# Patient Record
Sex: MALE | Race: WHITE | NOT HISPANIC OR LATINO | Employment: UNEMPLOYED | URBAN - METROPOLITAN AREA
[De-identification: names, ages, dates, MRNs, and addresses within clinical notes are randomized per-mention and may not be internally consistent; named-entity substitution may affect disease eponyms.]

---

## 2017-02-13 PROBLEM — R10.84 GENERALIZED ABDOMINAL PAIN: Status: ACTIVE | Noted: 2017-02-13

## 2017-02-13 PROBLEM — I16.1 HYPERTENSIVE EMERGENCY: Status: ACTIVE | Noted: 2017-02-13

## 2017-02-14 ENCOUNTER — HOSPITAL ENCOUNTER (INPATIENT)
Facility: MEDICAL CENTER | Age: 62
LOS: 3 days | DRG: 438 | End: 2017-02-17
Attending: HOSPITALIST | Admitting: HOSPITALIST
Payer: MEDICAID

## 2017-02-14 ENCOUNTER — RESOLUTE PROFESSIONAL BILLING HOSPITAL PROF FEE (OUTPATIENT)
Dept: HOSPITALIST | Facility: MEDICAL CENTER | Age: 62
End: 2017-02-14
Payer: MEDICAID

## 2017-02-14 PROCEDURE — A9270 NON-COVERED ITEM OR SERVICE: HCPCS | Performed by: HOSPITALIST

## 2017-02-14 PROCEDURE — 99223 1ST HOSP IP/OBS HIGH 75: CPT | Performed by: HOSPITALIST

## 2017-02-14 PROCEDURE — 770006 HCHG ROOM/CARE - MED/SURG/GYN SEMI*

## 2017-02-14 PROCEDURE — 700105 HCHG RX REV CODE 258: Performed by: HOSPITALIST

## 2017-02-14 PROCEDURE — 700112 HCHG RX REV CODE 229: Performed by: HOSPITALIST

## 2017-02-14 PROCEDURE — 700111 HCHG RX REV CODE 636 W/ 250 OVERRIDE (IP): Performed by: HOSPITALIST

## 2017-02-14 PROCEDURE — 700102 HCHG RX REV CODE 250 W/ 637 OVERRIDE(OP): Performed by: HOSPITALIST

## 2017-02-14 RX ORDER — BISACODYL 10 MG
10 SUPPOSITORY, RECTAL RECTAL
Status: DISCONTINUED | OUTPATIENT
Start: 2017-02-14 | End: 2017-02-17 | Stop reason: HOSPADM

## 2017-02-14 RX ORDER — AMLODIPINE BESYLATE 5 MG/1
5 TABLET ORAL
Status: DISCONTINUED | OUTPATIENT
Start: 2017-02-15 | End: 2017-02-15

## 2017-02-14 RX ORDER — HEPARIN SODIUM 5000 [USP'U]/ML
5000 INJECTION, SOLUTION INTRAVENOUS; SUBCUTANEOUS EVERY 8 HOURS
Status: DISCONTINUED | OUTPATIENT
Start: 2017-02-14 | End: 2017-02-16

## 2017-02-14 RX ORDER — ONDANSETRON 2 MG/ML
4 INJECTION INTRAMUSCULAR; INTRAVENOUS EVERY 4 HOURS PRN
Status: DISCONTINUED | OUTPATIENT
Start: 2017-02-14 | End: 2017-02-17 | Stop reason: HOSPADM

## 2017-02-14 RX ORDER — OXYCODONE HYDROCHLORIDE 10 MG/1
10 TABLET ORAL
Status: DISCONTINUED | OUTPATIENT
Start: 2017-02-14 | End: 2017-02-17 | Stop reason: HOSPADM

## 2017-02-14 RX ORDER — OXYCODONE HYDROCHLORIDE 5 MG/1
5 TABLET ORAL
Status: DISCONTINUED | OUTPATIENT
Start: 2017-02-14 | End: 2017-02-17 | Stop reason: HOSPADM

## 2017-02-14 RX ORDER — ENEMA 19; 7 G/133ML; G/133ML
1 ENEMA RECTAL
Status: DISCONTINUED | OUTPATIENT
Start: 2017-02-14 | End: 2017-02-17 | Stop reason: HOSPADM

## 2017-02-14 RX ORDER — ACETAMINOPHEN 325 MG/1
650 TABLET ORAL EVERY 6 HOURS PRN
Status: DISCONTINUED | OUTPATIENT
Start: 2017-02-14 | End: 2017-02-17 | Stop reason: HOSPADM

## 2017-02-14 RX ORDER — SODIUM CHLORIDE 9 MG/ML
INJECTION, SOLUTION INTRAVENOUS CONTINUOUS
Status: ACTIVE | OUTPATIENT
Start: 2017-02-14 | End: 2017-02-15

## 2017-02-14 RX ORDER — ONDANSETRON 4 MG/1
4 TABLET, ORALLY DISINTEGRATING ORAL EVERY 4 HOURS PRN
Status: DISCONTINUED | OUTPATIENT
Start: 2017-02-14 | End: 2017-02-17 | Stop reason: HOSPADM

## 2017-02-14 RX ORDER — PROMETHAZINE HYDROCHLORIDE 25 MG/1
12.5-25 SUPPOSITORY RECTAL EVERY 4 HOURS PRN
Status: DISCONTINUED | OUTPATIENT
Start: 2017-02-14 | End: 2017-02-17 | Stop reason: HOSPADM

## 2017-02-14 RX ORDER — MORPHINE SULFATE 4 MG/ML
4 INJECTION, SOLUTION INTRAMUSCULAR; INTRAVENOUS
Status: DISCONTINUED | OUTPATIENT
Start: 2017-02-14 | End: 2017-02-17 | Stop reason: HOSPADM

## 2017-02-14 RX ORDER — AMOXICILLIN 250 MG
1 CAPSULE ORAL
Status: DISCONTINUED | OUTPATIENT
Start: 2017-02-14 | End: 2017-02-17 | Stop reason: HOSPADM

## 2017-02-14 RX ORDER — TEMAZEPAM 15 MG/1
15 CAPSULE ORAL
Status: DISCONTINUED | OUTPATIENT
Start: 2017-02-14 | End: 2017-02-17 | Stop reason: HOSPADM

## 2017-02-14 RX ORDER — PROMETHAZINE HYDROCHLORIDE 25 MG/1
12.5-25 TABLET ORAL EVERY 4 HOURS PRN
Status: DISCONTINUED | OUTPATIENT
Start: 2017-02-14 | End: 2017-02-17 | Stop reason: HOSPADM

## 2017-02-14 RX ORDER — AMOXICILLIN 250 MG
1 CAPSULE ORAL NIGHTLY
Status: DISCONTINUED | OUTPATIENT
Start: 2017-02-14 | End: 2017-02-17 | Stop reason: HOSPADM

## 2017-02-14 RX ORDER — LACTULOSE 20 G/30ML
30 SOLUTION ORAL
Status: DISCONTINUED | OUTPATIENT
Start: 2017-02-14 | End: 2017-02-17 | Stop reason: HOSPADM

## 2017-02-14 RX ORDER — DOCUSATE SODIUM 100 MG/1
100 CAPSULE, LIQUID FILLED ORAL 2 TIMES DAILY
Status: DISCONTINUED | OUTPATIENT
Start: 2017-02-14 | End: 2017-02-17 | Stop reason: HOSPADM

## 2017-02-14 RX ADMIN — STANDARDIZED SENNA CONCENTRATE AND DOCUSATE SODIUM 1 TABLET: 8.6; 5 TABLET, FILM COATED ORAL at 22:57

## 2017-02-14 RX ADMIN — OXYCODONE HYDROCHLORIDE 10 MG: 10 TABLET ORAL at 22:57

## 2017-02-14 RX ADMIN — SODIUM CHLORIDE: 9 INJECTION, SOLUTION INTRAVENOUS at 22:58

## 2017-02-14 RX ADMIN — HEPARIN SODIUM 5000 UNITS: 5000 INJECTION, SOLUTION INTRAVENOUS; SUBCUTANEOUS at 22:57

## 2017-02-14 RX ADMIN — DOCUSATE SODIUM 100 MG: 100 CAPSULE ORAL at 22:57

## 2017-02-14 ASSESSMENT — LIFESTYLE VARIABLES
AVERAGE NUMBER OF DAYS PER WEEK YOU HAVE A DRINK CONTAINING ALCOHOL: 1
HAVE YOU EVER FELT YOU SHOULD CUT DOWN ON YOUR DRINKING: NO
CONSUMPTION TOTAL: NEGATIVE
TOTAL SCORE: 0
TOTAL SCORE: 0
HOW MANY TIMES IN THE PAST YEAR HAVE YOU HAD 5 OR MORE DRINKS IN A DAY: 0
EVER FELT BAD OR GUILTY ABOUT YOUR DRINKING: NO
TOTAL SCORE: 0
EVER_SMOKED: YES
DO YOU DRINK ALCOHOL: YES
HAVE PEOPLE ANNOYED YOU BY CRITICIZING YOUR DRINKING: NO
EVER HAD A DRINK FIRST THING IN THE MORNING TO STEADY YOUR NERVES TO GET RID OF A HANGOVER: NO
ON A TYPICAL DAY WHEN YOU DRINK ALCOHOL HOW MANY DRINKS DO YOU HAVE: 2

## 2017-02-14 ASSESSMENT — PAIN SCALES - GENERAL: PAINLEVEL_OUTOF10: 7

## 2017-02-15 ENCOUNTER — APPOINTMENT (OUTPATIENT)
Dept: RADIOLOGY | Facility: MEDICAL CENTER | Age: 62
DRG: 438 | End: 2017-02-15
Attending: HOSPITALIST
Payer: MEDICAID

## 2017-02-15 ENCOUNTER — HOSPITAL ENCOUNTER (OUTPATIENT)
Dept: RADIOLOGY | Facility: MEDICAL CENTER | Age: 62
End: 2017-02-15

## 2017-02-15 PROBLEM — K86.89 PANCREATIC MASS: Status: ACTIVE | Noted: 2017-02-15

## 2017-02-15 PROBLEM — N17.9 ACUTE KIDNEY INJURY (HCC): Status: ACTIVE | Noted: 2017-02-15

## 2017-02-15 PROBLEM — K85.90 PANCREATITIS: Status: ACTIVE | Noted: 2017-02-15

## 2017-02-15 LAB
ANION GAP SERPL CALC-SCNC: 7 MMOL/L (ref 0–11.9)
BUN SERPL-MCNC: 16 MG/DL (ref 8–22)
CALCIUM SERPL-MCNC: 8.7 MG/DL (ref 8.5–10.5)
CANCER AG19-9 SERPL-ACNC: 37.4 U/ML (ref 0–35)
CEA SERPL-MCNC: 4 NG/ML (ref 0–3)
CHLORIDE SERPL-SCNC: 103 MMOL/L (ref 96–112)
CO2 SERPL-SCNC: 26 MMOL/L (ref 20–33)
CREAT SERPL-MCNC: 1.19 MG/DL (ref 0.5–1.4)
ERYTHROCYTE [DISTWIDTH] IN BLOOD BY AUTOMATED COUNT: 41.7 FL (ref 35.9–50)
GFR SERPL CREATININE-BSD FRML MDRD: >60 ML/MIN/1.73 M 2
GLUCOSE SERPL-MCNC: 111 MG/DL (ref 65–99)
HCT VFR BLD AUTO: 41.3 % (ref 42–52)
HGB BLD-MCNC: 13.2 G/DL (ref 14–18)
LIPASE SERPL-CCNC: 237 U/L (ref 11–82)
MCH RBC QN AUTO: 28.6 PG (ref 27–33)
MCHC RBC AUTO-ENTMCNC: 32 G/DL (ref 33.7–35.3)
MCV RBC AUTO: 89.4 FL (ref 81.4–97.8)
PLATELET # BLD AUTO: 381 K/UL (ref 164–446)
PMV BLD AUTO: 11.3 FL (ref 9–12.9)
POTASSIUM SERPL-SCNC: 4.5 MMOL/L (ref 3.6–5.5)
RBC # BLD AUTO: 4.62 M/UL (ref 4.7–6.1)
SODIUM SERPL-SCNC: 136 MMOL/L (ref 135–145)
WBC # BLD AUTO: 14.8 K/UL (ref 4.8–10.8)

## 2017-02-15 PROCEDURE — A9270 NON-COVERED ITEM OR SERVICE: HCPCS | Performed by: INTERNAL MEDICINE

## 2017-02-15 PROCEDURE — 700112 HCHG RX REV CODE 229: Performed by: HOSPITALIST

## 2017-02-15 PROCEDURE — A9270 NON-COVERED ITEM OR SERVICE: HCPCS | Performed by: HOSPITALIST

## 2017-02-15 PROCEDURE — 700102 HCHG RX REV CODE 250 W/ 637 OVERRIDE(OP): Performed by: HOSPITALIST

## 2017-02-15 PROCEDURE — A9579 GAD-BASE MR CONTRAST NOS,1ML: HCPCS | Performed by: HOSPITALIST

## 2017-02-15 PROCEDURE — 36415 COLL VENOUS BLD VENIPUNCTURE: CPT

## 2017-02-15 PROCEDURE — 700117 HCHG RX CONTRAST REV CODE 255: Performed by: HOSPITALIST

## 2017-02-15 PROCEDURE — 74183 MRI ABD W/O CNTR FLWD CNTR: CPT

## 2017-02-15 PROCEDURE — 700111 HCHG RX REV CODE 636 W/ 250 OVERRIDE (IP): Performed by: HOSPITALIST

## 2017-02-15 PROCEDURE — 99233 SBSQ HOSP IP/OBS HIGH 50: CPT | Performed by: INTERNAL MEDICINE

## 2017-02-15 PROCEDURE — 86301 IMMUNOASSAY TUMOR CA 19-9: CPT

## 2017-02-15 PROCEDURE — 770006 HCHG ROOM/CARE - MED/SURG/GYN SEMI*

## 2017-02-15 PROCEDURE — 700102 HCHG RX REV CODE 250 W/ 637 OVERRIDE(OP): Performed by: INTERNAL MEDICINE

## 2017-02-15 PROCEDURE — 700111 HCHG RX REV CODE 636 W/ 250 OVERRIDE (IP): Performed by: INTERNAL MEDICINE

## 2017-02-15 PROCEDURE — 85027 COMPLETE CBC AUTOMATED: CPT

## 2017-02-15 PROCEDURE — 80048 BASIC METABOLIC PNL TOTAL CA: CPT

## 2017-02-15 PROCEDURE — 700105 HCHG RX REV CODE 258: Performed by: HOSPITALIST

## 2017-02-15 PROCEDURE — 83690 ASSAY OF LIPASE: CPT

## 2017-02-15 PROCEDURE — 82378 CARCINOEMBRYONIC ANTIGEN: CPT

## 2017-02-15 RX ORDER — LABETALOL HYDROCHLORIDE 5 MG/ML
20 INJECTION, SOLUTION INTRAVENOUS
Status: DISCONTINUED | OUTPATIENT
Start: 2017-02-15 | End: 2017-02-17 | Stop reason: HOSPADM

## 2017-02-15 RX ORDER — HYDRALAZINE HYDROCHLORIDE 20 MG/ML
20 INJECTION INTRAMUSCULAR; INTRAVENOUS
Status: DISCONTINUED | OUTPATIENT
Start: 2017-02-15 | End: 2017-02-17 | Stop reason: HOSPADM

## 2017-02-15 RX ORDER — BISACODYL 10 MG
10 SUPPOSITORY, RECTAL RECTAL
COMMUNITY
End: 2017-03-01

## 2017-02-15 RX ORDER — LISINOPRIL 20 MG/1
20 TABLET ORAL DAILY
Status: DISCONTINUED | OUTPATIENT
Start: 2017-02-15 | End: 2017-02-17 | Stop reason: HOSPADM

## 2017-02-15 RX ORDER — DOCUSATE SODIUM 100 MG/1
100 CAPSULE, LIQUID FILLED ORAL 2 TIMES DAILY PRN
Status: DISCONTINUED | OUTPATIENT
Start: 2017-02-15 | End: 2017-02-15

## 2017-02-15 RX ORDER — AMLODIPINE BESYLATE 10 MG/1
10 TABLET ORAL
Status: DISCONTINUED | OUTPATIENT
Start: 2017-02-15 | End: 2017-02-17 | Stop reason: HOSPADM

## 2017-02-15 RX ADMIN — HEPARIN SODIUM 5000 UNITS: 5000 INJECTION, SOLUTION INTRAVENOUS; SUBCUTANEOUS at 15:16

## 2017-02-15 RX ADMIN — OXYCODONE HYDROCHLORIDE 10 MG: 10 TABLET ORAL at 10:25

## 2017-02-15 RX ADMIN — STANDARDIZED SENNA CONCENTRATE AND DOCUSATE SODIUM 1 TABLET: 8.6; 5 TABLET, FILM COATED ORAL at 19:39

## 2017-02-15 RX ADMIN — HYDRALAZINE HYDROCHLORIDE 20 MG: 20 INJECTION INTRAMUSCULAR; INTRAVENOUS at 18:31

## 2017-02-15 RX ADMIN — HEPARIN SODIUM 5000 UNITS: 5000 INJECTION, SOLUTION INTRAVENOUS; SUBCUTANEOUS at 21:49

## 2017-02-15 RX ADMIN — LISINOPRIL 20 MG: 20 TABLET ORAL at 18:35

## 2017-02-15 RX ADMIN — DOCUSATE SODIUM 100 MG: 100 CAPSULE ORAL at 19:39

## 2017-02-15 RX ADMIN — HYDRALAZINE HYDROCHLORIDE 20 MG: 20 INJECTION INTRAMUSCULAR; INTRAVENOUS at 21:58

## 2017-02-15 RX ADMIN — SODIUM CHLORIDE: 9 INJECTION, SOLUTION INTRAVENOUS at 09:10

## 2017-02-15 RX ADMIN — GADODIAMIDE 20 ML: 287 INJECTION INTRAVENOUS at 14:40

## 2017-02-15 RX ADMIN — OXYCODONE HYDROCHLORIDE 10 MG: 10 TABLET ORAL at 16:08

## 2017-02-15 RX ADMIN — HEPARIN SODIUM 5000 UNITS: 5000 INJECTION, SOLUTION INTRAVENOUS; SUBCUTANEOUS at 05:20

## 2017-02-15 RX ADMIN — AMLODIPINE BESYLATE 10 MG: 10 TABLET ORAL at 09:26

## 2017-02-15 RX ADMIN — OXYCODONE HYDROCHLORIDE 5 MG: 5 TABLET ORAL at 19:39

## 2017-02-15 RX ADMIN — OXYCODONE HYDROCHLORIDE 10 MG: 10 TABLET ORAL at 05:20

## 2017-02-15 ASSESSMENT — PAIN SCALES - GENERAL
PAINLEVEL_OUTOF10: 5
PAINLEVEL_OUTOF10: 6
PAINLEVEL_OUTOF10: 8
PAINLEVEL_OUTOF10: 7
PAINLEVEL_OUTOF10: 6
PAINLEVEL_OUTOF10: 4

## 2017-02-15 NOTE — H&P
CHIEF COMPLAINT:  Abdominal pain.    HISTORY OF PRESENT ILLNESS:  This is a 61-year-old gentleman who is   transferred to our care from [facility].  He reports that he   has been having some abdominal pain for about 2 weeks.  He describes as   cramping sensation located, more so on the upper abdomen and it does radiate   to his lower back; however.  He has also noticed a decreased appetite as when   he eats, it triggers his pain to be worse.  There is nothing else that makes   it better and nothing else makes it worse that he is aware of.  He thinks he   lost about 20 pounds in the space of the last 3-4 weeks.    Patient was seen at an urgent care and thought to be perhaps suffering from   diverticulitis and was treated empirically for this.  He did not get better.    He therefore, presented for evaluation to the emergency room at [facility].  Workup there included a CT which has demonstrated a   pancreatic mass, he has been transferred to our facility for further   evaluation.    REVIEW OF SYSTEMS:  Positive as noted above, otherwise positive for   constipation.  All other systems are reviewed and negative.    PREVIOUS MEDICAL HISTORY:  The patient denies any previous medical history as   he is not seeing physicians of late.  When he saw the urgent care mid level,   he was noted to be hypertensive and started on blood pressure medication.    This is his only previous medical history that he is aware of.    MEDICATIONS:  Lisinopril 20 mg p.o. daily.    ALLERGIES:  No known drug allergies.    SOCIAL HISTORY:  Patient smokes about greater than 40 pack years now.  He   drinks alcohol 1-2 beers every few weeks.    SURGICAL HISTORY:  Denies.    FAMILY HISTORY:  Reviewed but not relevant to presentation.    PHYSICAL EXAMINATION:  VITAL SIGNS:  Temperature 36.9, heart rate 84, respiratory rate 20, /96,   sating 91% on room air.  GENERAL:  The patient is awake, alert.  He is in no  acute distress.  HEENT:  Head is normocephalic and atraumatic.  Mucous membranes are moist.    Sclerae and conjunctivae are benign.  NECK:  Trachea is in the midline.  There is no JVD.  There are no bruits.  RESPIRATORY:  Clear to auscultation bilaterally.  CARDIAC:  Regular rate and rhythm.  No murmurs, rubs or clicks.  ABDOMEN:  Soft, no guarding, rebound, hepatosplenomegaly or masses.  There is   minimal tenderness to palpation in the epigastric region.  EXTREMITIES:  No clubbing, cyanosis or edema.  Calves are nontender to   palpation.  Peripheral pulses maintained and symmetric.  SKIN:  Warm, dry, no rashes appreciated.  NEUROLOGIC:  Alert and oriented.  Speech clear, content logical.  PSYCHIATRIC:  Mood and affect are appropriate.  Hygiene neat and clean.    LABORATORY DATA:  White count 17.2, hemoglobin 14.1, platelet count 402.    Sodium 136, potassium 3.9, BUN 22, creatinine 1.5.  Calculated GFR 48.  LFTs   are unremarkable.  Albumin is low at 2.2, and lipase is 980.  UA, nitrite and   leukocyte esterase negative.    IMAGING STUDIES:  CT of the abdomen and pelvis reviewed from the transferring   facility, large multiseptated cystic mass involving the pancreatic tail and   body.    ASSESSMENT AND PLAN:  A 61-year-old gentleman with problem list as follows:  1.  Pancreatic mass:  The patient will be admitted, MRI of the abdomen will be   done and we will consult GI or surgery depending on the results.  2.  Abdominal pain:  Likely secondary to above.  3.  Acute kidney injury:  Unclear if this is acute or acute on chronic.    Patient does carry significant hypertension, which is newly diagnosed and   certainly this could explain it.  There is no evidence of obstructive etiology   on CT; however, there is some evidence of chronic kidney disease on CT.  4.  Hypertension:  Stop the patient's ACE inhibitor given his renal function   and place him on amlodipine 10 mg p.o. daily.  Monitor and titrate closely.  5.   Tobacco use:  Encourage cessation.       ____________________________________     DO PATRICK Duarte / ANTONIO    DD:  02/15/2017 02:32:53  DT:  02/15/2017 03:05:02    D#:  [id number]  Job#:  [id number]

## 2017-02-15 NOTE — PROGRESS NOTES
Assumed patient care at 0715. A&OX4. Assessment complete. PIV right ac running NS at 100mL/hr. NPO at this time- scheduled MRI. Patient resting in bed. Personal belongings and call light within reach.

## 2017-02-15 NOTE — CARE PLAN
Problem: Communication  Goal: The ability to communicate needs accurately and effectively will improve  Outcome: PROGRESSING AS EXPECTED  Pt capable of verbalizing all needs and utilizes call light system approprietly.    Problem: Safety  Goal: Will remain free from injury  Outcome: PROGRESSING AS EXPECTED  Pt fall risk, pt wearing treaded socks, bed locked and in lowest position.  Pt refusing bed alarm, educated on need but still refusing.  Pt call light and phone within reach.

## 2017-02-15 NOTE — PROGRESS NOTES
Cannot access films to assess pancreas.  Please ask to have films pushed to [facility].  Also, needs GI consult for EUS and social service to help attaining insurance coverage for possible surgery and EUS.

## 2017-02-15 NOTE — CONSULTS
DATE OF SERVICE:  02/15/2017    HISTORY OF PRESENT ILLNESS:  Patient is a 61-year-old male patient who was   transferred from an outside [facility] to [facility] after having been diagnosed with what appears to be a large   mass in the body and tail of the pancreas.  The patient is somewhat of a poor   historian, but has a history of alcoholism and alcohol abuse, back 10 years   ago, but started developing severe abdominal pain in the epigastric region   approximately two weeks ago.  He states that it is a cramping pain in the   upper abdomen.  It did not radiate anywhere.  He denied any upper or lower GI   bleed.  He denies any unusual pancreatic disease in the family, but when he   ate it appeared that it did get worse.  In any event, he also complains of   about 20-pound weight loss, but he is morbidly obese at this time.  The   patient states that he went to urgent care, thought he was suffering from   diverticulitis, which he has had in the past, but he was then sent from there   to the ER at [facility] where he got a CT scan, which   revealed a large pancreatic cystic lesion with somewhat thickened walls and   then he was transferred to [facility] for further workup.    REVIEW OF SYSTEMS:  The patient denies any upper or lower GI bleed.  He denies   ever being jaundiced.  The patient denies any vision or hearing loss.  He   denies any nausea or vomiting, but he does complain of chronic pain.  He   denies any chest pain, shortness of breath, or neurological dysfunction.    PAST MEDICAL HISTORY:  In the past again, he used to drink heavily.  He said   he stopped 10 years ago.  He also has a history of hypertension, but he is on   blood pressure medication.    MEDICATIONS:  Right now, he is on lisinopril 20 mg a day.    ALLERGIES:  He says he has no known drug allergies.    SOCIAL HISTORY:  He does smoke a pack a day, he says for the last 40 years and   then  he says he drinks 1-2 beers every couple of weeks, but states that he   does not have any more abuse of alcohol for the last 10 years.  VITAL SIGNS:  He is afebrile, vital signs are stable.  HEENT:  Extraocular movements intact.  No sign of jaundice.  NECK:  Soft and supple.  There is no cervical, supraclavicular, or occipital   lymphadenopathy.  The patient has poor dentition.  LUNGS:  Clear to auscultation.  Moderate inspiratory effort.  CARDIOVASCULAR:  Regular rhythm.  ABDOMEN:  Soft.  He has some discomfort in the upper epigastrium and left   upper quadrant consistent with the region of the cyst.  He is obese and   distended.  EXTREMITIES:  No clubbing, cyanosis, or edema.    ASSESSMENT AND PLAN:  Finally, the patient has what appears to be either a   pseudocyst or a large mucinous cystic neoplasm or intraductal papillary   mucinous neoplasm.  The patient is in need of the endoscopic ultrasound and GI   consultation for evaluation.  Also, the patient does have some challenges   with regards to social issues and the patient has no insurance and setting up   for either surgery and endoscopic ultrasound would be an extreme challenge due   to his lack of coverage.  I would appreciate if  can get   involved and help the patient to acquire coverage for his care.  In the   meantime, I would recommend having him see GI and the patient can see me as an   outpatient once his workup and biopsies have been done.       ____________________________________     MD NATALI CARABALLO / ANTONIO    DD:  02/15/2017 10:45:28  DT:  02/15/2017 15:17:55    D#:  [id number]  Job#:  [id number]

## 2017-02-15 NOTE — PROGRESS NOTES
Pt direct admit arrived by emilee.  Pt ambulated to bed with stand by assist.  Pt assessed and 2 RN skin assessment complete, skin intact.  Updated pt on unit routine including call light system, hourly rounding, white board info, need for bed alarm, and visitors policy.  Bed in lowest position, locked, pt wearing treaded socks, and pt instructed on need to call for assistance prior to getting out of bed.  Pt refusing bed alarm, pt educated on need for bed alarm but still refusing.  Call light, phone, and personal belongs within reach, white board updated.

## 2017-02-15 NOTE — DIETARY
"Nutrition Services: Poor Oral Intake and Unplanned Weight Loss  62 yo male with admit dx: pancreatitis, pancreatic mass  PMH: HTN    Per nutrition admit screen, patient with poor oral intake and unplanned weight loss. The patient reports pain associated with eating that has been ongoing for the last 2-3 weeks; in that time frame he has lost ~20 lbs (8%, SEVERE). The patient is currently on a regular diet. Briefly discussed appropriate diet modifications for pancreatitis such as reducing fat and trying to eat smaller more frequent meals. The patient was receptive to adding snacks between meals as he is only able to tolerance small amounts of food at this time.     Diet order: Regular  Pertinent Labs: Lipase 237  Pertinent Meds: amlodipine, colace, heparin, senokot.   Fluids: NS infusion 100 ml/hr  GI: last BM 2/11  WT: 99.1 kg Bed Scale weight  HT: 6'1\"  BMI: 28.83 kg/m^2  SKIN: no skin breakdown noted  PLAN - Nutrition Rep to see patient daily for meal choices.  RECOMMEND - Low fat, small frequent meals. Obtain weights to monitor fluid and nutrition status. Please document all intake as percentage of meal consumed. RD to follow clinical course.    "

## 2017-02-15 NOTE — PROGRESS NOTES
Direct admit from Dr. Huang at [facility]. Dr. Madden accepting for Pancreatitis and Pancreatic mass. ADT signed and held 02/14/2017 at 1925, will need to be released upon patient arrival to unit. Patient arriving via ground transport.

## 2017-02-16 VITALS
SYSTOLIC BLOOD PRESSURE: 165 MMHG | HEART RATE: 88 BPM | BODY MASS INDEX: 28.96 KG/M2 | OXYGEN SATURATION: 93 % | WEIGHT: 218.48 LBS | DIASTOLIC BLOOD PRESSURE: 87 MMHG | RESPIRATION RATE: 18 BRPM | TEMPERATURE: 97.9 F | HEIGHT: 73 IN

## 2017-02-16 LAB
ALBUMIN SERPL BCP-MCNC: 2.6 G/DL (ref 3.2–4.9)
ALBUMIN/GLOB SERPL: 0.7 G/DL
ALP SERPL-CCNC: 66 U/L (ref 30–99)
ALT SERPL-CCNC: 8 U/L (ref 2–50)
ANION GAP SERPL CALC-SCNC: 10 MMOL/L (ref 0–11.9)
AST SERPL-CCNC: 9 U/L (ref 12–45)
BILIRUB SERPL-MCNC: 0.2 MG/DL (ref 0.1–1.5)
BUN SERPL-MCNC: 12 MG/DL (ref 8–22)
CALCIUM SERPL-MCNC: 8.7 MG/DL (ref 8.5–10.5)
CHLORIDE SERPL-SCNC: 102 MMOL/L (ref 96–112)
CO2 SERPL-SCNC: 21 MMOL/L (ref 20–33)
CREAT SERPL-MCNC: 1.04 MG/DL (ref 0.5–1.4)
EKG IMPRESSION: NORMAL
ERYTHROCYTE [DISTWIDTH] IN BLOOD BY AUTOMATED COUNT: 41.1 FL (ref 35.9–50)
GFR SERPL CREATININE-BSD FRML MDRD: >60 ML/MIN/1.73 M 2
GLOBULIN SER CALC-MCNC: 3.8 G/DL (ref 1.9–3.5)
GLUCOSE SERPL-MCNC: 109 MG/DL (ref 65–99)
HCT VFR BLD AUTO: 39.3 % (ref 42–52)
HGB BLD-MCNC: 13 G/DL (ref 14–18)
LIPASE SERPL-CCNC: 163 U/L (ref 11–82)
MCH RBC QN AUTO: 29 PG (ref 27–33)
MCHC RBC AUTO-ENTMCNC: 33.1 G/DL (ref 33.7–35.3)
MCV RBC AUTO: 87.5 FL (ref 81.4–97.8)
PLATELET # BLD AUTO: 324 K/UL (ref 164–446)
PMV BLD AUTO: 11 FL (ref 9–12.9)
POTASSIUM SERPL-SCNC: 3.7 MMOL/L (ref 3.6–5.5)
PROT SERPL-MCNC: 6.4 G/DL (ref 6–8.2)
RBC # BLD AUTO: 4.49 M/UL (ref 4.7–6.1)
SODIUM SERPL-SCNC: 133 MMOL/L (ref 135–145)
WBC # BLD AUTO: 13.8 K/UL (ref 4.8–10.8)

## 2017-02-16 PROCEDURE — 700111 HCHG RX REV CODE 636 W/ 250 OVERRIDE (IP): Performed by: HOSPITALIST

## 2017-02-16 PROCEDURE — 99233 SBSQ HOSP IP/OBS HIGH 50: CPT | Performed by: INTERNAL MEDICINE

## 2017-02-16 PROCEDURE — 700111 HCHG RX REV CODE 636 W/ 250 OVERRIDE (IP): Performed by: INTERNAL MEDICINE

## 2017-02-16 PROCEDURE — A9270 NON-COVERED ITEM OR SERVICE: HCPCS | Performed by: HOSPITALIST

## 2017-02-16 PROCEDURE — 80053 COMPREHEN METABOLIC PANEL: CPT

## 2017-02-16 PROCEDURE — 83690 ASSAY OF LIPASE: CPT

## 2017-02-16 PROCEDURE — 85027 COMPLETE CBC AUTOMATED: CPT

## 2017-02-16 PROCEDURE — 700102 HCHG RX REV CODE 250 W/ 637 OVERRIDE(OP): Performed by: HOSPITALIST

## 2017-02-16 PROCEDURE — 93005 ELECTROCARDIOGRAM TRACING: CPT | Performed by: INTERNAL MEDICINE

## 2017-02-16 PROCEDURE — 770006 HCHG ROOM/CARE - MED/SURG/GYN SEMI*

## 2017-02-16 PROCEDURE — 93010 ELECTROCARDIOGRAM REPORT: CPT | Performed by: INTERNAL MEDICINE

## 2017-02-16 PROCEDURE — 36415 COLL VENOUS BLD VENIPUNCTURE: CPT

## 2017-02-16 PROCEDURE — A9270 NON-COVERED ITEM OR SERVICE: HCPCS | Performed by: INTERNAL MEDICINE

## 2017-02-16 PROCEDURE — 700102 HCHG RX REV CODE 250 W/ 637 OVERRIDE(OP): Performed by: INTERNAL MEDICINE

## 2017-02-16 PROCEDURE — 700112 HCHG RX REV CODE 229: Performed by: HOSPITALIST

## 2017-02-16 RX ORDER — CLONIDINE HYDROCHLORIDE 0.1 MG/1
0.1 TABLET ORAL TWICE DAILY
Status: DISCONTINUED | OUTPATIENT
Start: 2017-02-16 | End: 2017-02-17 | Stop reason: HOSPADM

## 2017-02-16 RX ORDER — CLONIDINE HYDROCHLORIDE 0.1 MG/1
0.2 TABLET ORAL EVERY 4 HOURS PRN
Status: DISCONTINUED | OUTPATIENT
Start: 2017-02-16 | End: 2017-02-17 | Stop reason: HOSPADM

## 2017-02-16 RX ADMIN — STANDARDIZED SENNA CONCENTRATE AND DOCUSATE SODIUM 1 TABLET: 8.6; 5 TABLET, FILM COATED ORAL at 20:32

## 2017-02-16 RX ADMIN — HEPARIN SODIUM 5000 UNITS: 5000 INJECTION, SOLUTION INTRAVENOUS; SUBCUTANEOUS at 05:33

## 2017-02-16 RX ADMIN — HYDRALAZINE HYDROCHLORIDE 20 MG: 20 INJECTION INTRAMUSCULAR; INTRAVENOUS at 13:44

## 2017-02-16 RX ADMIN — BISACODYL 10 MG: 10 SUPPOSITORY RECTAL at 13:54

## 2017-02-16 RX ADMIN — MORPHINE SULFATE 4 MG: 4 INJECTION INTRAVENOUS at 11:41

## 2017-02-16 RX ADMIN — OXYCODONE HYDROCHLORIDE 10 MG: 10 TABLET ORAL at 18:17

## 2017-02-16 RX ADMIN — MORPHINE SULFATE 4 MG: 4 INJECTION INTRAVENOUS at 20:54

## 2017-02-16 RX ADMIN — AMLODIPINE BESYLATE 10 MG: 10 TABLET ORAL at 09:05

## 2017-02-16 RX ADMIN — LISINOPRIL 20 MG: 20 TABLET ORAL at 09:04

## 2017-02-16 RX ADMIN — OXYCODONE HYDROCHLORIDE 10 MG: 10 TABLET ORAL at 09:04

## 2017-02-16 RX ADMIN — OXYCODONE HYDROCHLORIDE 10 MG: 10 TABLET ORAL at 05:33

## 2017-02-16 RX ADMIN — HYDRALAZINE HYDROCHLORIDE 20 MG: 20 INJECTION INTRAMUSCULAR; INTRAVENOUS at 03:22

## 2017-02-16 RX ADMIN — LACTULOSE 30 ML: 10 SOLUTION ORAL at 09:07

## 2017-02-16 RX ADMIN — DOCUSATE SODIUM 100 MG: 100 CAPSULE ORAL at 20:32

## 2017-02-16 RX ADMIN — OXYCODONE HYDROCHLORIDE 5 MG: 5 TABLET ORAL at 00:55

## 2017-02-16 RX ADMIN — MORPHINE SULFATE 4 MG: 4 INJECTION INTRAVENOUS at 15:46

## 2017-02-16 RX ADMIN — CLONIDINE HYDROCHLORIDE 0.1 MG: 0.1 TABLET ORAL at 18:17

## 2017-02-16 RX ADMIN — HYDRALAZINE HYDROCHLORIDE 20 MG: 20 INJECTION INTRAMUSCULAR; INTRAVENOUS at 17:15

## 2017-02-16 ASSESSMENT — PAIN SCALES - GENERAL
PAINLEVEL_OUTOF10: 6
PAINLEVEL_OUTOF10: 6
PAINLEVEL_OUTOF10: 5
PAINLEVEL_OUTOF10: 6
PAINLEVEL_OUTOF10: 7
PAINLEVEL_OUTOF10: 4
PAINLEVEL_OUTOF10: 7
PAINLEVEL_OUTOF10: 10
PAINLEVEL_OUTOF10: 8
PAINLEVEL_OUTOF10: 9
PAINLEVEL_OUTOF10: 10

## 2017-02-16 NOTE — PROGRESS NOTES
MD paged regarding elevated blood pressure, and family requesting MRI results. Dr. Harrison instructed to give IV labetelol and he reported results will be read in the morning. Pharmacy reporting a shortage of Labetelol. MD re-paged.

## 2017-02-16 NOTE — DISCHARGE PLANNING
Pt's transportation is set up for [facility].  Per RN Papito, DIALLO Flynn will ride with pt for procedure.      Per [facility] Rebecca, pt's [facility] ride back to main will cost $842.  THU obtained verbal consent from Supervisor Tabitha Emanuel.  THU faxed approved service form to [facility].

## 2017-02-16 NOTE — PROGRESS NOTES
Gi update.  He is scheduled for EUS eval tomorrow at 0930 at [facility]. Needs to be there by 0730. NPO, EKG and DC heparin orders placed.

## 2017-02-16 NOTE — CARE PLAN
"Problem: Knowledge Deficit  Goal: Knowledge of disease process/condition, treatment plan, diagnostic tests, and medications will improve  Pt stated, \"I have had high blood pressure for a long time. I quit taking any blood pressure medications about 6 years ago.\" Educated pt on the importance of finding a primary care doctor once discharged from hospital. Pt stated, \"we are still trying to figure out my lack of insurance.\"    Problem: Pain Management  Goal: Pain level will decrease to patient’s comfort goal  Pt c/o constant pain in abdomen. Medicated pt per MAR.         "

## 2017-02-16 NOTE — PROGRESS NOTES
GI Consults  Re: pancreatic cystic mass    62yo male initially seen 3 weeks ago [place] for abd pain and treated empirically for diverticulitis.  Returned to [place] clinic 2/13 with ongoing generalized abd pain, change in bowel habits/constipation, 20# weight loss, sweats and fatigue.  He was noted to have /116 and started on lisinopril.  Pain cont'd and presented to [facility] 2/14 with acute pancreatitis and CT showing large pancreatic cystic mass.  Transferred to [facility], had MR pancreas and was seen by Dr. Ghosh who recommended GI consult for EUS.    Past hx of alcohol abuse but occasional beer only now.  No gallbladder disease.  No meds other than lisinopril started day before presentation to ER.  No family hx of pancreatic disease.    Labs:  2/15 CEA 4, CA 19-9 37.4  2/16 lipase 163, alb 2.6, nl liver tests    Impression:  1.  13 x 17 cm mutiseptated cystic mass in body and tail of pancreas  2.  Acute pancreatitis  3.  Generalized abdominal pain: mid abd right and left + epigastric pain  4.  Change in bowel habits  5.  Weight loss  6.  Morbidly obese  7.  HTN  8.  Hx alcohol abuse and quit regular consumption 10 years ago  9.  FAtty liver  10.  Smoker  10.  Uninsured/social issue    Recs:  1.  Dr. Oconnor will be assuming GI service this am  2.  Will work on scheduling EUS with anesthesia at [facility].  Unknown if this can be done before next week

## 2017-02-16 NOTE — DISCHARGE PLANNING
Per [facility], Herminia willett is unable to take pt that early.  THU faxed approved service form to [facility] in the amount of $40.

## 2017-02-16 NOTE — PROGRESS NOTES
Assumed care at 1915.  Report received from day shift RN. Pt A&Ox4, pt ambulatory. Pt reports pain 8/10, medicated per MAR.  Pt educated on hourly rounding and phone extension numbers. Pt board has been updated. Pt denies any additional needs at this time.  Call light and phone within reach.  Pt refusing bed alarm at this time. Pt educated on safety risk and continues to refuse. Pt educated on importance of calling for assistance and verbalizes understanding.

## 2017-02-16 NOTE — CONSULTS
DATE OF SERVICE:  02/16/2017    REFERRING PHYSICIAN:  _____.    REASON FOR THE CONSULT:  Pancreatic cystic mass.    HISTORY OF PRESENT ILLNESS:  The patient is a 61-year-old  who lives in   the [facility], was initially seen 2-3 weeks ago at a clinic in [facility] for   generalized abdominal pain and constipation.  He had been trying Metamucil   and milk of magnesia, but was unable to get relief.  He was treated   empirically for diverticulitis with an unknown antibiotic.  He represented to   a clinic in [place] on February 13th with generalized abdominal pain.  He was   urged to go to the ER.  On that initial presentation, he had a blood pressure   of 210/116 and was started on lisinopril.  The following day, he presented to   [facility] with the same symptoms of generalized abdominal   pain, constipation, 20-pound weight loss, loss of appetite, drenching sweats   and fatigue.  He underwent a CT of the abdomen and pelvis with contrast, was   found to have a cystic pancreatic mass in the body and tail.  He also was   found to have acute pancreatitis.    He has a past history of alcohol abuse, but nothing on a regular basis.  He   drinks an occasional beer.  He has not been started on any new medications   other than lisinopril 1 day before his presentation.  There is no family   history of pancreatic disease or cancer.  He did not have any evidence of   gallbladder disease on his CT.    ALLERGIES:  None.    MEDICATIONS PRIOR TO ADMISSION:  Lisinopril.    SURGERIES:  None.    MEDICAL ILLNESSES:  Remote alcohol abuse, hypertension.    SOCIAL HISTORY:  He works as a part-time .  He lives with his   girlfriend.  He smokes 1 pack of cigarettes a day for the past 40 years.  He   has an occasional beer.    FAMILY HISTORY:  Mother had cardiac disease at age 80.  Father had head and   neck cancer.    REVIEW OF SYSTEMS:  GENERAL:  A 20-pound weight loss with fatigue.  He has had drenching sweats    occurring both day and night.  He has had loss of appetite.  HEENT:  No double vision.  No severe headaches.  PULMONARY:  No shortness of breath, wheezing, or cough.  CARDIOVASCULAR:  No chest pain.  GASTROINTESTINAL:  As above.  GENITOURINARY:  No hematuria.  MUSCULOSKELETAL:  No complaints of muscle pain, has had mid to low back pain   of 3 weeks' duration.  ENDOCRINE:  No diabetes or thyroid disease.  SKIN:  No rashes or pruritus.    PHYSICAL EXAMINATION:  VITAL SIGNS:  Temperature 37.4, pulse 89, respirations 17, blood pressure   156/81.  GENERAL:  This is a 61-year-old male sitting up in bed in no acute distress.  HEENT:  His sclerae are anicteric.  Pupils are round.  No oral lesions.    Mallampati II, near edentulous.  NECK:  Soft.  No adenopathy, no thyromegaly.  LUNGS:  Clear to auscultation.  CARDIOVASCULAR:  S1, S2, without murmur, gallop or rub.  ABDOMEN:  Protuberant, bowel sounds present, no distinct palpable masses.    Tender mid right abdomen, tender mid left abdomen and epigastric regions.  No   rebound or guarding.  RECTAL:  Deferred.  EXTREMITIES:  No clubbing, cyanosis or peripheral edema.  SKIN:  Smooth, moist, and warm.  NEUROLOGIC:  He is awake, alert, oriented, moving all extremities.    LABORATORY DATA:  February 14th, WBC 17.2, hemoglobin 14.1, hematocrit 43.6,   platelets 402.  Sodium 136, potassium 3.9, chloride 100, bicarbonate 29, BUN   22, creatinine 1.5, glucose 118, lipase 980.  February 15th, CA 19-9 37.4, CEA   4.0.  February 16th, WBC 13.8, hemoglobin 13, hematocrit 39.3, platelets 324,   BUN 12, creatinine 1.04, albumin 2.6, lipase 163.  February 14th, CT of the   abdomen and pelvis with contrast revealed fatty liver, negative gallbladder,   and a 13x7 cm multiseptated cystic mass in the body and the tail with the   possibility of occlusion of the splenic vein.  February 15th, MRI of the   pancreas, 13x17.5 cm mass with multiple irregular shaped cystic components,   occupying  the pancreatic body and tail.  There is enhancement of the tissue   between the cystic areas within the mass.  There is induration and stranding   areas of enhancement surrounding this portion of the pancreas extending into   the anterior pararenal space on the left.  The mass does not appear to contact   the superior mesenteric artery.  The portal confluence and superior   mesenteric vein are located immediately adjacent to the mass.  No upper   abdominal or retroperitoneal adenopathy.  Radiologically, the differential   diagnosis is pancreatic carcinoma with necrotic lesion, cystic neoplasm of the   pancreas, or multiple pseudocysts.    IMPRESSION:  1.  A 13x17.5 cm multiseptated cystic mass in the body and the tail.  2.  Acute pancreatitis.  3.  Generalized abdominal pain.  4.  A 3-week history of mid to low back pain.  5.  Change in bowel habits.  6.  Weight loss.  7.  Morbidly obese.  8.  Hypertension.  9.  History of alcohol abuse and quit regular consumption 10 years prior.  10.  Fatty liver.  11.  Smoker.  12.  Uninsured/social issues.    RECOMMENDATIONS:  1.  Dr. Oconnor will be assuming the GI service today and I will discuss the   patient with him.  2.  When my office is open, we will work on scheduling endoscopic ultrasound   with anesthesia at [facility].  It is unknown if this will be done prior to   next week.  3.  He has not had a bowel movement in 4 days and feeling uncomfortable.  He   is receiving laxatives and if he has not had a bowel movement by tomorrow, he   may need enemas and magnesium citrate.       ____________________________________     MD ASHLEIGH GEE / ANTONIO    DD:  02/16/2017 07:53:43  DT:  02/16/2017 08:41:12    D#:  [id number]  Job#:  [id number]

## 2017-02-16 NOTE — DISCHARGE PLANNING
Care Transition Team Assessment    Information Source  Orientation : Oriented x 4  Information Given By: Patient  Informant's Name: Brian Jasmine  Who is responsible for making decisions for patient? : Patient    Elopement Risk  Legal Hold: No  Ambulatory or Self Mobile in Wheelchair: Yes  Disoriented: No  Psychiatric Symptoms: None  History of Wandering: No  Elopement this Admit: No  Vocalizing Wanting to Leave: No  Displays Behaviors, Body Language Wanting to Leave: No-Not at Risk for Elopement  Elopement Risk: Not at Risk for Elopement    Interdisciplinary Discharge Planning  Does Admitting Nurse Feel This Could be a Complex Discharge?: No  Primary Care Physician: None  Lives with - Patient's Self Care Capacity: Significant Other  Patient or legal guardian wants to designate a caregiver (see row info): No  Support Systems: Spouse / Significant Other, Family Member(s), Children  Housing / Facility: [address]  Do You Take your Prescribed Medications Regularly: Yes  Able to Return to Previous ADL's: Yes  Mobility Issues: No  Prior Services: None  Patient Expects to be Discharged to:: Home  Assistance Needed: No  Durable Medical Equipment: Not Applicable    Discharge Preparedness  What are your discharge supports?: Child, Spouse, Sibling  Prior Functional Level: Ambulatory    Functional Assesment  Prior Functional Level: Ambulatory    Finances  Financial Barriers to Discharge: Yes  Source of Income: Other (comment) (Self Employed)  Prescription Coverage: No  Prescription Coverage Comments: No insurance    Vision / Hearing Impairment  Vision Impairment : No  Hearing Impairment : No    Values / Beliefs / Concerns  Values / Beliefs Concerns : No  Special Hospitalization Concerns: none    Advance Directive  Advance Directive?: None  Advance Directive offered?: AD Booklet given    Domestic Abuse  Have you ever been the victim of abuse or violence?: No  Physical Abuse or Sexual Abuse: No  Verbal Abuse or Emotional  Abuse: No  Possible Abuse Reported to:: Not Applicable    Psychological Assessment  History of Substance Abuse: None  History of Psychiatric Problems: No  Non-compliant with Treatment: No  Newly Diagnosed Illness: No    Discharge Risks or Barriers  Discharge risks or barriers?: Uninsured / underinsured    Anticipated Discharge Information  Anticipated discharge disposition: Home

## 2017-02-16 NOTE — DISCHARGE PLANNING
Transport arranged with Abhay. Patient will be leaving [facility] tomorrow 2/17 after surgery around 1200 via [facility] going back to [facility]. THU Sim notified.

## 2017-02-16 NOTE — DISCHARGE PLANNING
Transport arranged with Grab[facility]. Patient will be leaving tomorrow morning @0600 via the [facility] van going to [facility] for a procedure. THU Sim notified.

## 2017-02-16 NOTE — PROGRESS NOTES
Patient scheduled for endoscopy ultrasound on 2/17, [facility]. Social work arranging transport. Patient to be NPO starting midnight 2/16 per MD.

## 2017-02-16 NOTE — DISCHARGE PLANNING
Received approved services from THU Sim. Sent form to [facility] for $842.     Per Grabiel in transport, the [facility] cannot take the patient to  because they do not have drivers that early. [facility] received a quote from [employer] at [facility] of $40. Patient will be leaving tomorrow morning @0615 via [facility] going to [facility]. Approved services form faxed to [facility].

## 2017-02-16 NOTE — PROGRESS NOTES
HOSPITALIST PROGRESS NOTE (SOAP)      CHIEF COMPLAINT  61 y.o.yo male presented 2/14/2017 with abdominal pain    Subjective  Chart reviewed. Still having mild abdominal tenderness but otherwise he denies nausea, vomiting. He said he has eaten very little and has not moved his bowel for a few days now.    PHYSICAL EXAM  Vitals reviewed  General/Constitutional: No acute distress.   Head: Normocephalic, atraumatic  ENT: Mucous membranes are moist.  Eyes: Pink conjunctiva, no scleral icterus  Neck: Supple, no lymphadenopathy  Cardiovascular: Normal rate and regular rhythm. S1,2 noted. No murmurs, gallops or rubs.  Pulmonary: Clear to auscultation bilaterally. No wheezes, rales or rhonchi  Abdominal: Soft, mild diffuse tenderness, not distended, bowel sounds normoactive.  Musculoskeletal: No tenderness to palpation of chest wall.  Neurologic: Grossly nonfocal, moving all extremities.  Skin: No obvious rash.  Psychiatric: Pleasant, cooperative.    LABS  Lab Results   Component Value Date/Time    WBC 14.8* 02/15/2017 04:27 AM    RBC 4.62* 02/15/2017 04:27 AM    HEMOGLOBIN 13.2* 02/15/2017 04:27 AM    HEMATOCRIT 41.3* 02/15/2017 04:27 AM    MCV 89.4 02/15/2017 04:27 AM    MCH 28.6 02/15/2017 04:27 AM    MCHC 32.0* 02/15/2017 04:27 AM    MPV 11.3 02/15/2017 04:27 AM      Lab Results   Component Value Date/Time    SODIUM 136 02/15/2017 04:27 AM    POTASSIUM 4.5 02/15/2017 04:27 AM    CHLORIDE 103 02/15/2017 04:27 AM    CO2 26 02/15/2017 04:27 AM    GLUCOSE 111* 02/15/2017 04:27 AM    BUN 16 02/15/2017 04:27 AM    CREATININE 1.19 02/15/2017 04:27 AM      No results found for: PROTHROMBTM, INR   No results found for: BLOODCULTU, BLDCULT, BCHOLD    ASSESSMENT and PLAN    Generalized abdominal pain  Pancreatic mass  Pancreatitis  Consulted Pancreaticobiliary surgeon Dr. Stevenson who recommended GI consultation for endoscopic ultrasound.  Consulted Dr. Knapp 2/15  Spoke with Brian Rossd wife regarding plan, possible  surgical intervention  I ordered  and CEA  Continue clears for now as lipase improving    Acute kidney injury  Prerenal, likely from hypovolemia and poor PO intake. Resolving with IVF    Hypertension  ACEI held bec of DEXTER. Cannot restart it because of pancreatitis risk. Was started on amlodipine instead.    Tobacco dependence  Smoking cessation encouraged.    Pharmacologic DVT prophylaxis.    Did his medication reconciliation.    I spent 54 minutes, reviewing the chart, notes, vitals, labs, (imaging) evaluating Brian Jasmine consulting GI and surgery physicians) and enacting the plan above. 50% of the time was spent in counseling Brian Jasmine (and family) and answering questions. All of the above were discussed in detail.

## 2017-02-17 ENCOUNTER — HOSPITAL ENCOUNTER (OUTPATIENT)
Facility: MEDICAL CENTER | Age: 62
End: 2017-02-17
Attending: INTERNAL MEDICINE | Admitting: INTERNAL MEDICINE
Payer: MEDICAID

## 2017-02-17 ENCOUNTER — HOSPITAL ENCOUNTER (INPATIENT)
Facility: MEDICAL CENTER | Age: 62
LOS: 1 days | DRG: 438 | End: 2017-02-18
Attending: HOSPITALIST | Admitting: HOSPITALIST
Payer: MEDICAID

## 2017-02-17 VITALS
WEIGHT: 218.26 LBS | DIASTOLIC BLOOD PRESSURE: 68 MMHG | OXYGEN SATURATION: 97 % | BODY MASS INDEX: 28.93 KG/M2 | HEIGHT: 73 IN | SYSTOLIC BLOOD PRESSURE: 138 MMHG | HEART RATE: 95 BPM | RESPIRATION RATE: 16 BRPM | TEMPERATURE: 97.9 F

## 2017-02-17 LAB — PATHOLOGY CONSULT NOTE: NORMAL

## 2017-02-17 PROCEDURE — 0FBG3ZX EXCISION OF PANCREAS, PERCUTANEOUS APPROACH, DIAGNOSTIC: ICD-10-PCS | Performed by: INTERNAL MEDICINE

## 2017-02-17 PROCEDURE — 0F9G3ZX DRAINAGE OF PANCREAS, PERCUTANEOUS APPROACH, DIAGNOSTIC: ICD-10-PCS | Performed by: INTERNAL MEDICINE

## 2017-02-17 PROCEDURE — 700102 HCHG RX REV CODE 250 W/ 637 OVERRIDE(OP): Performed by: INTERNAL MEDICINE

## 2017-02-17 PROCEDURE — 160203 HCHG ENDO MINUTES - 1ST 30 MINS LEVEL 4: Performed by: INTERNAL MEDICINE

## 2017-02-17 PROCEDURE — 700112 HCHG RX REV CODE 229: Performed by: INTERNAL MEDICINE

## 2017-02-17 PROCEDURE — 700111 HCHG RX REV CODE 636 W/ 250 OVERRIDE (IP): Performed by: INTERNAL MEDICINE

## 2017-02-17 PROCEDURE — 160048 HCHG OR STATISTICAL LEVEL 1-5: Performed by: INTERNAL MEDICINE

## 2017-02-17 PROCEDURE — 88305 TISSUE EXAM BY PATHOLOGIST: CPT | Mod: 59

## 2017-02-17 PROCEDURE — 160009 HCHG ANES TIME/MIN: Performed by: INTERNAL MEDICINE

## 2017-02-17 PROCEDURE — 99233 SBSQ HOSP IP/OBS HIGH 50: CPT | Performed by: INTERNAL MEDICINE

## 2017-02-17 PROCEDURE — 770006 HCHG ROOM/CARE - MED/SURG/GYN SEMI*

## 2017-02-17 PROCEDURE — 700111 HCHG RX REV CODE 636 W/ 250 OVERRIDE (IP)

## 2017-02-17 PROCEDURE — 88307 TISSUE EXAM BY PATHOLOGIST: CPT

## 2017-02-17 PROCEDURE — 0DJ08ZZ INSPECTION OF UPPER INTESTINAL TRACT, VIA NATURAL OR ARTIFICIAL OPENING ENDOSCOPIC: ICD-10-PCS | Performed by: INTERNAL MEDICINE

## 2017-02-17 PROCEDURE — 502240 HCHG MISC OR SUPPLY RC 0272: Performed by: INTERNAL MEDICINE

## 2017-02-17 PROCEDURE — 88112 CYTOPATH CELL ENHANCE TECH: CPT | Mod: 59

## 2017-02-17 PROCEDURE — 160208 HCHG ENDO MINUTES - EA ADDL 1 MIN LEVEL 4: Performed by: INTERNAL MEDICINE

## 2017-02-17 PROCEDURE — 160002 HCHG RECOVERY MINUTES (STAT): Performed by: INTERNAL MEDICINE

## 2017-02-17 PROCEDURE — 160035 HCHG PACU - 1ST 60 MINS PHASE I: Performed by: INTERNAL MEDICINE

## 2017-02-17 PROCEDURE — 88173 CYTOPATH EVAL FNA REPORT: CPT

## 2017-02-17 PROCEDURE — 500066 HCHG BITE BLOCK, ECT: Performed by: INTERNAL MEDICINE

## 2017-02-17 PROCEDURE — 700111 HCHG RX REV CODE 636 W/ 250 OVERRIDE (IP): Performed by: HOSPITALIST

## 2017-02-17 PROCEDURE — 700101 HCHG RX REV CODE 250

## 2017-02-17 PROCEDURE — A4606 OXYGEN PROBE USED W OXIMETER: HCPCS | Performed by: INTERNAL MEDICINE

## 2017-02-17 PROCEDURE — A9270 NON-COVERED ITEM OR SERVICE: HCPCS | Performed by: INTERNAL MEDICINE

## 2017-02-17 RX ORDER — OXYCODONE HYDROCHLORIDE 10 MG/1
10 TABLET ORAL
Status: DISCONTINUED | OUTPATIENT
Start: 2017-02-17 | End: 2017-02-18 | Stop reason: HOSPADM

## 2017-02-17 RX ORDER — CIPROFLOXACIN 2 MG/ML
INJECTION, SOLUTION INTRAVENOUS
Status: COMPLETED
Start: 2017-02-17 | End: 2017-02-17

## 2017-02-17 RX ORDER — PROMETHAZINE HYDROCHLORIDE 25 MG/1
12.5-25 SUPPOSITORY RECTAL EVERY 4 HOURS PRN
Status: DISCONTINUED | OUTPATIENT
Start: 2017-02-17 | End: 2017-02-18 | Stop reason: HOSPADM

## 2017-02-17 RX ORDER — ENEMA 19; 7 G/133ML; G/133ML
1 ENEMA RECTAL
Status: DISCONTINUED | OUTPATIENT
Start: 2017-02-17 | End: 2017-02-18 | Stop reason: HOSPADM

## 2017-02-17 RX ORDER — ONDANSETRON 2 MG/ML
4 INJECTION INTRAMUSCULAR; INTRAVENOUS EVERY 4 HOURS PRN
Status: DISCONTINUED | OUTPATIENT
Start: 2017-02-17 | End: 2017-02-18 | Stop reason: HOSPADM

## 2017-02-17 RX ORDER — OXYCODONE HYDROCHLORIDE 5 MG/1
5 TABLET ORAL
Status: DISCONTINUED | OUTPATIENT
Start: 2017-02-17 | End: 2017-02-18 | Stop reason: HOSPADM

## 2017-02-17 RX ORDER — AMLODIPINE BESYLATE 10 MG/1
10 TABLET ORAL
Status: DISCONTINUED | OUTPATIENT
Start: 2017-02-17 | End: 2017-02-18 | Stop reason: HOSPADM

## 2017-02-17 RX ORDER — CIPROFLOXACIN 2 MG/ML
400 INJECTION, SOLUTION INTRAVENOUS
Status: COMPLETED | OUTPATIENT
Start: 2017-02-17 | End: 2017-02-17

## 2017-02-17 RX ORDER — PROMETHAZINE HYDROCHLORIDE 25 MG/1
12.5-25 TABLET ORAL EVERY 4 HOURS PRN
Status: DISCONTINUED | OUTPATIENT
Start: 2017-02-17 | End: 2017-02-18 | Stop reason: HOSPADM

## 2017-02-17 RX ORDER — AMOXICILLIN 250 MG
1 CAPSULE ORAL NIGHTLY
Status: DISCONTINUED | OUTPATIENT
Start: 2017-02-17 | End: 2017-02-18 | Stop reason: HOSPADM

## 2017-02-17 RX ORDER — CLONIDINE HYDROCHLORIDE 0.1 MG/1
0.2 TABLET ORAL EVERY 4 HOURS PRN
Status: DISCONTINUED | OUTPATIENT
Start: 2017-02-17 | End: 2017-02-18 | Stop reason: HOSPADM

## 2017-02-17 RX ORDER — CLONIDINE HYDROCHLORIDE 0.1 MG/1
0.1 TABLET ORAL TWICE DAILY
Status: DISCONTINUED | OUTPATIENT
Start: 2017-02-17 | End: 2017-02-18 | Stop reason: HOSPADM

## 2017-02-17 RX ORDER — DOCUSATE SODIUM 100 MG/1
100 CAPSULE, LIQUID FILLED ORAL 2 TIMES DAILY
Status: DISCONTINUED | OUTPATIENT
Start: 2017-02-17 | End: 2017-02-18 | Stop reason: HOSPADM

## 2017-02-17 RX ORDER — MORPHINE SULFATE 4 MG/ML
4 INJECTION, SOLUTION INTRAMUSCULAR; INTRAVENOUS
Status: DISCONTINUED | OUTPATIENT
Start: 2017-02-17 | End: 2017-02-18 | Stop reason: HOSPADM

## 2017-02-17 RX ORDER — AMOXICILLIN 250 MG
1 CAPSULE ORAL
Status: DISCONTINUED | OUTPATIENT
Start: 2017-02-17 | End: 2017-02-18 | Stop reason: HOSPADM

## 2017-02-17 RX ORDER — LACTULOSE 20 G/30ML
30 SOLUTION ORAL
Status: DISCONTINUED | OUTPATIENT
Start: 2017-02-17 | End: 2017-02-18 | Stop reason: HOSPADM

## 2017-02-17 RX ORDER — TEMAZEPAM 15 MG/1
15 CAPSULE ORAL
Status: DISCONTINUED | OUTPATIENT
Start: 2017-02-17 | End: 2017-02-18 | Stop reason: HOSPADM

## 2017-02-17 RX ORDER — HYDRALAZINE HYDROCHLORIDE 20 MG/ML
20 INJECTION INTRAMUSCULAR; INTRAVENOUS
Status: DISCONTINUED | OUTPATIENT
Start: 2017-02-17 | End: 2017-02-18 | Stop reason: HOSPADM

## 2017-02-17 RX ORDER — LABETALOL HYDROCHLORIDE 5 MG/ML
20 INJECTION, SOLUTION INTRAVENOUS
Status: DISCONTINUED | OUTPATIENT
Start: 2017-02-17 | End: 2017-02-18 | Stop reason: HOSPADM

## 2017-02-17 RX ORDER — LISINOPRIL 20 MG/1
20 TABLET ORAL DAILY
Status: DISCONTINUED | OUTPATIENT
Start: 2017-02-17 | End: 2017-02-18 | Stop reason: HOSPADM

## 2017-02-17 RX ORDER — ONDANSETRON 4 MG/1
4 TABLET, ORALLY DISINTEGRATING ORAL EVERY 4 HOURS PRN
Status: DISCONTINUED | OUTPATIENT
Start: 2017-02-17 | End: 2017-02-18 | Stop reason: HOSPADM

## 2017-02-17 RX ORDER — BISACODYL 10 MG
10 SUPPOSITORY, RECTAL RECTAL
Status: DISCONTINUED | OUTPATIENT
Start: 2017-02-17 | End: 2017-02-18 | Stop reason: HOSPADM

## 2017-02-17 RX ADMIN — LISINOPRIL 20 MG: 20 TABLET ORAL at 15:00

## 2017-02-17 RX ADMIN — MORPHINE SULFATE 4 MG: 4 INJECTION INTRAVENOUS at 06:13

## 2017-02-17 RX ADMIN — CLONIDINE HYDROCHLORIDE 0.1 MG: 0.1 TABLET ORAL at 15:00

## 2017-02-17 RX ADMIN — AMLODIPINE BESYLATE 10 MG: 10 TABLET ORAL at 15:00

## 2017-02-17 RX ADMIN — MORPHINE SULFATE 4 MG: 4 INJECTION INTRAVENOUS at 00:46

## 2017-02-17 RX ADMIN — STANDARDIZED SENNA CONCENTRATE AND DOCUSATE SODIUM 1 TABLET: 8.6; 5 TABLET, FILM COATED ORAL at 21:11

## 2017-02-17 RX ADMIN — OXYCODONE HYDROCHLORIDE 10 MG: 10 TABLET ORAL at 15:02

## 2017-02-17 RX ADMIN — DOCUSATE SODIUM 100 MG: 100 CAPSULE ORAL at 21:11

## 2017-02-17 RX ADMIN — OXYCODONE HYDROCHLORIDE 10 MG: 10 TABLET ORAL at 21:11

## 2017-02-17 RX ADMIN — CIPROFLOXACIN 400 MG: 2 INJECTION, SOLUTION INTRAVENOUS at 09:30

## 2017-02-17 RX ADMIN — CIPROFLOXACIN 400 MG: 2 INJECTION INTRAVENOUS at 09:30

## 2017-02-17 ASSESSMENT — ENCOUNTER SYMPTOMS
EYES NEGATIVE: 1
CARDIOVASCULAR NEGATIVE: 1
MUSCULOSKELETAL NEGATIVE: 1
ABDOMINAL PAIN: 1
NEUROLOGICAL NEGATIVE: 1
RESPIRATORY NEGATIVE: 1
CONSTITUTIONAL NEGATIVE: 1
PSYCHIATRIC NEGATIVE: 1

## 2017-02-17 ASSESSMENT — PAIN SCALES - GENERAL
PAINLEVEL_OUTOF10: 5
PAINLEVEL_OUTOF10: 0
PAINLEVEL_OUTOF10: 3
PAINLEVEL_OUTOF10: 5
PAINLEVEL_OUTOF10: 0

## 2017-02-17 NOTE — IP AVS SNAPSHOT
FTAPI Software Access Code: [email or web address]  Expires: 3/15/2017  2:33 PM    Your email address is not on file at [facility].  Email Addresses are required for you to sign up for FTAPI Software, please contact [phone] to verify your personal information and to provide your email address prior to attempting to register for FTAPI Software.    Brian Jasmine  [facility] [place] [address]    ShoutEmt  A secure, online tool to manage your health information     [facility]’s FTAPI Software® is a secure, online tool that connects you to your personalized health information from the privacy of your home -- day or night - making it very easy for you to manage your healthcare. Once the activation process is completed, you can even access your medical information using the FTAPI Software abby, which is available for free in the Apple Abby store or Google Play store.     To learn more about FTAPI Software, visit [email or web address]    There are two levels of access available (as shown below):   My Chart Features  [facility] Primary Care Doctor [facility]  Specialists [facility]  Urgent  Care Non-[facility] Primary Care Doctor   Email your healthcare team securely and privately 24/7 X X X    Manage appointments: schedule your next appointment; view details of past/upcoming appointments X      Request prescription refills. X      View recent personal medical records, including lab and immunizations X X X X   View health record, including health history, allergies, medications X X X X   Read reports about your outpatient visits, procedures, consult and ER notes X X X X   See your discharge summary, which is a recap of your hospital and/or ER visit that includes your diagnosis, lab results, and care plan X X  X     How to register for FTAPI Software:  Once your e-mail address has been verified, follow the following steps to sign up for FTAPI Software.     1. Go to  [email or web address]  2. Click on the Sign Up Now box, which takes you to the New Member Sign Up page. You will  need to provide the following information:  a. Enter your Swidjit Access Code exactly as it appears at the top of this page. (You will not need to use this code after you’ve completed the sign-up process. If you do not sign up before the expiration date, you must request a new code.)   b. Enter your date of birth.   c. Enter your home email address.   d. Click Submit, and follow the next screen’s instructions.  3. Create a [employer] ID. This will be your Swidjit login ID and cannot be changed, so think of one that is secure and easy to remember.  4. Create a Swidjit password. You can change your password at any time.  5. Enter your Password Reset Question and Answer. This can be used at a later time if you forget your password.   6. Enter your e-mail address. This allows you to receive e-mail notifications when new information is available in Swidjit.  7. Click Sign Up. You can now view your health information.    For assistance activating your Swidjit account, call [phone]

## 2017-02-17 NOTE — PROGRESS NOTES
HOSPITALIST PROGRESS NOTE (SOAP)  Date of Service  2/16  CHIEF COMPLAINT  61 y.o.yo male presented 2/14/2017 with abdominal pain    Subjective  MRI reviewed. He and his wife had wquestions on results. Currently still having mild abdominal pain but less today    PHYSICAL EXAM  Filed Vitals:    02/16/17 0800 02/16/17 1300 02/16/17 1522 02/16/17 1600   BP: 164/90 196/92 155/78 179/90   Pulse: 63   97   Temp: 36.9 °C (98.4 °F)   37.2 °C (98.9 °F)   Resp: 19 17   Height:       Weight:       SpO2: 95%   93%   Vitals reviewed  General/Constitutional: No acute distress.    Head: Normocephalic, atraumatic  ENT: Mucous membranes are moist.  Eyes: Pink conjunctiva, no scleral icterus  Neck: Supple, no lymphadenopathy  Cardiovascular: Normal rate and regular rhythm. S1,2 noted. No murmurs, gallops or rubs.  Pulmonary: Clear to auscultation bilaterally. No wheezes, rales or rhonchi  Abdominal: Soft, mild diffuse tenderness improved today, not distended, bowel sounds normoactive.  Musculoskeletal: No tenderness to palpation of chest wall.  Neurologic: Grossly nonfocal, moving all extremities.  Skin: No obvious rash.  Psychiatric: Pleasant, cooperative.  LABS  Lab Results   Component Value Date/Time    WBC 13.8* 02/16/2017 01:45 AM    RBC 4.49* 02/16/2017 01:45 AM    HEMOGLOBIN 13.0* 02/16/2017 01:45 AM    HEMATOCRIT 39.3* 02/16/2017 01:45 AM    MCV 87.5 02/16/2017 01:45 AM    MCH 29.0 02/16/2017 01:45 AM    MCHC 33.1* 02/16/2017 01:45 AM    MPV 11.0 02/16/2017 01:45 AM      Lab Results   Component Value Date/Time    SODIUM 133* 02/16/2017 01:45 AM    POTASSIUM 3.7 02/16/2017 01:45 AM    CHLORIDE 102 02/16/2017 01:45 AM    CO2 21 02/16/2017 01:45 AM    GLUCOSE 109* 02/16/2017 01:45 AM    BUN 12 02/16/2017 01:45 AM    CREATININE 1.04 02/16/2017 01:45 AM      No results found for: PROTHROMBTM, INR   No results found for: BLOODCULTU, BLDCULT, BCHOLD    ASSESSMENT and PLAN    Generalized abdominal pain  Pancreatic  mass  Pancreatitis  Consulted Pancreaticobiliary surgeon Dr. Stevenson who recommended GI consultation for endoscopic ultrasound.  Consulted Dr. Knapp 2/15  Spoke with Brian Reddy wife regarding plan, possible surgical intervention  I ordered  and CEA  Continue clears for now as lipase improving  2/16. Discussed in detail MRI results and plan to Brian Jasmine and wife. Dicussed with Dr. Knapp as well. Plan for EGD w/ bippsy to [facility].    Acute kidney injury  Prerenal, likely from hypovolemia and poor PO intake. Resolving with IVF  2/16. Reviewed BMP. Resolving.     Hypertension  ACEI held bec of DEXTER. Cannot restart it because of pancreatitis risk. Was started on amlodipine instead.  2/16. Reviewed BP. Uncontrolled. Continue amlodipine and add clonidine scheduled PRN. Can resume lisinopril for now as unlikely pancreatic mass is caused by this.    Tobacco dependence  Smoking cessation encouraged.    Pharmacologic DVT prophylaxis.    Did his medication reconciliation.    I spent 54 minutes, reviewing the chart, notes, vitals, labs, (imaging) evaluating Brian Jasmine consulting GI and surgery physicians) and enacting the plan above. 50% of the time was spent in counseling Brian Jasmine (and family) and answering questions. All of the above were discussed in detail.  Dispositon    I spent 46 minutes, reviewing the chart, notes, vitals, labs, imaging, evaluating Brian Jasmine and enacting the plan above. 50% of the time was spent in counseling Brian Jasmine and family and answering questions. See above, assessment and plan were discussed mirela with speaking with GI.

## 2017-02-17 NOTE — PROGRESS NOTES
Gastroenterology Progress Note     Author: Morales Hinton   Date & Time Created: 2/17/2017 9:28 AM    Interval History:  2/17/2017: No issues overnight. Still have abdominal pain 5/10.     Review of Systems:  Review of Systems   Constitutional: Negative.    HENT: Negative.    Eyes: Negative.    Respiratory: Negative.    Cardiovascular: Negative.    Gastrointestinal: Positive for abdominal pain.   Genitourinary: Negative.    Musculoskeletal: Negative.    Skin: Negative.    Neurological: Negative.    Endo/Heme/Allergies: Negative.    Psychiatric/Behavioral: Negative.        Physical Exam:  Physical Exam   Constitutional: He is oriented to person, place, and time. He appears well-developed and well-nourished.   HENT:   Head: Normocephalic and atraumatic.   Eyes: Conjunctivae are normal. Pupils are equal, round, and reactive to light. No scleral icterus.   Neck: Normal range of motion. Neck supple. No JVD present. No tracheal deviation present. No thyromegaly present.   Cardiovascular: Normal rate and regular rhythm.    Pulmonary/Chest: Effort normal and breath sounds normal.   Abdominal: Soft. Bowel sounds are normal. He exhibits no mass. There is tenderness. There is no rebound and no guarding.   Neurological: He is alert and oriented to person, place, and time. No cranial nerve deficit. Coordination normal.   Skin: Skin is warm and dry.       Labs:        Invalid input(s): [id number]  Recent Labs      02/14/17   1545   TROPONINI  0.07     Recent Labs      02/14/17   1545  02/15/17   0427  02/16/17   0145   SODIUM  136  136  133*   POTASSIUM  3.9  4.5  3.7   CHLORIDE  100  103  102   CO2  29  26  21   BUN  22*  16  12   CREATININE  1.5*  1.19  1.04   CALCIUM  9.0  8.7  8.7     Recent Labs      02/14/17   1545  02/15/17   0427  02/16/17   0145   ALTSGPT  22   --   8   ASTSGOT  23   --   9*   ALKPHOSPHAT  84   --   66   TBILIRUBIN  0.3   --   0.2   LIPASE  980*  237*  163*   GLUCOSE  118*  111*  109*     Recent Labs       17   1545  02/15/17   0427  17   0145   RBC  4.91  4.62*  4.49*   HEMOGLOBIN  14.1  13.2*  13.0*   HEMATOCRIT  43.6  41.3*  39.3*   PLATELETCT  402*  381  324     Recent Labs      17   1545  02/15/17   0427  17   0145   WBC  17.2*  14.8*  13.8*   ASTSGOT  23   --   9*   ALTSGPT  22   --   8   ALKPHOSPHAT  84   --   66   TBILIRUBIN  0.3   --   0.2     Hemodynamics:  Temp (24hrs), Av.6 °C (97.9 °F), Min:36.6 °C (97.9 °F), Max:36.6 °C (97.9 °F)  Temperature: 36.6 °C (97.9 °F)  Pulse  Av  Min: 95  Max: 95Heart Rate (Monitored): 84  Blood Pressure: (!) 187/97 mmHg     Respiratory:    Respiration: 19, Pulse Oximetry: 93 %           Fluids:  No intake or output data in the 24 hours ending 17  Weight: 99 kg (218 lb 4.1 oz)  GI/Nutrition:  No orders of the defined types were placed in this encounter.     Medical Decision Making, by Problem:  There are no hospital problems to display for this patient.    1.  A 13x17.5 cm multiseptated cystic mass in the body and the tail.  2.  Acute pancreatitis.  3.  Generalized abdominal pain.  4.  A 3-week history of mid to low back pain.  5.  Change in bowel habits.  6.  Weight loss.  7.  Morbidly obese.  8.  Hypertension.  9.  History of alcohol abuse and quit regular consumption 10 years prior.  10.  Fatty liver.  11.  Smoker.  12.  Uninsured/social issues.    Plan:  1. EUS with possible FNA  2. Ciprofloxacin 400mg IV x 1 dose to be given before procedure.   3. Would recommend Surgery involvement as well.    Indication:Multiloculated pancreatic cystic mass.   Risks, benefits, and alternatives were discussed with consenting person(s). Consenting person(s) were given an opportunity to ask questions and discuss other options. Risks including but not limited to failed or incomplete EUS, ineffective therapy, pancreatitis (with potential future complications), perforation, infection, bleeding, missed lesion(s), missed diagnosis, cardiac and/or  pulmonary event, aspiration, stroke, possible need for surgery, hospitalization possibly prolonged, discomfort, unsuccessful and/or incomplete procedure, possible need for repeat procedures and/or additional testings, damage to adjacent organs and/or vascular structures, medication reaction, disability, death, and other adverse events possibly life-threatening. Discussion was undertaken with Layman's terms. Consenting persons stated understanding and acceptance of these risks, and wished to proceed. Consent was given in clear state of mind.      Core Measures

## 2017-02-17 NOTE — IP AVS SNAPSHOT
" <p align=\"LEFT\"><IMG SRC=\"//[email or web address]$/[email or web address]\" alt=\"Image\" WIDTH=\"50%\" HEIGHT=\"200\" BORDER=\"\"></p>                   Name:Brian Jasmine  Medical Record Number:[id number]  CSN: [id number]    YOB: 1955   Age: 61 y.o.  Sex: male  HT:  WT: 109 kg (240 lb 4.8 oz)          Admit Date: 2/17/2017     Discharge Date:   Today's Date: 2/18/2017  Attending Doctor:  Ted Villeda*                  Allergies:  Review of patient's allergies indicates no known allergies.          Your appointments     Mar 01, 2017  1:00 PM   30 Minute with SHAWN Cisse   [facility] (--)    [address] [place] [id number] [phone]              Follow-up Information     1. Follow up with Liam Oconnor M.D.. Schedule an appointment as soon as possible for a visit in 1 week.    Specialty:  Gastroenterology    Why:  follow up on biospy results    Contact information    [address] [id number] [phone]          2. Follow up with J Carlos Ghosh M.D.. Schedule an appointment as soon as possible for a visit in 2 weeks.    Specialties:  Surgery, Radiology    Contact information    [address] [facility] [address]  [facility] [address]  [phone]           Medication List      Take these Medications        Instructions    amlodipine 10 MG Tabs   Commonly known as:  NORVASC    Take 1 Tab by mouth every day.   Dose:  10 mg       bisacodyl 10 MG Supp   Commonly known as:  DULCOLAX    Insert 10 mg in rectum 1 time daily as needed.   Dose:  10 mg       clonidine 0.1 MG Tabs   Commonly known as:  CATAPRES    Take 1 Tab by mouth 2 Times a Day.   Dose:  0.1 mg        MG Caps    Doctor's comments:  STOP if having diarrhea   Take 100 mg by mouth 2 Times a Day.   Dose:  100 mg       Hydrocodone-Acetaminophen 5-300 MG Tabs   Commonly known as:  VICODIN    Doctor's comments:  Please call Dr. Oconnor, GI clinic or Dr. Elise [phone] for refills.   Take 1 Tab by mouth every 8 hours " as needed.   Dose:  1 Tab       lisinopril 20 MG Tabs   Commonly known as:  PRINIVIL    Take 1 Tab by mouth every day. For high blood pressure   Dose:  20 mg       magnesium hydroxide 400 MG/5ML Susp   Commonly known as:  MILK OF MAGNESIA    Take 30 mL by mouth 1 time daily as needed.   Dose:  30 mL       polyethylene glycol/lytes Pack   Commonly known as:  MIRALAX    Take 1 Packet by mouth 1 time daily as needed (constipation).   Dose:  17 g       psyllium 58.12 % Pack   Commonly known as:  METAMUCIL    Take 1 Packet by mouth 1 time daily as needed.   Dose:  1 Packet       senna-docusate 8.6-50 MG Tabs   Commonly known as:  PERICOLACE or SENOKOT S    Take 1 Tab by mouth every 24 hours as needed for Constipation.   Dose:  1 Tab

## 2017-02-17 NOTE — IP AVS SNAPSHOT
Home Care Instructions                                                                                                                  Name:Brian Jasmine  Medical Record Number:[id number]  CSN: [id number]    YOB: 1955   Age: 61 y.o.  Sex: male  HT:  WT: 109 kg (240 lb 4.8 oz)          Admit Date: 2/17/2017     Discharge Date:   Today's Date: 2/18/2017  Attending Doctor:  Ted Villeda*                  Allergies:  Review of patient's allergies indicates no known allergies.            Discharge Instructions       Follow up to Dr. Arik Braxton [facility] [phone], primary care physician. Follow up on hypertension, I have added amlodipine and clonidine.   Follow up to Dr. Oconnor, Gastroenterology in 1 week post EU/S  W/ biopsy. Clinic to follow up on biopsy results.   Follow up to Dr. Ponce, Pancreaticobiliary surgeon in 2-3 weeks... Coordinate with GI clinic    Discharge Instructions    Discharged to home by car with relative. Discharged via walking, hospital escort: Refused.  Special equipment needed: Not Applicable    Be sure to schedule a follow-up appointment with your primary care doctor or any specialists as instructed.     Discharge Plan:        I understand that a diet low in cholesterol, fat, and sodium is recommended for good health. Unless I have been given specific instructions below for another diet, I accept this instruction as my diet prescription.   Other diet: none specified    Special Instructions: None    · Is patient discharged on Warfarin / Coumadin?   No     · Is patient Post Blood Transfusion?  No    Depression / Suicide Risk    As you are discharged from this [facility] facility, it is important to learn how to keep safe from harming yourself.    Recognize the warning signs:  · Abrupt changes in personality, positive or negative- including increase in energy   · Giving away possessions  · Change in eating patterns-  significant weight changes-  positive or negative  · Change in sleeping patterns- unable to sleep or sleeping all the time   · Unwillingness or inability to communicate  · Depression  · Unusual sadness, discouragement and loneliness  · Talk of wanting to die  · Neglect of personal appearance   · Rebelliousness- reckless behavior  · Withdrawal from people/activities they love  · Confusion- inability to concentrate     If you or a loved one observes any of these behaviors or has concerns about self-harm, here's what you can do:  · Talk about it- your feelings and reasons for harming yourself  · Remove any means that you might use to hurt yourself (examples: pills, rope, extension cords, firearm)  · Get professional help from the community (Mental Health, Substance Abuse, psychological counseling)  · Do not be alone:Call your Safe Contact- someone whom you trust who will be there for you.  · Call your local CRISIS HOTLINE 407-3653 or 858-779-1865  · Call your local Children's Mobile Crisis Response Team [place] (972) 968-9242 or www.DIIME  · Call the toll free National Suicide Prevention Hotlines   · National Suicide Prevention Lifeline 406-753-IIAW (5805)  · National Hope Line Network 800-SUICIDE (303-8536)        Your appointments     Mar 01, 2017  1:00 PM   30 Minute with SHAWN Cisse   [facility] (--)    [address] [place] [id number] [phone]              Follow-up Information     1. Follow up with Liam Oconnor M.D.. Schedule an appointment as soon as possible for a visit in 1 week.    Specialty:  Gastroenterology    Why:  follow up on biospy results    Contact information    [address]  [facility] [address] [phone]          2. Follow up with J Carlos Ghosh M.D.. Schedule an appointment as soon as possible for a visit in 2 weeks.    Specialties:  Surgery, Radiology    Contact information    [address]  [facility]  [address]  [phone]           Discharge Medication Instructions:    Below are the medications your physician expects you to take upon discharge:    Review all your home medications and newly ordered medications with your doctor and/or pharmacist. Follow medication instructions as directed by your doctor and/or pharmacist.    Please keep your medication list with you and share with your physician.               Medication List      START taking these medications        Instructions    amlodipine 10 MG Tabs   Last time this was given:  10 mg on 2/18/2017  9:09 AM   Commonly known as:  NORVASC    Take 1 Tab by mouth every day.   Dose:  10 mg       clonidine 0.1 MG Tabs   Last time this was given:  0.1 mg on 2/18/2017  9:09 AM   Commonly known as:  CATAPRES    Take 1 Tab by mouth 2 Times a Day.   Dose:  0.1 mg        MG Caps   Last time this was given:  100 mg on 2/18/2017  9:09 AM    Doctor's comments:  STOP if having diarrhea   Take 100 mg by mouth 2 Times a Day.   Dose:  100 mg       Hydrocodone-Acetaminophen 5-300 MG Tabs   Commonly known as:  VICODIN    Doctor's comments:  Please call Dr. Oconnor, GI clinic or Dr. Elise [phone] for refills.   Take 1 Tab by mouth every 8 hours as needed.   Dose:  1 Tab       polyethylene glycol/lytes Pack   Commonly known as:  MIRALAX    Take 1 Packet by mouth 1 time daily as needed (constipation).   Dose:  17 g       senna-docusate 8.6-50 MG Tabs   Last time this was given:  1 Tab on 2/17/2017  9:11 PM   Commonly known as:  PERICOLACE or SENOKOT S    Take 1 Tab by mouth every 24 hours as needed for Constipation.   Dose:  1 Tab         CONTINUE taking these medications        Instructions    bisacodyl 10 MG Supp   Last time this was given:  10 mg on 2/18/2017  9:09 AM   Commonly known as:  DULCOLAX    Insert 10 mg in rectum 1 time daily as needed.   Dose:  10 mg       lisinopril 20 MG Tabs   Last time this was given:  20 mg on 2/18/2017  9:09 AM   Commonly known as:   PRINIVIL    Take 1 Tab by mouth every day. For high blood pressure   Dose:  20 mg       magnesium hydroxide 400 MG/5ML Susp   Commonly known as:  MILK OF MAGNESIA    Take 30 mL by mouth 1 time daily as needed.   Dose:  30 mL       psyllium 58.12 % Pack   Commonly known as:  METAMUCIL    Take 1 Packet by mouth 1 time daily as needed.   Dose:  1 Packet               Instructions           Diet / Nutrition:    Follow any diet instructions given to you by your doctor or the dietician, including how much salt (sodium) you are allowed each day.    If you are overweight, talk to your doctor about a weight reduction plan.    Activity:    Remain physically active following your doctor's instructions about exercise and activity.    Rest often.     Any time you become even a little tired or short of breath, SIT DOWN and rest.    Worsening Symptoms:    Report any of the following signs and symptoms to the doctor's office immediately:    *Pain of jaw, arm, or neck  *Chest pain not relieved by medication                               *Dizziness or loss of consciousness  *Difficulty breathing even when at rest   *More tired than usual                                       *Bleeding drainage or swelling of surgical site  *Swelling of feet, ankles, legs or stomach                 *Fever (>100ºF)  *Pink or blood tinged sputum  *Weight gain (3lbs/day or 5lbs /week)           *Shock from internal defibrillator (if applicable)  *Palpitations or irregular heartbeats                *Cool and/or numb extremities    Stroke Awareness    Common Risk Factors for Stroke include:    Age  Atrial Fibrillation  Carotid Artery Stenosis  Diabetes Mellitus  Excessive alcohol consumption  High blood pressure  Overweight   Physical inactivity  Smoking    Warning signs and symptoms of a stroke include:    *Sudden numbness or weakness of the face, arm or leg (especially on one side of the body).  *Sudden confusion, trouble speaking or  understanding.  *Sudden trouble seeing in one or both eyes.  *Sudden trouble walking, dizziness, loss of balance or coordination.Sudden severe headache with no known cause.    It is very important to get treatment quickly when a stroke occurs. If you experience any of the above warning signs, call 911 immediately.                   Disclaimer         Quit Smoking / Tobacco Use:    I understand the use of any tobacco products increases my chance of suffering from future heart disease or stroke and could cause other illnesses which may shorten my life. Quitting the use of tobacco products is the single most important thing I can do to improve my health. For further information on smoking / tobacco cessation call a Toll Free Quit Line at 1-318.365.5238 (*National Cancer [place]) or 1-486.394.8989 (American Lung Association) or you can access the web based program at www.lungusa.org.    [place] Tobacco Users Help Line:  (226) 841-4476       Toll Free: 1-105.225.1746  Quit Tobacco Program [facility] (630)577-2010    Crisis Hotline:    [place] Crisis Hotline:  7-512-QTOBUAC or 1-406.423.2814    [place] Crisis Hotline:    1-261.681.4844 or 803-129-8594    Discharge Survey:   Thank you for choosing [facility]. We hope we did everything we could to make your hospital stay a pleasant one. You may be receiving a phone survey and we would appreciate your time and participation in answering the questions. Your input is very valuable to us in our efforts to improve our service to our patients and their families.        My signature on this form indicates that:    1. I have reviewed and understand the above information.  2. My questions regarding this information have been answered to my satisfaction.  3. I have formulated a plan with my discharge nurse to obtain my prescribed medications for home.                  Disclaimer         __________________________________                     __________        ________                       Patient Signature                                                 Date                    Time

## 2017-02-17 NOTE — IP AVS SNAPSHOT
2/18/2017          Brian Jasmine  [address] [place] [address]    Dear Brian:    [facility] wants to ensure your discharge home is safe and you or your loved ones have had all your questions answered regarding your care after you leave the hospital.    You may receive a telephone call within two days of your discharge.  This call is to make certain you understand your discharge instructions as well as ensure we provided you with the best care possible during your stay with us.     The call will only last approximately 3-5 minutes and will be done by a nurse.    Once again, we want to ensure your discharge home is safe and that you have a clear understanding of any next steps in your care.  If you have any questions or concerns, please do not hesitate to contact us, we are here for you.  Thank you for choosing [facility] for your healthcare needs.    Sincerely,    Thee Ryan    [facility] [place] [facility]

## 2017-02-17 NOTE — PROCEDURES
DATE OF SERVICE:  02/17/2017    PROCEDURE PERFORMED:  Endoscopic gastroduodenoscopy/endoscopic ultrasound with   fine needle aspiration.    PREOPERATIVE DIAGNOSIS:  Large pancreatic cystic mass.    POSTOPERATIVE DIAGNOSES:  Large pancreatic cystic mass in the body of pancreas   at least 6.7x6.67 cm with anechoic fluid components which on aspiration was   thin and clear with hyperechoic septation within the lesion suggesting a   component of lobulation.  Aspiration removed 55 mL of clear thin nonbloody   liquid.  Multiple passes of the edges of the cystic mass as well as the   hyperechoic septation x4 passes with 22 gauge [place] core needles was   Performed for biopsy.  Visualization was difficult of the portal confluence due to the   lesion.  A splenic vessel  appears to abut the lesion.    The ampulla appears normal.  EGD appears normal.  Differential diagnoses   includes mucinous cystic neoplasm versus intraductal papillary mucinous   neoplasm versus serous cystadenoma unlikely walled-off necrosis and unlikely   pseudocyst.    SEDATION:  As per general anesthesia with Catalino Rios DO.    INSTRUMENT:  Olympus forward viewing endoscope/Olympus side-viewing linear and   radial echoendoscope.    Prior to procedure, history and physical was performed.  Patient's medication   allergies were reviewed.  Patient's tolerance of previous anesthesia also   reviewed.  Risk and benefits of procedure and sedation option risks were   discussed with patient including but not limited to sedation, bleeding,   perforation, missed diagnosis, and missed lesions.  Patient cites   understanding.  Also discussion about pancreatitis was also consulted.    Informed consent was obtained.    DESCRIPTION OF PROCEDURE:  After I obtained informed consent from the patient,   the patient was placed in the left lateral position.  Appropriate time-out   protocol was followed including correct patient, correct procedure, correct   equipment and  the room were confirmed.  Throughout the procedure, patient's   blood pressure, pulse and oxygen saturation were monitored continuously.    After appropriate level of sedation, the forward viewing endoscope was   inserted through the oropharynx, esophagus and intubated and under direct   vision passed down the esophagus through the stomach into the duodenum.    Findings and interventions as documented below.  Retroflexion was performed.    The scope was then withdrawn.  In a similar fashion, the radial echoendoscope   and the linear echoendoscope was then inserted.  Fine needle aspiration was   performed with biopsy.  Findings as documented below.  Please note, prior to   procedure, 400 mg of IV ciprofloxacin was given prophylactically.    FINDINGS:  EGD:  Esophagus:  Normal GE junction at 45 cm.  Stomach normal and retroflexion was normal.  No gross erythema.  Duodenum normal.  Papilla visualized and not prominent.    ENDOSCOPIC ULTRASOUND:  Using combination of radial and linear echoendoscope   findings as below.  The major papilla was normal endoscopically and   sonographically.  The bile duct was not dilated and the head of the pancreas   measuring 0.21 cm.  The gallbladder was normal.  There is no stone or sludge.    Pancreatic parenchyma appears grossly abnormal.  The head of the pancreas   appears normal.  There were no features of chronic pancreatitis.  There was a large cystic mass lesion measuring at least 6.7x6.67   involving the body and the tail of the pancreas.  There was anechoic fluid   component and no lesion with also hyperechoic interlobular septation within   lesions suggesting of lobulation.  The pancreatic duct was difficult to trace   in the body and the tail and the head was measured at 0.25 cm.  Utilizing   Doppler ultrasound to ensure no intervening vessels, a 22-gauge FNB needle was   utilized for puncture and aspiration.  Approximately 55 mL of clear thin   liquid was aspirated from this  lesion.  This was sent for further analysis for   CEA as well as amylase.  After aspiration of majority of liquid, 4 additional   passes of the lesion wall as well as the hyperechoic septation was performed.    Core biopsies were sent for further analysis.    There were no lymph nodes seen in the upper abdomen or mediastinum.  There are   no masses visualized in the portion of the liver.  The left adrenal was not   seen.  Please note the portal confluence was difficult to visualize due to the   mass effect of the lesion.  A splenic vessel appears to abut lesion.    RECOMMENDATIONS:  1.  Await for CEA and amylase cytology.  2.  Ciprofloxacin 400 mg IV b.i.d. or oral x3 days to prevent infection of   aspirate.  3.  Continue to monitor for complications.  4.  Consideration for surgery consult based on results.  Additionally   consideration for repeat CT scan in a few days.       ____________________________________     Morales Carlos Hinton M.D.    JAMI ALVAREZ    DD:  02/17/2017 11:27:13  DT:  02/17/2017 14:02:33    D#:  [id number]  Job#:  [id number]

## 2017-02-17 NOTE — OR SURGEON
Immediate Post-Operative Note      PreOp Diagnosis: Cystic mass in pancreas    PostOp Diagnosis: Cystic mass in the body of pancreas at least 6.7cm x 6.67cm with anechoic fluid component (clear thin) and hyperechoic septation within the lesion suggesting component of lobulation. Aspiration of 55cc of thin clear fluid, multiple passes of the edge of the cystic mass and hyperechoic septation x 4 with 22G needle. EGD normal. Ampulla normal. Difficult to identify portal/splenic confluence due to the mass. Splenic artery appears to abut the lesion.  Cystic mass lesion broad differential: mucinous cystic neoplasm vs IPMN vs Serous cystadenoma (not WON, unlikely psuedocyst)    Procedure(s):  GASTROSCOPY - Wound Class: Clean Contaminated  EGD ESOPHAGUS WITH ENDOSCOPIC US UPPER WITH POSSIBLE FNA - Wound Class: Clean Contaminated    Surgeon(s):  Morales Hinton M.D.    Anesthesiologist/Type of Anesthesia:  Anesthesiologist: Catalino Rios D.O./General    Surgical Staff:  Circulator: Sarabjit Vargas R.N.  Endoscopy Technician: Alexis Lutz    Specimen:   Buccal  Interpace  Cytology  FNA  Core    Estimated Blood Loss: None    Findings:   EGD Normal    EUS:  Finding as above  PD HOP 0.25cm  CBD HOP 0.21cm  GB normal     Ciprofloxacin 400mg IV x 1 dose given.     Complications:   None    Recommendations:  1. Await CEA/Amylase/Cytology  2. Interpace result pending  3. Ciprofloxacin 400mg IV BID or oral x 3 days  4. Monitor for complications  5. Will need to consider involvement of Dr. Ghosh and repeat CT scan in a few days.    [id number]          2/17/2017 11:02 AM Morales Hinton

## 2017-02-17 NOTE — PROGRESS NOTES
[facility]. Family at bedside. No c/o pain. Tolerating liquids well.

## 2017-02-17 NOTE — PROGRESS NOTES
Pt transferred off the floor with [employer] and DIALLO Flynn. Given Morphine IV before transfer.

## 2017-02-17 NOTE — CARE PLAN
Problem: Infection  Goal: Will remain free from infection  Intervention: Implement standard precautions and perform hand washing before and after patient contact  Hand washing before and after providing patient care.       Problem: Pain Management  Goal: Pain level will decrease to patient’s comfort goal  Outcome: PROGRESSING SLOWER THAN EXPECTED  Intervention: Follow pain managment plan developed in collaboration with patient and Interdisciplinary Team  Patient educated on ordered Medications for pain control. Verbalized understanding.

## 2017-02-17 NOTE — PROGRESS NOTES
Assumed patient care at 0715. A&OX4. Assessment complete. PIV right ac 20g patent, saline locked. Patient to be  NPO starting 2/16 at midnight for ultrasound endoscopy on 2/17 at [facility] at 0930 with check in time of 0730, SW arranging transport. Pt rates 9/10 pain, medicated per MAR. Patient resting in bed. Personal belongings and call light within reach. Family at bedside.

## 2017-02-18 VITALS
RESPIRATION RATE: 18 BRPM | HEART RATE: 88 BPM | TEMPERATURE: 98.4 F | DIASTOLIC BLOOD PRESSURE: 83 MMHG | OXYGEN SATURATION: 90 % | SYSTOLIC BLOOD PRESSURE: 169 MMHG | WEIGHT: 240.3 LBS | BODY MASS INDEX: 31.68 KG/M2

## 2017-02-18 LAB
ALBUMIN SERPL BCP-MCNC: 2.5 G/DL (ref 3.2–4.9)
ALBUMIN/GLOB SERPL: 0.7 G/DL
ALP SERPL-CCNC: 73 U/L (ref 30–99)
ALT SERPL-CCNC: 11 U/L (ref 2–50)
ANION GAP SERPL CALC-SCNC: 8 MMOL/L (ref 0–11.9)
AST SERPL-CCNC: 13 U/L (ref 12–45)
BILIRUB SERPL-MCNC: 0.2 MG/DL (ref 0.1–1.5)
BUN SERPL-MCNC: 18 MG/DL (ref 8–22)
CALCIUM SERPL-MCNC: 8.6 MG/DL (ref 8.5–10.5)
CHLORIDE SERPL-SCNC: 100 MMOL/L (ref 96–112)
CO2 SERPL-SCNC: 25 MMOL/L (ref 20–33)
CREAT SERPL-MCNC: 1.39 MG/DL (ref 0.5–1.4)
ERYTHROCYTE [DISTWIDTH] IN BLOOD BY AUTOMATED COUNT: 41.8 FL (ref 35.9–50)
GFR SERPL CREATININE-BSD FRML MDRD: 52 ML/MIN/1.73 M 2
GLOBULIN SER CALC-MCNC: 3.7 G/DL (ref 1.9–3.5)
GLUCOSE SERPL-MCNC: 101 MG/DL (ref 65–99)
HCT VFR BLD AUTO: 37.4 % (ref 42–52)
HGB BLD-MCNC: 12.2 G/DL (ref 14–18)
LIPASE SERPL-CCNC: 35 U/L (ref 11–82)
MCH RBC QN AUTO: 28.8 PG (ref 27–33)
MCHC RBC AUTO-ENTMCNC: 32.6 G/DL (ref 33.7–35.3)
MCV RBC AUTO: 88.2 FL (ref 81.4–97.8)
PLATELET # BLD AUTO: 316 K/UL (ref 164–446)
PMV BLD AUTO: 10.8 FL (ref 9–12.9)
POTASSIUM SERPL-SCNC: 4 MMOL/L (ref 3.6–5.5)
PROT SERPL-MCNC: 6.2 G/DL (ref 6–8.2)
RBC # BLD AUTO: 4.24 M/UL (ref 4.7–6.1)
SODIUM SERPL-SCNC: 133 MMOL/L (ref 135–145)
WBC # BLD AUTO: 12.9 K/UL (ref 4.8–10.8)

## 2017-02-18 PROCEDURE — 700102 HCHG RX REV CODE 250 W/ 637 OVERRIDE(OP): Performed by: INTERNAL MEDICINE

## 2017-02-18 PROCEDURE — 80053 COMPREHEN METABOLIC PANEL: CPT

## 2017-02-18 PROCEDURE — 85027 COMPLETE CBC AUTOMATED: CPT

## 2017-02-18 PROCEDURE — A9270 NON-COVERED ITEM OR SERVICE: HCPCS | Performed by: INTERNAL MEDICINE

## 2017-02-18 PROCEDURE — 99239 HOSP IP/OBS DSCHRG MGMT >30: CPT | Performed by: INTERNAL MEDICINE

## 2017-02-18 PROCEDURE — 700112 HCHG RX REV CODE 229: Performed by: INTERNAL MEDICINE

## 2017-02-18 PROCEDURE — 83690 ASSAY OF LIPASE: CPT

## 2017-02-18 PROCEDURE — 36415 COLL VENOUS BLD VENIPUNCTURE: CPT

## 2017-02-18 RX ORDER — AMLODIPINE BESYLATE 10 MG/1
10 TABLET ORAL DAILY
Qty: 30 TAB | Refills: 0 | Status: SHIPPED | OUTPATIENT
Start: 2017-02-18 | End: 2017-03-01 | Stop reason: SDUPTHER

## 2017-02-18 RX ORDER — HYDROCODONE BITARTRATE AND ACETAMINOPHEN 5; 300 MG/1; MG/1
1 TABLET ORAL EVERY 8 HOURS PRN
Qty: 15 TAB | Refills: 0 | Status: SHIPPED | OUTPATIENT
Start: 2017-02-18 | End: 2017-03-01 | Stop reason: SDUPTHER

## 2017-02-18 RX ORDER — PSEUDOEPHEDRINE HCL 30 MG
100 TABLET ORAL 2 TIMES DAILY
Qty: 60 CAP | Refills: 0 | Status: SHIPPED | OUTPATIENT
Start: 2017-02-18 | End: 2017-03-01

## 2017-02-18 RX ORDER — POLYETHYLENE GLYCOL 3350 17 G/17G
17 POWDER, FOR SOLUTION ORAL
Qty: 20 EACH | Refills: 0 | Status: SHIPPED | OUTPATIENT
Start: 2017-02-18 | End: 2017-03-01

## 2017-02-18 RX ORDER — AMOXICILLIN 250 MG
1 CAPSULE ORAL
Qty: 30 TAB | Refills: 0 | Status: SHIPPED | OUTPATIENT
Start: 2017-02-18 | End: 2017-03-01

## 2017-02-18 RX ORDER — CLONIDINE HYDROCHLORIDE 0.1 MG/1
0.1 TABLET ORAL 2 TIMES DAILY
Qty: 60 TAB | Refills: 0 | Status: SHIPPED | OUTPATIENT
Start: 2017-02-18 | End: 2017-03-01 | Stop reason: SDUPTHER

## 2017-02-18 RX ADMIN — CLONIDINE HYDROCHLORIDE 0.1 MG: 0.1 TABLET ORAL at 09:09

## 2017-02-18 RX ADMIN — BISACODYL 10 MG: 10 SUPPOSITORY RECTAL at 09:09

## 2017-02-18 RX ADMIN — DOCUSATE SODIUM 100 MG: 100 CAPSULE ORAL at 09:09

## 2017-02-18 RX ADMIN — LISINOPRIL 20 MG: 20 TABLET ORAL at 09:09

## 2017-02-18 RX ADMIN — AMLODIPINE BESYLATE 10 MG: 10 TABLET ORAL at 09:09

## 2017-02-18 ASSESSMENT — ENCOUNTER SYMPTOMS
CONSTIPATION: 1
RESPIRATORY NEGATIVE: 1
CONSTITUTIONAL NEGATIVE: 1
ABDOMINAL PAIN: 1
CARDIOVASCULAR NEGATIVE: 1

## 2017-02-18 ASSESSMENT — PAIN SCALES - GENERAL: PAINLEVEL_OUTOF10: 0

## 2017-02-18 NOTE — PROGRESS NOTES
Discharge instructions and prescriptions given to pt. PIV removed, pt tolerated well. Pt off the floor for discharge home, via ambulating with family.

## 2017-02-18 NOTE — DISCHARGE SUMMARY
HOSPITAL MEDICINE DISCHARGE SUMMARY    Name: Brian Jasmine  MRN: [id number]  : 1955  Admit Date: 2017  Discharge Date: 2017  Attending Provider: Shiva Sarkar M.D.  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 Consulted Pancreaticobiliary surgeon Dr. Stevenson who recommended GI consultation for endoscopic ultrasound but did say . Consulted Dr. Knapp 2/15 and underwent endoscopic U/S, biopsy pending. I ordered  and CEA and that came back slightlly elevated at 37.4 and 4 respectively. He resumed his diet and his hyperlipasemia trended down and is close to resolution despite advancing diet. His hepatobiliary enzymes and transaminases were not elevated. He tolerated diet and had a bowel movement. He will follow up with Dr. Oconnor, GI in 1-2 weeks for post EU/S follow up with biopsy results and with Dr. Stevenson in 2-3 weeks in coordination with GI. Refills for narcotics will be deferred to his GI physician or primary physician.      Acute kidney injury  Resolved with IVF.    Hypertension  Not well controlled. Achieved better control with adding amlodipine and clonidine to his regimen of Prinvil. He will follow up with Dr. Elise, [phone] [facility] and continue to manage his blood pressure.    Tobacco dependence  Smoking cessation encouraged. I spent 8 minutes talking to him about smoking cessation and risk of cancer.      DISCHARGE MEDICATIONS:    Brian Jasmine   Home Medication Instructions TRU:[id number]    Printed on:17 1158   Medication Information                      amlodipine (NORVASC) 10 MG Tab  Take 1 Tab by mouth every day.             bisacodyl (DULCOLAX) 10 MG Suppos  Insert 10 mg in rectum 1 time daily as needed.             clonidine  (CATAPRES) 0.1 MG Tab  Take 1 Tab by mouth 2 Times a Day.             docusate sodium 100 MG Cap  Take 100 mg by mouth 2 Times a Day.             lisinopril (PRINIVIL) 20 MG Tab  Take 1 Tab by mouth every day. For high blood pressure             magnesium hydroxide (MILK OF MAGNESIA) 400 MG/5ML Suspension  Take 30 mL by mouth 1 time daily as needed.             polyethylene glycol/lytes (MIRALAX) Pack  Take 1 Packet by mouth 1 time daily as needed (constipation).             psyllium (METAMUCIL) 58.12 % Pack  Take 1 Packet by mouth 1 time daily as needed.             senna-docusate (PERICOLACE OR SENOKOT S) 8.6-50 MG Tab  Take 1 Tab by mouth every 24 hours as needed for Constipation.                 HOSPITAL COURSE  Please review Dr. Ted Villeda, D.GLORIA. notes for further details of history of present illness, past medical/social/family histories, allergies and medications. Please review Dr. Garcia, Dr. Oconnor, Dr. Ghosh consultation notes.    CT showed mucinous looking septated cystic pancreatic mass. GI and GI surgeons were consulted (see above). He underwent EU/S with biopsy. He was advanced on diet and even with this, his lipase had trended down to near resolution. He moved his bowels. He was cleared by GI for discharge.     Please see discharge diagnoses, plan and follow up above for details of presenting problem and other medical issues    Brian Jasmine improved and was deemed ready to be discharged from the hospital as there were no further inpatient needs. Brian Jasmine felt comfortable going home. The discharge plan was discussed with Brian Jasmine, and agreed to it. Brian Jasmine was subsequently discharged in improved and stable condition.    DISCHARGE VITALS, LABS and IMAGING  Filed Vitals:    02/17/17 1910 02/18/17 0010 02/18/17 0400 02/18/17 0800   BP: 120/67  159/82 169/83   Pulse: 86  81 88   Temp: 36.5 °C (97.7 °F)  36.6 °C (97.8 °F) 36.9 °C (98.4 °F)   Resp: 18 18 18    Weight:  109 kg (240 lb 4.8 oz)     SpO2: 93%  93% 90%     Filed Vitals:    02/17/17 1910 02/18/17 0010 02/18/17 0400 02/18/17 0800   BP: 120/67  159/82 169/83   Pulse: 86  81 88   Temp: 36.5 °C (97.7 °F)  36.6 °C (97.8 °F) 36.9 °C (98.4 °F)   Resp: 18 18 18   Weight:  109 kg (240 lb 4.8 oz)     SpO2: 93%  93% 90%     Lab Results   Component Value Date/Time    WBC 12.9* 02/18/2017 03:00 AM    RBC 4.24* 02/18/2017 03:00 AM    HEMOGLOBIN 12.2* 02/18/2017 03:00 AM    HEMATOCRIT 37.4* 02/18/2017 03:00 AM    MCV 88.2 02/18/2017 03:00 AM    MCH 28.8 02/18/2017 03:00 AM    MCHC 32.6* 02/18/2017 03:00 AM    MPV 10.8 02/18/2017 03:00 AM      Lab Results   Component Value Date/Time    SODIUM 133* 02/18/2017 02:59 AM    POTASSIUM 4.0 02/18/2017 02:59 AM    CHLORIDE 100 02/18/2017 02:59 AM    CO2 25 02/18/2017 02:59 AM    GLUCOSE 101* 02/18/2017 02:59 AM    BUN 18 02/18/2017 02:59 AM    CREATININE 1.39 02/18/2017 02:59 AM      No results found for: PROTHROMBTM, INR     Mr-abdomen-with & W/o    2/15/2017  2/15/2017 2:00 PM HISTORY/REASON FOR EXAM:  Large multiseptated mass in pancreatic tail on CT. TECHNIQUE/EXAM DESCRIPTION: MRI of the pancreas with dynamic IV gadolinium enhancement. MR imaging of the pancreas was performed with axial fat-suppressed FRFSE T2, coronal single-shot fast spin-echo T2, axial in-phase and out-of-phase T1 FSPGR, pre-gadolinium-enhanced fat-suppressed axial thin-section T1 FSPGR, bolus intravenous gadolinium-enhanced thin-section fat-suppressed T1 FSPGR in the arterial dominant/pancreatic phase, portal venous phase fat-suppressed T1 FSPGR sequences of the liver and pancreas, and delayed enhanced fat-suppressed coronal T1 FSPGR of the pancreas with  delayed enhanced sagittal fat-suppressed T1 FSPGR of the pancreatic head.. The study was performed on a [employer] Experiencea 1.5 Radha MRI scanner. 20 mL Omniscan contrast was administered intravenously. COMPARISON: 02/14/2017 FINDINGS: 13 x 7.5 cm mass containing  multiple irregular shaped cystic components is identified which occupies the pancreatic body and tail. This was also identified in the prior CT. Arterial phase and portal venous phase images demonstrate enhancement of the tissue between the cystic areas within this mass. There is induration and stranding areas of enhancement surrounding this portion of the pancreas and extending into the anterior pararenal space on the left side. The mass does not appear to contact the superior mesenteric artery. However, the portal confluence and superior mesenteric vein are located immediately adjacent to the mass. No upper abdominal or retroperitoneal adenopathy is identified.     2/15/2017  1.  Large multicystic appearing complex lesion occupies the pancreatic body and tail as described above. Some induration is noted around the pancreas as described above. Differential diagnosis includes pancreatic carcinoma which includes necrotic lesions. Cystic neoplasm of the pancreas is also possible. Acute pancreatitis with multiple pseudocysts is also in the differential diagnosis. 2.  Neoplastic involvement of the superior mesenteric vein at the portal confluence cannot be excluded. No abnormalities are noted surrounding the superior mesenteric artery. Splenic vein and splenic artery appear patent. 3.  No adenopathy or other solid organ abnormalities are identified.     Ct Abdomen-pelvis With Iv Contrast    2/14/2017  HISTORY/REASON FOR EXAM: . generalized pain x 3 weeks, constipation, weight loss TECHNIQUE/EXAM DESCRIPTION: CT scan of the abdomen and pelvis with IV contrast, 2/14/2017 4:29 PM. This examination was conducted with Automated Exposure Control and Automated Adjustment of Tube Current based on patient size. Following the administration of IV contrast, helical imaging was performed from the dome of the diaphragm to the pubic symphysis. Total DLP: 834 mGy*cm COMPARISON: None. FINDINGS: Lung bases:  Limited images to the lung  bases demonstrate mild bibasilar atelectasis. Liver:  The liver demonstrates evidence of fatty infiltration without evidence of focal fatty sparing within the area of the gallbladder fossa. The portal vein is patent. No hepatic lesion is appreciated. Gallbladder:  The gallbladder appears unremarkable. Spleen:  The spleen appears normal. Stomach:  The stomach and the area of the duodenum appears unremarkable. Adrenal glands:  No adrenal mass is identified. Pancreas:  There is a large multiseptated cystic mass noted involving the pancreatic tail and body. This mass measures approximately 13 cm x 7 cm in greatest dimensions. The head and the uncinate portions of the pancreas appear normal. The walls of the cysts appear relatively thick, and the individual cysts appear somewhat large, measuring up to 4 cm in diameter. No peripheral calcifications are noted. No central scar is noted. There is mild inflammation of the mesenteric fat surrounding the pancreas, and the pancreatic lesion, but I do not appreciate any enlarged lymph nodes. There are prominent collateral vessels peripheral to the stomach, and at the splenic hilum. The finding suggests stenosis/occlusion of the splenic vein. Kidneys:  The kidneys enhance promptly and symmetrically. There is no evidence of hydronephrosis or parenchymal lesions. Bowel: I do not see evidence of large or small bowel obstruction. The appendix is identified and appears normal. There is a fusiform aneurysm of the infrarenal abdominal aorta, which measures 3.7 cm in diameter. There is prominent atherosclerotic plaque involving the abdominal aorta, as well as as the common iliac arteries. No pathologically enlarged lymphnodes are identified. No destructive osseous lesion is noted. There is a 1 cm anterolisthesis of L5 upon S1. There is abnormal loss of vertebral body height at T8, T9, and T10, which appears represent a chronic, rather than acute process is no comminution of the endplates  is identified. No destructive osseous lesion is noted.     2/14/2017  There is a large multiseptated cystic mass noted involving the pancreatic tail and body which measures approximately 13 x 7.5 cm. Given the age of the patient, and the morphology of this mass, the leading consideration would be a mucinous cystic neoplasm. Surgical consultation is therefore suggested.Saurabh Bell MD 2/14/2017 5:03 PM DLP Reporting Thresholds for Incorrect/Repeated Exams - DLP in mGy*cm Head/Neck:  0-year-old 3840, 1-year-old 5880, 5-year-old 8770, 10-year-old 86391 and adult 97102 Head:  0-year-old 4540, 1-year-old 7460, 5-year-old 38108, 10-year-old 70801 and adult 60105 Neck:  0-year-old 2940, 1-year-old 4160, 5-year-old 4550, 10-year-old 6320 and adult 8470 Chest:  0-year-old 550, 1-year-old 830, 5-year-old 1200, 10-year-old 3840 and adult 3570 Abd/pelvis:  0-year-old 440, 1-year-old 720, 5-year-old 1080, 10-year-old 3330 and adult 3330 Trunk(C/A/P):  0-year-old 490, 1-year-old 770, 5-year-old 1140, 10-year-old 3570 and adult 3330       FOLLOW-UP APPOINTMENTS:   He will follow up with Dr. Oconnor, GI in 1-2 weeks for post EU/S follow up with biopsy results and with Dr. Stevenson in 2-3 weeks in coordination with GI. Refills for narcotics will be deferred to his GI physician or primary physician.    He will follow up with Dr. Elise, [phone] [facility] in 1 week and continue to manage his blood pressure.    Please CC Dr. Elise, Dr. Oconnor, Dr. Ghosh, Dr. Garcia.    For further details on discharge medications, patient education, diet, and activity, please refer to electronic copy of discharge instructions.       TIME SPENT: 46 minutes, with greater than 50% of the time spent on face-to-face encounter, addressing medical issues, coordination of care, counseling, discharge planning, medication reconciliation, and documentation.

## 2017-02-18 NOTE — PROGRESS NOTES
Gastroenterology Progress Note     Author: Liam Oconnor   Date & Time Created: 2017 11:06 AM    Interval History:  Problem: recent pancreatitis and large pancreatic cystic neoplasm.  S: Less pain. Constipation is still a problem and MOM was recommended when he is discharged. He'll need outpatient follow with Dr. Hinton and Dr. Ghosh.     Review of Systems:  Review of Systems   Constitutional: Negative.    Respiratory: Negative.    Cardiovascular: Negative.    Gastrointestinal: Positive for abdominal pain and constipation.       Physical Exam:  Physical Exam   Constitutional: He is oriented to person, place, and time. He appears well-developed and well-nourished.   Obese.    Cardiovascular: Normal rate and regular rhythm.    Pulmonary/Chest: Breath sounds normal.   Abdominal: Soft. Bowel sounds are normal. He exhibits no distension. There is no rebound and no guarding.   Musculoskeletal: Normal range of motion. He exhibits no edema or tenderness.   Neurological: He is alert and oriented to person, place, and time.   Skin: Skin is warm and dry.   Psychiatric: He has a normal mood and affect.       Labs:        Invalid input(s): [id number]      Recent Labs      17   0259   SODIUM  133*  133*   POTASSIUM  3.7  4.0   CHLORIDE  102  100   CO2  21  25   BUN  12  18   CREATININE  1.04  1.39   CALCIUM  8.7  8.6     Recent Labs      17   0259   ALTSGPT  8  11   ASTSGOT  9*  13   ALKPHOSPHAT  66  73   TBILIRUBIN  0.2  0.2   LIPASE  163*  35   GLUCOSE  109*  101*     Recent Labs      17   0300   RBC  4.49*  4.24*   HEMOGLOBIN  13.0*  12.2*   HEMATOCRIT  39.3*  37.4*   PLATELETCT  324  316     Recent Labs      17   0259  17   0300   WBC  13.8*   --   12.9*   ASTSGOT  9*  13   --    ALTSGPT  8  11   --    ALKPHOSPHAT  66  73   --    TBILIRUBIN  0.2  0.2   --      Hemodynamics:  Temp (24hrs), Av.8 °C (98.2 °F),  Min:36.5 °C (97.7 °F), Max:37.2 °C (99 °F)  Temperature: 36.9 °C (98.4 °F)  Pulse  Av.2  Min: 63  Max: 97   Blood Pressure: (!) 169/83 mmHg     Respiratory:    Respiration: 18, Pulse Oximetry: 90 %           Fluids:    Intake/Output Summary (Last 24 hours) at 17 1106  Last data filed at 17 1700   Gross per 24 hour   Intake    360 ml   Output      0 ml   Net    360 ml     Weight: 109 kg (240 lb 4.8 oz)  GI/Nutrition:  Orders Placed This Encounter   Procedures   • DIET ORDER     Standing Status: Standing      Number of Occurrences: 1      Standing Expiration Date:      Order Specific Question:  Diet:     Answer:  Regular [1]     Medical Decision Making, by Problem:  Active Hospital Problems    Diagnosis   • Pancreatic mass [K86.9]   Impression:  1. Recent pancreatitis.  2. 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 EUS done . Path and fluid analysis pending.   3. Constipation  4. Hypertension.     Plan:  1. OK for DC today.  2. I'll arrange follow up with Dr. Hinton and Dr. RINCON.     Labs reviewed, Radiology images reviewed and Medications reviewed

## 2017-02-18 NOTE — DISCHARGE INSTRUCTIONS
Follow up to Dr. Arik Braxton [facility] [phone], primary care physician. Follow up on hypertension, I have added amlodipine and clonidine.   Follow up to Dr. Oconnor, Gastroenterology in 1 week post EU/S  W/ biopsy. Clinic to follow up on biopsy results.   Follow up to Dr. Ponce, Pancreaticobiliary surgeon in 2-3 weeks... Coordinate with GI clinic    Discharge Instructions    Discharged to home by car with relative. Discharged via walking, hospital escort: Refused.  Special equipment needed: Not Applicable    Be sure to schedule a follow-up appointment with your primary care doctor or any specialists as instructed.     Discharge Plan:        I understand that a diet low in cholesterol, fat, and sodium is recommended for good health. Unless I have been given specific instructions below for another diet, I accept this instruction as my diet prescription.   Other diet: none specified    Special Instructions: None    · Is patient discharged on Warfarin / Coumadin?   No     · Is patient Post Blood Transfusion?  No    Depression / Suicide Risk    As you are discharged from this [facility] facility, it is important to learn how to keep safe from harming yourself.    Recognize the warning signs:  · Abrupt changes in personality, positive or negative- including increase in energy   · Giving away possessions  · Change in eating patterns- significant weight changes-  positive or negative  · Change in sleeping patterns- unable to sleep or sleeping all the time   · Unwillingness or inability to communicate  · Depression  · Unusual sadness, discouragement and loneliness  · Talk of wanting to die  · Neglect of personal appearance   · Rebelliousness- reckless behavior  · Withdrawal from people/activities they love  · Confusion- inability to concentrate     If you or a loved one observes any of these behaviors or has concerns about self-harm, here's what you can do:  · Talk about it-  your feelings and reasons for harming yourself  · Remove any means that you might use to hurt yourself (examples: pills, rope, extension cords, firearm)  · Get professional help from the community (Mental Health, Substance Abuse, psychological counseling)  · Do not be alone:Call your Safe Contact- someone whom you trust who will be there for you.  · Call your local CRISIS HOTLINE 248-7919 or 303-458-7814  · Call your local Children's Mobile Crisis Response Team [place] (270) 987-2161 or www.[employer]  · Call the toll free National Suicide Prevention Hotlines   · National Suicide Prevention Lifeline 629-001-UZNK (9940)  · National Hope Line Network 800-SUICIDE (567-3087)

## 2017-02-18 NOTE — PROGRESS NOTES
Returned pt to bed. Standby one person assist. Lap belt secured. Pt can release himself. Bed alarm on. Voided in toilet.  No s/s of distress. Oriented x 4 at this time.

## 2017-02-18 NOTE — PROGRESS NOTES
Pt agreeable to discharge home. Pt spouse is en route to hospital to provide transportation home and personal clothing.

## 2017-02-18 NOTE — PROGRESS NOTES
Received report from off going RN.  Assumed patient care and introduced self to patient. Patient AOx4. No complaints of pain/discomfort at this time. Bed in lowest position, bed locked, treaded socks in place, call light within reach. Will continue to monitor.

## 2017-03-01 PROBLEM — I10 ESSENTIAL HYPERTENSION: Status: ACTIVE | Noted: 2017-03-01

## 2017-03-14 PROBLEM — E66.9 OBESITY (BMI 30-39.9): Status: ACTIVE | Noted: 2017-03-14

## 2017-03-15 ENCOUNTER — HOSPITAL ENCOUNTER (EMERGENCY)
Facility: MEDICAL CENTER | Age: 62
End: 2017-03-15
Payer: MEDICAID

## 2017-03-15 VITALS
HEART RATE: 83 BPM | RESPIRATION RATE: 16 BRPM | WEIGHT: 218.92 LBS | HEIGHT: 73 IN | TEMPERATURE: 97.3 F | SYSTOLIC BLOOD PRESSURE: 197 MMHG | DIASTOLIC BLOOD PRESSURE: 95 MMHG | OXYGEN SATURATION: 99 % | BODY MASS INDEX: 29.01 KG/M2

## 2017-03-15 LAB
ALBUMIN SERPL BCP-MCNC: 3.9 G/DL (ref 3.2–4.9)
ALBUMIN/GLOB SERPL: 0.9 G/DL
ALP SERPL-CCNC: 114 U/L (ref 30–99)
ALT SERPL-CCNC: 11 U/L (ref 2–50)
ANION GAP SERPL CALC-SCNC: 10 MMOL/L (ref 0–11.9)
AST SERPL-CCNC: 11 U/L (ref 12–45)
BASOPHILS # BLD AUTO: 0.2 % (ref 0–1.8)
BASOPHILS # BLD: 0.03 K/UL (ref 0–0.12)
BILIRUB SERPL-MCNC: 0.7 MG/DL (ref 0.1–1.5)
BUN SERPL-MCNC: 14 MG/DL (ref 8–22)
CALCIUM SERPL-MCNC: 9.7 MG/DL (ref 8.5–10.5)
CHLORIDE SERPL-SCNC: 100 MMOL/L (ref 96–112)
CO2 SERPL-SCNC: 23 MMOL/L (ref 20–33)
CREAT SERPL-MCNC: 1.11 MG/DL (ref 0.5–1.4)
EOSINOPHIL # BLD AUTO: 0.35 K/UL (ref 0–0.51)
EOSINOPHIL NFR BLD: 2.4 % (ref 0–6.9)
ERYTHROCYTE [DISTWIDTH] IN BLOOD BY AUTOMATED COUNT: 41.6 FL (ref 35.9–50)
GFR SERPL CREATININE-BSD FRML MDRD: >60 ML/MIN/1.73 M 2
GLOBULIN SER CALC-MCNC: 4.2 G/DL (ref 1.9–3.5)
GLUCOSE SERPL-MCNC: 118 MG/DL (ref 65–99)
HCT VFR BLD AUTO: 43.9 % (ref 42–52)
HGB BLD-MCNC: 14.3 G/DL (ref 14–18)
IMM GRANULOCYTES # BLD AUTO: 0.05 K/UL (ref 0–0.11)
IMM GRANULOCYTES NFR BLD AUTO: 0.3 % (ref 0–0.9)
LIPASE SERPL-CCNC: 173 U/L (ref 11–82)
LYMPHOCYTES # BLD AUTO: 2.23 K/UL (ref 1–4.8)
LYMPHOCYTES NFR BLD: 15.2 % (ref 22–41)
MCH RBC QN AUTO: 28.1 PG (ref 27–33)
MCHC RBC AUTO-ENTMCNC: 32.6 G/DL (ref 33.7–35.3)
MCV RBC AUTO: 86.2 FL (ref 81.4–97.8)
MONOCYTES # BLD AUTO: 1.08 K/UL (ref 0–0.85)
MONOCYTES NFR BLD AUTO: 7.3 % (ref 0–13.4)
NEUTROPHILS # BLD AUTO: 10.96 K/UL (ref 1.82–7.42)
NEUTROPHILS NFR BLD: 74.6 % (ref 44–72)
NRBC # BLD AUTO: 0 K/UL
NRBC BLD AUTO-RTO: 0 /100 WBC
PLATELET # BLD AUTO: 246 K/UL (ref 164–446)
PMV BLD AUTO: 10.8 FL (ref 9–12.9)
POTASSIUM SERPL-SCNC: 4.4 MMOL/L (ref 3.6–5.5)
PROT SERPL-MCNC: 8.1 G/DL (ref 6–8.2)
RBC # BLD AUTO: 5.09 M/UL (ref 4.7–6.1)
SODIUM SERPL-SCNC: 133 MMOL/L (ref 135–145)
WBC # BLD AUTO: 14.7 K/UL (ref 4.8–10.8)

## 2017-03-15 PROCEDURE — 302449 STATCHG TRIAGE ONLY (STATISTIC)

## 2017-03-15 PROCEDURE — 80053 COMPREHEN METABOLIC PANEL: CPT

## 2017-03-15 PROCEDURE — 85025 COMPLETE CBC W/AUTO DIFF WBC: CPT

## 2017-03-15 PROCEDURE — 83690 ASSAY OF LIPASE: CPT

## 2017-03-15 ASSESSMENT — PAIN SCALES - GENERAL: PAINLEVEL_OUTOF10: 8

## 2017-03-15 NOTE — ED NOTES
"Chief Complaint   Patient presents with   • Abdominal Pain     pt reports that he has a cyst on his pancreas, states that it is hurting today and was told to come here by his MD.     Pt ambulatory to triage. In obvious discomfort.   Blood pressure 197/95, pulse 83, temperature 36.3 °C (97.3 °F), temperature source Temporal, resp. rate 16, height 1.854 m (6' 1\"), weight 99.3 kg (218 lb 14.7 oz), SpO2 99 %.  Pt informed of wait times. Educated on triage process.  Asked to return to triage RN for any new or worsening of symptoms. Thanked for patience.        "

## 2017-03-16 PROBLEM — K85.90 ACUTE ON CHRONIC PANCREATITIS (HCC): Status: ACTIVE | Noted: 2017-03-16

## 2017-03-16 PROBLEM — K86.1 ACUTE ON CHRONIC PANCREATITIS (HCC): Status: ACTIVE | Noted: 2017-03-16

## 2017-04-13 PROBLEM — I10 ESSENTIAL HYPERTENSION: Status: RESOLVED | Noted: 2017-03-01 | Resolved: 2017-04-13

## 2017-04-13 PROBLEM — I50.42 CHRONIC COMBINED SYSTOLIC AND DIASTOLIC CONGESTIVE HEART FAILURE (HCC): Status: ACTIVE | Noted: 2017-04-13

## 2017-04-13 PROBLEM — I15.0 RENOVASCULAR HYPERTENSION: Status: ACTIVE | Noted: 2017-02-13

## 2017-04-13 PROBLEM — R10.10 PAIN OF UPPER ABDOMEN: Status: ACTIVE | Noted: 2017-04-13

## 2017-04-13 PROBLEM — J41.0 SIMPLE CHRONIC BRONCHITIS (HCC): Status: ACTIVE | Noted: 2017-04-13

## 2017-04-28 ENCOUNTER — HOSPITAL ENCOUNTER (OUTPATIENT)
Dept: RADIOLOGY | Facility: MEDICAL CENTER | Age: 62
End: 2017-04-28
Attending: SURGERY
Payer: MEDICAID

## 2017-04-28 DIAGNOSIS — K86.2 CYST AND PSEUDOCYST OF PANCREAS: ICD-10-CM

## 2017-04-28 DIAGNOSIS — K86.3 CYST AND PSEUDOCYST OF PANCREAS: ICD-10-CM

## 2017-04-28 PROCEDURE — 74170 CT ABD WO CNTRST FLWD CNTRST: CPT

## 2017-04-28 PROCEDURE — 700117 HCHG RX CONTRAST REV CODE 255: Performed by: SURGERY

## 2017-04-28 RX ADMIN — IOHEXOL 100 ML: 350 INJECTION, SOLUTION INTRAVENOUS at 13:40

## 2017-06-09 PROBLEM — I50.42 CHRONIC COMBINED SYSTOLIC AND DIASTOLIC CONGESTIVE HEART FAILURE (HCC): Chronic | Status: ACTIVE | Noted: 2017-04-13

## 2017-06-09 PROBLEM — I15.0 RENOVASCULAR HYPERTENSION: Chronic | Status: ACTIVE | Noted: 2017-02-13

## 2017-06-09 PROBLEM — K86.3 PSEUDOCYST OF PANCREAS: Status: ACTIVE | Noted: 2017-02-15

## 2017-06-09 PROBLEM — K86.89 PANCREATIC MASS: Chronic | Status: ACTIVE | Noted: 2017-02-15

## 2017-06-09 PROBLEM — Z91.148 NON COMPLIANCE W MEDICATION REGIMEN: Chronic | Status: ACTIVE | Noted: 2017-06-09

## 2017-06-09 PROBLEM — J41.0 SIMPLE CHRONIC BRONCHITIS (HCC): Chronic | Status: ACTIVE | Noted: 2017-04-13

## 2017-06-09 PROBLEM — Z91.148 NON COMPLIANCE W MEDICATION REGIMEN: Status: ACTIVE | Noted: 2017-06-09

## 2017-06-09 PROBLEM — E66.9 OBESITY (BMI 30-39.9): Chronic | Status: ACTIVE | Noted: 2017-03-14

## 2017-06-09 PROBLEM — K86.3 PSEUDOCYST OF PANCREAS: Chronic | Status: ACTIVE | Noted: 2017-02-15

## 2017-10-10 ENCOUNTER — HOSPITAL ENCOUNTER (OUTPATIENT)
Dept: LAB | Facility: MEDICAL CENTER | Age: 62
End: 2017-10-10
Attending: SURGERY
Payer: MEDICAID

## 2017-10-10 LAB
BUN SERPL-MCNC: 20 MG/DL (ref 8–22)
CREAT SERPL-MCNC: 1.61 MG/DL (ref 0.5–1.4)
GFR SERPL CREATININE-BSD FRML MDRD: 44 ML/MIN/1.73 M 2

## 2017-10-10 PROCEDURE — 36415 COLL VENOUS BLD VENIPUNCTURE: CPT

## 2017-10-10 PROCEDURE — 82565 ASSAY OF CREATININE: CPT

## 2017-10-10 PROCEDURE — 84520 ASSAY OF UREA NITROGEN: CPT

## 2017-10-17 ENCOUNTER — HOSPITAL ENCOUNTER (OUTPATIENT)
Dept: RADIOLOGY | Facility: MEDICAL CENTER | Age: 62
End: 2017-10-17
Attending: SURGERY
Payer: MEDICAID

## 2017-10-17 DIAGNOSIS — K86.2 CYST AND PSEUDOCYST OF PANCREAS: ICD-10-CM

## 2017-10-17 DIAGNOSIS — K86.3 CYST AND PSEUDOCYST OF PANCREAS: ICD-10-CM

## 2017-10-17 PROCEDURE — 74181 MRI ABDOMEN W/O CONTRAST: CPT

## 2018-03-06 PROBLEM — E66.9 OBESITY (BMI 35.0-39.9 WITHOUT COMORBIDITY): Status: ACTIVE | Noted: 2018-03-06

## 2018-03-06 PROBLEM — Z23 PROPHYLACTIC VACCINATION AGAINST STREPTOCOCCUS PNEUMONIAE AND INFLUENZA: Status: ACTIVE | Noted: 2018-03-06

## 2018-03-06 PROBLEM — Z53.20 COLONOSCOPY REFUSED: Status: ACTIVE | Noted: 2018-03-06

## 2018-04-10 PROBLEM — I70.1 RENAL ARTERY STENOSIS (HCC): Status: ACTIVE | Noted: 2018-04-10

## 2018-05-10 PROBLEM — F10.11 HISTORY OF ALCOHOL ABUSE: Status: ACTIVE | Noted: 2017-02-28

## 2019-03-06 PROBLEM — R07.89 OTHER CHEST PAIN: Status: ACTIVE | Noted: 2019-03-06

## 2019-03-12 PROBLEM — N17.9 ACUTE KIDNEY INJURY (HCC): Status: RESOLVED | Noted: 2017-02-15 | Resolved: 2019-03-12

## 2019-03-12 PROBLEM — J41.0 SIMPLE CHRONIC BRONCHITIS (HCC): Chronic | Status: RESOLVED | Noted: 2017-04-13 | Resolved: 2019-03-12

## 2019-03-12 PROBLEM — K86.3 PSEUDOCYST OF PANCREAS: Chronic | Status: RESOLVED | Noted: 2017-02-15 | Resolved: 2019-03-12

## 2019-03-12 PROBLEM — E66.9 OBESITY (BMI 35.0-39.9 WITHOUT COMORBIDITY): Status: RESOLVED | Noted: 2018-03-06 | Resolved: 2019-03-12

## 2019-03-12 PROBLEM — K85.90 PANCREATITIS: Status: RESOLVED | Noted: 2017-02-15 | Resolved: 2019-03-12

## 2019-03-12 PROBLEM — R10.84 GENERALIZED ABDOMINAL PAIN: Status: RESOLVED | Noted: 2017-02-13 | Resolved: 2019-03-12

## 2019-03-12 PROBLEM — Z23 PROPHYLACTIC VACCINATION AGAINST STREPTOCOCCUS PNEUMONIAE AND INFLUENZA: Status: RESOLVED | Noted: 2018-03-06 | Resolved: 2019-03-12

## 2019-03-12 PROBLEM — Z91.148 NON COMPLIANCE W MEDICATION REGIMEN: Chronic | Status: RESOLVED | Noted: 2017-06-09 | Resolved: 2019-03-12

## 2019-03-12 PROBLEM — R10.10 PAIN OF UPPER ABDOMEN: Status: RESOLVED | Noted: 2017-04-13 | Resolved: 2019-03-12

## 2019-03-12 PROBLEM — Z53.20 COLONOSCOPY REFUSED: Status: RESOLVED | Noted: 2018-03-06 | Resolved: 2019-03-12

## 2019-03-12 PROBLEM — J44.9 CHRONIC OBSTRUCTIVE PULMONARY DISEASE (HCC): Status: ACTIVE | Noted: 2019-03-12

## 2019-03-12 PROBLEM — N18.30 CKD (CHRONIC KIDNEY DISEASE), STAGE III (HCC): Status: ACTIVE | Noted: 2019-03-12

## 2019-06-13 PROBLEM — N18.4 CKD (CHRONIC KIDNEY DISEASE), STAGE IV (HCC): Status: ACTIVE | Noted: 2019-03-12

## 2020-03-18 PROBLEM — N18.6 END STAGE RENAL DISEASE (HCC): Status: ACTIVE | Noted: 2019-03-12

## 2020-09-16 PROBLEM — K85.90 ACUTE ON CHRONIC PANCREATITIS (HCC): Status: RESOLVED | Noted: 2017-03-16 | Resolved: 2020-09-16

## 2020-09-16 PROBLEM — R07.89 OTHER CHEST PAIN: Status: RESOLVED | Noted: 2019-03-06 | Resolved: 2020-09-16

## 2020-09-16 PROBLEM — K86.1 ACUTE ON CHRONIC PANCREATITIS (HCC): Status: RESOLVED | Noted: 2017-03-16 | Resolved: 2020-09-16

## 2024-06-24 ENCOUNTER — APPOINTMENT (OUTPATIENT)
Dept: RADIOLOGY | Facility: MEDICAL CENTER | Age: 69
DRG: 216 | End: 2024-06-24
Attending: EMERGENCY MEDICINE
Payer: MEDICARE

## 2024-06-24 ENCOUNTER — APPOINTMENT (OUTPATIENT)
Dept: RADIOLOGY | Facility: MEDICAL CENTER | Age: 69
DRG: 216 | End: 2024-06-24
Attending: HOSPITALIST
Payer: MEDICARE

## 2024-06-24 ENCOUNTER — ANESTHESIA EVENT (OUTPATIENT)
Dept: SURGERY | Facility: MEDICAL CENTER | Age: 69
End: 2024-06-24
Payer: MEDICARE

## 2024-06-24 ENCOUNTER — HOSPITAL ENCOUNTER (INPATIENT)
Facility: MEDICAL CENTER | Age: 69
DRG: 216 | End: 2024-06-24
Attending: EMERGENCY MEDICINE | Admitting: HOSPITALIST
Payer: MEDICARE

## 2024-06-24 DIAGNOSIS — T82.49XA CLOTTED DIALYSIS ACCESS, INITIAL ENCOUNTER (HCC): ICD-10-CM

## 2024-06-24 DIAGNOSIS — K21.9 GASTROESOPHAGEAL REFLUX DISEASE WITHOUT ESOPHAGITIS: ICD-10-CM

## 2024-06-24 DIAGNOSIS — Z99.2 ESRD NEEDING DIALYSIS (HCC): ICD-10-CM

## 2024-06-24 DIAGNOSIS — Z99.2 ESRD ON DIALYSIS (HCC): ICD-10-CM

## 2024-06-24 DIAGNOSIS — I48.91 ATRIAL FIBRILLATION WITH RVR (HCC): ICD-10-CM

## 2024-06-24 DIAGNOSIS — Z95.2 S/P AVR (AORTIC VALVE REPLACEMENT): ICD-10-CM

## 2024-06-24 DIAGNOSIS — T82.7XXA INFECTED PROSTHETIC VASCULAR GRAFT, INITIAL ENCOUNTER (HCC): ICD-10-CM

## 2024-06-24 DIAGNOSIS — I25.119 CORONARY ARTERY DISEASE INVOLVING NATIVE CORONARY ARTERY OF NATIVE HEART WITH ANGINA PECTORIS (HCC): ICD-10-CM

## 2024-06-24 DIAGNOSIS — R79.89 ELEVATED TROPONIN: ICD-10-CM

## 2024-06-24 DIAGNOSIS — E56.9 VITAMIN DEFICIENCY: ICD-10-CM

## 2024-06-24 DIAGNOSIS — I48.20 CHRONIC ATRIAL FIBRILLATION (HCC): ICD-10-CM

## 2024-06-24 DIAGNOSIS — K59.03 DRUG-INDUCED CONSTIPATION: ICD-10-CM

## 2024-06-24 DIAGNOSIS — Z95.1 S/P CABG X 1: ICD-10-CM

## 2024-06-24 DIAGNOSIS — E87.70 HYPERVOLEMIA, UNSPECIFIED HYPERVOLEMIA TYPE: ICD-10-CM

## 2024-06-24 DIAGNOSIS — N18.6 END STAGE RENAL DISEASE (HCC): ICD-10-CM

## 2024-06-24 DIAGNOSIS — I73.9 PAD (PERIPHERAL ARTERY DISEASE) (HCC): ICD-10-CM

## 2024-06-24 DIAGNOSIS — N18.6 ESRD ON DIALYSIS (HCC): ICD-10-CM

## 2024-06-24 DIAGNOSIS — J44.9 CHRONIC OBSTRUCTIVE PULMONARY DISEASE, UNSPECIFIED COPD TYPE (HCC): ICD-10-CM

## 2024-06-24 DIAGNOSIS — N18.6 ESRD NEEDING DIALYSIS (HCC): ICD-10-CM

## 2024-06-24 DIAGNOSIS — R78.81 BACTEREMIA: ICD-10-CM

## 2024-06-24 PROBLEM — I25.10 CORONARY ARTERY DISEASE: Status: ACTIVE | Noted: 2024-06-24

## 2024-06-24 LAB
ALBUMIN SERPL BCP-MCNC: 2.9 G/DL (ref 3.2–4.9)
ALBUMIN/GLOB SERPL: 0.8 G/DL
ALP SERPL-CCNC: 75 U/L (ref 30–99)
ALT SERPL-CCNC: 16 U/L (ref 2–50)
ANION GAP SERPL CALC-SCNC: 18 MMOL/L (ref 7–16)
APTT PPP: 32.7 SEC (ref 24.7–36)
AST SERPL-CCNC: 23 U/L (ref 12–45)
BASOPHILS # BLD AUTO: 0.1 % (ref 0–1.8)
BASOPHILS # BLD: 0.01 K/UL (ref 0–0.12)
BILIRUB SERPL-MCNC: 0.5 MG/DL (ref 0.1–1.5)
BUN SERPL-MCNC: 67 MG/DL (ref 8–22)
CALCIUM ALBUM COR SERPL-MCNC: 8.9 MG/DL (ref 8.5–10.5)
CALCIUM SERPL-MCNC: 8 MG/DL (ref 8.5–10.5)
CHLORIDE SERPL-SCNC: 91 MMOL/L (ref 96–112)
CO2 SERPL-SCNC: 22 MMOL/L (ref 20–33)
CREAT SERPL-MCNC: 8.86 MG/DL (ref 0.5–1.4)
EKG IMPRESSION: NORMAL
EOSINOPHIL # BLD AUTO: 0.01 K/UL (ref 0–0.51)
EOSINOPHIL NFR BLD: 0.1 % (ref 0–6.9)
ERYTHROCYTE [DISTWIDTH] IN BLOOD BY AUTOMATED COUNT: 46.5 FL (ref 35.9–50)
GFR SERPLBLD CREATININE-BSD FMLA CKD-EPI: 6 ML/MIN/1.73 M 2
GLOBULIN SER CALC-MCNC: 3.8 G/DL (ref 1.9–3.5)
GLUCOSE SERPL-MCNC: 122 MG/DL (ref 65–99)
HAV IGM SERPL QL IA: NORMAL
HBV CORE IGM SER QL: NORMAL
HBV SURFACE AB SERPL IA-ACNC: ABNORMAL MIU/ML (ref 0–10)
HBV SURFACE AG SER QL: NORMAL
HCT VFR BLD AUTO: 28.6 % (ref 42–52)
HCV AB SER QL: NORMAL
HGB BLD-MCNC: 9.4 G/DL (ref 14–18)
IMM GRANULOCYTES # BLD AUTO: 0.09 K/UL (ref 0–0.11)
IMM GRANULOCYTES NFR BLD AUTO: 0.7 % (ref 0–0.9)
INR PPP: 1.46 (ref 0.87–1.13)
LACTATE SERPL-SCNC: 1.1 MMOL/L (ref 0.5–2)
LACTATE SERPL-SCNC: 1.3 MMOL/L (ref 0.5–2)
LYMPHOCYTES # BLD AUTO: 0.89 K/UL (ref 1–4.8)
LYMPHOCYTES NFR BLD: 6.4 % (ref 22–41)
MCH RBC QN AUTO: 28.7 PG (ref 27–33)
MCHC RBC AUTO-ENTMCNC: 32.9 G/DL (ref 32.3–36.5)
MCV RBC AUTO: 87.5 FL (ref 81.4–97.8)
MONOCYTES # BLD AUTO: 0.85 K/UL (ref 0–0.85)
MONOCYTES NFR BLD AUTO: 6.2 % (ref 0–13.4)
NEUTROPHILS # BLD AUTO: 11.95 K/UL (ref 1.82–7.42)
NEUTROPHILS NFR BLD: 86.5 % (ref 44–72)
NRBC # BLD AUTO: 0 K/UL
NRBC BLD-RTO: 0 /100 WBC (ref 0–0.2)
NT-PROBNP SERPL IA-MCNC: ABNORMAL PG/ML (ref 0–125)
PLATELET # BLD AUTO: 174 K/UL (ref 164–446)
PMV BLD AUTO: 11 FL (ref 9–12.9)
POTASSIUM SERPL-SCNC: 3.8 MMOL/L (ref 3.6–5.5)
PROT SERPL-MCNC: 6.7 G/DL (ref 6–8.2)
PROTHROMBIN TIME: 17.9 SEC (ref 12–14.6)
RBC # BLD AUTO: 3.27 M/UL (ref 4.7–6.1)
SODIUM SERPL-SCNC: 131 MMOL/L (ref 135–145)
TROPONIN T SERPL-MCNC: 200 NG/L (ref 6–19)
WBC # BLD AUTO: 13.8 K/UL (ref 4.8–10.8)

## 2024-06-24 PROCEDURE — 770020 HCHG ROOM/CARE - TELE (206)

## 2024-06-24 PROCEDURE — 93990 DOPPLER FLOW TESTING: CPT

## 2024-06-24 PROCEDURE — 96365 THER/PROPH/DIAG IV INF INIT: CPT

## 2024-06-24 PROCEDURE — 71045 X-RAY EXAM CHEST 1 VIEW: CPT

## 2024-06-24 PROCEDURE — 94760 N-INVAS EAR/PLS OXIMETRY 1: CPT

## 2024-06-24 PROCEDURE — 36556 INSERT NON-TUNNEL CV CATH: CPT | Performed by: HOSPITALIST

## 2024-06-24 PROCEDURE — 85610 PROTHROMBIN TIME: CPT

## 2024-06-24 PROCEDURE — 96366 THER/PROPH/DIAG IV INF ADDON: CPT

## 2024-06-24 PROCEDURE — 83880 ASSAY OF NATRIURETIC PEPTIDE: CPT

## 2024-06-24 PROCEDURE — 85730 THROMBOPLASTIN TIME PARTIAL: CPT

## 2024-06-24 PROCEDURE — 85025 COMPLETE CBC W/AUTO DIFF WBC: CPT

## 2024-06-24 PROCEDURE — 700111 HCHG RX REV CODE 636 W/ 250 OVERRIDE (IP): Mod: UD | Performed by: EMERGENCY MEDICINE

## 2024-06-24 PROCEDURE — 87040 BLOOD CULTURE FOR BACTERIA: CPT

## 2024-06-24 PROCEDURE — 87186 SC STD MICRODIL/AGAR DIL: CPT

## 2024-06-24 PROCEDURE — C1752 CATH,HEMODIALYSIS,SHORT-TERM: HCPCS

## 2024-06-24 PROCEDURE — 80074 ACUTE HEPATITIS PANEL: CPT

## 2024-06-24 PROCEDURE — 87077 CULTURE AEROBIC IDENTIFY: CPT

## 2024-06-24 PROCEDURE — 36415 COLL VENOUS BLD VENIPUNCTURE: CPT

## 2024-06-24 PROCEDURE — 99223 1ST HOSP IP/OBS HIGH 75: CPT | Mod: 25,AI | Performed by: HOSPITALIST

## 2024-06-24 PROCEDURE — 84484 ASSAY OF TROPONIN QUANT: CPT

## 2024-06-24 PROCEDURE — 80053 COMPREHEN METABOLIC PANEL: CPT

## 2024-06-24 PROCEDURE — 99285 EMERGENCY DEPT VISIT HI MDM: CPT

## 2024-06-24 PROCEDURE — 02HV33Z INSERTION OF INFUSION DEVICE INTO SUPERIOR VENA CAVA, PERCUTANEOUS APPROACH: ICD-10-PCS | Performed by: HOSPITALIST

## 2024-06-24 PROCEDURE — 96375 TX/PRO/DX INJ NEW DRUG ADDON: CPT

## 2024-06-24 PROCEDURE — 36556 INSERT NON-TUNNEL CV CATH: CPT

## 2024-06-24 PROCEDURE — 99222 1ST HOSP IP/OBS MODERATE 55: CPT | Performed by: SURGERY

## 2024-06-24 PROCEDURE — 87015 SPECIMEN INFECT AGNT CONCNTJ: CPT

## 2024-06-24 PROCEDURE — 83605 ASSAY OF LACTIC ACID: CPT | Mod: 91

## 2024-06-24 PROCEDURE — 700101 HCHG RX REV CODE 250: Mod: UD | Performed by: EMERGENCY MEDICINE

## 2024-06-24 PROCEDURE — 86706 HEP B SURFACE ANTIBODY: CPT

## 2024-06-24 PROCEDURE — 96376 TX/PRO/DX INJ SAME DRUG ADON: CPT

## 2024-06-24 PROCEDURE — 5A1D70Z PERFORMANCE OF URINARY FILTRATION, INTERMITTENT, LESS THAN 6 HOURS PER DAY: ICD-10-PCS | Performed by: INTERNAL MEDICINE

## 2024-06-24 PROCEDURE — C1751 CATH, INF, PER/CENT/MIDLINE: HCPCS

## 2024-06-24 PROCEDURE — 93005 ELECTROCARDIOGRAM TRACING: CPT | Performed by: EMERGENCY MEDICINE

## 2024-06-24 PROCEDURE — 700111 HCHG RX REV CODE 636 W/ 250 OVERRIDE (IP): Performed by: INTERNAL MEDICINE

## 2024-06-24 PROCEDURE — 90935 HEMODIALYSIS ONE EVALUATION: CPT

## 2024-06-24 PROCEDURE — 700105 HCHG RX REV CODE 258: Mod: UD | Performed by: EMERGENCY MEDICINE

## 2024-06-24 RX ORDER — EZETIMIBE 10 MG/1
10 TABLET ORAL DAILY
COMMUNITY
Start: 2024-01-03

## 2024-06-24 RX ORDER — SODIUM CHLORIDE 9 MG/ML
500 INJECTION, SOLUTION INTRAVENOUS ONCE
Status: COMPLETED | OUTPATIENT
Start: 2024-06-24 | End: 2024-06-24

## 2024-06-24 RX ORDER — METOPROLOL TARTRATE 1 MG/ML
5 INJECTION, SOLUTION INTRAVENOUS
Status: COMPLETED | OUTPATIENT
Start: 2024-06-24 | End: 2024-06-25

## 2024-06-24 RX ORDER — CARVEDILOL 25 MG/1
25 TABLET ORAL 2 TIMES DAILY WITH MEALS
Status: DISCONTINUED | OUTPATIENT
Start: 2024-06-24 | End: 2024-06-24

## 2024-06-24 RX ORDER — HEPARIN SODIUM 1000 [USP'U]/ML
500 INJECTION, SOLUTION INTRAVENOUS; SUBCUTANEOUS
Status: DISCONTINUED | OUTPATIENT
Start: 2024-06-24 | End: 2024-07-07

## 2024-06-24 RX ORDER — OXYCODONE HYDROCHLORIDE 10 MG/1
10 TABLET ORAL EVERY 6 HOURS PRN
Status: ON HOLD | COMMUNITY
Start: 2024-04-27 | End: 2024-07-17

## 2024-06-24 RX ORDER — HEPARIN SODIUM 1000 [USP'U]/ML
1200 INJECTION, SOLUTION INTRAVENOUS; SUBCUTANEOUS
Status: DISCONTINUED | OUTPATIENT
Start: 2024-06-24 | End: 2024-07-05 | Stop reason: DRUGHIGH

## 2024-06-24 RX ORDER — ROSUVASTATIN CALCIUM 10 MG/1
10 TABLET, COATED ORAL DAILY
Status: ON HOLD | COMMUNITY
Start: 2024-01-03 | End: 2024-07-17

## 2024-06-24 RX ORDER — HYDROMORPHONE HYDROCHLORIDE 1 MG/ML
0.5 INJECTION, SOLUTION INTRAMUSCULAR; INTRAVENOUS; SUBCUTANEOUS ONCE
Status: COMPLETED | OUTPATIENT
Start: 2024-06-24 | End: 2024-06-24

## 2024-06-24 RX ORDER — HEPARIN SODIUM 1000 [USP'U]/ML
1500 INJECTION, SOLUTION INTRAVENOUS; SUBCUTANEOUS
Status: DISCONTINUED | OUTPATIENT
Start: 2024-06-24 | End: 2024-07-07

## 2024-06-24 RX ORDER — ACETAMINOPHEN 325 MG/1
650 TABLET ORAL EVERY 6 HOURS PRN
Status: DISCONTINUED | OUTPATIENT
Start: 2024-06-24 | End: 2024-06-30

## 2024-06-24 RX ORDER — VALSARTAN 80 MG/1
80 TABLET ORAL
Status: ON HOLD | COMMUNITY
End: 2024-07-17

## 2024-06-24 RX ORDER — POTASSIUM CITRATE 10 MEQ/1
10 TABLET, EXTENDED RELEASE ORAL DAILY
Status: ON HOLD | COMMUNITY
End: 2024-07-17

## 2024-06-24 RX ORDER — CALCIUM ACETATE 667 MG/1
667 CAPSULE ORAL
Status: DISCONTINUED | OUTPATIENT
Start: 2024-06-24 | End: 2024-06-24

## 2024-06-24 RX ADMIN — HYDROMORPHONE HYDROCHLORIDE 0.5 MG: 1 INJECTION, SOLUTION INTRAMUSCULAR; INTRAVENOUS; SUBCUTANEOUS at 12:39

## 2024-06-24 RX ADMIN — METOPROLOL TARTRATE 5 MG: 5 INJECTION INTRAVENOUS at 16:52

## 2024-06-24 RX ADMIN — HEPARIN SODIUM 500 UNITS: 1000 INJECTION, SOLUTION INTRAVENOUS; SUBCUTANEOUS at 20:40

## 2024-06-24 RX ADMIN — METOPROLOL TARTRATE 5 MG: 5 INJECTION INTRAVENOUS at 13:37

## 2024-06-24 RX ADMIN — HEPARIN SODIUM 1500 UNITS: 1000 INJECTION, SOLUTION INTRAVENOUS; SUBCUTANEOUS at 20:40

## 2024-06-24 RX ADMIN — SODIUM CHLORIDE 500 ML: 9 INJECTION, SOLUTION INTRAVENOUS at 11:00

## 2024-06-24 RX ADMIN — HEPARIN SODIUM 1200 UNITS: 1000 INJECTION, SOLUTION INTRAVENOUS; SUBCUTANEOUS at 23:50

## 2024-06-24 RX ADMIN — VANCOMYCIN HYDROCHLORIDE 2250 MG: 5 INJECTION, POWDER, LYOPHILIZED, FOR SOLUTION INTRAVENOUS at 13:42

## 2024-06-24 ASSESSMENT — ENCOUNTER SYMPTOMS
CHILLS: 1
FEVER: 1

## 2024-06-24 NOTE — ED NOTES
Dialysis cath placed by MD Mayen; pt tolerated well. Order received for stat cxr to confirm placement.

## 2024-06-24 NOTE — ED NOTES
Lopressor administered slow IV push over 5 min, pt tolerated well. Updated report given to DIALLO Ramon at bedside. Pt a&o, resps even and unlabored, afib rate 110s with no ectopy on cardiac monitor. Nadn at time of transport.

## 2024-06-24 NOTE — ASSESSMENT & PLAN NOTE
Left arm graft is red, swollen, tender, grossly infected  Cultures have been drawn  IV vancomycin  Temporary dialysis access will be obtained  Vascular surgery has been consulted.     Patient with positive blood cultures continue IV vancomycin I have ordered echocardiogram  Will consult with infectious disease    Discussed with infectious disease Dr. Mercado, continue IV vancomycin, monitoring for side effects, monitoring vancomycin levels  Follow-up echocardiogram  Or cultures positive for Enterococcus faecalis    Repeat blood cultures in a.m.  Cultures from 6/27 positive  Follow-up repeat blood culture from 6/28

## 2024-06-24 NOTE — ASSESSMENT & PLAN NOTE
Troponin is elevated at 200  He denies chest pain  EKG reveals an intraventricular conduction delay not clearly left nor right bundle  Likely a stress response given his infection and lack of dialysis.    Follow-up echo

## 2024-06-24 NOTE — PROGRESS NOTES
Pharmacy Vancomycin Kinetics Note for 6/24/2024     68 y.o. male on Vancomycin day # 1     Vancomycin Indication (Trough based Dosing): Skin/skin structure infection (goal trough 10-15)    Provider specified end date: 06/29/24    Active Antibiotics (From admission, onward)      Ordered     Ordering Provider       Mon Jun 24, 2024  2:05 PM    06/24/24 1405  MD Alert...Vancomycin per Pharmacy  PHARMACY TO DOSE        Question:  Indication(s) for vancomycin?  Answer:  Skin and soft tissue infection    Morales Mayen M.D.       Mon Jun 24, 2024 10:50 AM    06/24/24 1050  vancomycin (Vancocin) 2,250 mg in  mL IVPB  (vancomycin (VANCOCIN) IV (LD + Maintenance))  ONCE         Fish López M.D.            Dosing Weight: 93 kg (205 lb 0.4 oz)      Admission History: Admitted on 6/24/2024 for Infected prosthetic vascular graft, initial encounter ([facility]) [T82.7XXA]  Pertinent history: Pt presenting with infected HD catheter site, multiple SIRS criteria including leukocytosis and tachycardia. Patient has ESRD and is HD dependent    Allergies:     Patient has no known allergies.     Pertinent cultures to date:     Results       Procedure Component Value Units Date/Time    MRSA By PCR (Amp) [[id number]]     Order Status: Sent Specimen: Respirate from Nares     BLOOD CULTURE [[id number]] Collected: 06/24/24 1334    Order Status: Sent Specimen: Blood from Peripheral Updated: 06/24/24 1431    BLOOD CULTURE [[id number]] Collected: 06/24/24 1231    Order Status: Sent Specimen: Blood from Peripheral Updated: 06/24/24 1333            Labs:     Estimated Creatinine Clearance: 9.3 mL/min (A) (by C-G formula based on SCr of 8.86 mg/dL (HH)).  Recent Labs     06/24/24  1038   WBC 13.8*   NEUTSPOLYS 86.50*     Recent Labs     06/24/24  1038   BUN 67*   CREATININE 8.86*   ALBUMIN 2.9*     No intake or output data in the 24 hours ending 06/24/24 1447   /60   Pulse (!) 112   Temp 37.4 °C (99.3 °F) (Temporal)   Resp 14   Ht 1.854  "stephen (6' 1\")   Wt 93 kg (205 lb)   SpO2 92%  Temp (24hrs), Av.4 °C (99.3 °F), Min:37.4 °C (99.3 °F), Max:37.4 °C (99.3 °F)      List concerns for Vancomycin clearance:     ESRD;Age;Malnutrition/Low albumin;CHF;Hypermetabolic State (SIRS)    A/P:     -  Vancomycin dose: TBD, will dose based on timed vancomycin levels    -  Next vancomycin level(s):    -  08:00 on  18 hour level    -  Comments: Vancomycin will accumulate in patients with ESRD on HD. Loading dose given in the ED. An 18 hour level will be obtained to determine interval of redosing to achieve a therapeutic trough goal. Pharmacy will monitor therapy and re-dose as appropriate.     Andrzej Marsh, PharmD    "

## 2024-06-24 NOTE — ED NOTES
This RN unable to start second PIV for blood cultures; difficult access. Lab notified. Lab at bedside, one set drawn. Second set to be drawn subsequently.

## 2024-06-24 NOTE — ED NOTES
Micro called by this RN; per micro only one set blood cultures in lab. Tech paged again to draw second set. Per lab, tech is en route. Temoo to be hung after second set blood culture. Report to break DIALLO simpson

## 2024-06-24 NOTE — ED NOTES
Med Rec complete per pharmacy  Allergies reviewed  Antibiotics in the past 30 days:yes  Anticoagulant in past 14 days:yes  Anticoagulant:Eliquis 2.5 mg Last dose:unk  Pharmacy patient utilizes:[facility] in [place]    Pt states he no longer is on Coumadin but another blood thinner which he thinks is Eliquis.   Pt unable to verify names of medications nor when he last took his medications.    Pharmacy is closed for lunch at this time    Pt goes to Dialysis in [facility]   ([phone]). Pt missed today's dialysis and last Friday's dialysis. Per staff pt receives Retacrit every dialysis days    Addendum: verified current medications with pharmacy staff. Per staff Keflex 500 mg QID x 7 days dispensed on 05/28/24. Pt states he did not complete antibiotic course because he felt like he didn't need them. (Not on med rec)

## 2024-06-24 NOTE — CONSULTS
"[facility] -  CONSULTATION NOTE               Author: Oswald Pearce M.D. Date & Time: 6/24/2024  4:23 PM       REASON FOR CONSULTATION:   - Inpatient hemodialysis management.    CHIEF COMPLAINT:   -  \"Painful arm\"    HISTORY OF PRESENT ILLNESS:    68-year-old  male with biventricular history of ESRD on hemodialysis q. Monday Wednesday Friday at [facility] through left upper arm AV fistula that was placed about 2 to 3 years ago.  He presented to the ER with left arm pain and swelling for the last 5 days.  His last dialysis was on Wednesday and had no dialysis in the last 5 days.  He felt sick on Friday and missed his dialysis.  Ultrasound of the left upper arm AV fistula showed there is no flow in the AV fistula and it has thrombosed.  A temporary right IJ dialysis catheter was placed in the ER.  Nephrology has been consulted for management of dialysis.  Patient denies any fever or chills, no nausea or vomiting, no chest pain.  He has some shortness of breath but is able to talk in full sentences and is in no acute respiratory distress at this time. No melena, hematochezia, hematemesis.  No HA, visual changes, or abdominal pain.    REVIEW OF SYSTEMS:    10 point ROS was performed and is as per HPI or otherwise negative    PAST MEDICAL HISTORY:     Past Medical History   has a past medical history of Allergy, Aortic aneurysm (HCC), CAD (coronary artery disease), CHF (congestive heart failure) (HCC), Chronic kidney disease (CKD), stage III (moderate), COPD (chronic obstructive pulmonary disease) (HCC), Hypertension, Impaired hearing, Indigestion, Renal failure, and Sleep apnea.     Surgical History   has a past surgical history that includes gastroscopy (N/A, 02/17/2017); egd esophagus with endoscopic us (N/A, 02/17/2017); and av fistula creation (Left).      Family History  family history includes Cancer in his father; Heart Disease in his brother and mother.   Family " "history reviewed with patient. There is no family history that is pertinent to the chief complaint.      Social History   reports that he quit smoking about 5 years ago. His smoking use included cigarettes. He started smoking about 47 years ago. He has a 21.1 pack-year smoking history. He has never used smokeless tobacco. He reports that he does not drink alcohol and does not use drugs.lives in [facility] [place] MEDICATIONS:   - Reviewed and documented in chart    LABORATORY STUDIES:   - Reviewed and documented in chart    ALLERGIES:  Patient has no known allergies.    VS:  BP (!) 151/67   Pulse (!) 123   Temp 37.4 °C (99.3 °F) (Temporal)   Resp 16   Ht 1.854 m (6' 1\")   Wt 93 kg (205 lb)   SpO2 97%   BMI 27.05 kg/m²   Physical Exam  Constitutional:       General: He is not in acute distress.     Appearance: Normal appearance. He is normal weight. He is not toxic-appearing.   HENT:      Head: Normocephalic and atraumatic.      Right Ear: External ear normal.      Left Ear: External ear normal.      Nose: Nose normal.      Mouth/Throat:      Mouth: Mucous membranes are dry.   Eyes:      Extraocular Movements: Extraocular movements intact.      Pupils: Pupils are equal, round, and reactive to light.   Cardiovascular:      Pulses: Normal pulses.   Pulmonary:      Effort: Pulmonary effort is normal. No respiratory distress.      Breath sounds: No wheezing.   Abdominal:      Palpations: Abdomen is soft.   Musculoskeletal:         General: No swelling.      Cervical back: Neck supple.   Skin:     General: Skin is dry.      Comments: Increased erythema and tenderness noted over left upper arm AV fistula.  No visible discharge   Neurological:      Mental Status: He is alert.            FLUID BALANCE:  No intake/output data recorded.    LABS:  Recent Labs     06/24/24  1038   SODIUM 131*   POTASSIUM 3.8   CHLORIDE 91*   CO2 22   GLUCOSE 122*   BUN 67*   CREATININE 8.86*   CALCIUM 8.0*        IMAGING:  - All imaging " reviewed from admission to present day    Ultrasound hemodialysis graft duplex 6/20/2024 FINDINGS:   Left upper extremity.    All veins demonstrate complete color filling and compressibility with    normal venous flow dynamics including spontaneous flow and respiratory    phasicity.    No superficial or deep venous thrombosis.     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  Fistula is thrombosed and    completely occluded from its proximal segment running its entire length to    its termination at the axillary vein.    Proximal segment of fistula is aneurysmal measuring 2.2 x 1.6 cm.     IMPRESSION:  # ESRD  -Q. MWF at [facility]  #Thrombosed left upper arm AV fistula   -Right IJ temporary HD catheter placed in ER 6/24/2024  # HTN  # Anemia of CKD  # CKD-MBD  #Coronary artery disease  #Chronic A-fib      PLAN:  -Hemodialysis today as has missed dialysis for the last 5 days   -Right IJ temporary HD catheter will need to be converted to a tunneled HD catheter prior to discharge , consult IR for the same   -  UF as tolerated  - No dietary protein restrictions  - Resume home meds   - Dose all meds per ESRD    Thank you for the consultation!

## 2024-06-24 NOTE — CONSULTS
VASCULAR SURGERY SERVICE  CONSULT NOTE      Date: 6/24/2024    Referring Provider: Fish López MD    Consulting Physician: Doug Tinoco MD - [facility]     -------------------------------------------------------------------------------------------------    Reason for consultation:  Thrombosed left arm arteriovenous graft.    HPI:  This is a 68 y.o. male with multiple medical problems including end-stage renal disease who has been on hemodialysis for quite some time.  Patient is on hemodialysis Monday, Wednesday, and Friday via left upper arm arteriovenous graft which was placed 3 to 4 years ago.  He was last dialyzed Wednesday.  He presented to the emergency room with left arm pain and swelling for at least 5 days.  He was found to have thrombosed left upper arm arteriovenous graft.  Vascular service is consulted.    Past Medical History:   Diagnosis Date    Allergy     Seasonal    Aortic aneurysm (HCC)     CAD (coronary artery disease)     CHF (congestive heart failure) ([facility])     Chronic kidney disease (CKD), stage III (moderate)     COPD (chronic obstructive pulmonary disease) ([facility])     Hypertension     Impaired hearing     Indigestion     Renal failure     Sleep apnea        Past Surgical History:   Procedure Laterality Date    GASTROSCOPY N/A 02/17/2017    Procedure: GASTROSCOPY;  Surgeon: Morales Hinton M.D.;  Location: SURGERY [facility];  Service:     EGD ESOPHAGUS WITH ENDOSCOPIC US N/A 02/17/2017    Procedure: EGD ESOPHAGUS WITH ENDOSCOPIC US UPPER WITH POSSIBLE FNA;  Surgeon: Morales Hinton M.D.;  Location: SURGERY [facility];  Service:     AV FISTULA CREATION Left        Current Facility-Administered Medications   Medication Dose Route Frequency Provider Last Rate Last Admin    vancomycin (Vancocin) 2,250 mg in  mL IVPB  25 mg/kg Intravenous Once Fish López M.D. 166.7 mL/hr at 06/24/24 1342 2,250 mg at 06/24/24 1342    Metoprolol Tartrate (Lopressor) injection 5 mg  5 mg  Intravenous Q5 MIN PRN Fish López M.D.   5 mg at 24 1337    acetaminophen (Tylenol) tablet 650 mg  650 mg Oral Q6HRS PRN Morales Mayen M.D.        MD Alert...Vancomycin per Pharmacy   Other PHARMACY TO DOSE Morales Mayen M.D.         Current Outpatient Medications   Medication Sig Dispense Refill    Epoetin Jhonny-epbx (RETACRIT INJ) Inject 400 Units as directed every Monday, Wednesday, and Friday.      ezetimibe (ZETIA) 10 MG Tab Take 10 mg by mouth every day.      oxyCODONE immediate release (ROXICODONE) 10 MG immediate release tablet Take 10 mg by mouth every 6 hours as needed for Moderate Pain.      rosuvastatin (CRESTOR) 10 MG Tab Take 10 mg by mouth every day.      apixaban (ELIQUIS) 2.5mg Tab Take 2.5 mg by mouth 2 times a day.      potassium citrate SR (UROCIT-K SR) 10 MEQ (1080 MG) Tab CR Take 10 mEq by mouth every day.      valsartan (DIOVAN) 80 MG Tab Take 80 mg by mouth every day.      warfarin (COUMADIN) 5 MG Tab          Social History     Socioeconomic History    Marital status: Single     Spouse name: Not on file    Number of children: Not on file    Years of education: Not on file    Highest education level: Not on file   Occupational History    Not on file   Tobacco Use    Smoking status: Former     Current packs/day: 0.00     Average packs/day: 0.5 packs/day for 42.2 years (21.1 ttl pk-yrs)     Types: Cigarettes     Start date: 1977     Quit date: 3/4/2019     Years since quittin.3    Smokeless tobacco: Never   Substance and Sexual Activity    Alcohol use: No     Comment: Hx of alcohol use    Drug use: No    Sexual activity: Not Currently     Partners: Female   Other Topics Concern    Not on file   Social History Narrative    Not on file     Social Determinants of Health     Financial Resource Strain: Not on file   Food Insecurity: Not on file   Transportation Needs: Not on file   Physical Activity: Not on file   Stress: Not on file   Social Connections: Not on file   Intimate  "Partner Violence: Low Risk  (2/15/2023)    Received [facility]    History of Abuse     History of Abuse: Denies   Housing Stability: Not on file       Family History   Problem Relation Age of Onset    Heart Disease Mother     Cancer Father     Heart Disease Brother        Allergies:  Patient has no known allergies.    Review of Systems:    Constitutional: Positive for fever, chills  HENT:    Negative for hearing loss or tinnitus    Eyes:    Negative for blurred vision, double vision, or loss of vision  Respiratory:  Negative for cough, hemoptysis, or wheezing    Cardiac:  Negative for chest pain or palpitations or orthopnea  Vascular:  Negative for claudication or rest pain   Gastrointestinal: Negative for nausea, vomiting, or abdominal pain     Negative for hematochezia or melena   Genitourinary: Negative for dysuria, frequency, or hematuria   Musculoskeletal: Positive for pain and swelling in the distal left upper arm  Skin:   Negative for itching or rash  Neurological:  Negative for dizziness, headaches, or tremors     Negative for speech disturbance     Negative for extremity weakness or paresthesias  Endo/Heme:  Negative for easy bruising or bleeding  Psychiatric:  Negative for depression, suicidal ideas, or hallucinations    Physical Exam:  /65   Pulse (!) 110   Temp 37.4 °C (99.3 °F) (Temporal)   Resp 20   Ht 1.854 m (6' 1\")   Wt 93 kg (205 lb)   SpO2 91%     Constitutional: Overweight, ill-appearing elderly male in nonapparent distress   HEENT:  Normocephalic and atraumatic, EOMI  Neck:   Supple, no JVD  Cardiovascular: Regular rate and rhythm  Pulmonary:  Good air entry bilaterally  Abdominal:  Soft, non-tender, non-distended     Aortic impulse not widened  Musculoskeletal: No edema, no tenderness  Neurological:  CN II-XII grossly intact, no focal deficits  Skin:   Skin is warm and dry. No rash noted.  Psychiatric:  Normal mood and affect.  Left upper extremity: Thrombosed " left upper arm fistula/graft.  Well-healed scar.  Distal upper arm over the fistula/graft is swollen, approximately 6 cm in size, mildly erythematous, and tender to palpation with no thinning of overlying skin.    Labs:  Recent Labs     06/24/24  1038   WBC 13.8*   RBC 3.27*   HEMOGLOBIN 9.4*   HEMATOCRIT 28.6*   MCV 87.5   MCH 28.7   MCHC 32.9   RDW 46.5   PLATELETCT 174   MPV 11.0     Recent Labs     06/24/24  1038   SODIUM 131*   POTASSIUM 3.8   CHLORIDE 91*   CO2 22   GLUCOSE 122*   BUN 67*   CREATININE 8.86*   CALCIUM 8.0*     Recent Labs     06/24/24  1038   APTT 32.7   INR 1.46*     Recent Labs     06/24/24  1038   ASTSGOT 23   ALTSGPT 16   TBILIRUBIN 0.5   ALKPHOSPHAT 75   GLOBULIN 3.8*   INR 1.46*       Radiology:  Reviewed.    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  -Thrombosed left upper arm arteriovenous fistula/graft..  -End-stage renal disease on hemodialysis.  -Coronary artery disease.  -Congestive heart failure.  -COPD.    Plan:  I had a long discussion with patient.  He appears to have an infected pseudoaneurysm of the fistula/graft in the distal upper arm.  I discussed with him the option of undergoing operative resection of the infected pseudoaneurysm and reconstruction of the artery using vein patch tomorrow since he would need to be admitted, started on dialysis since he  has not been dialyzed since this past Wednesday, and medically optimized.  Risks of procedure were discussed with patient which include, but not limited to, bleeding, recurrent infection, heart attack, and perioperative anesthetic complications.  Patient indicated understanding and would like to proceed.  All questions were answered.    NPO after midnight.  HOLD Coumadin.    Dr. Mayen already placed temporary dialysis catheter and planned to admit patient.      Doug Tinoco MD  [facility]   [place] preferred or call my office  [phone]  __________________________________________________________________  Patient:Brian Jasmine   MRN:[id number]   CSN:[id number]

## 2024-06-24 NOTE — ED PROVIDER NOTES
"  ER Provider Note    Scribed for Fish López M.D. by Nelli Bach. 6/24/2024   10:28 AM    Primary Care Provider: Kaushal Johnson P.A.-C.    CHIEF COMPLAINT  Chief Complaint   Patient presents with    Arm Pain    Rapid Heart Beat     EXTERNAL RECORDS REVIEWED  The patient has a history of hypertension, vascular disease, COPD, on dialysis ESRD. The patient has a history of CAD with a prior stent placement Semptember 2021. He saw cardiology in [place] on 6/14/2024     HPI/XIMENA  Brian Jasmine is a 68 y.o. male who presents to the ED for evaluation of left arm pain, swelling and redness at fistula site onset 8 days ago. When asked if he has had a fever he responds \"I have had everything.\" He states he missed his dialysis on Friday. Per RN, the patient is in A-fib here. The patient denies any history of A-fib. He takes his normal regimen of Eliquis.     PAST MEDICAL HISTORY  Past Medical History:   Diagnosis Date    Allergy     Seasonal    Aortic aneurysm (HCC)     CAD (coronary artery disease)     CHF (congestive heart failure) (HCC)     Chronic kidney disease (CKD), stage III (moderate)     COPD (chronic obstructive pulmonary disease) (HCC)     Hypertension     Impaired hearing     Indigestion     Renal failure     Sleep apnea        SURGICAL HISTORY  Past Surgical History:   Procedure Laterality Date    GASTROSCOPY N/A 02/17/2017    Procedure: GASTROSCOPY;  Surgeon: Morales Hinton M.D.;  Location: [facility];  Service:     EGD ESOPHAGUS WITH ENDOSCOPIC US N/A 02/17/2017    Procedure: EGD ESOPHAGUS WITH ENDOSCOPIC US UPPER WITH POSSIBLE FNA;  Surgeon: Morales Hinton M.D.;  Location: [facility];  Service:     AV FISTULA CREATION Left        FAMILY HISTORY  Family History   Problem Relation Age of Onset    Heart Disease Mother     Cancer Father     Heart Disease Brother        SOCIAL HISTORY   reports that he quit smoking about 5 years ago. His smoking use included cigarettes. He started " "smoking about 47 years ago. He has a 21.1 pack-year smoking history. He has never used smokeless tobacco. He reports that he does not drink alcohol and does not use drugs.    CURRENT MEDICATIONS  Previous Medications    ALBUTEROL 108 (90 BASE) MCG/ACT AERO SOLN INHALATION AEROSOL    Inhale 1-2 Puffs by mouth every four hours as needed for Shortness of Breath.    ANORO ELLIPTA 62.5-25 MCG/INH AEROSOL POWDER, BREATH ACTIVATED INHALER    INHALE ONE DOSE BY MOUTH DAILY    ASPIRIN (ASA) 81 MG CHEW TAB CHEWABLE TABLET    Take 1 Tab by mouth every evening.    CALCIUM ACETATE (PHOS-LO) 667 MG CAP    take 1 capsule by mouth three times a day with meals    CARVEDILOL (COREG) 25 MG TAB    Take 1 Tab by mouth 2 times a day, with meals.    NON SPECIFIED    OXYGEN AT NIGHT    WARFARIN (COUMADIN) 5 MG TAB           ALLERGIES  None noted     PHYSICAL EXAM  /62   Pulse (!) 136   Temp 37.4 °C (99.3 °F) (Temporal)   Resp 16   Ht 1.854 m (6' 1\")   Wt 93 kg (205 lb)   SpO2 93%   BMI 27.05 kg/m²    Nursing note and vitals reviewed.  Constitutional: Chronically ill appearing  HENT: Head is normocephalic and atraumatic. Oropharynx is clear and moist without exudate or erythema.   Eyes: Pupils are equal, round, and reactive to light. Conjunctiva are normal.   Cardiovascular: Tachycardic with irregularly irregular rhythm. No murmur heard. Normal radial pulses. Dialysis fistual in left upper extremity with associated erythema and swelling, no palpable thrill  Pulmonary/Chest: Breath sounds normal. No wheezes or rales.   Abdominal: Soft and non-tender. No distention    Musculoskeletal: Extremities exhibit normal range of motion without edema or tenderness.  Except for over the left upper extremity over the graft in the left arm as above.  Neurological: Awake, alert and oriented to person, place, and time. No focal deficits noted.  Skin: Skin is warm and dry. No rash.   Psychiatric: Normal mood and affect. Appropriate for clinical " situation    DIAGNOSTIC STUDIES    Labs:   Results for orders placed or performed during the hospital encounter of 06/24/24   CBC WITH DIFFERENTIAL   Result Value Ref Range    WBC 13.8 (H) 4.8 - 10.8 K/uL    RBC 3.27 (L) 4.70 - 6.10 M/uL    Hemoglobin 9.4 (L) 14.0 - 18.0 g/dL    Hematocrit 28.6 (L) 42.0 - 52.0 %    MCV 87.5 81.4 - 97.8 fL    MCH 28.7 27.0 - 33.0 pg    MCHC 32.9 32.3 - 36.5 g/dL    RDW 46.5 35.9 - 50.0 fL    Platelet Count 174 164 - 446 K/uL    MPV 11.0 9.0 - 12.9 fL    Neutrophils-Polys 86.50 (H) 44.00 - 72.00 %    Lymphocytes 6.40 (L) 22.00 - 41.00 %    Monocytes 6.20 0.00 - 13.40 %    Eosinophils 0.10 0.00 - 6.90 %    Basophils 0.10 0.00 - 1.80 %    Immature Granulocytes 0.70 0.00 - 0.90 %    Nucleated RBC 0.00 0.00 - 0.20 /100 WBC    Neutrophils (Absolute) 11.95 (H) 1.82 - 7.42 K/uL    Lymphs (Absolute) 0.89 (L) 1.00 - 4.80 K/uL    Monos (Absolute) 0.85 0.00 - 0.85 K/uL    Eos (Absolute) 0.01 0.00 - 0.51 K/uL    Baso (Absolute) 0.01 0.00 - 0.12 K/uL    Immature Granulocytes (abs) 0.09 0.00 - 0.11 K/uL    NRBC (Absolute) 0.00 K/uL   COMP METABOLIC PANEL   Result Value Ref Range    Sodium 131 (L) 135 - 145 mmol/L    Potassium 3.8 3.6 - 5.5 mmol/L    Chloride 91 (L) 96 - 112 mmol/L    Co2 22 20 - 33 mmol/L    Anion Gap 18.0 (H) 7.0 - 16.0    Glucose 122 (H) 65 - 99 mg/dL    Bun 67 (H) 8 - 22 mg/dL    Creatinine 8.86 (HH) 0.50 - 1.40 mg/dL    Calcium 8.0 (L) 8.5 - 10.5 mg/dL    Correct Calcium 8.9 8.5 - 10.5 mg/dL    AST(SGOT) 23 12 - 45 U/L    ALT(SGPT) 16 2 - 50 U/L    Alkaline Phosphatase 75 30 - 99 U/L    Total Bilirubin 0.5 0.1 - 1.5 mg/dL    Albumin 2.9 (L) 3.2 - 4.9 g/dL    Total Protein 6.7 6.0 - 8.2 g/dL    Globulin 3.8 (H) 1.9 - 3.5 g/dL    A-G Ratio 0.8 g/dL   TROPONIN   Result Value Ref Range    Troponin T 200 (H) 6 - 19 ng/L   proBrain Natriuretic Peptide, NT   Result Value Ref Range    NT-proBNP 44317 (H) 0 - 125 pg/mL   APTT   Result Value Ref Range    APTT 32.7 24.7 - 36.0 sec    PROTHROMBIN TIME (INR)   Result Value Ref Range    PT 17.9 (H) 12.0 - 14.6 sec    INR 1.46 (H) 0.87 - 1.13   ESTIMATED GFR   Result Value Ref Range    GFR (CKD-EPI) 6 (A) >60 mL/min/1.73 m 2   EKG (NOW)   Result Value Ref Range    Report       [facility] Emergency Dept.    Test Date:  2024  Pt Name:    BROOKE ROSARIO               Department: ER  MRN:        [id number]                      Room:        02  Gender:     Male                         Technician: [id number]  :        [id number]                   Requested By:MARIO CLARK  Order #:    [id number]                    Reading MD:    Measurements  Intervals                                Axis  Rate:       131                          P:          0  OR:         0                            QRS:        12  QRSD:       113                          T:          72  QT:         357  QTc:        528    Interpretive Statements  Atrial fibrillation  Incomplete left bundle branch block  Low voltage, extremity leads  Prolonged QT interval  Compared to ECG 2017 12:24:41  Left bundle-branch block now present  Low QRS voltage now present  Sinus rhythm no longer present  Left ventricular hypertrophy no longer present  Early repolarization no longer pres ent         EKG:   I have independently interpreted this EKG as detailed above.     Radiology:   This attending emergency physician has independently interpreted the diagnostic imaging associated with this visit and is awaiting the final reading from the radiologist.   Preliminary interpretation is a follows: Ultrasound shows clotted dialysis graft    Radiologist interpretation:   US-HEMODIALYSIS GRAFT DUPLEX COMP UPPER EXTREMITY   Final Result      DX-CHEST-PORTABLE (1 VIEW)   Final Result      Mild enlargement of the cardiomediastinal silhouette without acute cardiopulmonary abnormality.      US-EXTREMITY VENOUS UPPER UNILAT LEFT    (Results Pending)   IR-CONSULT AND TREAT    (Results  Pending)        INITIAL ASSESSMENT AND PLAN    10:28 AM - Patient was evaluated at bedside for arm pain. Ordered for EKG, INR, APTT, BNP, Troponin, CMP, CBC with diff, Dx-chest and US-Hemodialysis graft to evaluate. The patient will be medicated with Vancomycin 2,250 mg for his symptoms. Patient verbalizes understanding and support with my plan of care.  Differential diagnoses include but not limited to: fistula thrombosis vs infection, electrolyte abnormality, sepsis     Hydration: Based on the patient's presentation of Tachycardia the patient was given IV fluids. IV Hydration was used because oral hydration was not adequate alone. Upon recheck following hydration, the patient was improved.     COURSE AND MEDICAL DECISION MAKING    11:37 AM - The patient will be treated with Dilaudid 0.5 mg.     12:07 PM I discussed the patient's case and the above findings with Dr. Pearce (Nephrology) who recommends consult with vascular and hospitalization.    12:37 PM I discussed the patient's case and the above findings with Dr. Tinoco (Vascular) who will consult. He states that because it has been there for a long period of time it is most appropriate to place a dialysis catheter for dialysis. He can follow up as outpatient to have the graft replaced. Ordered for IR-Consult and Treat.    12:41 PM - The patient will be treated with Lopressor 5 mg.     Patient presents today with a red painful dialysis access point.  I suspect that there is a component of infection.  Is been treated with vancomycin.  He certainly also has a thrombosed graft.    Troponin is elevated.  The patient has had atrial fibrillation with rapid ventricular response.  Initiated rate control with metoprolol.    DISPOSITION AND DISCUSSIONS    I have discussed management of the patient with the following physicians and ALEAH's:  Dr. Pearce (Nephrology) & Dr. Tinoco (Vascular)     DISPOSITION:  Patient will be hospitalized by Dr. Mayen in [facility]  condition.    CRITICAL CARE  I provided critical care services, which included medication orders, frequent reevaluations of the patient's condition and response to treatment, ordering and reviewing test results, and discussing the case with various consultants.  The critical care time associated with the care of the patient was 35 minutes. Review chart for interventions. This time is exclusive of any other billable procedures.          FINAL DIAGNOSIS  1. ESRD on dialysis ([facility])    2. Clotted dialysis access, initial encounter ([facility])    3. ESRD needing dialysis ([facility])    4. Hypervolemia, unspecified hypervolemia type    5. Atrial fibrillation with RVR ([facility])    6. Elevated troponin         I, Nelli Bach (Scribe), am scribing for, and in the presence of, Fish López M.D..    Electronically signed by: Nelli Bach (Nora), 6/24/2024    IFish M.D. personally performed the services described in this documentation, as scribed by Nelli Bach in my presence, and it is both accurate and complete.      The note accurately reflects work and decisions made by me.  Fish López M.D.  6/24/2024  1:34 PM

## 2024-06-24 NOTE — ED NOTES
US at bedside; this RN unable to start second PIV for blood cultures as room is required to be darkened.

## 2024-06-24 NOTE — ASSESSMENT & PLAN NOTE
He has not had dialysis since Wednesday is has not been feeling well  Creatinine is elevated at 8.8 unfortunately potassium is 3.8  Once temporary access has been obtained and dialysis will be done by nephrology      Note from nephrology reviewed

## 2024-06-24 NOTE — ED NOTES
Report received from break DIALLO Raymundo; care resumed by this RN. Wife and daughter Mecca updated with pt status and plan of care with pt permission. MD Mayen at bedside for dialysis cath placement.

## 2024-06-24 NOTE — H&P
Hospital Medicine History & Physical Note    Date of Service  6/24/2024    Primary Care Physician  Kaushal Johnson P.A.-C.    Consultants  Vascular surgery Dr. Tinoco  Nephrology     Code Status  Full Code    Chief Complaint  Chief Complaint   Patient presents with    Arm Pain    Rapid Heart Beat       History of Presenting Illness  Brian Jasmine is a 68 y.o. male who presented 6/24/2024 with left arm infection.  Mr. Jasmine has a past medical history of peripheral arterial disease with femoral popliteal bypass, end-stage renal disease undergoing dialysis via a left arm graft, atrial fibrillation on Coumadin therapy, hypertension, coronary artery disease with stent to the circumflex that developed pain, redness, swelling in the right arm graft site about 5 days ago and did not go to dialysis and stopped taking all of his medications because he felt so poorly.  He has had fevers and chills.  He presents with the symptoms and here has a grossly infected site.  His creatinine is 8.8 with a potassium 3.8.  Vascular surgery has been consulted as has nephrology.  Attempt will be made at a temporary dialysis catheter until blood cultures are cleared for a possible tunneled catheter.  He states he has had at least 6 or 7 prior neck catheters in the past.  IV vancomycin has been initiated.    I discussed the plan of care with Dr. López.    Review of Systems  Review of Systems   Constitutional:  Positive for chills, fever and malaise/fatigue.   All other systems reviewed and are negative.      Past Medical History   has a past medical history of Allergy, Aortic aneurysm (HCC), CAD (coronary artery disease), CHF (congestive heart failure) ([facility]), Chronic kidney disease (CKD), stage III (moderate), COPD (chronic obstructive pulmonary disease) (HCC), Hypertension, Impaired hearing, Indigestion, Renal failure, and Sleep apnea.    Surgical History   has a past surgical history that includes gastroscopy (N/A, 02/17/2017); egd  esophagus with endoscopic us (N/A, 02/17/2017); and av fistula creation (Left).     Family History  family history includes Cancer in his father; Heart Disease in his brother and mother.   Family history reviewed with patient. There is no family history that is pertinent to the chief complaint.     Social History   reports that he quit smoking about 5 years ago. His smoking use included cigarettes. He started smoking about 47 years ago. He has a 21.1 pack-year smoking history. He has never used smokeless tobacco. He reports that he does not drink alcohol and does not use drugs.lives in [place]    Allergies  No Known Allergies    Medications  Prior to Admission Medications   Prescriptions Last Dose Informant Patient Reported? Taking?   ANORO ELLIPTA 62.5-25 MCG/INH AEROSOL POWDER, BREATH ACTIVATED inhaler   No No   Sig: INHALE ONE DOSE BY MOUTH DAILY   Epoetin Jhonny-epbx (RETACRIT INJ) 6/19/2024 at [facility] Facility Yes Yes   Sig: Inject 400 Units as directed every Monday, Wednesday, and Friday.   NON SPECIFIED   Yes No   Sig: OXYGEN AT NIGHT   albuterol 108 (90 Base) MCG/ACT Aero Soln inhalation aerosol   No No   Sig: Inhale 1-2 Puffs by mouth every four hours as needed for Shortness of Breath.   aspirin (ASA) 81 MG Chew Tab chewable tablet   No No   Sig: Take 1 Tab by mouth every evening.   calcium acetate (PHOS-LO) 667 MG Cap   Yes No   Sig: take 1 capsule by mouth three times a day with meals   carvedilol (COREG) 25 MG Tab   No No   Sig: Take 1 Tab by mouth 2 times a day, with meals.   warfarin (COUMADIN) 5 MG Tab COUPLE WEEKS AGO at STOPPED Patient Yes No      Facility-Administered Medications: None       Physical Exam  Temp:  [37.4 °C (99.3 °F)] 37.4 °C (99.3 °F)  Pulse:  [126-136] 132  Resp:  [12-16] 16  BP: (117-148)/(62-70) 123/66  SpO2:  [93 %-95 %] 94 %  Blood Pressure : 123/66   Temperature: 37.4 °C (99.3 °F)   Pulse: (!) 132   Respiration: 16   Pulse Oximetry: 94 %       Physical Exam  Vitals and  nursing note reviewed.   Constitutional:       Appearance: He is ill-appearing.   Cardiovascular:      Rate and Rhythm: Tachycardia present. Rhythm irregular.   Abdominal:      General: There is no distension.      Tenderness: There is no abdominal tenderness.   Musculoskeletal:      Right lower leg: No edema.      Left lower leg: No edema.      Comments: Left arm fistula is red, hot, swollen, tender   Neurological:      General: No focal deficit present.      Mental Status: He is alert and oriented to person, place, and time.   Psychiatric:         Mood and Affect: Mood normal.         Behavior: Behavior normal.         Laboratory:  Recent Labs     06/24/24  1038   WBC 13.8*   RBC 3.27*   HEMOGLOBIN 9.4*   HEMATOCRIT 28.6*   MCV 87.5   MCH 28.7   MCHC 32.9   RDW 46.5   PLATELETCT 174   MPV 11.0     Recent Labs     06/24/24  1038   SODIUM 131*   POTASSIUM 3.8   CHLORIDE 91*   CO2 22   GLUCOSE 122*   BUN 67*   CREATININE 8.86*   CALCIUM 8.0*     Recent Labs     06/24/24  1038   ALTSGPT 16   ASTSGOT 23   ALKPHOSPHAT 75   TBILIRUBIN 0.5   GLUCOSE 122*     Recent Labs     06/24/24  1038   APTT 32.7   INR 1.46*     Recent Labs     06/24/24  1038   NTPROBNP 24353*         Recent Labs     06/24/24  1038   TROPONINT 200*       Imaging:  US-HEMODIALYSIS GRAFT DUPLEX COMP UPPER EXTREMITY   Final Result      DX-CHEST-PORTABLE (1 VIEW)   Final Result      Mild enlargement of the cardiomediastinal silhouette without acute cardiopulmonary abnormality.      US-EXTREMITY VENOUS UPPER UNILAT LEFT    (Results Pending)       EKG afib, IVCD    Assessment/Plan:  Justification for Admission Status  I anticipate this patient will require at least two midnights for appropriate medical management, necessitating inpatient admission because IV antibiotics and surgery for infected dialysis graft    Patient will need a Med/Surg bed on MEDICAL service .  The need is secondary to as above.    * Infected prosthetic vascular graft, initial  encounter ([facility])- (present on admission)  Assessment & Plan  Left arm graft is red, swollen, tender, grossly infected  Cultures have been drawn  IV vancomycin  Temporary dialysis access will be obtained  Vascular surgery has been consulted.     End stage renal disease ([facility])- (present on admission)  Assessment & Plan  He has not had dialysis since Wednesday is has not been feeling well  Creatinine is elevated at 8.8 unfortunately potassium is 3.8  Once temporary access has been obtained and dialysis will be done by nephrology    Coronary artery disease- (present on admission)  Assessment & Plan  Followed by cardiology in [place] with history of stent to the circumflex    PAD (peripheral artery disease) ([facility])- (present on admission)  Assessment & Plan  Hx of previous fem-pop bypass    Troponin level elevated- (present on admission)  Assessment & Plan  Troponin is elevated at 200  He denies chest pain  EKG reveals an intraventricular conduction delay not clearly left nor right bundle  Likely a stress response given his infection and lack of dialysis.    Chronic atrial fibrillation ([facility])- (present on admission)  Assessment & Plan  He had been on coumadin but hasn't been taking it    Renovascular hypertension- (present on admission)  Assessment & Plan  Restart his home Coreg with holding parameters        VTE prophylaxis: SCDs/TEDs

## 2024-06-24 NOTE — ED TRIAGE NOTES
.  Chief Complaint   Patient presents with    Arm Pain    Rapid Heart Beat     Left fistula pain, swelling and erythema x 4 days; missed dialysis on Friday. Found to be in afib rvr; pt denies hx afib but takes blood thinner for unspecified reason.

## 2024-06-24 NOTE — PROGRESS NOTES
[facility] Services Progress Notes    Urgent hemodialysis treatment x 3 hours completed as ordered per Dr. Pearce due to missed HD sessions secondary to thrombosed LUE AVG dialysis access.  Treatment performed at bedside started at 2045 and ended at 2346.      Net UF Removed: 2000 mL    Dialysis Input: 500 mL (200 mL prime + 300 mL rinseback)  Dialysis Output: 2500 mL    Hemodialysis treatment orders and patient labs reviewed.  Procedure explained, verbalized understanding, consent for treatment obtained.  Patient and dialysis access assessed pre and post treatment.  Patient baseline A. Fib HR 100s-130s.  Patient tolerated treatment with some mild asymptomatic hypotensive episode BP 88/58 at 2230 improved with pausing/lowering UF goal.  Patient tolerated treatment well overall.  Newly placed right IJ non-tunneled triple lumen HD catheter access with good patency and flow x 2  See dialysis flow sheets under media for details.      Post tx access: Right IJ non-tunneled HD catheter flushed with saline then locked with Heparin 1000 units/ml per designated amount in each lumen (see MAR) then clamped, capped aseptically and labeled properly. CVC dressings changed aseptically per policy and labeled properly. No s/s of infection to HD catheter site. Heparin lock to be aspirated prior to next dialysis/CVC use by dialysis RN only. Please do not flush or draw from ports.    Please notify Nephrologist/Dialysis for follow-up.     Report given to primary care nurse WALT Stallworth V RN.

## 2024-06-24 NOTE — ED NOTES
Vanco order received, MD notified no blood cultures ordered. MD ordered lactic acid sepsis and blood cultures x 2 to be drawn prior to vanco start.  notified of need for draw.

## 2024-06-24 NOTE — PROCEDURES
Central Line Insertion    Date/Time: 6/24/2024 2:56 PM    Performed by: Morales Mayen M.D.  Authorized by: Morales Mayen M.D.    Consent:     Consent obtained:  Written    Consent given by:  Patient    Risks discussed:  Arterial puncture, incorrect placement, nerve damage, pneumothorax, infection and bleeding    Alternatives discussed:  No treatment  Universal protocol:     Procedure explained and questions answered to patient or proxy's satisfaction: yes      Relevant documents present and verified: yes      Test results available and properly labeled: yes      Imaging studies available: yes      Required blood products, implants, devices, and special equipment available: yes      Site/side marked: yes      Immediately prior to procedure, a time out was called: yes      Patient identity confirmed:  Hospital-assigned identification number and verbally with patient  Pre-procedure details:     Hand hygiene: Hand hygiene performed prior to insertion      Sterile barrier technique: All elements of maximal sterile technique followed      Skin preparation:  2% chlorhexidine    Skin preparation agent: Skin preparation agent completely dried prior to procedure    Sedation:     Sedation type:  None  Anesthesia:     Anesthesia method:  Local infiltration    Local anesthetic:  Lidocaine 1% w/o epi  Procedure details:     Location:  R internal jugular    Patient position:  Flat    Procedural supplies:  Triple lumen    Catheter size: 13 F trialysis catheter.    Landmarks identified: yes      Ultrasound guidance: yes      Sterile ultrasound techniques: Sterile gel and sterile probe covers were used      Number of attempts:  1    Successful placement: yes    Post-procedure details:     Post-procedure:  Dressing applied and line sutured    Guidewire: guidewire removal confirmed      Assessment:  Blood return through all ports    Patient tolerance of procedure:  Tolerated well, no immediate complications  Comments:      Chest xray  pending

## 2024-06-24 NOTE — ASSESSMENT & PLAN NOTE
He had been on coumadin but hasn't been taking it    Continue monitoring  Continue monitoring discussed with Vascular surgery okay to restart Eliquis from vascular surgery standpoint    Hold Eliquis for possible cardiac surgery

## 2024-06-25 ENCOUNTER — ANESTHESIA (OUTPATIENT)
Dept: SURGERY | Facility: MEDICAL CENTER | Age: 69
End: 2024-06-25
Payer: MEDICARE

## 2024-06-25 LAB
ANION GAP SERPL CALC-SCNC: 14 MMOL/L (ref 7–16)
BASOPHILS # BLD AUTO: 0.1 % (ref 0–1.8)
BASOPHILS # BLD: 0.01 K/UL (ref 0–0.12)
BUN SERPL-MCNC: 38 MG/DL (ref 8–22)
CALCIUM SERPL-MCNC: 8.5 MG/DL (ref 8.5–10.5)
CHLORIDE SERPL-SCNC: 97 MMOL/L (ref 96–112)
CO2 SERPL-SCNC: 23 MMOL/L (ref 20–33)
CREAT SERPL-MCNC: 5.18 MG/DL (ref 0.5–1.4)
EKG IMPRESSION: NORMAL
EOSINOPHIL # BLD AUTO: 0.07 K/UL (ref 0–0.51)
EOSINOPHIL NFR BLD: 0.5 % (ref 0–6.9)
ERYTHROCYTE [DISTWIDTH] IN BLOOD BY AUTOMATED COUNT: 47.4 FL (ref 35.9–50)
FUNGUS SPEC CULT: NORMAL
FUNGUS SPEC FUNGUS STN: NORMAL
FUNGUS SPEC FUNGUS STN: NORMAL
GFR SERPLBLD CREATININE-BSD FMLA CKD-EPI: 11 ML/MIN/1.73 M 2
GLUCOSE SERPL-MCNC: 112 MG/DL (ref 65–99)
GRAM STN SPEC: NORMAL
HCT VFR BLD AUTO: 30.9 % (ref 42–52)
HGB BLD-MCNC: 10.2 G/DL (ref 14–18)
IMM GRANULOCYTES # BLD AUTO: 0.07 K/UL (ref 0–0.11)
IMM GRANULOCYTES NFR BLD AUTO: 0.5 % (ref 0–0.9)
LYMPHOCYTES # BLD AUTO: 1.18 K/UL (ref 1–4.8)
LYMPHOCYTES NFR BLD: 9.3 % (ref 22–41)
MCH RBC QN AUTO: 29.3 PG (ref 27–33)
MCHC RBC AUTO-ENTMCNC: 33 G/DL (ref 32.3–36.5)
MCV RBC AUTO: 88.8 FL (ref 81.4–97.8)
MONOCYTES # BLD AUTO: 1.33 K/UL (ref 0–0.85)
MONOCYTES NFR BLD AUTO: 10.4 % (ref 0–13.4)
NEUTROPHILS # BLD AUTO: 10.08 K/UL (ref 1.82–7.42)
NEUTROPHILS NFR BLD: 79.2 % (ref 44–72)
NRBC # BLD AUTO: 0 K/UL
NRBC BLD-RTO: 0 /100 WBC (ref 0–0.2)
PLATELET # BLD AUTO: 171 K/UL (ref 164–446)
PMV BLD AUTO: 11.3 FL (ref 9–12.9)
POTASSIUM SERPL-SCNC: 3.9 MMOL/L (ref 3.6–5.5)
RBC # BLD AUTO: 3.48 M/UL (ref 4.7–6.1)
SIGNIFICANT IND 70042: NORMAL
SITE SITE: NORMAL
SODIUM SERPL-SCNC: 134 MMOL/L (ref 135–145)
SOURCE SOURCE: NORMAL
VANCOMYCIN SERPL-MCNC: 19.6 UG/ML
WBC # BLD AUTO: 12.7 K/UL (ref 4.8–10.8)

## 2024-06-25 PROCEDURE — 160028 HCHG SURGERY MINUTES - 1ST 30 MINS LEVEL 3: Performed by: SURGERY

## 2024-06-25 PROCEDURE — 87070 CULTURE OTHR SPECIMN AEROBIC: CPT

## 2024-06-25 PROCEDURE — 36415 COLL VENOUS BLD VENIPUNCTURE: CPT

## 2024-06-25 PROCEDURE — 700101 HCHG RX REV CODE 250: Performed by: EMERGENCY MEDICINE

## 2024-06-25 PROCEDURE — 700101 HCHG RX REV CODE 250: Performed by: STUDENT IN AN ORGANIZED HEALTH CARE EDUCATION/TRAINING PROGRAM

## 2024-06-25 PROCEDURE — 06BQ0ZZ EXCISION OF LEFT SAPHENOUS VEIN, OPEN APPROACH: ICD-10-PCS | Performed by: SURGERY

## 2024-06-25 PROCEDURE — 87186 SC STD MICRODIL/AGAR DIL: CPT

## 2024-06-25 PROCEDURE — 05CA0ZZ EXTIRPATION OF MATTER FROM LEFT BRACHIAL VEIN, OPEN APPROACH: ICD-10-PCS | Performed by: SURGERY

## 2024-06-25 PROCEDURE — 160036 HCHG PACU - EA ADDL 30 MINS PHASE I: Performed by: SURGERY

## 2024-06-25 PROCEDURE — 35236 REPAIR BLVSL VN GRF UXTR: CPT | Mod: LT | Performed by: SURGERY

## 2024-06-25 PROCEDURE — 93005 ELECTROCARDIOGRAM TRACING: CPT | Performed by: HOSPITALIST

## 2024-06-25 PROCEDURE — 700111 HCHG RX REV CODE 636 W/ 250 OVERRIDE (IP): Performed by: SURGERY

## 2024-06-25 PROCEDURE — 700102 HCHG RX REV CODE 250 W/ 637 OVERRIDE(OP): Performed by: SURGERY

## 2024-06-25 PROCEDURE — 03U807Z SUPPLEMENT LEFT BRACHIAL ARTERY WITH AUTOLOGOUS TISSUE SUBSTITUTE, OPEN APPROACH: ICD-10-PCS | Performed by: SURGERY

## 2024-06-25 PROCEDURE — 87077 CULTURE AEROBIC IDENTIFY: CPT

## 2024-06-25 PROCEDURE — A9270 NON-COVERED ITEM OR SERVICE: HCPCS | Performed by: HOSPITALIST

## 2024-06-25 PROCEDURE — 35011 REPAIR DEFECT OF ARTERY: CPT | Mod: LT | Performed by: SURGERY

## 2024-06-25 PROCEDURE — C1713 ANCHOR/SCREW BN/BN,TIS/BN: HCPCS | Performed by: SURGERY

## 2024-06-25 PROCEDURE — 35903 EXCISION GRAFT EXTREMITY: CPT | Mod: 59,LT | Performed by: SURGERY

## 2024-06-25 PROCEDURE — 700111 HCHG RX REV CODE 636 W/ 250 OVERRIDE (IP): Performed by: HOSPITALIST

## 2024-06-25 PROCEDURE — 03C80ZZ EXTIRPATION OF MATTER FROM LEFT BRACHIAL ARTERY, OPEN APPROACH: ICD-10-PCS | Performed by: SURGERY

## 2024-06-25 PROCEDURE — 99233 SBSQ HOSP IP/OBS HIGH 50: CPT | Performed by: HOSPITALIST

## 2024-06-25 PROCEDURE — 160009 HCHG ANES TIME/MIN: Performed by: SURGERY

## 2024-06-25 PROCEDURE — A9270 NON-COVERED ITEM OR SERVICE: HCPCS | Performed by: SURGERY

## 2024-06-25 PROCEDURE — 700105 HCHG RX REV CODE 258: Performed by: STUDENT IN AN ORGANIZED HEALTH CARE EDUCATION/TRAINING PROGRAM

## 2024-06-25 PROCEDURE — 87205 SMEAR GRAM STAIN: CPT

## 2024-06-25 PROCEDURE — 87075 CULTR BACTERIA EXCEPT BLOOD: CPT

## 2024-06-25 PROCEDURE — 160002 HCHG RECOVERY MINUTES (STAT): Performed by: SURGERY

## 2024-06-25 PROCEDURE — 700102 HCHG RX REV CODE 250 W/ 637 OVERRIDE(OP): Performed by: HOSPITALIST

## 2024-06-25 PROCEDURE — 700105 HCHG RX REV CODE 258: Performed by: HOSPITALIST

## 2024-06-25 PROCEDURE — 160048 HCHG OR STATISTICAL LEVEL 1-5: Performed by: SURGERY

## 2024-06-25 PROCEDURE — 87102 FUNGUS ISOLATION CULTURE: CPT

## 2024-06-25 PROCEDURE — 93010 ELECTROCARDIOGRAM REPORT: CPT | Performed by: INTERNAL MEDICINE

## 2024-06-25 PROCEDURE — 85025 COMPLETE CBC W/AUTO DIFF WBC: CPT

## 2024-06-25 PROCEDURE — 110371 HCHG SHELL REV 272: Performed by: SURGERY

## 2024-06-25 PROCEDURE — C1729 CATH, DRAINAGE: HCPCS | Performed by: SURGERY

## 2024-06-25 PROCEDURE — 770020 HCHG ROOM/CARE - TELE (206)

## 2024-06-25 PROCEDURE — 160039 HCHG SURGERY MINUTES - EA ADDL 1 MIN LEVEL 3: Performed by: SURGERY

## 2024-06-25 PROCEDURE — 80202 ASSAY OF VANCOMYCIN: CPT

## 2024-06-25 PROCEDURE — 700111 HCHG RX REV CODE 636 W/ 250 OVERRIDE (IP): Performed by: STUDENT IN AN ORGANIZED HEALTH CARE EDUCATION/TRAINING PROGRAM

## 2024-06-25 PROCEDURE — 160035 HCHG PACU - 1ST 60 MINS PHASE I: Performed by: SURGERY

## 2024-06-25 PROCEDURE — 80048 BASIC METABOLIC PNL TOTAL CA: CPT

## 2024-06-25 RX ORDER — SODIUM CHLORIDE 9 MG/ML
INJECTION, SOLUTION INTRAVENOUS
Status: DISCONTINUED | OUTPATIENT
Start: 2024-06-25 | End: 2024-06-25 | Stop reason: SURG

## 2024-06-25 RX ORDER — ONDANSETRON 2 MG/ML
4 INJECTION INTRAMUSCULAR; INTRAVENOUS
Status: DISCONTINUED | OUTPATIENT
Start: 2024-06-25 | End: 2024-06-25 | Stop reason: HOSPADM

## 2024-06-25 RX ORDER — CEFAZOLIN SODIUM 1 G/3ML
INJECTION, POWDER, FOR SOLUTION INTRAMUSCULAR; INTRAVENOUS PRN
Status: DISCONTINUED | OUTPATIENT
Start: 2024-06-25 | End: 2024-06-25 | Stop reason: SURG

## 2024-06-25 RX ORDER — ONDANSETRON 2 MG/ML
4 INJECTION INTRAMUSCULAR; INTRAVENOUS EVERY 4 HOURS PRN
Status: DISCONTINUED | OUTPATIENT
Start: 2024-06-25 | End: 2024-06-30

## 2024-06-25 RX ORDER — SODIUM CHLORIDE, SODIUM LACTATE, POTASSIUM CHLORIDE, CALCIUM CHLORIDE 600; 310; 30; 20 MG/100ML; MG/100ML; MG/100ML; MG/100ML
INJECTION, SOLUTION INTRAVENOUS
Status: DISCONTINUED | OUTPATIENT
Start: 2024-06-25 | End: 2024-06-25

## 2024-06-25 RX ORDER — ASPIRIN 81 MG/1
81 TABLET ORAL DAILY
Status: DISCONTINUED | OUTPATIENT
Start: 2024-06-25 | End: 2024-06-27

## 2024-06-25 RX ORDER — OXYCODONE HCL 5 MG/5 ML
5 SOLUTION, ORAL ORAL
Status: DISCONTINUED | OUTPATIENT
Start: 2024-06-25 | End: 2024-06-25 | Stop reason: HOSPADM

## 2024-06-25 RX ORDER — HYDROMORPHONE HYDROCHLORIDE 1 MG/ML
0.1 INJECTION, SOLUTION INTRAMUSCULAR; INTRAVENOUS; SUBCUTANEOUS
Status: DISCONTINUED | OUTPATIENT
Start: 2024-06-25 | End: 2024-06-25 | Stop reason: HOSPADM

## 2024-06-25 RX ORDER — HYDRALAZINE HYDROCHLORIDE 20 MG/ML
10 INJECTION INTRAMUSCULAR; INTRAVENOUS EVERY 4 HOURS PRN
Status: DISCONTINUED | OUTPATIENT
Start: 2024-06-25 | End: 2024-07-01

## 2024-06-25 RX ORDER — LIDOCAINE HYDROCHLORIDE 20 MG/ML
INJECTION, SOLUTION EPIDURAL; INFILTRATION; INTRACAUDAL; PERINEURAL PRN
Status: DISCONTINUED | OUTPATIENT
Start: 2024-06-25 | End: 2024-06-25 | Stop reason: SURG

## 2024-06-25 RX ORDER — HYDROMORPHONE HYDROCHLORIDE 1 MG/ML
0.2 INJECTION, SOLUTION INTRAMUSCULAR; INTRAVENOUS; SUBCUTANEOUS
Status: DISCONTINUED | OUTPATIENT
Start: 2024-06-25 | End: 2024-06-25 | Stop reason: HOSPADM

## 2024-06-25 RX ORDER — EPHEDRINE SULFATE 50 MG/ML
5 INJECTION, SOLUTION INTRAVENOUS
Status: DISCONTINUED | OUTPATIENT
Start: 2024-06-25 | End: 2024-06-25 | Stop reason: HOSPADM

## 2024-06-25 RX ORDER — LABETALOL HYDROCHLORIDE 5 MG/ML
10 INJECTION, SOLUTION INTRAVENOUS EVERY 4 HOURS PRN
Status: DISCONTINUED | OUTPATIENT
Start: 2024-06-25 | End: 2024-07-17 | Stop reason: HOSPADM

## 2024-06-25 RX ORDER — BUPIVACAINE HYDROCHLORIDE 5 MG/ML
INJECTION, SOLUTION EPIDURAL; INTRACAUDAL
Status: DISCONTINUED | OUTPATIENT
Start: 2024-06-25 | End: 2024-06-25 | Stop reason: HOSPADM

## 2024-06-25 RX ORDER — MORPHINE SULFATE 4 MG/ML
1-2 INJECTION INTRAVENOUS
Status: DISCONTINUED | OUTPATIENT
Start: 2024-06-25 | End: 2024-06-30

## 2024-06-25 RX ORDER — HYDRALAZINE HYDROCHLORIDE 20 MG/ML
5 INJECTION INTRAMUSCULAR; INTRAVENOUS
Status: DISCONTINUED | OUTPATIENT
Start: 2024-06-25 | End: 2024-06-25 | Stop reason: HOSPADM

## 2024-06-25 RX ORDER — PROTAMINE SULFATE 10 MG/ML
INJECTION, SOLUTION INTRAVENOUS PRN
Status: DISCONTINUED | OUTPATIENT
Start: 2024-06-25 | End: 2024-06-25 | Stop reason: SURG

## 2024-06-25 RX ORDER — HYDROCODONE BITARTRATE AND ACETAMINOPHEN 5; 325 MG/1; MG/1
1-2 TABLET ORAL EVERY 6 HOURS PRN
Status: DISCONTINUED | OUTPATIENT
Start: 2024-06-25 | End: 2024-06-30

## 2024-06-25 RX ORDER — HEPARIN SODIUM,PORCINE 1000/ML
VIAL (ML) INJECTION
Status: DISCONTINUED | OUTPATIENT
Start: 2024-06-25 | End: 2024-06-25 | Stop reason: HOSPADM

## 2024-06-25 RX ORDER — OXYCODONE HCL 5 MG/5 ML
10 SOLUTION, ORAL ORAL
Status: DISCONTINUED | OUTPATIENT
Start: 2024-06-25 | End: 2024-06-25 | Stop reason: HOSPADM

## 2024-06-25 RX ORDER — ESMOLOL HYDROCHLORIDE 10 MG/ML
INJECTION INTRAVENOUS PRN
Status: DISCONTINUED | OUTPATIENT
Start: 2024-06-25 | End: 2024-06-25 | Stop reason: SURG

## 2024-06-25 RX ORDER — HEPARIN SODIUM 1000 [USP'U]/ML
INJECTION, SOLUTION INTRAVENOUS; SUBCUTANEOUS PRN
Status: DISCONTINUED | OUTPATIENT
Start: 2024-06-25 | End: 2024-06-25 | Stop reason: SURG

## 2024-06-25 RX ORDER — PHENYLEPHRINE HCL IN 0.9% NACL 1 MG/10 ML
SYRINGE (ML) INTRAVENOUS PRN
Status: DISCONTINUED | OUTPATIENT
Start: 2024-06-25 | End: 2024-06-25 | Stop reason: SURG

## 2024-06-25 RX ORDER — DIPHENHYDRAMINE HYDROCHLORIDE 50 MG/ML
12.5 INJECTION INTRAMUSCULAR; INTRAVENOUS
Status: DISCONTINUED | OUTPATIENT
Start: 2024-06-25 | End: 2024-06-25 | Stop reason: HOSPADM

## 2024-06-25 RX ORDER — MIDAZOLAM HYDROCHLORIDE 1 MG/ML
INJECTION INTRAMUSCULAR; INTRAVENOUS PRN
Status: DISCONTINUED | OUTPATIENT
Start: 2024-06-25 | End: 2024-06-25 | Stop reason: SURG

## 2024-06-25 RX ORDER — HALOPERIDOL 5 MG/ML
1 INJECTION INTRAMUSCULAR
Status: DISCONTINUED | OUTPATIENT
Start: 2024-06-25 | End: 2024-06-25 | Stop reason: HOSPADM

## 2024-06-25 RX ORDER — HYDROMORPHONE HYDROCHLORIDE 1 MG/ML
0.4 INJECTION, SOLUTION INTRAMUSCULAR; INTRAVENOUS; SUBCUTANEOUS
Status: DISCONTINUED | OUTPATIENT
Start: 2024-06-25 | End: 2024-06-25 | Stop reason: HOSPADM

## 2024-06-25 RX ORDER — SODIUM CHLORIDE, SODIUM LACTATE, POTASSIUM CHLORIDE, CALCIUM CHLORIDE 600; 310; 30; 20 MG/100ML; MG/100ML; MG/100ML; MG/100ML
INJECTION, SOLUTION INTRAVENOUS CONTINUOUS
Status: DISCONTINUED | OUTPATIENT
Start: 2024-06-25 | End: 2024-06-25 | Stop reason: HOSPADM

## 2024-06-25 RX ORDER — DEXAMETHASONE SODIUM PHOSPHATE 4 MG/ML
INJECTION, SOLUTION INTRA-ARTICULAR; INTRALESIONAL; INTRAMUSCULAR; INTRAVENOUS; SOFT TISSUE PRN
Status: DISCONTINUED | OUTPATIENT
Start: 2024-06-25 | End: 2024-06-25 | Stop reason: SURG

## 2024-06-25 RX ADMIN — FENTANYL CITRATE 50 MCG: 50 INJECTION, SOLUTION INTRAMUSCULAR; INTRAVENOUS at 14:39

## 2024-06-25 RX ADMIN — CEFAZOLIN 2 G: 1 INJECTION, POWDER, FOR SOLUTION INTRAMUSCULAR; INTRAVENOUS at 13:06

## 2024-06-25 RX ADMIN — FENTANYL CITRATE 75 MCG: 50 INJECTION, SOLUTION INTRAMUSCULAR; INTRAVENOUS at 13:25

## 2024-06-25 RX ADMIN — MIDAZOLAM HYDROCHLORIDE 2 MG: 2 INJECTION, SOLUTION INTRAMUSCULAR; INTRAVENOUS at 12:56

## 2024-06-25 RX ADMIN — ASPIRIN 81 MG: 81 TABLET, COATED ORAL at 22:43

## 2024-06-25 RX ADMIN — FENTANYL CITRATE 100 MCG: 50 INJECTION, SOLUTION INTRAMUSCULAR; INTRAVENOUS at 13:54

## 2024-06-25 RX ADMIN — ESMOLOL HYDROCHLORIDE 20 MG: 100 INJECTION, SOLUTION INTRAVENOUS at 13:16

## 2024-06-25 RX ADMIN — PHENYLEPHRINE HYDROCHLORIDE 25 MCG/MIN: 10 INJECTION INTRAVENOUS at 13:35

## 2024-06-25 RX ADMIN — FENTANYL CITRATE 50 MCG: 50 INJECTION, SOLUTION INTRAMUSCULAR; INTRAVENOUS at 13:45

## 2024-06-25 RX ADMIN — FENTANYL CITRATE 50 MCG: 50 INJECTION, SOLUTION INTRAMUSCULAR; INTRAVENOUS at 14:33

## 2024-06-25 RX ADMIN — Medication 100 MCG: at 13:03

## 2024-06-25 RX ADMIN — FENTANYL CITRATE 50 MCG: 50 INJECTION, SOLUTION INTRAMUSCULAR; INTRAVENOUS at 13:42

## 2024-06-25 RX ADMIN — SODIUM CHLORIDE: 9 INJECTION, SOLUTION INTRAVENOUS at 12:58

## 2024-06-25 RX ADMIN — Medication 100 MCG: at 13:32

## 2024-06-25 RX ADMIN — PROPOFOL 100 MG: 10 INJECTION, EMULSION INTRAVENOUS at 13:03

## 2024-06-25 RX ADMIN — LIDOCAINE HYDROCHLORIDE 60 MG: 20 INJECTION, SOLUTION EPIDURAL; INFILTRATION; INTRACAUDAL at 13:03

## 2024-06-25 RX ADMIN — Medication 100 MCG: at 13:25

## 2024-06-25 RX ADMIN — HEPARIN SODIUM 4000 UNITS: 1000 INJECTION, SOLUTION INTRAVENOUS; SUBCUTANEOUS at 13:32

## 2024-06-25 RX ADMIN — PROTAMINE SULFATE 30 MG: 10 INJECTION, SOLUTION INTRAVENOUS at 14:44

## 2024-06-25 RX ADMIN — Medication 150 MCG: at 13:21

## 2024-06-25 RX ADMIN — VANCOMYCIN HYDROCHLORIDE 1000 MG: 5 INJECTION, POWDER, LYOPHILIZED, FOR SOLUTION INTRAVENOUS at 17:56

## 2024-06-25 RX ADMIN — ACETAMINOPHEN 650 MG: 325 TABLET, FILM COATED ORAL at 20:44

## 2024-06-25 RX ADMIN — Medication 100 MCG: at 13:30

## 2024-06-25 RX ADMIN — DEXAMETHASONE SODIUM PHOSPHATE 8 MG: 4 INJECTION INTRA-ARTICULAR; INTRALESIONAL; INTRAMUSCULAR; INTRAVENOUS; SOFT TISSUE at 13:06

## 2024-06-25 RX ADMIN — METOPROLOL TARTRATE 5 MG: 5 INJECTION INTRAVENOUS at 00:06

## 2024-06-25 RX ADMIN — FENTANYL CITRATE 25 MCG: 50 INJECTION, SOLUTION INTRAMUSCULAR; INTRAVENOUS at 13:32

## 2024-06-25 SDOH — ECONOMIC STABILITY: TRANSPORTATION INSECURITY
IN THE PAST 12 MONTHS, HAS THE LACK OF TRANSPORTATION KEPT YOU FROM MEDICAL APPOINTMENTS OR FROM GETTING MEDICATIONS?: NO

## 2024-06-25 SDOH — ECONOMIC STABILITY: TRANSPORTATION INSECURITY
IN THE PAST 12 MONTHS, HAS LACK OF RELIABLE TRANSPORTATION KEPT YOU FROM MEDICAL APPOINTMENTS, MEETINGS, WORK OR FROM GETTING THINGS NEEDED FOR DAILY LIVING?: NO

## 2024-06-25 ASSESSMENT — ENCOUNTER SYMPTOMS
HEADACHES: 0
PALPITATIONS: 0
DIZZINESS: 0
COUGH: 0
DEPRESSION: 0
PND: 0
NAUSEA: 0
MYALGIAS: 0
CLAUDICATION: 0
HEMOPTYSIS: 0
DOUBLE VISION: 0
VOMITING: 0
FEVER: 0
BRUISES/BLEEDS EASILY: 0
CHILLS: 0
BLURRED VISION: 0
HEARTBURN: 0
WHEEZING: 0
BACK PAIN: 0

## 2024-06-25 ASSESSMENT — LIFESTYLE VARIABLES
HOW MANY TIMES IN THE PAST YEAR HAVE YOU HAD 5 OR MORE DRINKS IN A DAY: 0
ON A TYPICAL DAY WHEN YOU DRINK ALCOHOL HOW MANY DRINKS DO YOU HAVE: 0
HAVE PEOPLE ANNOYED YOU BY CRITICIZING YOUR DRINKING: NO
EVER FELT BAD OR GUILTY ABOUT YOUR DRINKING: NO
ALCOHOL_USE: NO
EVER HAD A DRINK FIRST THING IN THE MORNING TO STEADY YOUR NERVES TO GET RID OF A HANGOVER: NO
TOTAL SCORE: 0
DOES PATIENT WANT TO STOP DRINKING: NO
AVERAGE NUMBER OF DAYS PER WEEK YOU HAVE A DRINK CONTAINING ALCOHOL: 0
TOTAL SCORE: 0
HAVE YOU EVER FELT YOU SHOULD CUT DOWN ON YOUR DRINKING: NO
TOTAL SCORE: 0
CONSUMPTION TOTAL: NEGATIVE

## 2024-06-25 ASSESSMENT — COGNITIVE AND FUNCTIONAL STATUS - GENERAL
DAILY ACTIVITIY SCORE: 24
SUGGESTED CMS G CODE MODIFIER MOBILITY: CK
WALKING IN HOSPITAL ROOM: A LITTLE
STANDING UP FROM CHAIR USING ARMS: A LITTLE
CLIMB 3 TO 5 STEPS WITH RAILING: A LOT
MOVING FROM LYING ON BACK TO SITTING ON SIDE OF FLAT BED: A LITTLE
MOVING TO AND FROM BED TO CHAIR: A LITTLE
MOBILITY SCORE: 18
SUGGESTED CMS G CODE MODIFIER DAILY ACTIVITY: CH

## 2024-06-25 ASSESSMENT — SOCIAL DETERMINANTS OF HEALTH (SDOH)
WITHIN THE LAST YEAR, HAVE YOU BEEN KICKED, HIT, SLAPPED, OR OTHERWISE PHYSICALLY HURT BY YOUR PARTNER OR EX-PARTNER?: NO
WITHIN THE PAST 12 MONTHS, YOU WORRIED THAT YOUR FOOD WOULD RUN OUT BEFORE YOU GOT THE MONEY TO BUY MORE: NEVER TRUE
WITHIN THE PAST 12 MONTHS, THE FOOD YOU BOUGHT JUST DIDN'T LAST AND YOU DIDN'T HAVE MONEY TO GET MORE: NEVER TRUE
WITHIN THE LAST YEAR, HAVE YOU BEEN HUMILIATED OR EMOTIONALLY ABUSED IN OTHER WAYS BY YOUR PARTNER OR EX-PARTNER?: NO
IN THE PAST 12 MONTHS, HAS THE ELECTRIC, GAS, OIL, OR WATER COMPANY THREATENED TO SHUT OFF SERVICE IN YOUR HOME?: NO
WITHIN THE LAST YEAR, HAVE TO BEEN RAPED OR FORCED TO HAVE ANY KIND OF SEXUAL ACTIVITY BY YOUR PARTNER OR EX-PARTNER?: NO
WITHIN THE LAST YEAR, HAVE YOU BEEN AFRAID OF YOUR PARTNER OR EX-PARTNER?: NO

## 2024-06-25 ASSESSMENT — PAIN SCALES - GENERAL: PAIN_LEVEL: 4

## 2024-06-25 ASSESSMENT — PATIENT HEALTH QUESTIONNAIRE - PHQ9
SUM OF ALL RESPONSES TO PHQ9 QUESTIONS 1 AND 2: 0
1. LITTLE INTEREST OR PLEASURE IN DOING THINGS: NOT AT ALL
2. FEELING DOWN, DEPRESSED, IRRITABLE, OR HOPELESS: NOT AT ALL

## 2024-06-25 ASSESSMENT — FIBROSIS 4 INDEX: FIB4 SCORE: 2.18

## 2024-06-25 ASSESSMENT — PAIN DESCRIPTION - PAIN TYPE: TYPE: ACUTE PAIN

## 2024-06-25 NOTE — CARE PLAN
The patient is Watcher - Medium risk of patient condition declining or worsening    Shift Goals  Clinical Goals: Dialysis  Patient Goals: Rest  Family Goals: NESTOR    Progress made toward(s) clinical / shift goals:        Problem: Pain - Standard  Goal: Alleviation of pain or a reduction in pain to the patient’s comfort goal  Outcome: Progressing     Problem: Knowledge Deficit - Standard  Goal: Patient and family/care givers will demonstrate understanding of plan of care, disease process/condition, diagnostic tests and medications  Outcome: Progressing  Note: Pt educated on POC and disease processes. Pt verbalized understanding of medication regimen and interventions.      Problem: Hemodynamics  Goal: Patient's hemodynamics, fluid balance and neurologic status will be stable or improve  Outcome: Progressing

## 2024-06-25 NOTE — CARE PLAN
Problem: Pain - Standard  Goal: Alleviation of pain or a reduction in pain to the patient’s comfort goal  Outcome: Progressing     Problem: Fall Risk  Goal: Patient will remain free from falls  Outcome: Progressing   The patient is Stable - Low risk of patient condition declining or worsening    Shift Goals  Clinical Goals: surgery  Patient Goals: rest  Family Goals: NESTOR    Progress made toward(s) clinical / shift goals:  pt updated on POC for today.  Hourly rounding for safety.    Patient is not progressing towards the following goals:

## 2024-06-25 NOTE — ANESTHESIA PREPROCEDURE EVALUATION
Case: [id number] Date/Time: 06/25/24 1300    Procedure: REVISION, AV FISTULA    Location: [facility] OR 10 / SURGERY [facility]    Surgeons: Doug Tinoco M.D.            Relevant Problems   PULMONARY   (positive) Chronic obstructive pulmonary disease (HCC)      CARDIAC   (positive) Chronic atrial fibrillation (HCC)   (positive) Chronic combined systolic and diastolic congestive heart failure (HCC)   (positive) Coronary artery disease   (positive) Infected prosthetic vascular graft, initial encounter ([facility])   (positive) PAD (peripheral artery disease) (HCC)   (positive) Renal artery stenosis (HCC)   (positive) Renovascular hypertension         (positive) End stage renal disease (HCC)       Physical Exam    Airway   Mallampati: II  TM distance: >3 FB  Neck ROM: full       Cardiovascular - normal exam  Rhythm: irregular  Rate: normal  (-) murmur     Dental - normal exam           Pulmonary - normal exam  Breath sounds clear to auscultation     Abdominal    Neurological - normal exam                   Anesthesia Plan    ASA 3   ASA physical status 3 criteria: ESRD undergoing regularly scheduled dialysis and CAD/stents (> 3 months)    Plan - general       Airway plan will be LMA          Induction: intravenous    Postoperative Plan: Postoperative administration of opioids is intended.    Pertinent diagnostic labs and testing reviewed    Informed Consent:    Anesthetic plan and risks discussed with patient.    Use of blood products discussed with: patient whom consented to blood products.

## 2024-06-25 NOTE — HOSPITAL COURSE
Brian Jasmine is a 68 y.o. male who presented 6/24/2024 with left arm infection.  Mr. Jasmine has a past medical history of peripheral arterial disease with femoral popliteal bypass, end-stage renal disease undergoing dialysis via a left arm graft, atrial fibrillation on Coumadin therapy, hypertension, coronary artery disease with stent to the circumflex that developed pain, redness, swelling in the right arm graft site about 5 days ago and did not go to dialysis and stopped taking all of his medications because he felt so poorly.  He has had fevers and chills.  He presents with the symptoms and here has a grossly infected site.  His creatinine is 8.8 with a potassium 3.8.  Vascular surgery has been consulted as has nephrology.  Attempt will be made at a temporary dialysis catheter until blood cultures are cleared for a possible tunneled catheter.  He states he has had at least 6 or 7 prior neck catheters in the past.  IV vancomycin has been initiated   amputation as he has severe tibial disease.  Supratheraeputic INR.  Vascular cleared. Nephrology likely clears  ID recommended IV daptomycin [phone] with his MWF  dialysis sessions STOP 8/11. Outpatient dialysis being arranged. PT recommended SNF. [facility] evaluating.

## 2024-06-25 NOTE — PROGRESS NOTES
Received pt from ED. Scheduled for Dialysis this evening. C/o generalized pain will medicate per MAR.   A&O x 4  Oxygen: How many liters 2L RA at baseline.  SBP Ranged: 1 teen's-150's  Patient on cardiac monitor: Yes afib low 100's  IVF/IV medications: Not Applicable   Fall Risk Score: high fall risk   Fall risk interventions in place: Place yellow fall risk ID band on patient, Provide patient/family education based on risk assessment, Educate patient/family to call staff for assistance when getting out of bed, Place fall precaution signage outside patient door, Place patient in room close to nursing station, Utilize bed/chair fall alarm, Notify charge of high risk for huddle, and Bed alarm connected correctly  Bed type: Regular (Gary Score less than 17 interventions in place)  Bedside sitter: Not Applicable   Isolation: Not applicable

## 2024-06-25 NOTE — WOUND TEAM
[facility]  Inpatient Services  Initial Wound and Skin Care Evaluation    Admission Date: 2024     Last order of IP CONSULT TO WOUND CARE was found on 2024 from Hospital Encounter on 2024     HPI, PMH, SH: Reviewed    Past Surgical History:   Procedure Laterality Date    GASTROSCOPY N/A 2017    Procedure: GASTROSCOPY;  Surgeon: Morales Hinton M.D.;  Location: SURGERY [facility];  Service:     EGD ESOPHAGUS WITH ENDOSCOPIC US N/A 2017    Procedure: EGD ESOPHAGUS WITH ENDOSCOPIC US UPPER WITH POSSIBLE FNA;  Surgeon: Morales Hinton M.D.;  Location: SURGERY [facility];  Service:     AV FISTULA CREATION Left      Social History     Tobacco Use    Smoking status: Former     Current packs/day: 0.00     Average packs/day: 0.5 packs/day for 42.2 years (21.1 ttl pk-yrs)     Types: Cigarettes     Start date: 1977     Quit date: 3/4/2019     Years since quittin.3    Smokeless tobacco: Never   Substance Use Topics    Alcohol use: No     Comment: Hx of alcohol use     Chief Complaint   Patient presents with    Arm Pain    Rapid Heart Beat     Diagnosis: Infected prosthetic vascular graft, initial encounter ([facility]) [T82.7XXA]    Unit where seen by Wound Team: T OR POOL/NONE (Seen in T824)    WOUND CONSULT RELATED TO:  right 5th toe plantar    WOUND TEAM PLAN OF CARE - Frequency of Follow-up:   Nursing to follow dressing orders written for wound care. Contact wound team if area fails to progress, deteriorates or with any questions/concerns if something comes up before next scheduled follow up (See below as to whether wound is following and frequency of wound follow up)   Not following, consult as needed  - any area    WOUND HISTORY:   68 y.o. male with multiple medical problems including end-stage renal disease who has been on hemodialysis for quite some time.  Patient is on hemodialysis Monday, Wednesday, and Friday via left upper arm arteriovenous graft which was placed 3  to 4 years ago.  He was last dialyzed Wednesday.  He presented to the emergency room with left arm pain and swelling for at least 5 days.         WOUND ASSESSMENT/LDA    Wound 06/24/24 Arterial Ulcer MTH 5 Right right 5th plantar toe ischemic (Active)   Wound Image    06/25/24 1500   Site Assessment Black;Brown    Periwound Assessment Intact    Margins Attached edges    Closure None    Drainage Amount None    Treatments Offloading    Wound Cleansing Povidone-Iodine    Dressing Status Open to Air    NEXT Weekly Photo (Inpatient Only) 07/02/24    Wound Team Following Not following    Non-staged Wound Description Full thickness        Wound 06/24/24 Pressure Injury Coccyx;Sacrum Bilateral sDTI evolving POA (Active)   Wound Image   06/25/24 1500   Site Assessment Purple;Red;Pink    Periwound Assessment Intact    Margins Attached edges    Closure None    Drainage Amount None    Treatments Site care;Offloading    Dressing Status Clean;Dry;Intact    Dressing Changed Reapplied    Dressing Cleansing/Solutions Not Applicable    Dressing Options Offloading Dressing - Sacral    Dressing Change/Treatment Frequency Every 72 hrs, and As Needed    NEXT Dressing Change/Treatment Date 06/27/24    NEXT Weekly Photo (Inpatient Only) 07/02/24    Wound Team Following Not following    WOUND NURSE ONLY - Pressure Injury Stage DTPI                                                  Vascular:    SUSI:   No results found.    Lab Values:    Lab Results   Component Value Date/Time    WBC 12.7 (H) 06/25/2024 12:53 AM    RBC 3.48 (L) 06/25/2024 12:53 AM    HEMOGLOBIN 10.2 (L) 06/25/2024 12:53 AM    HEMATOCRIT 30.9 (L) 06/25/2024 12:53 AM    HBA1C 6.3 (H) 03/05/2019 02:00 AM         Culture Results show:  No results found for this or any previous visit (from the past 720 hour(s)).    Pain Level/Medicated:  Patient denies pain       INTERVENTIONS BY WOUND TEAM:  Chart and images reviewed. Discussed with bedside RN. All areas of concern (based on  picture review, LDA review and discussion with bedside RN) have been thoroughly assessed. Documentation of areas based on significant findings. This RN in to assess patient. Performed standard wound care which includes appropriate positioning, dressing removal and non-selective debridement. Pictures and measurements obtained weekly if/when required.    Wound:  right 5th toe plantar  Open to air with betadine     Wound:  coccyx/sacrum  Sacral offloading dressing    Advanced Wound Care Discharge Planning  Number of Clinicians necessary to complete wound care: 1    Is patient requiring IV pain medications for dressing changes:  No   Length of time for dressing change 30 min. (This does not include chart review, pre-medication time, set up, clean up or time spent charting.)    Interdisciplinary consultation: Patient, Bedside RN, Lloyd AGEE (Wound RN).  Pressure injury and staging reviewed with Lloyd AGEE (Wound RN).    EVALUATION / RATIONALE FOR TREATMENT:     Date:  06/25/24  Wound Status:  Initial evaluation    Patient with eschar to right 5th plantar toe.  Likely related to poor blood flow.  Will order SUSI for baseline.          Goals: Steady decrease in wound area and depth weekly.    NURSING PLAN OF CARE ORDERS:  Skin care: See Skin Care orders  RN Prevention Protocol    NUTRITION RECOMMENDATIONS   Wound Team Recommendations:  N/A    DIET ORDERS (From admission to next 24h)       Start     Ordered    06/24/24 9206  Diet NPO Restrict to: Sips with Medications  AT MIDNIGHT      Question:  Diet NPO Restrict to:  Answer:  Sips with Medications    06/24/24 5295                    PREVENTATIVE INTERVENTIONS:    Q shift Gary - performed per nursing policy  Q shift pressure point assessments - performed per nursing policy    Surface/Positioning  Standard/trauma mattress - Currently in Place  Reposition q 2 hours - Currently in Place    Offloading/Redistribution  Sacral offloading dressing (Silicone dressing) - Currently in  Place  Heel offloading dressing (Silicone dressing) - Currently in Place  Float Heels off Bed with Pillows - Currently in Place           Respiratory  Silicone O2 tubing - Currently in Place  Gray Foam Ear protectors - Currently in Place    Containment/Moisture Prevention    Dri-demario pad - Currently in Place    Anticipated discharge plans:  TBD        Vac Discharge Needs:  Vac Discharge plan is purely a recommendation from wound team and not a requirement for discharge unless otherwise stated by physician.  Not Applicable Pt not on a wound vac

## 2024-06-25 NOTE — ANESTHESIA TIME REPORT
Anesthesia Start and Stop Event Times       Date Time Event    6/25/2024 1238 Ready for Procedure     1258 Anesthesia Start     1537 Anesthesia Stop          Responsible Staff  06/25/24      Name Role Begin End    Rubio Cates D.O. Anesth 1258 1537          Overtime Reason:  overtime    Comments: 37 min over

## 2024-06-25 NOTE — PROGRESS NOTES
4 Eyes Skin Assessment Completed by DIALLO Castaneda and DIALLO Aguilar.    Head Blanching and Redness  Ears Redness and Blanching  Nose WDL  Mouth WDL  Neck WDL  Breast/Chest WDL  Shoulder Blades Redness and Blanching  Spine WDL  (R) Arm/Elbow/Hand Redness, Blanching, Bruising, and Scar  (L) Arm/Elbow/Hand Redness, Blanching, Bruising, Swelling, and Shiny, swelling  Abdomen WDL  Groin WDL  Scrotum/Coccyx/Buttocks Redness and Blanching  (R) Leg Redness, Blanching, and Bruising  (L) Leg Redness, Blanching, and Bruising  (R) Heel/Foot/Toe Redness, Non-Blanching, Boggy, Bruising, Scar, and Scab, flaky, non blanching 1st toe, 3rd toe, 4th toe, and 5th toe. Scab and slow to elizabeth 2nd toe  (L) Heel/Foot/Toe Redness, Blanching, Boggy, and Scab, flaky          Devices In Places Tele Box, Blood Pressure Cuff, and Pulse Ox      Interventions In Place Heel Mepilex, Sacral Mepilex, Pillows, and Heels Loaded W/Pillows    Possible Skin Injury Yes    Pictures Uploaded Into Epic Yes  Wound Consult Placed Yes  RN Wound Prevention Protocol Ordered Yes

## 2024-06-25 NOTE — PROGRESS NOTES
Vascular Surgery.    For open repair of left arm pseudoaneurysm today.  All questions were answered.

## 2024-06-25 NOTE — ANESTHESIA POSTPROCEDURE EVALUATION
Patient: Brian Jasmine    Procedure Summary       Date: 06/25/24 Room / Location: [place] [address] / SURGERY [facility]    Anesthesia Start: 1258 Anesthesia Stop: 1537    Procedure: REVISION, AV FISTULA Diagnosis: (left arm pseudoaneurysm today)    Surgeons: Doug Tinoco M.D. Responsible Provider: Rubio Cates D.O.    Anesthesia Type: general ASA Status: 3            Final Anesthesia Type: general  Last vitals  BP   Blood Pressure : 128/87    Temp   36.4 °C (97.6 °F)    Pulse   (!) 116   Resp   18    SpO2   96 %      Anesthesia Post Evaluation    Patient location during evaluation: PACU  Patient participation: complete - patient participated  Level of consciousness: awake and alert  Pain score: 4    Airway patency: patent  Anesthetic complications: no  Cardiovascular status: hemodynamically stable  Respiratory status: acceptable  Hydration status: euvolemic    PONV: none          No notable events documented.     Nurse Pain Score: 0 (NPRS)

## 2024-06-25 NOTE — OR NURSING
Patient recovered well in post-op. AAOx4. VSS, on 2L via NC. Surgical sites NESTOR, surgical dressing in place CDI x2. LAMBERT drain to bulb suctions. LUE elevated w/ warm blanket. Denies pain. Patient tolerating oral fluids without nausea. Signification other and sister updated and discussed POC. No belongings in pacu. Report called to DIALLO Lange. Awaiting transport.     Patient on tele box, verified by monitor room/tech.

## 2024-06-25 NOTE — PROGRESS NOTES
"[facility] -  PROGRESS  NOTE               Author: Osawld Pearce M.D. Date & Time: 6/25/2024  11:04 AM       HISTORY OF PRESENT ILLNESS:    68-year-old  male with biventricular history of ESRD on hemodialysis q. Monday Wednesday Friday at [facility] through left upper arm AV fistula that was placed about 2 to 3 years ago.  He presented to the ER with left arm pain and swelling for the last 5 days.  His last dialysis was on Wednesday and had no dialysis in the last 5 days.  He felt sick on Friday and missed his dialysis.  Ultrasound of the left upper arm AV fistula showed there is no flow in the AV fistula and it has thrombosed.  A temporary right IJ dialysis catheter was placed in the ER.  Nephrology has been consulted for management of dialysis.  Patient denies any fever or chills, no nausea or vomiting, no chest pain.  He has some shortness of breath but is able to talk in full sentences and is in no acute respiratory distress at this time. No melena, hematochezia, hematemesis.  No HA, visual changes, or abdominal pain.    NEPHROLOGY DAILY PROGRESS:   6/24: consult note  6/25:  Afebrile, HD last night without any complications, plans noted for OR today with Dr. Tinoco for vascualr surgery       REVIEW OF SYSTEMS:    10 point ROS was performed and is as per HPI or otherwise negative    PAST MEDICAL/SURGICAL/SOCIAL/FAMILY HISTORY:   Reviewed and remains unchanged from admission   HOME MEDICATIONS:   - Reviewed and documented in chart    LABORATORY STUDIES:   - Reviewed and documented in chart    ALLERGIES:  Patient has no known allergies.    VS:  /61   Pulse (!) 126   Temp 36.9 °C (98.4 °F) (Temporal)   Resp 17   Ht 1.854 m (6' 1\")   Wt 88.5 kg (195 lb 1.7 oz)   SpO2 95%   BMI 25.74 kg/m²   Physical Exam  Constitutional:       General: He is not in acute distress.     Appearance: Normal appearance. He is normal weight. He is not toxic-appearing.   HENT:      " Head: Normocephalic and atraumatic.      Right Ear: External ear normal.      Left Ear: External ear normal.      Nose: Nose normal.      Mouth/Throat:      Mouth: Mucous membranes are dry.   Eyes:      Extraocular Movements: Extraocular movements intact.      Pupils: Pupils are equal, round, and reactive to light.   Cardiovascular:      Pulses: Normal pulses.   Pulmonary:      Effort: Pulmonary effort is normal. No respiratory distress.      Breath sounds: No wheezing.   Abdominal:      Palpations: Abdomen is soft.   Musculoskeletal:         General: No swelling.      Cervical back: Neck supple.   Skin:     General: Skin is dry.      Comments: Increased erythema and tenderness noted over left upper arm AV fistula.  No visible discharge   Neurological:      Mental Status: He is alert.            FLUID BALANCE:  In: 500 [Dialysis:500]  Out: 2500     LABS:  Recent Labs     06/24/24  1038 06/25/24  0052   SODIUM 131* 134*   POTASSIUM 3.8 3.9   CHLORIDE 91* 97   CO2 22 23   GLUCOSE 122* 112*   BUN 67* 38*   CREATININE 8.86* 5.18*   CALCIUM 8.0* 8.5        IMAGING:  - All imaging reviewed from admission to present day    Ultrasound hemodialysis graft duplex 6/20/2024 FINDINGS:   Left upper extremity.    All veins demonstrate complete color filling and compressibility with    normal venous flow dynamics including spontaneous flow and respiratory    phasicity.    No superficial or deep venous thrombosis.     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  Fistula is thrombosed and    completely occluded from its proximal segment running its entire length to    its termination at the axillary vein.    Proximal segment of fistula is aneurysmal measuring 2.2 x 1.6 cm.     IMPRESSION:  # ESRD  -Q. MWF at [facility]  # Bacteremia    - Blood Cx from 6/24 + fro gram positive cocci   #Thrombosed left upper arm AV fistula; infected pseudoaneurysm    -Right IJ temporary HD catheter placed in ER 6/24/2024  # HTN  # Anemia of  CKD  # CKD-MBD  #Coronary artery disease  #Chronic A-fib      PLAN:  -Last HD last night 6/24/24  - No need for repeat HD today, plan for HD tomorrow and cont q MWF   - Appreciate Dr. Tinoco (Vascular Surgery) - plans noted for operative resection of the infected pseudoaneurysm and reconstruction of the artery using vein patch today. Will assess tomorrow if AVF can be used for HD, otherwise will need to convert temp IJ catheter to a tunneled HD catheter before discharge.   - Continue IV Antibiotics, Follow blood C&S   - No dietary protein restrictions  - Resume home meds   - Dose all meds per ESRD    Thank you and we will follow closely!

## 2024-06-25 NOTE — PROGRESS NOTES
Monitor Summary  Rhythm: Atrial Fibrillation  Rate: 102-123  Ectopy: PVCs, Bigeminy , Couplets  .- / .12 / .36

## 2024-06-25 NOTE — OP REPORT
DATE OF SERVICE:  06/25/2024     SURGEON:  Doug Tinoco MD     ANESTHESIOLOGIST:  Rubio Cates DO     TYPE OF ANESTHESIA:  General anesthesia.     PREOPERATIVE DIAGNOSIS:  Infected left arm dialysis graft pseudoaneurysm at   arterial anastomosis.     POSTOPERATIVE DIAGNOSIS:  Blown-out, infected left arm dialysis graft pseudoaneurysm at   arterial anastomosis.     PROCEDURES:  1.  Resection of infected left upper arm dialysis graft pseudoaneurysm at the   arterial anastomosis.  2.  Left greater saphenous vein harvest.  3.  Reconstruction of left brachial artery using left greater saphenous vein   interposition graft.  4.  Partial removal of left upper arm dialysis graft.     INDICATIONS FOR PROCEDURE:  This is a 68-year-old male with multiple medical   problems, who was dialyzed via a left upper arm dialysis graft.  He developed   an infected pseudoaneurysm at the arterial anastomosis and at the same time   the graft became thrombosed.  He presented to [facility] and was admitted.  Discussion was made with the patient.  He would   like to undergo above procedures, fully understanding all risks.     DESCRIPTION OF PROCEDURE:  Informed consent was obtained.  The patient was   taken to the operating room and was placed in the supine position.  Sequential   compression devices were applied.  The patient was given Ancef intravenously.    General anesthesia was induced.     Next, patient's left arm as well as left thigh were sterilely prepped and   draped in the normal fashion.  A timeout procedure was done.  An incision was   made in the left groin, over the course of the saphenous vein.  The incision   was extended through subcutaneous tissue using the electrocautery.  The   greater saphenous vein was identified, carefully dissected free from the   surrounding tissues.  There were 2 branches, starting approximately 2 cm from   the saphenofemoral junction.  The medial branch was the larger one.     Patient was  given Heparin 4,000 units intravenously.  After the heparin was allowed to circulate systemically for 3 minutes, a sterile tourniquet was placed in the proximal left upper arm.  The arm   was elevated and the tourniquet was inflated to 200 mmHg.  An incision was made proximal to the pseudoaneurysm and extended over the pseudoaneurysm   distally.  The incision was extended through subcutaneous tissue using the   electrocautery.  The brachial artery was identified above the pseudoaneurysm   and carefully dissected free from the surrounding tissue.  The brachial artery   below the pseudoaneurysm was also identified and carefully dissected free   from the surrounding tissues.  The pseudoaneurysm area was then entered.    There was a large clot seen, which was evacuated.  The wound was also swabbed   and sent for culture and sensitivity.  The arterial anastomosis was found to   be completely disrupted.  The brachial artery at and around the arterial anastomosis was   completely destroyed by the infection with the length of approximately 5 cm.    The pseudoaneurysm and the hematoma was completely evacuated and resected.    The brachial vein was carefully dissected free from the surrounding tissues.    Vascular clamps were obtained over the brachial artery proximal and distal of   the pseudoaneurysm.  The tourniquet was then deflated.     Next, the medial segment of the greater saphenous vein was harvested with the   2 ends suture ligated with 3-0 Vicryl suture.  The greater saphenous vein   segment was brought into the operative field and prepared and was found to be   appropriate for use as interposition graft.  The greater saphenous vein was   anastomosed end to end to the proximal part of the brachial artery in appropriate flow  direction using running 6-0 Prolene suture.  After this was done, flushing was   obtained through a graft.  Excellent inflow was seen.  The graft was clamped   above the anastomosis.  Vascular  clamp on the distal brachial artery was   removed.  Good backbleeding was seen.  The greatest saphenous vein graft   was cut to appropriate length and anastomosed end-to-end to the distal   brachial artery using running 6-0 Prolene suture.  Prior to completing the   anastomosis, backbleeding and flushing were obtained.  The anastomosis was   completed.  Flow was restored distally.  A sterile Doppler probe was brought   into the operative field.  Excellent Doppler flow signals were obtained over the   brachial artery below the anastomosis.  Brisk Doppler flow signals were also   obtained over the distal radial and ulnar arteries.     Next, attention was then turned to removing the infected dialysis graft.  The   graft was found to be not incorporated in the surrounding tissues in the mid to distal upper arm but from there up it was found to be incorporated.    The unincorporated graft was carefully retracted down and resected up to the   incorporated part through 2 counter incisions.     Patient was given protamine 30 mg intravenously.  The wounds were irrigated and   were found to have good hemostasis.  The graft tunnel was also irrigated with   sterile saline solution as well.     Through a separate stab incision inferiorly, a 7 mm Roe-Polanco drain were   brought into the operative field, cut to appropriate length and secured to the   skin with 3-0 nylon suture.  The incision in the arm was closed with   interrupted 3-0 Nylon suture vertical mattresses.  The counterincisions on the graft tunnels were   closed with 3-0 Nylon suture vertical matresses as well.  A Penrose drain was placed in the graft removal tunnel through a   small incision.     The groin wound was irrigated, was found to have excellent hemostasis.  The   groin wound was closed subcutaneously with running 3-0 Vicryl suture and the skin   edges were reapproximated with a skin stapler.    The wounds were cleaned and sterile dressings were  applied.     ESTIMATED BLOOD LOSS:  200 mL     INTRAVENOUS FLUIDS:  200 mL     COUNTS:  Sponge and instrument count was correct x2.     Patient was then awakened, extubated, taken to recovery area in stable   condition with intact neurovascular examination of the left hand.        ______________________________  MD DERRICK Morse/GARRETT    DD:  06/25/2024 15:57  DT:  06/25/2024 16:23    Job#:  [id number]

## 2024-06-25 NOTE — PROGRESS NOTES
Hospital Medicine Daily Progress Note    Date of Service  6/25/2024    Chief Complaint  Brian Jasmine is a 68 y.o. male admitted 6/24/2024 with infected dialysis access    Hospital Course  Brian Jasmine is a 68 y.o. male who presented 6/24/2024 with left arm infection.  Mr. Jasmine has a past medical history of peripheral arterial disease with femoral popliteal bypass, end-stage renal disease undergoing dialysis via a left arm graft, atrial fibrillation on Coumadin therapy, hypertension, coronary artery disease with stent to the circumflex that developed pain, redness, swelling in the right arm graft site about 5 days ago and did not go to dialysis and stopped taking all of his medications because he felt so poorly.  He has had fevers and chills.  He presents with the symptoms and here has a grossly infected site.  His creatinine is 8.8 with a potassium 3.8.  Vascular surgery has been consulted as has nephrology.  Attempt will be made at a temporary dialysis catheter until blood cultures are cleared for a possible tunneled catheter.  He states he has had at least 6 or 7 prior neck catheters in the past.  IV vancomycin has been initiated    Interval Problem Update  6/25 patient is in bed, he is new to me today, he is able to give good information, patient had temporary dialysis catheter placed yesterday, nephrology notes reviewed, vascular surgery notes reviewed, patient on IV vancomycin, blood cultures positive for gram-positive cocci x 2, I have ordered echocardiogram, patient will likely require new dialysis catheter, will discuss with infectious disease, probably removed tomorrow after dialysis, discussed with bedside nurse charge nurse  pharmacist, all question have been answered.    I have discussed this patient's plan of care and discharge plan at IDT rounds today with Case Management, Nursing, Nursing leadership, and other members of the IDT team.    Consultants/Specialty  Nephrology  Vascular  surgery    Code Status  Full Code    Disposition  The patient is not medically cleared for discharge to home or a post-acute facility.      I have placed the appropriate orders for post-discharge needs.    Review of Systems  Review of Systems   Constitutional:  Negative for chills and fever.   Eyes:  Negative for blurred vision and double vision.   Respiratory:  Negative for cough, hemoptysis and wheezing.    Cardiovascular:  Negative for chest pain, palpitations, claudication, leg swelling and PND.   Gastrointestinal:  Negative for heartburn, nausea and vomiting.   Genitourinary:  Negative for hematuria and urgency.   Musculoskeletal:  Negative for back pain and myalgias.        Left arm swelling and redness at the graft area   Skin:  Negative for rash.   Neurological:  Negative for dizziness and headaches.   Endo/Heme/Allergies:  Does not bruise/bleed easily.   Psychiatric/Behavioral:  Negative for depression.         Physical Exam  Temp:  [36.1 °C (97 °F)-37.4 °C (99.3 °F)] 36.3 °C (97.3 °F)  Pulse:  [105-133] 121  Resp:  [11-19] 19  BP: ()/(50-87) 108/60  SpO2:  [94 %-98 %] 97 %    Physical Exam  Vitals and nursing note reviewed.   Constitutional:       Appearance: He is ill-appearing.   Eyes:      General: No scleral icterus.        Right eye: No discharge.         Left eye: No discharge.      Conjunctiva/sclera: Conjunctivae normal.   Cardiovascular:      Rate and Rhythm: Normal rate and regular rhythm.      Pulses: Normal pulses.      Heart sounds: Normal heart sounds.   Pulmonary:      Effort: Pulmonary effort is normal. No respiratory distress.      Breath sounds: Normal breath sounds.   Abdominal:      General: Bowel sounds are normal. There is no distension.      Tenderness: There is no abdominal tenderness.   Musculoskeletal:         General: Swelling and deformity present. Normal range of motion.      Cervical back: Normal range of motion and neck supple.   Skin:     General: Skin is warm and dry.    Neurological:      General: No focal deficit present.      Mental Status: He is alert.      Cranial Nerves: No cranial nerve deficit.   Psychiatric:         Mood and Affect: Mood normal.         Behavior: Behavior normal.         Fluids    Intake/Output Summary (Last 24 hours) at 6/25/2024 1605  Last data filed at 6/25/2024 1535  Gross per 24 hour   Intake 3900 ml   Output 2700 ml   Net 1200 ml       Laboratory  Recent Labs     06/24/24  1038 06/25/24  0053   WBC 13.8* 12.7*   RBC 3.27* 3.48*   HEMOGLOBIN 9.4* 10.2*   HEMATOCRIT 28.6* 30.9*   MCV 87.5 88.8   MCH 28.7 29.3   MCHC 32.9 33.0   RDW 46.5 47.4   PLATELETCT 174 171   MPV 11.0 11.3     Recent Labs     06/24/24  1038 06/25/24  0052   SODIUM 131* 134*   POTASSIUM 3.8 3.9   CHLORIDE 91* 97   CO2 22 23   GLUCOSE 122* 112*   BUN 67* 38*   CREATININE 8.86* 5.18*   CALCIUM 8.0* 8.5     Recent Labs     06/24/24  1038   APTT 32.7   INR 1.46*               Imaging  DX-CHEST-PORTABLE (1 VIEW)   Final Result      1.  Satisfactory appearance of the right IJ catheter. No pneumothorax visualized.      US-HEMODIALYSIS GRAFT DUPLEX COMP UPPER EXTREMITY   Final Result      US-HEMODIALYSIS GRAFT DUPLEX COMP UPPER EXTREMITY   Final Result      DX-CHEST-PORTABLE (1 VIEW)   Final Result      Mild enlargement of the cardiomediastinal silhouette without acute cardiopulmonary abnormality.      EC-ECHOCARDIOGRAM COMPLETE W/O CONT    (Results Pending)   US-EXTREMITY ARTERY LOWER BILAT W/SUSI (COMBO)    (Results Pending)        Assessment/Plan  * Infected prosthetic vascular graft, initial encounter ([facility])- (present on admission)  Assessment & Plan  Left arm graft is red, swollen, tender, grossly infected  Cultures have been drawn  IV vancomycin  Temporary dialysis access will be obtained  Vascular surgery has been consulted.     Patient with positive blood cultures continue IV vancomycin I have ordered echocardiogram  Will consult with infectious disease    Coronary artery disease-  (present on admission)  Assessment & Plan  Followed by cardiology in [place] with history of stent to the circumflex    PAD (peripheral artery disease) (HCC)- (present on admission)  Assessment & Plan  Hx of previous fem-pop bypass    Troponin level elevated- (present on admission)  Assessment & Plan  Troponin is elevated at 200  He denies chest pain  EKG reveals an intraventricular conduction delay not clearly left nor right bundle  Likely a stress response given his infection and lack of dialysis.    Chronic atrial fibrillation (HCC)- (present on admission)  Assessment & Plan  He had been on coumadin but hasn't been taking it    Continue monitoring    End stage renal disease (HCC)- (present on admission)  Assessment & Plan  He has not had dialysis since Wednesday is has not been feeling well  Creatinine is elevated at 8.8 unfortunately potassium is 3.8  Once temporary access has been obtained and dialysis will be done by nephrology    Note from nephrology reviewed    Renovascular hypertension- (present on admission)  Assessment & Plan  Restart his home Coreg with holding parameters         VTE prophylaxis: SCDs    I have performed a physical exam and reviewed and updated ROS and Plan today (6/25/2024). In review of yesterday's note (6/24/2024), there are no changes except as documented above.        I have reviewed CBC  Have reviewed BMP   I have reviewed blood cultures  I have reviewed nephrology notes  I have reviewed vascular surgery notes  Patient is on IV vancomycin monitoring for side effects, monitoring vancomycin levels.  I have ordered echocardiogram  I have ordered blood work for in a.m.    My total time spent caring for the patient on the day of the encounter was 51 minutes.   This does not include time spent on separately billable procedures/tests.

## 2024-06-25 NOTE — OR SURGEON
Immediate Post OP Note    PreOp Diagnosis: Infected left arm dialysis graft pseudoaneurysm at arterial anastomosis.      PostOp Diagnosis: Same      Procedure(s):  1) Resection of infected left arm dialysis graft pseudoaneurysm at arterial anastomosis   2) Reconstruction of the left brachial artery using left greater saphenous vein interposition graft.  3) Partial removal of left upper arm dialysis graft.    Wound Class: Dirty or Infected    Surgeon(s):  Doug Tinoco M.D.    Anesthesiologist/Type of Anesthesia:  Anesthesiologist: Rubio Cates D.O./General    Surgical Staff:  Circulator: Mckenna Johnson R.N.; Teena Gomez R.N.  Scrub Person: Kathy Alan    Specimens removed if any:  ID Type Source Tests Collected by Time Destination   1 : Left Forearm Infected Fistula Graft Tissue Arm AFB CULTURE, FUNGAL CULTURE, AEROBIC/ANAEROBIC CULTURE (SURGERY) Doug Tinoco M.D. 6/25/2024  2:00 PM        Estimated Blood Loss: 200 mL.    IV fluids: 200 mL.    Findings: Complete blowout at arterial anastomosis.  5 cm of brachial artery around and at the arterial anastomosis was destroyed by infection, reconstructed using left greater saphenous vein interposition graft.    Complications: None.    Dictated, #[id number].        6/25/2024 3:45 PM Doug Tinoco M.D.

## 2024-06-25 NOTE — PROGRESS NOTES
Pt is oriented x 4 without complaints.  NPO since midnight for AV fistula resection.  Call light within reach.

## 2024-06-25 NOTE — PROGRESS NOTES
NOC HOSPITALIST CROSS COVER    Notified by RN regarding elevated HRs into the 130s - 140s. EKG ordered and appears to show sinus tachycardia. Patient given IV fluid bolus and electrolytes optimized with IV magnesium and sodium phos. HR now WNL.      -----------------------------------------------------------------------------------------------------------    Electronically signed by:  WALT Rizo PA-C  Hospitalist Services

## 2024-06-26 ENCOUNTER — APPOINTMENT (OUTPATIENT)
Dept: RADIOLOGY | Facility: MEDICAL CENTER | Age: 69
DRG: 216 | End: 2024-06-26
Attending: HOSPITALIST
Payer: MEDICARE

## 2024-06-26 PROBLEM — R78.81 BACTEREMIA: Status: ACTIVE | Noted: 2024-06-26

## 2024-06-26 LAB
ANION GAP SERPL CALC-SCNC: 15 MMOL/L (ref 7–16)
BUN SERPL-MCNC: 56 MG/DL (ref 8–22)
CALCIUM SERPL-MCNC: 8.3 MG/DL (ref 8.5–10.5)
CHLORIDE SERPL-SCNC: 94 MMOL/L (ref 96–112)
CO2 SERPL-SCNC: 24 MMOL/L (ref 20–33)
CREAT SERPL-MCNC: 6.93 MG/DL (ref 0.5–1.4)
ERYTHROCYTE [DISTWIDTH] IN BLOOD BY AUTOMATED COUNT: 47.9 FL (ref 35.9–50)
GFR SERPLBLD CREATININE-BSD FMLA CKD-EPI: 8 ML/MIN/1.73 M 2
GLUCOSE SERPL-MCNC: 134 MG/DL (ref 65–99)
HCT VFR BLD AUTO: 29.9 % (ref 42–52)
HGB BLD-MCNC: 10 G/DL (ref 14–18)
MCH RBC QN AUTO: 29.9 PG (ref 27–33)
MCHC RBC AUTO-ENTMCNC: 33.4 G/DL (ref 32.3–36.5)
MCV RBC AUTO: 89.5 FL (ref 81.4–97.8)
PLATELET # BLD AUTO: 170 K/UL (ref 164–446)
PMV BLD AUTO: 10.9 FL (ref 9–12.9)
POTASSIUM SERPL-SCNC: 4.5 MMOL/L (ref 3.6–5.5)
RBC # BLD AUTO: 3.34 M/UL (ref 4.7–6.1)
SODIUM SERPL-SCNC: 133 MMOL/L (ref 135–145)
WBC # BLD AUTO: 14 K/UL (ref 4.8–10.8)

## 2024-06-26 PROCEDURE — 36415 COLL VENOUS BLD VENIPUNCTURE: CPT

## 2024-06-26 PROCEDURE — 99233 SBSQ HOSP IP/OBS HIGH 50: CPT | Performed by: HOSPITALIST

## 2024-06-26 PROCEDURE — A9270 NON-COVERED ITEM OR SERVICE: HCPCS | Performed by: INTERNAL MEDICINE

## 2024-06-26 PROCEDURE — 700102 HCHG RX REV CODE 250 W/ 637 OVERRIDE(OP): Performed by: INTERNAL MEDICINE

## 2024-06-26 PROCEDURE — 90935 HEMODIALYSIS ONE EVALUATION: CPT

## 2024-06-26 PROCEDURE — A9270 NON-COVERED ITEM OR SERVICE: HCPCS | Performed by: SURGERY

## 2024-06-26 PROCEDURE — 80048 BASIC METABOLIC PNL TOTAL CA: CPT

## 2024-06-26 PROCEDURE — 700102 HCHG RX REV CODE 250 W/ 637 OVERRIDE(OP): Performed by: SURGERY

## 2024-06-26 PROCEDURE — 770020 HCHG ROOM/CARE - TELE (206)

## 2024-06-26 PROCEDURE — 99223 1ST HOSP IP/OBS HIGH 75: CPT | Performed by: INTERNAL MEDICINE

## 2024-06-26 PROCEDURE — 85027 COMPLETE CBC AUTOMATED: CPT

## 2024-06-26 PROCEDURE — 700111 HCHG RX REV CODE 636 W/ 250 OVERRIDE (IP)

## 2024-06-26 RX ORDER — MIDODRINE HYDROCHLORIDE 5 MG/1
10 TABLET ORAL
Status: DISCONTINUED | OUTPATIENT
Start: 2024-06-26 | End: 2024-07-01

## 2024-06-26 RX ORDER — HEPARIN SODIUM 1000 [USP'U]/ML
INJECTION, SOLUTION INTRAVENOUS; SUBCUTANEOUS
Status: COMPLETED
Start: 2024-06-26 | End: 2024-06-26

## 2024-06-26 RX ADMIN — HYDROCODONE BITARTRATE AND ACETAMINOPHEN 1 TABLET: 5; 325 TABLET ORAL at 15:35

## 2024-06-26 RX ADMIN — HYDROCODONE BITARTRATE AND ACETAMINOPHEN 1 TABLET: 5; 325 TABLET ORAL at 09:08

## 2024-06-26 RX ADMIN — HYDROCODONE BITARTRATE AND ACETAMINOPHEN 1 TABLET: 5; 325 TABLET ORAL at 21:27

## 2024-06-26 RX ADMIN — HEPARIN SODIUM 1500 UNITS: 1000 INJECTION, SOLUTION INTRAVENOUS; SUBCUTANEOUS at 11:30

## 2024-06-26 RX ADMIN — HEPARIN SODIUM 500 UNITS: 1000 INJECTION, SOLUTION INTRAVENOUS; SUBCUTANEOUS at 11:25

## 2024-06-26 RX ADMIN — MIDODRINE HYDROCHLORIDE 10 MG: 5 TABLET ORAL at 11:45

## 2024-06-26 RX ADMIN — HEPARIN SODIUM 1200 UNITS: 1000 INJECTION, SOLUTION INTRAVENOUS; SUBCUTANEOUS at 14:35

## 2024-06-26 ASSESSMENT — ENCOUNTER SYMPTOMS
HEARTBURN: 0
FEVER: 0
HEADACHES: 0
BRUISES/BLEEDS EASILY: 0
HEMOPTYSIS: 0
VOMITING: 0
DOUBLE VISION: 0
COUGH: 0
BLURRED VISION: 0
WHEEZING: 0
DIZZINESS: 0
PND: 0
BACK PAIN: 0
PALPITATIONS: 0
DEPRESSION: 0
CHILLS: 0
CLAUDICATION: 0
MYALGIAS: 0
NAUSEA: 0

## 2024-06-26 ASSESSMENT — PAIN DESCRIPTION - PAIN TYPE
TYPE: SURGICAL PAIN
TYPE: SURGICAL PAIN
TYPE: ACUTE PAIN
TYPE: SURGICAL PAIN

## 2024-06-26 ASSESSMENT — FIBROSIS 4 INDEX: FIB4 SCORE: 2.22

## 2024-06-26 NOTE — CONSULTS
Consults  INFECTIOUS DISEASES INPATIENT CONSULT NOTE     Date of Service: 6/26/2024    Consult Requested By: Mello Yen M.D.    Reason for Consultation: Bacteremia     History of Present Illness:   Brian Jasmine is a 68 y.o.  admitted 6/24/2024. Pt has a past medical history of PAD with femoral-popliteal bypass, ESRD on dialysis via left arm graft, A-fib on Coumadin and CAD with stents, he presented complaining of fevers, pain, redness and swelling in his right arm graft site ongoing for approximately 5 days.     Hospital Course:   Patient was the OR with vascular surgery on 6/25 with resection of the infected left arm dialysis graft pseudoaneurysm,  left greater saphenous vein harvest, reconstruction of left brachial artery, partial removal of the left arm dialysis graft    Review Of Systems:  Review of Systems   Unable to perform ROS: Medical condition       PMH:   Past Medical History:   Diagnosis Date    Allergy     Seasonal    Aortic aneurysm (HCC)     CAD (coronary artery disease)     CHF (congestive heart failure) (HCC)     Chronic kidney disease (CKD), stage III (moderate)     COPD (chronic obstructive pulmonary disease) (HCC)     Hypertension     Impaired hearing     Indigestion     Renal failure     Sleep apnea        PSH:  Past Surgical History:   Procedure Laterality Date    GASTROSCOPY N/A 02/17/2017    Procedure: GASTROSCOPY;  Surgeon: Morales Hinton M.D.;  Location: SURGERY [facility];  Service:     EGD ESOPHAGUS WITH ENDOSCOPIC US N/A 02/17/2017    Procedure: EGD ESOPHAGUS WITH ENDOSCOPIC US UPPER WITH POSSIBLE FNA;  Surgeon: Morales Hinton M.D.;  Location: SURGERY [facility];  Service:     AV FISTULA CREATION Left        FAMILY HX:  Family History   Problem Relation Age of Onset    Heart Disease Mother     Cancer Father     Heart Disease Brother      Reviewed family history. No pertinent family history.     SOCIAL HX:  Social History     Socioeconomic History    Marital status:  Single     Spouse name: Not on file    Number of children: Not on file    Years of education: Not on file    Highest education level: Not on file   Occupational History    Not on file   Tobacco Use    Smoking status: Former     Current packs/day: 0.00     Average packs/day: 0.5 packs/day for 42.2 years (21.1 ttl pk-yrs)     Types: Cigarettes     Start date: 1977     Quit date: 3/4/2019     Years since quittin.3    Smokeless tobacco: Never   Vaping Use    Vaping status: Never Used   Substance and Sexual Activity    Alcohol use: No     Comment: Hx of alcohol use    Drug use: No    Sexual activity: Not Currently     Partners: Female   Other Topics Concern    Not on file   Social History Narrative    Not on file     Social Determinants of Health     Financial Resource Strain: Not on file   Food Insecurity: No Food Insecurity (2024)    Hunger Vital Sign     Worried About Running Out of Food in the Last Year: Never true     Ran Out of Food in the Last Year: Never true   Transportation Needs: No Transportation Needs (2024)    PRAPARE - Transportation     Lack of Transportation (Medical): No     Lack of Transportation (Non-Medical): No   Physical Activity: Not on file   Stress: Not on file   Social Connections: Not on file   Intimate Partner Violence: Not At Risk (2024)    Humiliation, Afraid, Rape, and Kick questionnaire     Fear of Current or Ex-Partner: No     Emotionally Abused: No     Physically Abused: No     Sexually Abused: No   Housing Stability: Low Risk  (2024)    Housing Stability Vital Sign     Unable to Pay for Housing in the Last Year: No     Number of Places Lived in the Last Year: 1     Unstable Housing in the Last Year: No     Social History     Tobacco Use   Smoking Status Former    Current packs/day: 0.00    Average packs/day: 0.5 packs/day for 42.2 years (21.1 ttl pk-yrs)    Types: Cigarettes    Start date: 1977    Quit date: 3/4/2019    Years since quittin.3    Smokeless Tobacco Never     Social History     Substance and Sexual Activity   Alcohol Use No    Comment: Hx of alcohol use       Allergies/Intolerances:  No Known Allergies    History reviewed with the patient and /or family member, chart & primary care team    Other Current Medications:    Current Facility-Administered Medications:     midodrine (Proamatine) tablet 10 mg, 10 mg, Oral, QDAY PRN, Oswald Pearce M.D., 10 mg at 06/26/24 1145    Pharmacy Consult Request ...Pain Management Review 1 Each, 1 Each, Other, PHARMACY TO DOSE, Doug Tinoco M.D.    ondansetron (Zofran) syringe/vial injection 4 mg, 4 mg, Intravenous, Q4HRS PRN, Doug Tinoco M.D.    labetalol (Normodyne/Trandate) injection 10 mg, 10 mg, Intravenous, Q4HRS PRN, Doug Tinoco M.D.    hydrALAZINE (Apresoline) injection 10 mg, 10 mg, Intravenous, Q4HRS PRN, Doug Tinoco M.D.    aspirin EC tablet 81 mg, 81 mg, Oral, DAILY, Doug Tinoco M.D., 81 mg at 06/25/24 2243    HYDROcodone-acetaminophen (Norco) 5-325 MG per tablet 1-2 Tablet, 1-2 Tablet, Oral, Q6HRS PRN, Doug Tinoco M.D., 1 Tablet at 06/26/24 0908    morphine 4 MG/ML injection 1-2 mg, 1-2 mg, Intravenous, Q2HRS PRN, Doug Tinoco M.D.    acetaminophen (Tylenol) tablet 650 mg, 650 mg, Oral, Q6HRS PRN, Morales Mayen M.D., 650 mg at 06/25/24 2044    MD Alert...Vancomycin per Pharmacy, , Other, PHARMACY TO DOSE, Morales Mayen M.D.    heparin injection 500 Units, 500 Units, Intravenous, DIALYSIS PRN, Oswald Pearce M.D., 500 Units at 06/26/24 1125    heparin injection 1,500 Units, 1,500 Units, Intravenous, DIALYSIS PRN, Oswald Pearce M.D., 1,500 Units at 06/26/24 1130    heparin intracatheter (for DIALYSIS USE ONLY) 1,200 Units, 1,200 Units, Intracatheter, DIALYSIS PRN, Oswald Pearce M.D., 1,200 Units at 06/24/24 2350    heparin intracatheter (for DIALYSIS USE ONLY) 1,200 Units, 1,200 Units, Intracatheter, DIALYSIS PRN, Oswald Pearce M.D., 1,200 Units at 06/24/24  "2350  [unfilled]    Most Recent Vital Signs:  /58   Pulse 89   Temp 36.3 °C (97.4 °F) (Temporal)   Resp 16   Ht 1.854 m (6' 1\")   Wt 88.6 kg (195 lb 5.2 oz)   SpO2 97%   BMI 25.77 kg/m²   Temp  Av.4 °C (97.6 °F)  Min: 35.9 °C (96.6 °F)  Max: 37.4 °C (99.3 °F)    Physical Exam:  Physical Exam  Constitutional:       Appearance: Normal appearance.   HENT:      Head: Normocephalic and atraumatic.      Right Ear: External ear normal.      Left Ear: External ear normal.      Nose: Nose normal.      Mouth/Throat:      Mouth: Mucous membranes are moist.      Pharynx: Oropharynx is clear.   Eyes:      Extraocular Movements: Extraocular movements intact.      Conjunctiva/sclera: Conjunctivae normal.      Pupils: Pupils are equal, round, and reactive to light.   Cardiovascular:      Rate and Rhythm: Normal rate.   Pulmonary:      Effort: Pulmonary effort is normal.   Abdominal:      General: Abdomen is flat.   Musculoskeletal:         General: Normal range of motion.      Comments: Left arm in bandaging, reviewed photos from yesterday and antecubital area with edema and erythema   Skin:     General: Skin is warm and dry.   Neurological:      Comments: Somnolent, undergoing dialysis   Psychiatric:         Mood and Affect: Mood normal.           Pertinent Lab Results:  Recent Labs     24  1038 24  0053 24  0344   WBC 13.8* 12.7* 14.0*      Recent Labs     24  1038 24  0053 24  0344   HEMOGLOBIN 9.4* 10.2* 10.0*   HEMATOCRIT 28.6* 30.9* 29.9*   MCV 87.5 88.8 89.5   MCH 28.7 29.3 29.9   PLATELETCT 174 171 170         Recent Labs     24  1038 24  0052 24  0344   SODIUM 131* 134* 133*   POTASSIUM 3.8 3.9 4.5   CHLORIDE 91* 97 94*   CO2    CREATININE 8.86* 5.18* 6.93*        Recent Labs     24  1038   ALBUMIN 2.9*        Pertinent Micro:  Results       Procedure Component Value Units Date/Time    Anaerobic Culture [[id number]] Collected: " 06/25/24 1400    Order Status: Completed Specimen: Wound Updated: 06/26/24 0857     Significant Indicator NEG     Source WND     Site L forearm fistula graft     Culture Result Culture in progress.    Narrative:      Surgery - swabs received    Fungal Culture [[id number]] Collected: 06/25/24 1400    Order Status: Completed Specimen: Wound Updated: 06/26/24 0857     Significant Indicator NEG     Source WND     Site L forearm fistula graft     Culture Result Culture in progress.     Fungal Smear Results No fungal elements seen.    Narrative:      Surgery - swabs received    CULTURE WOUND W/ GRAM STAIN [[id number]] Collected: 06/25/24 1400    Order Status: No result Specimen: Wound Updated: 06/26/24 0857     Significant Indicator NEG     Source WND     Site L forearm fistula graft     Culture Result -     Gram Stain Result Many WBCs.  No organisms seen.      Narrative:      Surgery - swabs received    GRAM STAIN [[id number]] Collected: 06/25/24 1400    Order Status: Completed Specimen: Wound Updated: 06/25/24 1934     Significant Indicator .     Source WND     Site L forearm fistula graft     Gram Stain Result Many WBCs.  No organisms seen.      Narrative:      Surgery - swabs received    Fungal Smear [[id number]] Collected: 06/25/24 1400    Order Status: Completed Specimen: Wound Updated: 06/25/24 1934     Significant Indicator NEG     Source WND     Site L forearm fistula graft     Fungal Smear Results No fungal elements seen.    Narrative:      Surgery - swabs received    BLOOD CULTURE [[id number]]  (Abnormal) Collected: 06/24/24 1231    Order Status: Completed Specimen: Blood from Peripheral Updated: 06/25/24 1735     Significant Indicator POS     Source BLD     Site PERIPHERAL     Culture Result Growth detected by Bactec instrument. 06/25/2024  00:21      Enterococcus faecalis  Identification performed by [employer]. Confirmatory testing  and susceptibilities to follow if indicated.      Narrative:      CALL  W[facility]  183  "tel. [phone],  CALLED  183 tel. [phone] 06/25/2024, 00:24, RB PERF. RESULTS CALLED  TO:[id number]  Right Hand    BLOOD CULTURE [[id number]]  (Abnormal) Collected: 06/24/24 1334    Order Status: Completed Specimen: Blood from Peripheral Updated: 06/25/24 0208     Significant Indicator POS     Source BLD     Site Peripheral     Culture Result Growth detected by Bactec instrument. 06/25/2024  02:07  Gram Stain: Gram positive cocci      Narrative:      CALL  [facility]  183 tel. [phone],  Right Hand    MRSA By PCR (Amp) [[id number]]     Order Status: Canceled Specimen: Respirate from Nares           No results found for: \"BLOODCULTU\", \"BLDCULT\", \"BCHOLD\"     Studies:  DX-CHEST-PORTABLE (1 VIEW)    Result Date: 6/24/2024 6/24/2024 2:53 PM HISTORY/REASON FOR EXAM:  Status post central line placement TECHNIQUE/EXAM DESCRIPTION AND NUMBER OF VIEWS: Single portable view of the chest. COMPARISON: 6/24/2024 FINDINGS: Right IJ catheter tip projects over the SVC in satisfactory position. The mediastinal and cardiac silhouette is unremarkable. The pulmonary vascularity is within normal limits. The lung parenchyma is clear. There is no significant pleural effusion. There is no visible pneumothorax. There are no acute bony abnormalities.     1.  Satisfactory appearance of the right IJ catheter. No pneumothorax visualized.    US-HEMODIALYSIS GRAFT DUPLEX COMP UPPER EXTREMITY    Result Date: 6/24/2024   Upper Extremity  Venous Duplex Report  Vascular Laboratory  CONCLUSIONS  1.  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  2.  No acute DVT seen elsewhere in the left upper extremity.  BROOKE ROSARIO  Exam Date:     06/24/2024 11:30  Room #:     Inpatient  Priority:     Stat  Ht (in):             Wt (lb):  Ordering Physician:        MARIO CLARK  Referring Physician:       [id number]  Sonographer:               Hipolito" Raj CARDONA,                             SANJANA  Study Type:                Complete Unilateral  Technical Quality:         Adequate  Age:    68    Gender:     M  MRN:    [id number]  :    1955      BSA:  Indications:     AV Fistula / Thrombosed  CPT Codes:       89217  ICD Codes:       996.73  History:         Left thrombosed AV Fistula. No prior studies.  Limitations:  PROCEDURES:  Left upper extremity venous duplex imaging.  The following venous structures were evaluated: internal jugular,  subclavian, axillary, brachial, cephalic, and basilic veins.  Serial compression, color, and spectral Doppler flow evaluations were  performed.  FINDINGS:  Left upper extremity.  All veins demonstrate complete color filling and compressibility with  normal venous flow dynamics including spontaneous flow and respiratory  phasicity.  No superficial or deep venous thrombosis.  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  Fistula is thrombosed and  completely occluded from its proximal segment running its entire length to  its termination at the axillary vein.  Proximal segment of fistula is aneurysmal measuring 2.2 x 1.6 cm.  Nani Morataya  (Electronically Signed)  Final Date:      2024                   12:41    US-HEMODIALYSIS GRAFT DUPLEX COMP UPPER EXTREMITY    Result Date: 2024   Upper Extremity  Venous Duplex Report  Vascular Laboratory  CONCLUSIONS  1.  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  2.  No acute DVT seen elsewhere in the left upper extremity.  BROOKE ROSARIO  Exam Date:     2024 11:30  Room #:     Inpatient  Priority:     Stat  Ht (in):             Wt (lb):  Ordering Physician:        MARIO CLARK  Referring Physician:       [id number] Hendricks  Sonographer:               Hipolito Anthony RVT,                             SANJANA  Study Type:                Complete Unilateral  Technical  Quality:         Adequate  Age:    68    Gender:     M  MRN:    [id number]  :    1955      BSA:  Indications:     AV Fistula / Thrombosed  CPT Codes:       75969  ICD Codes:       996.73  History:         Left thrombosed AV Fistula. No prior studies.  Limitations:  PROCEDURES:  Left upper extremity venous duplex imaging.  The following venous structures were evaluated: internal jugular,  subclavian, axillary, brachial, cephalic, and basilic veins.  Serial compression, color, and spectral Doppler flow evaluations were  performed.  FINDINGS:  Left upper extremity.  All veins demonstrate complete color filling and compressibility with  normal venous flow dynamics including spontaneous flow and respiratory  phasicity.  No superficial or deep venous thrombosis.  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  Fistula is thrombosed and  completely occluded from its proximal segment running its entire length to  its termination at the axillary vein.  Proximal segment of fistula is aneurysmal measuring 2.2 x 1.6 cm.  Nani Morataya  (Electronically Signed)  Final Date:      2024                   12:41    DX-CHEST-PORTABLE (1 VIEW)    Result Date: 2024 10:29 AM HISTORY/REASON FOR EXAM:  Chest Pain. TECHNIQUE/EXAM DESCRIPTION AND NUMBER OF VIEWS: Single portable view of the chest. COMPARISON: None FINDINGS: The soft tissues and bony structures are unremarkable. The heart and mediastinal structures are enlarged. Pulmonary vascularity is normal. The lung fields are clear. There is no effusion or pneumothorax.     Mild enlargement of the cardiomediastinal silhouette without acute cardiopulmonary abnormality.      ASSESSMENT/PLAN:     68 y.o.  admitted 2024. Pt has a past medical history of PAD with femoral-popliteal bypass, ESRD on dialysis via left arm graft, A-fib on Coumadin and CAD with stents, he presented complaining of fevers, pain, redness and swelling in his right arm graft site ongoing  for approximately 5 days.     Hospital Course:   Patient was the OR with vascular surgery on 6/25 with resection of the infected left arm dialysis graft pseudoaneurysm,  left greater saphenous vein harvest, reconstruction of left brachial artery, partial removal of the left arm dialysis graft    Problem List    Leukocytosis  Bacteremia  -Blood cultures on 6/24 +E faecalis   Left arm dialysis graft site infection, pseudoaneurysm at site of anastomosis  -Status post or with vascular surgery as described above, partial removal of the prior graft  ESRD on HD  PAD    Assessment:      -Notes were reviewed from primary team, radiology, ED, surgery, specialists, etc.   -Reviewed labs to date, microbiology for current admit and prior  -Imaging was independently reviewed and interpreted      Plan:    Recommendations for antibiotics:   --- Continue vancomycin for now with pharmacy to dose    Recommendations for further/ongoing work-up, monitoring of clinical status and drug toxicity:   --- Follow-up repeat blood culture, ordered for a.m.  --- Follow-up prior blood cultures for antibiotic susceptibility  --- Follow-up wound cultures  --- Monitor lab  --- Renally dose medication  --- Dialysis catheter was placed while potentially still bacteremic, will eventually need to be removed or exchanged    Recommendations for intervention:   --- Status post or procedure as described above, wound management per surgery      Dispo: Awaiting culture results and work-up as above.  Patient is at risk for infectious complications including death.     PICC: TBD      Plan of care discussed with NOMI Yen M.D.. Will continue to follow    Hiral Mercado M.D.

## 2024-06-26 NOTE — DIETARY
"Nutrition services: Day 2 of admit.  Brian Jasmine is a 68 y.o. male with admitting DX of infected prosthetic vascular graft.    Consult received for wt loss (2-13 lbs in 1 month), poor PO per admit screen. Attempted to speak with pt at bedside today, however pt was off the floor for dialysis.    Assessment:  Height: 185.4 cm (6' 1\")  Weight: 88.6 kg (195 lb 5.2 oz)  Body mass index is 25.77 kg/m²., BMI classification: overweight  Diet/Intake: Renal; no PO recorded in chart to assess    Evaluation:   Admitted with arm pain and rapid heart beat.  Hx of ESRD on dialysis.  PMH: aortic aneurysm, coranary artery disease, congestive heart failure, chronic kidney disease, COPD, hypertension, renal failure.  Wt hx per chart review: 205 lbs 6/24/24, 240 lbs 6/14/24, 240 lbs 2/27/24,  240 lbs 12/27/23, 252 lbs 9/16/20. Unsure of weight accuracy per chart review, as all pt's weights have been the same for encounters since February. Pt weighed 205 lbs two days ago.  Per chart note 6/25, UF removal of 2000 mL. Wt fluctuations may be related to fluid removal and dialysis.    Malnutrition Risk: Unable to determine at this time.    Recommendations/Plan:  Encourage intake of meals.  Document intake of all meals  as % taken in ADLs to provide interdisciplinary communication across all shifts.   Monitor weight.  Nutrition rep will continue to see patient for ongoing meal and snack preferences.     RD will follow per dept guidelines.    "

## 2024-06-26 NOTE — ASSESSMENT & PLAN NOTE
Probably related to infected graft, status post removal.  Continue IV vancomycin  Discussed with infectious disease  Follow-up repeat blood cultures in AM.  Blood cultures positive for Enterococcus  Follow-up repeat blood cultures  Continue IV vancomycin    Echo showed possible vegetation  I have discussed with cardiology for RENEE today, patient went for RENEE that confirmed vegetation  Repeat blood culture from 6/27 positive again, will repeat blood cultures in a.m.  Continue IV vancomycin as per infectious disease  Repeat blood cultures  Discussed with infectious disease transition to ampicillin ceftriaxone

## 2024-06-26 NOTE — PROGRESS NOTES
[facility] Progress Note     Hemodialysis treatment  x 3 hours as ordered per Dr. Pearce.  Treatment initiated at 1130 and ended at 1430.  Pt A/Ox4, Pt was hypotensive prior to start of tx BP 74/36, Pt asymptomatic, Dr Pearce notified, order to initiate HD with no UF noted. /105 pre-tx HD.     Labile BP throughout tx noted, BP trended down to SBP: 80s, midodrine 10mg p.o given by primary RN as ordered by Dr Pearce, with fair result noted, SBP sustained to 90s, Pt denies any discomfort during Tx.    BP improved post tx, Pt c/o LUE pain (surgical site), primary RN notified. See e-flow sheets for further details.     Net UF : 0  mL per Neph MD     Post tx CVC care: R IJ non tunneled HD ports cleansed per protocol, flushed with NS, locked with Heparin 1ml/1000 unit, clamped and capped. CVC dressing assessed, CDI. Aspirate Heparin prior to next CVC use.     Report given to primary RN.

## 2024-06-26 NOTE — PROGRESS NOTES
"[facility] -  PROGRESS  NOTE               Author: Oswald Pearce M.D. Date & Time: 6/26/2024  12:22 PM       HISTORY OF PRESENT ILLNESS:    68-year-old  male with biventricular history of ESRD on hemodialysis q. Monday Wednesday Friday at [facility] through left upper arm AV fistula that was placed about 2 to 3 years ago.  He presented to the ER with left arm pain and swelling for the last 5 days.  His last dialysis was on Wednesday and had no dialysis in the last 5 days.  He felt sick on Friday and missed his dialysis.  Ultrasound of the left upper arm AV fistula showed there is no flow in the AV fistula and it has thrombosed.  A temporary right IJ dialysis catheter was placed in the ER.  Nephrology has been consulted for management of dialysis.  Patient denies any fever or chills, no nausea or vomiting, no chest pain.  He has some shortness of breath but is able to talk in full sentences and is in no acute respiratory distress at this time. No melena, hematochezia, hematemesis.  No HA, visual changes, or abdominal pain.    NEPHROLOGY DAILY PROGRESS:   6/24: consult note  6/25:  Afebrile, HD last night without any complications, plans noted for OR today with Dr. Tinoco for vascualr surgery   6/26: Underwent AVG repair yesterday with Dr. Tinoco, has LAMBERT drain, BP noted low this AM on dialysis, says BP 's run low on dialysis - not on Midodrine, ordered Midodrine to be used on dialysis       REVIEW OF SYSTEMS:    10 point ROS was performed and is as per HPI or otherwise negative    PAST MEDICAL/SURGICAL/SOCIAL/FAMILY HISTORY:   Reviewed and remains unchanged from admission   HOME MEDICATIONS:   - Reviewed and documented in chart    LABORATORY STUDIES:   - Reviewed and documented in chart    ALLERGIES:  Patient has no known allergies.    VS:  /58   Pulse 89   Temp 36.3 °C (97.4 °F) (Temporal)   Resp 16   Ht 1.854 m (6' 1\")   Wt 88.6 kg (195 lb 5.2 oz)   SpO2 97%  "  BMI 25.77 kg/m²   Physical Exam  Constitutional:       General: He is not in acute distress.     Appearance: Normal appearance. He is normal weight. He is not toxic-appearing.   HENT:      Head: Normocephalic and atraumatic.      Right Ear: External ear normal.      Left Ear: External ear normal.      Nose: Nose normal.      Mouth/Throat:      Mouth: Mucous membranes are dry.   Eyes:      Extraocular Movements: Extraocular movements intact.      Pupils: Pupils are equal, round, and reactive to light.   Neck:      Comments: Rt IJ temp dialysis catheter  in place  Cardiovascular:      Pulses: Normal pulses.   Pulmonary:      Effort: Pulmonary effort is normal. No respiratory distress.      Breath sounds: No wheezing.   Abdominal:      Palpations: Abdomen is soft.   Musculoskeletal:         General: No swelling.      Cervical back: Neck supple.   Skin:     General: Skin is dry.      Comments: AVG site covered with surgical dressing, LAMBERT drain in place   Neurological:      Mental Status: He is alert.            FLUID BALANCE:  In: 3790 [P.O.:390; I.V.:3400]  Out: 200     LABS:  Recent Labs     06/24/24  1038 06/25/24  0052 06/26/24  0344   SODIUM 131* 134* 133*   POTASSIUM 3.8 3.9 4.5   CHLORIDE 91* 97 94*   CO2 22 23 24   GLUCOSE 122* 112* 134*   BUN 67* 38* 56*   CREATININE 8.86* 5.18* 6.93*   CALCIUM 8.0* 8.5 8.3*        IMAGING:  - All imaging reviewed from admission to present day    Ultrasound hemodialysis graft duplex 6/20/2024 FINDINGS:   Left upper extremity.    All veins demonstrate complete color filling and compressibility with    normal venous flow dynamics including spontaneous flow and respiratory    phasicity.    No superficial or deep venous thrombosis.     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  Fistula is thrombosed and    completely occluded from its proximal segment running its entire length to    its termination at the axillary vein.    Proximal segment of fistula is aneurysmal measuring 2.2  x 1.6 cm.     IMPRESSION:  # ESRD  -Q. MWF at [facility]  # Bacteremia    - Blood Cx from 6/24 + fro gram positive cocci   #Thrombosed left upper arm AVG; infected pseudoaneurysm    -Right IJ temporary HD catheter placed in ER 6/24/2024   - S/P AVG repair, resection and pseudoaneurysm resection 6/25/24 by Dr. Tinoco along with  Reconstruction of left brachial artery using left greater saphenous vein   interposition graft.  # HTN  # Anemia of CKD  # CKD-MBD  #Coronary artery disease  #Chronic A-fib      PLAN:  - HD today and cont q MWF   - Appreciate Dr. Tinoco (Vascular Surgery)   - AVG may not be available for HD use soon- will need to convert temp IJ catheter to a tunneled HD catheter before discharge.   - Continue IV Antibiotics, Follow blood C&S   - No dietary protein restrictions  - Resume home meds   - Dose all meds per ESRD    Thank you and we will follow closely!

## 2024-06-26 NOTE — PROGRESS NOTES
4 Eyes Skin Assessment Completed by DIALLO Calvillo and DIALLO Sanchez.    Head WDL  Ears WDL  Nose WDL  Mouth WDL  Neck Incision, right triple lumen dialysis cath  Breast/Chest WDL  Shoulder Blades WDL  Spine WDL  (R) Arm/Elbow/Hand WDL  (L) Arm/Elbow/Hand AV fistula covered with gauze and tape, LAMBERT in place  Abdomen WDL  Groin WDL  Scrotum/Coccyx/Buttocks Redness and Blanching  (R) Leg WDL, healed harvest site   (L) Leg WDL  (R) Heel/Foot/Toe Redness, Blanching, Discoloration, Ulcer(s), and Scab  (L) Heel/Foot/Toe Redness, Blanching, Discoloration, Ulcer(s), and Scab, black toe          Devices In Places Tele Box and Central Line      Interventions In Place Heel Mepilex, Sacral Mepilex, and Barrier Cream    Possible Skin Injury Yes    Pictures Uploaded Into Epic Yes  Wound Consult Placed Yes  RN Wound Prevention Protocol Ordered Yes  Cu

## 2024-06-26 NOTE — DISCHARGE PLANNING
Outpatient Dialysis Note     Confirmed patient is active at:     [facility]  [address], [place] [address]     Schedule: Mon, Wed, Fri   Time: 6:00 AM     Patient is followed by Dr. Frederick.     Spoke with Flor at facility who confirmed patient information.   Forwarded records for review.     Xitlaly Reyes   Dialysis Coordinator / [facility]  Ph: [phone]

## 2024-06-26 NOTE — PROGRESS NOTES
Bedside report received from off going RN/tech: Leonel, assumed care of patient.     Fall Risk Score: MODERATE RISK  Fall risk interventions in place: Place yellow fall risk ID band on patient, Provide patient/family education based on risk assessment, Educate patient/family to call staff for assistance when getting out of bed, Place fall precaution signage outside patient door, Place patient in room close to nursing station, Utilize bed/chair fall alarm, and Bed alarm connected correctly  Bed type: Regular (Gary Score less than 17 interventions in place)  Patient on cardiac monitor: Yes  IVF/IV medications: Not Applicable   Oxygen: How many liters 2.5L, Traced the line to wall oxygen, and No oxygen tank in room  Bedside sitter: Not Applicable   Isolation: Not applicable

## 2024-06-26 NOTE — PROGRESS NOTES
Pharmacy Vancomycin Kinetics Note for 6/25/2024     68 y.o. male on Vancomycin day # 2     Vancomycin Indication (Trough based Dosing): Sepsis (goal trough 15-20)    Provider specified end date: 06/29/24    Active Antibiotics (From admission, onward)      Ordered     Ordering Provider       Tue Jun 25, 2024  5:27 PM    06/25/24 1727  vancomycin (Vancocin) 1,000 mg in  mL IVPB  (vancomycin (VANCOCIN) IV (LD + Maintenance))  ONCE         Mello Yen M.D.       Mon Jun 24, 2024  2:05 PM    06/24/24 1405  MD Alert...Vancomycin per Pharmacy  PHARMACY TO DOSE        Question:  Indication(s) for vancomycin?  Answer:  Skin and soft tissue infection    Morales Mayen M.D.            Dosing Weight: 88 kg (194 lb 0.1 oz)      Admission History: Admitted on 6/24/2024 for Infected prosthetic vascular graft, initial encounter ([facility]) [T82.7XXA]  Pertinent history: Pt presenting with infected HD catheter site, multiple SIRS criteria including leukocytosis and tachycardia. Patient has ESRD and is HD dependent    Allergies:     Patient has no known allergies.     Pertinent cultures to date:     Results       Procedure Component Value Units Date/Time    CULTURE WOUND W/ GRAM STAIN [[id number]] Collected: 06/25/24 1400    Order Status: No result Specimen: Wound Updated: 06/25/24 1622     Significant Indicator NEG     Source WND     Site L forearm fistula graft     Culture Result -     Gram Stain Result -    Narrative:      Surgery - swabs received    Anaerobic Culture [[id number]] Collected: 06/25/24 1400    Order Status: No result Specimen: Wound Updated: 06/25/24 1622     Significant Indicator NEG     Source WND     Site L forearm fistula graft     Culture Result -    Narrative:      Surgery - swabs received    Fungal Culture [[id number]] Collected: 06/25/24 1400    Order Status: No result Specimen: Wound Updated: 06/25/24 1622     Significant Indicator NEG     Source WND     Site L forearm fistula graft     Culture  "Result -     Fungal Smear Results -    Narrative:      Surgery - swabs received    BLOOD CULTURE [[id number]]  (Abnormal) Collected: 24 1334    Order Status: Completed Specimen: Blood from Peripheral Updated: 24 0208     Significant Indicator POS     Source BLD     Site Peripheral     Culture Result Growth detected by Bactec instrument. 2024  02:07  Gram Stain: Gram positive cocci      Narrative:      CALL  Woo  183 tel. [phone],  Right Hand    BLOOD CULTURE [[id number]]  (Abnormal) Collected: 24 1231    Order Status: Completed Specimen: Blood from Peripheral Updated: 24 0024     Significant Indicator POS     Source BLD     Site PERIPHERAL     Culture Result Growth detected by Bactec instrument. 2024  00:21  Gram Stain: Gram positive cocci      Narrative:      CALL  Woo  183 tel. [phone],  CALLED  183 tel. [phone] 2024, 00:24, RB PERF. RESULTS CALLED  TO:[id number]  Right Hand    MRSA By PCR (Amp) [[id number]]     Order Status: Sent Specimen: Respirate from Nares             Labs:     Estimated Creatinine Clearance: 15.9 mL/min (A) (by C-G formula based on SCr of 5.18 mg/dL ()).  Recent Labs     24  1038 24  0053   WBC 13.8* 12.7*   NEUTSPOLYS 86.50* 79.20*     Recent Labs     24  1038 24  0052   BUN 67* 38*   CREATININE 8.86* 5.18*   ALBUMIN 2.9*  --        Intake/Output Summary (Last 24 hours) at 2024 1727  Last data filed at 2024 1630  Gross per 24 hour   Intake 4140 ml   Output 2700 ml   Net 1440 ml      /61   Pulse (!) 129   Temp 36.4 °C (97.5 °F) (Temporal)   Resp 16   Ht 1.854 m (6' 1\")   Wt 88.5 kg (195 lb 1.7 oz)   SpO2 99%  Temp (24hrs), Av.6 °C (97.8 °F), Min:36.1 °C (97 °F), Max:36.9 °C (98.4 °F)      List concerns for Vancomycin clearance:     ESRD;Hypermetabolic State (SIRS);CHF;Malnutrition/Low albumin    Pharmacokinetics:     Trough kinetics:   Recent Labs     24  0756   EDUIN 19.6 "       A/P:     Vancomycin loading dose (2250 mg x1) administered 6/24 @1400. Patient was also dialyzed overnight after the initial load. Vancomycin random level obtained this morning ~18 hours post dose and resulted at 19.6 (goal 15-20 given positive blood cultures).  Plan to give a 10 mg/kg (1000 mg) dose tonight. Next HD session is planned for tomorrow. Will order repeat level for 6/27 w/ AM labs after next HD session.   Comments:   Right IJ temporary HD catheter placed in the ER due to infected L arm dialysis graft pseudoaneurysm at arterial anastomosis. Plan for OR today for open repair with vascular surgery.   Blood cultures are positive for GPCs. Follow closely for culture and susceptibilities. Recommend repeating blood cultures q48 hours until they are negative & consulting ID for further recommendations.     Tara Romero, PharmD  [id number]

## 2024-06-26 NOTE — PROGRESS NOTES
"  VASCULAR SURGERY PROGRESS NOTE      Awake, no complaints.  Some numbness in left fingers \"my fingers are always numb but it is better now\".  AF, VSS.    General: Pleasant male in nonapparent distress.  Left upper extremity: Dressing intact with no bleeding.  LAMBERT with small amount of sanguinous output.  Palpable left distal radial pulses with multiphasic flow.  Biphasic flow is also obtained over the left distal ulnar artery.  Compartments are soft.  Left lower extremity: Groin incision intact without surrounding erythema or drainage.    Labs: Reviewed.    Assessment: Status post resection of left arm mycotic pseudoaneurysm and reconstruction of left brachial artery using left greater saphenous vein.    Plan:  Stable.  Keep arm straight and elevated.  Antibiotic as per ID.    Okay to resume Eliquis.    Will take down dressing tomorrow to examine wound.    Discussed with patient.  All questions were answered.    Discussed with RN.  "

## 2024-06-26 NOTE — PROGRESS NOTES
Hospital Medicine Daily Progress Note    Date of Service  6/26/2024    Chief Complaint  Brian Jasmine is a 68 y.o. male admitted 6/24/2024 with infected dialysis access    Hospital Course  Brian Jasmine is a 68 y.o. male who presented 6/24/2024 with left arm infection.  Mr. Jasmine has a past medical history of peripheral arterial disease with femoral popliteal bypass, end-stage renal disease undergoing dialysis via a left arm graft, atrial fibrillation on Coumadin therapy, hypertension, coronary artery disease with stent to the circumflex that developed pain, redness, swelling in the right arm graft site about 5 days ago and did not go to dialysis and stopped taking all of his medications because he felt so poorly.  He has had fevers and chills.  He presents with the symptoms and here has a grossly infected site.  His creatinine is 8.8 with a potassium 3.8.  Vascular surgery has been consulted as has nephrology.  Attempt will be made at a temporary dialysis catheter until blood cultures are cleared for a possible tunneled catheter.  He states he has had at least 6 or 7 prior neck catheters in the past.  IV vancomycin has been initiated    Interval Problem Update  6/25 patient is in bed, he is new to me today, he is able to give good information, patient had temporary dialysis catheter placed yesterday, nephrology notes reviewed, vascular surgery notes reviewed, patient on IV vancomycin, blood cultures positive for gram-positive cocci x 2, I have ordered echocardiogram, patient will likely require new dialysis catheter, will discuss with infectious disease, probably removed tomorrow after dialysis, discussed with bedside nurse charge nurse  pharmacist, all question have been answered.  6/26 patient is resting in bed, no nausea no vomiting, drain is in place, he is able to give good information, continue IV vancomycin, discussed with infectious disease, note from vascular surgery and nephrology  reviewed, repeat blood cultures in a.m., follow-up echocardiogram, discussed with bedside nurse charge nurse  pharmacist.    I have discussed this patient's plan of care and discharge plan at IDT rounds today with Case Management, Nursing, Nursing leadership, and other members of the IDT team.    Consultants/Specialty  Nephrology  Vascular surgery  ID    Code Status  Full Code    Disposition  The patient is not medically cleared for discharge to home or a post-acute facility.      I have placed the appropriate orders for post-discharge needs.    Review of Systems  Review of Systems   Constitutional:  Negative for chills and fever.   Eyes:  Negative for blurred vision and double vision.   Respiratory:  Negative for cough, hemoptysis and wheezing.    Cardiovascular:  Negative for chest pain, palpitations, claudication, leg swelling and PND.   Gastrointestinal:  Negative for heartburn, nausea and vomiting.   Genitourinary:  Negative for hematuria and urgency.   Musculoskeletal:  Negative for back pain and myalgias.        Left arm swelling and redness at the graft area   Skin:  Negative for rash.   Neurological:  Negative for dizziness and headaches.   Endo/Heme/Allergies:  Does not bruise/bleed easily.   Psychiatric/Behavioral:  Negative for depression.         Physical Exam  Temp:  [35.9 °C (96.6 °F)-36.7 °C (98.1 °F)] 36.3 °C (97.4 °F)  Pulse:  [] 89  Resp:  [14-20] 16  BP: ()/(49-68) 111/58  SpO2:  [96 %-99 %] 97 %    Physical Exam  Vitals and nursing note reviewed.   Constitutional:       Appearance: He is ill-appearing.   Eyes:      General: No scleral icterus.        Right eye: No discharge.         Left eye: No discharge.      Conjunctiva/sclera: Conjunctivae normal.   Cardiovascular:      Rate and Rhythm: Normal rate and regular rhythm.      Pulses: Normal pulses.      Heart sounds: Normal heart sounds.   Pulmonary:      Effort: Pulmonary effort is normal. No respiratory distress.       Breath sounds: Normal breath sounds.   Abdominal:      General: Bowel sounds are normal. There is no distension.      Tenderness: There is no abdominal tenderness.   Musculoskeletal:         General: Swelling and deformity present. Normal range of motion.      Cervical back: Normal range of motion and neck supple.   Skin:     General: Skin is warm and dry.   Neurological:      General: No focal deficit present.      Mental Status: He is alert.      Cranial Nerves: No cranial nerve deficit.   Psychiatric:         Mood and Affect: Mood normal.         Behavior: Behavior normal.         Fluids    Intake/Output Summary (Last 24 hours) at 6/26/2024 1441  Last data filed at 6/26/2024 0900  Gross per 24 hour   Intake 3790 ml   Output 240 ml   Net 3550 ml       Laboratory  Recent Labs     06/24/24  1038 06/25/24  0053 06/26/24  0344   WBC 13.8* 12.7* 14.0*   RBC 3.27* 3.48* 3.34*   HEMOGLOBIN 9.4* 10.2* 10.0*   HEMATOCRIT 28.6* 30.9* 29.9*   MCV 87.5 88.8 89.5   MCH 28.7 29.3 29.9   MCHC 32.9 33.0 33.4   RDW 46.5 47.4 47.9   PLATELETCT 174 171 170   MPV 11.0 11.3 10.9     Recent Labs     06/24/24  1038 06/25/24  0052 06/26/24  0344   SODIUM 131* 134* 133*   POTASSIUM 3.8 3.9 4.5   CHLORIDE 91* 97 94*   CO2 22 23 24   GLUCOSE 122* 112* 134*   BUN 67* 38* 56*   CREATININE 8.86* 5.18* 6.93*   CALCIUM 8.0* 8.5 8.3*     Recent Labs     06/24/24  1038   APTT 32.7   INR 1.46*               Imaging  DX-CHEST-PORTABLE (1 VIEW)   Final Result      1.  Satisfactory appearance of the right IJ catheter. No pneumothorax visualized.      US-HEMODIALYSIS GRAFT DUPLEX COMP UPPER EXTREMITY   Final Result      US-HEMODIALYSIS GRAFT DUPLEX COMP UPPER EXTREMITY   Final Result      DX-CHEST-PORTABLE (1 VIEW)   Final Result      Mild enlargement of the cardiomediastinal silhouette without acute cardiopulmonary abnormality.      EC-ECHOCARDIOGRAM COMPLETE W/O CONT    (Results Pending)   US-SUSI SINGLE LEVEL BILAT    (Results Pending)         Assessment/Plan  * Infected prosthetic vascular graft, initial encounter (HCC)- (present on admission)  Assessment & Plan  Left arm graft is red, swollen, tender, grossly infected  Cultures have been drawn  IV vancomycin  Temporary dialysis access will be obtained  Vascular surgery has been consulted.     Patient with positive blood cultures continue IV vancomycin I have ordered echocardiogram  Will consult with infectious disease    Discussed with infectious disease Dr. Mercado, continue IV vancomycin, monitoring for side effects, monitoring vancomycin levels  Follow-up echocardiogram    Bacteremia- (present on admission)  Assessment & Plan  Probably related to infected graft, status post removal.  Continue IV vancomycin  Discussed with infectious disease  Follow-up repeat blood cultures in AM.    Coronary artery disease- (present on admission)  Assessment & Plan  Followed by cardiology in [place] with history of stent to the circumflex  No chest pain    PAD (peripheral artery disease) ([facility])- (present on admission)  Assessment & Plan  Hx of previous fem-pop bypass    Troponin level elevated- (present on admission)  Assessment & Plan  Troponin is elevated at 200  He denies chest pain  EKG reveals an intraventricular conduction delay not clearly left nor right bundle  Likely a stress response given his infection and lack of dialysis.    Follow-up echo    Chronic atrial fibrillation ([facility])- (present on admission)  Assessment & Plan  He had been on coumadin but hasn't been taking it    Continue monitoring  Continue monitoring    End stage renal disease (HCC)- (present on admission)  Assessment & Plan  He has not had dialysis since Wednesday is has not been feeling well  Creatinine is elevated at 8.8 unfortunately potassium is 3.8  Once temporary access has been obtained and dialysis will be done by nephrology      Note from nephrology reviewed    Renovascular hypertension- (present on admission)  Assessment &  Plan  Restart his home Coreg with holding parameters         VTE prophylaxis: SCDs    I have performed a physical exam and reviewed and updated ROS and Plan today (6/26/2024). In review of yesterday's note (6/25/2024), there are no changes except as documented above.      I have reviewed CBC  I have reviewed BMP  I am monitoring blood cultures result  Note from nephrology reviewed  Note from vascular surgery reviewed  Discussed with infectious disease  Patient is on IV vancomycin, monitoring for side effects include but not limited to thrombophlebitis, extravasation necrosis, vasculitis, ototoxicity, neutropenia, hemorrhagic occlusive retinal vasculitis, monitoring vancomycin levels,  I have ordered blood work for in a.m.  Discussed with pharmacist

## 2024-06-27 ENCOUNTER — APPOINTMENT (OUTPATIENT)
Dept: CARDIOLOGY | Facility: MEDICAL CENTER | Age: 69
DRG: 216 | End: 2024-06-27
Attending: HOSPITALIST
Payer: MEDICARE

## 2024-06-27 ENCOUNTER — APPOINTMENT (OUTPATIENT)
Dept: RADIOLOGY | Facility: MEDICAL CENTER | Age: 69
DRG: 216 | End: 2024-06-27
Attending: HOSPITALIST
Payer: MEDICARE

## 2024-06-27 LAB
ANION GAP SERPL CALC-SCNC: 11 MMOL/L (ref 7–16)
BACTERIA BLD CULT: ABNORMAL
BUN SERPL-MCNC: 36 MG/DL (ref 8–22)
CALCIUM SERPL-MCNC: 7.6 MG/DL (ref 8.5–10.5)
CHLORIDE SERPL-SCNC: 97 MMOL/L (ref 96–112)
CO2 SERPL-SCNC: 26 MMOL/L (ref 20–33)
CREAT SERPL-MCNC: 4.85 MG/DL (ref 0.5–1.4)
ERYTHROCYTE [DISTWIDTH] IN BLOOD BY AUTOMATED COUNT: 48.8 FL (ref 35.9–50)
GFR SERPLBLD CREATININE-BSD FMLA CKD-EPI: 12 ML/MIN/1.73 M 2
GLUCOSE SERPL-MCNC: 104 MG/DL (ref 65–99)
HCT VFR BLD AUTO: 24.3 % (ref 42–52)
HCT VFR BLD AUTO: 26.8 % (ref 42–52)
HCT VFR BLD AUTO: 27.2 % (ref 42–52)
HGB BLD-MCNC: 7.9 G/DL (ref 14–18)
HGB BLD-MCNC: 8.7 G/DL (ref 14–18)
HGB BLD-MCNC: 8.8 G/DL (ref 14–18)
LV EJECT FRACT MOD 2C 99903: 57.86
LV EJECT FRACT MOD 4C 99902: 46.78
LV EJECT FRACT MOD BP 99901: 53.46
MCH RBC QN AUTO: 29.8 PG (ref 27–33)
MCHC RBC AUTO-ENTMCNC: 32.5 G/DL (ref 32.3–36.5)
MCV RBC AUTO: 91.7 FL (ref 81.4–97.8)
PLATELET # BLD AUTO: 199 K/UL (ref 164–446)
PMV BLD AUTO: 10.5 FL (ref 9–12.9)
POTASSIUM SERPL-SCNC: 3.7 MMOL/L (ref 3.6–5.5)
RBC # BLD AUTO: 2.65 M/UL (ref 4.7–6.1)
SIGNIFICANT IND 70042: ABNORMAL
SIGNIFICANT IND 70042: ABNORMAL
SITE SITE: ABNORMAL
SITE SITE: ABNORMAL
SODIUM SERPL-SCNC: 134 MMOL/L (ref 135–145)
SOURCE SOURCE: ABNORMAL
SOURCE SOURCE: ABNORMAL
VANCOMYCIN SERPL-MCNC: 15.4 UG/ML
WBC # BLD AUTO: 11.5 K/UL (ref 4.8–10.8)

## 2024-06-27 PROCEDURE — 85014 HEMATOCRIT: CPT | Mod: 91

## 2024-06-27 PROCEDURE — 700105 HCHG RX REV CODE 258: Performed by: HOSPITALIST

## 2024-06-27 PROCEDURE — 99233 SBSQ HOSP IP/OBS HIGH 50: CPT | Performed by: HOSPITALIST

## 2024-06-27 PROCEDURE — 93925 LOWER EXTREMITY STUDY: CPT

## 2024-06-27 PROCEDURE — 99233 SBSQ HOSP IP/OBS HIGH 50: CPT | Performed by: INTERNAL MEDICINE

## 2024-06-27 PROCEDURE — 700102 HCHG RX REV CODE 250 W/ 637 OVERRIDE(OP): Performed by: SURGERY

## 2024-06-27 PROCEDURE — A9270 NON-COVERED ITEM OR SERVICE: HCPCS | Performed by: SURGERY

## 2024-06-27 PROCEDURE — 700102 HCHG RX REV CODE 250 W/ 637 OVERRIDE(OP): Performed by: HOSPITALIST

## 2024-06-27 PROCEDURE — 93922 UPR/L XTREMITY ART 2 LEVELS: CPT

## 2024-06-27 PROCEDURE — 80048 BASIC METABOLIC PNL TOTAL CA: CPT

## 2024-06-27 PROCEDURE — 770020 HCHG ROOM/CARE - TELE (206)

## 2024-06-27 PROCEDURE — 93306 TTE W/DOPPLER COMPLETE: CPT | Mod: 26 | Performed by: STUDENT IN AN ORGANIZED HEALTH CARE EDUCATION/TRAINING PROGRAM

## 2024-06-27 PROCEDURE — 85018 HEMOGLOBIN: CPT

## 2024-06-27 PROCEDURE — 36415 COLL VENOUS BLD VENIPUNCTURE: CPT

## 2024-06-27 PROCEDURE — 87040 BLOOD CULTURE FOR BACTERIA: CPT

## 2024-06-27 PROCEDURE — 700111 HCHG RX REV CODE 636 W/ 250 OVERRIDE (IP): Performed by: HOSPITALIST

## 2024-06-27 PROCEDURE — 700105 HCHG RX REV CODE 258: Performed by: INTERNAL MEDICINE

## 2024-06-27 PROCEDURE — 93306 TTE W/DOPPLER COMPLETE: CPT

## 2024-06-27 PROCEDURE — 80202 ASSAY OF VANCOMYCIN: CPT

## 2024-06-27 PROCEDURE — A9270 NON-COVERED ITEM OR SERVICE: HCPCS | Performed by: HOSPITALIST

## 2024-06-27 PROCEDURE — 85027 COMPLETE CBC AUTOMATED: CPT

## 2024-06-27 PROCEDURE — 700111 HCHG RX REV CODE 636 W/ 250 OVERRIDE (IP): Mod: JZ | Performed by: INTERNAL MEDICINE

## 2024-06-27 PROCEDURE — 87077 CULTURE AEROBIC IDENTIFY: CPT

## 2024-06-27 RX ADMIN — HYDROCODONE BITARTRATE AND ACETAMINOPHEN 1 TABLET: 5; 325 TABLET ORAL at 11:35

## 2024-06-27 RX ADMIN — ASPIRIN 81 MG: 81 TABLET, COATED ORAL at 04:25

## 2024-06-27 RX ADMIN — APIXABAN 2.5 MG: 2.5 TABLET, FILM COATED ORAL at 17:12

## 2024-06-27 RX ADMIN — VANCOMYCIN HYDROCHLORIDE 1250 MG: 5 INJECTION, POWDER, LYOPHILIZED, FOR SOLUTION INTRAVENOUS at 09:28

## 2024-06-27 RX ADMIN — HYDROCODONE BITARTRATE AND ACETAMINOPHEN 1 TABLET: 5; 325 TABLET ORAL at 04:25

## 2024-06-27 RX ADMIN — HYDROCODONE BITARTRATE AND ACETAMINOPHEN 1 TABLET: 5; 325 TABLET ORAL at 17:12

## 2024-06-27 RX ADMIN — AMPICILLIN AND SULBACTAM 3 G: 1; 2 INJECTION, POWDER, FOR SOLUTION INTRAMUSCULAR; INTRAVENOUS at 17:21

## 2024-06-27 ASSESSMENT — ENCOUNTER SYMPTOMS
BACK PAIN: 0
VOMITING: 0
CLAUDICATION: 0
PALPITATIONS: 0
DEPRESSION: 0
WEAKNESS: 1
COUGH: 0
HEARTBURN: 0
CONSTIPATION: 0
NERVOUS/ANXIOUS: 0
MYALGIAS: 0
BLURRED VISION: 0
NAUSEA: 0
DIZZINESS: 0
MYALGIAS: 1
CHILLS: 0
BRUISES/BLEEDS EASILY: 0
WHEEZING: 0
DOUBLE VISION: 0
HEADACHES: 0
HEMOPTYSIS: 0
ABDOMINAL PAIN: 0
SHORTNESS OF BREATH: 0
PND: 0
DIARRHEA: 0
FEVER: 0

## 2024-06-27 ASSESSMENT — PAIN DESCRIPTION - PAIN TYPE
TYPE: ACUTE PAIN
TYPE: ACUTE PAIN;SURGICAL PAIN
TYPE: ACUTE PAIN
TYPE: ACUTE PAIN;SURGICAL PAIN

## 2024-06-27 ASSESSMENT — CHA2DS2 SCORE
AGE 75 OR GREATER: NO
SEX: MALE
HYPERTENSION: NO
AGE 65 TO 74: NO
CHA2DS2 VASC SCORE: 1
PRIOR STROKE OR TIA OR THROMBOEMBOLISM: NO
DIABETES: NO
VASCULAR DISEASE: YES
CHF OR LEFT VENTRICULAR DYSFUNCTION: NO

## 2024-06-27 ASSESSMENT — COGNITIVE AND FUNCTIONAL STATUS - GENERAL
SUGGESTED CMS G CODE MODIFIER MOBILITY: CJ
WALKING IN HOSPITAL ROOM: A LITTLE
DRESSING REGULAR LOWER BODY CLOTHING: A LITTLE
STANDING UP FROM CHAIR USING ARMS: A LITTLE
TOILETING: A LITTLE
MOBILITY SCORE: 21
CLIMB 3 TO 5 STEPS WITH RAILING: A LITTLE
DAILY ACTIVITIY SCORE: 22
SUGGESTED CMS G CODE MODIFIER DAILY ACTIVITY: CJ

## 2024-06-27 ASSESSMENT — FIBROSIS 4 INDEX: FIB4 SCORE: 2.23

## 2024-06-27 NOTE — PROGRESS NOTES
VASCULAR SURGERY PROGRESS NOTE      Awake, no complaints.    AF, VSS.     General: Pleasant male in nonapparent distress.  Left upper extremity: Incisions intact with no bleeding.  Penrose in place through a small counter incision.  LAMBERT with small amount of sanguinous output.  Palpable left distal radial pulses with multiphasic flow.  Biphasic flow is also obtained over the left distal ulnar artery.  Compartments are soft.  Left lower extremity: Groin incision intact without surrounding erythema or drainage.     Labs: Reviewed.     Assessment: Status post resection of left arm mycotic pseudoaneurysm and reconstruction of left brachial artery using left greater saphenous vein.     Plan:  Stable.  Keep arm straight and elevated.  Antibiotic as per ID.     Okay to resume Eliquis for A. Fib.    I removed LAMBERT as well as Penrose drain and changed dressings.  Start dressing changes to left upper arm with dry Kerlix and Ace wrap once a day..     Discussed with patient.  All questions were answered.     Notified Dr. Montoya about Eliquis.

## 2024-06-27 NOTE — PROGRESS NOTES
Hospital Medicine Daily Progress Note    Date of Service  6/27/2024    Chief Complaint  Brian Jasmine is a 68 y.o. male admitted 6/24/2024 with infected dialysis access    Hospital Course  Brian Jasmine is a 68 y.o. male who presented 6/24/2024 with left arm infection.  Mr. Jasmine has a past medical history of peripheral arterial disease with femoral popliteal bypass, end-stage renal disease undergoing dialysis via a left arm graft, atrial fibrillation on Coumadin therapy, hypertension, coronary artery disease with stent to the circumflex that developed pain, redness, swelling in the right arm graft site about 5 days ago and did not go to dialysis and stopped taking all of his medications because he felt so poorly.  He has had fevers and chills.  He presents with the symptoms and here has a grossly infected site.  His creatinine is 8.8 with a potassium 3.8.  Vascular surgery has been consulted as has nephrology.  Attempt will be made at a temporary dialysis catheter until blood cultures are cleared for a possible tunneled catheter.  He states he has had at least 6 or 7 prior neck catheters in the past.  IV vancomycin has been initiated    Interval Problem Update  6/25 patient is in bed, he is new to me today, he is able to give good information, patient had temporary dialysis catheter placed yesterday, nephrology notes reviewed, vascular surgery notes reviewed, patient on IV vancomycin, blood cultures positive for gram-positive cocci x 2, I have ordered echocardiogram, patient will likely require new dialysis catheter, will discuss with infectious disease, probably removed tomorrow after dialysis, discussed with bedside nurse charge nurse  pharmacist, all question have been answered.  6/26 patient is resting in bed, no nausea no vomiting, drain is in place, he is able to give good information, continue IV vancomycin, discussed with infectious disease, note from vascular surgery and nephrology  reviewed, repeat blood cultures in a.m., follow-up echocardiogram, discussed with bedside nurse charge nurse  pharmacist.  6/27 patient is resting in bed, he is alert oriented follows commands, he denies any fever chills nausea vomiting, pain is stable, discussed with bedside nurse charge nurse  pharmacist, continue IV antibiotics, follow-up echo results continue monitoring daily BMP Note from nephrology reviewed.    I have discussed this patient's plan of care and discharge plan at IDT rounds today with Case Management, Nursing, Nursing leadership, and other members of the IDT team.    Consultants/Specialty  Nephrology  Vascular surgery  ID    Code Status  Full Code    Disposition  The patient is not medically cleared for discharge to home or a post-acute facility.      I have placed the appropriate orders for post-discharge needs.    Review of Systems  Review of Systems   Constitutional:  Negative for chills and fever.   Eyes:  Negative for blurred vision and double vision.   Respiratory:  Negative for cough, hemoptysis and wheezing.    Cardiovascular:  Negative for chest pain, palpitations, claudication, leg swelling and PND.   Gastrointestinal:  Negative for heartburn, nausea and vomiting.   Genitourinary:  Negative for hematuria and urgency.   Musculoskeletal:  Negative for back pain and myalgias.        Left arm swelling and redness at the graft area   Skin:  Negative for rash.   Neurological:  Negative for dizziness and headaches.   Endo/Heme/Allergies:  Does not bruise/bleed easily.   Psychiatric/Behavioral:  Negative for depression.         Physical Exam  Temp:  [35.9 °C (96.6 °F)-36.9 °C (98.4 °F)] 36.9 °C (98.4 °F)  Pulse:  [] 88  Resp:  [16-18] 18  BP: (106-136)/(47-66) 129/66  SpO2:  [95 %-99 %] 96 %    Physical Exam  Vitals and nursing note reviewed.   Constitutional:       Appearance: He is ill-appearing.   Eyes:      General: No scleral icterus.        Right eye: No  discharge.         Left eye: No discharge.      Conjunctiva/sclera: Conjunctivae normal.   Cardiovascular:      Rate and Rhythm: Normal rate and regular rhythm.      Pulses: Normal pulses.      Heart sounds: Normal heart sounds.   Pulmonary:      Effort: Pulmonary effort is normal. No respiratory distress.      Breath sounds: Normal breath sounds.   Abdominal:      General: Bowel sounds are normal. There is no distension.      Tenderness: There is no abdominal tenderness.   Musculoskeletal:         General: Swelling and deformity present. Normal range of motion.      Cervical back: Normal range of motion and neck supple.   Skin:     General: Skin is warm and dry.   Neurological:      General: No focal deficit present.      Mental Status: He is alert.      Cranial Nerves: No cranial nerve deficit.   Psychiatric:         Mood and Affect: Mood normal.         Behavior: Behavior normal.         Fluids    Intake/Output Summary (Last 24 hours) at 6/27/2024 1410  Last data filed at 6/27/2024 1300  Gross per 24 hour   Intake 1220 ml   Output 1070 ml   Net 150 ml       Laboratory  Recent Labs     06/25/24  0053 06/26/24  0344 06/27/24  0958   WBC 12.7* 14.0* 11.5*   RBC 3.48* 3.34* 2.65*   HEMOGLOBIN 10.2* 10.0* 7.9*   HEMATOCRIT 30.9* 29.9* 24.3*   MCV 88.8 89.5 91.7   MCH 29.3 29.9 29.8   MCHC 33.0 33.4 32.5   RDW 47.4 47.9 48.8   PLATELETCT 171 170 199   MPV 11.3 10.9 10.5     Recent Labs     06/25/24  0052 06/26/24  0344 06/27/24  0320   SODIUM 134* 133* 134*   POTASSIUM 3.9 4.5 3.7   CHLORIDE 97 94* 97   CO2 23 24 26   GLUCOSE 112* 134* 104*   BUN 38* 56* 36*   CREATININE 5.18* 6.93* 4.85*   CALCIUM 8.5 8.3* 7.6*                     Imaging  US-SUSI SINGLE LEVEL BILAT   Final Result      DX-CHEST-PORTABLE (1 VIEW)   Final Result      1.  Satisfactory appearance of the right IJ catheter. No pneumothorax visualized.      US-HEMODIALYSIS GRAFT DUPLEX COMP UPPER EXTREMITY   Final Result      US-HEMODIALYSIS GRAFT DUPLEX COMP  UPPER EXTREMITY   Final Result      DX-CHEST-PORTABLE (1 VIEW)   Final Result      Mild enlargement of the cardiomediastinal silhouette without acute cardiopulmonary abnormality.      EC-ECHOCARDIOGRAM COMPLETE W/O CONT    (Results Pending)   IR-HUERTA,GROSHONG PLACEMENT >5    (Results Pending)   US-EXTREMITY ARTERY LOWER BILAT    (Results Pending)        Assessment/Plan  * Infected prosthetic vascular graft, initial encounter ([facility])- (present on admission)  Assessment & Plan  Left arm graft is red, swollen, tender, grossly infected  Cultures have been drawn  IV vancomycin  Temporary dialysis access will be obtained  Vascular surgery has been consulted.     Patient with positive blood cultures continue IV vancomycin I have ordered echocardiogram  Will consult with infectious disease    Discussed with infectious disease Dr. Mercado, continue IV vancomycin, monitoring for side effects, monitoring vancomycin levels  Follow-up echocardiogram  Or cultures positive for Enterococcus faecalis    Bacteremia- (present on admission)  Assessment & Plan  Probably related to infected graft, status post removal.  Continue IV vancomycin  Discussed with infectious disease  Follow-up repeat blood cultures in AM.  Blood cultures positive for Enterococcus  Follow-up repeat blood cultures  Continue IV vancomycin    Echo today pending reading    Coronary artery disease- (present on admission)  Assessment & Plan  Followed by cardiology in [place] with history of stent to the circumflex  No chest pain    PAD (peripheral artery disease) ([facility])- (present on admission)  Assessment & Plan  Hx of previous fem-pop bypass    Troponin level elevated- (present on admission)  Assessment & Plan  Troponin is elevated at 200  He denies chest pain  EKG reveals an intraventricular conduction delay not clearly left nor right bundle  Likely a stress response given his infection and lack of dialysis.    Follow-up echo    Chronic atrial fibrillation  (HCC)- (present on admission)  Assessment & Plan  He had been on coumadin but hasn't been taking it    Continue monitoring  Continue monitoring discussed with Vascular surgery okay to restart Eliquis from vascular surgery standpoint    End stage renal disease (HCC)- (present on admission)  Assessment & Plan  He has not had dialysis since Wednesday is has not been feeling well  Creatinine is elevated at 8.8 unfortunately potassium is 3.8  Once temporary access has been obtained and dialysis will be done by nephrology      Note from nephrology reviewed  Note from nephrology reviewed    Renovascular hypertension- (present on admission)  Assessment & Plan  Restart his home Coreg with holding parameters         VTE prophylaxis: SCDs    I have performed a physical exam and reviewed and updated ROS and Plan today (6/27/2024). In review of yesterday's note (6/26/2024), there are no changes except as documented above.      Total time of 52 minutes spent prepping to see patient (e.g. reviewing  tests/imaging results, notes from consultants, bedside nurse, night shift ) obtaining and/or reviewing separately obtained history. Performing a medically appropriate examination and evaluation.  Counseling and educating the patient.  Ordering medications, tests, or procedures.  Referring and communicating with other health care professionals.  Documenting clinical information in EPIC.  Independently interpreting results and communicating results to patient.  Care coordination.    Restart Eliquis today

## 2024-06-27 NOTE — PROGRESS NOTES
[facility] -  PROGRESS  NOTE               Author: JASKARAN Estevez Date & Time: 6/27/2024  11:33 AM       HISTORY OF PRESENT ILLNESS:    68-year-old  male with biventricular history of ESRD on hemodialysis q. Monday Wednesday Friday at [facility] through left upper arm AV fistula that was placed about 2 to 3 years ago.  He presented to the ER with left arm pain and swelling for the last 5 days.  His last dialysis was on Wednesday and had no dialysis in the last 5 days.  He felt sick on Friday and missed his dialysis.  Ultrasound of the left upper arm AV fistula showed there is no flow in the AV fistula and it has thrombosed.  A temporary right IJ dialysis catheter was placed in the ER.  Nephrology has been consulted for management of dialysis.  Patient denies any fever or chills, no nausea or vomiting, no chest pain.  He has some shortness of breath but is able to talk in full sentences and is in no acute respiratory distress at this time. No melena, hematochezia, hematemesis.  No HA, visual changes, or abdominal pain.    NEPHROLOGY DAILY PROGRESS:   6/24: consult note  6/25:  Afebrile, HD last night without any complications, plans noted for OR today with Dr. Tinoco for vascualr surgery   6/26: Underwent AVG repair yesterday with Dr. Tinoco, has LAMBERT drain, BP noted low this AM on dialysis, says BP 's run low on dialysis - not on Midodrine, ordered Midodrine to be used on dialysis   6/27: 0 Net UF with HD yesterday, limited by hypotension. Sitting up at side of bed eating breakfast. Bps improved 130s systolic.       REVIEW OF SYSTEMS:    10 point ROS was performed and is as per HPI or otherwise negative    PAST MEDICAL/SURGICAL/SOCIAL/FAMILY HISTORY:   Reviewed and remains unchanged from admission   HOME MEDICATIONS:   - Reviewed and documented in chart    LABORATORY STUDIES:   - Reviewed and documented in chart    ALLERGIES:  Patient has no known  "allergies.    VS:  /65   Pulse 75   Temp 36.9 °C (98.4 °F) (Temporal)   Resp 18   Ht 1.854 m (6' 1\")   Wt 89.8 kg (197 lb 15.6 oz)   SpO2 95%   BMI 26.12 kg/m²   Physical Exam  Constitutional:       General: He is not in acute distress.     Appearance: Normal appearance. He is normal weight. He is not toxic-appearing.   HENT:      Head: Normocephalic and atraumatic.      Right Ear: External ear normal.      Left Ear: External ear normal.      Nose: Nose normal.      Mouth/Throat:      Mouth: Mucous membranes are dry.   Eyes:      Extraocular Movements: Extraocular movements intact.      Pupils: Pupils are equal, round, and reactive to light.   Neck:      Comments: Rt IJ temp dialysis catheter  in place  Cardiovascular:      Pulses: Normal pulses.   Pulmonary:      Effort: Pulmonary effort is normal. No respiratory distress.      Breath sounds: No wheezing.   Abdominal:      Palpations: Abdomen is soft.   Musculoskeletal:         General: No swelling.      Cervical back: Neck supple.   Skin:     General: Skin is dry.      Comments: AVG site covered with surgical dressing, LAMBERT drain in place   Neurological:      Mental Status: He is alert.            FLUID BALANCE:  In: 980 [P.O.:480; Dialysis:500]  Out: 560     LABS:  Recent Labs     06/25/24  0052 06/26/24  0344 06/27/24  0320   SODIUM 134* 133* 134*   POTASSIUM 3.9 4.5 3.7   CHLORIDE 97 94* 97   CO2 23 24 26   GLUCOSE 112* 134* 104*   BUN 38* 56* 36*   CREATININE 5.18* 6.93* 4.85*   CALCIUM 8.5 8.3* 7.6*        IMAGING:  - All imaging reviewed from admission to present day    Ultrasound hemodialysis graft duplex 6/20/2024 FINDINGS:   Left upper extremity.    All veins demonstrate complete color filling and compressibility with    normal venous flow dynamics including spontaneous flow and respiratory    phasicity.    No superficial or deep venous thrombosis.     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  Fistula is thrombosed and    completely occluded " from its proximal segment running its entire length to    its termination at the axillary vein.    Proximal segment of fistula is aneurysmal measuring 2.2 x 1.6 cm.     IMPRESSION:  # ESRD  -Q. MWF at [facility]  # Bacteremia    - Blood Cx from 6/24 + E. Faecalis  #Thrombosed left upper arm AVG; infected pseudoaneurysm    -Right IJ temporary HD catheter placed in ER 6/24/2024   - S/P AVG repair, resection and pseudoaneurysm resection 6/25/24 by Dr. Tinoco along with  Reconstruction of left brachial artery using left greater saphenous vein   interposition graft.  # HTN  # Anemia of CKD  # CKD-MBD  #Coronary artery disease  #Chronic A-fib      PLAN:  - No HD today ([facility]URS) and cont q MWF   - Appreciate Dr. Tinoco (Vascular Surgery)   - AVG may not be available for HD use soon   - will need to convert temp IJ catheter to a tunneled HD catheter before discharge.   - Continue IV Antibiotics, Follow blood C&S   - No dietary protein restrictions  - Resume home meds   - Dose all meds per ESRD    Thank you!

## 2024-06-27 NOTE — PROGRESS NOTES
Bedside report received from off going RN/tech: Marie, assumed care of patient.     Fall Risk Score: HIGH RISK  Fall risk interventions in place: Place yellow fall risk ID band on patient, Provide patient/family education based on risk assessment, Educate patient/family to call staff for assistance when getting out of bed, Place fall precaution signage outside patient door, Place patient in room close to nursing station, Utilize bed/chair fall alarm, Notify charge of high risk for huddle, and Bed alarm connected correctly  Bed type: Regular (Gary Score less than 17 interventions in place)  Patient on cardiac monitor: Yes  IVF/IV medications: Not Applicable   Oxygen: How many liters 1L and Traced the line to wall oxygen  Bedside sitter: Not Applicable   Isolation: Not applicable  Pt opens eyes spontaneously and tracks RN, demonstrates oriented conversation to person, place, time, and event, follows commands. Bedside report received from day shift RN. Pt is currently A&Ox4, SR, breathing at a normal rate and rhythm, warm and dry, skin color appropriate for ethnicity, and in no visible emotional or physical distress. Bed locked in lowest position, call light is within reach, fall prevention measures in place. Pt educated to use call light before getting out of bed, pt verbalized understanding. No further needs at this time.

## 2024-06-27 NOTE — CARE PLAN
The patient is Watcher - Medium risk of patient condition declining or worsening    Shift Goals  Clinical Goals: VSS, hemodynamic stability, mobilize  Patient Goals: Rest, pain control  Family Goals: NESTOR    Progress made toward(s) clinical / shift goals:    Problem: Pain - Standard  Goal: Alleviation of pain or a reduction in pain to the patient’s comfort goal  Outcome: Progressing  Note: Assess and monitor for pain. Provide pharmacological and non-pharmacological interventions as appropriate. Re-evaluate and continue to monitor.        Problem: Hemodynamics  Goal: Patient's hemodynamics, fluid balance and neurologic status will be stable or improve  Outcome: Progressing  Note: Assess and monitor VS, monitor for S&S of hypotension.     Problem: Skin Integrity  Goal: Skin integrity is maintained or improved  Outcome: Progressing  Note: Assess skin and monitor for skin breakdown. Alleviate pressure to bony prominences and provide assistance with turning, repositioning, ROM and mobility as appropriate. Use of Sacral and heel mepilex and pillows as needed. Continue to monitor.

## 2024-06-27 NOTE — PROGRESS NOTES
Lab called with result of Hgb of 7.9 at 1023. Per lab this was a re-draw from the NOC team.   Dr. Rodriguez notified of critical lab result at 1035. Per Dr. Rodriguez will order a re-draw at noon.

## 2024-06-27 NOTE — PROGRESS NOTES
Infectious Disease Progress Note    Author: Hiral Mercado M.D. Date & Time of service: 2024  3:57 PM    Chief Complaint:  Bacteremia     Interval History:      Review of Systems:  Review of Systems   Respiratory:  Negative for cough and shortness of breath.    Gastrointestinal:  Negative for abdominal pain, constipation, diarrhea, nausea and vomiting.   Musculoskeletal:  Positive for myalgias.   Neurological:  Positive for weakness.   Psychiatric/Behavioral:  The patient is not nervous/anxious.        Hemodynamics:  Temp (24hrs), Av.7 °C (98.1 °F), Min:36.4 °C (97.5 °F), Max:36.9 °C (98.4 °F)  Temperature: 36.9 °C (98.4 °F)  Pulse  Av.8  Min: 72  Max: 136   Blood Pressure : 129/66       Physical Exam:  Physical Exam  Cardiovascular:      Rate and Rhythm: Normal rate and regular rhythm.      Heart sounds: Normal heart sounds.   Pulmonary:      Effort: Pulmonary effort is normal.      Breath sounds: Normal breath sounds.   Abdominal:      General: Abdomen is flat. Bowel sounds are normal.      Palpations: Abdomen is soft.   Musculoskeletal:      Comments: Left arm in bandaging   Skin:     General: Skin is warm and dry.   Neurological:      General: No focal deficit present.      Mental Status: He is oriented to person, place, and time.   Psychiatric:         Mood and Affect: Mood normal.         Behavior: Behavior normal.         Meds:    Current Facility-Administered Medications:     apixaban    midodrine    Pharmacy Consult Request    ondansetron    labetalol    hydrALAZINE    HYDROcodone-acetaminophen    morphine injection    acetaminophen    MD Alert...Vancomycin per Pharmacy    heparin    heparin    heparin    heparin    Labs:  Recent Labs     24  0053 24  0344 24  0958 24  1432   WBC 12.7* 14.0* 11.5*  --    RBC 3.48* 3.34* 2.65*  --    HEMOGLOBIN 10.2* 10.0* 7.9* 8.8*   HEMATOCRIT 30.9* 29.9* 24.3* 26.8*   MCV 88.8 89.5 91.7  --    MCH 29.3 29.9 29.8  --    RDW 47.4  47.9 48.8  --    PLATELETCT 171 170 199  --    MPV 11.3 10.9 10.5  --    NEUTSPOLYS 79.20*  --   --   --    LYMPHOCYTES 9.30*  --   --   --    MONOCYTES 10.40  --   --   --    EOSINOPHILS 0.50  --   --   --    BASOPHILS 0.10  --   --   --      Recent Labs     24  0052 24  0344 24  0320   SODIUM 134* 133* 134*   POTASSIUM 3.9 4.5 3.7   CHLORIDE 97 94* 97   CO2    GLUCOSE 112* 134* 104*   BUN 38* 56* 36*     Recent Labs     242 24  0344 24  0320   CREATININE 5.18* 6.93* 4.85*       Imaging:  US-EXTREMITY ARTERY LOWER BILAT    Result Date: 2024  Lower Extremity  Arterial Duplex Report  Vascular Laboratory  CONCLUSIONS  1.  Right lower extremity atherosclerosis with occlusion of the distal  fem/popliteal graft with 3 vessel runoff to the right ankle.  3.  Left lower extremity atherosclerosis with patent fem/popliteal graft  and 3 vessel runoff to the ankle.  ABRAHAMBROOKE  Exam Date:     2024 12:28  Room #:     Inpatient  Priority:     Routine  Ht (in):             Wt (lb):  Ordering Physician:        CHANTAL CRAWFORD  Referring Physician:       [id number]  Sonographer:               Hipolito Anthony RVT,                             [facility]  Study Type:                Complete Bilateral  Technical Quality:         Adequate  Age:    68    Gender:     M  MRN:    [id number]  :    1955      BSA:  Indications:     Bypass Graft  CPT Codes:       28906  ICD Codes:       414.04  History:         History of bilateral fem-pop bypass grafts. No prior studies.  Limitations:                RIGHT  Waveform        Peak Systolic Velocity (cm/s)                  Prox    Prox-Mid  Mid    Mid-Dist  Distal  Biphasic                          274                      CFA  Biphasic        198                                        PFA                                                             SFA                                     29                       POP  Monophasic      73                                 18      AT  Monophasic      40                                 31      PT  Monophasic      72                                 51      NICHOLAS                LEFT  Waveform        Peak Systolic Velocity (cm/s)                  Prox    Prox-Mid  Mid    Mid-Dist  Distal  Biphasic                          206                      CFA  Biphasic        373                                        PFA                                                             SFA  Biphasic                                           74      POP  Biphasic        132                                137     AT  Biphasic        93                                 94      PT  Biphasic        47                                 59      NICHOLAS  FINDINGS  Right.  Diffuse plaque and multiphasic flow throughout the common femoral and  popliteal arteries.  Three vessel runoff to the ankle with monophasic flow.  Fully occluded fem to pop bypass graft seen with the following velocities:  Proximal anastamosis: 32 cm/sec.  Distal to proximal anastamosis: 14 cm/sec.  Distal anastamosis: No flow detected.  Distal to anastamosis: 29 cm/sec.  Left.  Diffuse plaque and multiphasic flow throughout the common femoral and  popliteal arteries.  Three vessel runoff to the ankle with biphasic flow.  Fem to pop bypass graft seen with the following velocities:  Proximal anastamosis: 32 cm/sec.  Distal to proximal anastamosis: 21 cm/sec.  Proximal thigh: 104 cm/sec.  Mid thigh: 107 cm/sec.  Distal thigh: 95 cm/sec.  Distal anastamosis: 183 cm/sec.  Distal to anastamosis: 132 cm/sec.  Abdominal aortic aneurysm seem with a measurement of 4.0 x 4.4 cm noted as  an incidental finding.  .  Nani Morataya  (Electronically Signed)  Final Date:      27 June 2024                   15:40    US-SUSI SINGLE LEVEL BILAT    Result Date: 6/27/2024   Vascular Laboratory  Conclusions  1.  Reduced  right SUSI.  2.  Reduced left TBI.  BROOKE ROSARIO  Age:    68    Gender:     M  MRN:    [id number]  :    1955      BSA:  Exam Date:     2024 09:20  Room #:     Inpatient  Priority:     Routine  Ht (in):             Wt (lb):  Ordering Physician:        CHANTAL CRAWFORD  Referring Physician:       [id number]  Sonographer:               Hipolito Anthony RVT RDMS  Study Type:                Complete Bilateral  Technical Quality:         Adequate  Indications:     Ulcer of ankle  CPT Codes:       41081  ICD Codes:       707.13  History:         Ulceration of the bilateral ankles. No prior studies.  Limitations:                 RIGHT  Waveform            Systolic BPs (mmHg)                             117           Brachial  Triphasic                                Common Femoral  Biphasic                                 Popliteal  Bi, non-rev                83            Posterior Tibial  Bi, non-rev                72            Dorsalis Pedis                             285           Digit                             0.71          SUSI                             2.44          TBI                       LEFT  Waveform        Systolic BPs (mmHg)                                           Brachial  Triphasic                                Common Femoral  Triphasic                                Popliteal  Triphasic                  130           Posterior Tibial  Triphasic                  117           Dorsalis Pedis  Diminished                 45            Digit                             1.11          SUSI                             0.38          TBI  Findings  Right.  Doppler waveforms of the common femoral, popliteal, posterior tibial, and  dorsalis pedis arteries are of high amplitude and multiphasic.  Doppler waveforms at the ankle are brisk and biphasic.  The ankle-brachial index is dimished.  The toe-brachial index is not  accurately measured due to calcification and  noncompressibility of the tibial vessels.  Left.  Unable to obtain brachial pressure due to unremovable dressing following AV  fistula repair.   Doppler waveforms of the common femoral, popliteal, posterior tibial, and  dorsalis pedis arteries are of high amplitude and multiphasic.  Doppler waveforms at the ankle are brisk and triphasic.  The ankle-brachial index is normal.  The toe-brachial index is abnormally reduced.  An arterial duplex was performed in accordance with lower extremity  arterial evaluation protocol - see separate report.  Nani Mroataya  (Electronically Signed)  Final Date:      27 June 2024                   11:25    DX-CHEST-PORTABLE (1 VIEW)    Result Date: 6/24/2024 6/24/2024 2:53 PM HISTORY/REASON FOR EXAM:  Status post central line placement TECHNIQUE/EXAM DESCRIPTION AND NUMBER OF VIEWS: Single portable view of the chest. COMPARISON: 6/24/2024 FINDINGS: Right IJ catheter tip projects over the SVC in satisfactory position. The mediastinal and cardiac silhouette is unremarkable. The pulmonary vascularity is within normal limits. The lung parenchyma is clear. There is no significant pleural effusion. There is no visible pneumothorax. There are no acute bony abnormalities.     1.  Satisfactory appearance of the right IJ catheter. No pneumothorax visualized.    US-HEMODIALYSIS GRAFT DUPLEX COMP UPPER EXTREMITY    Result Date: 6/24/2024   Upper Extremity  Venous Duplex Report  Vascular Laboratory  CONCLUSIONS  1.  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  2.  No acute DVT seen elsewhere in the left upper extremity.  BROOKE ROSARIO  Exam Date:     06/24/2024 11:30  Room #:     Inpatient  Priority:     Stat  Ht (in):             Wt (lb):  Ordering Physician:        MARIO CLARK  Referring Physician:       [id number]  Sonographer:                Hipolito Anthony RVT, RDMS  Study Type:                Complete Unilateral  Technical Quality:         Adequate  Age:    68    Gender:     M  MRN:    [id number]  :    1955      BSA:  Indications:     AV Fistula / Thrombosed  CPT Codes:       05420  ICD Codes:       996.73  History:         Left thrombosed AV Fistula. No prior studies.  Limitations:  PROCEDURES:  Left upper extremity venous duplex imaging.  The following venous structures were evaluated: internal jugular,  subclavian, axillary, brachial, cephalic, and basilic veins.  Serial compression, color, and spectral Doppler flow evaluations were  performed.  FINDINGS:  Left upper extremity.  All veins demonstrate complete color filling and compressibility with  normal venous flow dynamics including spontaneous flow and respiratory  phasicity.  No superficial or deep venous thrombosis.  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  Fistula is thrombosed and  completely occluded from its proximal segment running its entire length to  its termination at the axillary vein.  Proximal segment of fistula is aneurysmal measuring 2.2 x 1.6 cm.  Nani Morataya  (Electronically Signed)  Final Date:      2024                   12:41    US-HEMODIALYSIS GRAFT DUPLEX COMP UPPER EXTREMITY    Result Date: 2024   Upper Extremity  Venous Duplex Report  Vascular Laboratory  CONCLUSIONS  1.  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  2.  No acute DVT seen elsewhere in the left upper extremity.  BROOKE ROSARIO  Exam Date:     2024 11:30  Room #:     Inpatient  Priority:     Stat  Ht (in):             Wt (lb):  Ordering Physician:        MARIO CLARK  Referring Physician:       EDUARDO Almanza  Sonographer:               Hipolito Anthony RVT, RDMS  Study Type:                Complete  Unilateral  Technical Quality:         Adequate  Age:    68    Gender:     M  MRN:    [id number]  :    1955      BSA:  Indications:     AV Fistula / Thrombosed  CPT Codes:       82126  ICD Codes:       996.73  History:         Left thrombosed AV Fistula. No prior studies.  Limitations:  PROCEDURES:  Left upper extremity venous duplex imaging.  The following venous structures were evaluated: internal jugular,  subclavian, axillary, brachial, cephalic, and basilic veins.  Serial compression, color, and spectral Doppler flow evaluations were  performed.  FINDINGS:  Left upper extremity.  All veins demonstrate complete color filling and compressibility with  normal venous flow dynamics including spontaneous flow and respiratory  phasicity.  No superficial or deep venous thrombosis.  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  Fistula is thrombosed and  completely occluded from its proximal segment running its entire length to  its termination at the axillary vein.  Proximal segment of fistula is aneurysmal measuring 2.2 x 1.6 cm.  Nani Morataya  (Electronically Signed)  Final Date:      2024                   12:41    DX-CHEST-PORTABLE (1 VIEW)    Result Date: 2024 10:29 AM HISTORY/REASON FOR EXAM:  Chest Pain. TECHNIQUE/EXAM DESCRIPTION AND NUMBER OF VIEWS: Single portable view of the chest. COMPARISON: None FINDINGS: The soft tissues and bony structures are unremarkable. The heart and mediastinal structures are enlarged. Pulmonary vascularity is normal. The lung fields are clear. There is no effusion or pneumothorax.     Mild enlargement of the cardiomediastinal silhouette without acute cardiopulmonary abnormality.      Micro:  Results       Procedure Component Value Units Date/Time    CULTURE WOUND W/ GRAM STAIN [[id number]]  (Abnormal)  (Susceptibility) Collected: 24 1400    Order Status: Completed Specimen: Wound Updated: 24 1123     Significant Indicator POS     Source  WND     Site L forearm fistula graft     Culture Result -     Gram Stain Result Many WBCs.  No organisms seen.       Culture Result Enterococcus faecalis  Moderate growth      Narrative:      Surgery - swabs received    Susceptibility       Enterococcus faecalis (1)       Antibiotic Interpretation Microscan   Method Status    Daptomycin Sensitive 2 mcg/mL WAQAR Preliminary    Gent Synergy Sensitive <=500 mcg/mL WAQAR Preliminary    Ampicillin Sensitive <=2 mcg/mL WAQAR Preliminary    Linezolid Sensitive 2 mcg/mL WAQAR Preliminary    Vancomycin Sensitive 1 mcg/mL WAQAR Preliminary    Penicillin Sensitive 2 mcg/mL WAQAR Preliminary    Tetracycline Sensitive <=4 mcg/mL WAQAR Preliminary                       Anaerobic Culture [[id number]] Collected: 06/25/24 1400    Order Status: Completed Specimen: Wound Updated: 06/27/24 1123     Significant Indicator NEG     Source WND     Site L forearm fistula graft     Culture Result Culture in progress.    Narrative:      Surgery - swabs received    Fungal Culture [[id number]] Collected: 06/25/24 1400    Order Status: Completed Specimen: Wound Updated: 06/27/24 1123     Significant Indicator NEG     Source WND     Site L forearm fistula graft     Culture Result Culture in progress.     Fungal Smear Results No fungal elements seen.    Narrative:      Surgery - swabs received    BLOOD CULTURE [[id number]]  (Abnormal) Collected: 06/24/24 1334    Order Status: Completed Specimen: Blood from Peripheral Updated: 06/27/24 1122     Significant Indicator POS     Source BLD     Site Peripheral     Culture Result Growth detected by Bactec instrument. 06/25/2024  02:07      Enterococcus faecalis  Please see previous culture for susceptibilities      Narrative:      CALL  [facility]  183 tel. [phone],  Right Hand    BLOOD CULTURE [[id number]]  (Abnormal)  (Susceptibility) Collected: 06/24/24 1231    Order Status: Completed Specimen: Blood from Peripheral Updated: 06/27/24 1121     Significant Indicator POS      Source BLD     Site PERIPHERAL     Culture Result Growth detected by Bactec instrument. 06/25/2024  00:21      Enterococcus faecalis    Narrative:      CALL  [facility]  183 tel. [phone],  CALLED  183 tel. [phone] 06/25/2024, 17:37, Voalted carlos alberto PARVEEN  Right Hand    Susceptibility       Enterococcus faecalis (1)       Antibiotic Interpretation Microscan   Method Status    Daptomycin Sensitive 2 mcg/mL WAQAR Final    Gent Synergy Sensitive <=500 mcg/mL WAQAR Final    Ampicillin Sensitive <=2 mcg/mL WAQAR Final    Linezolid Sensitive 2 mcg/mL WAQAR Final    Vancomycin Sensitive 1 mcg/mL WAQAR Final    Penicillin Sensitive 2 mcg/mL WAQAR Final                       BLOOD CULTURE [[id number]] Collected: 06/27/24 0958    Order Status: Sent Specimen: Blood from Peripheral Updated: 06/27/24 1010    BLOOD CULTURE [[id number]] Collected: 06/27/24 0320    Order Status: Sent Specimen: Blood from Line Updated: 06/27/24 0450    GRAM STAIN [[id number]] Collected: 06/25/24 1400    Order Status: Completed Specimen: Wound Updated: 06/25/24 1934     Significant Indicator .     Source WND     Site L forearm fistula graft     Gram Stain Result Many WBCs.  No organisms seen.      Narrative:      Surgery - swabs received    Fungal Smear [[id number]] Collected: 06/25/24 1400    Order Status: Completed Specimen: Wound Updated: 06/25/24 1934     Significant Indicator NEG     Source WND     Site L forearm fistula graft     Fungal Smear Results No fungal elements seen.    Narrative:      Surgery - swabs received    MRSA By PCR (Amp) [[id number]]     Order Status: Canceled Specimen: Respirate from Nares             Assessment:  Active Hospital Problems    Diagnosis     *Infected prosthetic vascular graft, initial encounter ([facility]) [T82.7XXA]     Bacteremia [R78.81]     Chronic atrial fibrillation ([facility]) [I48.20]     Troponin level elevated [R79.89]     PAD (peripheral artery disease) ([facility]) [I73.9]     Coronary artery disease [I25.10]     End stage renal disease  (HCC) [N18.6]     Renovascular hypertension [I15.0]      Interval 24 hours:      AF, O2 1 L NC   Labs reviewed to assess for clinical status, drug toxicity and organ function  Imaging personally reviewed both images and report.   Micro reviewed    Patient states that he pain at the left arm is improved.  Antibiotics as below.    ASSESSMENT/PLAN:      68 y.o.  admitted 6/24/2024. Pt has a past medical history of PAD with femoral-popliteal bypass, ESRD on dialysis via left arm graft, A-fib on Coumadin and CAD with stents, he presented complaining of fevers, pain, redness and swelling in his right arm graft site ongoing for approximately 5 days.      Hospital Course:   Patient was the OR with vascular surgery on 6/25 with resection of the infected left arm dialysis graft pseudoaneurysm,  left greater saphenous vein harvest, reconstruction of left brachial artery, partial removal of the left arm dialysis graft     Problem List     Leukocytosis, proving  Bacteremia, source the infected left arm dialysis site  -Blood cultures on 6/24 +E faecalis, amp sensitive   Left arm dialysis graft site infection, pseudoaneurysm at site of anastomosis  -Status post or with vascular surgery as described above, partial removal of the prior graft  -Left forearm wound culture +E faecalis, amp sensitive  ESRD on HD  PAD     Plan:     Recommendations for antibiotics:   --- Stop vancomycin will transition to renally dosed Unasyn 3 g every 24 hours     Recommendations for further/ongoing work-up, monitoring of clinical status and drug toxicity:   --- Follow-up repeat blood culture, pending   --- Monitor lab  --- Renally dose medications  --- Dialysis catheter was placed while potentially still bacteremic, will eventually need to be removed or exchanged     Recommendations for intervention:   --- Status post or procedure as described above, wound management per surgery        Dispo: Awaiting culture results and work-up as above.  Patient is at  risk for infectious complications including death.      PICC: TBD        Plan of care discussed with NOMI Yen M.D.. Will continue to follow     Hiral Mercado M.D.

## 2024-06-27 NOTE — PROGRESS NOTES
[facility] -  PROGRESS  NOTE               Author: Oswald Pearce M.D. Date & Time: 6/27/2024  5:55 AM       HISTORY OF PRESENT ILLNESS:    68-year-old  male with biventricular history of ESRD on hemodialysis q. Monday Wednesday Friday at [facility] through left upper arm AV fistula that was placed about 2 to 3 years ago.  He presented to the ER with left arm pain and swelling for the last 5 days.  His last dialysis was on Wednesday and had no dialysis in the last 5 days.  He felt sick on Friday and missed his dialysis.  Ultrasound of the left upper arm AV fistula showed there is no flow in the AV fistula and it has thrombosed.  A temporary right IJ dialysis catheter was placed in the ER.  Nephrology has been consulted for management of dialysis.  Patient denies any fever or chills, no nausea or vomiting, no chest pain.  He has some shortness of breath but is able to talk in full sentences and is in no acute respiratory distress at this time. No melena, hematochezia, hematemesis.  No HA, visual changes, or abdominal pain.    NEPHROLOGY DAILY PROGRESS:   6/24: consult note  6/25:  Afebrile, HD last night without any complications, plans noted for OR today with Dr. Tinoco for vascualr surgery   6/26: Underwent AVG repair yesterday with Dr. Tinoco, has LAMBERT drain, BP noted low this AM on dialysis, says BP 's run low on dialysis - not on Midodrine, ordered Midodrine to be used on dialysis   6/27: HD yesterday with some concerns for hypertension, able to complete treatment with midodrine, blood pressures better this a.m., denies any new complaints    REVIEW OF SYSTEMS:    10 point ROS was performed and is as per HPI or otherwise negative    PAST MEDICAL/SURGICAL/SOCIAL/FAMILY HISTORY:   Reviewed and remains unchanged from admission   HOME MEDICATIONS:   - Reviewed and documented in chart    LABORATORY STUDIES:   - Reviewed and documented in chart    ALLERGIES:  Patient has no  "known allergies.    VS:  /50   Pulse 75   Temp 36.6 °C (97.9 °F) (Temporal)   Resp 17   Ht 1.854 m (6' 1\")   Wt 89.8 kg (197 lb 15.6 oz)   SpO2 96%   BMI 26.12 kg/m²   Physical Exam  Constitutional:       General: He is not in acute distress.     Appearance: Normal appearance. He is normal weight. He is not toxic-appearing.   HENT:      Head: Normocephalic and atraumatic.      Right Ear: External ear normal.      Left Ear: External ear normal.      Nose: Nose normal.      Mouth/Throat:      Mouth: Mucous membranes are dry.   Eyes:      Extraocular Movements: Extraocular movements intact.      Pupils: Pupils are equal, round, and reactive to light.   Neck:      Comments: Rt IJ temp dialysis catheter  in place  Cardiovascular:      Pulses: Normal pulses.   Pulmonary:      Effort: Pulmonary effort is normal. No respiratory distress.      Breath sounds: No wheezing.   Abdominal:      Palpations: Abdomen is soft.   Musculoskeletal:         General: No swelling.      Cervical back: Neck supple.   Skin:     General: Skin is dry.      Comments: AVG site covered with surgical dressing, LAMBERT drain in place   Neurological:      Mental Status: He is alert.            FLUID BALANCE:  In: 650 [P.O.:150; Dialysis:500]  Out: 540     LABS:  Recent Labs     06/25/24  0052 06/26/24  0344 06/27/24  0320   SODIUM 134* 133* 134*   POTASSIUM 3.9 4.5 3.7   CHLORIDE 97 94* 97   CO2 23 24 26   GLUCOSE 112* 134* 104*   BUN 38* 56* 36*   CREATININE 5.18* 6.93* 4.85*   CALCIUM 8.5 8.3* 7.6*        IMAGING:  - All imaging reviewed from admission to present day    Ultrasound hemodialysis graft duplex 6/20/2024 FINDINGS:   Left upper extremity.    All veins demonstrate complete color filling and compressibility with    normal venous flow dynamics including spontaneous flow and respiratory    phasicity.    No superficial or deep venous thrombosis.     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  Fistula is thrombosed and    completely " occluded from its proximal segment running its entire length to    its termination at the axillary vein.    Proximal segment of fistula is aneurysmal measuring 2.2 x 1.6 cm.     IMPRESSION:  # ESRD  -Q. MWF at [facility]  # Bacteremia    - Blood Cx from 6/24 + fro gram positive cocci   #Thrombosed left upper arm AVG; infected pseudoaneurysm    -Right IJ temporary HD catheter placed in ER 6/24/2024   - S/P AVG repair, resection and mycotic pseudoaneurysm resection 6/25/24 by Dr. Tinoco along with  Reconstruction of left brachial artery using left greater saphenous vein interposition graft.  # HTN  # Anemia of CKD  # CKD-MBD  #Coronary artery disease  #Chronic A-fib      PLAN:  - HD today and cont q MWF   - Appreciate Dr. Tinoco (Vascular Surgery)   - AVG may not be available for HD use soon- will need to convert temp IJ catheter to a tunneled HD catheter before discharge   - Continue IV Antibiotics per ID, Follow blood C&S   - No dietary protein restrictions  - Resume home meds   - Dose all meds per ESRD    Thank you and we will follow closely!

## 2024-06-27 NOTE — CARE PLAN
The patient is Watcher - Medium risk of patient condition declining or worsening    Shift Goals  Clinical Goals: VSS, monitor HR, hemodynamic stability, pain control  Patient Goals: rest, comfort, pain control  Family Goals: [place]    Progress made toward(s) clinical / shift goals:    Problem: Pain - Standard  Goal: Alleviation of pain or a reduction in pain to the patient’s comfort goal  Outcome: Progressing   Pt reports pain well controlled with current therapy.    Problem: Knowledge Deficit - Standard  Goal: Patient and family/care givers will demonstrate understanding of plan of care, disease process/condition, diagnostic tests and medications  Outcome: Progressing   Plan of care discussed    Patient is not progressing towards the following goals:

## 2024-06-27 NOTE — CARE PLAN
The patient is Watcher - Medium risk of patient condition declining or worsening    Shift Goals: Pain management  Clinical Goals: Hemodynamic stability, monitor HR and rhythm, pain control  Patient Goals: Rest, comfort, pain control  Family Goals: NEST[place]    Progress made toward(s) clinical / shift goals:    Problem: Pain - Standard  Goal: Alleviation of pain or a reduction in pain to the patient’s comfort goal  Outcome: Progressing  Note: Assess and monitor for pain. Provide pharmacological and non-pharmacological interventions as appropriate. Re-evaluate and continue to monitor.       Problem: Hemodynamics  Goal: Patient's hemodynamics, fluid balance and neurologic status will be stable or improve  Outcome: Progressing  Note: Assess vital signs and neurological status. Monitor labs, V/S, mentation and for S&S of bleeding. Continue to monitor.         Patient is not progressing towards the following goals: Hypotensive during dialysis, nephrologist aware

## 2024-06-27 NOTE — PROGRESS NOTES
Pharmacy Vancomycin Kinetics Note for 6/27/2024     68 y.o. male on Vancomycin day # 4     Vancomycin Indication (Trough based Dosing): Sepsis (goal trough 15-20)    Provider specified end date: 06/29/24 (TBD per ID)    Active Antibiotics (From admission, onward)      Ordered     Ordering Provider       Thu Jun 27, 2024  7:50 AM    06/27/24 0750  vancomycin (Vancocin) 1,250 mg in  mL IVPB  (vancomycin (VANCOCIN) IV (LD + Maintenance))  ONCE         Mello Yen M.D.       Mon Jun 24, 2024  2:05 PM    06/24/24 1405  MD Alert...Vancomycin per Pharmacy  PHARMACY TO DOSE        Question:  Indication(s) for vancomycin?  Answer:  Skin and soft tissue infection    Morales Mayen M.D.            Dosing Weight: 89.8 kg (197 lb 15.6 oz)      Admission History: Admitted on 6/24/2024 for Infected prosthetic vascular graft, initial encounter ([facility]) [T82.7XXA]  Pertinent history: Pt presenting with infected HD catheter site, multiple SIRS criteria including leukocytosis and tachycardia. Patient has ESRD and is HD dependent    Allergies:     Patient has no known allergies.     Pertinent cultures to date:     Results       Procedure Component Value Units Date/Time    CULTURE WOUND W/ GRAM STAIN [[id number]]  (Abnormal)  (Susceptibility) Collected: 06/25/24 1400    Order Status: Completed Specimen: Wound Updated: 06/27/24 1123     Significant Indicator POS     Source WND     Site L forearm fistula graft     Culture Result -     Gram Stain Result Many WBCs.  No organisms seen.       Culture Result Enterococcus faecalis  Moderate growth      Narrative:      Surgery - swabs received    Anaerobic Culture [[id number]] Collected: 06/25/24 1400    Order Status: Completed Specimen: Wound Updated: 06/27/24 1123     Significant Indicator NEG     Source WND     Site L forearm fistula graft     Culture Result Culture in progress.    Narrative:      Surgery - swabs received    Fungal Culture [[id number]] Collected: 06/25/24 1400     Order Status: Completed Specimen: Wound Updated: 06/27/24 1123     Significant Indicator NEG     Source WND     Site L forearm fistula graft     Culture Result Culture in progress.     Fungal Smear Results No fungal elements seen.    Narrative:      Surgery - swabs received    BLOOD CULTURE [[id number]]  (Abnormal) Collected: 06/24/24 1334    Order Status: Completed Specimen: Blood from Peripheral Updated: 06/27/24 1122     Significant Indicator POS     Source BLD     Site Peripheral     Culture Result Growth detected by Bactec instrument. 06/25/2024  02:07      Enterococcus faecalis  Please see previous culture for susceptibilities      Narrative:      CALL  Woo  183 tel. [phone],  Right Hand    BLOOD CULTURE [[id number]]  (Abnormal)  (Susceptibility) Collected: 06/24/24 1231    Order Status: Completed Specimen: Blood from Peripheral Updated: 06/27/24 1121     Significant Indicator POS     Source BLD     Site PERIPHERAL     Culture Result Growth detected by Bactec instrument. 06/25/2024  00:21      Enterococcus faecalis    Narrative:      CALL  [facility]  183 tel. [phone],  CALLED  183 tel. [phone] 06/25/2024, 17:37, Voalted carlos alberto SAINI  Right Hand    BLOOD CULTURE [[id number]] Collected: 06/27/24 0958    Order Status: Sent Specimen: Blood from Peripheral Updated: 06/27/24 1010    BLOOD CULTURE [[id number]] Collected: 06/27/24 0320    Order Status: Sent Specimen: Blood from Line Updated: 06/27/24 0450    GRAM STAIN [[id number]] Collected: 06/25/24 1400    Order Status: Completed Specimen: Wound Updated: 06/25/24 1934     Significant Indicator .     Source WND     Site L forearm fistula graft     Gram Stain Result Many WBCs.  No organisms seen.      Narrative:      Surgery - swabs received    Fungal Smear [[id number]] Collected: 06/25/24 1400    Order Status: Completed Specimen: Wound Updated: 06/25/24 1934     Significant Indicator NEG     Source WND     Site L forearm fistula graft     Fungal Smear Results No fungal  "elements seen.    Narrative:      Surgery - swabs received    MRSA By PCR (Amp) [[id number]]     Order Status: Canceled Specimen: Respirate from Nares             Labs:     Estimated Creatinine Clearance: 16.9 mL/min (A) (by C-G formula based on SCr of 4.85 mg/dL (HH)).  Recent Labs     243 24  0344 24  0958   WBC 12.7* 14.0* 11.5*   NEUTSPOLYS 79.20*  --   --      Recent Labs     24  00524  0344 24  0320   BUN 38* 56* 36*   CREATININE 5.18* 6.93* 4.85*       Intake/Output Summary (Last 24 hours) at 2024 1350  Last data filed at 2024 1300  Gross per 24 hour   Intake 1220 ml   Output 1070 ml   Net 150 ml      /66   Pulse 88   Temp 36.9 °C (98.4 °F) (Temporal)   Resp 18   Ht 1.854 m (6' 1\")   Wt 89.8 kg (197 lb 15.6 oz)   SpO2 96%  Temp (24hrs), Av.5 °C (97.7 °F), Min:35.9 °C (96.6 °F), Max:36.9 °C (98.4 °F)      List concerns for Vancomycin clearance:     ESRD;CHF;Malnutrition/Low albumin    Pharmacokinetics:     Trough kinetics:   Recent Labs     24  0320   VANCORANDOM 15.4       A/P:     -  Vancomycin dose: Vanco 1250 mg x 1 today    -  Next vancomycin level(s): in 2-3 days    -  Comments: Vanco level this AM is 15.4. Vanco 15 mg/kg (1250 mg) x 1 dopes given. Will check a vanco level in a few days. ID is following, duration TBD.    Jonathon Rizo, PharmD    "

## 2024-06-27 NOTE — PROGRESS NOTES
Monitor Summary  Rhythm: SR-Afib  Rate:82-90  Ectopy:PVC, Bigem  -/.1/.4

## 2024-06-27 NOTE — PROGRESS NOTES
Bedside report received from off going RN/tech: Laisha, assumed care of patient.     Fall Risk Score: HIGH RISK  Fall risk interventions in place: Place yellow fall risk ID band on patient, Provide patient/family education based on risk assessment, Educate patient/family to call staff for assistance when getting out of bed, Place fall precaution signage outside patient door, Place patient in room close to nursing station, Utilize bed/chair fall alarm, Notify charge of high risk for huddle, and Bed alarm connected correctly  Bed type: Regular (Gary Score less than 17 interventions in place)  Patient on cardiac monitor: Yes  IVF/IV medications: Not Applicable   Oxygen: Room Air  Bedside sitter: Not Applicable   Isolation: Not applicable

## 2024-06-28 ENCOUNTER — ANESTHESIA EVENT (OUTPATIENT)
Dept: CARDIOLOGY | Facility: MEDICAL CENTER | Age: 69
End: 2024-06-28
Payer: MEDICARE

## 2024-06-28 ENCOUNTER — ANESTHESIA (OUTPATIENT)
Dept: CARDIOLOGY | Facility: MEDICAL CENTER | Age: 69
End: 2024-06-28
Payer: MEDICARE

## 2024-06-28 ENCOUNTER — APPOINTMENT (OUTPATIENT)
Dept: CARDIOLOGY | Facility: MEDICAL CENTER | Age: 69
DRG: 216 | End: 2024-06-28
Attending: NURSE PRACTITIONER
Payer: MEDICARE

## 2024-06-28 LAB
ANION GAP SERPL CALC-SCNC: 14 MMOL/L (ref 7–16)
BACTERIA SPEC ANAEROBE CULT: NORMAL
BACTERIA WND AEROBE CULT: ABNORMAL
BACTERIA WND AEROBE CULT: ABNORMAL
BUN SERPL-MCNC: 43 MG/DL (ref 8–22)
CALCIUM SERPL-MCNC: 7.6 MG/DL (ref 8.5–10.5)
CHLORIDE SERPL-SCNC: 97 MMOL/L (ref 96–112)
CO2 SERPL-SCNC: 24 MMOL/L (ref 20–33)
CREAT SERPL-MCNC: 5.56 MG/DL (ref 0.5–1.4)
ERYTHROCYTE [DISTWIDTH] IN BLOOD BY AUTOMATED COUNT: 49 FL (ref 35.9–50)
FERRITIN SERPL-MCNC: 2057 NG/ML (ref 22–322)
GFR SERPLBLD CREATININE-BSD FMLA CKD-EPI: 11 ML/MIN/1.73 M 2
GLUCOSE SERPL-MCNC: 102 MG/DL (ref 65–99)
GRAM STN SPEC: ABNORMAL
HCT VFR BLD AUTO: 25.3 % (ref 42–52)
HCT VFR BLD AUTO: 25.6 % (ref 42–52)
HCT VFR BLD AUTO: 26.3 % (ref 42–52)
HGB BLD-MCNC: 8 G/DL (ref 14–18)
HGB BLD-MCNC: 8.1 G/DL (ref 14–18)
HGB BLD-MCNC: 8.7 G/DL (ref 14–18)
HGB RETIC QN AUTO: 32.7 PG/CELL (ref 29–35)
IMM RETICS NFR: 8.7 % (ref 2.6–16.1)
IRON SATN MFR SERPL: 35 % (ref 15–55)
IRON SERPL-MCNC: 52 UG/DL (ref 50–180)
MCH RBC QN AUTO: 29 PG (ref 27–33)
MCHC RBC AUTO-ENTMCNC: 32 G/DL (ref 32.3–36.5)
MCV RBC AUTO: 90.7 FL (ref 81.4–97.8)
PLATELET # BLD AUTO: 213 K/UL (ref 164–446)
PMV BLD AUTO: 10.7 FL (ref 9–12.9)
POTASSIUM SERPL-SCNC: 3.7 MMOL/L (ref 3.6–5.5)
RBC # BLD AUTO: 2.79 M/UL (ref 4.7–6.1)
RETICS # AUTO: 0.07 M/UL (ref 0.04–0.12)
RETICS/RBC NFR: 2.5 % (ref 0.8–2.6)
SIGNIFICANT IND 70042: ABNORMAL
SIGNIFICANT IND 70042: NORMAL
SITE SITE: ABNORMAL
SITE SITE: NORMAL
SODIUM SERPL-SCNC: 135 MMOL/L (ref 135–145)
SOURCE SOURCE: ABNORMAL
SOURCE SOURCE: NORMAL
TIBC SERPL-MCNC: 147 UG/DL (ref 250–450)
TRANSFERRIN SERPL-MCNC: 129 MG/DL (ref 200–370)
UIBC SERPL-MCNC: 95 UG/DL (ref 110–370)
VIT B12 SERPL-MCNC: 1116 PG/ML (ref 211–911)
WBC # BLD AUTO: 10.6 K/UL (ref 4.8–10.8)

## 2024-06-28 PROCEDURE — 700111 HCHG RX REV CODE 636 W/ 250 OVERRIDE (IP): Mod: JZ

## 2024-06-28 PROCEDURE — 93325 DOPPLER ECHO COLOR FLOW MAPG: CPT

## 2024-06-28 PROCEDURE — 700102 HCHG RX REV CODE 250 W/ 637 OVERRIDE(OP): Performed by: SURGERY

## 2024-06-28 PROCEDURE — 700101 HCHG RX REV CODE 250: Performed by: ANESTHESIOLOGY

## 2024-06-28 PROCEDURE — A9270 NON-COVERED ITEM OR SERVICE: HCPCS | Performed by: SURGERY

## 2024-06-28 PROCEDURE — 770020 HCHG ROOM/CARE - TELE (206)

## 2024-06-28 PROCEDURE — A9270 NON-COVERED ITEM OR SERVICE: HCPCS | Performed by: INTERNAL MEDICINE

## 2024-06-28 PROCEDURE — 85027 COMPLETE CBC AUTOMATED: CPT

## 2024-06-28 PROCEDURE — 700102 HCHG RX REV CODE 250 W/ 637 OVERRIDE(OP): Performed by: INTERNAL MEDICINE

## 2024-06-28 PROCEDURE — 84466 ASSAY OF TRANSFERRIN: CPT

## 2024-06-28 PROCEDURE — A9270 NON-COVERED ITEM OR SERVICE: HCPCS | Performed by: HOSPITALIST

## 2024-06-28 PROCEDURE — 85046 RETICYTE/HGB CONCENTRATE: CPT

## 2024-06-28 PROCEDURE — 99233 SBSQ HOSP IP/OBS HIGH 50: CPT | Performed by: HOSPITALIST

## 2024-06-28 PROCEDURE — 82728 ASSAY OF FERRITIN: CPT

## 2024-06-28 PROCEDURE — 82607 VITAMIN B-12: CPT

## 2024-06-28 PROCEDURE — 93312 ECHO TRANSESOPHAGEAL: CPT | Mod: 26 | Performed by: INTERNAL MEDICINE

## 2024-06-28 PROCEDURE — 80048 BASIC METABOLIC PNL TOTAL CA: CPT

## 2024-06-28 PROCEDURE — 83540 ASSAY OF IRON: CPT

## 2024-06-28 PROCEDURE — 85014 HEMATOCRIT: CPT

## 2024-06-28 PROCEDURE — 36415 COLL VENOUS BLD VENIPUNCTURE: CPT

## 2024-06-28 PROCEDURE — 90935 HEMODIALYSIS ONE EVALUATION: CPT

## 2024-06-28 PROCEDURE — 83550 IRON BINDING TEST: CPT

## 2024-06-28 PROCEDURE — 700111 HCHG RX REV CODE 636 W/ 250 OVERRIDE (IP): Performed by: ANESTHESIOLOGY

## 2024-06-28 PROCEDURE — 160035 HCHG PACU - 1ST 60 MINS PHASE I

## 2024-06-28 PROCEDURE — 700102 HCHG RX REV CODE 250 W/ 637 OVERRIDE(OP): Performed by: HOSPITALIST

## 2024-06-28 PROCEDURE — 85018 HEMOGLOBIN: CPT

## 2024-06-28 PROCEDURE — B24BZZ4 ULTRASONOGRAPHY OF HEART WITH AORTA, TRANSESOPHAGEAL: ICD-10-PCS | Performed by: INTERNAL MEDICINE

## 2024-06-28 PROCEDURE — 160002 HCHG RECOVERY MINUTES (STAT)

## 2024-06-28 PROCEDURE — 700105 HCHG RX REV CODE 258: Performed by: ANESTHESIOLOGY

## 2024-06-28 PROCEDURE — 93325 DOPPLER ECHO COLOR FLOW MAPG: CPT | Mod: 26 | Performed by: INTERNAL MEDICINE

## 2024-06-28 RX ORDER — HYDROMORPHONE HYDROCHLORIDE 1 MG/ML
0.5 INJECTION, SOLUTION INTRAMUSCULAR; INTRAVENOUS; SUBCUTANEOUS
Status: DISCONTINUED | OUTPATIENT
Start: 2024-06-28 | End: 2024-06-28 | Stop reason: HOSPADM

## 2024-06-28 RX ORDER — OXYCODONE HCL 5 MG/5 ML
5 SOLUTION, ORAL ORAL
Status: DISCONTINUED | OUTPATIENT
Start: 2024-06-28 | End: 2024-06-28 | Stop reason: HOSPADM

## 2024-06-28 RX ORDER — HYDROMORPHONE HYDROCHLORIDE 1 MG/ML
0.2 INJECTION, SOLUTION INTRAMUSCULAR; INTRAVENOUS; SUBCUTANEOUS
Status: DISCONTINUED | OUTPATIENT
Start: 2024-06-28 | End: 2024-06-28 | Stop reason: HOSPADM

## 2024-06-28 RX ORDER — ONDANSETRON 2 MG/ML
4 INJECTION INTRAMUSCULAR; INTRAVENOUS
Status: DISCONTINUED | OUTPATIENT
Start: 2024-06-28 | End: 2024-06-28 | Stop reason: HOSPADM

## 2024-06-28 RX ORDER — HYDRALAZINE HYDROCHLORIDE 20 MG/ML
5 INJECTION INTRAMUSCULAR; INTRAVENOUS
Status: DISCONTINUED | OUTPATIENT
Start: 2024-06-28 | End: 2024-06-28 | Stop reason: HOSPADM

## 2024-06-28 RX ORDER — SODIUM CHLORIDE, SODIUM LACTATE, POTASSIUM CHLORIDE, CALCIUM CHLORIDE 600; 310; 30; 20 MG/100ML; MG/100ML; MG/100ML; MG/100ML
INJECTION, SOLUTION INTRAVENOUS CONTINUOUS
Status: DISCONTINUED | OUTPATIENT
Start: 2024-06-28 | End: 2024-06-28 | Stop reason: HOSPADM

## 2024-06-28 RX ORDER — SODIUM CHLORIDE, SODIUM LACTATE, POTASSIUM CHLORIDE, CALCIUM CHLORIDE 600; 310; 30; 20 MG/100ML; MG/100ML; MG/100ML; MG/100ML
INJECTION, SOLUTION INTRAVENOUS
Status: DISCONTINUED | OUTPATIENT
Start: 2024-06-28 | End: 2024-06-28 | Stop reason: SURG

## 2024-06-28 RX ORDER — EPHEDRINE SULFATE 50 MG/ML
5 INJECTION, SOLUTION INTRAVENOUS
Status: DISCONTINUED | OUTPATIENT
Start: 2024-06-28 | End: 2024-06-28 | Stop reason: HOSPADM

## 2024-06-28 RX ORDER — HALOPERIDOL 5 MG/ML
1 INJECTION INTRAMUSCULAR
Status: DISCONTINUED | OUTPATIENT
Start: 2024-06-28 | End: 2024-06-28 | Stop reason: HOSPADM

## 2024-06-28 RX ORDER — LIDOCAINE HYDROCHLORIDE 20 MG/ML
INJECTION, SOLUTION EPIDURAL; INFILTRATION; INTRACAUDAL; PERINEURAL PRN
Status: DISCONTINUED | OUTPATIENT
Start: 2024-06-28 | End: 2024-06-28 | Stop reason: SURG

## 2024-06-28 RX ORDER — HYDROMORPHONE HYDROCHLORIDE 1 MG/ML
0.4 INJECTION, SOLUTION INTRAMUSCULAR; INTRAVENOUS; SUBCUTANEOUS
Status: DISCONTINUED | OUTPATIENT
Start: 2024-06-28 | End: 2024-06-28 | Stop reason: HOSPADM

## 2024-06-28 RX ORDER — OXYCODONE HCL 5 MG/5 ML
10 SOLUTION, ORAL ORAL
Status: DISCONTINUED | OUTPATIENT
Start: 2024-06-28 | End: 2024-06-28 | Stop reason: HOSPADM

## 2024-06-28 RX ORDER — LABETALOL HYDROCHLORIDE 5 MG/ML
5 INJECTION, SOLUTION INTRAVENOUS
Status: DISCONTINUED | OUTPATIENT
Start: 2024-06-28 | End: 2024-06-28 | Stop reason: HOSPADM

## 2024-06-28 RX ORDER — HEPARIN SODIUM 1000 [USP'U]/ML
INJECTION, SOLUTION INTRAVENOUS; SUBCUTANEOUS
Status: COMPLETED
Start: 2024-06-28 | End: 2024-06-28

## 2024-06-28 RX ORDER — DIPHENHYDRAMINE HYDROCHLORIDE 50 MG/ML
12.5 INJECTION INTRAMUSCULAR; INTRAVENOUS
Status: DISCONTINUED | OUTPATIENT
Start: 2024-06-28 | End: 2024-06-28 | Stop reason: HOSPADM

## 2024-06-28 RX ADMIN — PROPOFOL 20 MG: 10 INJECTION, EMULSION INTRAVENOUS at 12:37

## 2024-06-28 RX ADMIN — APIXABAN 2.5 MG: 2.5 TABLET, FILM COATED ORAL at 17:22

## 2024-06-28 RX ADMIN — PROPOFOL 20 MG: 10 INJECTION, EMULSION INTRAVENOUS at 12:33

## 2024-06-28 RX ADMIN — HYDROCODONE BITARTRATE AND ACETAMINOPHEN 2 TABLET: 5; 325 TABLET ORAL at 00:44

## 2024-06-28 RX ADMIN — LIDOCAINE HYDROCHLORIDE 100 MG: 20 INJECTION, SOLUTION EPIDURAL; INFILTRATION; INTRACAUDAL at 12:19

## 2024-06-28 RX ADMIN — PROPOFOL 20 MG: 10 INJECTION, EMULSION INTRAVENOUS at 12:29

## 2024-06-28 RX ADMIN — HEPARIN SODIUM 1200 UNITS: 1000 INJECTION, SOLUTION INTRAVENOUS; SUBCUTANEOUS at 10:15

## 2024-06-28 RX ADMIN — PROPOFOL 50 MG: 10 INJECTION, EMULSION INTRAVENOUS at 12:19

## 2024-06-28 RX ADMIN — HEPARIN SODIUM 1500 UNITS: 1000 INJECTION, SOLUTION INTRAVENOUS; SUBCUTANEOUS at 07:11

## 2024-06-28 RX ADMIN — HYDROCODONE BITARTRATE AND ACETAMINOPHEN 2 TABLET: 5; 325 TABLET ORAL at 23:17

## 2024-06-28 RX ADMIN — PROPOFOL 20 MG: 10 INJECTION, EMULSION INTRAVENOUS at 12:27

## 2024-06-28 RX ADMIN — HEPARIN SODIUM 500 UNITS: 1000 INJECTION, SOLUTION INTRAVENOUS; SUBCUTANEOUS at 07:11

## 2024-06-28 RX ADMIN — EPOETIN ALFA 10000 UNITS: 10000 SOLUTION INTRAVENOUS; SUBCUTANEOUS at 10:20

## 2024-06-28 RX ADMIN — PROPOFOL 20 MG: 10 INJECTION, EMULSION INTRAVENOUS at 12:24

## 2024-06-28 RX ADMIN — ACETAMINOPHEN 650 MG: 325 TABLET, FILM COATED ORAL at 14:41

## 2024-06-28 RX ADMIN — HYDROCODONE BITARTRATE AND ACETAMINOPHEN 2 TABLET: 5; 325 TABLET ORAL at 17:22

## 2024-06-28 RX ADMIN — HYDROCODONE BITARTRATE AND ACETAMINOPHEN 2 TABLET: 5; 325 TABLET ORAL at 10:51

## 2024-06-28 RX ADMIN — SODIUM CHLORIDE, POTASSIUM CHLORIDE, SODIUM LACTATE AND CALCIUM CHLORIDE: 600; 310; 30; 20 INJECTION, SOLUTION INTRAVENOUS at 12:10

## 2024-06-28 RX ADMIN — APIXABAN 2.5 MG: 2.5 TABLET, FILM COATED ORAL at 04:24

## 2024-06-28 RX ADMIN — MIDODRINE HYDROCHLORIDE 10 MG: 5 TABLET ORAL at 06:50

## 2024-06-28 ASSESSMENT — ENCOUNTER SYMPTOMS
DOUBLE VISION: 0
FEVER: 0
HEMOPTYSIS: 0
BRUISES/BLEEDS EASILY: 0
PND: 0
WHEEZING: 0
BLURRED VISION: 0
DEPRESSION: 0
HEADACHES: 0
CLAUDICATION: 0
COUGH: 0
MYALGIAS: 0
CHILLS: 0
DIZZINESS: 0
VOMITING: 0
PALPITATIONS: 0
NAUSEA: 0
BACK PAIN: 0
HEARTBURN: 0

## 2024-06-28 ASSESSMENT — PAIN DESCRIPTION - PAIN TYPE
TYPE: ACUTE PAIN
TYPE: ACUTE PAIN
TYPE: ACUTE PAIN;SURGICAL PAIN
TYPE: ACUTE PAIN
TYPE: ACUTE PAIN;SURGICAL PAIN

## 2024-06-28 ASSESSMENT — FIBROSIS 4 INDEX: FIB4 SCORE: 1.78

## 2024-06-28 NOTE — PROGRESS NOTES
Bedside report received from off going RN/tech: Laisha, assumed care of patient.     Fall Risk Score: HIGH RISK  Fall risk interventions in place: Place yellow fall risk ID band on patient, Provide patient/family education based on risk assessment, Educate patient/family to call staff for assistance when getting out of bed, Place fall precaution signage outside patient door, Place patient in room close to nursing station, Utilize bed/chair fall alarm, Notify charge of high risk for huddle, and Bed alarm connected correctly  Bed type: Regular (Gary Score less than 17 interventions in place)  Patient on cardiac monitor: Yes  IVF/IV medications: Not Applicable   Oxygen: How many liters 1L, Traced the line to wall oxygen, and No oxygen tank in room  Bedside sitter: Not Applicable   Isolation: Not applicable

## 2024-06-28 NOTE — PROGRESS NOTES
4 Eyes Skin Assessment Completed by DIALLO Medina and DIALLO Gaines.    Head WDL  Ears Redness and Blanching  Nose WDL  Mouth WDL  Neck Incision Triple lumen dialysis cath  Breast/Chest WDL  Shoulder Blades WDL  Spine WDL  (R) Arm/Elbow/Hand WDL  (L) Arm/Elbow/Hand Redness, Blanching, and Swelling, surgical site covered in gauze and ace wrap  Abdomen WDL  Groin WDL  Scrotum/Coccyx/Buttocks Redness and Blanching  (R) Leg Redness, healed harvest site with staples, gauze, and tegaderm  (L) Leg WDL  (R) Heel/Foot/Toe Redness, Discoloration, Bruising, and Scab, black toe  (L) Heel/Foot/Toe Redness, Blanching, Discoloration, Ulcer(s), and Scab          Devices In Places Tele Box, Pulse Ox, and Central Line      Interventions In Place Gray Ear Foams, NC W/Ear Foams, Sacral Mepilex, Heel Float Boots, and Pillows    Possible Skin Injury Yes    Pictures Uploaded Into Epic Yes  Wound Consult Placed Yes  RN Wound Prevention Protocol Ordered Yes

## 2024-06-28 NOTE — CARE PLAN
The patient is Stable - Low risk of patient condition declining or worsening    Shift Goals  Clinical Goals: VSS, mobility, pain control  Patient Goals: rest, pain control  Family Goals: [place]    Progress made toward(s) clinical / shift goals:    Problem: Pain - Standard  Goal: Alleviation of pain or a reduction in pain to the patient’s comfort goal  Outcome: Progressing  Note: Assess and monitor for pain. Provide pharmacological and non-pharmacological interventions as appropriate. Re-evaluate and continue to monitor.       Problem: Hemodynamics  Goal: Patient's hemodynamics, fluid balance and neurologic status will be stable or improve  Outcome: Progressing  Note: Monitor V/S, monitor and assess HR and rhythm. Assess peripheral pulses     Problem: Fall Risk  Goal: Patient will remain free from falls  Outcome: Progressing  Note: Treaded socks and bed/strip alarm on, side rails up x 2. Call light within reach. Pt educated to call for assistance. Reinforce as needed. Continue to monitor.

## 2024-06-28 NOTE — ANESTHESIA POSTPROCEDURE EVALUATION
Patient: Brian Jasmine    Procedure Summary       Date: 06/28/24 Room / Location: [facility] - Echocardiology [facility]    Anesthesia Start: 1210 Anesthesia Stop: 1250    Procedure: EC-RENEE W/O CONT Diagnosis:       Infected prosthetic vascular graft, initial encounter (HCC)      Infected prosthetic vascular graft, initial encounter (HCC)    Scheduled Providers: Jeremy Dalal M.D.; Sky Melendrez M.D. Responsible Provider: Sky Melendrez M.D.    Anesthesia Type: MAC ASA Status: 3            Final Anesthesia Type: MAC  Last vitals  BP 92/55   Temp   36.6 °C (97.9 °F)    Pulse   80   Resp   15    SpO2   97 %      Anesthesia Post Evaluation    Patient location during evaluation: PACU  Patient participation: complete - patient participated  Level of consciousness: awake and alert    Airway patency: patent  Anesthetic complications: no  Cardiovascular status: hemodynamically stable  Respiratory status: acceptable  Hydration status: euvolemic    PONV: none          No notable events documented.     Nurse Pain Score: 2 (NPRS)

## 2024-06-28 NOTE — PROGRESS NOTES
Monitor Summary  Rhythm: SR  Rate: 73-87  Ectopy: frequent PVC Frequent PAC  .16 / .07 / .47

## 2024-06-28 NOTE — PROGRESS NOTES
NO HARD CHART:   Patient to procedure Room for RENEE with no hard chart, only loose papers. Per Floor RN, no hard chart available. Procedure RN completed Pre-Procedure Consent paperwork packet, including: WHO Yellow Sheet signed by RN and MD, Informed consent for RENEE procedure signed by RN, patient, and Cardiology, and Informed consent for Anesthesia signed by Anesthesia MD and patient. Procedure Packet secured to loose papers and hand delivered to receiving PACU RN who acknowledged receipt and no hard chart.       Denture X2 given to PACU RN.

## 2024-06-28 NOTE — PROGRESS NOTES
Bedside report received from off going RN/tech: Adam Calvillo, assumed care of patient.     Fall Risk Score: HIGH RISK  Fall risk interventions in place: Place yellow fall risk ID band on patient, Provide patient/family education based on risk assessment, Educate patient/family to call staff for assistance when getting out of bed, Place fall precaution signage outside patient door, Place patient in room close to nursing station, Utilize bed/chair fall alarm, Notify charge of high risk for huddle, and Bed alarm connected correctly  Bed type: Regular (Gary Score less than 17 interventions in place)  Patient on cardiac monitor: Yes  IVF/IV medications: Not Applicable   Oxygen: Room Air  Bedside sitter: Not Applicable   Isolation: Not applicable  Pt opens eyes spontaneously and tracks RN, demonstrates oriented conversation to person, place, time, and event, follows commands. Bedside report received from day shift RN. Pt is currently A&Ox4, SR, breathing at a normal rate and rhythm, warm and dry, skin color appropriate for ethnicity, and in no visible emotional or physical distress. Bed locked in lowest position, call light is within reach, fall prevention measures in place. Pt educated to use call light before getting out of bed, pt verbalized understanding. No further needs at this time.

## 2024-06-28 NOTE — PROGRESS NOTES
[facility] -  PROGRESS  NOTE               Author: Jeremy Negron M.D. Date & Time: 6/28/2024  10:40 AM       HISTORY OF PRESENT ILLNESS:    68-year-old  male with biventricular history of ESRD on hemodialysis q. Monday Wednesday Friday at [facility] through left upper arm AV fistula that was placed about 2 to 3 years ago.  He presented to the ER with left arm pain and swelling for the last 5 days.  His last dialysis was on Wednesday and had no dialysis in the last 5 days.  He felt sick on Friday and missed his dialysis.  Ultrasound of the left upper arm AV fistula showed there is no flow in the AV fistula and it has thrombosed.  A temporary right IJ dialysis catheter was placed in the ER.  Nephrology has been consulted for management of dialysis.  Patient denies any fever or chills, no nausea or vomiting, no chest pain.  He has some shortness of breath but is able to talk in full sentences and is in no acute respiratory distress at this time. No melena, hematochezia, hematemesis.  No HA, visual changes, or abdominal pain.    NEPHROLOGY DAILY PROGRESS:   6/24: consult note  6/25:  Afebrile, HD last night without any complications, plans noted for OR today with Dr. Tinoco for vascualr surgery   6/26: Underwent AVG repair yesterday with Dr. Tinoco, has LAMBERT drain, BP noted low this AM on dialysis, says BP 's run low on dialysis - not on Midodrine, ordered Midodrine to be used on dialysis   6/27: 0 Net UF with HD yesterday, limited by hypotension. Sitting up at side of bed eating breakfast. Bps improved 130s systolic.   6/28: HD today, seen      REVIEW OF SYSTEMS:    10 point ROS was performed and is as per HPI or otherwise negative    PAST MEDICAL/SURGICAL/SOCIAL/FAMILY HISTORY:   Reviewed and remains unchanged from admission   HOME MEDICATIONS:   - Reviewed and documented in chart    LABORATORY STUDIES:   - Reviewed and documented in chart    ALLERGIES:  Patient has no known  "allergies.    VS:  /55   Pulse 80   Temp 36.6 °C (97.9 °F) (Temporal)   Resp 16   Ht 1.854 m (6' 1\")   Wt 89.8 kg (197 lb 15.6 oz)   SpO2 97%   BMI 26.12 kg/m²   Physical Exam  Constitutional:       General: He is not in acute distress.     Appearance: Normal appearance. He is normal weight. He is not toxic-appearing.   HENT:      Head: Normocephalic and atraumatic.      Right Ear: External ear normal.      Left Ear: External ear normal.      Nose: Nose normal.      Mouth/Throat:      Mouth: Mucous membranes are dry.   Eyes:      Extraocular Movements: Extraocular movements intact.      Pupils: Pupils are equal, round, and reactive to light.   Neck:      Comments: Rt IJ temp dialysis catheter  in place  Cardiovascular:      Pulses: Normal pulses.   Pulmonary:      Effort: Pulmonary effort is normal. No respiratory distress.      Breath sounds: No wheezing.   Abdominal:      Palpations: Abdomen is soft.   Musculoskeletal:         General: No swelling.      Cervical back: Neck supple.   Skin:     General: Skin is dry.      Comments: AVG site covered with surgical dressing, LAMBERT drain in place   Neurological:      Mental Status: He is alert.            FLUID BALANCE:  In: 720 [P.O.:720]  Out: 750     LABS:  Recent Labs     06/26/24  0344 06/27/24  0320 06/28/24  0028   SODIUM 133* 134* 135   POTASSIUM 4.5 3.7 3.7   CHLORIDE 94* 97 97   CO2 24 26 24   GLUCOSE 134* 104* 102*   BUN 56* 36* 43*   CREATININE 6.93* 4.85* 5.56*   CALCIUM 8.3* 7.6* 7.6*        IMAGING:  - All imaging reviewed from admission to present day    Ultrasound hemodialysis graft duplex 6/20/2024 FINDINGS:   Left upper extremity.    All veins demonstrate complete color filling and compressibility with    normal venous flow dynamics including spontaneous flow and respiratory    phasicity.    No superficial or deep venous thrombosis.     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  Fistula is thrombosed and    completely occluded from its " proximal segment running its entire length to    its termination at the axillary vein.    Proximal segment of fistula is aneurysmal measuring 2.2 x 1.6 cm.     IMPRESSION:  # ESRD  -Q. MWF at [facility]  # Bacteremia    - Blood Cx from 6/24, 6/27 + E. Faecalis  #Thrombosed left upper arm AVG; infected pseudoaneurysm    -Right IJ temporary HD catheter placed in ER 6/24/2024   - S/P AVG repair, resection and pseudoaneurysm resection 6/25/24 by Dr. Tinoco along with  Reconstruction of left brachial artery using left greater saphenous vein   interposition graft.  # HTN  # Anemia of CKD  # CKD-MBD  #Coronary artery disease  #Chronic A-fib      PLAN:  - HD today Friday and cont q MWF   - EPO with HD  - Appreciate Dr. Tinoco (Vascular Surgery)   - AVG may not be available for HD use soon   - will need to convert temp IJ catheter to a tunneled HD catheter before discharge. But would wait another day or two  - Continue IV Antibiotics, Follow blood C&S 6/27 still psoitve  - No dietary protein restrictions  - Resume home meds   - Dose all meds per ESRD    Thank you!

## 2024-06-28 NOTE — PROGRESS NOTES
See prior ID progress note from 6/27 with full assessment and plan.  Patient underwent TTE yesterday and concern for endocarditis but echodensity seen on the aortic valve.  Patient undergoing RENEE now.    --- Due to concern for endocarditis I restarted vancomycin for now with pharmacy to dose.  If it is confirmed we could potentially do a dual beta-lactam therapy versus vancomycin with dialysis.    ID will follow    Hiral Mercado MD

## 2024-06-28 NOTE — CARE PLAN
The patient is Stable - Low risk of patient condition declining or worsening    Shift Goals  Clinical Goals: VSS, mobility, rest, CMS intact  Patient Goals: rest, pain control  Family Goals: [place]    Progress made toward(s) clinical / shift goals:    Problem: Pain - Standard  Goal: Alleviation of pain or a reduction in pain to the patient’s comfort goal  Outcome: Progressing   Pt reports pain well controlled with current therapy.    Problem: Knowledge Deficit - Standard  Goal: Patient and family/care givers will demonstrate understanding of plan of care, disease process/condition, diagnostic tests and medications  Outcome: Progressing   Discussed plan of care.    Patient is not progressing towards the following goals:       show

## 2024-06-28 NOTE — PROGRESS NOTES
Hospital Medicine Daily Progress Note    Date of Service  6/28/2024    Chief Complaint  Brian Jasmine is a 68 y.o. male admitted 6/24/2024 with infected dialysis access    Hospital Course  Brian Jasmine is a 68 y.o. male who presented 6/24/2024 with left arm infection.  Mr. Jasmine has a past medical history of peripheral arterial disease with femoral popliteal bypass, end-stage renal disease undergoing dialysis via a left arm graft, atrial fibrillation on Coumadin therapy, hypertension, coronary artery disease with stent to the circumflex that developed pain, redness, swelling in the right arm graft site about 5 days ago and did not go to dialysis and stopped taking all of his medications because he felt so poorly.  He has had fevers and chills.  He presents with the symptoms and here has a grossly infected site.  His creatinine is 8.8 with a potassium 3.8.  Vascular surgery has been consulted as has nephrology.  Attempt will be made at a temporary dialysis catheter until blood cultures are cleared for a possible tunneled catheter.  He states he has had at least 6 or 7 prior neck catheters in the past.  IV vancomycin has been initiated    Interval Problem Update  6/25 patient is in bed, he is new to me today, he is able to give good information, patient had temporary dialysis catheter placed yesterday, nephrology notes reviewed, vascular surgery notes reviewed, patient on IV vancomycin, blood cultures positive for gram-positive cocci x 2, I have ordered echocardiogram, patient will likely require new dialysis catheter, will discuss with infectious disease, probably removed tomorrow after dialysis, discussed with bedside nurse charge nurse  pharmacist, all question have been answered.  6/26 patient is resting in bed, no nausea no vomiting, drain is in place, he is able to give good information, continue IV vancomycin, discussed with infectious disease, note from vascular surgery and nephrology  reviewed, repeat blood cultures in a.m., follow-up echocardiogram, discussed with bedside nurse charge nurse  pharmacist.  6/27 patient is resting in bed, he is alert oriented follows commands, he denies any fever chills nausea vomiting, pain is stable, discussed with bedside nurse charge nurse  pharmacist, continue IV antibiotics, follow-up echo results continue monitoring daily BMP Note from nephrology reviewed.  6/28 patient is resting in bed, he just came back from RENEE, he is alert oriented follows commands, RENEE results reviewed, repeated blood culture from 6/27 positive, will repeat blood cultures in a.m., notes from infectious disease reviewed, note from cardiology reviewed, continue IV vancomycin, close monitoring for side effects, will need dialysis catheter removal, notes from nephrology reviewed, continue close monitoring, discussed with bedside nurse charge nurse  pharmacist.    I have discussed this patient's plan of care and discharge plan at IDT rounds today with Case Management, Nursing, Nursing leadership, and other members of the IDT team.    Consultants/Specialty  Nephrology  Vascular surgery  ID    Code Status  Full Code    Disposition  The patient is not medically cleared for discharge to home or a post-acute facility.      I have placed the appropriate orders for post-discharge needs.    Review of Systems  Review of Systems   Constitutional:  Negative for chills and fever.   Eyes:  Negative for blurred vision and double vision.   Respiratory:  Negative for cough, hemoptysis and wheezing.    Cardiovascular:  Negative for chest pain, palpitations, claudication, leg swelling and PND.   Gastrointestinal:  Negative for heartburn, nausea and vomiting.   Genitourinary:  Negative for hematuria and urgency.   Musculoskeletal:  Negative for back pain and myalgias.        Left arm swelling and redness at the graft area   Skin:  Negative for rash.   Neurological:   Negative for dizziness and headaches.   Endo/Heme/Allergies:  Does not bruise/bleed easily.   Psychiatric/Behavioral:  Negative for depression.         Physical Exam  Temp:  [36.2 °C (97.1 °F)-36.8 °C (98.2 °F)] 36.5 °C (97.7 °F)  Pulse:  [63-84] 72  Resp:  [14-18] 18  BP: ()/(40-63) 118/60  SpO2:  [92 %-100 %] 99 %    Physical Exam  Vitals and nursing note reviewed.   Constitutional:       Appearance: He is ill-appearing.   Eyes:      General: No scleral icterus.        Right eye: No discharge.         Left eye: No discharge.      Conjunctiva/sclera: Conjunctivae normal.   Cardiovascular:      Rate and Rhythm: Normal rate and regular rhythm.      Pulses: Normal pulses.      Heart sounds: Normal heart sounds.   Pulmonary:      Effort: Pulmonary effort is normal. No respiratory distress.      Breath sounds: Normal breath sounds.   Abdominal:      General: Bowel sounds are normal. There is no distension.      Tenderness: There is no abdominal tenderness.   Musculoskeletal:         General: Swelling and deformity present. Normal range of motion.      Cervical back: Normal range of motion and neck supple.   Skin:     General: Skin is warm and dry.   Neurological:      General: No focal deficit present.      Mental Status: He is alert.      Cranial Nerves: No cranial nerve deficit.   Psychiatric:         Mood and Affect: Mood normal.         Behavior: Behavior normal.         Fluids    Intake/Output Summary (Last 24 hours) at 6/28/2024 1529  Last data filed at 6/28/2024 1240  Gross per 24 hour   Intake 340 ml   Output 200 ml   Net 140 ml       Laboratory  Recent Labs     06/26/24  0344 06/27/24  0958 06/27/24  1432 06/28/24  0028 06/28/24  0650 06/28/24  1139   WBC 14.0* 11.5*  --  10.6  --   --    RBC 3.34* 2.65*  --  2.79*  --   --    HEMOGLOBIN 10.0* 7.9*   < > 8.1* 8.0* 8.7*   HEMATOCRIT 29.9* 24.3*   < > 25.3* 25.6* 26.3*   MCV 89.5 91.7  --  90.7  --   --    MCH 29.9 29.8  --  29.0  --   --    MCHC 33.4 32.5   --  32.0*  --   --    RDW 47.9 48.8  --  49.0  --   --    PLATELETCT 170 199  --  213  --   --    MPV 10.9 10.5  --  10.7  --   --     < > = values in this interval not displayed.     Recent Labs     06/26/24  0344 06/27/24  0320 06/28/24  0028   SODIUM 133* 134* 135   POTASSIUM 4.5 3.7 3.7   CHLORIDE 94* 97 97   CO2 24 26 24   GLUCOSE 134* 104* 102*   BUN 56* 36* 43*   CREATININE 6.93* 4.85* 5.56*   CALCIUM 8.3* 7.6* 7.6*                     Imaging  EC-RENEE W/O CONT         EC-ECHOCARDIOGRAM COMPLETE W/O CONT   Final Result      US-SUSI SINGLE LEVEL BILAT   Final Result      US-EXTREMITY ARTERY LOWER BILAT   Final Result      DX-CHEST-PORTABLE (1 VIEW)   Final Result      1.  Satisfactory appearance of the right IJ catheter. No pneumothorax visualized.      US-HEMODIALYSIS GRAFT DUPLEX COMP UPPER EXTREMITY   Final Result      US-HEMODIALYSIS GRAFT DUPLEX COMP UPPER EXTREMITY   Final Result      DX-CHEST-PORTABLE (1 VIEW)   Final Result      Mild enlargement of the cardiomediastinal silhouette without acute cardiopulmonary abnormality.           Assessment/Plan  * Infected prosthetic vascular graft, initial encounter ([facility])- (present on admission)  Assessment & Plan  Left arm graft is red, swollen, tender, grossly infected  Cultures have been drawn  IV vancomycin  Temporary dialysis access will be obtained  Vascular surgery has been consulted.     Patient with positive blood cultures continue IV vancomycin I have ordered echocardiogram  Will consult with infectious disease    Discussed with infectious disease Dr. Mercado, continue IV vancomycin, monitoring for side effects, monitoring vancomycin levels  Follow-up echocardiogram  Or cultures positive for Enterococcus faecalis    Repeat blood cultures in a.m.  Cultures from 6/27 positive    Bacteremia- (present on admission)  Assessment & Plan  Probably related to infected graft, status post removal.  Continue IV vancomycin  Discussed with infectious disease  Follow-up  repeat blood cultures in AM.  Blood cultures positive for Enterococcus  Follow-up repeat blood cultures  Continue IV vancomycin    Echo showed possible vegetation  I have discussed with cardiology for RENEE today, patient went for RENEE that confirmed vegetation  Repeat blood culture from 6/27 positive again, will repeat blood cultures in a.m.  Continue IV vancomycin as per infectious disease    Coronary artery disease- (present on admission)  Assessment & Plan  Followed by cardiology in [place] with history of stent to the circumflex  No chest pain    PAD (peripheral artery disease) (HCC)- (present on admission)  Assessment & Plan  Hx of previous fem-pop bypass    Troponin level elevated- (present on admission)  Assessment & Plan  Troponin is elevated at 200  He denies chest pain  EKG reveals an intraventricular conduction delay not clearly left nor right bundle  Likely a stress response given his infection and lack of dialysis.    Follow-up echo    Chronic atrial fibrillation (HCC)- (present on admission)  Assessment & Plan  He had been on coumadin but hasn't been taking it    Continue monitoring  Continue monitoring discussed with Vascular surgery okay to restart Eliquis from vascular surgery standpoint    End stage renal disease (HCC)- (present on admission)  Assessment & Plan  He has not had dialysis since Wednesday is has not been feeling well  Creatinine is elevated at 8.8 unfortunately potassium is 3.8  Once temporary access has been obtained and dialysis will be done by nephrology      Note from nephrology reviewed  Note from nephrology reviewed    Renovascular hypertension- (present on admission)  Assessment & Plan  Restart his home Coreg with holding parameters         VTE prophylaxis: SCDs    I have performed a physical exam and reviewed and updated ROS and Plan today (6/28/2024). In review of yesterday's note (6/27/2024), there are no changes except as documented above.      Review CBC  Review  BMP  Reviewed note from cardiology  Reviewed note from infectious disease  RENEE results reviewed  Patient is on IV vancomycin monitoring for side effects include but not limited to ototoxicity, thrombophlebitis, extravasation endocarditis, vasculitis, neutropenia, hemorrhagic occlusive retinal vasculitis, monitoring vancomycin levels  Discussed with pharmacist  Order repeat blood cultures  Ordered blood work for in a.m.

## 2024-06-28 NOTE — RESPIRATORY CARE
"COPD EDUCATION by COPD CLINICAL EDUCATOR  6/27/2024 at 5:27 PM by Chari Stover, RRT     Patient interviewed by COPD education team. Patient acknowledges COPD but denied any current medication or Oxygen use. He states he does not have any Oxygen at home. He took our education booklet on COPD but declined review..             COPD Assessment  COPD Clinical Specialists ONLY  COPD Education Initiated: Yes--Short Intervention (met with pt -acknowledges COPD dx denies current medications and oxygen use he does not have Oxygen at home he quit smoking took information but did not want to elaborate)  DME Company: NONE  Physician Name: Fatou in [place] pt is setting up an appointment  Referrals Initiated: Yes  Smoking Cessation: No (quit 5 yrs)  Home Health Care:  (TBD at this encounter)  Mobile Urgent Care Services: N/A (out of area)    PFT Results  (OP) Pulmonary Function Testing: Yes (PFT in 2018 but no data available)  Interdisciplinary Rounds: Attendance at Rounds (30 Min)    Meds to Beds  [facility] provides bedside medication delivery for all eligible patients at discharge.  Would you like to opt out of this program for any reason?: No - Stay Opted In     MY COPD ACTION PLAN     It is recommended that patients and physicians /healthcare providers complete this action plan together. This plan should be discussed at each physician visit and updated as needed.    The green, yellow and red zones show groups of symptoms of COPD. This list of symptoms is not comprehensive, and you may experience other symptoms. In the \"Actions\" column, your healthcare provider has recommended actions for you to take based on your symptoms.    Patient Name: Brian Jasmine   YOB: 1955   Last Updated on:     Green Zone:  I am doing well today Actions     Usual activitiy and exercise level   Take daily medications     Usual amounts of cough and phlegm/mucus   Use oxygen as prescribed     Sleep well at night   Continue " "regular exercise/diet plan     Appetite is good   At all times avoid cigarette smoke, inhaled irritants     Daily Medications (these medications are taken every day):                Yellow Zone:  I am having a bad day or a COPD flare Actions     More breathless than usual   Continue daily medications     I have less energy for my daily activities   Use quick relief inhaler as ordered     Increased or thicker phlegm/mucus   Use oxygen as prescribed     Using quick relief inhaler/nebulizer more often   Get plenty of rest     Swelling of ankles more than usual   Use pursed lip breathing     More coughing than usual   At all times avoid cigarette smoke, inhaled irritants     I feel like I have a \"chest cold\"     Poor sleep and my symptoms woke me up     My appetite is not good     My medicine is not helping      Call provider immediately if symptoms don’t improve     Continue daily medications, add rescue medications:               Medications to be used during a flare up, (as Discussed with Provider):              Red Zone:  I need urgent medical care Actions     Severe shortness of breath even at rest   Call 911 or seek medical care immediately     Not able to do any activity because of breathing      Fever or shaking chills      Feeling confused or very drowsy       Chest pains      Coughing up blood                  "

## 2024-06-28 NOTE — PROGRESS NOTES
Monitor Summary  Rhythm: Sinus rhythm, Afib with a first degree  Rate:67-88  Ectopy:PVC, Ciup, Trigem, Bigem,  .21/.09/.39

## 2024-06-28 NOTE — ANESTHESIA TIME REPORT
Anesthesia Start and Stop Event Times       Date Time Event    6/28/2024 1208 Ready for Procedure     1210 Anesthesia Start     1250 Anesthesia Stop          Responsible Staff  06/28/24      Name Role Begin End    Sky Melendrez M.D. Anesth 1210 1250          Overtime Reason:  no overtime (within assigned shift)    Comments:

## 2024-06-28 NOTE — ANESTHESIA PREPROCEDURE EVALUATION
Date/Time: 06/28/24 1100    Scheduled providers: Jeremy Dalal M.D.; Sky Melendrez M.D.    Procedure: EC-RENEE W/O CONT    Diagnosis:       Infected prosthetic vascular graft, initial encounter ([facility]) [T82.7XXA]      Infected prosthetic vascular graft, initial encounter ([facility]) [T82.7XXA]    Location: [facility] - Echocardiology [facility]            Relevant Problems   PULMONARY   (positive) Chronic obstructive pulmonary disease (HCC)      CARDIAC   (positive) Chronic atrial fibrillation (HCC)   (positive) Chronic combined systolic and diastolic congestive heart failure (HCC)   (positive) Coronary artery disease   (positive) Infected prosthetic vascular graft, initial encounter ([facility])   (positive) PAD (peripheral artery disease) (HCC)   (positive) Renal artery stenosis (HCC)   (positive) Renovascular hypertension         (positive) End stage renal disease (HCC)     TTE 6/27/24:  Normal left ventricular systolic function. The ejection fraction is   measured to be  53% by Pugh's biplane.   Normal right ventricular size and systolic function.  Moderate aortic insufficiency. Mobile echodensity seen on the aortic valve, concerning for vegetation.    Physical Exam    Airway   Mallampati: II  TM distance: >3 FB  Neck ROM: full       Cardiovascular - normal exam  Rhythm: regular  Rate: normal  (-) murmur     Dental - normal exam           Pulmonary - normal exam  Breath sounds clear to auscultation     Abdominal    Neurological - normal exam                   Anesthesia Plan    ASA 3   ASA physical status 3 criteria: CAD/stents (> 3 months) and ESRD undergoing regularly scheduled dialysis    Plan - MAC               Induction: intravenous      Pertinent diagnostic labs and testing reviewed    Informed Consent:    Anesthetic plan and risks discussed with patient.    Use of blood products discussed with: patient whom consented to blood products.

## 2024-06-28 NOTE — OR NURSING
1245.Patient arrived from RENEE procedure in stable condition. Received report from nurse and anesthesiologist. VSS.    1315 Report given to Adam LANDRUM    1324.Patient transported to floor via gurney with RN and monitor.  On 2 L O2 with tank >50% and tubing connected to patient.  RN notified that pt is on the way to the floor.

## 2024-06-28 NOTE — PROGRESS NOTES
Cardiology note  Preliminary RENEE report  Normal LV function and wall motion  Large irregular mobile aortic valve echodensity consistent with vegetation  No obvious perivalvular abscess identified  Severe aortic regurgitation  Mitral and tricuspid valves are normal.  Pulmonic valve not seen.

## 2024-06-29 ENCOUNTER — APPOINTMENT (OUTPATIENT)
Dept: CARDIOLOGY | Facility: MEDICAL CENTER | Age: 69
DRG: 216 | End: 2024-06-29
Payer: MEDICARE

## 2024-06-29 ENCOUNTER — APPOINTMENT (OUTPATIENT)
Dept: RADIOLOGY | Facility: MEDICAL CENTER | Age: 69
DRG: 216 | End: 2024-06-29
Payer: MEDICARE

## 2024-06-29 LAB
ABO + RH BLD: NORMAL
ABO GROUP BLD: NORMAL
ACT BLD: 220 SEC (ref 74–137)
ALBUMIN SERPL BCP-MCNC: 2.9 G/DL (ref 3.2–4.9)
ALBUMIN/GLOB SERPL: 0.9 G/DL
ALP SERPL-CCNC: 97 U/L (ref 30–99)
ALT SERPL-CCNC: 17 U/L (ref 2–50)
ANION GAP SERPL CALC-SCNC: 10 MMOL/L (ref 7–16)
ANION GAP SERPL CALC-SCNC: 14 MMOL/L (ref 7–16)
APTT PPP: 213.1 SEC (ref 24.7–36)
AST SERPL-CCNC: 26 U/L (ref 12–45)
BACTERIA BLD CULT: ABNORMAL
BARCODED ABORH UBTYP: 5100
BARCODED ABORH UBTYP: 9500
BARCODED PRD CODE UBPRD: NORMAL
BARCODED UNIT NUM UBUNT: NORMAL
BILIRUB SERPL-MCNC: 0.2 MG/DL (ref 0.1–1.5)
BLD GP AB SCN SERPL QL: NORMAL
BUN SERPL-MCNC: 25 MG/DL (ref 8–22)
BUN SERPL-MCNC: 27 MG/DL (ref 8–22)
CALCIUM ALBUM COR SERPL-MCNC: 9.2 MG/DL (ref 8.5–10.5)
CALCIUM SERPL-MCNC: 8.2 MG/DL (ref 8.5–10.5)
CALCIUM SERPL-MCNC: 8.3 MG/DL (ref 8.5–10.5)
CHLORIDE SERPL-SCNC: 96 MMOL/L (ref 96–112)
CHLORIDE SERPL-SCNC: 98 MMOL/L (ref 96–112)
CO2 SERPL-SCNC: 24 MMOL/L (ref 20–33)
CO2 SERPL-SCNC: 27 MMOL/L (ref 20–33)
COMPONENT R 8504R: NORMAL
CREAT SERPL-MCNC: 4.05 MG/DL (ref 0.5–1.4)
CREAT SERPL-MCNC: 4.87 MG/DL (ref 0.5–1.4)
ERYTHROCYTE [DISTWIDTH] IN BLOOD BY AUTOMATED COUNT: 48.5 FL (ref 35.9–50)
EST. AVERAGE GLUCOSE BLD GHB EST-MCNC: 111 MG/DL
GFR SERPLBLD CREATININE-BSD FMLA CKD-EPI: 12 ML/MIN/1.73 M 2
GFR SERPLBLD CREATININE-BSD FMLA CKD-EPI: 15 ML/MIN/1.73 M 2
GLOBULIN SER CALC-MCNC: 3.4 G/DL (ref 1.9–3.5)
GLUCOSE SERPL-MCNC: 95 MG/DL (ref 65–99)
GLUCOSE SERPL-MCNC: 98 MG/DL (ref 65–99)
HBA1C MFR BLD: 5.5 % (ref 4–5.6)
HCT VFR BLD AUTO: 25.9 % (ref 42–52)
HGB BLD-MCNC: 8.4 G/DL (ref 14–18)
INR PPP: 1.42 (ref 0.87–1.13)
MCH RBC QN AUTO: 29.7 PG (ref 27–33)
MCHC RBC AUTO-ENTMCNC: 32.4 G/DL (ref 32.3–36.5)
MCV RBC AUTO: 91.5 FL (ref 81.4–97.8)
PLATELET # BLD AUTO: 256 K/UL (ref 164–446)
PMV BLD AUTO: 10.1 FL (ref 9–12.9)
POTASSIUM SERPL-SCNC: 3.9 MMOL/L (ref 3.6–5.5)
POTASSIUM SERPL-SCNC: 3.9 MMOL/L (ref 3.6–5.5)
PRODUCT TYPE UPROD: NORMAL
PROT SERPL-MCNC: 6.3 G/DL (ref 6–8.2)
PROTHROMBIN TIME: 17.5 SEC (ref 12–14.6)
RBC # BLD AUTO: 2.83 M/UL (ref 4.7–6.1)
RH BLD: NORMAL
SIGNIFICANT IND 70042: ABNORMAL
SIGNIFICANT IND 70042: ABNORMAL
SITE SITE: ABNORMAL
SITE SITE: ABNORMAL
SODIUM SERPL-SCNC: 134 MMOL/L (ref 135–145)
SODIUM SERPL-SCNC: 135 MMOL/L (ref 135–145)
SOURCE SOURCE: ABNORMAL
SOURCE SOURCE: ABNORMAL
UNIT STATUS USTAT: NORMAL
VANCOMYCIN SERPL-MCNC: 19.8 UG/ML
WBC # BLD AUTO: 11 K/UL (ref 4.8–10.8)

## 2024-06-29 PROCEDURE — 87040 BLOOD CULTURE FOR BACTERIA: CPT | Mod: 91

## 2024-06-29 PROCEDURE — 700111 HCHG RX REV CODE 636 W/ 250 OVERRIDE (IP): Performed by: INTERNAL MEDICINE

## 2024-06-29 PROCEDURE — 71046 X-RAY EXAM CHEST 2 VIEWS: CPT

## 2024-06-29 PROCEDURE — 93571 IV DOP VEL&/PRESS C FLO 1ST: CPT | Mod: 26,52,LD | Performed by: INTERNAL MEDICINE

## 2024-06-29 PROCEDURE — 99233 SBSQ HOSP IP/OBS HIGH 50: CPT | Performed by: INTERNAL MEDICINE

## 2024-06-29 PROCEDURE — 700102 HCHG RX REV CODE 250 W/ 637 OVERRIDE(OP): Performed by: SURGERY

## 2024-06-29 PROCEDURE — 93005 ELECTROCARDIOGRAM TRACING: CPT

## 2024-06-29 PROCEDURE — 700101 HCHG RX REV CODE 250: Performed by: INTERNAL MEDICINE

## 2024-06-29 PROCEDURE — 85730 THROMBOPLASTIN TIME PARTIAL: CPT

## 2024-06-29 PROCEDURE — 85347 COAGULATION TIME ACTIVATED: CPT

## 2024-06-29 PROCEDURE — 93882 EXTRACRANIAL UNI/LTD STUDY: CPT | Mod: LT

## 2024-06-29 PROCEDURE — 99153 MOD SED SAME PHYS/QHP EA: CPT

## 2024-06-29 PROCEDURE — 86850 RBC ANTIBODY SCREEN: CPT

## 2024-06-29 PROCEDURE — 700105 HCHG RX REV CODE 258: Performed by: INTERNAL MEDICINE

## 2024-06-29 PROCEDURE — 85610 PROTHROMBIN TIME: CPT

## 2024-06-29 PROCEDURE — 770022 HCHG ROOM/CARE - ICU (200)

## 2024-06-29 PROCEDURE — 83036 HEMOGLOBIN GLYCOSYLATED A1C: CPT

## 2024-06-29 PROCEDURE — 4A023N7 MEASUREMENT OF CARDIAC SAMPLING AND PRESSURE, LEFT HEART, PERCUTANEOUS APPROACH: ICD-10-PCS | Performed by: INTERNAL MEDICINE

## 2024-06-29 PROCEDURE — 80202 ASSAY OF VANCOMYCIN: CPT

## 2024-06-29 PROCEDURE — 87186 SC STD MICRODIL/AGAR DIL: CPT

## 2024-06-29 PROCEDURE — 87077 CULTURE AEROBIC IDENTIFY: CPT

## 2024-06-29 PROCEDURE — 99233 SBSQ HOSP IP/OBS HIGH 50: CPT | Performed by: HOSPITALIST

## 2024-06-29 PROCEDURE — 93970 EXTREMITY STUDY: CPT

## 2024-06-29 PROCEDURE — A9270 NON-COVERED ITEM OR SERVICE: HCPCS | Performed by: SURGERY

## 2024-06-29 PROCEDURE — 700102 HCHG RX REV CODE 250 W/ 637 OVERRIDE(OP): Performed by: HOSPITALIST

## 2024-06-29 PROCEDURE — 99223 1ST HOSP IP/OBS HIGH 75: CPT | Mod: 57,FS | Performed by: THORACIC SURGERY (CARDIOTHORACIC VASCULAR SURGERY)

## 2024-06-29 PROCEDURE — 700117 HCHG RX CONTRAST REV CODE 255: Performed by: INTERNAL MEDICINE

## 2024-06-29 PROCEDURE — 80053 COMPREHEN METABOLIC PANEL: CPT

## 2024-06-29 PROCEDURE — 85027 COMPLETE CBC AUTOMATED: CPT

## 2024-06-29 PROCEDURE — 700111 HCHG RX REV CODE 636 W/ 250 OVERRIDE (IP): Mod: JG | Performed by: SURGERY

## 2024-06-29 PROCEDURE — 99152 MOD SED SAME PHYS/QHP 5/>YRS: CPT | Performed by: INTERNAL MEDICINE

## 2024-06-29 PROCEDURE — 4A033BC MEASUREMENT OF ARTERIAL PRESSURE, CORONARY, PERCUTANEOUS APPROACH: ICD-10-PCS | Performed by: INTERNAL MEDICINE

## 2024-06-29 PROCEDURE — A9270 NON-COVERED ITEM OR SERVICE: HCPCS | Performed by: HOSPITALIST

## 2024-06-29 PROCEDURE — 93458 L HRT ARTERY/VENTRICLE ANGIO: CPT | Mod: 26 | Performed by: INTERNAL MEDICINE

## 2024-06-29 PROCEDURE — 80048 BASIC METABOLIC PNL TOTAL CA: CPT

## 2024-06-29 PROCEDURE — 86900 BLOOD TYPING SEROLOGIC ABO: CPT

## 2024-06-29 PROCEDURE — 700111 HCHG RX REV CODE 636 W/ 250 OVERRIDE (IP)

## 2024-06-29 PROCEDURE — 99222 1ST HOSP IP/OBS MODERATE 55: CPT | Performed by: INTERNAL MEDICINE

## 2024-06-29 PROCEDURE — 86901 BLOOD TYPING SEROLOGIC RH(D): CPT

## 2024-06-29 PROCEDURE — 700101 HCHG RX REV CODE 250

## 2024-06-29 PROCEDURE — B2111ZZ FLUOROSCOPY OF MULTIPLE CORONARY ARTERIES USING LOW OSMOLAR CONTRAST: ICD-10-PCS | Performed by: INTERNAL MEDICINE

## 2024-06-29 PROCEDURE — 93005 ELECTROCARDIOGRAM TRACING: CPT | Performed by: INTERNAL MEDICINE

## 2024-06-29 RX ORDER — NOREPINEPHRINE BITARTRATE 0.03 MG/ML
0-1 INJECTION, SOLUTION INTRAVENOUS CONTINUOUS
Status: DISCONTINUED | OUTPATIENT
Start: 2024-06-30 | End: 2024-06-30

## 2024-06-29 RX ORDER — VERAPAMIL HYDROCHLORIDE 2.5 MG/ML
INJECTION, SOLUTION INTRAVENOUS
Status: COMPLETED
Start: 2024-06-29 | End: 2024-06-29

## 2024-06-29 RX ORDER — MIDAZOLAM HYDROCHLORIDE 1 MG/ML
INJECTION INTRAMUSCULAR; INTRAVENOUS
Status: COMPLETED
Start: 2024-06-29 | End: 2024-06-29

## 2024-06-29 RX ORDER — DEXMEDETOMIDINE HYDROCHLORIDE 4 UG/ML
0-1.5 INJECTION, SOLUTION INTRAVENOUS CONTINUOUS
Status: DISCONTINUED | OUTPATIENT
Start: 2024-06-30 | End: 2024-06-30

## 2024-06-29 RX ORDER — HEPARIN SODIUM 1000 [USP'U]/ML
INJECTION, SOLUTION INTRAVENOUS; SUBCUTANEOUS
Status: COMPLETED
Start: 2024-06-29 | End: 2024-06-29

## 2024-06-29 RX ORDER — LIDOCAINE HYDROCHLORIDE 20 MG/ML
INJECTION, SOLUTION INFILTRATION; PERINEURAL
Status: COMPLETED
Start: 2024-06-29 | End: 2024-06-29

## 2024-06-29 RX ORDER — HEPARIN SODIUM 200 [USP'U]/100ML
INJECTION, SOLUTION INTRAVENOUS
Status: COMPLETED
Start: 2024-06-29 | End: 2024-06-29

## 2024-06-29 RX ORDER — ACETAMINOPHEN 500 MG
1000 TABLET ORAL ONCE
Status: COMPLETED | OUTPATIENT
Start: 2024-06-30 | End: 2024-06-30

## 2024-06-29 RX ORDER — METHADONE HYDROCHLORIDE 10 MG/ML
20 INJECTION, SOLUTION INTRAMUSCULAR; INTRAVENOUS; SUBCUTANEOUS ONCE
Status: CANCELLED | OUTPATIENT
Start: 2024-06-29 | End: 2024-06-29

## 2024-06-29 RX ORDER — EPINEPHRINE HCL IN 0.9 % NACL 4MG/250ML
0-.5 PLASTIC BAG, INJECTION (ML) INTRAVENOUS CONTINUOUS
Status: DISCONTINUED | OUTPATIENT
Start: 2024-06-30 | End: 2024-06-30

## 2024-06-29 RX ADMIN — HYDROCODONE BITARTRATE AND ACETAMINOPHEN 2 TABLET: 5; 325 TABLET ORAL at 13:16

## 2024-06-29 RX ADMIN — APIXABAN 2.5 MG: 2.5 TABLET, FILM COATED ORAL at 04:42

## 2024-06-29 RX ADMIN — AMPICILLIN SODIUM 2000 MG: 2 INJECTION, POWDER, FOR SOLUTION INTRAVENOUS at 18:44

## 2024-06-29 RX ADMIN — IOHEXOL 80 ML: 350 INJECTION, SOLUTION INTRAVENOUS at 16:29

## 2024-06-29 RX ADMIN — LABETALOL HYDROCHLORIDE 10 MG: 5 INJECTION, SOLUTION INTRAVENOUS at 18:18

## 2024-06-29 RX ADMIN — VERAPAMIL HYDROCHLORIDE 5 MG: 2.5 INJECTION, SOLUTION INTRAVENOUS at 15:52

## 2024-06-29 RX ADMIN — FENTANYL CITRATE 100 MCG: 50 INJECTION, SOLUTION INTRAMUSCULAR; INTRAVENOUS at 16:27

## 2024-06-29 RX ADMIN — NITROGLYCERIN 10 ML: 20 INJECTION INTRAVENOUS at 15:52

## 2024-06-29 RX ADMIN — AMPICILLIN SODIUM 2000 MG: 2 INJECTION, POWDER, FOR SOLUTION INTRAVENOUS at 13:10

## 2024-06-29 RX ADMIN — HEPARIN SODIUM: 1000 INJECTION, SOLUTION INTRAVENOUS; SUBCUTANEOUS at 15:52

## 2024-06-29 RX ADMIN — HYDROCODONE BITARTRATE AND ACETAMINOPHEN 2 TABLET: 5; 325 TABLET ORAL at 19:31

## 2024-06-29 RX ADMIN — MORPHINE SULFATE 1 MG: 4 INJECTION INTRAVENOUS at 04:43

## 2024-06-29 RX ADMIN — LIDOCAINE HYDROCHLORIDE: 20 INJECTION, SOLUTION INFILTRATION; PERINEURAL at 15:52

## 2024-06-29 RX ADMIN — MIDAZOLAM HYDROCHLORIDE 2 MG: 1 INJECTION, SOLUTION INTRAMUSCULAR; INTRAVENOUS at 16:27

## 2024-06-29 RX ADMIN — HEPARIN SODIUM 2000 UNITS: 200 INJECTION, SOLUTION INTRAVENOUS at 15:52

## 2024-06-29 RX ADMIN — CEFTRIAXONE SODIUM 2000 MG: 10 INJECTION, POWDER, FOR SOLUTION INTRAVENOUS at 18:32

## 2024-06-29 RX ADMIN — CEFTRIAXONE SODIUM 2000 MG: 10 INJECTION, POWDER, FOR SOLUTION INTRAVENOUS at 13:06

## 2024-06-29 ASSESSMENT — ENCOUNTER SYMPTOMS
FOCAL WEAKNESS: 0
DOUBLE VISION: 0
VOMITING: 0
PND: 0
CONSTIPATION: 0
CHILLS: 0
BLURRED VISION: 0
NERVOUS/ANXIOUS: 0
DIZZINESS: 0
CHILLS: 1
WHEEZING: 0
MYALGIAS: 0
SEIZURES: 0
FEVER: 0
BACK PAIN: 0
NAUSEA: 0
DEPRESSION: 0
CLAUDICATION: 0
PALPITATIONS: 0
WEIGHT LOSS: 0
BRUISES/BLEEDS EASILY: 0
HEARTBURN: 0
WEAKNESS: 1
HEMOPTYSIS: 0
SPEECH CHANGE: 0
SHORTNESS OF BREATH: 0
EYE PAIN: 0
HALLUCINATIONS: 0
NECK PAIN: 0
ABDOMINAL PAIN: 0
ORTHOPNEA: 0
SPUTUM PRODUCTION: 0
FLANK PAIN: 0
CHEST TIGHTNESS: 0
HEADACHES: 0
SHORTNESS OF BREATH: 1
POLYDIPSIA: 0
MYALGIAS: 1
COUGH: 0
DIARRHEA: 0
WEAKNESS: 0
STRIDOR: 0
EYE DISCHARGE: 0
DIAPHORESIS: 0
FEVER: 1
SORE THROAT: 0

## 2024-06-29 ASSESSMENT — PAIN DESCRIPTION - PAIN TYPE
TYPE: ACUTE PAIN

## 2024-06-29 ASSESSMENT — FIBROSIS 4 INDEX
FIB4 SCORE: 1.63
FIB4 SCORE: 1.48

## 2024-06-29 ASSESSMENT — LIFESTYLE VARIABLES: SUBSTANCE_ABUSE: 0

## 2024-06-29 NOTE — PROGRESS NOTES
Hospital Medicine Daily Progress Note    Date of Service  6/29/2024    Chief Complaint  Brian Jasmine is a 68 y.o. male admitted 6/24/2024 with infected dialysis access    Hospital Course  Brian Jasmine is a 68 y.o. male who presented 6/24/2024 with left arm infection.  Mr. Jasmine has a past medical history of peripheral arterial disease with femoral popliteal bypass, end-stage renal disease undergoing dialysis via a left arm graft, atrial fibrillation on Coumadin therapy, hypertension, coronary artery disease with stent to the circumflex that developed pain, redness, swelling in the right arm graft site about 5 days ago and did not go to dialysis and stopped taking all of his medications because he felt so poorly.  He has had fevers and chills.  He presents with the symptoms and here has a grossly infected site.  His creatinine is 8.8 with a potassium 3.8.  Vascular surgery has been consulted as has nephrology.  Attempt will be made at a temporary dialysis catheter until blood cultures are cleared for a possible tunneled catheter.  He states he has had at least 6 or 7 prior neck catheters in the past.  IV vancomycin has been initiated    Interval Problem Update  6/25 patient is in bed, he is new to me today, he is able to give good information, patient had temporary dialysis catheter placed yesterday, nephrology notes reviewed, vascular surgery notes reviewed, patient on IV vancomycin, blood cultures positive for gram-positive cocci x 2, I have ordered echocardiogram, patient will likely require new dialysis catheter, will discuss with infectious disease, probably removed tomorrow after dialysis, discussed with bedside nurse charge nurse  pharmacist, all question have been answered.  6/26 patient is resting in bed, no nausea no vomiting, drain is in place, he is able to give good information, continue IV vancomycin, discussed with infectious disease, note from vascular surgery and nephrology  reviewed, repeat blood cultures in a.m., follow-up echocardiogram, discussed with bedside nurse charge nurse  pharmacist.  6/27 patient is resting in bed, he is alert oriented follows commands, he denies any fever chills nausea vomiting, pain is stable, discussed with bedside nurse charge nurse  pharmacist, continue IV antibiotics, follow-up echo results continue monitoring daily BMP Note from nephrology reviewed.  6/28 patient is resting in bed, he just came back from [facility], he is alert oriented follows commands, RENEE results reviewed, repeated blood culture from 6/27 positive, will repeat blood cultures in a.m., notes from infectious disease reviewed, note from cardiology reviewed, continue IV vancomycin, close monitoring for side effects, will need dialysis catheter removal, notes from nephrology reviewed, continue close monitoring, discussed with bedside nurse charge nurse  pharmacist.  6/29 patient is sitting in chair, he is eating, patient will go for cardiac cath and cardiac surgery for valve replacement tomorrow, discussed with cardiology, note from cardiothoracic surgery reviewed, discussed with infectious disease, note from nephrology reviewed, holding Eliquis, continue antibiotics Unasyn and ceftriaxone, discussed with bedside nurse charge nurse  pharmacist, all question have been answered.    I have discussed this patient's plan of care and discharge plan at IDT rounds today with Case Management, Nursing, Nursing leadership, and other members of the IDT team.    Consultants/Specialty  Nephrology  Vascular surgery  ID  Thoracic surgery  Cardiology    Code Status  Full Code    Disposition  The patient is not medically cleared for discharge to home or a post-acute facility.      I have placed the appropriate orders for post-discharge needs.    Review of Systems  Review of Systems   Constitutional:  Negative for chills and fever.   Eyes:  Negative for blurred  vision and double vision.   Respiratory:  Negative for cough, hemoptysis and wheezing.    Cardiovascular:  Negative for chest pain, palpitations, claudication, leg swelling and PND.   Gastrointestinal:  Negative for heartburn, nausea and vomiting.   Genitourinary:  Negative for hematuria and urgency.   Musculoskeletal:  Negative for back pain and myalgias.        Left arm swelling and redness at the graft area   Skin:  Negative for rash.   Neurological:  Negative for dizziness and headaches.   Endo/Heme/Allergies:  Does not bruise/bleed easily.   Psychiatric/Behavioral:  Negative for depression.         Physical Exam  Temp:  [36.1 °C (97 °F)-36.9 °C (98.5 °F)] 36.9 °C (98.5 °F)  Pulse:  [] 98  Resp:  [16-20] 20  BP: ()/(46-77) 133/72  SpO2:  [93 %-100 %] 100 %    Physical Exam  Vitals and nursing note reviewed.   Constitutional:       Appearance: He is ill-appearing.   Eyes:      General: No scleral icterus.        Right eye: No discharge.         Left eye: No discharge.      Conjunctiva/sclera: Conjunctivae normal.   Cardiovascular:      Rate and Rhythm: Normal rate and regular rhythm.      Pulses: Normal pulses.      Heart sounds: Normal heart sounds.   Pulmonary:      Effort: Pulmonary effort is normal. No respiratory distress.      Breath sounds: Normal breath sounds.   Abdominal:      General: Bowel sounds are normal. There is no distension.      Tenderness: There is no abdominal tenderness.   Musculoskeletal:         General: Swelling and deformity present. Normal range of motion.      Cervical back: Normal range of motion and neck supple.   Skin:     General: Skin is warm and dry.   Neurological:      General: No focal deficit present.      Mental Status: He is alert.      Cranial Nerves: No cranial nerve deficit.   Psychiatric:         Mood and Affect: Mood normal.         Behavior: Behavior normal.         Fluids    Intake/Output Summary (Last 24 hours) at 6/29/2024 3114  Last data filed at  6/29/2024 1303  Gross per 24 hour   Intake 640 ml   Output --   Net 640 ml       Laboratory  Recent Labs     06/27/24  0958 06/27/24  1432 06/28/24  0028 06/28/24  0650 06/28/24  1139 06/29/24  0125   WBC 11.5*  --  10.6  --   --  11.0*   RBC 2.65*  --  2.79*  --   --  2.83*   HEMOGLOBIN 7.9*   < > 8.1* 8.0* 8.7* 8.4*   HEMATOCRIT 24.3*   < > 25.3* 25.6* 26.3* 25.9*   MCV 91.7  --  90.7  --   --  91.5   MCH 29.8  --  29.0  --   --  29.7   MCHC 32.5  --  32.0*  --   --  32.4   RDW 48.8  --  49.0  --   --  48.5   PLATELETCT 199  --  213  --   --  256   MPV 10.5  --  10.7  --   --  10.1    < > = values in this interval not displayed.     Recent Labs     06/27/24  0320 06/28/24  0028 06/29/24  0125   SODIUM 134* 135 135   POTASSIUM 3.7 3.7 3.9   CHLORIDE 97 97 98   CO2 26 24 27   GLUCOSE 104* 102* 98   BUN 36* 43* 25*   CREATININE 4.85* 5.56* 4.05*   CALCIUM 7.6* 7.6* 8.2*                     Imaging  US-CAROTID DOPPLER UNILAT LEFT   Final Result      EC-RENEE W/O CONT   Final Result      EC-ECHOCARDIOGRAM COMPLETE W/O CONT   Final Result      US-SUSI SINGLE LEVEL BILAT   Final Result      US-EXTREMITY ARTERY LOWER BILAT   Final Result      DX-CHEST-PORTABLE (1 VIEW)   Final Result      1.  Satisfactory appearance of the right IJ catheter. No pneumothorax visualized.      US-HEMODIALYSIS GRAFT DUPLEX COMP UPPER EXTREMITY   Final Result      US-HEMODIALYSIS GRAFT DUPLEX COMP UPPER EXTREMITY   Final Result      DX-CHEST-PORTABLE (1 VIEW)   Final Result      Mild enlargement of the cardiomediastinal silhouette without acute cardiopulmonary abnormality.      CL-LEFT HEART CATHETERIZATION WITH POSSIBLE INTERVENTION    (Results Pending)        Assessment/Plan  * Infected prosthetic vascular graft, initial encounter ([facility])- (present on admission)  Assessment & Plan  Left arm graft is red, swollen, tender, grossly infected  Cultures have been drawn  IV vancomycin  Temporary dialysis access will be obtained  Vascular surgery has  been consulted.     Patient with positive blood cultures continue IV vancomycin I have ordered echocardiogram  Will consult with infectious disease    Discussed with infectious disease Dr. Mercado, continue IV vancomycin, monitoring for side effects, monitoring vancomycin levels  Follow-up echocardiogram  Or cultures positive for Enterococcus faecalis    Repeat blood cultures in a.m.  Cultures from 6/27 positive  Follow-up repeat blood culture from 6/28    Bacteremia- (present on admission)  Assessment & Plan  Probably related to infected graft, status post removal.  Continue IV vancomycin  Discussed with infectious disease  Follow-up repeat blood cultures in AM.  Blood cultures positive for Enterococcus  Follow-up repeat blood cultures  Continue IV vancomycin    Echo showed possible vegetation  I have discussed with cardiology for RENEE today, patient went for RENEE that confirmed vegetation  Repeat blood culture from 6/27 positive again, will repeat blood cultures in a.m.  Continue IV vancomycin as per infectious disease  Repeat blood cultures  Discussed with infectious disease transition to ampicillin ceftriaxone    Coronary artery disease- (present on admission)  Assessment & Plan  Followed by cardiology in [place] with history of stent to the circumflex  No chest pain    PAD (peripheral artery disease) (HCC)- (present on admission)  Assessment & Plan  Hx of previous fem-pop bypass    Troponin level elevated- (present on admission)  Assessment & Plan  Troponin is elevated at 200  He denies chest pain  EKG reveals an intraventricular conduction delay not clearly left nor right bundle  Likely a stress response given his infection and lack of dialysis.    Follow-up echo    Chronic atrial fibrillation (HCC)- (present on admission)  Assessment & Plan  He had been on coumadin but hasn't been taking it    Continue monitoring  Continue monitoring discussed with Vascular surgery okay to restart Eliquis from vascular  surgery standpoint    Hold Eliquis for possible cardiac surgery    End stage renal disease (HCC)- (present on admission)  Assessment & Plan  He has not had dialysis since Wednesday is has not been feeling well  Creatinine is elevated at 8.8 unfortunately potassium is 3.8  Once temporary access has been obtained and dialysis will be done by nephrology      Note from nephrology reviewed    Renovascular hypertension- (present on admission)  Assessment & Plan  Restart his home Coreg with holding parameters         VTE prophylaxis: SCDs    I have performed a physical exam and reviewed and updated ROS and Plan today (6/29/2024). In review of yesterday's note (6/28/2024), there are no changes except as documented above.      Total time of 52 minutes spent prepping to see patient (e.g. reviewing  tests/imaging results, notes from consultants, bedside nurse, night shift ) obtaining and/or reviewing separately obtained history. Performing a medically appropriate examination and evaluation.  Counseling and educating the patient.  Ordering medications, tests, or procedures.  Referring and communicating with other health care professionals.  Documenting clinical information in EPIC.  Independently interpreting results and communicating results to patient.  Care coordination.

## 2024-06-29 NOTE — PROGRESS NOTES
[facility]     Dialysis treatment started at 0711 and completed at 1011. Nephrologist Dr. Negron notified of treatment start.     NET UF: 2000 mL     Patient is A&Ox4, no pain and discomfort reported. VS within normal limits. Left UA AVF has Bruitt and thrill and no signs and symptoms of infection. AVF cannulated and no access issues encountered. Lines are patent w/ good blood flow. Lab results and orders reviewed prior to treatment start.     Patient completed and tolerated dialysis treatment well w/o complications. VS stayed within normal limits. Left UA AVF Deaccessed, No bleeding noted after needle removal. Manual access pressure applied x 10mins. Patient and PCN instructed to monitor Left UA AVF site for bleeding and to re-enforce pressure dressing if needed.     1030 Report given to PCN UMAIR Cage RN    See Dialysis treatment flow sheet for details.

## 2024-06-29 NOTE — CONSULTS
REFERRING PHYSICIAN: Jeremy Jacques MD.     CONSULTING PHYSICIAN: Zion Austin DO     CHIEF COMPLAINT: Infective endocarditis     HISTORY OF PRESENT ILLNESS: The patient is a 68 y.o. male with a past medical history significant for coronary artery disease (s/p PCI to OM 6/2021), peripheral vascular disease (s/p bilat femoral-popliteal bypass) ESRD on HD, chronic anticoagulation for AV fistula, HTN, HLD, hx of tobacco use who presented on 6/24/24 with fever and chills and concern for infection involving the AV fistula. Blood cultures positive for enterococcus faecalis, ampicillin sensitive. Cardiology was consulted and performed RENEE, which demonstrated a mobile aortic valve echogenic mass consistent with vegetation with severe aortic regurgitation. EKG also revealed atrial fibrillation. For this reason our service was consulted.     The patient was seen and evaluated at bedside this morning and endorses the above history.  He currently reports no new issues. He denies chest pain, shortness of breath, palpitations, orthopnea, syncope, lower extremity edema.       PAST MEDICAL HISTORY:   Active Ambulatory Problems     Diagnosis Date Noted    Renovascular hypertension 02/13/2017    Obesity (BMI 30-39.9) 03/14/2017    Chronic combined systolic and diastolic congestive heart failure (HCC) 04/13/2017    Renal artery stenosis (HCC) 04/10/2018    History of alcohol abuse 02/28/2017    End stage renal disease (HCC) 03/12/2019    Chronic obstructive pulmonary disease (HCC) 03/12/2019     Resolved Ambulatory Problems     Diagnosis Date Noted    Generalized abdominal pain 02/13/2017    Pseudocyst of pancreas 02/15/2017    Acute kidney injury (HCC) 02/15/2017    Pancreatitis 02/15/2017    Essential hypertension 03/01/2017    Acute on chronic pancreatitis (HCC) 03/16/2017    Pain of upper abdomen 04/13/2017    Simple chronic bronchitis (HCC) 04/13/2017    Non compliance w medication regimen 06/09/2017    Prophylactic  vaccination against streptococcus pneumoniae and influenza 03/06/2018    Colonoscopy refused 03/06/2018    Obesity (BMI 35.0-39.9 without comorbidity) (HCC) 03/06/2018    Other chest pain 03/06/2019     Past Medical History:   Diagnosis Date    Allergy     Aortic aneurysm (HCC)     CAD (coronary artery disease)     CHF (congestive heart failure) (HCC)     Chronic kidney disease (CKD), stage III (moderate)     COPD (chronic obstructive pulmonary disease) (HCC)     Hypertension     Impaired hearing     Indigestion     Renal failure     Sleep apnea        PAST SURGICAL HISTORY:   Past Surgical History:   Procedure Laterality Date    AV FISTULA REVISION Left 6/25/2024    Procedure: RESECTION OF INFECTED LEFT ARM ARTERIOVENOUS  GRAFT PSEUDOANEURYSM . RECONSTRUCT LEFT BRACHIAL ARTERY USING LEFT GREATER SAPHENOUS VEIN;  Surgeon: Doug Tinoco M.D.;  Location: SURGERY [facility];  Service: General    GASTROSCOPY N/A 02/17/2017    Procedure: GASTROSCOPY;  Surgeon: Morales Hinton M.D.;  Location: SURGERY [facility];  Service:     EGD ESOPHAGUS WITH ENDOSCOPIC US N/A 02/17/2017    Procedure: EGD ESOPHAGUS WITH ENDOSCOPIC US UPPER WITH POSSIBLE FNA;  Surgeon: Morales Hinton M.D.;  Location: SURGERY [facility];  Service:     AV FISTULA CREATION Left         ALLERGIES: No Known Allergies     CURRENT MEDICATIONS:   Current Facility-Administered Medications:     epoetin (Epogen,Procrit) injection 10,000 Units, 10,000 Units, Intravenous, MO, WE + FR, Jeremy Negron M.D., 10,000 Units at 06/28/24 1020    MD Alert...Vancomycin per Pharmacy, , Other, PHARMACY TO DOSE, Hiral Mercado M.D.    apixaban (Eliquis) tablet 2.5 mg, 2.5 mg, Oral, BID, Mello Yen M.D., 2.5 mg at 06/29/24 0442    midodrine (Proamatine) tablet 10 mg, 10 mg, Oral, QDAY PRN, Oswald Pearce M.D., 10 mg at 06/28/24 0650    Pharmacy Consult Request ...Pain Management Review 1 Each, 1 Each, Other, PHARMACY TO DOSE, Doug Tinoco M.D.     ondansetron (Zofran) syringe/vial injection 4 mg, 4 mg, Intravenous, Q4HRS PRN, Doug Tinoco M.D.    labetalol (Normodyne/Trandate) injection 10 mg, 10 mg, Intravenous, Q4HRS PRN, Doug Tinoco M.D.    hydrALAZINE (Apresoline) injection 10 mg, 10 mg, Intravenous, Q4HRS PRN, Doug Tinoco M.D.    HYDROcodone-acetaminophen (Norco) 5-325 MG per tablet 1-2 Tablet, 1-2 Tablet, Oral, Q6HRS PRN, Doug Tinoco M.D., 2 Tablet at 24 2317    morphine 4 MG/ML injection 1-2 mg, 1-2 mg, Intravenous, Q2HRS PRN, Doug Tinoco M.D., 1 mg at 24 0443    acetaminophen (Tylenol) tablet 650 mg, 650 mg, Oral, Q6HRS PRN, Morales Mayen M.D., 650 mg at 24 1441    heparin injection 500 Units, 500 Units, Intravenous, DIALYSIS PRN, Oswald Pearce M.D., 500 Units at 24 0711    heparin injection 1,500 Units, 1,500 Units, Intravenous, DIALYSIS PRN, Oswald Pearce M.D., 1,500 Units at 24 0711    heparin intracatheter (for DIALYSIS USE ONLY) 1,200 Units, 1,200 Units, Intracatheter, DIALYSIS PRN, Oswald Pearce M.D., 1,200 Units at 24 1015    heparin intracatheter (for DIALYSIS USE ONLY) 1,200 Units, 1,200 Units, Intracatheter, DIALYSIS PRN, Oswald Pearce M.D., 1,200 Units at 24 1015    FAMILY HISTORY:   Family History   Problem Relation Age of Onset    Heart Disease Mother     Cancer Father     Heart Disease Brother         SOCIAL HISTORY:   Social History     Socioeconomic History    Marital status: Single     Spouse name: Not on file    Number of children: Not on file    Years of education: Not on file    Highest education level: Not on file   Occupational History    Not on file   Tobacco Use    Smoking status: Former     Current packs/day: 0.00     Average packs/day: 0.5 packs/day for 42.2 years (21.1 ttl pk-yrs)     Types: Cigarettes     Start date: 1977     Quit date: 3/4/2019     Years since quittin.3    Smokeless tobacco: Never   Vaping Use    Vaping status: Never Used    Substance and Sexual Activity    Alcohol use: No     Comment: Hx of alcohol use    Drug use: No    Sexual activity: Not Currently     Partners: Female   Other Topics Concern    Not on file   Social History Narrative    Not on file     Social Determinants of Health     Financial Resource Strain: Not on file   Food Insecurity: No Food Insecurity (6/25/2024)    Hunger Vital Sign     Worried About Running Out of Food in the Last Year: Never true     Ran Out of Food in the Last Year: Never true   Transportation Needs: No Transportation Needs (6/25/2024)    PRAPARE - Transportation     Lack of Transportation (Medical): No     Lack of Transportation (Non-Medical): No   Physical Activity: Not on file   Stress: Not on file   Social Connections: Not on file   Intimate Partner Violence: Not At Risk (6/25/2024)    Humiliation, Afraid, Rape, and Kick questionnaire     Fear of Current or Ex-Partner: No     Emotionally Abused: No     Physically Abused: No     Sexually Abused: No   Housing Stability: Low Risk  (6/25/2024)    Housing Stability Vital Sign     Unable to Pay for Housing in the Last Year: No     Number of Places Lived in the Last Year: 1     Unstable Housing in the Last Year: No       REVIEW OF SYSTEMS:  Review of Systems   Constitutional:  Positive for chills, fever and malaise/fatigue. Negative for diaphoresis and weight loss.   HENT:  Negative for congestion, ear pain, hearing loss, nosebleeds, sore throat and tinnitus.    Eyes:  Negative for blurred vision, double vision, pain and discharge.   Respiratory:  Positive for shortness of breath. Negative for cough, hemoptysis, sputum production, wheezing and stridor.    Cardiovascular:  Positive for leg swelling. Negative for chest pain, palpitations and orthopnea.   Gastrointestinal:  Negative for abdominal pain, constipation, heartburn, melena, nausea and vomiting.   Genitourinary:  Negative for dysuria, flank pain and hematuria.   Musculoskeletal:  Positive for  "joint pain. Negative for myalgias and neck pain.   Skin:  Negative for itching and rash.   Neurological:  Negative for dizziness, speech change, focal weakness, seizures, weakness and headaches.   Endo/Heme/Allergies:  Negative for environmental allergies and polydipsia. Does not bruise/bleed easily.   Psychiatric/Behavioral:  Negative for depression, hallucinations, substance abuse and suicidal ideas.          PHYSICAL EXAMINATION:    BP (!) 148/58   Pulse 89   Temp 36.5 °C (97.7 °F) (Temporal)   Resp 20   Ht 1.854 m (6' 1\")   Wt 90.8 kg (200 lb 2.8 oz)   SpO2 99%   BMI 26.41 kg/m²    Physical Exam  Constitutional:       General: He is not in acute distress.  HENT:      Head: Normocephalic and atraumatic.      Nose: Nose normal.   Eyes:      Conjunctiva/sclera: Conjunctivae normal.      Pupils: Pupils are equal, round, and reactive to light.   Neck:      Vascular: No JVD.      Trachea: No tracheal deviation.   Cardiovascular:      Rate and Rhythm: Normal rate and regular rhythm.      Heart sounds: Murmur heard.   Pulmonary:      Effort: Pulmonary effort is normal. No respiratory distress.      Breath sounds: Normal breath sounds. No stridor.   Abdominal:      General: Bowel sounds are normal. There is no distension.      Palpations: Abdomen is soft.      Tenderness: There is no abdominal tenderness.   Musculoskeletal:         General: No tenderness. Normal range of motion.      Cervical back: Normal range of motion and neck supple.   Skin:     General: Skin is warm and dry.   Neurological:      Mental Status: He is alert and oriented to person, place, and time.      Coordination: Coordination normal.      Gait: Gait is intact.   Psychiatric:         Mood and Affect: Mood and affect normal.         Cognition and Memory: Memory normal.           LABS REVIEWED:  Lab Results   Component Value Date/Time    SODIUM 135 06/29/2024 01:25 AM    POTASSIUM 3.9 06/29/2024 01:25 AM    CHLORIDE 98 06/29/2024 01:25 AM    " CO2 27 06/29/2024 01:25 AM    GLUCOSE 98 06/29/2024 01:25 AM    BUN 25 (H) 06/29/2024 01:25 AM    CREATININE 4.05 (H) 06/29/2024 01:25 AM    GLOMRATE 7 (L) 02/15/2023 03:59 AM      Lab Results   Component Value Date/Time    PROTHROMBTM 17.9 (H) 06/24/2024 10:38 AM    INR 1.46 (H) 06/24/2024 10:38 AM      Lab Results   Component Value Date/Time    WBC 11.0 (H) 06/29/2024 01:25 AM    RBC 2.83 (L) 06/29/2024 01:25 AM    HEMOGLOBIN 8.4 (L) 06/29/2024 01:25 AM    HEMATOCRIT 25.9 (L) 06/29/2024 01:25 AM    MCV 91.5 06/29/2024 01:25 AM    MCH 29.7 06/29/2024 01:25 AM    MCHC 32.4 06/29/2024 01:25 AM    MPV 10.1 06/29/2024 01:25 AM    NEUTSPOLYS 79.20 (H) 06/25/2024 12:53 AM    LYMPHOCYTES 9.30 (L) 06/25/2024 12:53 AM    MONOCYTES 10.40 06/25/2024 12:53 AM    EOSINOPHILS 0.50 06/25/2024 12:53 AM    BASOPHILS 0.10 06/25/2024 12:53 AM    HYPOCHROMIA 1+ (A) 08/31/2021 08:05 AM    ANISOCYTOSIS 1+ (A) 08/31/2021 08:05 AM        IMAGING REVIEWED AND INTERPRETED:    ECHOCARDIOGRAM   RENEE 6/28/24 @ [facility]   Echocardiography Laboratory  CONCLUSIONS  Normal left ventricular systolic function.  The left ventricular ejection fraction is visually estimated to be 65-  70%.  Aortic valve with large (1.7x 1.1 cm) lobulated mobile mass consistent   with vegetation.  Severe-torrential aortic insufficiency.  Normal right ventricular systolic function    CARDIAC CATHETERIZATION   None    CT SCAN CHEST   None       IMPRESSION:  8 y.o. male with a past medical history significant for coronary artery disease (s/p PCI to OM 6/2021), peripheral vascular disease (s/p bilat femoral-popliteal bypass) ESRD on HD, chronic anticoagulation for AV fistula, HTN, HLD, hx of tobacco, now with infective endocarditis and severe aortic regurgitation.       PLAN:  I recommend aortic valve replacement, with modified maze procedure    The procedure, its risks, benefits, potential complications and alternative treatments were discussed with the patient in detail  including the risks should he decide not to undergo my recommended treatment. All of his questions were answered to his satisfaction and he is willing to proceed with the operation. The risks include death, stroke, infection: to include a rare bacterial infection related to the use of the heart/lung machine, sonia-operative myocardial infarction, dysrhythmias, diaphragmatic paralysis, chest wall paresthesia, tracheostomy, kidney or other organ failure, possible return to the operating room for bleeding, bleeding requiring transfusion with its attendant risks including AIDS or hepatitis, dehiscence of surgical incisions, respiratory complications including the need for prolonged ventilator support, Protamine or other drug reaction, peripheral neuropathy, loss of limb, and miscount of surgical items. The operative mortality risk is approximately 8%. The STS mortality risk score for AVR is 6% and the morbidity and mortality risk score for AVR is 23%. The scores were discussed with patient.     Thank you for this very challenging consultation and participation in the patient’s care.  I will keep you apprised of all future developments.      The operation is scheduled this hospitalization in the next few days.     Sincerely,     Zion Austin DO.          I,  Zion Austin DO performed a substantial portion of the service face-to-face with the same patient on the same date of service. I have reviewed and agree with the care plan and complexity of problems documented by me, Zion Austin DO and/or Onofre Sutherland PA-C. I was personally involved in the medical decision making, including the information below:  reviewing and interpreting the films, conducted elements of the history and physical exam, and provided the plan of care. All medical decision making was made by me.

## 2024-06-29 NOTE — PROGRESS NOTES
Monitor Summary  Rhythm: Sinus Rhythm  Rate:70-86  Ectopy:PVC, Trig, PAC, Coup  .14/.08/.39

## 2024-06-29 NOTE — CONSULTS
Cardiology Initial Consultation    Date of Service  6/29/2024    Referring Physician  Mello Yen M.D    Reason for Consultation  Abnormal echocardiogram    History of Presenting Illness  Brian Jasmine is a 68 y.o. male with a past medical history of CAD, PCI OM (2.25 x 18 mm 6/30/2021), peripheral vascular disease with bilateral femoral-popliteal bypass, ESRD on hemodialysis, chronic anticoagulation for AV fistula patency, hypertension, dyslipidemia, prior chronic tobacco use who presented 6/24/2024 with fevers, chills and evidence of AV fistula infection requiring resection.  Blood cultures positive for Enterococcus faecalis ampicillin sensitive.  Transthoracic echocardiogram showed mobile aortic valve echogenic mass.  Transesophageal echocardiogram showed mobile aortic valve echogenic mass consistent with vegetation with severe aortic regurgitation.  Additionally EKG on admission showed atrial fibrillation.  Cardiology consultation was requested.    The patient [facility] last seen 6/14/2024.  Currently    The patient denies any symptoms of chest pain, shortness of breath or palpitations.  He has never been diagnosed with atrial fibrillation though he states he has been told of an irregular heartbeat during dialysis sessions.  He has been on chronic anticoagulation to maintain dialysis fistula patency easily with warfarin and was transitioned to Eliquis.    He denies a history of stroke or thyroid disease.    Review of Systems  Review of Systems   Respiratory:  Negative for chest tightness and shortness of breath.    Cardiovascular:  Negative for palpitations.   Gastrointestinal:  Negative for abdominal pain and nausea.   Neurological:  Negative for dizziness and headaches.     Past Medical History   has a past medical history of Allergy, Aortic aneurysm (HCC), CAD (coronary artery disease), CHF (congestive heart failure) ([facility]), Chronic kidney disease  (CKD), stage III (moderate), COPD (chronic obstructive pulmonary disease) (HCC), Hypertension, Impaired hearing, Indigestion, Renal failure, and Sleep apnea.    Surgical History   has a past surgical history that includes gastroscopy (N/A, 02/17/2017); egd esophagus with endoscopic us (N/A, 02/17/2017); av fistula creation (Left); and av fistula revision (Left, 6/25/2024).    Family History  family history includes Cancer in his father; Heart Disease in his brother and mother.    Social History   reports that he quit smoking about 5 years ago. His smoking use included cigarettes. He started smoking about 47 years ago. He has a 21.1 pack-year smoking history. He has never used smokeless tobacco. He reports that he does not drink alcohol and does not use drugs.    Medications  Prior to Admission Medications   Prescriptions Last Dose Informant Patient Reported? Taking?   Epoetin Jhonny-epbx (RETACRIT INJ) 6/19/2024 at [facility], Patient's Home Pharmacy Yes Yes   Sig: Inject 400 Units as directed every Monday, Wednesday, and Friday.   apixaban (ELIQUIS) 2.5mg Tab UNK at [facility] Patient's Home Pharmacy Yes Yes   Sig: Take 2.5 mg by mouth 2 times a day.   ezetimibe (ZETIA) 10 MG Tab UNK at [facility] Patient's Home Pharmacy Yes Yes   Sig: Take 10 mg by mouth every day.   oxyCODONE immediate release (ROXICODONE) 10 MG immediate release tablet UNK at [facility] Patient's Home Pharmacy Yes Yes   Sig: Take 10 mg by mouth every 6 hours as needed for Moderate Pain.   potassium citrate SR (UROCIT-K SR) 10 MEQ (1080 MG) Tab CR UNK at [facility] Patient's Home Pharmacy Yes Yes   Sig: Take 10 mEq by mouth every day.   rosuvastatin (CRESTOR) 10 MG Tab UNK at [facility] Patient's Home Pharmacy Yes Yes   Sig: Take 10 mg by mouth every day.   valsartan (DIOVAN) 80 MG Tab UNK at [facility] Patient's Home Pharmacy Yes No   Sig: Take 80 mg by mouth every day.   warfarin (COUMADIN) 5 MG Tab COUPLE WEEKS AGO at [facility] Patient, Patient's Home Pharmacy Yes No       Facility-Administered Medications: None     epoetin, 10,000 Units, Intravenous, MO, WE + FR  MD Alert...Vancomycin per Pharmacy, , Other, PHARMACY TO DOSE  apixaban, 2.5 mg, Oral, BID  Pharmacy Consult Request, 1 Each, Other, PHARMACY TO DOSE        Allergies  No Known Allergies    Vital signs in last 24 hours  Temp:  [36.1 °C (97 °F)-36.5 °C (97.7 °F)] 36.5 °C (97.7 °F)  Pulse:  [] 80  Resp:  [14-18] 16  BP: ()/(46-77) 148/58  SpO2:  [92 %-100 %] 98 %    Physical Exam  Physical Exam  Constitutional:       General: He is not in acute distress.  HENT:      Head: Normocephalic.   Eyes:      General: No scleral icterus.     Conjunctiva/sclera: Conjunctivae normal.      Pupils: Pupils are equal, round, and reactive to light.   Neck:      Comments: Normal jugular venous pressure.  Cardiovascular:      Rate and Rhythm: Normal rate and regular rhythm.      Pulses:           Carotid pulses are 1+ on the right side and 1+ on the left side.       Radial pulses are 1+ on the right side and 1+ on the left side.        Dorsalis pedis pulses are 0 on the right side and 1+ on the left side.      Heart sounds: S1 normal and S2 normal. No murmur heard.     No friction rub. No gallop.   Pulmonary:      Effort: Pulmonary effort is normal.      Breath sounds: Normal breath sounds. No wheezing, rhonchi or rales.   Chest:      Comments: Permacath  Abdominal:      General: Bowel sounds are normal.      Palpations: Abdomen is soft.      Tenderness: There is no abdominal tenderness.   Musculoskeletal:      Right lower leg: No edema.      Left lower leg: No edema.      Comments: Bilateral leg scars   Skin:     General: Skin is warm and dry.      Nails: There is no clubbing.   Neurological:      Mental Status: He is alert and oriented to person, place, and time.   Psychiatric:         Behavior: Behavior normal.         Lab Review  Lab Results   Component Value Date/Time    WBC 11.0 (H) 06/29/2024 01:25 AM    RBC 2.83 (L)  "06/29/2024 01:25 AM    HEMOGLOBIN 8.4 (L) 06/29/2024 01:25 AM    HEMATOCRIT 25.9 (L) 06/29/2024 01:25 AM    MCV 91.5 06/29/2024 01:25 AM    MCH 29.7 06/29/2024 01:25 AM    MCHC 32.4 06/29/2024 01:25 AM    MPV 10.1 06/29/2024 01:25 AM      Lab Results   Component Value Date/Time    SODIUM 135 06/29/2024 01:25 AM    POTASSIUM 3.9 06/29/2024 01:25 AM    CHLORIDE 98 06/29/2024 01:25 AM    CO2 27 06/29/2024 01:25 AM    GLUCOSE 98 06/29/2024 01:25 AM    BUN 25 (H) 06/29/2024 01:25 AM    CREATININE 4.05 (H) 06/29/2024 01:25 AM    GLOMRATE 7 (L) 02/15/2023 03:59 AM      Lab Results   Component Value Date/Time    ASTSGOT 23 06/24/2024 10:38 AM    ALTSGPT 16 06/24/2024 10:38 AM     Lab Results   Component Value Date/Time    CHOLSTRLTOT 172 05/09/2022 10:44 AM    LDL 99 05/09/2022 10:44 AM    HDL 39.0 (L) 05/09/2022 10:44 AM    TRIGLYCERIDE 170 (H) 05/09/2022 10:44 AM    TROPONINT 200 (H) 06/24/2024 10:38 AM       No results for input(s): \"NTPROBNP\" in the last 72 hours.    Cardiac Imaging and Procedures Review  EKG:  My personal interpretation of the EKG dated 6/25/2024 is atrial fibrillation    Rhythm: My personal interpretation the rhythm dated 6/29/2024 is sinus rhythm, PACs    Echocardiogram:  6/27/2024  Moderately reduced left ventricular systolic function.  Left ventricular ejection fraction is visually estimated to be 35-40%.  Indeterminate diastolic function.  Normal inferior vena cava size and inspiratory collapse.  Right ventricular systolic pressure is estimated to be 38 mmHg.  Aortic sclerosis without stenosis.    Echocardiogram: 6/28/2024  Normal left ventricular systolic function. The ejection fraction is   measured to be  53% by Pugh's biplane.   Normal right ventricular size and systolic function.  Moderate aortic insufficiency. Mobile echodensity seen on the aortic   valve, concerning for vegetation.   Unable to estimate pulmonary artery pressure due to an inadequate   tricuspid regurgitant jet.  No recent " study is available for comparison.   Primary team is notified of findings.    Transesophageal echocardiogram: 6/28/2024  Normal left ventricular systolic function.  The left ventricular ejection fraction is visually estimated to be 65-  70%.  Aortic valve with large (1.7x 1.1 cm) lobulated mobile mass consistent   with vegetation.  Severe-torrential aortic insufficiency.  Normal right ventricular systolic function.    Cardiac Catheterization:  6/30/2021  PCI Cx marginal 2.25 x 18 mm AURA     Imaging  Chest X-Ray:  6/24/2024   Mild enlargement of the cardiomediastinal silhouette without acute cardiopulmonary abnormality.    Assessment  Infective endocarditis aortic valve  Severe aortic regurgitation  Atrial fibrillation, newly diagnosed, rate controlled  Bacteremia, Enterococcus faecalis  CAD, PCI Cx marginal 2.25 x 25 mm AURA 6/30/2021 [facility]  ESRD on hemodialysis  Peripheral vascular disease, bilateral femoral-popliteal bypass 2021, 2023  Hypertension  Dyslipidemia  Prior tobacco use    Recommendation Discussion  Clinically stable from a cardiac standpoint currently with no ischemic symptoms, no heart failure findings.  I reviewed with the patient the findings of the transesophageal echocardiogram.  Recommend CT surgical consultation for aortic valve vegetation and severe aortic regurgitation  Continue ASCVD prevention.  Recommend resuming rosuvastatin, ezetimibe.  Target LDL of less than 70.  Recheck lipid panel  Check TFTs  For atrial fibrillation continue Eliquis for atrial fibrillation stroke thromboembolic prophylaxis.  Rate appears for the most part to be intrinsically controlled.  Currently appears to be in sinus rhythm.  Will repeat EKG.  I had a detailed discussion with the patient informing him of his various cardiac problems in particular his new aortic valve findings.  I also spoke with Angela his significant other  No additional cardiac recommendations at this time  I personally discussed  management plan with Mello Yen M.D.     Thank you for allowing me to participate in the care of this patient.    Please contact me with any questions.    Jeremy Dalal M.D.   Cardiologist, [facility]  [phone]

## 2024-06-29 NOTE — PROCEDURES
"CARDIAC CATHETERIZATION REPORT    REFERRING: Zion Austin,     PROCEDURE PHYSICIAN: Jonathan Purvis MD, [facility], [facility]  ASSISTANT: Jc Whitfield MD    IMPRESSIONS:  1.  Obstructive two-vessel coronary artery disease involving the LAD and RCA    Recommendations:  CABG plus AVR    Pre-procedure diagnosis:  Endocarditis, surgical team recommending preoperative angiogram  Post-procedure diagnosis: Same    Procedure performed  Selective coronary angiography  Left heart catheterization  Instantaneous wave free ratio (iFR) (LAD)    Conscious sedation was supervised by myself and administered by trained personnel using fentanyl and versed between 1546 and 1629. The patient tolerated sedation without complication.     Procedure Description  1. Access: 5/6 [place] right radial artery Micropuncture technique was utilized following local anesthesia with lidocaine.  A radial cocktail containing verapamil and saline was administered in the radial artery sheath    2. Diagnostic description: The catheter was passed to the central circulation with the aide of J tipped 0.35\" wire. A 5F TIG 4.0 and 6F EBU 3.5 were used to inject the coronary circulation and enter the left ventricle during invasive hemodynamic monitoring.  The EBU 3.5 was used to perform IFR using a [employer] Omni wire system.  The IFR of the LAD was 0.75.  Left heart catheterization was performed after the EBU catheter inadvertently entered the left ventricle during attempts to engage the left main coronary artery    3. Closing: At completion of the procedure the relevant equipment was removed from the body and hemostasis achieved by Radial band    Findings   Hemodynamics:   Aorta: 123/52 mmHg  LVEDP: 30 mmHg    Coronary Anatomy   Left Main: Normal   LAD: 80% stenosis in the mid segment.  The IFR is 0.75   LCx:  Ostial 30% stenosis.  The OM1 has minimal luminal irregularities, the OM 2 has proximal 40% stenosis   RCA:  70% sequential stenosis in the midsegment of " the vessel.  The PDA has minimal luminal irregularities, the PLB is normal     Technical Factors  1. Complications: None  2. Estimated Blood Loss: <50 cc  3. Specimens: None  4. Contrast Volume: 100 ml  5. Medications: Radial cocktail (Verapamil 2.5 mg, Nitroglycerin 100 mcg) Heparin 8000 units  6. Radiation (air kerma): 347 mGy

## 2024-06-29 NOTE — PROGRESS NOTES
Received report from [facility] lab RN, pt transported on zoll to T625, pt placed on bedside monitor, 2 RN skin check completed, fall precautions in place, call light in reach. R radial site with TR band in place, HELEN with ACE bandage wrap in place. Pt Aox4, bigeminy on monitor, BP to L calf.

## 2024-06-29 NOTE — PROGRESS NOTES
[facility]     Dialysis treatment started at 0711 and completed at 1011. Nephrologist Dr. Negron notified of treatment start.     NET UF: 2000 mL     Patient is A&Ox4, no pain and discomfort reported. VS within normal limits. Right chest CVC, dressing is CDI and no signs and symptoms of infection noted. CVC Lines are patent w/ good blood flow. Lab results and orders reviewed prior to treatment start.     Patient completed and tolerated dialysis treatment well w/o complications. VS stayed within normal limits. Right chest CVC care done aseptically, lines flushed, Heparin lock in each lines, then clamped and capped. Access site labelled and dated.    1030 Report given to ALIN Cage RN     See Dialysis flow sheet for details

## 2024-06-29 NOTE — PROGRESS NOTES
Bedside report received from off going RN/tech: Madeleine, assumed care of patient.     Fall Risk Score: HIGH RISK  Fall risk interventions in place: Place yellow fall risk ID band on patient, Provide patient/family education based on risk assessment, Educate patient/family to call staff for assistance when getting out of bed, Place fall precaution signage outside patient door, Place patient in room close to nursing station, Utilize bed/chair fall alarm, Notify charge of high risk for huddle, and Bed alarm connected correctly  Bed type: Regular (Gary Score less than 17 interventions in place)  Patient on cardiac monitor: Yes  IVF/IV medications: Not Applicable   Oxygen: How many liters 1L  Bedside sitter: Not Applicable   Isolation: Not applicable  Pt opens eyes spontaneously and tracks RN, demonstrates oriented conversation to person, place, time, and event, follows commands. Bedside report received from day shift RN. Pt is currently A&Ox4, SR, breathing at a normal rate and rhythm, warm and dry, skin color appropriate for ethnicity, and in no visible emotional or physical distress. Bed locked in lowest position, call light is within reach, fall prevention measures in place. Pt educated to use call light before getting out of bed, pt verbalized understanding. No further needs at this time.

## 2024-06-29 NOTE — PROGRESS NOTES
Monitor Summary  Rhythm: SR  Rate: 68 95  Ectopy: frequent PVC, rare Bigem, rare trigem, rare coup,   .13 / .07 / .41

## 2024-06-29 NOTE — CARE PLAN
The patient is Stable - Low risk of patient condition declining or worsening    Shift Goals  Clinical Goals: VSS, mobility, pain control, abx  Patient Goals: rest, pain control  Family Goals: [place]    Progress made toward(s) clinical / shift goals:    Problem: Pain - Standard  Goal: Alleviation of pain or a reduction in pain to the patient’s comfort goal  Outcome: Progressing     Problem: Knowledge Deficit - Standard  Goal: Patient and family/care givers will demonstrate understanding of plan of care, disease process/condition, diagnostic tests and medications  Outcome: Progressing     Problem: Hemodynamics  Goal: Patient's hemodynamics, fluid balance and neurologic status will be stable or improve  Outcome: Progressing       Patient is not progressing towards the following goals:

## 2024-06-29 NOTE — PROGRESS NOTES
Infectious Disease Progress Note    Author: Sarabjit Pulliam M.D. Date & Time of service: 2024  10:14 AM    Chief Complaint:  Bacteremia, endocarditis    Interval History:   patient remains afebrile, count 11,000, cardiothoracic surgery planning aortic valve replacement, cultures as below, creatinine 4.05    Review of Systems:  Review of Systems   Respiratory:  Negative for cough and shortness of breath.    Gastrointestinal:  Negative for abdominal pain, constipation, diarrhea, nausea and vomiting.   Musculoskeletal:  Positive for myalgias.   Neurological:  Positive for weakness.   Psychiatric/Behavioral:  The patient is not nervous/anxious.        Hemodynamics:  Temp (24hrs), Av.3 °C (97.4 °F), Min:36.1 °C (97 °F), Max:36.5 °C (97.7 °F)  Temperature: 36.5 °C (97.7 °F)  Pulse  Av.5  Min: 63  Max: 136   Blood Pressure : (!) 148/58       Physical Exam:  Physical Exam  Constitutional:       General: He is not in acute distress.     Appearance: He is ill-appearing.   Cardiovascular:      Rate and Rhythm: Normal rate and regular rhythm.      Heart sounds: Normal heart sounds.   Pulmonary:      Effort: Pulmonary effort is normal.      Breath sounds: Normal breath sounds.   Abdominal:      General: Abdomen is flat. Bowel sounds are normal.      Palpations: Abdomen is soft.   Musculoskeletal:      Comments: Left arm in bandaging   Skin:     General: Skin is warm and dry.   Neurological:      General: No focal deficit present.      Mental Status: He is oriented to person, place, and time.   Psychiatric:         Mood and Affect: Mood normal.         Behavior: Behavior normal.         Meds:    Current Facility-Administered Medications:     epoetibenja GODFREY Alert...Vancomycin per Pharmacy    apixaban    midodrine    Pharmacy Consult Request    ondansetron    labetalol    hydrALAZINE    HYDROcodone-acetaminophen    morphine injection    acetaminophen    heparin    heparin    heparin    heparin    Labs:  Recent  Labs     24  0958 24  1432 24  0028 24  0650 24  1139 24  0125   WBC 11.5*  --  10.6  --   --  11.0*   RBC 2.65*  --  2.79*  --   --  2.83*   HEMOGLOBIN 7.9*   < > 8.1* 8.0* 8.7* 8.4*   HEMATOCRIT 24.3*   < > 25.3* 25.6* 26.3* 25.9*   MCV 91.7  --  90.7  --   --  91.5   MCH 29.8  --  29.0  --   --  29.7   RDW 48.8  --  49.0  --   --  48.5   PLATELETCT 199  --  213  --   --  256   MPV 10.5  --  10.7  --   --  10.1    < > = values in this interval not displayed.     Recent Labs     24  0320 24  0028 24  0125   SODIUM 134* 135 135   POTASSIUM 3.7 3.7 3.9   CHLORIDE 97 97 98   CO2    GLUCOSE 104* 102* 98   BUN 36* 43* 25*     Recent Labs     24  0320 24  0028 24  0125   CREATININE 4.85* 5.56* 4.05*       Imaging:  US-EXTREMITY ARTERY LOWER BILAT    Result Date: 2024  Lower Extremity  Arterial Duplex Report  Vascular Laboratory  CONCLUSIONS  1.  Right lower extremity atherosclerosis with occlusion of the distal  fem/popliteal graft with 3 vessel runoff to the right ankle.  3.  Left lower extremity atherosclerosis with patent fem/popliteal graft  and 3 vessel runoff to the ankle.  BROOKE ROSARIO  Exam Date:     2024 12:28  Room #:     Inpatient  Priority:     Routine  Ht (in):             Wt (lb):  Ordering Physician:        CHANTAL CRAWFORD  Referring Physician:       [id number]  Sonographer:               Hipolito Anthony RVT,                             SANJANA  Study Type:                Complete Bilateral  Technical Quality:         Adequate  Age:    68    Gender:     M  MRN:    [id number]  :    1955      BSA:  Indications:     Bypass Graft  CPT Codes:       89152  ICD Codes:       414.04  History:         History of bilateral fem-pop bypass grafts. No prior studies.  Limitations:                RIGHT  Waveform        Peak Systolic Velocity (cm/s)                   Prox    Prox-Mid  Mid    Mid-Dist  Distal  Biphasic                          274                      CFA  Biphasic        198                                        PFA                                                             SFA                                    29                       POP  Monophasic      73                                 18      AT  Monophasic      40                                 31      PT  Monophasic      72                                 51      NICHOLAS                LEFT  Waveform        Peak Systolic Velocity (cm/s)                  Prox    Prox-Mid  Mid    Mid-Dist  Distal  Biphasic                          206                      CFA  Biphasic        373                                        PFA                                                             SFA  Biphasic                                           74      POP  Biphasic        132                                137     AT  Biphasic        93                                 94      PT  Biphasic        47                                 59      NICHOLAS  FINDINGS  Right.  Diffuse plaque and multiphasic flow throughout the common femoral and  popliteal arteries.  Three vessel runoff to the ankle with monophasic flow.  Fully occluded fem to pop bypass graft seen with the following velocities:  Proximal anastamosis: 32 cm/sec.  Distal to proximal anastamosis: 14 cm/sec.  Distal anastamosis: No flow detected.  Distal to anastamosis: 29 cm/sec.  Left.  Diffuse plaque and multiphasic flow throughout the common femoral and  popliteal arteries.  Three vessel runoff to the ankle with biphasic flow.  Fem to pop bypass graft seen with the following velocities:  Proximal anastamosis: 32 cm/sec.  Distal to proximal anastamosis: 21 cm/sec.  Proximal thigh: 104 cm/sec.  Mid thigh: 107 cm/sec.  Distal thigh: 95 cm/sec.  Distal anastamosis: 183 cm/sec.  Distal to anastamosis: 132 cm/sec.  Abdominal aortic aneurysm seem with a  measurement of 4.0 x 4.4 cm noted as  an incidental finding.  .  Nani Morataya  (Electronically Signed)  Final Date:      2024                   15:40    US-SUSI SINGLE LEVEL BILAT    Result Date: 2024   Vascular Laboratory  Conclusions  1.  Reduced right SUSI.  2.  Reduced left TBI.  BROOKE ROSARIO  Age:    68    Gender:     M  MRN:    [id number]  :    1955      BSA:  Exam Date:     2024 09:20  Room #:     Inpatient  Priority:     Routine  Ht (in):             Wt (lb):  Ordering Physician:        CHANTAL CRAWFORD  Referring Physician:       [id number]  Sonographer:               Hipolito Anthony JIMENA MS  Study Type:                Complete Bilateral  Technical Quality:         Adequate  Indications:     Ulcer of ankle  CPT Codes:       27461  ICD Codes:       707.13  History:         Ulceration of the bilateral ankles. No prior studies.  Limitations:                 RIGHT  Waveform            Systolic BPs (mmHg)                             117           Brachial  Triphasic                                Common Femoral  Biphasic                                 Popliteal  Bi, non-rev                83            Posterior Tibial  Bi, non-rev                72            Dorsalis Pedis                             285           Digit                             0.71          SUSI                             2.44          TBI                       LEFT  Waveform        Systolic BPs (mmHg)                                           Brachial  Triphasic                                Common Femoral  Triphasic                                Popliteal  Triphasic                  130           Posterior Tibial  Triphasic                  117           Dorsalis Pedis  Diminished                 45            Digit                             1.11          SUSI                             0.38          TBI  Findings  Right.  Doppler  waveforms of the common femoral, popliteal, posterior tibial, and  dorsalis pedis arteries are of high amplitude and multiphasic.  Doppler waveforms at the ankle are brisk and biphasic.  The ankle-brachial index is dimished.  The toe-brachial index is not accurately measured due to calcification and  noncompressibility of the tibial vessels.  Left.  Unable to obtain brachial pressure due to unremovable dressing following AV  fistula repair.   Doppler waveforms of the common femoral, popliteal, posterior tibial, and  dorsalis pedis arteries are of high amplitude and multiphasic.  Doppler waveforms at the ankle are brisk and triphasic.  The ankle-brachial index is normal.  The toe-brachial index is abnormally reduced.  An arterial duplex was performed in accordance with lower extremity  arterial evaluation protocol - see separate report.  Nani Morataya  (Electronically Signed)  Final Date:      27 June 2024                   11:25    DX-CHEST-PORTABLE (1 VIEW)    Result Date: 6/24/2024 6/24/2024 2:53 PM HISTORY/REASON FOR EXAM:  Status post central line placement TECHNIQUE/EXAM DESCRIPTION AND NUMBER OF VIEWS: Single portable view of the chest. COMPARISON: 6/24/2024 FINDINGS: Right IJ catheter tip projects over the SVC in satisfactory position. The mediastinal and cardiac silhouette is unremarkable. The pulmonary vascularity is within normal limits. The lung parenchyma is clear. There is no significant pleural effusion. There is no visible pneumothorax. There are no acute bony abnormalities.     1.  Satisfactory appearance of the right IJ catheter. No pneumothorax visualized.    US-HEMODIALYSIS GRAFT DUPLEX COMP UPPER EXTREMITY    Result Date: 6/24/2024   Upper Extremity  Venous Duplex Report  Vascular Laboratory  CONCLUSIONS  1.  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  2.  No acute DVT seen elsewhere in  the left upper extremity.  BROOKE ROSARIO  Exam Date:     2024 11:30  Room #:     Inpatient  Priority:     Stat  Ht (in):             Wt (lb):  Ordering Physician:        MARIO CLARK  Referring Physician:       [id number] Hendricks  Sonographer:               Hipolito Anthony RVT, RDMS  Study Type:                Complete Unilateral  Technical Quality:         Adequate  Age:    68    Gender:     M  MRN:    [id number]  :    1955      BSA:  Indications:     AV Fistula / Thrombosed  CPT Codes:       00051  ICD Codes:       996.73  History:         Left thrombosed AV Fistula. No prior studies.  Limitations:  PROCEDURES:  Left upper extremity venous duplex imaging.  The following venous structures were evaluated: internal jugular,  subclavian, axillary, brachial, cephalic, and basilic veins.  Serial compression, color, and spectral Doppler flow evaluations were  performed.  FINDINGS:  Left upper extremity.  All veins demonstrate complete color filling and compressibility with  normal venous flow dynamics including spontaneous flow and respiratory  phasicity.  No superficial or deep venous thrombosis.  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  Fistula is thrombosed and  completely occluded from its proximal segment running its entire length to  its termination at the axillary vein.  Proximal segment of fistula is aneurysmal measuring 2.2 x 1.6 cm.  Nani Morataya  (Electronically Signed)  Final Date:      2024                   12:41    US-HEMODIALYSIS GRAFT DUPLEX COMP UPPER EXTREMITY    Result Date: 2024   Upper Extremity  Venous Duplex Report  Vascular Laboratory  CONCLUSIONS  1.  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  2.  No acute DVT seen elsewhere in the left upper extremity.  BROOKE ROSARIO  Exam Date:     2024 11:30  Room #:     Inpatient   Priority:     Stat  Ht (in):             Wt (lb):  Ordering Physician:        MARIO CLARK  Referring Physician:       [id number] Munson  Sonographer:               Hipolito Anthony RVT, RDMS  Study Type:                Complete Unilateral  Technical Quality:         Adequate  Age:    68    Gender:     M  MRN:    [id number]  :    1955      BSA:  Indications:     AV Fistula / Thrombosed  CPT Codes:       97898  ICD Codes:       996.73  History:         Left thrombosed AV Fistula. No prior studies.  Limitations:  PROCEDURES:  Left upper extremity venous duplex imaging.  The following venous structures were evaluated: internal jugular,  subclavian, axillary, brachial, cephalic, and basilic veins.  Serial compression, color, and spectral Doppler flow evaluations were  performed.  FINDINGS:  Left upper extremity.  All veins demonstrate complete color filling and compressibility with  normal venous flow dynamics including spontaneous flow and respiratory  phasicity.  No superficial or deep venous thrombosis.  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  Fistula is thrombosed and  completely occluded from its proximal segment running its entire length to  its termination at the axillary vein.  Proximal segment of fistula is aneurysmal measuring 2.2 x 1.6 cm.  Nani Morataya  (Electronically Signed)  Final Date:      2024                   12:41    DX-CHEST-PORTABLE (1 VIEW)    Result Date: 2024 10:29 AM HISTORY/REASON FOR EXAM:  Chest Pain. TECHNIQUE/EXAM DESCRIPTION AND NUMBER OF VIEWS: Single portable view of the chest. COMPARISON: None FINDINGS: The soft tissues and bony structures are unremarkable. The heart and mediastinal structures are enlarged. Pulmonary vascularity is normal. The lung fields are clear. There is no effusion or pneumothorax.     Mild enlargement of the cardiomediastinal silhouette without acute cardiopulmonary abnormality.      Micro:  Results        Procedure Component Value Units Date/Time    BLOOD CULTURE [[id number]] Collected: 06/29/24 0217    Order Status: Sent Specimen: Blood from Peripheral Updated: 06/29/24 0317    BLOOD CULTURE [[id number]] Collected: 06/29/24 0125    Order Status: Sent Specimen: Blood from Peripheral Updated: 06/29/24 0218    CULTURE WOUND W/ GRAM STAIN [[id number]]  (Abnormal)  (Susceptibility) Collected: 06/25/24 1400    Order Status: Completed Specimen: Wound Updated: 06/28/24 1502     Significant Indicator POS     Source WND     Site L forearm fistula graft     Culture Result -     Gram Stain Result Many WBCs.  No organisms seen.       Culture Result Enterococcus faecalis  Moderate growth      Narrative:      Surgery - swabs received    Susceptibility       Enterococcus faecalis (1)       Antibiotic Interpretation Microscan   Method Status    Daptomycin Sensitive 2 mcg/mL WAQAR Final    Gent Synergy Sensitive <=500 mcg/mL WAQAR Final    Ampicillin Sensitive <=2 mcg/mL WAQAR Final    Linezolid Sensitive 2 mcg/mL WAQAR Final    Vancomycin Sensitive 1 mcg/mL WAQAR Final    Penicillin Sensitive 2 mcg/mL WAQAR Final    Tetracycline Sensitive <=4 mcg/mL WAQAR Final                       Anaerobic Culture [[id number]] Collected: 06/25/24 1400    Order Status: Completed Specimen: Wound Updated: 06/28/24 1502     Significant Indicator NEG     Source WND     Site L forearm fistula graft     Culture Result No Anaerobes isolated.    Narrative:      Surgery - swabs received    Fungal Culture [[id number]] Collected: 06/25/24 1400    Order Status: Completed Specimen: Wound Updated: 06/28/24 1502     Significant Indicator NEG     Source WND     Site L forearm fistula graft     Culture Result Culture in progress.     Fungal Smear Results No fungal elements seen.    Narrative:      Surgery - swabs received    BLOOD CULTURE [[id number]] Collected: 06/27/24 0958    Order Status: Completed Specimen: Blood from Peripheral Updated: 06/28/24 0929     Significant  Indicator NEG     Source BLD     Site PERIPHERAL     Culture Result No Growth  Note: Blood cultures are incubated for 5 days and  are monitored continuously.Positive blood cultures  are called to the RN and reported as soon as  they are identified.      Narrative:      Right Hand    BLOOD CULTURE [[id number]]  (Abnormal) Collected: 06/27/24 0320    Order Status: Completed Specimen: Blood from Line Updated: 06/28/24 0126     Significant Indicator POS     Source BLD     Site Peripheral     Culture Result Growth detected by Bactec instrument. 06/28/2024  01:25  Gram Stain: Gram positive cocci: Possible Streptococcus sp.      Narrative:      Right Hand    BLOOD CULTURE [[id number]]  (Abnormal) Collected: 06/24/24 1334    Order Status: Completed Specimen: Blood from Peripheral Updated: 06/27/24 1122     Significant Indicator POS     Source BLD     Site Peripheral     Culture Result Growth detected by Bactec instrument. 06/25/2024  02:07      Enterococcus faecalis  Please see previous culture for susceptibilities      Narrative:      CALL  [facility]  [address] tel. [phone],  Right Hand    BLOOD CULTURE [[id number]]  (Abnormal)  (Susceptibility) Collected: 06/24/24 1231    Order Status: Completed Specimen: Blood from Peripheral Updated: 06/27/24 1121     Significant Indicator POS     Source BLD     Site PERIPHERAL     Culture Result Growth detected by Bactec instrument. 06/25/2024  00:21      Enterococcus faecalis    Narrative:      CALL  [facility]  [address] tel. [phone],  CALLED  183 tel. [phone] 06/25/2024, 17:37, Voalted carlos alberto SAINI  Right Hand    Susceptibility       Enterococcus faecalis (1)       Antibiotic Interpretation Microscan   Method Status    Daptomycin Sensitive 2 mcg/mL WAQAR Final    Gent Synergy Sensitive <=500 mcg/mL WAQAR Final    Ampicillin Sensitive <=2 mcg/mL WAQAR Final    Linezolid Sensitive 2 mcg/mL WAQAR Final    Vancomycin Sensitive 1 mcg/mL WAQAR Final    Penicillin Sensitive 2 mcg/mL WAQAR Final                       GRAM  STAIN [[id number]] Collected: 06/25/24 1400    Order Status: Completed Specimen: Wound Updated: 06/25/24 1934     Significant Indicator .     Source WND     Site L forearm fistula graft     Gram Stain Result Many WBCs.  No organisms seen.      Narrative:      Surgery - swabs received    Fungal Smear [[id number]] Collected: 06/25/24 1400    Order Status: Completed Specimen: Wound Updated: 06/25/24 1934     Significant Indicator NEG     Source WND     Site L forearm fistula graft     Fungal Smear Results No fungal elements seen.    Narrative:      Surgery - swabs received    MRSA By PCR (Amp) [[id number]]     Order Status: Canceled Specimen: Respirate from Nares           ASSESSMENT/PLAN:   68 y.o.  admitted 6/24/2024. Pt has a past medical history of PAD with femoral-popliteal bypass, ESRD on dialysis via left arm graft, A-fib on Coumadin and CAD with stents, he presented complaining of fevers, pain, redness and swelling in his right arm graft site ongoing for approximately 5 days.      Hospital Course:   Patient was the OR with vascular surgery on 6/25 with resection of the infected left arm dialysis graft pseudoaneurysm,  left greater saphenous vein harvest, reconstruction of left brachial artery, partial removal of the left arm dialysis graft.  Echocardiogram consistent with endocarditis as below.     Problem List  Aortic valve infective endocarditis, large lobulated mobile mass 1.7 x 1.1 cm with severe aortic insufficiency on RENEE  E faecalis bacteremia, infected left arm dialysis site  -Blood cultures on 6/24 +E faecalis, amp sensitive   Left arm dialysis graft site infection, pseudoaneurysm at site of anastomosis  -Status post or with vascular surgery as described above, partial removal of the prior graft  -Left forearm wound culture +E faecalis, amp sensitive  ESRD on HD  PAD     Plan:  -Will switch to dual beta-lactam therapy with renally dosed IV ampicillin and ceftriaxone  -Cardiothoracic surgery is planning  aortic valve replacement, will follow along  -Repeat blood culture from 6/27 also positive, will repeat blood cultures x 2 in a.m.  -Dialysis catheter was placed while potentially still bacteremic, may need to be removed if bacteremia fails to clear      Dispo: Awaiting culture results and work-up as above.  Patient is at risk for infectious complications including death.   PICC: TBD     Plan of care discussed with NOMI Yen M.D.. Will continue to follow.  This illness poses a threat to life

## 2024-06-29 NOTE — PROGRESS NOTES
[facility] -  PROGRESS NOTE               Author: Noemí Zuleta M.D. Date & Time: 6/29/2024  10:50 AM     HPI:  68-year-old  male with biventricular history of ESRD on hemodialysis q. Monday Wednesday Friday at [facility] through left upper arm AV fistula that was placed about 2 to 3 years ago.  He presented to the ER with left arm pain and swelling for the last 5 days.  His last dialysis was on Wednesday and had no dialysis in the last 5 days.  He felt sick on Friday and missed his dialysis.  Ultrasound of the left upper arm AV fistula showed there is no flow in the AV fistula and it has thrombosed.  A temporary right IJ dialysis catheter was placed in the ER.  Nephrology has been consulted for management of dialysis.  Patient denies any fever or chills, no nausea or vomiting, no chest pain.  He has some shortness of breath but is able to talk in full sentences and is in no acute respiratory distress at this time. No melena, hematochezia, hematemesis.  No HA, visual changes, or abdominal pain.     NEPHROLOGY DAILY PROGRESS:   6/24: consult note  6/25:  Afebrile, HD last night without any complications, plans noted for OR today with Dr. Tinoco for vascualr surgery   6/26: Underwent AVG repair yesterday with Dr. Tinoco, has LAMBERT drain, BP noted low this AM on dialysis, says BP 's run low on dialysis - not on Midodrine, ordered Midodrine to be used on dialysis   6/27: 0 Net UF with HD yesterday, limited by hypotension. Sitting up at side of bed eating breakfast. Bps improved 130s systolic.   6/28: HD today, seen  6/29: s/p HD yesterday over temp HD line. Tolerated well, no complaints     REVIEW OF SYSTEMS:    10 point ROS reviewed and is as per HPI/daily summary or otherwise negative    PMH/PSH/SH/FH:   Reviewed and unchanged since admission note    CURRENT MEDICATIONS:   Reviewed from admission to present day    VS:  BP (!) 148/58   Pulse 89   Temp 36.5 °C (97.7 °F) (Temporal)  "  Resp 20   Ht 1.854 m (6' 1\")   Wt 90.8 kg (200 lb 2.8 oz)   SpO2 99%   BMI 26.41 kg/m²     Physical Exam  Constitutional:       General: He is not in acute distress.     Appearance: Normal appearance. He is normal weight. He is not toxic-appearing.   HENT:      Head: Normocephalic and atraumatic.      Right Ear: External ear normal.      Left Ear: External ear normal.      Nose: Nose normal.      Mouth/Throat:      Mouth: Mucous membranes are dry.   Eyes:      Extraocular Movements: Extraocular movements intact.      Pupils: Pupils are equal, round, and reactive to light.   Neck:      Comments: Rt IJ temp dialysis catheter  in place  Cardiovascular:      Pulses: Normal pulses.   Pulmonary:      Effort: Pulmonary effort is normal. No respiratory distress.      Breath sounds: No wheezing.   Abdominal:      Palpations: Abdomen is soft.   Musculoskeletal:         General: No swelling.      Cervical back: Neck supple.   Skin:     General: Skin is dry.      Comments: AVG site covered with surgical dressing  Neurological:      Mental Status: He is alert.      Fluids:  In: 1000 [P.O.:400; I.V.:100; Dialysis:500]  Out: 2500     LABS:  Recent Labs     06/27/24  0320 06/28/24  0028 06/29/24  0125   SODIUM 134* 135 135   POTASSIUM 3.7 3.7 3.9   CHLORIDE 97 97 98   CO2 26 24 27   GLUCOSE 104* 102* 98   BUN 36* 43* 25*   CREATININE 4.85* 5.56* 4.05*   CALCIUM 7.6* 7.6* 8.2*       IMAGING:   All imaging reviewed from admission to present day    IMPRESSION:  # ESRD  -Q. MWF at [facility]  # Bacteremia               - Blood Cx from 6/24, 6/27 + E. Faecalis  #Thrombosed left upper arm AVG; infected pseudoaneurysm               -Right IJ temporary HD catheter placed in ER 6/24/2024              - S/P AVG repair, resection and pseudoaneurysm resection 6/25/24 by Dr. Tinoco along with  Reconstruction of left brachial artery using left greater saphenous vein   interposition graft.  # HTN  # Anemia of CKD  # " CKD-MBD  #Coronary artery disease  #Chronic A-fib        PLAN:  - cont q MWF   - EPO with HD  - Appreciate Dr. Tinoco (Vascular Surgery)   - AVG may not be available for HD use soon   - will need to convert temp IJ catheter to a tunneled HD catheter before discharge but will need negative BCx  - No dietary protein restrictions  - Resume home meds   - Dose all meds per ESRD

## 2024-06-30 ENCOUNTER — APPOINTMENT (OUTPATIENT)
Dept: RADIOLOGY | Facility: MEDICAL CENTER | Age: 69
DRG: 216 | End: 2024-06-30
Attending: THORACIC SURGERY (CARDIOTHORACIC VASCULAR SURGERY)
Payer: MEDICARE

## 2024-06-30 ENCOUNTER — ANESTHESIA (OUTPATIENT)
Dept: SURGERY | Facility: MEDICAL CENTER | Age: 69
DRG: 216 | End: 2024-06-30
Payer: MEDICARE

## 2024-06-30 ENCOUNTER — ANESTHESIA EVENT (OUTPATIENT)
Dept: SURGERY | Facility: MEDICAL CENTER | Age: 69
DRG: 216 | End: 2024-06-30
Payer: MEDICARE

## 2024-06-30 VITALS
HEART RATE: 66 BPM | HEIGHT: 73 IN | BODY MASS INDEX: 26 KG/M2 | WEIGHT: 196.21 LBS | RESPIRATION RATE: 18 BRPM | TEMPERATURE: 96.8 F | DIASTOLIC BLOOD PRESSURE: 58 MMHG | SYSTOLIC BLOOD PRESSURE: 113 MMHG | OXYGEN SATURATION: 99 %

## 2024-06-30 PROBLEM — Z95.2 S/P AVR (AORTIC VALVE REPLACEMENT): Status: ACTIVE | Noted: 2024-06-30

## 2024-06-30 LAB
ACT BLD: 140 SEC (ref 74–137)
ACT BLD: 152 SEC (ref 74–137)
ACT BLD: 476 SEC (ref 74–137)
ACT BLD: 483 SEC (ref 74–137)
ACT BLD: 489 SEC (ref 74–137)
ACT BLD: 513 SEC (ref 74–137)
ACT BLD: 519 SEC (ref 74–137)
ACT BLD: 556 SEC (ref 74–137)
ACT BLD: 672 SEC (ref 74–137)
ACT BLD: 831 SEC (ref 74–137)
AMORPH CRY #/AREA URNS HPF: PRESENT /HPF
ANION GAP SERPL CALC-SCNC: 11 MMOL/L (ref 7–16)
APPEARANCE UR: ABNORMAL
APTT PPP: 62.6 SEC (ref 24.7–36)
ARTERIAL PATENCY WRIST A: ABNORMAL
BACTERIA #/AREA URNS HPF: NEGATIVE /HPF
BACTERIA BLD CULT: ABNORMAL
BACTERIA BLD CULT: NORMAL
BACTERIA SPEC ANAEROBE CULT: NORMAL
BACTERIA TISS AEROBE CULT: NORMAL
BARCODED ABORH UBTYP: 5100
BARCODED ABORH UBTYP: 5100
BARCODED ABORH UBTYP: 6200
BARCODED ABORH UBTYP: 7300
BARCODED ABORH UBTYP: 8400
BARCODED PRD CODE UBPRD: NORMAL
BARCODED UNIT NUM UBUNT: NORMAL
BASE EXCESS BLDA CALC-SCNC: -1 MMOL/L (ref -4–3)
BASE EXCESS BLDA CALC-SCNC: -2 MMOL/L (ref -4–3)
BASE EXCESS BLDA CALC-SCNC: -2 MMOL/L (ref -4–3)
BASE EXCESS BLDA CALC-SCNC: -3 MMOL/L (ref -4–3)
BASE EXCESS BLDA CALC-SCNC: -3 MMOL/L (ref -4–3)
BASE EXCESS BLDA CALC-SCNC: -9 MMOL/L (ref -4–3)
BASE EXCESS BLDA CALC-SCNC: 0 MMOL/L (ref -4–3)
BASE EXCESS BLDA CALC-SCNC: 1 MMOL/L (ref -4–3)
BASE EXCESS BLDA CALC-SCNC: 2 MMOL/L (ref -4–3)
BASE EXCESS BLDV CALC-SCNC: -2 MMOL/L (ref -4–3)
BILIRUB UR QL STRIP.AUTO: NEGATIVE
BODY TEMPERATURE: ABNORMAL DEGREES
BUN SERPL-MCNC: 29 MG/DL (ref 8–22)
CA-I BLD ISE-SCNC: 0.83 MMOL/L (ref 1.1–1.3)
CA-I BLD ISE-SCNC: 0.9 MMOL/L (ref 1.1–1.3)
CA-I BLD ISE-SCNC: 0.91 MMOL/L (ref 1.1–1.3)
CA-I BLD ISE-SCNC: 0.93 MMOL/L (ref 1.1–1.3)
CA-I BLD ISE-SCNC: 0.94 MMOL/L (ref 1.1–1.3)
CA-I BLD ISE-SCNC: 0.97 MMOL/L (ref 1.1–1.3)
CA-I BLD ISE-SCNC: 1.05 MMOL/L (ref 1.1–1.3)
CA-I BLD ISE-SCNC: 1.06 MMOL/L (ref 1.1–1.3)
CA-I BLD ISE-SCNC: 1.22 MMOL/L (ref 1.1–1.3)
CALCIUM SERPL-MCNC: 8.3 MG/DL (ref 8.5–10.5)
CHLORIDE SERPL-SCNC: 97 MMOL/L (ref 96–112)
CHOLEST SERPL-MCNC: 128 MG/DL (ref 100–199)
CO2 BLDA-SCNC: 16 MMOL/L (ref 20–33)
CO2 BLDA-SCNC: 19 MMOL/L (ref 20–33)
CO2 BLDA-SCNC: 22 MMOL/L (ref 20–33)
CO2 BLDA-SCNC: 24 MMOL/L (ref 20–33)
CO2 BLDA-SCNC: 25 MMOL/L (ref 20–33)
CO2 BLDA-SCNC: 25 MMOL/L (ref 20–33)
CO2 BLDA-SCNC: 26 MMOL/L (ref 20–33)
CO2 BLDA-SCNC: 27 MMOL/L (ref 20–33)
CO2 BLDV-SCNC: 26 MMOL/L (ref 20–33)
CO2 SERPL-SCNC: 26 MMOL/L (ref 20–33)
COLOR UR: YELLOW
COMPONENT CT 8504CT: NORMAL
COMPONENT F 8504F: NORMAL
COMPONENT FT 8504FT: NORMAL
COMPONENT P 8504P: NORMAL
COMPONENT P 8504P: NORMAL
CREAT SERPL-MCNC: 5 MG/DL (ref 0.5–1.4)
DELSYS IDSYS: ABNORMAL
EKG IMPRESSION: NORMAL
END TIDAL CARBON DIOXIDE IECO2: 23 MMHG
END TIDAL CARBON DIOXIDE IECO2: 26 MMHG
END TIDAL CARBON DIOXIDE IECO2: 26 MMHG
END TIDAL CARBON DIOXIDE IECO2: 28 MMHG
EPI CELLS #/AREA URNS HPF: NEGATIVE /HPF
ERYTHROCYTE [DISTWIDTH] IN BLOOD BY AUTOMATED COUNT: 49.4 FL (ref 35.9–50)
ERYTHROCYTE [DISTWIDTH] IN BLOOD BY AUTOMATED COUNT: 51.5 FL (ref 35.9–50)
FUNGUS SPEC CULT: NORMAL
FUNGUS SPEC FUNGUS STN: NORMAL
FUNGUS SPEC FUNGUS STN: NORMAL
GFR SERPLBLD CREATININE-BSD FMLA CKD-EPI: 12 ML/MIN/1.73 M 2
GLUCOSE BLD STRIP.AUTO-MCNC: 100 MG/DL (ref 65–99)
GLUCOSE BLD STRIP.AUTO-MCNC: 120 MG/DL (ref 65–99)
GLUCOSE BLD STRIP.AUTO-MCNC: 126 MG/DL (ref 65–99)
GLUCOSE BLD STRIP.AUTO-MCNC: 139 MG/DL (ref 65–99)
GLUCOSE BLD STRIP.AUTO-MCNC: 148 MG/DL (ref 65–99)
GLUCOSE BLD STRIP.AUTO-MCNC: 153 MG/DL (ref 65–99)
GLUCOSE BLD STRIP.AUTO-MCNC: 159 MG/DL (ref 65–99)
GLUCOSE BLD STRIP.AUTO-MCNC: 163 MG/DL (ref 65–99)
GLUCOSE BLD STRIP.AUTO-MCNC: 182 MG/DL (ref 65–99)
GLUCOSE BLD STRIP.AUTO-MCNC: 206 MG/DL (ref 65–99)
GLUCOSE BLD STRIP.AUTO-MCNC: 94 MG/DL (ref 65–99)
GLUCOSE SERPL-MCNC: 110 MG/DL (ref 65–99)
GLUCOSE UR STRIP.AUTO-MCNC: NEGATIVE MG/DL
GRAM STN SPEC: NORMAL
GRAM STN SPEC: NORMAL
HCO3 BLDA-SCNC: 15 MMOL/L (ref 17–25)
HCO3 BLDA-SCNC: 18.6 MMOL/L (ref 17–25)
HCO3 BLDA-SCNC: 21.2 MMOL/L (ref 17–25)
HCO3 BLDA-SCNC: 23.2 MMOL/L (ref 17–25)
HCO3 BLDA-SCNC: 23.4 MMOL/L (ref 17–25)
HCO3 BLDA-SCNC: 24.1 MMOL/L (ref 17–25)
HCO3 BLDA-SCNC: 24.4 MMOL/L (ref 17–25)
HCO3 BLDA-SCNC: 25.4 MMOL/L (ref 17–25)
HCO3 BLDA-SCNC: 25.4 MMOL/L (ref 17–25)
HCO3 BLDA-SCNC: 25.6 MMOL/L (ref 17–25)
HCO3 BLDA-SCNC: 26.2 MMOL/L (ref 17–25)
HCO3 BLDV-SCNC: 24.6 MMOL/L (ref 24–28)
HCT VFR BLD AUTO: 25.2 % (ref 42–52)
HCT VFR BLD AUTO: 26.7 % (ref 42–52)
HCT VFR BLD AUTO: 26.9 % (ref 42–52)
HCT VFR BLD AUTO: 28.4 % (ref 42–52)
HCT VFR BLD AUTO: 28.9 % (ref 42–52)
HCT VFR BLD CALC: 21 % (ref 42–52)
HCT VFR BLD CALC: 22 % (ref 42–52)
HCT VFR BLD CALC: 23 % (ref 42–52)
HCT VFR BLD CALC: 24 % (ref 42–52)
HCT VFR BLD CALC: 25 % (ref 42–52)
HCT VFR BLD CALC: 26 % (ref 42–52)
HCT VFR BLD CALC: 26 % (ref 42–52)
HCT VFR BLD CALC: 27 % (ref 42–52)
HDLC SERPL-MCNC: 27 MG/DL
HGB BLD-MCNC: 7.1 G/DL (ref 14–18)
HGB BLD-MCNC: 7.5 G/DL (ref 14–18)
HGB BLD-MCNC: 7.7 G/DL (ref 14–18)
HGB BLD-MCNC: 7.8 G/DL (ref 14–18)
HGB BLD-MCNC: 8.2 G/DL (ref 14–18)
HGB BLD-MCNC: 8.5 G/DL (ref 14–18)
HGB BLD-MCNC: 8.8 G/DL (ref 14–18)
HGB BLD-MCNC: 9.2 G/DL (ref 14–18)
HGB BLD-MCNC: 9.2 G/DL (ref 14–18)
HGB BLD-MCNC: 9.5 G/DL (ref 14–18)
HGB BLD-MCNC: 9.6 G/DL (ref 14–18)
HOROWITZ INDEX BLDA+IHG-RTO: 198 MM[HG]
HOROWITZ INDEX BLDA+IHG-RTO: 235 MM[HG]
HOROWITZ INDEX BLDA+IHG-RTO: 247 MM[HG]
HOROWITZ INDEX BLDA+IHG-RTO: 257 MM[HG]
HYALINE CASTS #/AREA URNS LPF: ABNORMAL /LPF
INR PPP: 1.4 (ref 0.87–1.13)
INR PPP: 1.55 (ref 0.87–1.13)
KETONES UR STRIP.AUTO-MCNC: NEGATIVE MG/DL
LACTATE BLD-SCNC: 0.8 MMOL/L (ref 0.5–2)
LACTATE BLD-SCNC: 1.5 MMOL/L (ref 0.5–2)
LACTATE BLD-SCNC: 1.7 MMOL/L (ref 0.5–2)
LDLC SERPL CALC-MCNC: 76 MG/DL
LEUKOCYTE ESTERASE UR QL STRIP.AUTO: ABNORMAL
LV EJECT FRACT  99904: 50
MAGNESIUM SERPL-MCNC: 1.9 MG/DL (ref 1.5–2.5)
MAGNESIUM SERPL-MCNC: 3.4 MG/DL (ref 1.5–2.5)
MCH RBC QN AUTO: 28.6 PG (ref 27–33)
MCH RBC QN AUTO: 29.3 PG (ref 27–33)
MCHC RBC AUTO-ENTMCNC: 30.6 G/DL (ref 32.3–36.5)
MCHC RBC AUTO-ENTMCNC: 32.9 G/DL (ref 32.3–36.5)
MCV RBC AUTO: 89.2 FL (ref 81.4–97.8)
MCV RBC AUTO: 93.7 FL (ref 81.4–97.8)
MICRO URNS: ABNORMAL
MODE IMODE: ABNORMAL
MYCOBACTERIUM SPEC CULT: NORMAL
NITRITE UR QL STRIP.AUTO: NEGATIVE
O2/TOTAL GAS SETTING VFR VENT: 100 %
O2/TOTAL GAS SETTING VFR VENT: 30 %
O2/TOTAL GAS SETTING VFR VENT: 40 %
O2/TOTAL GAS SETTING VFR VENT: 50 %
PCO2 BLDA: 22.6 MMHG (ref 26–37)
PCO2 BLDA: 22.8 MMHG (ref 26–37)
PCO2 BLDA: 27.8 MMHG (ref 26–37)
PCO2 BLDA: 34.7 MMHG (ref 26–37)
PCO2 BLDA: 36.1 MMHG (ref 26–37)
PCO2 BLDA: 38.7 MMHG (ref 26–37)
PCO2 BLDA: 39.7 MMHG (ref 26–37)
PCO2 BLDA: 40.3 MMHG (ref 26–37)
PCO2 BLDA: 40.3 MMHG (ref 26–37)
PCO2 BLDA: 40.5 MMHG (ref 26–37)
PCO2 BLDA: 40.9 MMHG (ref 26–37)
PCO2 BLDA: 41.8 MMHG (ref 26–37)
PCO2 BLDA: 45.4 MMHG (ref 26–37)
PCO2 BLDV: 50.5 MMHG (ref 41–51)
PCO2 TEMP ADJ BLDA: 21.9 MMHG (ref 26–37)
PCO2 TEMP ADJ BLDA: 22.1 MMHG (ref 26–37)
PCO2 TEMP ADJ BLDA: 26.7 MMHG (ref 26–37)
PCO2 TEMP ADJ BLDA: 34.1 MMHG (ref 26–37)
PCO2 TEMP ADJ BLDA: 34.8 MMHG (ref 26–37)
PCO2 TEMP ADJ BLDA: 35.7 MMHG (ref 26–37)
PCO2 TEMP ADJ BLDA: 36.1 MMHG (ref 26–37)
PCO2 TEMP ADJ BLDA: 38.8 MMHG (ref 26–37)
PCO2 TEMP ADJ BLDA: 39.8 MMHG (ref 26–37)
PCO2 TEMP ADJ BLDA: 40.3 MMHG (ref 26–37)
PCO2 TEMP ADJ BLDA: 40.3 MMHG (ref 26–37)
PCO2 TEMP ADJ BLDA: 40.9 MMHG (ref 26–37)
PCO2 TEMP ADJ BLDA: 43.5 MMHG (ref 26–37)
PCO2 TEMP ADJ BLDV: 47.7 MMHG (ref 41–51)
PEEP END EXPIRATORY PRESSURE IPEEP: 5 CMH20
PEEP END EXPIRATORY PRESSURE IPEEP: 8 CMH20
PERCENT MINUTE VOLUME IPMV: 130
PERCENT MINUTE VOLUME IPMV: 160
PERCENT MINUTE VOLUME IPMV: 160
PH BLDA: 7.32 [PH] (ref 7.4–7.5)
PH BLDA: 7.35 [PH] (ref 7.4–7.5)
PH BLDA: 7.37 [PH] (ref 7.4–7.5)
PH BLDA: 7.39 [PH] (ref 7.4–7.5)
PH BLDA: 7.41 [PH] (ref 7.4–7.5)
PH BLDA: 7.42 [PH] (ref 7.4–7.5)
PH BLDA: 7.43 [PH] (ref 7.4–7.5)
PH BLDA: 7.45 [PH] (ref 7.4–7.5)
PH BLDA: 7.49 [PH] (ref 7.4–7.5)
PH BLDA: 7.53 [PH] (ref 7.4–7.5)
PH BLDV: 7.29 [PH] (ref 7.31–7.45)
PH TEMP ADJ BLDA: 7.33 [PH] (ref 7.4–7.5)
PH TEMP ADJ BLDA: 7.37 [PH] (ref 7.4–7.5)
PH TEMP ADJ BLDA: 7.38 [PH] (ref 7.4–7.5)
PH TEMP ADJ BLDA: 7.39 [PH] (ref 7.4–7.5)
PH TEMP ADJ BLDA: 7.41 [PH] (ref 7.4–7.5)
PH TEMP ADJ BLDA: 7.42 [PH] (ref 7.4–7.5)
PH TEMP ADJ BLDA: 7.42 [PH] (ref 7.4–7.5)
PH TEMP ADJ BLDA: 7.44 [PH] (ref 7.4–7.5)
PH TEMP ADJ BLDA: 7.45 [PH] (ref 7.4–7.5)
PH TEMP ADJ BLDA: 7.46 [PH] (ref 7.4–7.5)
PH TEMP ADJ BLDA: 7.46 [PH] (ref 7.4–7.5)
PH TEMP ADJ BLDA: 7.5 [PH] (ref 7.4–7.5)
PH TEMP ADJ BLDA: 7.54 [PH] (ref 7.4–7.5)
PH TEMP ADJ BLDV: 7.31 [PH] (ref 7.31–7.45)
PH UR STRIP.AUTO: 8 [PH] (ref 5–8)
PLATELET # BLD AUTO: 234 K/UL (ref 164–446)
PLATELET # BLD AUTO: 244 K/UL (ref 164–446)
PLATELET # BLD AUTO: 258 K/UL (ref 164–446)
PMV BLD AUTO: 10.2 FL (ref 9–12.9)
PMV BLD AUTO: 10.5 FL (ref 9–12.9)
PO2 BLDA: 218 MMHG (ref 64–87)
PO2 BLDA: 247 MMHG (ref 64–87)
PO2 BLDA: 252 MMHG (ref 64–87)
PO2 BLDA: 277 MMHG (ref 64–87)
PO2 BLDA: 277 MMHG (ref 64–87)
PO2 BLDA: 283 MMHG (ref 64–87)
PO2 BLDA: 305 MMHG (ref 64–87)
PO2 BLDA: 323 MMHG (ref 64–87)
PO2 BLDA: 334 MMHG (ref 64–87)
PO2 BLDA: 372 MMHG (ref 64–87)
PO2 BLDA: 77 MMHG (ref 64–87)
PO2 BLDA: 94 MMHG (ref 64–87)
PO2 BLDA: 99 MMHG (ref 64–87)
PO2 BLDV: 38 MMHG (ref 25–40)
PO2 TEMP ADJ BLDA: 218 MMHG (ref 64–87)
PO2 TEMP ADJ BLDA: 245 MMHG (ref 64–87)
PO2 TEMP ADJ BLDA: 252 MMHG (ref 64–87)
PO2 TEMP ADJ BLDA: 266 MMHG (ref 64–87)
PO2 TEMP ADJ BLDA: 275 MMHG (ref 64–87)
PO2 TEMP ADJ BLDA: 283 MMHG (ref 64–87)
PO2 TEMP ADJ BLDA: 305 MMHG (ref 64–87)
PO2 TEMP ADJ BLDA: 311 MMHG (ref 64–87)
PO2 TEMP ADJ BLDA: 326 MMHG (ref 64–87)
PO2 TEMP ADJ BLDA: 367 MMHG (ref 64–87)
PO2 TEMP ADJ BLDA: 73 MMHG (ref 64–87)
PO2 TEMP ADJ BLDA: 89 MMHG (ref 64–87)
PO2 TEMP ADJ BLDA: 95 MMHG (ref 64–87)
PO2 TEMP ADJ BLDV: 34 MMHG (ref 25–40)
POTASSIUM BLD-SCNC: 3.9 MMOL/L (ref 3.6–5.5)
POTASSIUM BLD-SCNC: 4.2 MMOL/L (ref 3.6–5.5)
POTASSIUM BLD-SCNC: 4.3 MMOL/L (ref 3.6–5.5)
POTASSIUM BLD-SCNC: 4.7 MMOL/L (ref 3.6–5.5)
POTASSIUM BLD-SCNC: 4.7 MMOL/L (ref 3.6–5.5)
POTASSIUM BLD-SCNC: 4.9 MMOL/L (ref 3.6–5.5)
POTASSIUM BLD-SCNC: 5 MMOL/L (ref 3.6–5.5)
POTASSIUM BLD-SCNC: 5.1 MMOL/L (ref 3.6–5.5)
POTASSIUM BLD-SCNC: 5.2 MMOL/L (ref 3.6–5.5)
POTASSIUM BLD-SCNC: 5.6 MMOL/L (ref 3.6–5.5)
POTASSIUM BLD-SCNC: 6 MMOL/L (ref 3.6–5.5)
POTASSIUM SERPL-SCNC: 4.2 MMOL/L (ref 3.6–5.5)
POTASSIUM SERPL-SCNC: 4.9 MMOL/L (ref 3.6–5.5)
POTASSIUM SERPL-SCNC: 5.3 MMOL/L (ref 3.6–5.5)
PRESSURE SUPPORT SETTING VENT: 5 CM[H2O]
PRODUCT TYPE UPROD: NORMAL
PROT UR QL STRIP: 100 MG/DL
PROTHROMBIN TIME: 17.3 SEC (ref 12–14.6)
PROTHROMBIN TIME: 18.8 SEC (ref 12–14.6)
RBC # BLD AUTO: 2.69 M/UL (ref 4.7–6.1)
RBC # BLD AUTO: 3.24 M/UL (ref 4.7–6.1)
RBC # URNS HPF: ABNORMAL /HPF
RBC UR QL AUTO: NEGATIVE
RHODAMINE-AURAMINE STN SPEC: NORMAL
RHODAMINE-AURAMINE STN SPEC: NORMAL
SAO2 % BLDA: 100 % (ref 93–99)
SAO2 % BLDA: 96 % (ref 93–99)
SAO2 % BLDA: 98 % (ref 93–99)
SAO2 % BLDA: 99 % (ref 93–99)
SAO2 % BLDV: 65 %
SIGNIFICANT IND 70042: ABNORMAL
SIGNIFICANT IND 70042: NORMAL
SITE SITE: ABNORMAL
SITE SITE: NORMAL
SODIUM BLD-SCNC: 134 MMOL/L (ref 135–145)
SODIUM BLD-SCNC: 134 MMOL/L (ref 135–145)
SODIUM BLD-SCNC: 136 MMOL/L (ref 135–145)
SODIUM BLD-SCNC: 138 MMOL/L (ref 135–145)
SODIUM SERPL-SCNC: 134 MMOL/L (ref 135–145)
SOURCE SOURCE: ABNORMAL
SOURCE SOURCE: NORMAL
SP GR UR STRIP.AUTO: 1.03
SPECIMEN DRAWN FROM PATIENT: ABNORMAL
T4 FREE SERPL-MCNC: 1.58 NG/DL (ref 0.93–1.7)
TRIGL SERPL-MCNC: 123 MG/DL (ref 0–149)
TSH SERPL DL<=0.005 MIU/L-ACNC: 4.36 UIU/ML (ref 0.38–5.33)
UNIT STATUS USTAT: NORMAL
UROBILINOGEN UR STRIP.AUTO-MCNC: 0.2 MG/DL
WBC # BLD AUTO: 10.9 K/UL (ref 4.8–10.8)
WBC # BLD AUTO: 27.5 K/UL (ref 4.8–10.8)
WBC #/AREA URNS HPF: ABNORMAL /HPF

## 2024-06-30 PROCEDURE — 700105 HCHG RX REV CODE 258: Performed by: THORACIC SURGERY (CARDIOTHORACIC VASCULAR SURGERY)

## 2024-06-30 PROCEDURE — C1894 INTRO/SHEATH, NON-LASER: HCPCS | Performed by: THORACIC SURGERY (CARDIOTHORACIC VASCULAR SURGERY)

## 2024-06-30 PROCEDURE — C1729 CATH, DRAINAGE: HCPCS | Performed by: THORACIC SURGERY (CARDIOTHORACIC VASCULAR SURGERY)

## 2024-06-30 PROCEDURE — 83605 ASSAY OF LACTIC ACID: CPT | Mod: 91

## 2024-06-30 PROCEDURE — 36430 TRANSFUSION BLD/BLD COMPNT: CPT

## 2024-06-30 PROCEDURE — 02RF0KZ REPLACEMENT OF AORTIC VALVE WITH NONAUTOLOGOUS TISSUE SUBSTITUTE, OPEN APPROACH: ICD-10-PCS | Performed by: THORACIC SURGERY (CARDIOTHORACIC VASCULAR SURGERY)

## 2024-06-30 PROCEDURE — 503001 HCHG PERFUSION: Performed by: THORACIC SURGERY (CARDIOTHORACIC VASCULAR SURGERY)

## 2024-06-30 PROCEDURE — B24BZZ4 ULTRASONOGRAPHY OF HEART WITH AORTA, TRANSESOPHAGEAL: ICD-10-PCS | Performed by: ANESTHESIOLOGY

## 2024-06-30 PROCEDURE — 160048 HCHG OR STATISTICAL LEVEL 1-5: Performed by: THORACIC SURGERY (CARDIOTHORACIC VASCULAR SURGERY)

## 2024-06-30 PROCEDURE — 700111 HCHG RX REV CODE 636 W/ 250 OVERRIDE (IP): Performed by: ANESTHESIOLOGY

## 2024-06-30 PROCEDURE — 93010 ELECTROCARDIOGRAM REPORT: CPT | Mod: 77,59 | Performed by: INTERNAL MEDICINE

## 2024-06-30 PROCEDURE — C1751 CATH, INF, PER/CENT/MIDLINE: HCPCS | Performed by: THORACIC SURGERY (CARDIOTHORACIC VASCULAR SURGERY)

## 2024-06-30 PROCEDURE — 160042 HCHG SURGERY MINUTES - EA ADDL 1 MIN LEVEL 5: Performed by: THORACIC SURGERY (CARDIOTHORACIC VASCULAR SURGERY)

## 2024-06-30 PROCEDURE — 33533 CABG ARTERIAL SINGLE: CPT | Performed by: THORACIC SURGERY (CARDIOTHORACIC VASCULAR SURGERY)

## 2024-06-30 PROCEDURE — 700102 HCHG RX REV CODE 250 W/ 637 OVERRIDE(OP): Performed by: SURGERY

## 2024-06-30 PROCEDURE — 33268 EXCL LAA OPN OTH PX ANY METH: CPT | Mod: AS

## 2024-06-30 PROCEDURE — 87076 CULTURE ANAEROBE IDENT EACH: CPT

## 2024-06-30 PROCEDURE — P9016 RBC LEUKOCYTES REDUCED: HCPCS

## 2024-06-30 PROCEDURE — 33268 EXCL LAA OPN OTH PX ANY METH: CPT | Performed by: THORACIC SURGERY (CARDIOTHORACIC VASCULAR SURGERY)

## 2024-06-30 PROCEDURE — 87116 MYCOBACTERIA CULTURE: CPT

## 2024-06-30 PROCEDURE — 82962 GLUCOSE BLOOD TEST: CPT | Mod: 91

## 2024-06-30 PROCEDURE — 700101 HCHG RX REV CODE 250: Performed by: ANESTHESIOLOGY

## 2024-06-30 PROCEDURE — 87206 SMEAR FLUORESCENT/ACID STAI: CPT

## 2024-06-30 PROCEDURE — 700101 HCHG RX REV CODE 250: Performed by: INTERNAL MEDICINE

## 2024-06-30 PROCEDURE — 700111 HCHG RX REV CODE 636 W/ 250 OVERRIDE (IP): Performed by: THORACIC SURGERY (CARDIOTHORACIC VASCULAR SURGERY)

## 2024-06-30 PROCEDURE — 87102 FUNGUS ISOLATION CULTURE: CPT

## 2024-06-30 PROCEDURE — 80048 BASIC METABOLIC PNL TOTAL CA: CPT

## 2024-06-30 PROCEDURE — 87040 BLOOD CULTURE FOR BACTERIA: CPT | Mod: 91

## 2024-06-30 PROCEDURE — 93010 ELECTROCARDIOGRAM REPORT: CPT | Performed by: INTERNAL MEDICINE

## 2024-06-30 PROCEDURE — 700102 HCHG RX REV CODE 250 W/ 637 OVERRIDE(OP)

## 2024-06-30 PROCEDURE — 71045 X-RAY EXAM CHEST 1 VIEW: CPT

## 2024-06-30 PROCEDURE — 700101 HCHG RX REV CODE 250

## 2024-06-30 PROCEDURE — P9047 ALBUMIN (HUMAN), 25%, 50ML: HCPCS | Mod: JG

## 2024-06-30 PROCEDURE — 93010 ELECTROCARDIOGRAM REPORT: CPT | Mod: 76 | Performed by: INTERNAL MEDICINE

## 2024-06-30 PROCEDURE — C1768 GRAFT, VASCULAR: HCPCS | Performed by: THORACIC SURGERY (CARDIOTHORACIC VASCULAR SURGERY)

## 2024-06-30 PROCEDURE — 30233R1 TRANSFUSION OF NONAUTOLOGOUS PLATELETS INTO PERIPHERAL VEIN, PERCUTANEOUS APPROACH: ICD-10-PCS | Performed by: THORACIC SURGERY (CARDIOTHORACIC VASCULAR SURGERY)

## 2024-06-30 PROCEDURE — 84132 ASSAY OF SERUM POTASSIUM: CPT | Mod: 91

## 2024-06-30 PROCEDURE — A9270 NON-COVERED ITEM OR SERVICE: HCPCS | Performed by: SURGERY

## 2024-06-30 PROCEDURE — 5A1221Z PERFORMANCE OF CARDIAC OUTPUT, CONTINUOUS: ICD-10-PCS | Performed by: THORACIC SURGERY (CARDIOTHORACIC VASCULAR SURGERY)

## 2024-06-30 PROCEDURE — 87186 SC STD MICRODIL/AGAR DIL: CPT

## 2024-06-30 PROCEDURE — C1898 LEAD, PMKR, OTHER THAN TRANS: HCPCS | Performed by: THORACIC SURGERY (CARDIOTHORACIC VASCULAR SURGERY)

## 2024-06-30 PROCEDURE — 700105 HCHG RX REV CODE 258: Performed by: INTERNAL MEDICINE

## 2024-06-30 PROCEDURE — P9017 PLASMA 1 DONOR FRZ W/IN 8 HR: HCPCS | Mod: 91

## 2024-06-30 PROCEDURE — 87075 CULTR BACTERIA EXCEPT BLOOD: CPT

## 2024-06-30 PROCEDURE — 85730 THROMBOPLASTIN TIME PARTIAL: CPT

## 2024-06-30 PROCEDURE — 80061 LIPID PANEL: CPT

## 2024-06-30 PROCEDURE — 700105 HCHG RX REV CODE 258

## 2024-06-30 PROCEDURE — 02100Z9 BYPASS CORONARY ARTERY, ONE ARTERY FROM LEFT INTERNAL MAMMARY, OPEN APPROACH: ICD-10-PCS | Performed by: THORACIC SURGERY (CARDIOTHORACIC VASCULAR SURGERY)

## 2024-06-30 PROCEDURE — 83735 ASSAY OF MAGNESIUM: CPT

## 2024-06-30 PROCEDURE — 94799 UNLISTED PULMONARY SVC/PX: CPT

## 2024-06-30 PROCEDURE — 82330 ASSAY OF CALCIUM: CPT | Mod: 91

## 2024-06-30 PROCEDURE — 87086 URINE CULTURE/COLONY COUNT: CPT

## 2024-06-30 PROCEDURE — 94002 VENT MGMT INPAT INIT DAY: CPT

## 2024-06-30 PROCEDURE — 88305 TISSUE EXAM BY PATHOLOGIST: CPT

## 2024-06-30 PROCEDURE — 33405 REPLACEMENT AORTIC VALVE OPN: CPT | Performed by: THORACIC SURGERY (CARDIOTHORACIC VASCULAR SURGERY)

## 2024-06-30 PROCEDURE — 87015 SPECIMEN INFECT AGNT CONCNTJ: CPT

## 2024-06-30 PROCEDURE — 30233N1 TRANSFUSION OF NONAUTOLOGOUS RED BLOOD CELLS INTO PERIPHERAL VEIN, PERCUTANEOUS APPROACH: ICD-10-PCS | Performed by: THORACIC SURGERY (CARDIOTHORACIC VASCULAR SURGERY)

## 2024-06-30 PROCEDURE — 110371 HCHG SHELL REV 272: Performed by: THORACIC SURGERY (CARDIOTHORACIC VASCULAR SURGERY)

## 2024-06-30 PROCEDURE — 02B70ZK EXCISION OF LEFT ATRIAL APPENDAGE, OPEN APPROACH: ICD-10-PCS | Performed by: THORACIC SURGERY (CARDIOTHORACIC VASCULAR SURGERY)

## 2024-06-30 PROCEDURE — A9270 NON-COVERED ITEM OR SERVICE: HCPCS

## 2024-06-30 PROCEDURE — 85014 HEMATOCRIT: CPT | Mod: 91

## 2024-06-30 PROCEDURE — 33533 CABG ARTERIAL SINGLE: CPT | Mod: AS

## 2024-06-30 PROCEDURE — 86923 COMPATIBILITY TEST ELECTRIC: CPT | Mod: 91

## 2024-06-30 PROCEDURE — 160009 HCHG ANES TIME/MIN: Performed by: THORACIC SURGERY (CARDIOTHORACIC VASCULAR SURGERY)

## 2024-06-30 PROCEDURE — 160031 HCHG SURGERY MINUTES - 1ST 30 MINS LEVEL 5: Performed by: THORACIC SURGERY (CARDIOTHORACIC VASCULAR SURGERY)

## 2024-06-30 PROCEDURE — 700111 HCHG RX REV CODE 636 W/ 250 OVERRIDE (IP)

## 2024-06-30 PROCEDURE — 700102 HCHG RX REV CODE 250 W/ 637 OVERRIDE(OP): Performed by: THORACIC SURGERY (CARDIOTHORACIC VASCULAR SURGERY)

## 2024-06-30 PROCEDURE — 30233V1 TRANSFUSION OF NONAUTOLOGOUS ANTIHEMOPHILIC FACTORS INTO PERIPHERAL VEIN, PERCUTANEOUS APPROACH: ICD-10-PCS | Performed by: THORACIC SURGERY (CARDIOTHORACIC VASCULAR SURGERY)

## 2024-06-30 PROCEDURE — 700101 HCHG RX REV CODE 250: Performed by: THORACIC SURGERY (CARDIOTHORACIC VASCULAR SURGERY)

## 2024-06-30 PROCEDURE — 82803 BLOOD GASES ANY COMBINATION: CPT | Mod: 91

## 2024-06-30 PROCEDURE — 770022 HCHG ROOM/CARE - ICU (200)

## 2024-06-30 PROCEDURE — 84443 ASSAY THYROID STIM HORMONE: CPT

## 2024-06-30 PROCEDURE — 84295 ASSAY OF SERUM SODIUM: CPT | Mod: 91

## 2024-06-30 PROCEDURE — 87070 CULTURE OTHR SPECIMN AEROBIC: CPT

## 2024-06-30 PROCEDURE — 94150 VITAL CAPACITY TEST: CPT

## 2024-06-30 PROCEDURE — 88304 TISSUE EXAM BY PATHOLOGIST: CPT

## 2024-06-30 PROCEDURE — 700111 HCHG RX REV CODE 636 W/ 250 OVERRIDE (IP): Performed by: INTERNAL MEDICINE

## 2024-06-30 PROCEDURE — 85018 HEMOGLOBIN: CPT | Mod: 91

## 2024-06-30 PROCEDURE — 700105 HCHG RX REV CODE 258: Performed by: ANESTHESIOLOGY

## 2024-06-30 PROCEDURE — 85027 COMPLETE CBC AUTOMATED: CPT

## 2024-06-30 PROCEDURE — 99291 CRITICAL CARE FIRST HOUR: CPT | Performed by: STUDENT IN AN ORGANIZED HEALTH CARE EDUCATION/TRAINING PROGRAM

## 2024-06-30 PROCEDURE — P9012 CRYOPRECIPITATE EACH UNIT: HCPCS | Mod: 91

## 2024-06-30 PROCEDURE — 33405 REPLACEMENT AORTIC VALVE OPN: CPT | Mod: AS

## 2024-06-30 PROCEDURE — 85049 AUTOMATED PLATELET COUNT: CPT

## 2024-06-30 PROCEDURE — 87077 CULTURE AEROBIC IDENTIFY: CPT

## 2024-06-30 PROCEDURE — 110372 HCHG SHELL REV 278: Performed by: THORACIC SURGERY (CARDIOTHORACIC VASCULAR SURGERY)

## 2024-06-30 PROCEDURE — 81001 URINALYSIS AUTO W/SCOPE: CPT

## 2024-06-30 PROCEDURE — 93005 ELECTROCARDIOGRAM TRACING: CPT

## 2024-06-30 PROCEDURE — 87205 SMEAR GRAM STAIN: CPT | Mod: 91

## 2024-06-30 PROCEDURE — P9034 PLATELETS, PHERESIS: HCPCS

## 2024-06-30 PROCEDURE — 85610 PROTHROMBIN TIME: CPT | Mod: 91

## 2024-06-30 PROCEDURE — C1713 ANCHOR/SCREW BN/BN,TIS/BN: HCPCS | Performed by: THORACIC SURGERY (CARDIOTHORACIC VASCULAR SURGERY)

## 2024-06-30 PROCEDURE — 85347 COAGULATION TIME ACTIVATED: CPT | Mod: 91

## 2024-06-30 PROCEDURE — 84439 ASSAY OF FREE THYROXINE: CPT

## 2024-06-30 DEVICE — VALVE INSPIRIS AORTIC 25MM: Type: IMPLANTABLE DEVICE | Site: HEART | Status: FUNCTIONAL

## 2024-06-30 DEVICE — DEVICE CLOSURE STERNAL ZIPFIX: Type: IMPLANTABLE DEVICE | Site: HEART | Status: FUNCTIONAL

## 2024-06-30 RX ORDER — INSULIN LISPRO 100 [IU]/ML
0-14 INJECTION, SOLUTION INTRAVENOUS; SUBCUTANEOUS
Status: DISCONTINUED | OUTPATIENT
Start: 2024-06-30 | End: 2024-07-01

## 2024-06-30 RX ORDER — ALBUMIN, HUMAN INJ 5% 5 %
25 SOLUTION INTRAVENOUS ONCE
Status: ACTIVE | OUTPATIENT
Start: 2024-06-30 | End: 2024-07-01

## 2024-06-30 RX ORDER — DIPHENHYDRAMINE HCL 25 MG
25 TABLET ORAL
Status: DISCONTINUED | OUTPATIENT
Start: 2024-06-30 | End: 2024-07-17 | Stop reason: HOSPADM

## 2024-06-30 RX ORDER — ACETAMINOPHEN 500 MG
1000 TABLET ORAL EVERY 6 HOURS
Status: DISPENSED | OUTPATIENT
Start: 2024-06-30 | End: 2024-07-10

## 2024-06-30 RX ORDER — PAPAVERINE HYDROCHLORIDE 30 MG/ML
INJECTION INTRAMUSCULAR; INTRAVENOUS
Status: DISCONTINUED | OUTPATIENT
Start: 2024-06-30 | End: 2024-06-30 | Stop reason: HOSPADM

## 2024-06-30 RX ORDER — METHADONE HYDROCHLORIDE 10 MG/ML
INJECTION, SOLUTION INTRAMUSCULAR; INTRAVENOUS; SUBCUTANEOUS PRN
Status: DISCONTINUED | OUTPATIENT
Start: 2024-06-30 | End: 2024-06-30

## 2024-06-30 RX ORDER — AMOXICILLIN 250 MG
1 CAPSULE ORAL NIGHTLY
Status: DISCONTINUED | OUTPATIENT
Start: 2024-06-30 | End: 2024-07-17 | Stop reason: HOSPADM

## 2024-06-30 RX ORDER — ASPIRIN 81 MG/1
81 TABLET ORAL DAILY
Status: DISCONTINUED | OUTPATIENT
Start: 2024-07-01 | End: 2024-07-17 | Stop reason: HOSPADM

## 2024-06-30 RX ORDER — ATORVASTATIN CALCIUM 40 MG/1
40 TABLET, FILM COATED ORAL
Status: DISCONTINUED | OUTPATIENT
Start: 2024-06-30 | End: 2024-07-17 | Stop reason: HOSPADM

## 2024-06-30 RX ORDER — MAGNESIUM SULFATE 1 G/100ML
1 INJECTION INTRAVENOUS DAILY
Status: COMPLETED | OUTPATIENT
Start: 2024-06-30 | End: 2024-07-02

## 2024-06-30 RX ORDER — ALUMINA, MAGNESIA, AND SIMETHICONE 2400; 2400; 240 MG/30ML; MG/30ML; MG/30ML
30 SUSPENSION ORAL EVERY 4 HOURS PRN
Status: DISCONTINUED | OUTPATIENT
Start: 2024-06-30 | End: 2024-07-17 | Stop reason: HOSPADM

## 2024-06-30 RX ORDER — POLYETHYLENE GLYCOL 3350 17 G/17G
1 POWDER, FOR SOLUTION ORAL 2 TIMES DAILY PRN
Status: DISCONTINUED | OUTPATIENT
Start: 2024-06-30 | End: 2024-07-17 | Stop reason: HOSPADM

## 2024-06-30 RX ORDER — PHENYLEPHRINE HYDROCHLORIDE 10 MG/ML
INJECTION, SOLUTION INTRAMUSCULAR; INTRAVENOUS; SUBCUTANEOUS PRN
Status: DISCONTINUED | OUTPATIENT
Start: 2024-06-30 | End: 2024-06-30 | Stop reason: SURG

## 2024-06-30 RX ORDER — AMIODARONE HYDROCHLORIDE 150 MG/3ML
INJECTION, SOLUTION INTRAVENOUS PRN
Status: DISCONTINUED | OUTPATIENT
Start: 2024-06-30 | End: 2024-06-30

## 2024-06-30 RX ORDER — DEXTROSE MONOHYDRATE 25 G/50ML
12.5-25 INJECTION, SOLUTION INTRAVENOUS PRN
Status: DISCONTINUED | OUTPATIENT
Start: 2024-06-30 | End: 2024-07-01

## 2024-06-30 RX ORDER — ENEMA 19; 7 G/133ML; G/133ML
1 ENEMA RECTAL
Status: DISCONTINUED | OUTPATIENT
Start: 2024-06-30 | End: 2024-07-12

## 2024-06-30 RX ORDER — PROCHLORPERAZINE EDISYLATE 5 MG/ML
10 INJECTION INTRAMUSCULAR; INTRAVENOUS EVERY 6 HOURS PRN
Status: DISCONTINUED | OUTPATIENT
Start: 2024-06-30 | End: 2024-07-17 | Stop reason: HOSPADM

## 2024-06-30 RX ORDER — OMEPRAZOLE 20 MG/1
20 CAPSULE, DELAYED RELEASE ORAL DAILY
Status: DISCONTINUED | OUTPATIENT
Start: 2024-07-01 | End: 2024-07-17 | Stop reason: HOSPADM

## 2024-06-30 RX ORDER — PROTAMINE SULFATE 10 MG/ML
INJECTION, SOLUTION INTRAVENOUS PRN
Status: DISCONTINUED | OUTPATIENT
Start: 2024-06-30 | End: 2024-06-30 | Stop reason: SURG

## 2024-06-30 RX ORDER — SODIUM CHLORIDE, SODIUM GLUCONATE, SODIUM ACETATE, POTASSIUM CHLORIDE AND MAGNESIUM CHLORIDE 526; 502; 368; 37; 30 MG/100ML; MG/100ML; MG/100ML; MG/100ML; MG/100ML
INJECTION, SOLUTION INTRAVENOUS
Status: DISCONTINUED | OUTPATIENT
Start: 2024-06-30 | End: 2024-06-30 | Stop reason: SURG

## 2024-06-30 RX ORDER — OXYCODONE HYDROCHLORIDE 5 MG/1
5 TABLET ORAL
Status: DISCONTINUED | OUTPATIENT
Start: 2024-06-30 | End: 2024-07-17 | Stop reason: HOSPADM

## 2024-06-30 RX ORDER — MIDAZOLAM HYDROCHLORIDE 1 MG/ML
2 INJECTION INTRAMUSCULAR; INTRAVENOUS
Status: DISCONTINUED | OUTPATIENT
Start: 2024-06-30 | End: 2024-07-01

## 2024-06-30 RX ORDER — NITROGLYCERIN 20 MG/100ML
0-100 INJECTION INTRAVENOUS CONTINUOUS
Status: DISCONTINUED | OUTPATIENT
Start: 2024-06-30 | End: 2024-07-02

## 2024-06-30 RX ORDER — MIDAZOLAM HYDROCHLORIDE 1 MG/ML
INJECTION INTRAMUSCULAR; INTRAVENOUS PRN
Status: DISCONTINUED | OUTPATIENT
Start: 2024-06-30 | End: 2024-06-30

## 2024-06-30 RX ORDER — SODIUM CHLORIDE, SODIUM GLUCONATE, SODIUM ACETATE, POTASSIUM CHLORIDE AND MAGNESIUM CHLORIDE 526; 502; 368; 37; 30 MG/100ML; MG/100ML; MG/100ML; MG/100ML; MG/100ML
INJECTION, SOLUTION INTRAVENOUS PRN
Status: DISCONTINUED | OUTPATIENT
Start: 2024-06-30 | End: 2024-07-07

## 2024-06-30 RX ORDER — SODIUM CHLORIDE 9 MG/ML
INJECTION, SOLUTION INTRAVENOUS CONTINUOUS
Status: DISCONTINUED | OUTPATIENT
Start: 2024-06-30 | End: 2024-07-04

## 2024-06-30 RX ORDER — TRAMADOL HYDROCHLORIDE 50 MG/1
50 TABLET ORAL EVERY 12 HOURS PRN
Status: DISCONTINUED | OUTPATIENT
Start: 2024-06-30 | End: 2024-07-17 | Stop reason: HOSPADM

## 2024-06-30 RX ORDER — DOCUSATE SODIUM 100 MG/1
100 CAPSULE, LIQUID FILLED ORAL 2 TIMES DAILY
Status: DISCONTINUED | OUTPATIENT
Start: 2024-06-30 | End: 2024-07-17 | Stop reason: HOSPADM

## 2024-06-30 RX ORDER — EPINEPHRINE HCL IN 0.9 % NACL 4MG/250ML
0-.5 PLASTIC BAG, INJECTION (ML) INTRAVENOUS CONTINUOUS
Status: DISCONTINUED | OUTPATIENT
Start: 2024-06-30 | End: 2024-07-02

## 2024-06-30 RX ORDER — ONDANSETRON 2 MG/ML
8 INJECTION INTRAMUSCULAR; INTRAVENOUS EVERY 6 HOURS PRN
Status: DISCONTINUED | OUTPATIENT
Start: 2024-06-30 | End: 2024-07-17 | Stop reason: HOSPADM

## 2024-06-30 RX ORDER — SODIUM CHLORIDE 9 MG/ML
INJECTION, SOLUTION INTRAVENOUS
Status: DISCONTINUED | OUTPATIENT
Start: 2024-06-30 | End: 2024-06-30 | Stop reason: SURG

## 2024-06-30 RX ORDER — CLOPIDOGREL BISULFATE 75 MG/1
75 TABLET ORAL DAILY
Status: DISCONTINUED | OUTPATIENT
Start: 2024-07-01 | End: 2024-07-05

## 2024-06-30 RX ORDER — BISACODYL 10 MG
10 SUPPOSITORY, RECTAL RECTAL
Status: DISCONTINUED | OUTPATIENT
Start: 2024-06-30 | End: 2024-07-17 | Stop reason: HOSPADM

## 2024-06-30 RX ORDER — ACETAMINOPHEN 500 MG
1000 TABLET ORAL EVERY 6 HOURS PRN
Status: DISCONTINUED | OUTPATIENT
Start: 2024-07-10 | End: 2024-07-17 | Stop reason: HOSPADM

## 2024-06-30 RX ORDER — MORPHINE SULFATE 4 MG/ML
4 INJECTION INTRAVENOUS
Status: DISCONTINUED | OUTPATIENT
Start: 2024-06-30 | End: 2024-07-02

## 2024-06-30 RX ORDER — NOREPINEPHRINE BITARTRATE 0.03 MG/ML
0-1 INJECTION, SOLUTION INTRAVENOUS CONTINUOUS
Status: DISCONTINUED | OUTPATIENT
Start: 2024-06-30 | End: 2024-07-02

## 2024-06-30 RX ORDER — DEXMEDETOMIDINE HYDROCHLORIDE 4 UG/ML
0-1.5 INJECTION, SOLUTION INTRAVENOUS CONTINUOUS
Status: DISCONTINUED | OUTPATIENT
Start: 2024-06-30 | End: 2024-07-02

## 2024-06-30 RX ORDER — SODIUM CHLORIDE, SODIUM LACTATE, POTASSIUM CHLORIDE, CALCIUM CHLORIDE 600; 310; 30; 20 MG/100ML; MG/100ML; MG/100ML; MG/100ML
INJECTION, SOLUTION INTRAVENOUS
Status: DISCONTINUED | OUTPATIENT
Start: 2024-06-30 | End: 2024-06-30 | Stop reason: SURG

## 2024-06-30 RX ORDER — OXYCODONE HYDROCHLORIDE 10 MG/1
10 TABLET ORAL
Status: DISCONTINUED | OUTPATIENT
Start: 2024-06-30 | End: 2024-07-17 | Stop reason: HOSPADM

## 2024-06-30 RX ORDER — ACETAMINOPHEN 325 MG/1
650 TABLET ORAL EVERY 4 HOURS PRN
Status: DISCONTINUED | OUTPATIENT
Start: 2024-06-30 | End: 2024-07-17 | Stop reason: HOSPADM

## 2024-06-30 RX ORDER — HEPARIN SODIUM,PORCINE 1000/ML
VIAL (ML) INJECTION PRN
Status: DISCONTINUED | OUTPATIENT
Start: 2024-06-30 | End: 2024-06-30 | Stop reason: SURG

## 2024-06-30 RX ORDER — ACETAMINOPHEN 650 MG/1
650 SUPPOSITORY RECTAL EVERY 4 HOURS PRN
Status: DISCONTINUED | OUTPATIENT
Start: 2024-06-30 | End: 2024-07-17 | Stop reason: HOSPADM

## 2024-06-30 RX ORDER — AMOXICILLIN 250 MG
1 CAPSULE ORAL
Status: DISCONTINUED | OUTPATIENT
Start: 2024-06-30 | End: 2024-07-17 | Stop reason: HOSPADM

## 2024-06-30 RX ADMIN — AMINOCAPROIC ACID 9080 MG: 250 INJECTION, SOLUTION INTRAVENOUS at 08:18

## 2024-06-30 RX ADMIN — PHENYLEPHRINE HYDROCHLORIDE 200 MCG: 10 INJECTION INTRAVENOUS at 12:22

## 2024-06-30 RX ADMIN — MAGNESIUM SULFATE IN DEXTROSE 1 G: 10 INJECTION, SOLUTION INTRAVENOUS at 14:28

## 2024-06-30 RX ADMIN — AMPICILLIN SODIUM 2000 MG: 2 INJECTION, POWDER, FOR SOLUTION INTRAVENOUS at 17:34

## 2024-06-30 RX ADMIN — CEFTRIAXONE SODIUM 2000 MG: 10 INJECTION, POWDER, FOR SOLUTION INTRAVENOUS at 17:14

## 2024-06-30 RX ADMIN — EPINEPHRINE 0.04 MCG/KG/MIN: 1 INJECTION INTRAMUSCULAR; INTRAVENOUS; SUBCUTANEOUS at 09:12

## 2024-06-30 RX ADMIN — AMPICILLIN SODIUM 2000 MG: 2 INJECTION, POWDER, FOR SOLUTION INTRAVENOUS at 04:25

## 2024-06-30 RX ADMIN — SODIUM CHLORIDE 10 UNITS/HR: 9 INJECTION, SOLUTION INTRAVENOUS at 10:09

## 2024-06-30 RX ADMIN — AMINOCAPROIC ACID 2 G/HR: 250 INJECTION, SOLUTION INTRAVENOUS at 09:10

## 2024-06-30 RX ADMIN — OXYCODONE HYDROCHLORIDE 5 MG: 5 TABLET ORAL at 23:37

## 2024-06-30 RX ADMIN — METOPROLOL TARTRATE 12.5 MG: 25 TABLET, FILM COATED ORAL at 06:36

## 2024-06-30 RX ADMIN — SODIUM CHLORIDE: 9 INJECTION, SOLUTION INTRAVENOUS at 14:26

## 2024-06-30 RX ADMIN — DEXMEDETOMIDINE HYDROCHLORIDE 0.4 MCG/KG/HR: 100 INJECTION, SOLUTION INTRAVENOUS at 08:18

## 2024-06-30 RX ADMIN — SODIUM CHLORIDE, SODIUM GLUCONATE, SODIUM ACETATE, POTASSIUM CHLORIDE AND MAGNESIUM CHLORIDE: 526; 502; 368; 37; 30 INJECTION, SOLUTION INTRAVENOUS at 07:30

## 2024-06-30 RX ADMIN — PHENYLEPHRINE HYDROCHLORIDE 200 MCG: 10 INJECTION INTRAVENOUS at 09:30

## 2024-06-30 RX ADMIN — ROCURONIUM BROMIDE 10 MG: 10 INJECTION, SOLUTION INTRAVENOUS at 11:25

## 2024-06-30 RX ADMIN — SODIUM CHLORIDE: 9 INJECTION, SOLUTION INTRAVENOUS at 14:25

## 2024-06-30 RX ADMIN — DEXMEDETOMIDINE HYDROCHLORIDE 0.4 MCG/KG/HR: 100 INJECTION, SOLUTION INTRAVENOUS at 14:22

## 2024-06-30 RX ADMIN — SODIUM CHLORIDE 2 UNITS/HR: 9 INJECTION, SOLUTION INTRAVENOUS at 16:21

## 2024-06-30 RX ADMIN — SENNOSIDES AND DOCUSATE SODIUM 1 TABLET: 50; 8.6 TABLET ORAL at 23:37

## 2024-06-30 RX ADMIN — PHENYLEPHRINE HYDROCHLORIDE 200 MCG: 10 INJECTION INTRAVENOUS at 12:21

## 2024-06-30 RX ADMIN — HEPARIN SODIUM 36000 UNITS: 1000 INJECTION, SOLUTION INTRAVENOUS; SUBCUTANEOUS at 09:08

## 2024-06-30 RX ADMIN — PROPOFOL 150 MG: 10 INJECTION, EMULSION INTRAVENOUS at 07:38

## 2024-06-30 RX ADMIN — PROTAMINE SULFATE 150 MG: 10 INJECTION, SOLUTION INTRAVENOUS at 12:19

## 2024-06-30 RX ADMIN — PROTAMINE SULFATE 320 MG: 10 INJECTION, SOLUTION INTRAVENOUS at 12:51

## 2024-06-30 RX ADMIN — Medication 1 APPLICATOR: at 14:27

## 2024-06-30 RX ADMIN — ROCURONIUM BROMIDE 30 MG: 10 INJECTION, SOLUTION INTRAVENOUS at 09:48

## 2024-06-30 RX ADMIN — NOREPINEPHRINE BITARTRATE 0.06 MCG/KG/MIN: 1 INJECTION INTRAVENOUS at 14:24

## 2024-06-30 RX ADMIN — ACETAMINOPHEN 1000 MG: 325 TABLET, FILM COATED ORAL at 06:32

## 2024-06-30 RX ADMIN — NOREPINEPHRINE BITARTRATE 0.04 MCG/KG/MIN: 1 INJECTION INTRAVENOUS at 09:12

## 2024-06-30 RX ADMIN — ACETAMINOPHEN 1000 MG: 500 TABLET, FILM COATED ORAL at 23:37

## 2024-06-30 RX ADMIN — METHADONE HYDROCHLORIDE 10 MG: 10 INJECTION, SOLUTION INTRAMUSCULAR; INTRAVENOUS; SUBCUTANEOUS at 07:38

## 2024-06-30 RX ADMIN — ATORVASTATIN CALCIUM 40 MG: 40 TABLET, FILM COATED ORAL at 23:30

## 2024-06-30 RX ADMIN — VASOPRESSIN 0.03 UNITS/HR: 20 INJECTION INTRAVENOUS at 12:31

## 2024-06-30 RX ADMIN — VANCOMYCIN HYDROCHLORIDE 1500 MG: 1 INJECTION, POWDER, LYOPHILIZED, FOR SOLUTION INTRAVENOUS at 08:00

## 2024-06-30 RX ADMIN — SODIUM CHLORIDE, POTASSIUM CHLORIDE, SODIUM LACTATE AND CALCIUM CHLORIDE: 600; 310; 30; 20 INJECTION, SOLUTION INTRAVENOUS at 07:30

## 2024-06-30 RX ADMIN — CEFTRIAXONE SODIUM 2000 MG: 10 INJECTION, POWDER, FOR SOLUTION INTRAVENOUS at 04:26

## 2024-06-30 RX ADMIN — ROCURONIUM BROMIDE 10 MG: 10 INJECTION, SOLUTION INTRAVENOUS at 13:06

## 2024-06-30 RX ADMIN — SODIUM CHLORIDE: 9 INJECTION, SOLUTION INTRAVENOUS at 07:30

## 2024-06-30 RX ADMIN — ROCURONIUM BROMIDE 100 MG: 10 INJECTION, SOLUTION INTRAVENOUS at 07:38

## 2024-06-30 RX ADMIN — SODIUM CHLORIDE, SODIUM GLUCONATE, SODIUM ACETATE, POTASSIUM CHLORIDE AND MAGNESIUM CHLORIDE: 526; 502; 368; 37; 30 INJECTION, SOLUTION INTRAVENOUS at 15:00

## 2024-06-30 RX ADMIN — AMIODARONE HYDROCHLORIDE 150 MG: 50 INJECTION, SOLUTION INTRAVENOUS at 11:46

## 2024-06-30 RX ADMIN — MIDAZOLAM HYDROCHLORIDE 2 MG: 2 INJECTION, SOLUTION INTRAMUSCULAR; INTRAVENOUS at 07:38

## 2024-06-30 RX ADMIN — ROCURONIUM BROMIDE 20 MG: 10 INJECTION, SOLUTION INTRAVENOUS at 09:03

## 2024-06-30 RX ADMIN — HEPARIN SODIUM 40000 UNITS: 1000 INJECTION, SOLUTION INTRAVENOUS; SUBCUTANEOUS at 12:21

## 2024-06-30 RX ADMIN — CALCIUM CHLORIDE 1000 MG: 100 INJECTION, SOLUTION INTRAVENOUS at 15:30

## 2024-06-30 RX ADMIN — Medication 1 APPLICATOR: at 23:30

## 2024-06-30 RX ADMIN — HYDROCODONE BITARTRATE AND ACETAMINOPHEN 1 TABLET: 5; 325 TABLET ORAL at 04:38

## 2024-06-30 ASSESSMENT — PAIN DESCRIPTION - PAIN TYPE
TYPE: ACUTE PAIN

## 2024-06-30 ASSESSMENT — COPD QUESTIONNAIRES
DO YOU EVER COUGH UP ANY MUCUS OR PHLEGM?: NO/ONLY WITH OCCASIONAL COLDS OR INFECTIONS
COPD SCREENING SCORE: 6
DURING THE PAST 4 WEEKS HOW MUCH DID YOU FEEL SHORT OF BREATH: SOME OF THE TIME
HAVE YOU SMOKED AT LEAST 100 CIGARETTES IN YOUR ENTIRE LIFE: YES

## 2024-06-30 ASSESSMENT — PULMONARY FUNCTION TESTS: FVC: 1.6

## 2024-06-30 NOTE — PROGRESS NOTES
4 Eyes Skin Assessment Completed by DIALLO Bowen and DIALLO Umanzor.    Head WDL  Ears Redness and Blanching  Nose WDL  Mouth WDL  Neck Incision R IJ Trialysis  Breast/Chest WDL  Shoulder Blades WDL  Spine WDL  (R) Arm/Elbow/Hand Redness and Blanching TR band R wrist  (L) Arm/Elbow/Hand Redness and Blanching ACE bandage HELEN  Abdomen Scar  Groin Incision Dressing/staples to L groin  Scrotum/Coccyx/Buttocks Redness and Blanching  (R) Leg Redness and Blanching Mottling  (L) Leg Redness and Blanching Mottling  (R) Heel/Foot/Toe Redness, Blanching, Non-Blanching, Discoloration, Boggy, Ulcer(s), and Scab  (L) Heel/Foot/Toe Redness, Blanching, and Boggy          Devices In Places ECG, Blood Pressure Cuff, Pulse Ox, and Central Line      Interventions In Place NC W/Ear Foams, Heel Mepilex, Sacral Mepilex, Pillows, Low Air Loss Mattress, Heels Loaded W/Pillows, and Pressure Redistribution Mattress    Possible Skin Injury Yes    Pictures Uploaded Into Epic Yes  Wound Consult Placed Yes  RN Wound Prevention Protocol Ordered No

## 2024-06-30 NOTE — PROGRESS NOTES
4 Eyes Skin Assessment Completed by DIALLO Matamoros and DIALLO Baugh.    Head WDL  Ears Redness and Blanching  Nose WDL  Mouth WDL  Neck trialysis incision  Breast/Chest WDL  Shoulder Blades WDL  Spine WDL  (R) Arm/Elbow/Hand Redness and Blanching TR band  (L) Arm/Elbow/Hand Redness and Blanching, stitches upper arm  Abdomen Scar  Groin incision, dressing to L groin  Scrotum/Coccyx/Buttocks Redness and Blanching  (R) Leg Redness and Blanching  (L) Leg Redness and Blanching  (R) Heel/Foot/Toe Redness, Blanching, Non-Blanching, Discoloration, Boggy, Ulcer(s), and Scab  (L) Heel/Foot/Toe Redness, Blanching, and Boggy          Devices In Places ECG, Blood Pressure Cuff, Pulse Ox, and Central Line      Interventions In Place TAP System, Pillows, Low Air Loss Mattress, and Pressure Redistribution Mattress    Possible Skin Injury Yes    Pictures Uploaded Into Epic Yes  Wound Consult Placed Yes  RN Wound Prevention Protocol Ordered No

## 2024-06-30 NOTE — PROGRESS NOTES
Patient arrived to floor from OR at 1400 with OR team and anesthesiologist. Intensivist Dr. Che at bedside for report. All gtts, lines, and devices verified. Pacer set at 70/10/2 to get capture, back up rate 40bpm. 1 unit PRBC's currently infusing, once settled pt HTN up to 130s, epi and vaso titrated off.

## 2024-06-30 NOTE — PROGRESS NOTES
R mediastinal chest tube clotted, Onofre Sutherland PA-C to bedside to suction chest tube, large clots removed.

## 2024-06-30 NOTE — CARE PLAN
The patient is Stable - Low risk of patient condition declining or worsening    Shift Goals  Clinical Goals: VSS, control pain, antibiotics  Patient Goals: rest and mobility  Family Goals: [place]    Progress made toward(s) clinical / shift goals:    Problem: Pain - Standard  Goal: Alleviation of pain or a reduction in pain to the patient’s comfort goal  Outcome: Progressing  Note: No pain with interventions     Problem: Knowledge Deficit - Standard  Goal: Patient and family/care givers will demonstrate understanding of plan of care, disease process/condition, diagnostic tests and medications  Outcome: Progressing  Note: Pt updated on POC       Patient is not progressing towards the following goals:

## 2024-06-30 NOTE — PROGRESS NOTES
Pt with 300 output from mediastinal tubes since 1400, Onofre Sutherland PA-C notified, repeat H&H stable, continue to monitor.

## 2024-06-30 NOTE — PROGRESS NOTES
Patient seen and examined this morning. Interval changes dicussed with patient as below. Preoperative lab work notable for H/h: 7.7/ 25.2. WBC: 10.9. Cr: 5.0. Blood cultures with Gram positive cocci, C&S pending.     Surgical plan updated to now include: CABG x2, EVH, AVR, Modified MAZE, RENEE.      Cardiac Catheterization   @ [facility]   IMPRESSIONS:  1.  Obstructive two-vessel coronary artery disease involving the LAD and RCA   Findings   Hemodynamics:   Aorta: 123/52 mmHg  LVEDP: 30 mmHg     Coronary Anatomy              Left Main: Normal              LAD: 80% stenosis in the mid segment.  The IFR is 0.75              LCx:  Ostial 30% stenosis.  The OM1 has minimal luminal irregularities, the OM 2 has proximal 40% stenosis              RCA:  70% sequential stenosis in the midsegment of the vessel.  The PDA has minimal luminal irregularities, the PLB is normal    US Carotids:   @ [facility]    Vascular Laboratory   CONCLUSIONS   Patent left internal carotid artery      Right internal carotid artery could not be assessed due to presence of    internal jugular catheter      US Vein Mappin/29@ [facility]   FINDINGS   Right.    The great saphenous vein is patent throughout its length with diameters    listed above.   The great saphenous vein is small in caliber below the knee.   The small saphenous vein is small in caliber throughout its length.      Left.    The great saphenous vein is not seen at the origin.   The remaining length of the great saphenous vein is small in caliber.    CXR:   @ [facility]   IMPRESSION:    1.  No focal infiltrates.  2.  Perihilar interstitial prominence and bronchial wall cuffing, appearance suggests changes of underlying bronchial inflammation, consider bronchitis.

## 2024-06-30 NOTE — PROGRESS NOTES
[facility] -  PROGRESS NOTE               Author: Noemí Zuleta M.D. Date & Time: 6/30/2024  12:25 PM     HPI:  68-year-old  male with biventricular history of ESRD on hemodialysis q. Monday Wednesday Friday at [facility] through left upper arm AV fistula that was placed about 2 to 3 years ago.  He presented to the ER with left arm pain and swelling for the last 5 days.  His last dialysis was on Wednesday and had no dialysis in the last 5 days.  He felt sick on Friday and missed his dialysis.  Ultrasound of the left upper arm AV fistula showed there is no flow in the AV fistula and it has thrombosed.  A temporary right IJ dialysis catheter was placed in the ER.  Nephrology has been consulted for management of dialysis.  Patient denies any fever or chills, no nausea or vomiting, no chest pain.  He has some shortness of breath but is able to talk in full sentences and is in no acute respiratory distress at this time. No melena, hematochezia, hematemesis.  No HA, visual changes, or abdominal pain.     NEPHROLOGY DAILY PROGRESS:   6/24: consult note  6/25:  Afebrile, HD last night without any complications, plans noted for OR today with Dr. Tinoco for vascualr surgery   6/26: Underwent AVG repair yesterday with Dr. Tinoco, has LAMBERT drain, BP noted low this AM on dialysis, says BP 's run low on dialysis - not on Midodrine, ordered Midodrine to be used on dialysis   6/27: 0 Net UF with HD yesterday, limited by hypotension. Sitting up at side of bed eating breakfast. Bps improved 130s systolic.   6/28: HD today, seen  6/29: s/p HD yesterday over temp HD line. Tolerated well, no complaints.  6/30: in OR, not available     REVIEW OF SYSTEMS:    Pt is Not available     PMH/PSH/SH/FH:   Reviewed and unchanged since admission note    CURRENT MEDICATIONS:   Reviewed from admission to present day    VS:  BP (!) 156/70   Pulse 76   Temp 36 °C (96.8 °F) (Temporal)   Resp 18   Ht 1.854 m  "(6' 1\")   Wt 89 kg (196 lb 3.4 oz)   SpO2 96%   BMI 25.89 kg/m²     Physical Exam  Pt is Not available   Fluids:  In: 240 [P.O.:240]  Out: 150     LABS:  Recent Labs     06/29/24  0125 06/29/24  1710 06/30/24  0048   SODIUM 135 134* 134*   POTASSIUM 3.9 3.9 4.2   CHLORIDE 98 96 97   CO2 27 24 26   GLUCOSE 98 95 110*   BUN 25* 27* 29*   CREATININE 4.05* 4.87* 5.00*   CALCIUM 8.2* 8.3* 8.3*       IMAGING:   All imaging reviewed from admission to present day    IMPRESSION:  # ESRD  -Q. MWF at [facility]  # Bacteremia               - Blood Cx from 6/24, 6/27 + E. Faecalis  #Thrombosed left upper arm AVG; infected pseudoaneurysm               -Right IJ temporary HD catheter placed in ER 6/24/2024              - S/P AVG repair, resection and pseudoaneurysm resection 6/25/24 by Dr. Tinoco along with  Reconstruction of left brachial artery using left greater saphenous vein   interposition graft.  # HTN  # Anemia of CKD  # CKD-MBD  #Coronary artery disease  #Chronic A-fib        PLAN:  - cont q MWF   - EPO with HD  - Appreciate Dr. Tinoco (Vascular Surgery)   - AVG may not be available for HD use soon   - will need to convert temp IJ catheter to a tunneled HD catheter before discharge but will need negative BCx  - No dietary protein restrictions  - Resume home meds   - Dose all meds per ESRD    "

## 2024-06-30 NOTE — CARE PLAN
The patient is Stable - Low risk of patient condition declining or worsening    Shift Goals  Clinical Goals: complete pre op checklist, pain control  Patient Goals: rest  Family Goals: [place]    Progress made toward(s) clinical / shift goals:    Problem: Pain - Standard  Goal: Alleviation of pain or a reduction in pain to the patient’s comfort goal  Outcome: Progressing     Problem: Fall Risk  Goal: Patient will remain free from falls  Outcome: Progressing     Problem: Skin Integrity  Goal: Skin integrity is maintained or improved  Outcome: Progressing     Problem: Pre Op  Goal: Optimal preparation for CABG/Heart Valve surgery  Outcome: Progressing  Intervention: Pre Op education to patient/significant other. Provide patient [facility] Patient Guideline for Cardiac Surgery (See Pt. Ed.)  Note: Pt openly accepted and understood education given  Intervention: Consents obtained for Surgery, Anesthesia and Transfusion/Bloodless Program Participant  Note: Printed paper  Intervention: Pre op checklist completed.  Sign and held orders released. Admit profile completed. Advanced directive verified.  Note: Completed. Full code verified  Intervention: Pre op labs per MD order: CBC, CMP, INR, PTT, COD and UA if not done in past 72 hours.  HbA1C if diabetic.  Note: completed  Intervention: Baseline assessment documented to include IS volume, weight, bilateral BP and peripheral pulses.  Note: Baseline IS 2500, UE pulses 2+ LE pulses heard with doppler, bilateral leg /51 L leg and 101/49 Right leg  Intervention: Remove dentures, valuables, jewelry, piercings, contacts, dentures and hearing aids  Note: completed  Intervention: CABG patients - Determine if patient is taking beta blockers  Note: Does not take beta blockers  Intervention: Beta blocker, gabapentin and Tylenol given at least 2 hours prior to surgery  Note: Will be administered in pre op  Intervention: If diabetic, administer insulin night before surgery  Note: Not  diabetic  Intervention: If diabetic, FSBS in am and follow sliding scale if indicated  Note: BG 92  Intervention: Chart back and consents sent to surgery with patient  Note: completed  Intervention: Transport to surgery with cardiac monitor, oxygen, and ensure hand off report is given to pre op RN  Note: To be completed       Patient is not progressing towards the following goals:

## 2024-06-30 NOTE — PROGRESS NOTES
Lab called with critical APTT 213.1 post [facility] lab, Onofre Sutherland PA-C notified.

## 2024-06-30 NOTE — OP REPORT
Operative report    PreOp Diagnosis: Bacterial endocarditis, severe aortic insufficiency, sepsis, multivessel coronary artery disease, end-stage renal disease on dialysis, chronic atrial fibrillation, peripheral arterial disease, hypertension, dyslipidemia      PostOp Diagnosis: Same as above      Procedure(s):  REPLACEMENT, AORTIC VALVE WITH A 25 MM DICKEY INSPIRIS- Wound Class: Clean with DrainCABG X1 WITH SEKELTONIZED LIMA FREE GRAFT- Wound Class: Clean with Drain  LEFT ATRIAL APPENDAGE RESECTION AND LIGATION  ECHOCARDIOGRAM, TRANSESOPHAGEAL, INTRAOPERATIVE - Wound Class: Clean with Drain      Surgeon(s):  Zion Austin D.O.    Anesthesiologist/Type of Anesthesia:  Anesthesiologist: Gumaro Arnold M.D.  Perfusionist: Doris Bhagat/General    Surgical Staff:  Assistant: Onofre Sutherland P.A.-C.  Circulator: Claudia Arcos R.N.  Relief Circulator: Anita Chau R.N.  Scrub Person: Alma Davis; Naty Duran    Specimens removed if any:  ID Type Source Tests Collected by Time Destination   1 : AORTIC VALVE Tissue Heart Valve AFB CULTURE, FUNGAL CULTURE, GRAM STAIN ONLY, AEROBIC/ANAEROBIC CULTURE (SURGERY) Zion Austin D.O. 6/30/2024 10:25 AM    A : APPENDAGE Tissue Heart PATHOLOGY SPECIMEN Zion Austin D.O. 6/30/2024 10:22 AM    B : AORTIC VALVE Tissue Heart Valve PATHOLOGY SPECIMEN Zion Austin D.O. 6/30/2024 10:27 AM        Estimated Blood Loss: Cardiopulmonary bypass    Findings: Preoperative echo showed depressed ejection fraction    Consistent with preoperative RENEE.  Postoperative echo showed improvement of the wall motion with improved ejection fraction to low normal at 45-50.    Mammary artery was 3 mm size good quality conduit.  LAD had proximal calcification and was a good target distally.  Other coronaries had calcium throughout.  Patient had no venous conduit for use for RCA bypass.  This was communicated to his cardiologist.  Flow down the LIMA free graft to the  LAD was 60 cc a minute at 150 mmHg.    Aortic valve had endocarditis of all 3 cusps with no involvement of the aortic root or annulus.  Sized for 25 mm valve.  After valve was replaced, he had good clearance of both coronary ostia with no gaps.  Postoperative echo showed a transvalvular gradient of 7 mmHg with no perivalvular leak.    Patient's anatomy was unfavorable for epicardial ablation for his chronic atrial fibrillation.  Given his sepsis, I elected to forego ablation and just ligated and excluded his appendage which was sent for specimen.    Patient weaned from bypass without issue.  He did have an episode off-pump where he injected there down his LIMA graft which required emergent reinstitution of cardiopulmonary bypass and resting for 20 minutes, he then again weaned from bypass without issue with improved ejection fraction and good hemodynamics on appropriate inotropic and vasopressor support.      Complications: None immediate postoperatively    Complications: None immediate    Patient condition: Guarded to ICU    Chest tubes: Mediastinal: 32 Norwegian x2    Pacing wires: RV x 2    Pericardium: Open    Cross-clamp time: 119 minutes    Pump time: 174 minutes    Cardioplegia: Cold blood antegrade Del Nido cardioplegia given.  Initial induction with 1250 and mL.  Redosed at 75 minutes with 300 cc of warm blood.  Patient was rearrested for repair of bleeding at the left atrial appendage stump that was done with 300 cc of antegrade Del Nido.  Hotshot was given with 300 cc of warm blood antegrade    Vent: Aortic root      Procedure:    After informed consent was obtained, the patient was brought to the operating room.  They were placed in supine position on the operating table.  General anesthesia was induced via endotracheal tube.  Lines, arterial line, Crespo were placed by the nursing and anesthesia teams.  They received pre-incision antibiotics and beta-blockade.  The patient was prepped in a sterile fashion  from their chin to their feet.  Drapes were placed in a sterile fashion.  The procedure was begun with a timeout.    I was joined throughout the case by TOO Chaudhry.  He assisted in all aspects of the procedure from beginning to end.    I began by making a median sternotomy incision with 10 blade scalpel.  I deepened my sternal incision with left cautery to the sternum.  The sternum was divided in the midline using the sternal saw.  Hemostasis of the sternal edges was obtained using bone putty and electrocautery.  The mammary retractor was brought up at that point and the left hemisternum was elevated.  Using the electrocautery with occasional clips for side branches, the mammary was dissected down from its takeoff at the subclavian down to its bifurcation into the muscular phrenic and inferior epigastric, in a skeletonized free fashion.    Once the mammary was completely dissected out, systemic heparinization was performed, using 400 units/kg of heparin, to achieve an ACT greater than 480 seconds. The mammary was ligated distally and divided.  There was excellent flow noted.  I placed a small clip at the distal end of the mammary, treated it with papaverine, and placed it back in the left chest for safe keeping.  The proximal portion was ligated with large clips and a silk tie the distal stump was secured.  The mammary retractor was removed and sternal retractor was placed in the chest open.    With the chest open, I then dissected out the innominate vein and the pericardium.  I then opened the pericardium and inverted T fashion.  I created pericardial well, suspending the pericardial edges from the skin edges using interrupted 2-0 Ethibond sutures.  Inspection of the heart and the aorta revealed no obvious pathology.  Palpation of the aorta revealed no calcifications precluding cannulation or cross-clamp.  I cannulated the aorta using a direct Seldinger's technique and RENEE guidance.    Next, I placed the  venous cannula into the IVC via the right atrium.  Again this was confirmed with RENEE.  Next, I then placed a pursestring on the mid ascending aorta, through which I placed my antegrade needle.      With my cannulas in place, I then inspected the vein.  It was of reasonable quality.  The distal end was spatulated and it was marked to help with orienting the graft.  Cardiopulmonary bypass was then initiated.    Once on full flow, I then retracted the heart to inspect my targets.  The LAD was noted to be a reasonable target distally.  These were each marked with the Dolores blade.  Finally, I dissected the pulmonary artery away from the distal portion of the aorta to facilitate cross-clamp placement and complete isolation of the heart.  I then placed a cross-clamp and the heart was arrested.  Ice slush was placed on the heart to assist with cooling. CO2 was distilled over the field throughout the case.    After I had arrest, I examined the patient's pulmonary veins.  Given his body habitus and the fact that he was fairly ill individual with sepsis and endocarditis, I felt the added time of cross-clamp and bypass would be potentially harmful.  I therefore turned my attention to the just resecting the left atrial appendage.  The heart was repositioned to expose it and a pursestring was placed at its base using a 4-0 Prolene suture with pledget reinforcement.  This was then resected and sent for specimen.  The stump was oversewn in 2 layers with the 4-0 Prolene.    The heart was repositioned again to expose the LAD.  I dissected this out with a Dolores blade and opened it with 11 blade scalpel.  I extended the arteriotomy with Cortez scissors.  I then created a keyhole defect in the pericardium through which I brought my mammary pedicle.  I then sized it to the site of the anastomosis and divided mammary pedicle, discarding the distal portion.  I then fashioned the distal end of the mammary.  An end LIMA to side LAD  anastomosis was created with a running 7-0 Prolene suture.  Upon completion, the pedicle was opened and flow was noted to run distal.  It was tested with cardioplegia instillation on the graft found to be 60 mL minute at a pressure 150 mmHg.     I then turned my attention to the aortic valve.  I created a transverse oblique aortotomy which was carried down to the midportion of the noncoronary sinus.  The clearly infected cusp were resected and sent for specimen and culture.  Sizing was consistent with a 25 mm valve.  Annular sutures were placed circumferentially in an noneverting fashion using 2 oh Ethibonds with 3 x 3 mm pledgets.  The valve was brought up and prepped on the back table.  Sutures were then passed through the sewing cuff and lowered into place.  After checking the both coronary ostia were clear of the valve and that it sat well on the annulus, the sutures were then secured with the cor knot cramping device.  Again both coronary ostia were checked and found to be completely clear of the valve with good seating of the valve.  The aortotomy was then closed with 4-0 Prolene suture with pledget reinforcement on the lateral and in 2 layers.    The frame of free graft proximal anastomosis was then created.  A 4 mm punch was used to create an aortotomy to which the free graft was sized and then anastomosed with a running 6-0 Prolene suture.  It was marked with a coronary marker.  Will  Patient was de-aired, flows dropped, and cross-clamp removed.  As the heart reperfused the placement of right ventricular pacing leads and my mediastinal chest tubes.  They were brought out through the epigastrium in the usual fashion.  Surgical sites were checked and I found had bleeding at the left atrial appendage stump.  I attempted to oversew this with pledgeted 4-0 Prolene sutures with the cross-clamp off.  Ultimately decided to reapply the cross-clamp earlier as the heart in order to get a secure closure.  This was done  with 300 cc of antegrade Del Nido.  A single pledgeted 4-0 Prolene suture was then placed with good hemostasis.  Cross-clamp was then removed after de-airing the patient.  After waiting an appropriate amount of time, the patient was then  from bypass.  Unfortunately after several minutes off bypass the patient had a sudden V-fib arrest consistent with intracoronary air.  He was defibrillated twice with no resumption of normal sinus rhythm.  He was recannulated with the venous cannula and heparin were given through the pump.  Cardiopulmonary bypass was then reinitiated for 20 minutes to clear the intra coronary air.  After that time he again  from bypass without any issue with the minimal dose of inotropic and vasopressor support for his endocarditis sepsis.     Venous cannula and aortic root vent were removed.  Protamine was administered to reverse systemic heparinization.  As the protamine was administered, the patient's blood volume in the pump was administered back to the aortic cannula.  Once that was given back, that cannula was removed.  Sites were oversewn as needed.  Hemostasis was verified to be good.  Proceeded with closure of the sternum using interrupted #5 sternal wires and the zip fix sternal band system.  The wound was then irrigated and closed in layers using absorbable sutures.  The patient tolerated the procedure well, all instrument counts were correct x2, and he was taken to the ICU in guarded condition.          6/30/2024 1:21 PM Zion Austin D.O.

## 2024-06-30 NOTE — CARE PLAN
The patient is Stable - Low risk of patient condition declining or worsening    Shift Goals  Clinical Goals: pain control, pre op checklist  Patient Goals: rest  Family Goals: [place]    Progress made toward(s) clinical / shift goals:    Problem: Pain - Standard  Goal: Alleviation of pain or a reduction in pain to the patient’s comfort goal  Outcome: Progressing     Problem: Fall Risk  Goal: Patient will remain free from falls  Outcome: Progressing     Problem: Skin Integrity  Goal: Skin integrity is maintained or improved  Outcome: Progressing     Problem: Pre Op  Goal: Optimal preparation for CABG/Heart Valve surgery  Outcome: Progressing  Intervention: Pre Op education to patient/significant other. Provide patient [facility] Patient Guideline for Cardiac Surgery (See Pt. Ed.)  Note: Pt openly accepted and understood education given  Intervention: Consents obtained for Surgery, Anesthesia and Transfusion/Bloodless Program Participant  Note: Printed paper  Intervention: Pre op checklist completed.  Sign and held orders released. Admit profile completed. Advanced directive verified.  Note: Completed. Full code verified  Intervention: Baseline assessment documented to include IS volume, weight, bilateral BP and peripheral pulses.  Note: Baseline IS 2500, UE pulses 2+ LE pulses heard with doppler, bilateral leg /51 L leg and 101/49 Right leg  Intervention: Remove dentures, valuables, jewelry, piercings, contacts, dentures and hearing aids  Note: completed  Intervention: CABG patients - Determine if patient is taking beta blockers  Note: Does not take beta blockers  Intervention: Beta blocker, gabapentin and Tylenol given at least 2 hours prior to surgery  Note: Will be administered in pre op  Intervention: If diabetic, administer insulin night before surgery  Note: Not diabetic  Intervention: If diabetic, FSBS in am and follow sliding scale if indicated  Note: BG 92  Intervention: Chart back and consents sent to surgery  with patient  Note: completed  Intervention: Transport to surgery with cardiac monitor, oxygen, and ensure hand off report is given to pre op RN  Note: To be completed       Patient is not progressing towards the following goals:

## 2024-07-01 ENCOUNTER — APPOINTMENT (OUTPATIENT)
Dept: RADIOLOGY | Facility: MEDICAL CENTER | Age: 69
DRG: 216 | End: 2024-07-01
Payer: MEDICARE

## 2024-07-01 ENCOUNTER — PATIENT OUTREACH (OUTPATIENT)
Dept: SCHEDULING | Facility: IMAGING CENTER | Age: 69
End: 2024-07-01
Payer: MEDICARE

## 2024-07-01 PROBLEM — Z99.11 ENCOUNTER FOR WEANING FROM VENTILATOR (HCC): Status: ACTIVE | Noted: 2024-07-01

## 2024-07-01 LAB
ANION GAP SERPL CALC-SCNC: 20 MMOL/L (ref 7–16)
BUN SERPL-MCNC: 31 MG/DL (ref 8–22)
CALCIUM SERPL-MCNC: 8.2 MG/DL (ref 8.5–10.5)
CFT BLD TEG: 4.4 MIN (ref 4.6–9.1)
CFT P HPASE BLD TEG: 4.2 MIN (ref 4.3–8.3)
CHLORIDE SERPL-SCNC: 98 MMOL/L (ref 96–112)
CLOT ANGLE BLD TEG: 79 DEGREES (ref 63–78)
CLOT LYSIS 30M P MA LENFR BLD TEG: 0 % (ref 0–2.6)
CO2 SERPL-SCNC: 21 MMOL/L (ref 20–33)
CREAT SERPL-MCNC: 4.95 MG/DL (ref 0.5–1.4)
CT.EXTRINSIC BLD ROTEM: 0.8 MIN (ref 0.8–2.1)
EKG IMPRESSION: NORMAL
ERYTHROCYTE [DISTWIDTH] IN BLOOD BY AUTOMATED COUNT: 52.1 FL (ref 35.9–50)
GFR SERPLBLD CREATININE-BSD FMLA CKD-EPI: 12 ML/MIN/1.73 M 2
GLUCOSE BLD STRIP.AUTO-MCNC: 126 MG/DL (ref 65–99)
GLUCOSE BLD STRIP.AUTO-MCNC: 126 MG/DL (ref 65–99)
GLUCOSE BLD STRIP.AUTO-MCNC: 128 MG/DL (ref 65–99)
GLUCOSE BLD STRIP.AUTO-MCNC: 133 MG/DL (ref 65–99)
GLUCOSE BLD STRIP.AUTO-MCNC: 136 MG/DL (ref 65–99)
GLUCOSE BLD STRIP.AUTO-MCNC: 137 MG/DL (ref 65–99)
GLUCOSE BLD STRIP.AUTO-MCNC: 138 MG/DL (ref 65–99)
GLUCOSE BLD STRIP.AUTO-MCNC: 138 MG/DL (ref 65–99)
GLUCOSE BLD STRIP.AUTO-MCNC: 140 MG/DL (ref 65–99)
GLUCOSE BLD STRIP.AUTO-MCNC: 141 MG/DL (ref 65–99)
GLUCOSE BLD STRIP.AUTO-MCNC: 143 MG/DL (ref 65–99)
GLUCOSE BLD STRIP.AUTO-MCNC: 145 MG/DL (ref 65–99)
GLUCOSE BLD STRIP.AUTO-MCNC: 152 MG/DL (ref 65–99)
GLUCOSE BLD STRIP.AUTO-MCNC: 160 MG/DL (ref 65–99)
GLUCOSE BLD STRIP.AUTO-MCNC: 168 MG/DL (ref 65–99)
GLUCOSE BLD STRIP.AUTO-MCNC: 170 MG/DL (ref 65–99)
GLUCOSE BLD STRIP.AUTO-MCNC: 182 MG/DL (ref 65–99)
GLUCOSE SERPL-MCNC: 153 MG/DL (ref 65–99)
HCT VFR BLD AUTO: 20.7 % (ref 42–52)
HCT VFR BLD AUTO: 22.9 % (ref 42–52)
HCT VFR BLD AUTO: 24.9 % (ref 42–52)
HGB BLD-MCNC: 6.8 G/DL (ref 14–18)
HGB BLD-MCNC: 7.7 G/DL (ref 14–18)
HGB BLD-MCNC: 8.4 G/DL (ref 14–18)
MCF BLD TEG: 67.4 MM (ref 52–69)
MCF.PLATELET INHIB BLD ROTEM: 33.4 MM (ref 15–32)
MCH RBC QN AUTO: 29.3 PG (ref 27–33)
MCHC RBC AUTO-ENTMCNC: 32.9 G/DL (ref 32.3–36.5)
MCV RBC AUTO: 89.2 FL (ref 81.4–97.8)
PA AA BLD-ACNC: 9.2 % (ref 0–11)
PA ADP BLD-ACNC: 19.2 % (ref 0–17)
PATHOLOGY CONSULT NOTE: NORMAL
PLATELET # BLD AUTO: 169 K/UL (ref 164–446)
PMV BLD AUTO: 10.5 FL (ref 9–12.9)
POTASSIUM SERPL-SCNC: 4.9 MMOL/L (ref 3.6–5.5)
POTASSIUM SERPL-SCNC: 5.1 MMOL/L (ref 3.6–5.5)
RBC # BLD AUTO: 2.32 M/UL (ref 4.7–6.1)
SODIUM SERPL-SCNC: 139 MMOL/L (ref 135–145)
TEG ALGORITHM TGALG: ABNORMAL
WBC # BLD AUTO: 16.7 K/UL (ref 4.8–10.8)

## 2024-07-01 PROCEDURE — 770022 HCHG ROOM/CARE - ICU (200)

## 2024-07-01 PROCEDURE — 36430 TRANSFUSION BLD/BLD COMPNT: CPT

## 2024-07-01 PROCEDURE — 84132 ASSAY OF SERUM POTASSIUM: CPT

## 2024-07-01 PROCEDURE — 700111 HCHG RX REV CODE 636 W/ 250 OVERRIDE (IP)

## 2024-07-01 PROCEDURE — A9270 NON-COVERED ITEM OR SERVICE: HCPCS

## 2024-07-01 PROCEDURE — 85027 COMPLETE CBC AUTOMATED: CPT

## 2024-07-01 PROCEDURE — 85018 HEMOGLOBIN: CPT

## 2024-07-01 PROCEDURE — 700111 HCHG RX REV CODE 636 W/ 250 OVERRIDE (IP): Mod: JZ | Performed by: NURSE PRACTITIONER

## 2024-07-01 PROCEDURE — 82962 GLUCOSE BLOOD TEST: CPT

## 2024-07-01 PROCEDURE — 99291 CRITICAL CARE FIRST HOUR: CPT | Performed by: INTERNAL MEDICINE

## 2024-07-01 PROCEDURE — 93005 ELECTROCARDIOGRAM TRACING: CPT

## 2024-07-01 PROCEDURE — 700111 HCHG RX REV CODE 636 W/ 250 OVERRIDE (IP): Performed by: INTERNAL MEDICINE

## 2024-07-01 PROCEDURE — 87040 BLOOD CULTURE FOR BACTERIA: CPT

## 2024-07-01 PROCEDURE — 85576 BLOOD PLATELET AGGREGATION: CPT | Mod: 91

## 2024-07-01 PROCEDURE — 99233 SBSQ HOSP IP/OBS HIGH 50: CPT | Performed by: INTERNAL MEDICINE

## 2024-07-01 PROCEDURE — 85347 COAGULATION TIME ACTIVATED: CPT

## 2024-07-01 PROCEDURE — 700102 HCHG RX REV CODE 250 W/ 637 OVERRIDE(OP): Performed by: INTERNAL MEDICINE

## 2024-07-01 PROCEDURE — 94669 MECHANICAL CHEST WALL OSCILL: CPT

## 2024-07-01 PROCEDURE — 85384 FIBRINOGEN ACTIVITY: CPT | Mod: 91

## 2024-07-01 PROCEDURE — 97602 WOUND(S) CARE NON-SELECTIVE: CPT

## 2024-07-01 PROCEDURE — 700101 HCHG RX REV CODE 250: Performed by: INTERNAL MEDICINE

## 2024-07-01 PROCEDURE — 86923 COMPATIBILITY TEST ELECTRIC: CPT | Mod: 91

## 2024-07-01 PROCEDURE — 71045 X-RAY EXAM CHEST 1 VIEW: CPT

## 2024-07-01 PROCEDURE — 93010 ELECTROCARDIOGRAM REPORT: CPT | Performed by: INTERNAL MEDICINE

## 2024-07-01 PROCEDURE — 700102 HCHG RX REV CODE 250 W/ 637 OVERRIDE(OP)

## 2024-07-01 PROCEDURE — 700111 HCHG RX REV CODE 636 W/ 250 OVERRIDE (IP): Mod: JZ,JG | Performed by: INTERNAL MEDICINE

## 2024-07-01 PROCEDURE — P9016 RBC LEUKOCYTES REDUCED: HCPCS | Mod: 91

## 2024-07-01 PROCEDURE — 700105 HCHG RX REV CODE 258: Performed by: INTERNAL MEDICINE

## 2024-07-01 PROCEDURE — 80048 BASIC METABOLIC PNL TOTAL CA: CPT

## 2024-07-01 PROCEDURE — 85014 HEMATOCRIT: CPT

## 2024-07-01 PROCEDURE — 90935 HEMODIALYSIS ONE EVALUATION: CPT

## 2024-07-01 RX ORDER — CALCIUM CHLORIDE 100 MG/ML
1 INJECTION INTRAVENOUS; INTRAVENTRICULAR ONCE
Status: COMPLETED | OUTPATIENT
Start: 2024-07-01 | End: 2024-07-01

## 2024-07-01 RX ORDER — HYDRALAZINE HYDROCHLORIDE 20 MG/ML
20 INJECTION INTRAMUSCULAR; INTRAVENOUS EVERY 6 HOURS PRN
Status: DISCONTINUED | OUTPATIENT
Start: 2024-07-01 | End: 2024-07-17 | Stop reason: HOSPADM

## 2024-07-01 RX ORDER — DEXTROSE MONOHYDRATE 25 G/50ML
25 INJECTION, SOLUTION INTRAVENOUS
Status: DISCONTINUED | OUTPATIENT
Start: 2024-07-01 | End: 2024-07-03

## 2024-07-01 RX ORDER — VALSARTAN 80 MG/1
40 TABLET ORAL
Status: DISCONTINUED | OUTPATIENT
Start: 2024-07-02 | End: 2024-07-05

## 2024-07-01 RX ADMIN — MAGNESIUM SULFATE IN DEXTROSE 1 G: 10 INJECTION, SOLUTION INTRAVENOUS at 06:35

## 2024-07-01 RX ADMIN — Medication 1 APPLICATOR: at 20:13

## 2024-07-01 RX ADMIN — ACETAMINOPHEN 1000 MG: 500 TABLET, FILM COATED ORAL at 06:15

## 2024-07-01 RX ADMIN — HEPARIN SODIUM 1200 UNITS: 1000 INJECTION, SOLUTION INTRAVENOUS; SUBCUTANEOUS at 15:00

## 2024-07-01 RX ADMIN — HEPARIN SODIUM 1500 UNITS: 1000 INJECTION, SOLUTION INTRAVENOUS; SUBCUTANEOUS at 12:39

## 2024-07-01 RX ADMIN — ACETAMINOPHEN 1000 MG: 500 TABLET, FILM COATED ORAL at 17:10

## 2024-07-01 RX ADMIN — INSULIN HUMAN 2 UNITS: 100 INJECTION, SOLUTION PARENTERAL at 20:38

## 2024-07-01 RX ADMIN — OXYCODONE HYDROCHLORIDE 5 MG: 5 TABLET ORAL at 19:29

## 2024-07-01 RX ADMIN — EPOETIN ALFA 10000 UNITS: 10000 SOLUTION INTRAVENOUS; SUBCUTANEOUS at 12:45

## 2024-07-01 RX ADMIN — OXYCODONE HYDROCHLORIDE 5 MG: 5 TABLET ORAL at 04:02

## 2024-07-01 RX ADMIN — OXYCODONE HYDROCHLORIDE 5 MG: 5 TABLET ORAL at 11:00

## 2024-07-01 RX ADMIN — ONDANSETRON 8 MG: 2 INJECTION INTRAMUSCULAR; INTRAVENOUS at 06:35

## 2024-07-01 RX ADMIN — COAGULATION FACTOR VIIA (RECOMBINANT) 1000 MCG: KIT at 03:17

## 2024-07-01 RX ADMIN — DOCUSATE SODIUM 100 MG: 100 CAPSULE, LIQUID FILLED ORAL at 06:14

## 2024-07-01 RX ADMIN — ATORVASTATIN CALCIUM 40 MG: 40 TABLET, FILM COATED ORAL at 20:13

## 2024-07-01 RX ADMIN — AMPICILLIN SODIUM 2000 MG: 2 INJECTION, POWDER, FOR SOLUTION INTRAVENOUS at 17:19

## 2024-07-01 RX ADMIN — AMPICILLIN SODIUM 2000 MG: 2 INJECTION, POWDER, FOR SOLUTION INTRAVENOUS at 07:00

## 2024-07-01 RX ADMIN — CALCIUM CHLORIDE 1 G: 100 INJECTION, SOLUTION INTRAVENOUS at 03:12

## 2024-07-01 RX ADMIN — HEPARIN SODIUM 500 UNITS: 1000 INJECTION, SOLUTION INTRAVENOUS; SUBCUTANEOUS at 12:10

## 2024-07-01 RX ADMIN — ACETAMINOPHEN 1000 MG: 500 TABLET, FILM COATED ORAL at 23:13

## 2024-07-01 RX ADMIN — OXYCODONE HYDROCHLORIDE 5 MG: 5 TABLET ORAL at 23:14

## 2024-07-01 RX ADMIN — CALCIUM CHLORIDE 1 G: 100 INJECTION, SOLUTION INTRAVENOUS at 06:11

## 2024-07-01 RX ADMIN — ACETAMINOPHEN 1000 MG: 500 TABLET, FILM COATED ORAL at 11:00

## 2024-07-01 RX ADMIN — CEFTRIAXONE SODIUM 2000 MG: 10 INJECTION, POWDER, FOR SOLUTION INTRAVENOUS at 17:10

## 2024-07-01 RX ADMIN — OXYCODONE HYDROCHLORIDE 5 MG: 5 TABLET ORAL at 08:00

## 2024-07-01 RX ADMIN — OXYCODONE HYDROCHLORIDE 5 MG: 5 TABLET ORAL at 14:43

## 2024-07-01 RX ADMIN — METOPROLOL TARTRATE 12.5 MG: 25 TABLET, FILM COATED ORAL at 06:15

## 2024-07-01 RX ADMIN — HYDRALAZINE HYDROCHLORIDE 20 MG: 20 INJECTION INTRAMUSCULAR; INTRAVENOUS at 23:13

## 2024-07-01 RX ADMIN — CEFTRIAXONE SODIUM 2000 MG: 10 INJECTION, POWDER, FOR SOLUTION INTRAVENOUS at 06:46

## 2024-07-01 RX ADMIN — OMEPRAZOLE 20 MG: 20 CAPSULE, DELAYED RELEASE ORAL at 06:14

## 2024-07-01 RX ADMIN — Medication 1 APPLICATOR: at 08:01

## 2024-07-01 ASSESSMENT — PAIN DESCRIPTION - PAIN TYPE
TYPE: ACUTE PAIN

## 2024-07-01 ASSESSMENT — FIBROSIS 4 INDEX: FIB4 SCORE: 2.46

## 2024-07-01 NOTE — FLOWSHEET NOTE
06/30/24 2115   Spontaneous Breathing Trial (SBT)   Length of Weaning Trial (Hours) 45 mins   Patient Extubated Yes   Weaning Parameters   RR (bpm) 12   $ FVC / Vital Capacity (liters)  1.6   NIF (cm H2O)  -27   Rapid Shallow Breathing Index (RR/VT) 13   Spontaneous VE 9.6   Spontaneous      Pt extubated to 4 L NC

## 2024-07-01 NOTE — PROGRESS NOTES
Critical Care Progress Note    Date of admission  6/24/2024    68 y.o. male with history of PAD w/fem-pop bypass, ESRD on HD, afib on warfarin, HTN, CAD s/p PCI who presented 6/24/2024 with left arm graft infection. He was noted to have E. faecalis bacteremia and aortic valve infective endocarditis (1.7x1.1cm large lobulated mobile mass) as well as severe AI. Patient was evaluated by CT surgery and on 6/30/24, he underwent aortic valve replacement (25mm Morgan Inspiris), CABG x1 w/skeletonized LIMA free graft, TAHIR resection and ligation.   Cross clamp time 119 minutes. Pump time was 174 minutes - he was weaned from bypass initially and then had an episode where air was injected down his LIMA graft and he was emergently placed back on cardiopulmonary bypass for 20 min and then weaned without problems. Postop EF showed improvement in wall motion with improved EF of 45-50%.  Patient received 4u pRBCs, 2u plasma, 1u plts.  Post-op, patient was transferred on epi, levo and vaso. 2 chest tubes in place with bloody output. Not on insulin with BG in 140s.        Hospital Course  6/30 admitted to the ICU s/p cardiac surgery  7/1 Awake and alert, bleeding improved    Interval Problem Update  Reviewed last 24 hour events:  Awake and alert  iHDS per nephro  Transfusions and VII overnight which has improved coagulopathy     Review of Systems  Review of Systems   Unable to perform ROS: Critical illness        Vital Signs for last 24 hours   Temp:  [35.7 °C (96.3 °F)-36.6 °C (97.9 °F)] 36.1 °C (97 °F)  Pulse:  [57-71] 58  Resp:  [9-20] 16  BP: ()/(52-91) 108/60  SpO2:  [93 %-100 %] 96 %    Hemodynamic parameters for last 24 hours  CVP:  [2 MM HG-19 MM HG] 7 MM HG    Respiratory Information for the last 24 hours  Vent Mode: Spont  PEEP/CPAP: 5  P Support: 5  MAP: 6  Length of Weaning Trial (Hours): 45 mins  Control VTE (exp VT): 696    Physical Exam   Physical Exam  Vitals and nursing note reviewed.   HENT:      Head:  Normocephalic and atraumatic.      Nose: Nose normal.      Mouth/Throat:      Mouth: Mucous membranes are moist.   Eyes:      Pupils: Pupils are equal, round, and reactive to light.   Cardiovascular:      Rate and Rhythm: Normal rate.      Pulses: Normal pulses.      Comments: Dressing c/d/i  Pulmonary:      Effort: Pulmonary effort is normal. No respiratory distress.   Abdominal:      General: Abdomen is flat. There is no distension.   Musculoskeletal:      Right lower leg: No edema.      Left lower leg: No edema.   Skin:     General: Skin is warm and dry.      Capillary Refill: Capillary refill takes less than 2 seconds.   Neurological:      Mental Status: He is alert and oriented to person, place, and time.      Motor: No weakness.   Psychiatric:         Mood and Affect: Mood normal.         Medications  Current Facility-Administered Medications   Medication Dose Route Frequency Provider Last Rate Last Admin    insulin regular (HumuLIN R,NovoLIN R) injection  2-9 Units Subcutaneous 4X/DAY ACHDHRUV Barton M.D.        And    dextrose 50% (D50W) injection 25 g  25 g Intravenous Q15 MIN PRN Catalino Barton M.D.        norepinephrine (Levophed) 8 mg in 250 mL NS infusion (premix)  0-1 mcg/kg/min (Ideal) Intravenous Continuous Zion Austin, D.O.   Stopped at 07/01/24 0330    EPINEPHrine (Adrenalin) infusion 4 mg/250 mL (premix)  0-0.5 mcg/kg/min (Ideal) Intravenous Continuous Zion Austin, D.O.   Held at 06/30/24 1407    Respiratory Therapy Consult   Nebulization Continuous RT Onofre Sutherland P.A.-C.        NS infusion   Intravenous Continuous Onofre Sutherland P.A.-C.   Stopped at 07/01/24 0045    NS infusion   Intravenous Continuous LILIA ChaudhryC.   Stopped at 07/01/24 0043    electrolyte-A (Plasmalyte-A) infusion   Intravenous PRN Onofre Sutherland P.A.-C.   Stopped at 06/30/24 1814    Nozin nasal  swab  1 Applicator Each Nostril BID Onofre Sutherland P.A.-C.   1 Applicator at 07/01/24  0801    calcium CHLORIDE 10 % 1,000 mg in dextrose 5% 100 mL IVPB  1,000 mg Intravenous Once PRN LILIA ChaudhryCMichelle 200 mL/hr at 06/30/24 1530 1,000 mg at 06/30/24 1530    magnesium sulfate in D5W IVPB premix 1 g  1 g Intravenous DAILY ANOOP Chaudhry.-CMichelle   Stopped at 07/01/24 0735    metoprolol tartrate (Lopressor) tablet 12.5 mg  12.5 mg Oral BID ANOOP Chaudhry.-CMichelle   12.5 mg at 07/01/24 0615    Followed by    [START ON 7/2/2024] metoprolol tartrate (Lopressor) tablet 25 mg  25 mg Oral BID ANOOP Chaudhry.-FRITZ.        aspirin EC tablet 81 mg  81 mg Oral DAILY ROBB ChaudhryA.-C.        clopidogrel (Plavix) tablet 75 mg  75 mg Oral DAILY ROBB ChaudhryA.-DELFINA        atorvastatin (Lipitor) tablet 40 mg  40 mg Oral QHS ROBB ChaudhryAMichelle-CMichelle   40 mg at 06/30/24 2330    clevidipine (Cleviprex) IV emulsion  0-21 mg/hr Intravenous Continuous ANOOP Chaudhry.-CMichelle   Held at 06/30/24 1415    nitroglycerin 50 mg in D5W 250 ml infusion  0-100 mcg/min Intravenous Continuous ROBB ChaudhryA.-C.   Held at 06/30/24 1415    Pharmacy Consult Request ...Pain Management Review 1 Each  1 Each Other PHARMACY TO DOSE Onofre Sutherland P.A.-C.        acetaminophen (Tylenol) tablet 1,000 mg  1,000 mg Oral Q6HRS ANOOP Chaudhry.-CMichelle   1,000 mg at 07/01/24 1100    Followed by    [START ON 7/10/2024] acetaminophen (Tylenol) tablet 1,000 mg  1,000 mg Oral Q6HRS PRN SHAWN Chaudhry-C.        oxyCODONE immediate-release (Roxicodone) tablet 5 mg  5 mg Oral Q3HRS PRN LILIA ChaudhryC.   5 mg at 07/01/24 1100    Or    oxyCODONE immediate release (Roxicodone) tablet 10 mg  10 mg Oral Q3HRS PRN ROBB ChaudhryAMichelle-CMichelle        Or    fentaNYL (Sublimaze) injection 50 mcg  50 mcg Intravenous Q3HRS PRN ROBB ChaudhryA.-C.        traMADol (Ultram) 50 MG tablet 50 mg  50 mg Oral Q12HRS PRN ROBB ChaudhryA.-CMichelle        dexmedetomidine (PRECEDEX) 400 mcg/100mL NS premix infusion  0-1.5 mcg/kg/hr (Ideal) Intravenous Continuous Onofre Sutherland  WANG.A.-C.   Stopped at 06/30/24 1502    sodium bicarbonate 8.4 % injection 50 mEq  50 mEq Intravenous Q HOUR PRN Onofre Sutherland P.A.-C.        morphine 4 MG/ML injection 4 mg  4 mg Intravenous Q HOUR PRN ROBB ChaudhryAMichelle-C.        ondansetron (Zofran) syringe/vial injection 8 mg  8 mg Intravenous Q6HRS PRN LILIA ChaudhryCMichelle   8 mg at 07/01/24 0635    Or    prochlorperazine (Compazine) injection 10 mg  10 mg Intravenous Q6HRS PRN ROBB ChaudhryAMichelle-C.        acetaminophen (Tylenol) tablet 650 mg  650 mg Oral Q4HRS PRN Onofre Sutherland P.A.-C.        Or    acetaminophen (Tylenol) suppository 650 mg  650 mg Rectal Q4HRS PRN ROBB ChaudhryAMichelle-C.        docusate sodium (Colace) capsule 100 mg  100 mg Oral BID Onofre Sutherland P.A.-C.   100 mg at 07/01/24 0614    senna-docusate (Pericolace Or Senokot S) 8.6-50 MG per tablet 1 Tablet  1 Tablet Oral Nightly LILIA ChaudhryC.   1 Tablet at 06/30/24 2337    senna-docusate (Pericolace Or Senokot S) 8.6-50 MG per tablet 1 Tablet  1 Tablet Oral Q24HRS PRN ROBB ChaudhryA.-C.        polyethylene glycol/lytes (Miralax) Packet 1 Packet  1 Packet Oral BID PRN Onofre Sutherland P.A.-C.        magnesium hydroxide (Milk Of Magnesia) suspension 30 mL  30 mL Oral QDAY PRN ROBB ChaudhryAArianC.        bisacodyl (Dulcolax) suppository 10 mg  10 mg Rectal Q24HRS PRN ROBB ChaudhryATRAN.        sodium phosphate (Fleet) enema 133 mL  1 Each Rectal Once PRN ROBB ChaudhryA.-CMichelle        omeprazole (PriLOSEC) capsule 20 mg  20 mg Oral DAILY Onofre Sutherland P.A.-C.   20 mg at 07/01/24 0614    mag hydrox-al hydrox-simeth (Maalox Plus Es Or Mylanta Ds) suspension 30 mL  30 mL Oral Q4HRS PRN SHAWN Chaudhry-DELFINA        diphenhydrAMINE (Benadryl) tablet/capsule 25 mg  25 mg Oral HS PRN - MR X 1 Onofre Sutherland P.A.-C.        vasopressin (Vasostrict) 20 Units in  mL Infusion  0.03 Units/min Intravenous Continuous Zion Austin D.O.   Held at 06/30/24 1431    sodium bicarbonate 8.4 %  injection 50 mEq  50 mEq Intravenous Once Onofre Sutherland P.A.-C.        albumin human 5% solution 25 g  25 g Intravenous Once Onofre Sutherland P.A.-C.   Held at 06/30/24 2200    albuterol (Proventil) 2.5mg/0.5ml nebulizer solution 2.5 mg  2.5 mg Nebulization Q2HRS PRN (RT) Molly Che M.D.        cefTRIAXone (Rocephin) syringe 2,000 mg  2,000 mg Intravenous Q12HRS Sarabjit Pulliam M.D.   2,000 mg at 07/01/24 0646    ampicillin (Omnipen) 2,000 mg in  mL IVPB  2,000 mg Intravenous Q12HRS Sarabjit Pulliam M.D.   Stopped at 07/01/24 0730    epoetin (Epogen,Procrit) injection 10,000 Units  10,000 Units Intravenous MO, WE + FR Jeremy Negron M.D.   10,000 Units at 06/28/24 1020    labetalol (Normodyne/Trandate) injection 10 mg  10 mg Intravenous Q4HRS PRN Doug Tinoco M.D.   10 mg at 06/29/24 1818    hydrALAZINE (Apresoline) injection 10 mg  10 mg Intravenous Q4HRS PRN Dogu Tinoco M.D.        heparin injection 500 Units  500 Units Intravenous DIALYSIS PRN Oswald Pearce M.D.   500 Units at 06/28/24 0711    heparin injection 1,500 Units  1,500 Units Intravenous DIALYSIS PRN Oswald Pearce M.D.   1,500 Units at 06/28/24 0711    heparin intracatheter (for DIALYSIS USE ONLY) 1,200 Units  1,200 Units Intracatheter DIALYSIS JASSIN Oswald Pearce M.D.   1,200 Units at 06/28/24 1015    heparin intracatheter (for DIALYSIS USE ONLY) 1,200 Units  1,200 Units Intracatheter DIALYSIS PRN Oswald Pearce M.D.   1,200 Units at 06/28/24 1015       Fluids    Intake/Output Summary (Last 24 hours) at 7/1/2024 1226  Last data filed at 7/1/2024 0700  Gross per 24 hour   Intake 64765.76 ml   Output 2960 ml   Net 9613.76 ml       Laboratory  Recent Labs     06/30/24  1459 06/30/24  1601 06/30/24  2105   ISTATAPH 7.525* 7.491 7.425   ISTATAPCO2 22.6* 27.8 22.8*   ISTATAPO2 99* 94* 77   ISTATATCO2 19* 22 16*   [id number] 99 98 96   ISTATARTHCO3 18.6 21.2 15.0*   ISTATARTBE -3 -1 -9*   ISTATTEMP 36.3 C 36.1 C 36.3 C    ISTATFIO2 50 40 30   ISTATSPEC Arterial Arterial Arterial   ISTATAPHTC 7.536* 7.505* 7.436   [id number] 95* 89* 73         Recent Labs     06/29/24 1710 06/30/24 0048 06/30/24  1355 06/30/24  1700 07/01/24  0130 07/01/24  0630   SODIUM 134* 134*  --   --  139  --    POTASSIUM 3.9 4.2 5.3 4.9 4.9 5.1   CHLORIDE 96 97  --   --  98  --    CO2 24 26  --   --  21  --    BUN 27* 29*  --   --  31*  --    CREATININE 4.87* 5.00*  --   --  4.95*  --    MAGNESIUM  --  1.9 3.4*  --   --   --    CALCIUM 8.3* 8.3*  --   --  8.2*  --      Recent Labs     06/29/24 1710 06/30/24 0048 07/01/24  0130   ALTSGPT 17  --   --    ASTSGOT 26  --   --    ALKPHOSPHAT 97  --   --    TBILIRUBIN 0.2  --   --    GLUCOSE 95 110* 153*     Recent Labs     06/29/24 1710 06/30/24 0048 06/30/24 2140 07/01/24  0130   WBC  --  10.9* 27.5* 16.7*   ASTSGOT 26  --   --   --    ALTSGPT 17  --   --   --    ALKPHOSPHAT 97  --   --   --    TBILIRUBIN 0.2  --   --   --      Recent Labs     06/29/24 1710 06/30/24 0048 06/30/24  1355 06/30/24  1554 06/30/24  2140 07/01/24  0130 07/01/24  0500 07/01/24  0817   RBC  --  2.69*  --   --  3.24* 2.32*  --   --    HEMOGLOBIN  --  7.7* 9.6*   < > 9.5* 6.8* 7.7* 8.4*   HEMATOCRIT  --  25.2* 28.4*   < > 28.9* 20.7* 22.9* 24.9*   PLATELETCT  --  244 234  --  258 169  --   --    PROTHROMBTM 17.5*  --  18.8*  --  17.3*  --   --   --    APTT 213.1*  --  62.6*  --   --   --   --   --    INR 1.42*  --  1.55*  --  1.40*  --   --   --     < > = values in this interval not displayed.       Imaging  X-Ray:  I have personally reviewed the images and compared with prior images.    Assessment/Plan  * Infected prosthetic vascular graft, initial encounter ([facility])- (present on admission)  Assessment & Plan  C/b pseudoaneurysm.  6/25/24 Left brachial artery interposition repair with GSV due to infected AVG arterial pseudoaneurysm, and partial AVG removal (Alejandrina)  Plan for AV graft as outpatient once medically improved and  discharged    Encounter for weaning from ventilator ([facility])  Assessment & Plan  Intubated 6/30 for surgery, extubated successfully    SpO2 > 90%  Pulmonary hygiene     S/P AVR (aortic valve replacement)  Assessment & Plan  Underwent AVR on 6/30/24 for AV endocarditis w/E.faecalis bacteremia    Monitor and optimize hemodynamics  Goals SBP< 120 mmHg, MAP> 65   Titrate vasoactive/ionotropic medications vs BP reducing medications to achieve hemodynamic goals.  Maintain intravascular euvolemia  Monitor chest tube output for postoperative hemorrhage.       Bacteremia- (present on admission)  Assessment & Plan  E. Faecalis bacteremia with AV endocarditis - s/p AVR on 6/30.  F/u blood cultures from today - will need to repeat cultures tomorrow while pending these results especially with his new AVR  Continue ampicillin and ceftriaxone  Appreciate ID recs  Will need more permanent HD access once repeat cultures neg and patient stable    Coronary artery disease- (present on admission)  Assessment & Plan  Hx of PCI to circumflex. With elevated trop but no chest pain.  Underwent CABG with LIMA to LAD  Aspirin, plavix  Statin   GDMT as tolerated  Cardiology following     PAD (peripheral artery disease) ([facility])- (present on admission)  Assessment & Plan  Hx of previous fem-pop bypass    Chronic atrial fibrillation ([facility])- (present on admission)  Assessment & Plan  S/p left atrial appendage resection and ligation  Tele monitoring     End stage renal disease ([facility])- (present on admission)  Assessment & Plan  HD per nephrology - via temporary HD cath  Monitor/replete lytes  Strict I/Os - still makes urine  Will need plans for more permanent access once stable    Renovascular hypertension- (present on admission)  Assessment & Plan  Hold antihypertensives - currently on vasopressors post AVR         VTE:  per CTS protocol  Ulcer: per CTS protocol  Lines: Central Line  Ongoing indication addressed, Arterial Line  Ongoing indication  addressed, and Crsepo Catheter  Ongoing indication addressed    I have performed a physical exam and reviewed and updated ROS and Plan today (7/1/2024). In review of yesterday's note (6/30/2024), there are no changes except as documented above.     Discussed patient condition and risk of morbidity and/or mortality with RN, RT, Pharmacy, Charge nurse / hot rounds, and Patient  The patient remains critically ill.  Critical care time = 42 minutes in directly providing and coordinating critical care and extensive data review.  No time overlap and excludes procedures.

## 2024-07-01 NOTE — PROGRESS NOTES
Shift Notifications    --------6/30--------    2130: TOO Trejo updated on pt condition.    -chest tube clotting and >150mL output in last hour. Okay to have suctioned by ICU  -pt requiring increased pressors, CVP  -last ABG, pt just extubated without complication    Orders for PT/INR, CBC without diff, 1 PLT, 2FFP, and 1 Cyro.    --------7/1---------    0020: TOO Trejo updated on pt condition.  Pt temp down to 35.7, but pt is c/o of feeling hot and is sweating. CT is still clotting up, but manageable. Okay to give factor 7 if H/H continues to trend down.     0230: TOO Sutherland notified of h/h drop from 9.5/29 to 6.8/20.7, 130mL dump after mobilizing to EOB.  Order to 1G CaCl, then factor 7, then 1unit PRBC.  Okay to recheck h/h 4 hours after first unit and then repeat 1 unit PRBC and 1G CaCl if Hgb<8.0 or hct <26.  Update if bleeding continues.     0450: TOO Sutherland notified of CT outout 190mL last hour, still clotting.  Requesting TEG.  Updated on pt condition-levo off, pt drinking a lot, still unable to keep temperature up despite nelly hugger.     0500: Nick Pan MD notified of above. TEG ordered.  H/H sent down per protocol.     0540:  Okay for 1 unit PRBC and CaCl per TOO Trejo

## 2024-07-01 NOTE — ASSESSMENT & PLAN NOTE
C/b pseudoaneurysm.  6/25/24 Left brachial artery interposition repair with GSV due to infected AVG arterial pseudoaneurysm, and partial AVG removal (Alejandrina)  Plan for AV graft as outpatient once medically improved and discharged

## 2024-07-01 NOTE — PROGRESS NOTES
EXTUBATED AT 2115 TO 5L NC  Total Intubation time: 7 hours 27min    Rapid shallow breathing index (RSBI) less than 100:  13     More than one vasoactive agent Extubation parameters:   Only levo gtt    Pinsp less than or equal to 15 cmH2O:  5    RR less than or equal to 20 breaths per minute:  12    PEEP less than 12wdK9O:  5    FiO2 less than or equal to 50%:  30%    NIF more negative than -25:   -27    VC 10-15 mL/k.5L    PACO2 less than 45:  22    pH greater than 7.3:  7.43    SpO2 greater than 92%   96

## 2024-07-01 NOTE — PROGRESS NOTES
Infectious Disease Progress Note    Author: Sarabjit Pulliam M.D. Date & Time of service: 2024  7:50 AM    Chief Complaint:  Bacteremia, endocarditis    Interval History:   patient remains afebrile, count 11,000, cardiothoracic surgery planning aortic valve replacement, cultures as below, creatinine 4.05   patient remains afebrile, underwent valve replacement surgery yesterday, white count bumped to 27.5, down to 16.7 today, cultures as below, creatinine 4.95, magnesium 3 point    Review of Systems:  Review of Systems   Unable to perform ROS: Other       Hemodynamics:  Temp (24hrs), Av.1 °C (97 °F), Min:35.7 °C (96.3 °F), Max:36.6 °C (97.9 °F)  Temperature: 35.9 °C (96.6 °F), Monitored Temp: 35.9 °C (96.6 °F)  Pulse  Av.4  Min: 57  Max: 136   Blood Pressure : 125/65, Arterial BP: 110/110  CVP (mm Hg): (!) 8 MM HG    Physical Exam:  Physical Exam  Constitutional:       General: He is not in acute distress.     Appearance: He is ill-appearing.   Cardiovascular:      Rate and Rhythm: Normal rate and regular rhythm.      Heart sounds: Normal heart sounds.   Pulmonary:      Effort: Pulmonary effort is normal.      Breath sounds: Normal breath sounds.      Comments: Midline clean and dry dressing  Abdominal:      General: Abdomen is flat. Bowel sounds are normal.      Palpations: Abdomen is soft.   Musculoskeletal:      Comments: Left arm in bandaging   Skin:     General: Skin is warm and dry.   Neurological:      General: No focal deficit present.      Mental Status: He is oriented to person, place, and time.   Psychiatric:         Mood and Affect: Mood normal.         Behavior: Behavior normal.         Meds:    Current Facility-Administered Medications:     NORepinephrine    EPINEPHrine (Adrenalin) infusion    Respiratory Therapy Consult    NS    NS    electrolyte-A    Nozin nasal  swab    calcium CHLORIDE 10 % 1,000 mg in dextrose 5% 100 mL IVPB    magnesium sulfate    Potassium Serum (K)  **AND** K+ Scale: Goal of 4.5    metoprolol tartrate **FOLLOWED BY** [START ON 7/2/2024] metoprolol tartrate    aspirin    clopidogrel    atorvastatin    clevidipine    nitroglycerin in D5W    Pharmacy Consult Request    acetaminophen **FOLLOWED BY** [START ON 7/10/2024] acetaminophen    oxyCODONE immediate-release **OR** oxyCODONE immediate-release **OR** fentaNYL    traMADol    midazolam    dexmedetomidine (Precedex) infusion    sodium bicarbonate    morphine injection    ondansetron **OR** prochlorperazine    acetaminophen **OR** acetaminophen    docusate sodium    senna-docusate    senna-docusate    polyethylene glycol/lytes    magnesium hydroxide    bisacodyl    sodium phosphate    omeprazole    mag hydrox-al hydrox-simeth    diphenhydrAMINE    vasopressin (Vasostrict) 20 Units in  mL Infusion    insulin regular    insulin lispro    insulin regular (HumuLIN R/NovoLIN R) 100 Units in  mL Infusion    dextrose bolus    MD Alert...Pharmacy to initiate transition from insulin infusion to subcutaneous insulin for cardiothoracic surgery    sodium bicarbonate    albumin human 5%    albuterol    cefTRIAXone (ROCEPHIN) IV    ampicillin    epoetin    midodrine    labetalol    hydrALAZINE    heparin    heparin    heparin    heparin    Labs:  Recent Labs     06/30/24  0048 06/30/24  1355 06/30/24  1554 06/30/24  2140 07/01/24  0130 07/01/24  0500   WBC 10.9*  --   --  27.5* 16.7*  --    RBC 2.69*  --   --  3.24* 2.32*  --    HEMOGLOBIN 7.7* 9.6*   < > 9.5* 6.8* 7.7*   HEMATOCRIT 25.2* 28.4*   < > 28.9* 20.7* 22.9*   MCV 93.7  --   --  89.2 89.2  --    MCH 28.6  --   --  29.3 29.3  --    RDW 49.4  --   --  51.5* 52.1*  --    PLATELETCT 244 234  --  258 169  --    MPV 10.5  --   --  10.2 10.5  --     < > = values in this interval not displayed.     Recent Labs     06/29/24  1710 06/30/24  0048 06/30/24  1355 06/30/24  1700 07/01/24  0130 07/01/24  0630   SODIUM 134* 134*  --   --  139  --    POTASSIUM 3.9 4.2   <  > 4.9 4.9 5.1   CHLORIDE 96 97  --   --  98  --    CO2 24 26  --   --  21  --    GLUCOSE 95 110*  --   --  153*  --    BUN 27* 29*  --   --  31*  --     < > = values in this interval not displayed.     Recent Labs     24  1710 24  0048 24  0130   ALBUMIN 2.9*  --   --    TBILIRUBIN 0.2  --   --    ALKPHOSPHAT 97  --   --    TOTPROTEIN 6.3  --   --    ALTSGPT 17  --   --    ASTSGOT 26  --   --    CREATININE 4.87* 5.00* 4.95*       Imaging:  US-EXTREMITY ARTERY LOWER BILAT    Result Date: 2024  Lower Extremity  Arterial Duplex Report  Vascular Laboratory  CONCLUSIONS  1.  Right lower extremity atherosclerosis with occlusion of the distal  fem/popliteal graft with 3 vessel runoff to the right ankle.  3.  Left lower extremity atherosclerosis with patent fem/popliteal graft  and 3 vessel runoff to the ankle.  ABRAHAMBROOKE  Exam Date:     2024 12:28  Room #:     Inpatient  Priority:     Routine  Ht (in):             Wt (lb):  Ordering Physician:        CHANTAL CRAWFORD  Referring Physician:       [id number]  Sonographer:               Hipolito Anthony RVT,                             [facility]  Study Type:                Complete Bilateral  Technical Quality:         Adequate  Age:    68    Gender:     M  MRN:    [id number]  :    1955      BSA:  Indications:     Bypass Graft  CPT Codes:       88262  ICD Codes:       414.04  History:         History of bilateral fem-pop bypass grafts. No prior studies.  Limitations:                RIGHT  Waveform        Peak Systolic Velocity (cm/s)                  Prox    Prox-Mid  Mid    Mid-Dist  Distal  Biphasic                          274                      CFA  Biphasic        198                                        PFA                                                             SFA                                    29                       POP  Monophasic      73                                  18      AT  Monophasic      40                                 31      PT  Monophasic      72                                 51      NICHOLAS                LEFT  Waveform        Peak Systolic Velocity (cm/s)                  Prox    Prox-Mid  Mid    Mid-Dist  Distal  Biphasic                          206                      CFA  Biphasic        373                                        PFA                                                             SFA  Biphasic                                           74      POP  Biphasic        132                                137     AT  Biphasic        93                                 94      PT  Biphasic        47                                 59      NICHOLAS  FINDINGS  Right.  Diffuse plaque and multiphasic flow throughout the common femoral and  popliteal arteries.  Three vessel runoff to the ankle with monophasic flow.  Fully occluded fem to pop bypass graft seen with the following velocities:  Proximal anastamosis: 32 cm/sec.  Distal to proximal anastamosis: 14 cm/sec.  Distal anastamosis: No flow detected.  Distal to anastamosis: 29 cm/sec.  Left.  Diffuse plaque and multiphasic flow throughout the common femoral and  popliteal arteries.  Three vessel runoff to the ankle with biphasic flow.  Fem to pop bypass graft seen with the following velocities:  Proximal anastamosis: 32 cm/sec.  Distal to proximal anastamosis: 21 cm/sec.  Proximal thigh: 104 cm/sec.  Mid thigh: 107 cm/sec.  Distal thigh: 95 cm/sec.  Distal anastamosis: 183 cm/sec.  Distal to anastamosis: 132 cm/sec.  Abdominal aortic aneurysm seem with a measurement of 4.0 x 4.4 cm noted as  an incidental finding.  .  Nani Morataya  (Electronically Signed)  Final Date:      27 June 2024                   15:40    US-SUSI SINGLE LEVEL BILAT    Result Date: 6/27/2024   Vascular Laboratory  Conclusions  1.  Reduced right SUSI.  2.  Reduced left TBI.  BROOKE ROSARIO  Age:    68    Gender:     M   MRN:    [id number]  :    1955      BSA:  Exam Date:     2024 09:20  Room #:     Inpatient  Priority:     Routine  Ht (in):             Wt (lb):  Ordering Physician:        CHANTAL CRAWFORD  Referring Physician:       [id number]  Sonographer:               Hipolito Anthony RVT [facility]  Study Type:                Complete Bilateral  Technical Quality:         Adequate  Indications:     Ulcer of ankle  CPT Codes:       61284  ICD Codes:       707.13  History:         Ulceration of the bilateral ankles. No prior studies.  Limitations:                 RIGHT  Waveform            Systolic BPs (mmHg)                             117           Brachial  Triphasic                                Common Femoral  Biphasic                                 Popliteal  Bi, non-rev                83            Posterior Tibial  Bi, non-rev                72            Dorsalis Pedis                             285           Digit                             0.71          SUSI                             2.44          TBI                       LEFT  Waveform        Systolic BPs (mmHg)                                           Brachial  Triphasic                                Common Femoral  Triphasic                                Popliteal  Triphasic                  130           Posterior Tibial  Triphasic                  117           Dorsalis Pedis  Diminished                 45            Digit                             1.11          SUSI                             0.38          TBI  Findings  Right.  Doppler waveforms of the common femoral, popliteal, posterior tibial, and  dorsalis pedis arteries are of high amplitude and multiphasic.  Doppler waveforms at the ankle are brisk and biphasic.  The ankle-brachial index is dimished.  The toe-brachial index is not accurately measured due to calcification and  noncompressibility of the tibial vessels.   Left.  Unable to obtain brachial pressure due to unremovable dressing following AV  fistula repair.   Doppler waveforms of the common femoral, popliteal, posterior tibial, and  dorsalis pedis arteries are of high amplitude and multiphasic.  Doppler waveforms at the ankle are brisk and triphasic.  The ankle-brachial index is normal.  The toe-brachial index is abnormally reduced.  An arterial duplex was performed in accordance with lower extremity  arterial evaluation protocol - see separate report.  Nani GUNN Krystajuliocesar  (Electronically Signed)  Final Date:      27 June 2024                   11:25    DX-CHEST-PORTABLE (1 VIEW)    Result Date: 6/24/2024 6/24/2024 2:53 PM HISTORY/REASON FOR EXAM:  Status post central line placement TECHNIQUE/EXAM DESCRIPTION AND NUMBER OF VIEWS: Single portable view of the chest. COMPARISON: 6/24/2024 FINDINGS: Right IJ catheter tip projects over the SVC in satisfactory position. The mediastinal and cardiac silhouette is unremarkable. The pulmonary vascularity is within normal limits. The lung parenchyma is clear. There is no significant pleural effusion. There is no visible pneumothorax. There are no acute bony abnormalities.     1.  Satisfactory appearance of the right IJ catheter. No pneumothorax visualized.    US-HEMODIALYSIS GRAFT DUPLEX COMP UPPER EXTREMITY    Result Date: 6/24/2024   Upper Extremity  Venous Duplex Report  Vascular Laboratory  CONCLUSIONS  1.  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  2.  No acute DVT seen elsewhere in the left upper extremity.  BROOKE ROSARIO  Exam Date:     06/24/2024 11:30  Room #:     Inpatient  Priority:     Stat  Ht (in):             Wt (lb):  Ordering Physician:        MARIO CLARK  Referring Physician:       [id number] Hendricks  Sonographer:               Hipolito Anthony RVT,                             [facility]  Study Type:                 Complete Unilateral  Technical Quality:         Adequate  Age:    68    Gender:     M  MRN:    [id number]  :    1955      BSA:  Indications:     AV Fistula / Thrombosed  CPT Codes:       35008  ICD Codes:       996.73  History:         Left thrombosed AV Fistula. No prior studies.  Limitations:  PROCEDURES:  Left upper extremity venous duplex imaging.  The following venous structures were evaluated: internal jugular,  subclavian, axillary, brachial, cephalic, and basilic veins.  Serial compression, color, and spectral Doppler flow evaluations were  performed.  FINDINGS:  Left upper extremity.  All veins demonstrate complete color filling and compressibility with  normal venous flow dynamics including spontaneous flow and respiratory  phasicity.  No superficial or deep venous thrombosis.  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  Fistula is thrombosed and  completely occluded from its proximal segment running its entire length to  its termination at the axillary vein.  Proximal segment of fistula is aneurysmal measuring 2.2 x 1.6 cm.  Nani Morataya  (Electronically Signed)  Final Date:      2024                   12:41    US-HEMODIALYSIS GRAFT DUPLEX COMP UPPER EXTREMITY    Result Date: 2024   Upper Extremity  Venous Duplex Report  Vascular Laboratory  CONCLUSIONS  1.  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  2.  No acute DVT seen elsewhere in the left upper extremity.  BROOKE ROSARIO  Exam Date:     2024 11:30  Room #:     Inpatient  Priority:     Stat  Ht (in):             Wt (lb):  Ordering Physician:        MARIO CLARK  Referring Physician:       [id number] Hendricks  Sonographer:               Hipolito Anthony RVT, RDMS  Study Type:                Complete Unilateral  Technical Quality:         Adequate  Age:    68    Gender:     M  MRN:    [id number]   :    1955      BSA:  Indications:     AV Fistula / Thrombosed  CPT Codes:       86212  ICD Codes:       996.73  History:         Left thrombosed AV Fistula. No prior studies.  Limitations:  PROCEDURES:  Left upper extremity venous duplex imaging.  The following venous structures were evaluated: internal jugular,  subclavian, axillary, brachial, cephalic, and basilic veins.  Serial compression, color, and spectral Doppler flow evaluations were  performed.  FINDINGS:  Left upper extremity.  All veins demonstrate complete color filling and compressibility with  normal venous flow dynamics including spontaneous flow and respiratory  phasicity.  No superficial or deep venous thrombosis.  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  Fistula is thrombosed and  completely occluded from its proximal segment running its entire length to  its termination at the axillary vein.  Proximal segment of fistula is aneurysmal measuring 2.2 x 1.6 cm.  Nani Morataya  (Electronically Signed)  Final Date:      2024                   12:41    DX-CHEST-PORTABLE (1 VIEW)    Result Date: 2024 10:29 AM HISTORY/REASON FOR EXAM:  Chest Pain. TECHNIQUE/EXAM DESCRIPTION AND NUMBER OF VIEWS: Single portable view of the chest. COMPARISON: None FINDINGS: The soft tissues and bony structures are unremarkable. The heart and mediastinal structures are enlarged. Pulmonary vascularity is normal. The lung fields are clear. There is no effusion or pneumothorax.     Mild enlargement of the cardiomediastinal silhouette without acute cardiopulmonary abnormality.      Micro:  Results       Procedure Component Value Units Date/Time    BLOOD CULTURE [[id number]]  (Abnormal) Collected: 24 0048    Order Status: Completed Specimen: Blood from Peripheral Updated: 24 0711     Significant Indicator POS     Source BLD     Site PERIPHERAL     Culture Result Growth detected by Bactec instrument. 2024  07:09  Gram Stain:  Gram positive cocci: Possible Streptococcus sp.      Narrative:      CALL  Woo  161 tel. [phone],  CALLED  161 tel. [phone] 07/01/2024, 07:11, RB PERF. RESULTS CALLED  TO:[id number] RN  Right Hand    Acid Fast Stain [[id number]] Collected: 06/30/24 1025    Order Status: Completed Specimen: Tissue Updated: 06/30/24 2253     Significant Indicator NEG     Source TISS     Site aortic valve     AFB Smear Results No acid fast bacilli seen.    Narrative:      Surgery Specimen    CULTURE TISSUE W/ GRM STAIN [[id number]] Collected: 06/30/24 1025    Order Status: No result Specimen: Tissue Updated: 06/30/24 2253     Significant Indicator NEG     Source TISS     Site aortic valve     Culture Result -     Gram Stain Result Rare WBCs.  No organisms seen.      Narrative:      Surgery Specimen    Anaerobic Culture [[id number]] Collected: 06/30/24 1025    Order Status: No result Specimen: Tissue Updated: 06/30/24 2253     Significant Indicator NEG     Source TISS     Site aortic valve     Culture Result -    Narrative:      Surgery Specimen    Fungal Culture [[id number]] Collected: 06/30/24 1025    Order Status: No result Specimen: Tissue Updated: 06/30/24 2253     Significant Indicator NEG     Source TISS     Site aortic valve     Culture Result -     Fungal Smear Results No fungal elements seen.    Narrative:      Surgery Specimen    AFB Culture [[id number]] Collected: 06/30/24 1025    Order Status: Completed Specimen: Tissue Updated: 06/30/24 2253     Significant Indicator NEG     Source TISS     Site aortic valve     Culture Result Culture in progress.     AFB Smear Results No acid fast bacilli seen.    Narrative:      Surgery Specimen    GRAM STAIN [[id number]] Collected: 06/30/24 1025    Order Status: Completed Specimen: Tissue Updated: 06/30/24 2253     Significant Indicator .     Source TISS     Site aortic valve     Gram Stain Result Rare WBCs.  No organisms seen.      Narrative:      Surgery Specimen    Fungal Smear  [[id number]] Collected: 06/30/24 1025    Order Status: Completed Specimen: Tissue Updated: 06/30/24 2253     Significant Indicator NEG     Source TISS     Site aortic valve     Fungal Smear Results No fungal elements seen.    Narrative:      Surgery Specimen    URINE CULTURE(NEW) [[id number]] Collected: 06/30/24 0546    Order Status: Sent Specimen: Urine, Clean Catch Updated: 06/30/24 1046    BLOOD CULTURE [[id number]] Collected: 06/29/24 0125    Order Status: Completed Specimen: Blood from Peripheral Updated: 06/30/24 0815     Significant Indicator NEG     Source BLD     Site PERIPHERAL     Culture Result No Growth  Note: Blood cultures are incubated for 5 days and  are monitored continuously.Positive blood cultures  are called to the RN and reported as soon as  they are identified.      Narrative:      Right Hand    BLOOD CULTURE [[id number]]  (Abnormal) Collected: 06/29/24 0217    Order Status: Completed Specimen: Blood from Peripheral Updated: 06/30/24 0717     Significant Indicator POS     Source BLD     Site PERIPHERAL     Culture Result Growth detected by Bactec instrument. 06/30/2024  07:13  Gram Stain: Gram positive cocci: Possible Streptococcus sp.      Narrative:      CALL  Woo  RSTA tel. [phone],  CALLED  RSTA tel. [phone] 06/30/2024, 07:16, voalted results to  Hospitalist Mello Montoya  Right Hand    URINE CULTURE(NEW) [[id number]]     Order Status: No result Specimen: Urine     Urinalysis [[id number]]  (Abnormal) Collected: 06/30/24 0546    Order Status: Completed Specimen: Urine, Clean Catch Updated: 06/30/24 0623     Color Yellow     Character Cloudy     Specific Gravity 1.029     Ph 8.0     Glucose Negative mg/dL      Ketones Negative mg/dL      Protein 100 mg/dL      Bilirubin Negative     Urobilinogen, Urine 0.2     Nitrite Negative     Leukocyte Esterase Moderate     Occult Blood Negative     Micro Urine Req Microscopic    BLOOD CULTURE [[id number]] Collected: 06/30/24 0205    Order Status:  Sent Specimen: Blood from Peripheral Updated: 06/30/24 0342    BLOOD CULTURE [[id number]]  (Abnormal) Collected: 06/27/24 0320    Order Status: Completed Specimen: Blood from Line Updated: 06/29/24 1737     Significant Indicator POS     Source BLD     Site Peripheral     Culture Result Growth detected by Bactec instrument. 06/28/2024  01:25      Enterococcus faecalis  Please see previous culture for susceptibilities      Narrative:      CALL  Woo  183 tel. [phone],  CALLED  183 tel. [phone] 06/28/2024, 01:27, RB PERF. RESULTS CALLED TO: RN  [id number]  Right Hand    BLOOD CULTURE [[id number]]  (Abnormal) Collected: 06/27/24 0958    Order Status: Completed Specimen: Blood from Peripheral Updated: 06/29/24 1737     Significant Indicator POS     Source BLD     Site PERIPHERAL     Culture Result Growth detected by Bactec instrument. 06/28/2024  17:42      Enterococcus faecalis  Please see previous culture for susceptibilities      Narrative:      CALL  Woo  183 tel. [phone],  CALLED  183 tel. [phone] 06/28/2024, 17:58, RB PERF. RESULTS CALLED  TO:[facility] [place] to Aurelio Cid Pharm D  Right Hand    CULTURE WOUND W/ GRAM STAIN [[id number]]  (Abnormal)  (Susceptibility) Collected: 06/25/24 1400    Order Status: Completed Specimen: Wound Updated: 06/28/24 1502     Significant Indicator POS     Source WND     Site L forearm fistula graft     Culture Result -     Gram Stain Result Many WBCs.  No organisms seen.       Culture Result Enterococcus faecalis  Moderate growth      Narrative:      Surgery - swabs received    Susceptibility       Enterococcus faecalis (1)       Antibiotic Interpretation Microscan   Method Status    Daptomycin Sensitive 2 mcg/mL WAQAR Final    Gent Synergy Sensitive <=500 mcg/mL WAQAR Final    Ampicillin Sensitive <=2 mcg/mL WAQAR Final    Linezolid Sensitive 2 mcg/mL WAQAR Final    Vancomycin Sensitive 1 mcg/mL WAQAR Final    Penicillin Sensitive 2 mcg/mL WAQAR Final    Tetracycline Sensitive <=4 mcg/mL WAQAR  Final                       Anaerobic Culture [[id number]] Collected: 06/25/24 1400    Order Status: Completed Specimen: Wound Updated: 06/28/24 1502     Significant Indicator NEG     Source WND     Site L forearm fistula graft     Culture Result No Anaerobes isolated.    Narrative:      Surgery - swabs received    Fungal Culture [[id number]] Collected: 06/25/24 1400    Order Status: Completed Specimen: Wound Updated: 06/28/24 1502     Significant Indicator NEG     Source WND     Site L forearm fistula graft     Culture Result Culture in progress.     Fungal Smear Results No fungal elements seen.    Narrative:      Surgery - swabs received    BLOOD CULTURE [[id number]]  (Abnormal) Collected: 06/24/24 1334    Order Status: Completed Specimen: Blood from Peripheral Updated: 06/27/24 1122     Significant Indicator POS     Source BLD     Site Peripheral     Culture Result Growth detected by Bactec instrument. 06/25/2024  02:07      Enterococcus faecalis  Please see previous culture for susceptibilities      Narrative:      CALL  Woo  183 tel. [phone],  Right Hand    BLOOD CULTURE [[id number]]  (Abnormal)  (Susceptibility) Collected: 06/24/24 1231    Order Status: Completed Specimen: Blood from Peripheral Updated: 06/27/24 1121     Significant Indicator POS     Source BLD     Site PERIPHERAL     Culture Result Growth detected by Bactec instrument. 06/25/2024  00:21      Enterococcus faecalis    Narrative:      CALL  Woo  183 tel. [phone],  CALLED  183 tel. [phone] 06/25/2024, 17:37, Voalted carlos alberto SAINI  Right Hand    Susceptibility       Enterococcus faecalis (1)       Antibiotic Interpretation Microscan   Method Status    Daptomycin Sensitive 2 mcg/mL WAQAR Final    Gent Synergy Sensitive <=500 mcg/mL WAQAR Final    Ampicillin Sensitive <=2 mcg/mL WAQAR Final    Linezolid Sensitive 2 mcg/mL WAQAR Final    Vancomycin Sensitive 1 mcg/mL WAQAR Final    Penicillin Sensitive 2 mcg/mL WAQAR Final                       GRAM STAIN  [[id number]] Collected: 06/25/24 1400    Order Status: Completed Specimen: Wound Updated: 06/25/24 1934     Significant Indicator .     Source WND     Site L forearm fistula graft     Gram Stain Result Many WBCs.  No organisms seen.      Narrative:      Surgery - swabs received    Fungal Smear [[id number]] Collected: 06/25/24 1400    Order Status: Completed Specimen: Wound Updated: 06/25/24 1934     Significant Indicator NEG     Source WND     Site L forearm fistula graft     Fungal Smear Results No fungal elements seen.    Narrative:      Surgery - swabs received    MRSA By PCR (Amp) [[id number]]     Order Status: Canceled Specimen: Respirate from Nares           ASSESSMENT/PLAN:   68 y.o.  admitted 6/24/2024. Pt has a past medical history of PAD with femoral-popliteal bypass, ESRD on dialysis via left arm graft, A-fib on Coumadin and CAD with stents, he presented complaining of fevers, pain, redness and swelling in his right arm graft site ongoing for approximately 5 days.      Hospital Course:   Patient was the OR with vascular surgery on 6/25 with resection of the infected left arm dialysis graft pseudoaneurysm,  left greater saphenous vein harvest, reconstruction of left brachial artery, partial removal of the left arm dialysis graft.  Echocardiogram consistent with endocarditis as below.     Problem List  Aortic valve infective endocarditis, large lobulated mobile mass 1.7 x 1.1 cm with severe aortic insufficiency on RENEE  E faecalis bacteremia, infected left arm dialysis site  -Blood cultures on 6/24 +E faecalis, amp sensitive   Left arm dialysis graft site infection, pseudoaneurysm at site of anastomosis  -Status post or with vascular surgery as described above, partial removal of the prior graft  -Left forearm wound culture +E faecalis, amp sensitive  ESRD on HD  PAD     Plan:  -Continue dual beta-lactam therapy with renally dosed IV ampicillin and ceftriaxone  -On 6/30, patient underwent aortic valve  replacement and left atrial appendage resection and ligation, follow cultures  -Repeat blood culture from 6/27, 6/29, 6/30 also positive, will repeat blood cultures x 2 in a.m.  -Dialysis catheter was placed while potentially still bacteremic, may need to be removed if bacteremia fails to clear despite surgery  -Monitor closely for any evidence of additional sites of seeding of infection     Dispo: Awaiting culture results and work-up as above.  Patient is at risk for infectious complications including death.   PICC: TBD     Discussed Dr. Barton.  This patient is critically ill in the ICU, spent 32 minutes in the unit reviewing the chart, documentation, labs, imaging, and reviewing potential treatment options with primary teams

## 2024-07-01 NOTE — DISCHARGE PLANNING
Discharge Appointments/outpatient referrals/ and  set up:    Cardiac surgery follow up appointment made for 4-5 weeks out    Hospital discharge team/schedulers called for Fernando Caputo cardiology f/u appointment for MD or APRN within 3-4 weeks if possible.    Aortic surveillance program/vascular medicine referral not needed, orders not placed.    Anticoagulation referral/ coumadin clinic referral not needed and orders not placed .    INR draws are not indicated for AVR CABG procedure and Plavix.    Will await PT/OT notes and medical progression to determine further discharge needs.

## 2024-07-01 NOTE — CARE PLAN
The patient is Watcher - Medium risk of patient condition declining or worsening    Shift Goals  Clinical Goals: wean vasopressors/ extubate/ mobility  Patient Goals: rest/ extubation/ pain control  Family Goals: [place]    Progress made toward(s) clinical / shift goals:    Problem: Pain - Standard  Goal: Alleviation of pain or a reduction in pain to the patient’s comfort goal  Outcome: Progressing  Note: Controlled with PRN oxy 5mg     Problem: Day of surgery post CABG/Heart valve replacement  Goal: Stabilization in immediate post op period  Outcome: Progressing  Intervention: VS q 15 min x 4 hours, then q 1 hour. Include temperature immediately upon arrival. Check CO/CI q 2-4 hours and PRN  Note: Completed, no eunice  Intervention: If radial artery used, elevate arm, no BP checks or needle sticks from affected arm, monitor ulnar pulse and capillary refill  Note: completed  Intervention: First post op hour labs and EKG per order  Note: completed  Intervention: Serum K q 6 hours x 24 hours.  ABG and CBC prn.  Note: completed  Intervention: Initiate post cardiac insulin infusion protocol orders for FSBS greater than 140 and check frequency per protocol  Note: Insulin gtt off at 0200.  Sugars maintaining between 120-140  Intervention: FSBS frequency as per Cardiac Surgery Insulin Drip Protocol  Note: Insulin gtt  Intervention: Chest tube to 20 cm suction, record CT drainage with VS, and check for air leak  Note: -20 suction, increased drainage tonight, lots of clotting in chest tubes  Intervention: For CT drainage >300 mL in first hour post op and/or 150 mL in subsequent hours: Stat platelets, PT, INR, TEG, iSTAT, and H&H per order  Note: completed  Intervention: Titrate and wean off vasoactive drips per patient's condition and per MD order while maintaining SBP  mmHg per MD order  Note: Levo off around 0200  Intervention: VAP protocol in place  Note: VAP protocol in place when intubated. Pt extubated at  2115  Intervention: Wean from Vent per protocol (see protocol), extubation goal within 6 hours post op  Note: Extubated after 7.5 hours  Intervention: IS q 1 hour while awake post extubation  Note: To start in AM  Intervention: Bedrest until extubated and groin lines out  Note: EOB after extubated  Intervention: Dangle within 4 hours post extubation  Note: completed  Intervention: Up in chair 4 hours, day of extubation  Note: Unable to complete this AM due to bleeding  Intervention: Maintain all original surgical dressings per provider orders and specifications  Note: Dsg in place  Intervention: Clear liquids post extubation, order carbohydrate free (post cardiac surgery) diet, advance as tolerated  Note: Clear liquid diet ordered  Intervention: Discontinue [place] shana and arterial line 12-18 hours post op if hemodynamically stable and off vasoactive drips  Note: N/a  Intervention: A-Fib and DVT prophylaxis per MD order or contraindications documented (refer to DVT/VTE problem on Care Plan)  Note: N/a  Intervention: Amiodarone protocol per MD order  Note: N/a

## 2024-07-01 NOTE — RESPIRATORY CARE
Extubation    Cuff leak noted YES  Stridor present NO       Patient toleration YES  RCP Complete? YES     Events/Summary/Plan: pt on spont for approx 45 mins, extubated successfully to 4 L NC. Pt is tolerating well at this time.

## 2024-07-01 NOTE — ASSESSMENT & PLAN NOTE
HD per nephrology - via temporary HD cath  Monitor/replete jean carlos  Strict I/Os - still makes urine  Will need plans for more permanent access once stable

## 2024-07-01 NOTE — PROGRESS NOTES
[facility] Nursing Notes    HD today x 3hrs per Dr LUISANA Augustin  Initiated at 1203 and ended 1503     Pre HD assessment     Received patient alert and oriented.   No Sob. No chest pain  With O2 @ 2lpm   Sao2 94%-96%  Lungs Clear.   Noted BP on the lower side  With ongoing 1 IV pressor  No complains pre HD.     Edema - edema on left hand and legs     Access: -Right IJ catheter, Non tunneled     No s/s of infection: no redness, no tenderness, no pus, no oozing of blood, warm to touch.     Intra HD    Bp still on the lower side  1 IV pressor ongoing  No complaints, pt is asymptomatic  @ 1430 - complaints of stomach upset and verbalized wants to go bathroom, cleaned up back side on chair instead.  Decreased UF target to 1.8L from 2L    Post HD    Concluded 10mins early, patient complaints of nausea,arm pain and stomach upset     UF goal  achieved: 1800mLs - 500 mLs(200mls prime + 300mls rinseback)       Target Net UF : 1300mLs    Informed Dr. MARIAN Augustin    Post vital signs stable, /58mmhg  With 1 IV pressor  Nil complaints  Dressing: dry and intact     Documented by  BLANK WEI RN    Report given to ALIN Perry RN

## 2024-07-01 NOTE — CARE PLAN
Problem: Ventilation  Goal: Ability to achieve and maintain unassisted ventilation or tolerate decreased levels of ventilator support  Description: Target End Date:  4 days     Document on Vent flowsheet    1.  Support and monitor invasive and noninvasive mechanical ventilation  2.  Monitor ventilator weaning response  3.  Perform ventilator associated pneumonia prevention interventions  4.  Manage ventilation therapy by monitoring diagnostic test results  Outcome: Met    Pt extubated to 4 L NC at approximately 2115

## 2024-07-01 NOTE — ASSESSMENT & PLAN NOTE
E. Faecalis bacteremia with AV endocarditis - s/p AVR on 6/30.  F/u blood cultures from today - will need to repeat cultures tomorrow while pending these results especially with his new AVR  Continue ampicillin and ceftriaxone  Appreciate ID recs  Will need more permanent HD access once repeat cultures neg and patient stable

## 2024-07-01 NOTE — CARE PLAN
The patient is Watcher - Medium risk of patient condition declining or worsening    Shift Goals  Clinical Goals: Maintain hemodynamic stability, extubate within 6 hours  Patient Goals: [place]  Family Goals: [place]ST[place]    Progress made toward(s) clinical / shift goals:    Problem: Day of surgery post CABG/Heart valve replacement  Goal: Stabilization in immediate post op period  Outcome: Progressing  Intervention: VS q 15 min x 4 hours, then q 1 hour. Include temperature immediately upon arrival. Check CO/CI q 2-4 hours and PRN  Note: Complete  Intervention: If radial artery used, elevate arm, no BP checks or needle sticks from affected arm, monitor ulnar pulse and capillary refill  Note: N/A  Intervention: First post op hour labs and EKG per order  Note: Complete  Intervention: Serum K q 6 hours x 24 hours.  ABG and CBC prn.  Note: Complete  Intervention: Initiate post cardiac insulin infusion protocol orders for FSBS greater than 140 and check frequency per protocol  Note: Complete  Intervention: FSBS frequency as per Cardiac Surgery Insulin Drip Protocol  Note: Complete  Intervention: For patients on Beta Blockers: verify dose given prior to surgery or within 6 hours after arrival to the unit  Note: Complete  Intervention: Chest tube to 20 cm suction, record CT drainage with VS, and check for air leak  Note: Complete  Intervention: For CT drainage >300 mL in first hour post op and/or 150 mL in subsequent hours: Stat platelets, PT, INR, TEG, iSTAT, and H&H per order  Note: N/A, H&H stable, CTS aware  Intervention: Titrate and wean off vasoactive drips per patient's condition and per MD order while maintaining SBP  mmHg per MD order  Note: Remains on levo gtt  Intervention: VAP protocol in place  Note: Complete  Intervention: Wean from Vent per protocol (see protocol), extubation goal within 6 hours post op  Note: Will attempt, goal 1948  Intervention: IS q 1 hour while awake post extubation  Note: Remains  intubated  Intervention: Bedrest until extubated and groin lines out  Note: Remains intubated  Intervention: Dangle within 4 hours post extubation  Note: Remains intubated  Intervention: Up in chair 4 hours, day of extubation  Note: Remains intubated  Intervention: Maintain all original surgical dressings per provider orders and specifications  Note: Complete  Intervention: Clear liquids post extubation, order carbohydrate free (post cardiac surgery) diet, advance as tolerated  Note: Remains intubated  Intervention: Discontinue [place] shana and arterial line 12-18 hours post op if hemodynamically stable and off vasoactive drips  Note: N/A  Intervention: A-Fib and DVT prophylaxis per MD order or contraindications documented (refer to DVT/VTE problem on Care Plan)  Note: N/A  Intervention: Amiodarone protocol per MD order  Note: N/A       Patient is not progressing towards the following goals:

## 2024-07-01 NOTE — CONSULTS
Critical Care Consultation    Date of consult: 6/30/2024    Referring Physician  No att. providers found    Reason for Consultation  S/p AVR    History of Presenting Illness  68 y.o. male with history of PAD w/fem-pop bypass, ESRD on HD, afib on warfarin, HTN, CAD s/p PCI who presented 6/24/2024 with left arm graft infection. He was noted to have E. faecalis bacteremia and aortic valve infective endocarditis (1.7x1.1cm large lobulated mobile mass) as well as severe AI. Patient was evaluated by CT surgery and on 6/30/24, he underwent aortic valve replacement (25mm Morgan Inspiris), CABG x1 w/skeletonized LIMA free graft, TAHIR resection and ligation.   Cross clamp time 119 minutes. Pump time was 174 minutes - he was weaned from bypass initially and then had an episode where air was injected down his LIMA graft and he was emergently placed back on cardiopulmonary bypass for 20 min and then weaned without problems. Postop EF showed improvement in wall motion with improved EF of 45-50%.  Patient received 4u pRBCs, 2u plasma, 1u plts.  Post-op, patient was transferred on epi, levo and vaso. 2 chest tubes in place with bloody output. Not on insulin with BG in 140s.     Code Status  Full Code    Review of Systems  Review of Systems   Unable to perform ROS: Critical illness       Past Medical History   has a past medical history of Allergy, Aortic aneurysm (HCC), CAD (coronary artery disease), CHF (congestive heart failure) ([facility]), Chronic kidney disease (CKD), stage III (moderate), COPD (chronic obstructive pulmonary disease) (HCC), Hypertension, Impaired hearing, Indigestion, Renal failure, and Sleep apnea.    Surgical History   has a past surgical history that includes gastroscopy (N/A, 02/17/2017); egd esophagus with endoscopic us (N/A, 02/17/2017); av fistula creation (Left); and av fistula revision (Left, 6/25/2024).    Family History  family history includes Cancer in his father; Heart Disease in his brother and  mother.    Social History   reports that he quit smoking about 5 years ago. His smoking use included cigarettes. He started smoking about 47 years ago. He has a 21.1 pack-year smoking history. He has never used smokeless tobacco. He reports that he does not drink alcohol and does not use drugs.    Medications  Home Medications       Reviewed by Claudia Arcos R.N. (Registered Nurse) on 06/30/24 at 0704  Med List Status: Complete     Medication Last Dose Status   acetaminophen (Tylenol) tablet 650 mg  Active   aminocaproic acid (Amicar) 5 g in  mL Infusion  Active   aminocaproic acid (Amicar) 9.08 g in  mL IV Bolus  Active   ampicillin (Omnipen) 2,000 mg in  mL IVPB  Active   apixaban (ELIQUIS) 2.5mg Tab UNK Active   cefTRIAXone (Rocephin) syringe 2,000 mg  Active   dexmedetomidine (PRECEDEX) 400 mcg/100mL NS premix infusion  Active   EPINEPHrine (Adrenalin) infusion 4 mg/250 mL (premix)  Active   epoetin (Epogen,Procrit) injection 10,000 Units  Active   Epoetin Jhonny-epbx (RETACRIT INJ) 6/19/2024 Active   ezetimibe (ZETIA) 10 MG Tab UNK Active   heparin injection 1,500 Units  Active   heparin injection 500 Units  Active   heparin intracatheter (for DIALYSIS USE ONLY) 1,200 Units  Active   heparin intracatheter (for DIALYSIS USE ONLY) 1,200 Units  Active   hydrALAZINE (Apresoline) injection 10 mg  Active   HYDROcodone-acetaminophen (Norco) 5-325 MG per tablet 1-2 Tablet  Active   insulin regular (HumuLIN R/NovoLIN R) 100 Units in  mL Infusion  Active   labetalol (Normodyne/Trandate) injection 10 mg  Active   lidocaine (Xylocaine) 1 % injection 0.5 mL  Active   midodrine (Proamatine) tablet 10 mg  Active   morphine 4 MG/ML injection 1-2 mg  Active   norepinephrine (Levophed) 8 mg in 250 mL NS infusion (premix)  Active   ondansetron (Zofran) syringe/vial injection 4 mg  Active   oxyCODONE immediate release (ROXICODONE) 10 MG immediate release tablet UNK Active   Pharmacy Consult Request ...Pain  Management Review 1 Each  Active   potassium citrate SR (UROCIT-K SR) 10 MEQ (1080 MG) Tab CR UNK Active   rosuvastatin (CRESTOR) 10 MG Tab UNK Active   valsartan (DIOVAN) 80 MG Tab UNK Active   vancomycin 1500 mg/250mL NS IVPB premix  Active   warfarin (COUMADIN) 5 MG Tab COUPLE WEEKS AGO Active                  Audit from Redirected Encounters    **Home medications have not yet been reviewed for this encounter**       Current Facility-Administered Medications   Medication Dose Route Frequency Provider Last Rate Last Admin    norepinephrine (Levophed) 8 mg in 250 mL NS infusion (premix)  0-1 mcg/kg/min (Ideal) Intravenous Continuous Zion Torret, D.O. 6 mL/hr at 06/30/24 1941 0.04 mcg/kg/min at 06/30/24 1941    EPINEPHrine (Adrenalin) infusion 4 mg/250 mL (premix)  0-0.5 mcg/kg/min (Ideal) Intravenous Continuous Zion Austin, D.O.   Held at 06/30/24 1407    Respiratory Therapy Consult   Nebulization Continuous RT WANG Chaudhry.A.-C.        NS infusion   Intravenous Continuous ANOOP Chaudhry.-C. 10 mL/hr at 06/30/24 1426 New Bag at 06/30/24 1426    NS infusion   Intravenous Continuous ROBB ChaudhryA.-C. 30 mL/hr at 06/30/24 1425 New Bag at 06/30/24 1425    electrolyte-A (Plasmalyte-A) infusion   Intravenous PRN ROBB ChaudhryA.-C.   Stopped at 06/30/24 1814    Nozin nasal  swab  1 Applicator Each Nostril BID ANOOP Chaudhry.-C.   1 Applicator at 06/30/24 1427    calcium CHLORIDE 10 % 1,000 mg in dextrose 5% 100 mL IVPB  1,000 mg Intravenous Once PRN WANG Chaudhry.A.-C. 200 mL/hr at 06/30/24 1530 1,000 mg at 06/30/24 1530    magnesium sulfate in D5W IVPB premix 1 g  1 g Intravenous DAILY ROBB ChaudhryA.-C.   Stopped at 06/30/24 1528    K+ Scale: Goal of 4.5  1 Each Intravenous Q6HRS Onofre Sutherland P.A.-C.        [START ON 7/1/2024] metoprolol tartrate (Lopressor) tablet 12.5 mg  12.5 mg Oral BID Onofre Sutherland P.A.-C.        Followed by    [START ON 7/2/2024] metoprolol  tartrate (Lopressor) tablet 25 mg  25 mg Oral BID LILIA ChaudhryCMichelle        [START ON 7/1/2024] aspirin EC tablet 81 mg  81 mg Oral DAILY Onofre Sutherland P.A.-C.        [START ON 7/1/2024] clopidogrel (Plavix) tablet 75 mg  75 mg Oral DAILY Onofre Sutherland P.A.-C.        atorvastatin (Lipitor) tablet 40 mg  40 mg Oral QHS Onofre Sutherland P.A.-C.        clevidipine (Cleviprex) IV emulsion  0-21 mg/hr Intravenous Continuous ANOOP Chaudhry.-C.   Held at 06/30/24 1415    nitroglycerin 50 mg in D5W 250 ml infusion  0-100 mcg/min Intravenous Continuous Onofre Sutherland P.A.-C.   Held at 06/30/24 1415    Pharmacy Consult Request ...Pain Management Review 1 Each  1 Each Other PHARMACY TO DOSE ANOOP Chaudhry.MADAY.        acetaminophen (Tylenol) tablet 1,000 mg  1,000 mg Oral Q6HRS Onofre Sutherland P.A.-C.        Followed by    [START ON 7/10/2024] acetaminophen (Tylenol) tablet 1,000 mg  1,000 mg Oral Q6HRS PRN SHAWN Chaudhry-C.        oxyCODONE immediate-release (Roxicodone) tablet 5 mg  5 mg Oral Q3HRS PRN ANOOP Chaudhry.-C.        Or    oxyCODONE immediate release (Roxicodone) tablet 10 mg  10 mg Oral Q3HRS PRN ROBB ChaudhryAMichelle-C.        Or    fentaNYL (Sublimaze) injection 50 mcg  50 mcg Intravenous Q3HRS PRN ROBB ChaudhryA.-C.        traMADol (Ultram) 50 MG tablet 50 mg  50 mg Oral Q12HRS PRN ROBB ChaudhryAMichelle-C.        midazolam (Versed) injection 2 mg  2 mg Intravenous Q HOUR PRN ROBB ChaudhryA.-FRITZ.        dexmedetomidine (PRECEDEX) 400 mcg/100mL NS premix infusion  0-1.5 mcg/kg/hr (Ideal) Intravenous Continuous SHAWN Chaudhry-C.   Stopped at 06/30/24 1502    sodium bicarbonate 8.4 % injection 50 mEq  50 mEq Intravenous Q HOUR PRN ROBB ChaudhryAMichelle-CMichelle        morphine 4 MG/ML injection 4 mg  4 mg Intravenous Q HOUR PRN SHAWN Chaudhry-C.        ondansetron (Zofran) syringe/vial injection 8 mg  8 mg Intravenous Q6HRS PRN LILIA ChuadhryCMichelle        Or    prochlorperazine (Compazine) injection 10 mg   10 mg Intravenous Q6HRS PRN Onofre Sutherland P.A.-C.        acetaminophen (Tylenol) tablet 650 mg  650 mg Oral Q4HRS PRN Onofre Sutherland P.A.-C.        Or    acetaminophen (Tylenol) suppository 650 mg  650 mg Rectal Q4HRS PRN Onofre Sutherland P.A.-C.        docusate sodium (Colace) capsule 100 mg  100 mg Oral BID Onofre Sutherland P.A.-C.        senna-docusate (Pericolace Or Senokot S) 8.6-50 MG per tablet 1 Tablet  1 Tablet Oral Nightly Onofre Sutherland P.A.-C.        senna-docusate (Pericolace Or Senokot S) 8.6-50 MG per tablet 1 Tablet  1 Tablet Oral Q24HRS PRN Onofre Sutherland P.A.-C.        polyethylene glycol/lytes (Miralax) Packet 1 Packet  1 Packet Oral BID PRN Onofre Sutherland P.A.-C.        magnesium hydroxide (Milk Of Magnesia) suspension 30 mL  30 mL Oral QDAY PRN Onofre Sutherland P.A.-C.        bisacodyl (Dulcolax) suppository 10 mg  10 mg Rectal Q24HRS PRN Onofre Sutherland P.A.-C.        sodium phosphate (Fleet) enema 133 mL  1 Each Rectal Once PRN Onofre Sutherland P.A.-C.        [START ON 7/1/2024] omeprazole (PriLOSEC) capsule 20 mg  20 mg Oral DAILY Onofre Sutherland P.A.-C.        mag hydrox-al hydrox-simeth (Maalox Plus Es Or Mylanta Ds) suspension 30 mL  30 mL Oral Q4HRS PRN Onofre Sutherland P.A.-C.        diphenhydrAMINE (Benadryl) tablet/capsule 25 mg  25 mg Oral HS PRN - MR X 1 Onofre Sutherland P.A.-C.        vasopressin (Vasostrict) 20 Units in  mL Infusion  0.03 Units/min Intravenous Continuous Zion Austin D.OMichelle   Held at 06/30/24 1431    insulin regular (HumuLIN R/NovoLIN R) 1 unit/mL IV syringe 0-14 Units  0-14 Units Intravenous Once Zion Austin D.O.        insulin lispro (HumaLOG,AdmeLOG) injection  0-14 Units Subcutaneous TID AC Zion Austin D.O.        insulin regular (HumuLIN R/NovoLIN R) 100 Units in  mL Infusion  0-29 Units/hr Intravenous Continuous Zion Austin D.O.   Stopped. (Insulin or Heparin) at 06/30/24 2230    dextrose 50% (D50W) injection 12.5-25 g  12.5-25 g  Intravenous PRN Zion Austin D.O.        MD Alert...Pharmacy to initiate transition from insulin infusion to subcutaneous insulin for cardiothoracic surgery 1 Each  1 Each Other Continuous Zion Austin D.O.        sodium bicarbonate 8.4 % injection 50 mEq  50 mEq Intravenous Once SHAWN Chaudhry-CMichelle        albumin human 5% solution 25 g  25 g Intravenous Once SHAWN Chaudhry-CMichelle        cefTRIAXone (Rocephin) syringe 2,000 mg  2,000 mg Intravenous Q12HRS Sarabjit Pulliam M.D.   2,000 mg at 06/30/24 1714    ampicillin (Omnipen) 2,000 mg in  mL IVPB  2,000 mg Intravenous Q12HRS Sarabjit Pulliam M.D.   Stopped at 06/30/24 1804    epoetin (Epogen,Procrit) injection 10,000 Units  10,000 Units Intravenous MO, WE + FR Jeremy Negron M.D.   10,000 Units at 06/28/24 1020    midodrine (Proamatine) tablet 10 mg  10 mg Oral QDAY PRN Oswald Pearce M.D.   10 mg at 06/28/24 0650    labetalol (Normodyne/Trandate) injection 10 mg  10 mg Intravenous Q4HRS PRN Doug Tinoco M.D.   10 mg at 06/29/24 1818    hydrALAZINE (Apresoline) injection 10 mg  10 mg Intravenous Q4HRS PRN Doug Tinoco M.D.        heparin injection 500 Units  500 Units Intravenous DIALYSIS PRN Oswald Pearce M.D.   500 Units at 06/28/24 0711    heparin injection 1,500 Units  1,500 Units Intravenous DIALYSIS PRN Oswald Pearce M.D.   1,500 Units at 06/28/24 0711    heparin intracatheter (for DIALYSIS USE ONLY) 1,200 Units  1,200 Units Intracatheter DIALYSIS PRN Oswald Pearce M.D.   1,200 Units at 06/28/24 1015    heparin intracatheter (for DIALYSIS USE ONLY) 1,200 Units  1,200 Units Intracatheter DIALYSIS PRN Oswald Pearce M.D.   1,200 Units at 06/28/24 1015       Allergies  No Known Allergies    Vital Signs last 24 hours  Temp:  [35.9 °C (96.6 °F)-36.6 °C (97.9 °F)] 36.1 °C (97 °F)  Pulse:  [57-76] 63  Resp:  [12-20] 17  BP: ()/(51-73) 114/57  SpO2:  [93 %-100 %] 99 %    Physical Exam  Physical Exam  Vitals and  nursing note reviewed.   Constitutional:       Appearance: He is ill-appearing.      Comments: sedated   HENT:      Mouth/Throat:      Mouth: Mucous membranes are dry.   Eyes:      Conjunctiva/sclera: Conjunctivae normal.   Cardiovascular:      Rate and Rhythm: Normal rate.   Pulmonary:      Effort: Pulmonary effort is normal.      Comments: Intubated.  Chest tubes in place w/bloody drainage  Abdominal:      Palpations: Abdomen is soft.   Musculoskeletal:         General: No deformity.   Skin:     General: Skin is warm and dry.   Neurological:      Comments: sedated         Fluids    Intake/Output Summary (Last 24 hours) at 6/30/2024 2313  Last data filed at 6/30/2024 2300  Gross per 24 hour   Intake 67061.27 ml   Output 1795 ml   Net 9450.27 ml       Laboratory  Recent Results (from the past 48 hour(s))   VANCOMYCIN TIMED (RANDOM) LEVEL    Collection Time: 06/29/24  1:25 AM   Result Value Ref Range    Vancomycin Unknown Level 19.8 ug/mL   BLOOD CULTURE    Collection Time: 06/29/24  1:25 AM    Specimen: Peripheral; Blood   Result Value Ref Range    Significant Indicator NEG     Source BLD     Site PERIPHERAL     Culture Result       No Growth  Note: Blood cultures are incubated for 5 days and  are monitored continuously.Positive blood cultures  are called to the RN and reported as soon as  they are identified.     CBC WITHOUT DIFFERENTIAL    Collection Time: 06/29/24  1:25 AM   Result Value Ref Range    WBC 11.0 (H) 4.8 - 10.8 K/uL    RBC 2.83 (L) 4.70 - 6.10 M/uL    Hemoglobin 8.4 (L) 14.0 - 18.0 g/dL    Hematocrit 25.9 (L) 42.0 - 52.0 %    MCV 91.5 81.4 - 97.8 fL    MCH 29.7 27.0 - 33.0 pg    MCHC 32.4 32.3 - 36.5 g/dL    RDW 48.5 35.9 - 50.0 fL    Platelet Count 256 164 - 446 K/uL    MPV 10.1 9.0 - 12.9 fL   Basic Metabolic Panel    Collection Time: 06/29/24  1:25 AM   Result Value Ref Range    Sodium 135 135 - 145 mmol/L    Potassium 3.9 3.6 - 5.5 mmol/L    Chloride 98 96 - 112 mmol/L    Co2 27 20 - 33 mmol/L     Glucose 98 65 - 99 mg/dL    Bun 25 (H) 8 - 22 mg/dL    Creatinine 4.05 (H) 0.50 - 1.40 mg/dL    Calcium 8.2 (L) 8.5 - 10.5 mg/dL    Anion Gap 10.0 7.0 - 16.0   ESTIMATED GFR    Collection Time: 24  1:25 AM   Result Value Ref Range    GFR (CKD-EPI) 15 (A) >60 mL/min/1.73 m 2   ABO Rh Confirm    Collection Time: 24  1:25 AM   Result Value Ref Range    ABO Rh Confirm O POS    BLOOD CULTURE    Collection Time: 24  2:17 AM    Specimen: Peripheral; Blood   Result Value Ref Range    Significant Indicator POS (POS)     Source BLD     Site PERIPHERAL     Culture Result (A)      Growth detected by Bactec instrument. 2024  07:13  Gram Stain: Gram positive cocci: Possible Streptococcus sp.     EKG    Collection Time: 24  9:19 AM   Result Value Ref Range    Report       [facility]    Test Date:  2024  Pt Name:    BROOKE ROSARIO               Department: Replaced by [facility]  MRN:        [id number]                      Room:       [facility]  Gender:     Male                         Technician: [facility]  :        1955                   Requested By:LM MEDRANO  Order #:    [id number]                    Reading MD: Fredi Herndon MD    Measurements  Intervals                                Axis  Rate:       91                           P:          68  NY:         153                          QRS:        14  QRSD:       115                          T:          87  QT:         406  QTc:        500    Interpretive Statements  Sinus rhythm  Multiple ventricular premature complexes  Incomplete left bundle branch block  Electronically Signed On 2024 09:04:07 PDT by Fredi Herndon MD     POCT activated clotting time device results    Collection Time: 24  4:17 PM   Result Value Ref Range    Istat Activated Clotting Time 220 (H) 74 - 137 sec   Comp Metabolic Panel (CMP)    Collection Time: 24  5:10 PM   Result Value Ref Range    Sodium 134 (L) 135 - 145 mmol/L    Potassium 3.9 3.6 - 5.5 mmol/L     Chloride 96 96 - 112 mmol/L    Co2 24 20 - 33 mmol/L    Anion Gap 14.0 7.0 - 16.0    Glucose 95 65 - 99 mg/dL    Bun 27 (H) 8 - 22 mg/dL    Creatinine 4.87 (HH) 0.50 - 1.40 mg/dL    Calcium 8.3 (L) 8.5 - 10.5 mg/dL    Correct Calcium 9.2 8.5 - 10.5 mg/dL    AST(SGOT) 26 12 - 45 U/L    ALT(SGPT) 17 2 - 50 U/L    Alkaline Phosphatase 97 30 - 99 U/L    Total Bilirubin 0.2 0.1 - 1.5 mg/dL    Albumin 2.9 (L) 3.2 - 4.9 g/dL    Total Protein 6.3 6.0 - 8.2 g/dL    Globulin 3.4 1.9 - 3.5 g/dL    A-G Ratio 0.9 g/dL   Hemoglobin  A1c    Collection Time: 06/29/24  5:10 PM   Result Value Ref Range    Glycohemoglobin 5.5 4.0 - 5.6 %    Est Avg Glucose 111 mg/dL   Prothrombin time (INR)    Collection Time: 06/29/24  5:10 PM   Result Value Ref Range    PT 17.5 (H) 12.0 - 14.6 sec    INR 1.42 (H) 0.87 - 1.13   APTT (PTT)    Collection Time: 06/29/24  5:10 PM   Result Value Ref Range    APTT 213.1 (HH) 24.7 - 36.0 sec   COD (Adult)    Collection Time: 06/29/24  5:10 PM   Result Value Ref Range    ABO Grouping Only O     Rh Grouping Only POS     Antibody Screen-Cod NEG     Component R       R99                 Red Cells, LR       [id number]   transfused   06/30/24   09:11      Product Type R99     Dispense Status transfused     Unit Number (Barcoded) [id number]     Product Code (Barcoded) Y6413H56     Blood Type (Barcoded) 5100     Component R       R99                 Red Cells, LR       [id number]   transfused   06/30/24   09:11      Product Type R99     Dispense Status transfused     Unit Number (Barcoded) [id number]     Product Code (Barcoded) U9349U51     Blood Type (Barcoded) 5100     Component R       R99                 Red Cells, LR       [id number]   transfused   06/30/24   11:05      Product Type R99     Dispense Status transfused     Unit Number (Barcoded) [id number]     Product Code (Barcoded) B6436A29     Blood Type (Barcoded) 5100     Component R       R99                 Red Cells, LR        [id number]   transfused   24   12:39      Product Type R99     Dispense Status transfused     Unit Number (Barcoded) [id number]     Product Code (Barcoded) X6518L93     Blood Type (Barcoded) 5100    ESTIMATED GFR    Collection Time: 24  5:10 PM   Result Value Ref Range    GFR (CKD-EPI) 12 (A) >60 mL/min/1.73 m 2   EKG    Collection Time: 24  5:21 PM   Result Value Ref Range    Report       [facility]    Test Date:  2024  Pt Name:    BROOKE ROSARIO               Department: 161  MRN:        [id number]                      Room:       [facility]  Gender:     Male                         Technician: [facility]  :        1955                   Requested By:DEEPAK VAZQUEZ  Order #:    [id number]                    Reading MD: Jeremy Dalal MD    Measurements  Intervals                                Axis  Rate:       80                           P:          -37  OR:         152                          QRS:        0  QRSD:       126                          T:          74  QT:         406  QTc:        469    Interpretive Statements  Sinus rhythm  Atrial premature complexes  Ventricular premature complexes  Nonspecific intraventricular conduction delay  Compared to ECG 2024 09:19:14  There has been no significant changes  Electronically Signed On 2024 21:30:22 PDT by Jeremy Dalal MD     FREE THYROXINE    Collection Time: 24 12:48 AM   Result Value Ref Range    Free T-4 1.58 0.93 - 1.70 ng/dL   TSH    Collection Time: 24 12:48 AM   Result Value Ref Range    TSH 4.360 0.380 - 5.330 uIU/mL   Lipid Profile    Collection Time: 24 12:48 AM   Result Value Ref Range    Cholesterol,Tot 128 100 - 199 mg/dL    Triglycerides 123 0 - 149 mg/dL    HDL 27 (A) >=40 mg/dL    LDL 76 <100 mg/dL   CBC WITHOUT DIFFERENTIAL    Collection Time: 24 12:48 AM   Result Value Ref Range    WBC 10.9 (H) 4.8 - 10.8 K/uL    RBC 2.69 (L) 4.70 - 6.10 M/uL    Hemoglobin 7.7 (L)  14.0 - 18.0 g/dL    Hematocrit 25.2 (L) 42.0 - 52.0 %    MCV 93.7 81.4 - 97.8 fL    MCH 28.6 27.0 - 33.0 pg    MCHC 30.6 (L) 32.3 - 36.5 g/dL    RDW 49.4 35.9 - 50.0 fL    Platelet Count 244 164 - 446 K/uL    MPV 10.5 9.0 - 12.9 fL   Basic Metabolic Panel    Collection Time: 06/30/24 12:48 AM   Result Value Ref Range    Sodium 134 (L) 135 - 145 mmol/L    Potassium 4.2 3.6 - 5.5 mmol/L    Chloride 97 96 - 112 mmol/L    Co2 26 20 - 33 mmol/L    Glucose 110 (H) 65 - 99 mg/dL    Bun 29 (H) 8 - 22 mg/dL    Creatinine 5.00 (HH) 0.50 - 1.40 mg/dL    Calcium 8.3 (L) 8.5 - 10.5 mg/dL    Anion Gap 11.0 7.0 - 16.0   MAGNESIUM    Collection Time: 06/30/24 12:48 AM   Result Value Ref Range    Magnesium 1.9 1.5 - 2.5 mg/dL   ESTIMATED GFR    Collection Time: 06/30/24 12:48 AM   Result Value Ref Range    GFR (CKD-EPI) 12 (A) >60 mL/min/1.73 m 2   POCT glucose device results    Collection Time: 06/30/24  5:28 AM   Result Value Ref Range    POC Glucose, Blood 94 65 - 99 mg/dL   Urinalysis    Collection Time: 06/30/24  5:46 AM    Specimen: Urine, Clean Catch   Result Value Ref Range    Color Yellow     Character Cloudy (A)     Specific Gravity 1.029 <1.035    Ph 8.0 5.0 - 8.0    Glucose Negative Negative mg/dL    Ketones Negative Negative mg/dL    Protein 100 (A) Negative mg/dL    Bilirubin Negative Negative    Urobilinogen, Urine 0.2 Negative    Nitrite Negative Negative    Leukocyte Esterase Moderate (A) Negative    Occult Blood Negative Negative    Micro Urine Req Microscopic    URINE MICROSCOPIC (W/UA)    Collection Time: 06/30/24  5:46 AM   Result Value Ref Range    -150 (A) /hpf    RBC 0-2 (A) /hpf    Bacteria Negative None /hpf    Epithelial Cells Negative /hpf    Amorphous Crystal Present /hpf    Hyaline Cast 0-2 /lpf   EC-RENEE W/O CONT    Collection Time: 06/30/24  7:25 AM   Result Value Ref Range    Left Ventrical Ejection Fraction 50    POCT glucose device results    Collection Time: 06/30/24  7:46 AM   Result Value  Ref Range    POC Glucose, Blood 100 (H) 65 - 99 mg/dL   POCT activated clotting time device results    Collection Time: 06/30/24  7:48 AM   Result Value Ref Range    Istat Activated Clotting Time 152 (H) 74 - 137 sec   POCT arterial blood gas device results    Collection Time: 06/30/24  7:48 AM   Result Value Ref Range    Ph 7.408 7.400 - 7.500    Pco2 40.3 (H) 26.0 - 37.0 mmHg    Po2 305 (H) 64 - 87 mmHg    Tco2 27 20 - 33 mmol/L    S02 100 (H) 93 - 99 %    Hco3 25.4 (H) 17.0 - 25.0 mmol/L    BE 1 -4 - 3 mmol/L    Body Temp 37.0 C degrees    Ph Temp Alma Delia 7.408 7.400 - 7.500    Pco2 Temp Co 40.3 (H) 26.0 - 37.0 mmHg    Po2 Temp Cor 305 (H) 64 - 87 mmHg    Specimen Arterial    POCT sodium device results    Collection Time: 06/30/24  7:48 AM   Result Value Ref Range    Istat Sodium 136 135 - 145 mmol/L   POCT potassium device results    Collection Time: 06/30/24  7:48 AM   Result Value Ref Range    Istat Potassium 3.9 3.6 - 5.5 mmol/L   POCT ionized CA device results    Collection Time: 06/30/24  7:48 AM   Result Value Ref Range    Istat Ionized Calcium 1.06 (L) 1.10 - 1.30 mmol/L   POCT hematocrit and hemoglobin device results    Collection Time: 06/30/24  7:48 AM   Result Value Ref Range    Istat Hematocrit 25 (L) 42 - 52 %    Istat Hemoglobin 8.5 (L) 14.0 - 18.0 g/dL   POCT glucose device results    Collection Time: 06/30/24  9:11 AM   Result Value Ref Range    POC Glucose, Blood 120 (H) 65 - 99 mg/dL   POCT activated clotting time device results    Collection Time: 06/30/24  9:14 AM   Result Value Ref Range    Istat Activated Clotting Time 476 (H) 74 - 137 sec   POCT arterial blood gas device results    Collection Time: 06/30/24  9:15 AM   Result Value Ref Range    Ph 7.316 (L) 7.400 - 7.500    Pco2 45.4 (H) 26.0 - 37.0 mmHg    Po2 372 (H) 64 - 87 mmHg    Tco2 25 20 - 33 mmol/L    S02 100 (H) 93 - 99 %    Hco3 23.2 17.0 - 25.0 mmol/L    BE -3 -4 - 3 mmol/L    Body Temp 36.0 C degrees    Ph Temp Alma Delia 7.330 (L)  7.400 - 7.500    Pco2 Temp Co 43.5 (H) 26.0 - 37.0 mmHg    Po2 Temp Cor 367 (H) 64 - 87 mmHg    Specimen Arterial    POCT sodium device results    Collection Time: 06/30/24  9:15 AM   Result Value Ref Range    Istat Sodium 136 135 - 145 mmol/L   POCT potassium device results    Collection Time: 06/30/24  9:15 AM   Result Value Ref Range    Istat Potassium 4.2 3.6 - 5.5 mmol/L   POCT ionized CA device results    Collection Time: 06/30/24  9:15 AM   Result Value Ref Range    Istat Ionized Calcium 1.05 (L) 1.10 - 1.30 mmol/L   POCT hematocrit and hemoglobin device results    Collection Time: 06/30/24  9:15 AM   Result Value Ref Range    Istat Hematocrit 23 (L) 42 - 52 %    Istat Hemoglobin 7.8 (L) 14.0 - 18.0 g/dL   POCT glucose device results    Collection Time: 06/30/24  9:48 AM   Result Value Ref Range    POC Glucose, Blood 206 (H) 65 - 99 mg/dL   POCT activated clotting time device results    Collection Time: 06/30/24  9:50 AM   Result Value Ref Range    Istat Activated Clotting Time 483 (H) 74 - 137 sec   POCT venous blood gas device results    Collection Time: 06/30/24  9:51 AM   Result Value Ref Range    Ph 7.295 (L) 7.310 - 7.450    Pco2 50.5 41.0 - 51.0 mmHg    Po2 38 25 - 40 mmHg    Tco2 26 20 - 33 mmol/L    SO2 65 %    Hco3 24.6 24.0 - 28.0 mmol/L    BE -2 -4 - 3 mmol/L    Body Temp 35.7 C degrees    Ph Temp Correc 7.313 7.310 - 7.450    Pco2 Temp Alma Delia 47.7 41.0 - 51.0 mmHg    Po2 Temp Corre 34 25 - 40 mmHg    Specimen Venous    POCT sodium device results    Collection Time: 06/30/24  9:51 AM   Result Value Ref Range    Istat Sodium 134 (L) 135 - 145 mmol/L   POCT potassium device results    Collection Time: 06/30/24  9:51 AM   Result Value Ref Range    Istat Potassium 5.0 3.6 - 5.5 mmol/L   POCT ionized CA device results    Collection Time: 06/30/24  9:51 AM   Result Value Ref Range    Istat Ionized Calcium 0.97 (L) 1.10 - 1.30 mmol/L   POCT hematocrit and hemoglobin device results    Collection Time:  06/30/24  9:51 AM   Result Value Ref Range    Istat Hematocrit 27 (L) 42 - 52 %    Istat Hemoglobin 9.2 (L) 14.0 - 18.0 g/dL   POCT glucose device results    Collection Time: 06/30/24 10:00 AM   Result Value Ref Range    POC Glucose, Blood 182 (H) 65 - 99 mg/dL   POCT activated clotting time device results    Collection Time: 06/30/24 10:03 AM   Result Value Ref Range    Istat Activated Clotting Time 556 (H) 74 - 137 sec   POCT arterial blood gas device results    Collection Time: 06/30/24 10:04 AM   Result Value Ref Range    Ph 7.352 (L) 7.400 - 7.500    Pco2 41.8 (H) 26.0 - 37.0 mmHg    Po2 334 (H) 64 - 87 mmHg    Tco2 24 20 - 33 mmol/L    S02 100 (H) 93 - 99 %    Hco3 23.2 17.0 - 25.0 mmol/L    BE -2 -4 - 3 mmol/L    Body Temp 35.3 C degrees    Ph Temp Alma Delia 7.376 (L) 7.400 - 7.500    Pco2 Temp Co 38.8 (H) 26.0 - 37.0 mmHg    Po2 Temp Cor 326 (H) 64 - 87 mmHg    Specimen Arterial    POCT sodium device results    Collection Time: 06/30/24 10:04 AM   Result Value Ref Range    Istat Sodium 134 (L) 135 - 145 mmol/L   POCT potassium device results    Collection Time: 06/30/24 10:04 AM   Result Value Ref Range    Istat Potassium 4.7 3.6 - 5.5 mmol/L   POCT ionized CA device results    Collection Time: 06/30/24 10:04 AM   Result Value Ref Range    Istat Ionized Calcium 0.91 (L) 1.10 - 1.30 mmol/L   POCT hematocrit and hemoglobin device results    Collection Time: 06/30/24 10:04 AM   Result Value Ref Range    Istat Hematocrit 25 (L) 42 - 52 %    Istat Hemoglobin 8.5 (L) 14.0 - 18.0 g/dL   CULTURE TISSUE W/ GRM STAIN    Collection Time: 06/30/24 10:25 AM    Specimen: Tissue   Result Value Ref Range    Significant Indicator NEG     Source TISS     Site aortic valve     Culture Result -     Gram Stain Result Rare WBCs.  No organisms seen.      Anaerobic Culture    Collection Time: 06/30/24 10:25 AM    Specimen: Tissue   Result Value Ref Range    Significant Indicator NEG     Source TISS     Site aortic valve     Culture  Result -    Fungal Culture    Collection Time: 06/30/24 10:25 AM    Specimen: Tissue   Result Value Ref Range    Significant Indicator NEG     Source TISS     Site aortic valve     Culture Result -     Fungal Smear Results No fungal elements seen.    AFB Culture    Collection Time: 06/30/24 10:25 AM    Specimen: Tissue   Result Value Ref Range    Significant Indicator NEG     Source TISS     Site aortic valve     Culture Result Culture in progress.     AFB Smear Results No acid fast bacilli seen.    GRAM STAIN    Collection Time: 06/30/24 10:25 AM    Specimen: Tissue   Result Value Ref Range    Significant Indicator .     Source TISS     Site aortic valve     Gram Stain Result Rare WBCs.  No organisms seen.      Acid Fast Stain    Collection Time: 06/30/24 10:25 AM    Specimen: Tissue   Result Value Ref Range    Significant Indicator NEG     Source TISS     Site aortic valve     AFB Smear Results No acid fast bacilli seen.    Fungal Smear    Collection Time: 06/30/24 10:25 AM    Specimen: Tissue   Result Value Ref Range    Significant Indicator NEG     Source TISS     Site aortic valve     Fungal Smear Results No fungal elements seen.    POCT glucose device results    Collection Time: 06/30/24 10:28 AM   Result Value Ref Range    POC Glucose, Blood 153 (H) 65 - 99 mg/dL   POCT activated clotting time device results    Collection Time: 06/30/24 10:30 AM   Result Value Ref Range    Istat Activated Clotting Time 672 (H) 74 - 137 sec   POCT arterial blood gas device results    Collection Time: 06/30/24 10:31 AM   Result Value Ref Range    Ph 7.415 7.400 - 7.500    Pco2 39.7 (H) 26.0 - 37.0 mmHg    Po2 323 (H) 64 - 87 mmHg    Tco2 27 20 - 33 mmol/L    S02 100 (H) 93 - 99 %    Hco3 25.4 (H) 17.0 - 25.0 mmol/L    BE 1 -4 - 3 mmol/L    Body Temp 34.6 C degrees    Ph Temp Alma Delia 7.450 7.400 - 7.500    Pco2 Temp Co 35.7 26.0 - 37.0 mmHg    Po2 Temp Cor 311 (H) 64 - 87 mmHg    Specimen Arterial    POCT sodium device results     Collection Time: 06/30/24 10:31 AM   Result Value Ref Range    Istat Sodium 136 135 - 145 mmol/L   POCT potassium device results    Collection Time: 06/30/24 10:31 AM   Result Value Ref Range    Istat Potassium 4.3 3.6 - 5.5 mmol/L   POCT ionized CA device results    Collection Time: 06/30/24 10:31 AM   Result Value Ref Range    Istat Ionized Calcium 0.93 (L) 1.10 - 1.30 mmol/L   POCT hematocrit and hemoglobin device results    Collection Time: 06/30/24 10:31 AM   Result Value Ref Range    Istat Hematocrit 24 (L) 42 - 52 %    Istat Hemoglobin 8.2 (L) 14.0 - 18.0 g/dL   PLATELETS REQUEST    Collection Time: 06/30/24 10:40 AM   Result Value Ref Range    Component P       P71                 Plts,Pheresis       [id number]   transfused   06/30/24   11:51      Product Type Platelets Pheresis LR     Dispense Status transfused     Unit Number (Barcoded) [id number]     Product Code (Barcoded) [id number]     Blood Type (Barcoded) 6200     Component P       P07                 Plts,Pheresis       [id number]   issued       06/30/24   22:14      Product Type Platelets  Pheresis LR     Dispense Status issued     Unit Number (Barcoded) [id number]     Product Code (Barcoded) [id number]     Blood Type (Barcoded) 7300    POCT glucose device results    Collection Time: 06/30/24 10:56 AM   Result Value Ref Range    POC Glucose, Blood 139 (H) 65 - 99 mg/dL   POCT activated clotting time device results    Collection Time: 06/30/24 10:59 AM   Result Value Ref Range    Istat Activated Clotting Time 513 (H) 74 - 137 sec   POCT arterial blood gas device results    Collection Time: 06/30/24 10:59 AM   Result Value Ref Range    Ph 7.429 7.400 - 7.500    Pco2 38.7 (H) 26.0 - 37.0 mmHg    Po2 277 (H) 64 - 87 mmHg    Tco2 27 20 - 33 mmol/L    S02 100 (H) 93 - 99 %    Hco3 25.6 (H) 17.0 - 25.0 mmol/L    BE 1 -4 - 3 mmol/L    Body Temp 34.6 C degrees    Ph Temp Alma Delia 7.465 7.400 - 7.500    Pco2 Temp Co 34.8 26.0 - 37.0 mmHg    Po2 Temp Cor  266 (H) 64 - 87 mmHg    Specimen Arterial    POCT sodium device results    Collection Time: 06/30/24 10:59 AM   Result Value Ref Range    Istat Sodium 136 135 - 145 mmol/L   POCT potassium device results    Collection Time: 06/30/24 10:59 AM   Result Value Ref Range    Istat Potassium 4.7 3.6 - 5.5 mmol/L   POCT ionized CA device results    Collection Time: 06/30/24 10:59 AM   Result Value Ref Range    Istat Ionized Calcium 0.93 (L) 1.10 - 1.30 mmol/L   POCT hematocrit and hemoglobin device results    Collection Time: 06/30/24 10:59 AM   Result Value Ref Range    Istat Hematocrit 22 (L) 42 - 52 %    Istat Hemoglobin 7.5 (L) 14.0 - 18.0 g/dL   POCT glucose device results    Collection Time: 06/30/24 11:27 AM   Result Value Ref Range    POC Glucose, Blood 126 (H) 65 - 99 mg/dL   POCT activated clotting time device results    Collection Time: 06/30/24 11:30 AM   Result Value Ref Range    Istat Activated Clotting Time 519 (H) 74 - 137 sec   POCT arterial blood gas device results    Collection Time: 06/30/24 11:31 AM   Result Value Ref Range    Ph 7.388 (L) 7.400 - 7.500    Pco2 40.5 (H) 26.0 - 37.0 mmHg    Po2 277 (H) 64 - 87 mmHg    Tco2 26 20 - 33 mmol/L    S02 100 (H) 93 - 99 %    Hco3 24.4 17.0 - 25.0 mmol/L    BE -1 -4 - 3 mmol/L    Body Temp 36.6 C degrees    Ph Temp Alma Delia 7.394 (L) 7.400 - 7.500    Pco2 Temp Co 39.8 (H) 26.0 - 37.0 mmHg    Po2 Temp Cor 275 (H) 64 - 87 mmHg    Specimen Arterial    POCT sodium device results    Collection Time: 06/30/24 11:31 AM   Result Value Ref Range    Istat Sodium 136 135 - 145 mmol/L   POCT potassium device results    Collection Time: 06/30/24 11:31 AM   Result Value Ref Range    Istat Potassium 6.0 (H) 3.6 - 5.5 mmol/L   POCT ionized CA device results    Collection Time: 06/30/24 11:31 AM   Result Value Ref Range    Istat Ionized Calcium 0.90 (L) 1.10 - 1.30 mmol/L   POCT hematocrit and hemoglobin device results    Collection Time: 06/30/24 11:31 AM   Result Value Ref Range     Istat Hematocrit 25 (L) 42 - 52 %    Istat Hemoglobin 8.5 (L) 14.0 - 18.0 g/dL   FRESH FROZEN PLASMA    Collection Time: 06/30/24 11:53 AM   Result Value Ref Range    Component Ft       FPT                 Plasma, Thawed      [id number]   transfused   06/30/24   11:57      Product Type Plasma  Thawed     Dispense Status transfused     Unit Number (Barcoded) [id number]     Product Code (Barcoded) [id number]     Blood Type (Barcoded) 5100     Component F       TA4                 Thawed Plasma 4     [id number]   transfused   06/30/24   11:57      Product Type Thawed Apheresis Plasma 4th Cont     Dispense Status transfused     Unit Number (Barcoded) [id number]     Product Code (Barcoded) P1760B52     Blood Type (Barcoded) 8400     Component F       TA2                 Thawed Plasma 2     [id number]   issued       06/30/24   22:17      Product Type Thawed Apheresis Plasma 2nd Cont     Dispense Status issued     Unit Number (Barcoded) [id number]     Product Code (Barcoded) Q7244K83     Blood Type (Barcoded) 7300     Component F       TA2                 Thawed Plasma 2     [id number]   issued       06/30/24   22:59      Product Type Thawed Apheresis Plasma 2nd Cont     Dispense Status issued     Unit Number (Barcoded) [id number]     Product Code (Barcoded) H4649T72     Blood Type (Barcoded) 7300    POCT glucose device results    Collection Time: 06/30/24 11:59 AM   Result Value Ref Range    POC Glucose, Blood 148 (H) 65 - 99 mg/dL   POCT activated clotting time device results    Collection Time: 06/30/24 12:02 PM   Result Value Ref Range    Istat Activated Clotting Time 489 (H) 74 - 137 sec   POCT arterial blood gas device results    Collection Time: 06/30/24 12:02 PM   Result Value Ref Range    Ph 7.415 7.400 - 7.500    Pco2 40.9 (H) 26.0 - 37.0 mmHg    Po2 252 (H) 64 - 87 mmHg    Tco2 27 20 - 33 mmol/L    S02 100 (H) 93 - 99 %    Hco3 26.2 (H) 17.0 - 25.0 mmol/L    BE 2 -4 - 3 mmol/L    Body  Temp 37.0 C degrees    Ph Temp Alma Delia 7.415 7.400 - 7.500    Pco2 Temp Co 40.9 (H) 26.0 - 37.0 mmHg    Po2 Temp Cor 252 (H) 64 - 87 mmHg    Specimen Arterial    POCT sodium device results    Collection Time: 06/30/24 12:02 PM   Result Value Ref Range    Istat Sodium 136 135 - 145 mmol/L   POCT potassium device results    Collection Time: 06/30/24 12:02 PM   Result Value Ref Range    Istat Potassium 5.6 (H) 3.6 - 5.5 mmol/L   POCT ionized CA device results    Collection Time: 06/30/24 12:02 PM   Result Value Ref Range    Istat Ionized Calcium 1.22 1.10 - 1.30 mmol/L   POCT hematocrit and hemoglobin device results    Collection Time: 06/30/24 12:02 PM   Result Value Ref Range    Istat Hematocrit 26 (L) 42 - 52 %    Istat Hemoglobin 8.8 (L) 14.0 - 18.0 g/dL   POCT glucose device results    Collection Time: 06/30/24 12:29 PM   Result Value Ref Range    POC Glucose, Blood 163 (H) 65 - 99 mg/dL   POCT activated clotting time device results    Collection Time: 06/30/24 12:31 PM   Result Value Ref Range    Istat Activated Clotting Time 831 (H) 74 - 137 sec   POCT arterial blood gas device results    Collection Time: 06/30/24 12:32 PM   Result Value Ref Range    Ph 7.368 (L) 7.400 - 7.500    Pco2 40.3 (H) 26.0 - 37.0 mmHg    Po2 218 (H) 64 - 87 mmHg    Tco2 24 20 - 33 mmol/L    S02 100 (H) 93 - 99 %    Hco3 23.2 17.0 - 25.0 mmol/L    BE -2 -4 - 3 mmol/L    Body Temp 37.0 C degrees    Ph Temp [place] Delia 7.368 (L) 7.400 - 7.500    Pco2 Temp Co 40.3 (H) 26.0 - 37.0 mmHg    Po2 Temp Cor 218 (H) 64 - 87 mmHg    Specimen Arterial    POCT sodium device results    Collection Time: 06/30/24 12:32 PM   Result Value Ref Range    Istat Sodium 138 135 - 145 mmol/L   POCT potassium device results    Collection Time: 06/30/24 12:32 PM   Result Value Ref Range    Istat Potassium 5.2 3.6 - 5.5 mmol/L   POCT ionized CA device results    Collection Time: 06/30/24 12:32 PM   Result Value Ref Range    Istat Ionized Calcium 0.94 (L) 1.10 - 1.30 mmol/L    POCT hematocrit and hemoglobin device results    Collection Time: 06/30/24 12:32 PM   Result Value Ref Range    Istat Hematocrit 21 (L) 42 - 52 %    Istat Hemoglobin 7.1 (L) 14.0 - 18.0 g/dL   POCT glucose device results    Collection Time: 06/30/24  1:10 PM   Result Value Ref Range    POC Glucose, Blood 159 (H) 65 - 99 mg/dL   POCT activated clotting time device results    Collection Time: 06/30/24  1:12 PM   Result Value Ref Range    Istat Activated Clotting Time 140 (H) 74 - 137 sec   POCT arterial blood gas device results    Collection Time: 06/30/24  1:13 PM   Result Value Ref Range    Ph 7.420 7.400 - 7.500    Pco2 36.1 26.0 - 37.0 mmHg    Po2 283 (H) 64 - 87 mmHg    Tco2 24 20 - 33 mmol/L    S02 100 (H) 93 - 99 %    Hco3 23.4 17.0 - 25.0 mmol/L    BE -1 -4 - 3 mmol/L    Body Temp 37.0 C degrees    Ph Temp [place] Delia 7.420 7.400 - 7.500    Pco2 Temp Co 36.1 26.0 - 37.0 mmHg    Po2 Temp Cor 283 (H) 64 - 87 mmHg    Specimen Arterial    POCT sodium device results    Collection Time: 06/30/24  1:13 PM   Result Value Ref Range    Istat Sodium 138 135 - 145 mmol/L   POCT potassium device results    Collection Time: 06/30/24  1:13 PM   Result Value Ref Range    Istat Potassium 4.9 3.6 - 5.5 mmol/L   POCT ionized CA device results    Collection Time: 06/30/24  1:13 PM   Result Value Ref Range    Istat Ionized Calcium 0.83 (L) 1.10 - 1.30 mmol/L   POCT hematocrit and hemoglobin device results    Collection Time: 06/30/24  1:13 PM   Result Value Ref Range    Istat Hematocrit 26 (L) 42 - 52 %    Istat Hemoglobin 8.8 (L) 14.0 - 18.0 g/dL   Platelet count    Collection Time: 06/30/24  1:55 PM   Result Value Ref Range    Platelet Count 234 164 - 446 K/uL   Magnesium    Collection Time: 06/30/24  1:55 PM   Result Value Ref Range    Magnesium 3.4 (H) 1.5 - 2.5 mg/dL   Prothrombin time (INR)    Collection Time: 06/30/24  1:55 PM   Result Value Ref Range    PT 18.8 (H) 12.0 - 14.6 sec    INR 1.55 (H) 0.87 - 1.13   APTT (PTT)     Collection Time: 24  1:55 PM   Result Value Ref Range    APTT 62.6 (H) 24.7 - 36.0 sec   POTASSIUM SERUM (K)    Collection Time: 24  1:55 PM   Result Value Ref Range    Potassium 5.3 3.6 - 5.5 mmol/L   HEMOGLOBIN AND HEMATOCRIT    Collection Time: 24  1:55 PM   Result Value Ref Range    Hemoglobin 9.6 (L) 14.0 - 18.0 g/dL    Hematocrit 28.4 (L) 42.0 - 52.0 %   EKG on arrival to CSU    Collection Time: 24  1:57 PM   Result Value Ref Range    Report       [facility]    Test Date:  2024  Pt Name:    BROOKE ROSARIO               Department: 161  MRN:        [id number]                      Room:       T625  Gender:     Male                         Technician: [facility]  :        1955                   Requested By:DEEPAK VAZQUEZ  Order #:    [id number]                    Reading MD: Fredi Herndon MD    Measurements  Intervals                                Axis  Rate:       61                           P:          0  WV:         0                            QRS:        28  QRSD:       114                          T:          65  QT:         594  QTc:        599    Interpretive Statements  Sinus rhythm  Borderline intraventricular conduction delay  Prolonged QT interval  Electronically Signed On 2024 14:03:49 PDT by Fredi Herndon MD     POCT arterial blood gas device results    Collection Time: 24  2:00 PM   Result Value Ref Range    Ph 7.449 7.400 - 7.500    Pco2 34.7 26.0 - 37.0 mmHg    Po2 247 (H) 64 - 87 mmHg    Tco2 25 20 - 33 mmol/L    S02 100 (H) 93 - 99 %    Hco3 24.1 17.0 - 25.0 mmol/L    BE 0 -4 - 3 mmol/L    Body Temp 36.6 C degrees    O2 Therapy 100 %    iPF Ratio 247     Ph Temp Alma Delia 7.455 7.400 - 7.500    Pco2 Temp Co 34.1 26.0 - 37.0 mmHg    Po2 Temp Cor 245 (H) 64 - 87 mmHg    Specimen Arterial     Rusty Test N/A     DelSys Vent     End Tidal Carbon Dioxide 26 mmhg    Peep End Expiratory Pressure 8 cmh20    Percent Minute Volume 160     Mode ASV    POCT sodium  device results    Collection Time: 06/30/24  2:00 PM   Result Value Ref Range    Istat Sodium 138 135 - 145 mmol/L   POCT potassium device results    Collection Time: 06/30/24  2:00 PM   Result Value Ref Range    Istat Potassium 5.1 3.6 - 5.5 mmol/L   POCT ionized CA device results    Collection Time: 06/30/24  2:00 PM   Result Value Ref Range    Istat Ionized Calcium 0.93 (L) 1.10 - 1.30 mmol/L   POCT arterial blood gas device results    Collection Time: 06/30/24  2:59 PM   Result Value Ref Range    Ph 7.525 (H) 7.400 - 7.500    Pco2 22.6 (L) 26.0 - 37.0 mmHg    Po2 99 (H) 64 - 87 mmHg    Tco2 19 (L) 20 - 33 mmol/L    S02 99 93 - 99 %    Hco3 18.6 17.0 - 25.0 mmol/L    BE -3 -4 - 3 mmol/L    Body Temp 36.3 C degrees    O2 Therapy 50 %    iPF Ratio 198     Ph Temp Alma Delia 7.536 (H) 7.400 - 7.500    Pco2 Temp Co 21.9 (L) 26.0 - 37.0 mmHg    Po2 Temp Cor 95 (H) 64 - 87 mmHg    Specimen Arterial     Rusty Test N/A     DelSys Vent     End Tidal Carbon Dioxide 23 mmhg    Peep End Expiratory Pressure 8 cmh20    Percent Minute Volume 160     Mode ASV    POCT lactate device results    Collection Time: 06/30/24  2:59 PM   Result Value Ref Range    iStat Lactate 1.7 0.5 - 2.0 mmol/L   HEMOGLOBIN AND HEMATOCRIT    Collection Time: 06/30/24  3:54 PM   Result Value Ref Range    Hemoglobin 9.2 (L) 14.0 - 18.0 g/dL    Hematocrit 26.9 (L) 42.0 - 52.0 %   POCT arterial blood gas device results    Collection Time: 06/30/24  4:01 PM   Result Value Ref Range    Ph 7.491 7.400 - 7.500    Pco2 27.8 26.0 - 37.0 mmHg    Po2 94 (H) 64 - 87 mmHg    Tco2 22 20 - 33 mmol/L    S02 98 93 - 99 %    Hco3 21.2 17.0 - 25.0 mmol/L    BE -1 -4 - 3 mmol/L    Body Temp 36.1 C degrees    O2 Therapy 40 %    iPF Ratio 235     Ph Temp Alma Delia 7.505 (H) 7.400 - 7.500    Pco2 Temp Co 26.7 26.0 - 37.0 mmHg    Po2 Temp Cor 89 (H) 64 - 87 mmHg    Specimen Arterial     Rusty Test N/A     DelSys Vent     End Tidal Carbon Dioxide 26 mmhg    Peep End Expiratory Pressure 8  cmh20    Percent Minute Volume 130     Mode ASV    POCT lactate device results    Collection Time: 06/30/24  4:01 PM   Result Value Ref Range    iStat Lactate 1.5 0.5 - 2.0 mmol/L   POTASSIUM SERUM (K)    Collection Time: 06/30/24  5:00 PM   Result Value Ref Range    Potassium 4.9 3.6 - 5.5 mmol/L   HEMOGLOBIN AND HEMATOCRIT    Collection Time: 06/30/24  6:37 PM   Result Value Ref Range    Hemoglobin 8.8 (L) 14.0 - 18.0 g/dL    Hematocrit 26.7 (L) 42.0 - 52.0 %   POCT arterial blood gas device results    Collection Time: 06/30/24  9:05 PM   Result Value Ref Range    Ph 7.425 7.400 - 7.500    Pco2 22.8 (L) 26.0 - 37.0 mmHg    Po2 77 64 - 87 mmHg    Tco2 16 (L) 20 - 33 mmol/L    S02 96 93 - 99 %    Hco3 15.0 (L) 17.0 - 25.0 mmol/L    BE -9 (L) -4 - 3 mmol/L    Body Temp 36.3 C degrees    O2 Therapy 30 %    iPF Ratio 257     Ph Temp Alma Delia 7.436 7.400 - 7.500    Pco2 Temp Co 22.1 (L) 26.0 - 37.0 mmHg    Po2 Temp Cor 73 64 - 87 mmHg    Specimen Arterial     Rusty Test N/A     DelSys Vent     End Tidal Carbon Dioxide 28 mmhg    Peep End Expiratory Pressure 5 cmh20    Pressure Support 5     Mode CPAP/Spont    POCT lactate device results    Collection Time: 06/30/24  9:05 PM   Result Value Ref Range    iStat Lactate 0.8 0.5 - 2.0 mmol/L   Prothrombin Time    Collection Time: 06/30/24  9:40 PM   Result Value Ref Range    PT 17.3 (H) 12.0 - 14.6 sec    INR 1.40 (H) 0.87 - 1.13   CBC WITHOUT DIFFERENTIAL    Collection Time: 06/30/24  9:40 PM   Result Value Ref Range    WBC 27.5 (H) 4.8 - 10.8 K/uL    RBC 3.24 (L) 4.70 - 6.10 M/uL    Hemoglobin 9.5 (L) 14.0 - 18.0 g/dL    Hematocrit 28.9 (L) 42.0 - 52.0 %    MCV 89.2 81.4 - 97.8 fL    MCH 29.3 27.0 - 33.0 pg    MCHC 32.9 32.3 - 36.5 g/dL    RDW 51.5 (H) 35.9 - 50.0 fL    Platelet Count 258 164 - 446 K/uL    MPV 10.2 9.0 - 12.9 fL   CRYOPRECIPITATE    Collection Time: 06/30/24 10:11 PM   Result Value Ref Range    Component Ct       CTP                 Cryoprecipitate      [id number]   selected     06/30/24   23:13      Product Type CTP     Dispense Status selected     Unit Number (Barcoded) [id number]     Product Code (Barcoded) [id number]     Blood Type (Barcoded) 5100        Imaging  Reviewed     Assessment/Plan  * Infected prosthetic vascular graft, initial encounter ([facility])- (present on admission)  Assessment & Plan  C/b pseudoaneurysm.  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 Faecalis bacteremia with AV endocarditis - s/p AVR on 6/30.  F/u blood cultures from today - will need to repeat cultures tomorrow while pending these results especially with his new AVR  Continue ampicillin and ceftriaxone  Appreciate ID recs  Will need more permanent HD access once repeat cultures neg and patient stable    Coronary artery disease- (present on admission)  Assessment & Plan  Hx of PCI to circumflex. With elevated trop but no chest pain.  Underwent CABG with LIMA to LAD  Aspirin, plavix  Statin   GDMT as tolerated  Cardiology following     PAD (peripheral artery disease) ([facility])- (present on admission)  Assessment & Plan  Hx of previous fem-pop bypass    Chronic atrial fibrillation ([facility])- (present on admission)  Assessment & Plan  S/p left atrial appendage resection and ligation  Tele monitoring     End stage renal disease ([facility])- (present on admission)  Assessment & Plan  HD per nephrology - via temporary HD  cath  Monitor/replete lytes  Strict I/Os - still makes urine  Will need plans for more permanent access once stable    Renovascular hypertension- (present on admission)  Assessment & Plan  Hold antihypertensives - currently on vasopressors post AVR        Discussed patient condition and risk of morbidity and/or mortality with RN, RT, Pharmacy, and Charge nurse / hot rounds.    This note was generated using voice recognition software which has a chance of producing errors of grammar and content.  I have made every reasonable attempt to find and correct any errors, but it should be expected that some may not be found prior to finalization of this note.    The patient remains critically ill.  Critical care time = 60 minutes in directly providing and coordinating critical care and extensive data review.  No time overlap and excludes procedures.    __________  Molly Che MD  Pulmonary and Critical Care Medicine  [facility]

## 2024-07-01 NOTE — PROGRESS NOTES
Onofre Sutherland PA-C notified for minimal urine output, ok to give rest of plasmalyte per Onofre. Onofre also informed pt remains very somnolent but has woken up and followed commands, informed of possibly not meeting 6 hour extubation goal.

## 2024-07-01 NOTE — PROGRESS NOTES
4 Eyes Skin Assessment Completed by DIALLO Gutierrez and DIALLO Craven.    Head WDL  Ears WDL  Nose WDL  Mouth WDL  Neck WDL  Breast/Chest Incision  Shoulder Blades WDL  Spine WDL  (R) Arm/Elbow/Hand Redness, Blanching, and Bruising  (L) Arm/Elbow/Hand Redness, Blanching, and Bruising, 3 incisions with sutures on L upper arm. Now covered by dsg    Abdomen WDL  Groin WDL  Scrotum/Coccyx/Buttocks Redness and Blanching, slow to elizabeth, line bruise on L hip/buttock    (R) Leg Redness and Blanching, scars on inner thigh, mottled knee  (L) Leg Redness and Blanching, scars on inner thigh, mottled knee  (R) Heel/Foot/Toe Discoloration, mottled and dusky feet, redness/blanching, necrotic on tip of pinkie toe    (L) Heel/Foot/Toe Discoloration, mottled and dusky feet, redness/ blanching          Devices In Places ECG, Tele Box, Blood Pressure Cuff, Pulse Ox, Crespo, Arterial Line, SCD's, Central Line, and Nasal Cannula      Interventions In Place Gray Ear Foams, Sacral Mepilex, Pillows, Q2 Turns, Low Air Loss Mattress, Heels Loaded W/Pillows, and Pressure Redistribution Mattress    Possible Skin Injury Yes    Pictures Uploaded Into Epic Yes  Wound Consult Placed Yes  RN Wound Prevention Protocol Ordered Yes

## 2024-07-01 NOTE — PROGRESS NOTES
Cardiovascular Surgery Progress Note    Name: Brian Jasmine  MRN: [id number]  : 1955  Admit Date: 2024 10:20 AM  1 Day Post-Op     Procedure:  Procedure(s) and Anesthesia Type:     * REPLACEMENT, AORTIC VALVE WITH A 25 MM DICKEY INSPIRIS- Wound Class: Clean with DrainCABG X1 WITH SEKELTONIZED LIMA FREE GRAFT- Wound Class: Clean with Drain  LEFT ATRIAL APPENDAGE RESECTION AND LIGATION  ECHOCARDIOGRAM, TRANSESOPHAGEAL, INTRAOPERATIVE - Wound Class: Clean with Drain    Vitals:  Vitals:    24 0639 24 0645 24 0648 24 0700   BP: 133/75 129/69  125/65   Pulse: 67 66 61 66   Resp: 19 16 16 15   Temp: 35.9 °C (96.6 °F)   35.9 °C (96.6 °F)   TempSrc: Bladder   Bladder   SpO2: 96% 97% 97% 97%   Weight:    97.6 kg (215 lb 2.7 oz)   Height:          Temp (24hrs), Av.1 °C (97 °F), Min:35.7 °C (96.3 °F), Max:36.6 °C (97.9 °F)      Respiratory:   Respiration: 15, Pulse Oximetry: 97 %       Fluids:    Intake/Output Summary (Last 24 hours) at 2024 0801  Last data filed at 2024 0700  Gross per 24 hour   Intake 98648.76 ml   Output 2960 ml   Net 81848.76 ml     Admit weight: Weight: 93 kg (205 lb)  Current weight: Weight: 97.6 kg (215 lb 2.7 oz) (24 0700)    Labs:  Recent Labs     24  0048 24  1355 24  1554 24  2140 24  0130 24  0500   WBC 10.9*  --   --  27.5* 16.7*  --    RBC 2.69*  --   --  3.24* 2.32*  --    HEMOGLOBIN 7.7* 9.6*   < > 9.5* 6.8* 7.7*   HEMATOCRIT 25.2* 28.4*   < > 28.9* 20.7* 22.9*   MCV 93.7  --   --  89.2 89.2  --    MCH 28.6  --   --  29.3 29.3  --    MCHC 30.6*  --   --  32.9 32.9  --    RDW 49.4  --   --  51.5* 52.1*  --    PLATELETCT 244 234  --  258 169  --    MPV 10.5  --   --  10.2 10.5  --     < > = values in this interval not displayed.     Recent Labs     24  1710 24  0048 24  1355 24  1700 24  0130 24  0630   SODIUM 134* 134*  --   --  139  --    POTASSIUM 3.9 4.2   < > 4.9 4.9  5.1   CHLORIDE 96 97  --   --  98  --    CO2 24 26  --   --  21  --    GLUCOSE 95 110*  --   --  153*  --    BUN 27* 29*  --   --  31*  --    CREATININE 4.87* 5.00*  --   --  4.95*  --    CALCIUM 8.3* 8.3*  --   --  8.2*  --     < > = values in this interval not displayed.     Recent Labs     06/29/24  1710 06/30/24  1355 06/30/24  2140   APTT 213.1* 62.6*  --    INR 1.42* 1.55* 1.40*       HgbA1c:  5.1    Diabetic Educator Consult:  N\A    Medications:  Scheduled Medications   Medication Dose Frequency    Nozin nasal  swab  1 Applicator BID    magnesium sulfate  1 g DAILY    K+ Scale: Goal of 4.5  1 Each Q6HRS    metoprolol tartrate  12.5 mg BID    Followed by    [START ON 7/2/2024] metoprolol tartrate  25 mg BID    aspirin  81 mg DAILY    clopidogrel  75 mg DAILY    atorvastatin  40 mg QHS    Pharmacy Consult Request  1 Each PHARMACY TO DOSE    acetaminophen  1,000 mg Q6HRS    docusate sodium  100 mg BID    senna-docusate  1 Tablet Nightly    omeprazole  20 mg DAILY    insulin regular  0-14 Units Once    insulin lispro  0-14 Units TID AC    sodium bicarbonate  50 mEq Once    albumin human 5%  25 g Once    cefTRIAXone (ROCEPHIN) IV  2,000 mg Q12HRS    ampicillin  2,000 mg Q12HRS    epoetin  10,000 Units MO, WE + FR        Exam:   Physical Exam  Vitals and nursing note reviewed.   Constitutional:       General: He is not in acute distress.     Appearance: Normal appearance.   HENT:      Head: Normocephalic.      Right Ear: External ear normal.      Left Ear: External ear normal.      Nose: Nose normal. No congestion.      Mouth/Throat:      Mouth: Mucous membranes are moist.      Pharynx: Oropharynx is clear.   Eyes:      Extraocular Movements: Extraocular movements intact.      Pupils: Pupils are equal, round, and reactive to light.   Cardiovascular:      Rate and Rhythm: Normal rate and regular rhythm.      Pulses: Normal pulses.      Heart sounds: Normal heart sounds.   Pulmonary:      Effort: Pulmonary  effort is normal.      Breath sounds: Decreased breath sounds present.   Abdominal:      General: Bowel sounds are decreased. There is no distension.      Palpations: Abdomen is soft.   Musculoskeletal:      Cervical back: Normal range of motion and neck supple. No tenderness.      Right lower leg: Edema present.      Left lower leg: Edema present.   Skin:     General: Skin is warm and dry.      Comments: Midline surgical incision   Neurological:      General: No focal deficit present.      Mental Status: He is alert and oriented to person, place, and time. Mental status is at baseline.   Psychiatric:         Mood and Affect: Mood normal.         Behavior: Behavior normal.         Thought Content: Thought content normal.         Cardiac Medications:    ASA - Yes    Plavix - Yes    Post-operative Beta Blockers - Yes    Ace/ARB- Yes    Statin - Yes    Aldactone- No; contraindicated because of Normal EF    SGLT2i-  No contraindicated because of No; contraindicated because of Normal EF    Ejection Fraction:  50%    Telemetry:   7/1 SR    Assessment/Plan:  POD 1  HDS, SR, neuro intact, sternal incision intact, abdomen soft - BM, fluid balance positive, wt up,  2 L NC. H/H 7.7/22.9- got blood this AM. Plan:  Recheck H/H- transfuse as appropriate. Keep chest tubes and pacing wires, IS/ambulate.     Disposition:  D

## 2024-07-01 NOTE — PROGRESS NOTES
[facility] -  PROGRESS NOTE               Author: Grabiel Augustin M.D. Date & Time: 7/1/2024  8:11 AM     HPI:  68-year-old  male with biventricular history of ESRD on hemodialysis q. Monday Wednesday Friday at [facility] through left upper arm AV fistula that was placed about 2 to 3 years ago.  He presented to the ER with left arm pain and swelling for the last 5 days.  His last dialysis was on Wednesday and had no dialysis in the last 5 days.  He felt sick on Friday and missed his dialysis.  Ultrasound of the left upper arm AV fistula showed there is no flow in the AV fistula and it has thrombosed.  A temporary right IJ dialysis catheter was placed in the ER.  Nephrology has been consulted for management of dialysis.  Patient denies any fever or chills, no nausea or vomiting, no chest pain.  He has some shortness of breath but is able to talk in full sentences and is in no acute respiratory distress at this time. No melena, hematochezia, hematemesis.  No HA, visual changes, or abdominal pain.     NEPHROLOGY DAILY PROGRESS:   6/24: consult note  6/25:  Afebrile, HD last night without any complications, plans noted for OR today with Dr. Tinoco for vascualr surgery   6/26: Underwent AVG repair yesterday with Dr. Tinoco, has LAMBERT drain, BP noted low this AM on dialysis, says BP 's run low on dialysis - not on Midodrine, ordered Midodrine to be used on dialysis   6/27: 0 Net UF with HD yesterday, limited by hypotension. Sitting up at side of bed eating breakfast. Bps improved 130s systolic.   6/28: HD today, seen  6/29: s/p HD yesterday over temp HD line. Tolerated well, no complaints.  6/30: in OR, not available   7/1: s/p AVR, 1V CABG and TAHIR resection/ligation, transferred to ICU, now off pressors, fatigued, post-op pain    REVIEW OF SYSTEMS:    Pt is Not available     PMH/PSH/SH/FH:   Reviewed and unchanged since admission note    CURRENT MEDICATIONS:   Reviewed from  "admission to present day    VS:  VS:  /60   Pulse (!) 58   Temp 36.1 °C (97 °F) (Bladder)   Resp 16   Ht 1.854 m (6' 0.99\")   Wt 97.6 kg (215 lb 2.7 oz)   SpO2 96%   BMI 28.39 kg/m²   GENERAL: no acute distress  CV: No edema  RESP: non-labored  GI: Soft  MSK: No joint deformities   SKIN: No concerning rashes  NEURO: AOx3  PSYCH: Cooperative    Fluids:  In: 34110.8 [P.O.:1100; I.V.:3454.6; Blood:3420; Other:2200]  Out: 2925     LABS:  Recent Labs     06/29/24  1710 06/30/24  0048 06/30/24  1355 06/30/24  1700 07/01/24  0130 07/01/24  0630   SODIUM 134* 134*  --   --  139  --    POTASSIUM 3.9 4.2   < > 4.9 4.9 5.1   CHLORIDE 96 97  --   --  98  --    CO2 24 26  --   --  21  --    GLUCOSE 95 110*  --   --  153*  --    BUN 27* 29*  --   --  31*  --    CREATININE 4.87* 5.00*  --   --  4.95*  --    CALCIUM 8.3* 8.3*  --   --  8.2*  --     < > = values in this interval not displayed.       IMAGING:   All imaging reviewed from admission to present day    IMPRESSION:  # ESRD  -Q. MWF at [facility]  # Bacteremia   - Blood Cx from 6/24, 6/27 + E. Faecalis  # Bacterial endocarditis:   -s/p aortic valve replacement 6/30  3 CAD  -s/p 1V CABG 6/30  #Thrombosed left upper arm AVG; infected pseudoaneurysm   -Right IJ temporary HD catheter placed in ER 6/24/2024  - S/P AVG repair, resection and pseudoaneurysm resection 6/25/24 by Dr. Tinoco along with  Reconstruction of left brachial artery using left greater saphenous vein   interposition graft.  # HTN  # Sepsis   # Anemia of CKD  # CKD-MBD  # Coronary artery disease  # Chronic A-fib  -s/p TAHIR resection/ligation        PLAN:  - HD today, continue q MWF schedule   - EPO with HD  - AVG may not be available for HD use soon   -will need conversion to a tunneled HD catheter before discharge  - No dietary protein restrictions  - Resume home meds   - Dose all meds per ESRD    Thank you    "

## 2024-07-01 NOTE — WOUND TEAM
[facility]  Inpatient Services  Wound and Skin Care Brief Evaluation    Admission Date: 6/24/2024     Last order of IP CONSULT TO WOUND CARE was found on 7/1/2024 from Hospital Encounter on 6/24/2024     HPI, PMH, SH: Reviewed    Chief Complaint   Patient presents with    Arm Pain    Rapid Heart Beat     Diagnosis: Infected prosthetic vascular graft, initial encounter (HCC) [T82.7XXA]    Unit where seen by Wound Team: T625/00     Wound consult placed regarding left hip, sacrum, left upper arm incision. Chart and images reviewed. This discussed with bedside RN, Key. This clinician in to assess patient. Patient pleasant and agreeable. Non-selectively debrided with Moist warm washcloth.     No pressure injuries or advanced wound care needs identified. Left upper arm incision has order from Dr. Blanchard, no orders needed for incision, any questions or concerns can be directed to vascular.  Sacrum and left hip are normal skin coloration for skin tone, intact, and blanching.  Wound consult completed. No further follow up unless indicated and consulted.          PREVENTATIVE INTERVENTIONS:    Q shift Gary - performed per nursing policy  Q shift pressure point assessments - performed per nursing policy    Surface/Positioning  ICU Low Airloss - Currently in Place  Reposition q 2 hours - Currently in Place    Offloading/Redistribution  Sacral offloading dressing (Silicone dressing) - Currently in Place  Float Heels off Bed with Pillows - Currently in Place           Respiratory  Silicone O2 tubing - Currently in Place  Gray Foam Ear protectors - Currently in Place    Containment/Moisture Prevention    Dri-demario pad - Currently in Place    Mobilization      Up to chair

## 2024-07-01 NOTE — ASSESSMENT & PLAN NOTE
Hx of PCI to circumflex. With elevated trop but no chest pain.  Underwent CABG with LIMA to LAD  Aspirin, plavix  Statin   GDMT as tolerated  Cardiology following

## 2024-07-01 NOTE — PROGRESS NOTES
"  VASCULAR SURGERY SERVICE  Progress Note  ___________________________________    6/25/24 Left brachial artery interposition repair with GSV due to infected AVG arterial pseudoaneurysm, and partial AVG removal (Tinoco)    6/30/24: underwent AVR and 1v CABG with LIMA (Knackstedt)    Today:  Arrived in ICU recently after AVR, Intubated, sedated,     Vitals  BP 96/58   Pulse 61   Temp 36.3 °C (97.3 °F) (Bladder)   Resp 18   Ht 1.854 m (6' 0.99\")   Wt 89 kg (196 lb 3.4 oz)   SpO2 99%   BMI 25.89 kg/m²     Exam  Intubated, sedated    Left arm bandage changed, incisions CDI with nylon sutures, healing well, no hematoma or drainage or evidence of infection    Left thigh incision clean dry and intact with staples, open to air, healing well, no hematoma    RIJ temp cath    Labs  WBC normal  Hgb stable around 9  Creatinine: on dialysis     A/P)  No specific concerns from vascular surgery standpoint    Okay to change the left arm bandage with dry gauze and Kerlix once a day.    Sutures from left arm incision should be removed on or around 7/9/2024    Staples from the left thigh incision should  be removed on or around 7/5/2024    Recommend IR for new tunneled catheter placement once the patient is medically cleared and blood cultures are clear    Placement of a new AV graft will need to be scheduled as an outpatient once the patient recovers from aortic valve replacement    Vascular surgery signing off but available anytime, if questions or concerns arise please notify vascular surgeon on call      Jonathan Blanchard MD  [facility]  Voalte preferred or call my office [phone]  __________________________________________________________________  Patient:Brian Jasmine   MRN:[id number]   CSN:[id number]      "

## 2024-07-01 NOTE — ASSESSMENT & PLAN NOTE
Underwent AVR on 6/30/24 for AV endocarditis w/E.faecalis bacteremia  Extubation per CTS protocol  Wean vasopressors as tolerated  Chest tube management per CTS  Wean sedation for mental status - might need to consider HD session if he's slow to wake  Monitor hgb, plts, coags  F/u repeat cultures

## 2024-07-02 LAB
ANION GAP SERPL CALC-SCNC: 15 MMOL/L (ref 7–16)
BACTERIA BLD CULT: ABNORMAL
BACTERIA UR CULT: NORMAL
BUN SERPL-MCNC: 27 MG/DL (ref 8–22)
CALCIUM SERPL-MCNC: 8.5 MG/DL (ref 8.5–10.5)
CHLORIDE SERPL-SCNC: 94 MMOL/L (ref 96–112)
CO2 SERPL-SCNC: 24 MMOL/L (ref 20–33)
CREAT SERPL-MCNC: 4.54 MG/DL (ref 0.5–1.4)
ERYTHROCYTE [DISTWIDTH] IN BLOOD BY AUTOMATED COUNT: 52.4 FL (ref 35.9–50)
GFR SERPLBLD CREATININE-BSD FMLA CKD-EPI: 13 ML/MIN/1.73 M 2
GLUCOSE BLD STRIP.AUTO-MCNC: 87 MG/DL (ref 65–99)
GLUCOSE BLD STRIP.AUTO-MCNC: 96 MG/DL (ref 65–99)
GLUCOSE BLD STRIP.AUTO-MCNC: 98 MG/DL (ref 65–99)
GLUCOSE BLD STRIP.AUTO-MCNC: 99 MG/DL (ref 65–99)
GLUCOSE SERPL-MCNC: 104 MG/DL (ref 65–99)
GRAM STN SPEC: NORMAL
HCT VFR BLD AUTO: 23.1 % (ref 42–52)
HGB BLD-MCNC: 7.6 G/DL (ref 14–18)
MCH RBC QN AUTO: 29.7 PG (ref 27–33)
MCHC RBC AUTO-ENTMCNC: 32.9 G/DL (ref 32.3–36.5)
MCV RBC AUTO: 90.2 FL (ref 81.4–97.8)
PLATELET # BLD AUTO: 160 K/UL (ref 164–446)
PMV BLD AUTO: 10.8 FL (ref 9–12.9)
POTASSIUM SERPL-SCNC: 3.9 MMOL/L (ref 3.6–5.5)
RBC # BLD AUTO: 2.56 M/UL (ref 4.7–6.1)
SIGNIFICANT IND 70042: ABNORMAL
SIGNIFICANT IND 70042: ABNORMAL
SIGNIFICANT IND 70042: NORMAL
SIGNIFICANT IND 70042: NORMAL
SITE SITE: ABNORMAL
SITE SITE: ABNORMAL
SITE SITE: NORMAL
SITE SITE: NORMAL
SODIUM SERPL-SCNC: 133 MMOL/L (ref 135–145)
SOURCE SOURCE: ABNORMAL
SOURCE SOURCE: ABNORMAL
SOURCE SOURCE: NORMAL
SOURCE SOURCE: NORMAL
WBC # BLD AUTO: 13.4 K/UL (ref 4.8–10.8)

## 2024-07-02 PROCEDURE — 700105 HCHG RX REV CODE 258: Performed by: INTERNAL MEDICINE

## 2024-07-02 PROCEDURE — A9270 NON-COVERED ITEM OR SERVICE: HCPCS

## 2024-07-02 PROCEDURE — 80048 BASIC METABOLIC PNL TOTAL CA: CPT

## 2024-07-02 PROCEDURE — 700102 HCHG RX REV CODE 250 W/ 637 OVERRIDE(OP): Performed by: NURSE PRACTITIONER

## 2024-07-02 PROCEDURE — 700102 HCHG RX REV CODE 250 W/ 637 OVERRIDE(OP)

## 2024-07-02 PROCEDURE — 97535 SELF CARE MNGMENT TRAINING: CPT

## 2024-07-02 PROCEDURE — 700101 HCHG RX REV CODE 250: Performed by: INTERNAL MEDICINE

## 2024-07-02 PROCEDURE — 700111 HCHG RX REV CODE 636 W/ 250 OVERRIDE (IP)

## 2024-07-02 PROCEDURE — A9270 NON-COVERED ITEM OR SERVICE: HCPCS | Performed by: NURSE PRACTITIONER

## 2024-07-02 PROCEDURE — 99233 SBSQ HOSP IP/OBS HIGH 50: CPT | Performed by: INTERNAL MEDICINE

## 2024-07-02 PROCEDURE — 94669 MECHANICAL CHEST WALL OSCILL: CPT

## 2024-07-02 PROCEDURE — 700111 HCHG RX REV CODE 636 W/ 250 OVERRIDE (IP): Performed by: INTERNAL MEDICINE

## 2024-07-02 PROCEDURE — 36430 TRANSFUSION BLD/BLD COMPNT: CPT

## 2024-07-02 PROCEDURE — P9016 RBC LEUKOCYTES REDUCED: HCPCS

## 2024-07-02 PROCEDURE — 97166 OT EVAL MOD COMPLEX 45 MIN: CPT

## 2024-07-02 PROCEDURE — 82962 GLUCOSE BLOOD TEST: CPT | Mod: 91

## 2024-07-02 PROCEDURE — 86923 COMPATIBILITY TEST ELECTRIC: CPT

## 2024-07-02 PROCEDURE — 85027 COMPLETE CBC AUTOMATED: CPT

## 2024-07-02 PROCEDURE — 770020 HCHG ROOM/CARE - TELE (206)

## 2024-07-02 RX ADMIN — CEFTRIAXONE SODIUM 2000 MG: 10 INJECTION, POWDER, FOR SOLUTION INTRAVENOUS at 18:01

## 2024-07-02 RX ADMIN — CEFTRIAXONE SODIUM 2000 MG: 10 INJECTION, POWDER, FOR SOLUTION INTRAVENOUS at 05:39

## 2024-07-02 RX ADMIN — Medication 1 APPLICATOR: at 08:26

## 2024-07-02 RX ADMIN — ACETAMINOPHEN 1000 MG: 500 TABLET, FILM COATED ORAL at 23:25

## 2024-07-02 RX ADMIN — MAGNESIUM SULFATE IN DEXTROSE 1 G: 10 INJECTION, SOLUTION INTRAVENOUS at 06:22

## 2024-07-02 RX ADMIN — OXYCODONE HYDROCHLORIDE 5 MG: 5 TABLET ORAL at 05:40

## 2024-07-02 RX ADMIN — TRAMADOL HYDROCHLORIDE 50 MG: 50 TABLET ORAL at 08:25

## 2024-07-02 RX ADMIN — ATORVASTATIN CALCIUM 40 MG: 40 TABLET, FILM COATED ORAL at 21:06

## 2024-07-02 RX ADMIN — VALSARTAN 40 MG: 80 TABLET ORAL at 05:40

## 2024-07-02 RX ADMIN — AMPICILLIN SODIUM 2000 MG: 2 INJECTION, POWDER, FOR SOLUTION INTRAVENOUS at 18:11

## 2024-07-02 RX ADMIN — ACETAMINOPHEN 1000 MG: 500 TABLET, FILM COATED ORAL at 18:02

## 2024-07-02 RX ADMIN — ACETAMINOPHEN 1000 MG: 500 TABLET, FILM COATED ORAL at 12:28

## 2024-07-02 RX ADMIN — OXYCODONE HYDROCHLORIDE 10 MG: 10 TABLET ORAL at 18:03

## 2024-07-02 RX ADMIN — ASPIRIN 81 MG: 81 TABLET, COATED ORAL at 05:40

## 2024-07-02 RX ADMIN — ACETAMINOPHEN 1000 MG: 500 TABLET, FILM COATED ORAL at 05:39

## 2024-07-02 RX ADMIN — OXYCODONE HYDROCHLORIDE 10 MG: 10 TABLET ORAL at 09:31

## 2024-07-02 RX ADMIN — AMPICILLIN SODIUM 2000 MG: 2 INJECTION, POWDER, FOR SOLUTION INTRAVENOUS at 06:18

## 2024-07-02 RX ADMIN — CLOPIDOGREL BISULFATE 75 MG: 75 TABLET ORAL at 05:40

## 2024-07-02 RX ADMIN — OMEPRAZOLE 20 MG: 20 CAPSULE, DELAYED RELEASE ORAL at 05:40

## 2024-07-02 RX ADMIN — Medication 1 APPLICATOR: at 21:06

## 2024-07-02 RX ADMIN — OXYCODONE HYDROCHLORIDE 10 MG: 10 TABLET ORAL at 23:25

## 2024-07-02 ASSESSMENT — ENCOUNTER SYMPTOMS
PALPITATIONS: 0
MYALGIAS: 0
DEPRESSION: 0
BLURRED VISION: 0
ABDOMINAL PAIN: 0
NECK PAIN: 0
HEARTBURN: 0
HEADACHES: 0
NAUSEA: 0
DIZZINESS: 0
SHORTNESS OF BREATH: 0
COUGH: 0
CHILLS: 0
DOUBLE VISION: 0
FEVER: 0

## 2024-07-02 ASSESSMENT — COGNITIVE AND FUNCTIONAL STATUS - GENERAL
DAILY ACTIVITIY SCORE: 19
TOILETING: A LITTLE
DRESSING REGULAR UPPER BODY CLOTHING: A LITTLE
SUGGESTED CMS G CODE MODIFIER DAILY ACTIVITY: CK
PERSONAL GROOMING: A LITTLE
HELP NEEDED FOR BATHING: A LITTLE
DRESSING REGULAR LOWER BODY CLOTHING: A LITTLE

## 2024-07-02 ASSESSMENT — FIBROSIS 4 INDEX
FIB4 SCORE: 2.6
FIB4 SCORE: 2.6

## 2024-07-02 ASSESSMENT — ACTIVITIES OF DAILY LIVING (ADL): TOILETING: INDEPENDENT

## 2024-07-02 ASSESSMENT — PAIN DESCRIPTION - PAIN TYPE
TYPE: ACUTE PAIN

## 2024-07-02 ASSESSMENT — PATIENT HEALTH QUESTIONNAIRE - PHQ9
1. LITTLE INTEREST OR PLEASURE IN DOING THINGS: NOT AT ALL
2. FEELING DOWN, DEPRESSED, IRRITABLE, OR HOPELESS: NOT AT ALL
SUM OF ALL RESPONSES TO PHQ9 QUESTIONS 1 AND 2: 0

## 2024-07-02 NOTE — PROGRESS NOTES
2 RN wound assessment was completed by wound Team and this RN.     4 Eyes Skin Assessment Completed by DIALLO Otto and Wound Team, RN.    Head WDL  Ears WDL  Nose Scab  Mouth WDL  Neck WDL  Breast/Chest Incision   Shoulder Blades WDL  Spine WDL  (R) Arm/Elbow/Hand Redness,blanching, bruising   (L) Arm/Elbow/Hand Redness, blanching, Bruising, 3 incisions w/ staples/sutures   Abdomen WDL  Groin WDL  Scrotum/Coccyx/Buttocks Redness, slow to elizabeth , L brusie on hip   (R) Leg Redness, Blanching, and Scar, mottled knees  (L) Leg Redness, Blanching, and Scar, mottled knees  (R) Heel/Foot/Toe Blanching, discoloration, necrotic pinky toe, red spot on top of foot   (L) Heel/Foot/Toe Discoloration, blanching           Devices In Places ECG, Tele Box, Blood Pressure Cuff, Pulse Ox, and Crespo      Interventions In Place Gray Ear Foams, NC W/Ear Foams, Heel Mepilex, Sacral Mepilex, Chair Waffle, TAP System, Pillows, Q2 Turns, and Low Air Loss Mattress    Possible Skin Injury none    Pictures Uploaded Into Epic Yes  Wound Consult Placed N/A  RN Wound Prevention Protocol Ordered No

## 2024-07-02 NOTE — PROGRESS NOTES
[facility] -  PROGRESS NOTE               Author: Grabiel Augustin M.D. Date & Time: 7/2/2024  7:47 AM     HPI:  68-year-old  male with biventricular history of ESRD on hemodialysis q. Monday Wednesday Friday at [facility] through left upper arm AV fistula that was placed about 2 to 3 years ago.  He presented to the ER with left arm pain and swelling for the last 5 days.  His last dialysis was on Wednesday and had no dialysis in the last 5 days.  He felt sick on Friday and missed his dialysis.  Ultrasound of the left upper arm AV fistula showed there is no flow in the AV fistula and it has thrombosed.  A temporary right IJ dialysis catheter was placed in the ER.  Nephrology has been consulted for management of dialysis.  Patient denies any fever or chills, no nausea or vomiting, no chest pain.  He has some shortness of breath but is able to talk in full sentences and is in no acute respiratory distress at this time. No melena, hematochezia, hematemesis.  No HA, visual changes, or abdominal pain.     NEPHROLOGY DAILY PROGRESS:   6/24: consult note  6/25:  Afebrile, HD last night without any complications, plans noted for OR today with Dr. Tinoco for vascualr surgery   6/26: Underwent AVG repair yesterday with Dr. Tinoco, has LAMBERT drain, BP noted low this AM on dialysis, says BP 's run low on dialysis - not on Midodrine, ordered Midodrine to be used on dialysis   6/27: 0 Net UF with HD yesterday, limited by hypotension. Sitting up at side of bed eating breakfast. Bps improved 130s systolic.   6/28: HD today, seen  6/29: s/p HD yesterday over temp HD line. Tolerated well, no complaints.  6/30: in OR, not available   7/1: s/p AVR, 1V CABG and TAHIR resection/ligation, transferred to ICU, now off pressors, fatigued, post-op pain  7/2: Stable hemodynamics, tolerated HD, transferred out of ICU, chest wall pain improved    REVIEW OF SYSTEMS:    10 Point ROS performed, negative other than  "stated above    PMH/PSH/SH/FH:   Reviewed and unchanged since admission note    CURRENT MEDICATIONS:   Reviewed from admission to present day    VS:  VS:  /63   Pulse 63   Temp 36.3 °C (97.3 °F)   Resp (!) 37   Ht 1.854 m (6' 0.99\")   Wt 99.3 kg (218 lb 14.7 oz)   SpO2 96%   BMI 28.89 kg/m²   GENERAL: no acute distress  CV: No edema  RESP: non-labored  GI: Soft  MSK: No joint deformities   SKIN: No concerning rashes  NEURO: AOx3  PSYCH: Cooperative    Fluids:  In: 800 [Blood:300; Dialysis:500]  Out: 2025     LABS:  Recent Labs     06/30/24  0048 06/30/24  1355 07/01/24  0130 07/01/24  0630 07/02/24  0400   SODIUM 134*  --  139  --  133*   POTASSIUM 4.2   < > 4.9 5.1 3.9   CHLORIDE 97  --  98  --  94*   CO2 26  --  21  --  24   GLUCOSE 110*  --  153*  --  104*   BUN 29*  --  31*  --  27*   CREATININE 5.00*  --  4.95*  --  4.54*   CALCIUM 8.3*  --  8.2*  --  8.5    < > = values in this interval not displayed.       IMAGING:   All imaging reviewed from admission to present day    IMPRESSION:  # ESRD  -Q. MWF at [facility]  # Bacteremia   - Blood Cx from 6/24, 6/27 + E. Faecalis  # Bacterial endocarditis:   -s/p aortic valve replacement 6/30  3 CAD  -s/p 1V CABG 6/30  #Thrombosed left upper arm AVG; infected pseudoaneurysm   -Right IJ temporary HD catheter placed in ER 6/24/2024  - S/P AVG repair, resection and pseudoaneurysm resection 6/25/24 by Dr. Tinoco along with  Reconstruction of left brachial artery using left greater saphenous vein   interposition graft.  # HTN  # Sepsis   # Anemia of CKD  # CKD-MBD  # Coronary artery disease  # Chronic A-fib  -s/p TAHIR resection/ligation        PLAN:  - No HD today, continue q MWF schedule   - EPO with HD   -will need conversion to a tunneled HD catheter before discharge, tentative plan for 7/3 or 7/4 if BC remain negative  - No dietary protein restrictions  - Resume home meds   - Dose all meds per ESRD    Thank you    "

## 2024-07-02 NOTE — PROGRESS NOTES
4 Eyes Skin Assessment Completed by Fannie RN and Bhupendra RN.    Head WDL  Ears Redness and Blanching  Nose Scab  Mouth WDL  Neck Incision (Hemodialysis cath and CVC)  Breast/Chest Incision (Midesternal Incision)  Shoulder Blades WDL  Spine WDL  (R) Arm/Elbow/Hand Redness, Blanching, Bruising, and Edema  (L) Arm/Elbow/Hand Redness, Blanching, Bruising, and Edema  Abdomen WDL  Groin WDL  Scrotum/Coccyx/Buttocks Redness and Blanching and Bruising     (R) Leg Redness, Scar and Mottled  (L) Leg Redness, Scar and Mottled  (R) Heel/Foot/Toe Redness and Blanching and Dusky  (L) Heel/Foot/Toe Redness and Blanching and Dusky          Devices In Places Tele Box, Blood Pressure Cuff, Pulse Ox, Crespo, SCD's, Central Line, and Nasal Cannula      Interventions In Place NC W/Ear Foams, Sacral Mepilex, Chair Waffle, Pillows, Optifoam, Q2 Turns, Low Air Loss Mattress, and Heels Loaded W/Pillows    Possible Skin Injury No    Pictures Uploaded Into Epic Yes  Wound Consult Placed N/A  RN Wound Prevention Protocol Ordered No

## 2024-07-02 NOTE — DIETARY
Nutrition Services: Cardiac Diet Education Consult   Day 8 of admit.  Brian Jasmine is a 68 y.o. male with admitting DX of Infected prosthetic vascular graft, initial encounter ([facility]) [T82.7XXA]    RD received referral for cardiac diet education. RD able to provide information via discharge instructions which includes nutrition recommendations and tips to support a healthy dietary pattern that aligns within pt's current dx. This includes outpatient resources to [facility] [place] [facility] for continued nutrition education and guidance as desired.     No other education needs identified at this time. Please consult RD otherwise for supplemental education or at the request of patient.    Please re-consult RD PRN

## 2024-07-02 NOTE — PROGRESS NOTES
Cardiovascular Surgery Progress Note    Name: Brian Jasmine  MRN: [id number]  : 1955  Admit Date: 2024 10:20 AM  2 Days Post-Op     Procedure:  Procedure(s) and Anesthesia Type:     * REPLACEMENT, AORTIC VALVE WITH A 25 MM DICKEY INSPIRIS- Wound Class: Clean with DrainCABG X1 WITH SEKELTONIZED LIMA FREE GRAFT- Wound Class: Clean with Drain  LEFT ATRIAL APPENDAGE RESECTION AND LIGATION  ECHOCARDIOGRAM, TRANSESOPHAGEAL, INTRAOPERATIVE - Wound Class: Clean with Drain    Vitals:  Vitals:    24 0300 24 0400 24 0500 24 0600   BP: 109/56 109/54 114/60 121/63   Pulse: 62 63 65 63   Resp:       Temp:  36.3 °C (97.3 °F)     TempSrc:       SpO2: 97% 94% 98% 96%   Weight:  99.3 kg (218 lb 14.7 oz)     Height:          Temp (24hrs), Av.1 °C (97 °F), Min:35.9 °C (96.6 °F), Max:36.3 °C (97.3 °F)      Respiratory:   Respiration: (!) 37, Pulse Oximetry: 96 %       Fluids:    Intake/Output Summary (Last 24 hours) at 2024 0739  Last data filed at 2024 0600  Gross per 24 hour   Intake 500 ml   Output 1990 ml   Net -1490 ml     Admit weight: Weight: 93 kg (205 lb)  Current weight: Weight: 99.3 kg (218 lb 14.7 oz) (24 0400)    Labs:  Recent Labs     24  2140 24  0130 24  0500 24  0817 24  0400   WBC 27.5* 16.7*  --   --  13.4*   RBC 3.24* 2.32*  --   --  2.56*   HEMOGLOBIN 9.5* 6.8* 7.7* 8.4* 7.6*   HEMATOCRIT 28.9* 20.7* 22.9* 24.9* 23.1*   MCV 89.2 89.2  --   --  90.2   MCH 29.3 29.3  --   --  29.7   MCHC 32.9 32.9  --   --  32.9   RDW 51.5* 52.1*  --   --  52.4*   PLATELETCT 258 169  --   --  160*   MPV 10.2 10.5  --   --  10.8     Recent Labs     24  0048 24  1355 24  0130 24  0630 24  0400   SODIUM 134*  --  139  --  133*   POTASSIUM 4.2   < > 4.9 5.1 3.9   CHLORIDE 97  --  98  --  94*   CO2 26  --  21  --  24   GLUCOSE 110*  --  153*  --  104*   BUN 29*  --  31*  --  27*   CREATININE 5.00*  --  4.95*  --  4.54*    CALCIUM 8.3*  --  8.2*  --  8.5    < > = values in this interval not displayed.     Recent Labs     06/29/24  1710 06/30/24  1355 06/30/24  2140   APTT 213.1* 62.6*  --    INR 1.42* 1.55* 1.40*       HgbA1c:  5.1    Diabetic Educator Consult:  N\A    Medications:  Scheduled Medications   Medication Dose Frequency    insulin regular  2-9 Units 4X/DAY ACHS    valsartan  40 mg Q DAY    Nozin nasal  swab  1 Applicator BID    metoprolol tartrate  25 mg BID    aspirin  81 mg DAILY    clopidogrel  75 mg DAILY    atorvastatin  40 mg QHS    Pharmacy Consult Request  1 Each PHARMACY TO DOSE    acetaminophen  1,000 mg Q6HRS    docusate sodium  100 mg BID    senna-docusate  1 Tablet Nightly    omeprazole  20 mg DAILY    cefTRIAXone (ROCEPHIN) IV  2,000 mg Q12HRS    ampicillin  2,000 mg Q12HRS    epoetin  10,000 Units MO, WE + FR        Exam:   Physical Exam  Vitals and nursing note reviewed.   Constitutional:       General: He is not in acute distress.     Appearance: Normal appearance.   HENT:      Head: Normocephalic.      Right Ear: External ear normal.      Left Ear: External ear normal.      Nose: Nose normal. No congestion.      Mouth/Throat:      Mouth: Mucous membranes are moist.      Pharynx: Oropharynx is clear.   Eyes:      Extraocular Movements: Extraocular movements intact.      Pupils: Pupils are equal, round, and reactive to light.   Cardiovascular:      Rate and Rhythm: Normal rate and regular rhythm.      Pulses: Normal pulses.      Heart sounds: Normal heart sounds.   Pulmonary:      Effort: Pulmonary effort is normal.      Breath sounds: Decreased breath sounds present.   Abdominal:      General: Bowel sounds are normal. There is no distension.      Palpations: Abdomen is soft.   Musculoskeletal:      Cervical back: Normal range of motion and neck supple. No tenderness.      Right lower leg: No edema.      Left lower leg: No edema.   Skin:     General: Skin is warm and dry.      Comments: Midline  surgical incision   Neurological:      General: No focal deficit present.      Mental Status: He is alert and oriented to person, place, and time. Mental status is at baseline.   Psychiatric:         Mood and Affect: Mood normal.         Behavior: Behavior normal.         Thought Content: Thought content normal.         Cardiac Medications:    ASA - Yes    Plavix - Yes    Post-operative Beta Blockers - Yes    Ace/ARB- Yes    Statin - Yes    Aldactone- No; contraindicated because of Normal EF    SGLT2i-  No contraindicated because of No; contraindicated because of Normal EF    Ejection Fraction:  50%    Telemetry:   7/1 SR  7/2 SB/SR    Assessment/Plan:  POD 1  HDS, SR, neuro intact, sternal incision intact, abdomen soft - BM, fluid balance positive, wt up,  2 L NC. H/H 7.7/22.9- got blood this AM. Plan:  Recheck H/H- transfuse as appropriate. Keep chest tubes and pacing wires, IS/ambulate.     POD 2  HDS, SB, neuro intact, wounds intact, abdomen +BM, fluid balance positive, t/ up,  0.5 L NC. H/H 7.6/23.1.  Plan: Transfuse 1 unit RBCs. Remove chest tubes. Dialysis per neph. IS/ambulate. Transfer to [facility].     Disposition:  TBD

## 2024-07-02 NOTE — DOCUMENTATION QUERY
[facility]                                                                       Query Response Note      PATIENT:               BROOKE ROSARIO  ACCT #:                  [id number]  MRN:                     [id number]  :                      1955  ADMIT DATE:       2024 10:20 AM  DISCH DATE:          RESPONDING  PROVIDER #:        [id number]           QUERY TEXT:    Sepsis is documented in - Nephrology progress notes and in the  operative note. Patient treated for infected dialysis graft, bacteremia, and endocarditis, with 2/4 SIRS on admission (WBC >12 and HR >90). No sepsis related end organ dysfunction noted and documentation of sepsis is not consistent throughout medical record.      Please clarify the status of sepsis by providing sepsis-related organ dysfunction.    Sepsis - real or suspected infection plus 2 or more SIRS criteria + organ dysfunction related to sepsis    Temp <96.8 or >101  HR >90  RR >20  WBC <4,000 or > 12,000 or >10% bandemia    The patient's Clinical Indicators include:  Clinical Findings:     (date of admission):  -WBC 13.8; lactic acid 1.3  -T 99.3; ; RR 16  -Blood Cx: Enterococcus faecalis  -ED note: chronically ill appearing    :   -Blood Cx: Enterococcus faecalis    :  -Blood Cx: Enterococcus faecalis      Risk Factors: Infected dialysis graft, bacteremia, endocarditis, afib RVR, ESRD  Treatments: ID consult, IV fluids, IV abx, graft removal, RENEE, valve replacement, labs, blood cultures      Contact me with any questions.    Thank you for your time and attention,  Clotilde Frederick RN, CDI  Connect via email, Voalte or messenger.    Note: If you agree with a listed diagnosis, please remember to include it in your concurrent daily documentation and onto the Discharge Summary.  Options provided:   -- Sepsis exists, (provide plus sepsis-related organ dysfunction)   -- Sepsis does  not exist   -- Other explanation, Please specify      Query created by: Clotilde Frederick on 7/2/2024 10:31 AM    RESPONSE TEXT:    Sepsis exists - The patient has a source of infection + SIRS criteria = sepsis. The patient does not have severe sepsis or septic shock, there is no organ dysfunction.          Electronically signed by:  DIANDRA MORALES MD 7/2/2024 1:22 PM

## 2024-07-02 NOTE — DISCHARGE INSTR - DIET
Heart-Healthy Nutrition Therapy    A heart-healthy diet is recommended to reduce your unhealthy blood cholesterol levels, manage high blood pressure, and lower your risk for heart disease.    To follow a heart-healthy diet,    Eat a balanced diet with whole grains, fruits and vegetables, and lean protein sources.  Achieve and maintain a healthy weight.  Choose heart-healthy unsaturated fats. Limit saturated fats, trans fats, and cholesterol intake. Eat more plant-based or vegetarian meals using beans and soy foods for protein.  Eat whole, unprocessed foods to limit the amount of sodium (salt) you eat.  Limit refined carbohydrates especially sugar, sweets and sugar-sweetened beverages.  If you drink alcohol, do so in moderation: one serving per day (women) and two servings per day (men).  One serving is equivalent to 12 ounces beer, 5 ounces wine, or 1.5 ounces distilled spirits    Tips for Choosing Heart-Healthy Fats    Choose lean protein and low-fat dairy foods to reduce saturated fat intake.    Saturated fat is usually found in animal-based protein and is associated with certain health risks. Saturated fat is the biggest contributor to raised low-density lipoprotein (LDL) cholesterol levels in the diet. Research shows that limiting saturated fat lowers unhealthy cholesterol levels. Eat no more than 5-6% of your total calories each day from saturated fat. Ask your registered dietitian nutritionist (RDN) to help you determine how much saturated fat is right for you.  There are many foods that do not contain large amounts of saturated fats. Swapping these foods to replace foods high in saturated fats will help you limit the saturated fat you eat and improve your cholesterol levels. You can also try eating more plant-based or vegetarian meals.        Avoid trans fats    Trans fats increase levels of LDL-cholesterol. Hydrogenated fat in processed foods is the main source of trans fats in foods.   Trans fats can be  found in stick margarine, shortening, processed sweets, baked goods, some fried foods, and packaged foods made with hydrogenated oils. Avoid foods with “partially hydrogenated oil” on the ingredient list such as: cookies, pastries, baked goods, biscuits, crackers, microwave popcorn, and frozen dinners.    Choose foods with heart healthy fats    Polyunsaturated and monounsaturated fat are unsaturated fats that may help lower your blood cholesterol level when used in place of saturated fat in your diet.  Ask your RDN about taking a dietary supplement with plant sterols and stanols to help lower your cholesterol level.  Research shows that substituting saturated fats with unsaturated fats is beneficial to cholesterol levels. Try these easy swaps:      Limit the amount of cholesterol you eat to less than 200 milligrams per day.    Cholesterol is a substance carried through the bloodstream via lipoproteins, which are known as “transporters” of fat. Some body functions need cholesterol to work properly, but too much cholesterol in the bloodstream can damage arteries and build up blood vessel linings (which can lead to heart attack and stroke). You should eat less than 200 milligrams cholesterol per day.  People respond differently to eating cholesterol. There is no test available right now that can figure out which people will respond more to dietary cholesterol and which will respond less. For individuals with high intake of dietary cholesterol, different types of increase (none, small, moderate, large) in LDL-cholesterol levels are all possible.    Food sources of cholesterol include egg yolks and organ meats such as liver, gizzards.  Limit egg yolks to two to four per week and avoid organ meats like liver and gizzards to control cholesterol intake.    Tips for Choosing Heart-Healthy Carbohydrates    Consume foods rich in viscous (soluble) fiber    Viscous, or soluble, fiber is found in the walls of plant cells. Viscous  fiber is found only in plant-based foods--animal-based foods like meat or dairy products do not contain fiber. In the stomach, viscous fibers absorb water and swell to form a thick, jelly-like mass. This helps to lower your unhealthy cholesterol  Rich sources of viscous fiber include asparagus, [place] sprouts, sweet potatoes, turnips, apricots, mangoes, oranges, legumes, barley, oats, and oat bran.  Eat at least 5 to 10 grams of viscous fiber each day. As you increase your fiber intake gradually, also increase the amount of water you drink. This will help prevent constipation.  If you have difficulty achieving this goal, ask your RDN about fiber laxatives. Choose fiber supplements made with viscous fibers such as psyllium seed husks or methylcellulose to help lower unhealthy cholesterol.    Limit refined carbohydrates    There are three types of carbohydrates: starches, sugar, and fiber. Some carbohydrates occur naturally in food, like the starches in rice or corn or the sugars in fruits and milk. Refined carbohydrates--foods with high amounts of simple sugars--can raise triglyceride levels. High triglyceride levels are associated with coronary heart disease.  Some examples of refined carbohydrate foods are table sugar, sweets, and beverages sweetened with added sugar.    Tips for Reducing Sodium (Salt)  Although sodium is important for your body to function, too much sodium can be harmful for people with high blood pressure.  As sodium and fluid buildup in your tissues and bloodstream, your blood pressure increases. High blood pressure may cause damage to other organs and increase your risk for a stroke.    Keep your salt intake to 2300 milligrams or less per day. Even if you take a pill for blood pressure or a water pill (diuretic) to remove fluid, it is still important to have less salt in your diet. Ask your RDN what amount of sodium is right for you.    Avoid processed foods.  Eat more fresh foods.  Fresh  "fruits and vegetables are naturally low in sodium, as well as frozen vegetables and fruits that have no added juices or sauces.  Fresh meats are lower in sodium than processed meats, such as rick, sausage, and hotdogs.  Read the nutrition label or ask your  to help you find a fresh meat that is low in sodium.  Eat less salt--at the table and when cooking.  A single teaspoon of table salt has 2,300 mg of sodium.  Leave the salt out of recipes for pasta, casseroles, and soups.  Ask your RDN how to cook your favorite recipes without sodium  Be a smart .  Look for food packages that say “salt-free” or “sodium-free.” These items contain less than 5 milligrams of sodium per serving.  “Very low-sodium” products contain less than 35 milligrams of sodium per serving.  “Low-sodium” products contain less than 140 milligrams of sodium per serving.   Beware of “reduced salt” or \"reduced sodium\" products.  These items may still be high in sodium. Check the nutrition label.   Add flavors to your food without adding sodium.  Try lemon juice, lime juice, fruit juice or vinegar.    Dry or fresh herbs add flavor. Try basil, bay leaf, dill, rosemary, parsley, ihsan, dry mustard, nutmeg, thyme, and paprika.  Pepper, red pepper flakes, and cayenne pepper can add spice to your meals without adding sodium. Hot sauce contains sodium, but if you use just a drop or two, it will not add up to much.  Buy a sodium-free seasoning blend or make your own at home.     Additional Lifestyle Tips    Achieve and maintain a healthy weight.  Talk with your RDN or your doctor about what is a healthy weight for you.  Set goals to reach and maintain that weight.   To lose weight, reduce your calorie intake along with increasing your physical activity. A weight loss of 10 to 15 pounds could reduce LDL-cholesterol by 5 milligrams per deciliter.  Participate in physical activity.    Talk with your health care team to find out what types of " physical activity are best for you. Set a plan to get about 30 minutes of exercise on most days.          Nutrition Counseling  Our expert team offers:   Medical Nutrition Therapy for Chronic Conditions   Weight Management   Diabetes Education and Management   Wellness Services   Body Composition Measurements   Gastrointestinal Health    [facility] are located at:  [address], [place] [address]  For more information and to schedule a consultation, please call [phone].  A physician referral may be required by your insurance for coverage.

## 2024-07-02 NOTE — THERAPY
Physical Therapy Contact Note    Patient Name: Brian Jasmine  Age:  68 y.o., Sex:  male  Medical Record #: [id number]  Today's Date: 7/2/2024    Discussed missed therapy with RN       07/01/24 1301   Interdisciplinary Plan of Care Collaboration   Collaboration Comments Eval attempted, pt in dialysis, will complete eval when pt is available.

## 2024-07-02 NOTE — PROGRESS NOTES
Infectious Disease Progress Note    Author: Sarabjit Pulliam M.D. Date & Time of service: 2024  8:21 AM    Chief Complaint:  Bacteremia, endocarditis    Interval History:   patient remains afebrile, count 11,000, cardiothoracic surgery planning aortic valve replacement, cultures as below, creatinine 4.05   patient remains afebrile, underwent valve replacement surgery yesterday, white count bumped to 27.5, down to 16.7 today, cultures as below, creatinine 4.95, magnesium 3 point   patient is afebrile, white count is down to 13.4, cultures as below, creatinine 4.54,    Review of Systems:  Review of Systems   Unable to perform ROS: Other     Hemodynamics:  Temp (24hrs), Av.2 °C (97.1 °F), Min:35.9 °C (96.6 °F), Max:36.3 °C (97.3 °F)  Temperature: 36.2 °C (97.2 °F), Monitored Temp: 36.2 °C (97.2 °F)  Pulse  Av.9  Min: 31  Max: 136   Blood Pressure : 96/54  CVP (mm Hg): (!) 7 MM HG    Physical Exam:  Physical Exam  Constitutional:       General: He is not in acute distress.     Appearance: He is ill-appearing.   Cardiovascular:      Rate and Rhythm: Normal rate and regular rhythm.      Heart sounds: Normal heart sounds.   Pulmonary:      Effort: Pulmonary effort is normal.      Breath sounds: Normal breath sounds.      Comments: Midline clean and dry dressing  Abdominal:      General: Abdomen is flat. Bowel sounds are normal.      Palpations: Abdomen is soft.   Musculoskeletal:      Comments: Left arm in bandaging   Skin:     General: Skin is warm and dry.   Neurological:      General: No focal deficit present.      Mental Status: He is oriented to person, place, and time.   Psychiatric:         Mood and Affect: Mood normal.         Behavior: Behavior normal.       Meds:    Current Facility-Administered Medications:     Discontinue sliding scale insulin orders and Accuchecks after post-op day #2 if 2 consecutive blood sugars are less than 150 mg/dL **AND** insulin regular **AND** POC blood glucose  manual result **AND** NOTIFY MD and PharmD **AND** Administer 20 grams of glucose (approximately 8 ounces of fruit juice) every 15 minutes PRN FSBG less than 70 mg/dL **AND** dextrose bolus    valsartan    hydrALAZINE    Respiratory Therapy Consult    NS    NS    electrolyte-A    Nozin nasal  swab    [] metoprolol tartrate **FOLLOWED BY** metoprolol tartrate    aspirin    clopidogrel    atorvastatin    Pharmacy Consult Request    acetaminophen **FOLLOWED BY** [START ON 7/10/2024] acetaminophen    oxyCODONE immediate-release **OR** oxyCODONE immediate-release **OR** fentaNYL    traMADol    sodium bicarbonate    ondansetron **OR** prochlorperazine    acetaminophen **OR** acetaminophen    docusate sodium    senna-docusate    senna-docusate    polyethylene glycol/lytes    magnesium hydroxide    bisacodyl    sodium phosphate    omeprazole    mag hydrox-al hydrox-simeth    diphenhydrAMINE    albuterol    cefTRIAXone (ROCEPHIN) IV    ampicillin    epoetin    labetalol    heparin    heparin    heparin    heparin    Labs:  Recent Labs     24  2140 24  0130 24  0500 24  0817 24  0400   WBC 27.5* 16.7*  --   --  13.4*   RBC 3.24* 2.32*  --   --  2.56*   HEMOGLOBIN 9.5* 6.8* 7.7* 8.4* 7.6*   HEMATOCRIT 28.9* 20.7* 22.9* 24.9* 23.1*   MCV 89.2 89.2  --   --  90.2   MCH 29.3 29.3  --   --  29.7   RDW 51.5* 52.1*  --   --  52.4*   PLATELETCT 258 169  --   --  160*   MPV 10.2 10.5  --   --  10.8     Recent Labs     24  0048 24  1355 24  0130 24  0630 24  0400   SODIUM 134*  --  139  --  133*   POTASSIUM 4.2   < > 4.9 5.1 3.9   CHLORIDE 97  --  98  --  94*   CO2 26  --  21  --  24   GLUCOSE 110*  --  153*  --  104*   BUN 29*  --  31*  --  27*    < > = values in this interval not displayed.     Recent Labs     24  1710 24  0048 24  0130 24  0400   ALBUMIN 2.9*  --   --   --    TBILIRUBIN 0.2  --   --   --    ALKPHOSPHAT 97  --   --    --    TOTPROTEIN 6.3  --   --   --    ALTSGPT 17  --   --   --    ASTSGOT 26  --   --   --    CREATININE 4.87* 5.00* 4.95* 4.54*       Imaging:  US-EXTREMITY ARTERY LOWER BILAT    Result Date: 2024  Lower Extremity  Arterial Duplex Report  Vascular Laboratory  CONCLUSIONS  1.  Right lower extremity atherosclerosis with occlusion of the distal  fem/popliteal graft with 3 vessel runoff to the right ankle.  3.  Left lower extremity atherosclerosis with patent fem/popliteal graft  and 3 vessel runoff to the ankle.  ABRAHAM, BROOKE  Exam Date:     2024 12:28  Room #:     Inpatient  Priority:     Routine  Ht (in):             Wt (lb):  Ordering Physician:        CHANTAL CRAWFORD  Referring Physician:       [id number]  Sonographer:               Hipolito Anthony RVT, RDMS  Study Type:                Complete Bilateral  Technical Quality:         Adequate  Age:    68    Gender:     M  MRN:    [id number]  :    1955      BSA:  Indications:     Bypass Graft  CPT Codes:       11265  ICD Codes:       414.04  History:         History of bilateral fem-pop bypass grafts. No prior studies.  Limitations:                RIGHT  Waveform        Peak Systolic Velocity (cm/s)                  Prox    Prox-Mid  Mid    Mid-Dist  Distal  Biphasic                          274                      CFA  Biphasic        198                                        PFA                                                             SFA                                    29                       POP  Monophasic      73                                 18      AT  Monophasic      40                                 31      PT  Monophasic      72                                 51      NICHOLAS                LEFT  Waveform        Peak Systolic Velocity (cm/s)                  Prox    Prox-Mid  Mid    Mid-Dist  Distal  Biphasic                           206                      CFA  Biphasic        373                                        PFA                                                             SFA  Biphasic                                           74      POP  Biphasic        132                                137     AT  Biphasic        93                                 94      PT  Biphasic        47                                 59      NICHOLAS  FINDINGS  Right.  Diffuse plaque and multiphasic flow throughout the common femoral and  popliteal arteries.  Three vessel runoff to the ankle with monophasic flow.  Fully occluded fem to pop bypass graft seen with the following velocities:  Proximal anastamosis: 32 cm/sec.  Distal to proximal anastamosis: 14 cm/sec.  Distal anastamosis: No flow detected.  Distal to anastamosis: 29 cm/sec.  Left.  Diffuse plaque and multiphasic flow throughout the common femoral and  popliteal arteries.  Three vessel runoff to the ankle with biphasic flow.  Fem to pop bypass graft seen with the following velocities:  Proximal anastamosis: 32 cm/sec.  Distal to proximal anastamosis: 21 cm/sec.  Proximal thigh: 104 cm/sec.  Mid thigh: 107 cm/sec.  Distal thigh: 95 cm/sec.  Distal anastamosis: 183 cm/sec.  Distal to anastamosis: 132 cm/sec.  Abdominal aortic aneurysm seem with a measurement of 4.0 x 4.4 cm noted as  an incidental finding.  .  Nani Morataya  (Electronically Signed)  Final Date:      2024                   15:40    US-SUSI SINGLE LEVEL BILAT    Result Date: 2024   Vascular Laboratory  Conclusions  1.  Reduced right SUSI.  2.  Reduced left TBI.  BROOKE ROSARIO  Age:    68    Gender:     M  MRN:    [id number]  :    1955      BSA:  Exam Date:     2024 09:20  Room #:     Inpatient  Priority:     Routine  Ht (in):             Wt (lb):  Ordering Physician:        CHANTAL CRAWFORD  Referring Physician:       [id number]   Sonographer:               Hipolito Anthony RVT [facility]  Study Type:                Complete Bilateral  Technical Quality:         Adequate  Indications:     Ulcer of ankle  CPT Codes:       53366  ICD Codes:       707.13  History:         Ulceration of the bilateral ankles. No prior studies.  Limitations:                 RIGHT  Waveform            Systolic BPs (mmHg)                             117           Brachial  Triphasic                                Common Femoral  Biphasic                                 Popliteal  Bi, non-rev                83            Posterior Tibial  Bi, non-rev                72            Dorsalis Pedis                             285           Digit                             0.71          SUSI                             2.44          TBI                       LEFT  Waveform        Systolic BPs (mmHg)                                           Brachial  Triphasic                                Common Femoral  Triphasic                                Popliteal  Triphasic                  130           Posterior Tibial  Triphasic                  117           Dorsalis Pedis  Diminished                 45            Digit                             1.11          SUSI                             0.38          TBI  Findings  Right.  Doppler waveforms of the common femoral, popliteal, posterior tibial, and  dorsalis pedis arteries are of high amplitude and multiphasic.  Doppler waveforms at the ankle are brisk and biphasic.  The ankle-brachial index is dimished.  The toe-brachial index is not accurately measured due to calcification and  noncompressibility of the tibial vessels.  Left.  Unable to obtain brachial pressure due to unremovable dressing following AV  fistula repair.   Doppler waveforms of the common femoral, popliteal, posterior tibial, and  dorsalis pedis arteries are of high amplitude and multiphasic.  Doppler waveforms at the ankle are brisk and triphasic.  The ankle-brachial  index is normal.  The toe-brachial index is abnormally reduced.  An arterial duplex was performed in accordance with lower extremity  arterial evaluation protocol - see separate report.  Nani Morataya  (Electronically Signed)  Final Date:      2024                   11:25    DX-CHEST-PORTABLE (1 VIEW)    Result Date: 2024 2:53 PM HISTORY/REASON FOR EXAM:  Status post central line placement TECHNIQUE/EXAM DESCRIPTION AND NUMBER OF VIEWS: Single portable view of the chest. COMPARISON: 2024 FINDINGS: Right IJ catheter tip projects over the SVC in satisfactory position. The mediastinal and cardiac silhouette is unremarkable. The pulmonary vascularity is within normal limits. The lung parenchyma is clear. There is no significant pleural effusion. There is no visible pneumothorax. There are no acute bony abnormalities.     1.  Satisfactory appearance of the right IJ catheter. No pneumothorax visualized.    US-HEMODIALYSIS GRAFT DUPLEX COMP UPPER EXTREMITY    Result Date: 2024   Upper Extremity  Venous Duplex Report  Vascular Laboratory  CONCLUSIONS  1.  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  2.  No acute DVT seen elsewhere in the left upper extremity.  BROOKE ROSARIO  Exam Date:     2024 11:30  Room #:     Inpatient  Priority:     Stat  Ht (in):             Wt (lb):  Ordering Physician:        MARIO CLARK  Referring Physician:       [id number] Hendricks  Sonographer:               Hipolito Anthony RVT, RDMS  Study Type:                Complete Unilateral  Technical Quality:         Adequate  Age:    68    Gender:     M  MRN:    [id number]  :    1955      BSA:  Indications:     AV Fistula / Thrombosed  CPT Codes:       12347  ICD Codes:       996.73  History:         Left thrombosed AV Fistula. No prior studies.  Limitations:  PROCEDURES:   Left upper extremity venous duplex imaging.  The following venous structures were evaluated: internal jugular,  subclavian, axillary, brachial, cephalic, and basilic veins.  Serial compression, color, and spectral Doppler flow evaluations were  performed.  FINDINGS:  Left upper extremity.  All veins demonstrate complete color filling and compressibility with  normal venous flow dynamics including spontaneous flow and respiratory  phasicity.  No superficial or deep venous thrombosis.  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  Fistula is thrombosed and  completely occluded from its proximal segment running its entire length to  its termination at the axillary vein.  Proximal segment of fistula is aneurysmal measuring 2.2 x 1.6 cm.  Nani Morataya  (Electronically Signed)  Final Date:      2024                   12:41    US-HEMODIALYSIS GRAFT DUPLEX COMP UPPER EXTREMITY    Result Date: 2024   Upper Extremity  Venous Duplex Report  Vascular Laboratory  CONCLUSIONS  1.  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  2.  No acute DVT seen elsewhere in the left upper extremity.  BROOKE ROSARIO  Exam Date:     2024 11:30  Room #:     Inpatient  Priority:     Stat  Ht (in):             Wt (lb):  Ordering Physician:        MARIO CLARK  Referring Physician:       [id number] Munson  Sonographer:               Hipolito Anthony RVT, RDMS  Study Type:                Complete Unilateral  Technical Quality:         Adequate  Age:    68    Gender:     M  MRN:    [id number]  :    1955      BSA:  Indications:     AV Fistula / Thrombosed  CPT Codes:       81066  ICD Codes:       996.73  History:         Left thrombosed AV Fistula. No prior studies.  Limitations:  PROCEDURES:  Left upper extremity venous duplex imaging.  The following venous structures were evaluated: internal  jugular,  subclavian, axillary, brachial, cephalic, and basilic veins.  Serial compression, color, and spectral Doppler flow evaluations were  performed.  FINDINGS:  Left upper extremity.  All veins demonstrate complete color filling and compressibility with  normal venous flow dynamics including spontaneous flow and respiratory  phasicity.  No superficial or deep venous thrombosis.  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  Fistula is thrombosed and  completely occluded from its proximal segment running its entire length to  its termination at the axillary vein.  Proximal segment of fistula is aneurysmal measuring 2.2 x 1.6 cm.  Nani Morataya  (Electronically Signed)  Final Date:      24 June 2024                   12:41    DX-CHEST-PORTABLE (1 VIEW)    Result Date: 6/24/2024 6/24/2024 10:29 AM HISTORY/REASON FOR EXAM:  Chest Pain. TECHNIQUE/EXAM DESCRIPTION AND NUMBER OF VIEWS: Single portable view of the chest. COMPARISON: None FINDINGS: The soft tissues and bony structures are unremarkable. The heart and mediastinal structures are enlarged. Pulmonary vascularity is normal. The lung fields are clear. There is no effusion or pneumothorax.     Mild enlargement of the cardiomediastinal silhouette without acute cardiopulmonary abnormality.      Micro:  Results       Procedure Component Value Units Date/Time    BLOOD CULTURE [[id number]] Collected: 07/01/24 0937    Order Status: Completed Specimen: Blood from Peripheral Updated: 07/02/24 0738     Significant Indicator NEG     Source BLD     Site PERIPHERAL     Culture Result No Growth  Note: Blood cultures are incubated for 5 days and  are monitored continuously.Positive blood cultures  are called to the RN and reported as soon as  they are identified.      Narrative:      Right Forearm/Arm    BLOOD CULTURE [[id number]] Collected: 07/01/24 1000    Order Status: Completed Specimen: Blood from Peripheral Updated: 07/02/24 0738     Significant Indicator NEG     Source  BLD     Site PERIPHERAL     Culture Result No Growth  Note: Blood cultures are incubated for 5 days and  are monitored continuously.Positive blood cultures  are called to the RN and reported as soon as  they are identified.      Narrative:      Right Forearm/Arm    URINE CULTURE(NEW) [[id number]] Collected: 06/30/24 0546    Order Status: Completed Specimen: Urine, Clean Catch Updated: 07/01/24 1529     Significant Indicator NEG     Source UR     Site URINE, CLEAN CATCH     Culture Result No growth at 24 hours.    Narrative:      Indication for culture:->Evaluation for sepsis without a    Anaerobic Culture [[id number]] Collected: 06/30/24 1025    Order Status: Completed Specimen: Tissue Updated: 07/01/24 1434     Significant Indicator NEG     Source TISS     Site aortic valve     Culture Result Culture in progress.    Narrative:      Surgery Specimen    Fungal Culture [[id number]] Collected: 06/30/24 1025    Order Status: Completed Specimen: Tissue Updated: 07/01/24 1434     Significant Indicator NEG     Source TISS     Site aortic valve     Culture Result Culture in progress.     Fungal Smear Results No fungal elements seen.    Narrative:      Surgery Specimen    AFB Culture [[id number]] Collected: 06/30/24 1025    Order Status: Completed Specimen: Tissue Updated: 07/01/24 1434     Significant Indicator NEG     Source TISS     Site aortic valve     Culture Result Culture in progress.     AFB Smear Results No acid fast bacilli seen.    Narrative:      Surgery Specimen    CULTURE TISSUE W/ GRM STAIN [[id number]]  (Abnormal) Collected: 06/30/24 1025    Order Status: Completed Specimen: Tissue Updated: 07/01/24 1434     Significant Indicator POS     Source TISS     Site aortic valve     Culture Result -     Gram Stain Result Rare WBCs.  No organisms seen.       Culture Result Enterococcus faecalis  Moderate growth      Narrative:      Surgery Specimen    BLOOD CULTURE [[id number]]  (Abnormal) Collected: 06/29/24 0217    Order  Status: Completed Specimen: Blood from Peripheral Updated: 07/01/24 1258     Significant Indicator POS     Source BLD     Site PERIPHERAL     Culture Result Growth detected by Bactec instrument. 06/30/2024  07:13      Enterococcus faecalis  Susceptibilities in progress      Narrative:      CALL  Woo  [facility] tel. [phone],  CALLED  [facility] tel. [phone] 06/30/2024, 07:16, voalted results to  Hospitalist Mello Montoya  Right Hand    BLOOD CULTURE [[id number]] Collected: 06/30/24 0205    Order Status: Completed Specimen: Blood from Peripheral Updated: 07/01/24 0800     Significant Indicator NEG     Source BLD     Site PERIPHERAL     Culture Result No Growth  Note: Blood cultures are incubated for 5 days and  are monitored continuously.Positive blood cultures  are called to the RN and reported as soon as  they are identified.      Narrative:      Right Forearm/Arm    BLOOD CULTURE [[id number]]  (Abnormal) Collected: 06/30/24 0048    Order Status: Completed Specimen: Blood from Peripheral Updated: 07/01/24 0711     Significant Indicator POS     Source BLD     Site PERIPHERAL     Culture Result Growth detected by Bactec instrument. 07/01/2024  07:09  Gram Stain: Gram positive cocci: Possible Streptococcus sp.      Narrative:      CALL  Woo  161 tel. [phone],  CALLED  161 tel. [phone] 07/01/2024, 07:11, RB PERF. RESULTS CALLED  TO:[id number] RN  Right Hand    Acid Fast Stain [[id number]] Collected: 06/30/24 1025    Order Status: Completed Specimen: Tissue Updated: 06/30/24 2253     Significant Indicator NEG     Source TISS     Site aortic valve     AFB Smear Results No acid fast bacilli seen.    Narrative:      Surgery Specimen    GRAM STAIN [[id number]] Collected: 06/30/24 1025    Order Status: Completed Specimen: Tissue Updated: 06/30/24 2253     Significant Indicator .     Source TISS     Site aortic valve     Gram Stain Result Rare WBCs.  No organisms seen.      Narrative:      Surgery Specimen    Fungal Smear  [[id number]] Collected: 06/30/24 1025    Order Status: Completed Specimen: Tissue Updated: 06/30/24 2253     Significant Indicator NEG     Source TISS     Site aortic valve     Fungal Smear Results No fungal elements seen.    Narrative:      Surgery Specimen    BLOOD CULTURE [[id number]] Collected: 06/29/24 0125    Order Status: Completed Specimen: Blood from Peripheral Updated: 06/30/24 0815     Significant Indicator NEG     Source BLD     Site PERIPHERAL     Culture Result No Growth  Note: Blood cultures are incubated for 5 days and  are monitored continuously.Positive blood cultures  are called to the RN and reported as soon as  they are identified.      Narrative:      Right Hand    URINE CULTURE(NEW) [[id number]]     Order Status: No result Specimen: Urine     Urinalysis [[id number]]  (Abnormal) Collected: 06/30/24 0546    Order Status: Completed Specimen: Urine, Clean Catch Updated: 06/30/24 0623     Color Yellow     Character Cloudy     Specific Gravity 1.029     Ph 8.0     Glucose Negative mg/dL      Ketones Negative mg/dL      Protein 100 mg/dL      Bilirubin Negative     Urobilinogen, Urine 0.2     Nitrite Negative     Leukocyte Esterase Moderate     Occult Blood Negative     Micro Urine Req Microscopic    BLOOD CULTURE [[id number]]  (Abnormal) Collected: 06/27/24 0320    Order Status: Completed Specimen: Blood from Line Updated: 06/29/24 1737     Significant Indicator POS     Source BLD     Site Peripheral     Culture Result Growth detected by Bactec instrument. 06/28/2024  01:25      Enterococcus faecalis  Please see previous culture for susceptibilities      Narrative:      CALL  Woo  183 tel. [phone],  CALLED  183 tel. [phone] 06/28/2024, 01:27, RB PERF. RESULTS CALLED TO: RN  [id number]  Right Hand    BLOOD CULTURE [[id number]]  (Abnormal) Collected: 06/27/24 0958    Order Status: Completed Specimen: Blood from Peripheral Updated: 06/29/24 1737     Significant Indicator POS     Source BLD     Site  PERIPHERAL     Culture Result Growth detected by Bactec instrument. 06/28/2024  17:42      Enterococcus faecalis  Please see previous culture for susceptibilities      Narrative:      CALL  Woo  183 tel. [phone],  CALLED  183 tel. [phone] 06/28/2024, 17:58, RB PERF. RESULTS CALLED  TO:Parris msg to Aurelio Cid Pharm D  Right Hand    CULTURE WOUND W/ GRAM STAIN [[id number]]  (Abnormal)  (Susceptibility) Collected: 06/25/24 1400    Order Status: Completed Specimen: Wound Updated: 06/28/24 1502     Significant Indicator POS     Source WND     Site L forearm fistula graft     Culture Result -     Gram Stain Result Many WBCs.  No organisms seen.       Culture Result Enterococcus faecalis  Moderate growth      Narrative:      Surgery - swabs received    Susceptibility       Enterococcus faecalis (1)       Antibiotic Interpretation Microscan   Method Status    Daptomycin Sensitive 2 mcg/mL WAQAR Final    Gent Synergy Sensitive <=500 mcg/mL WAQAR Final    Ampicillin Sensitive <=2 mcg/mL WAQAR Final    Linezolid Sensitive 2 mcg/mL WAQAR Final    Vancomycin Sensitive 1 mcg/mL WAQAR Final    Penicillin Sensitive 2 mcg/mL WAQAR Final    Tetracycline Sensitive <=4 mcg/mL WAQAR Final                       Anaerobic Culture [[id number]] Collected: 06/25/24 1400    Order Status: Completed Specimen: Wound Updated: 06/28/24 1502     Significant Indicator NEG     Source WND     Site L forearm fistula graft     Culture Result No Anaerobes isolated.    Narrative:      Surgery - swabs received    Fungal Culture [[id number]] Collected: 06/25/24 1400    Order Status: Completed Specimen: Wound Updated: 06/28/24 1502     Significant Indicator NEG     Source WND     Site L forearm fistula graft     Culture Result Culture in progress.     Fungal Smear Results No fungal elements seen.    Narrative:      Surgery - swabs received    BLOOD CULTURE [[id number]]  (Abnormal) Collected: 06/24/24 1334    Order Status: Completed Specimen: Blood from Peripheral  Updated: 06/27/24 1122     Significant Indicator POS     Source BLD     Site Peripheral     Culture Result Growth detected by Bactec instrument. 06/25/2024  02:07      Enterococcus faecalis  Please see previous culture for susceptibilities      Narrative:      CALL  Woo  183 tel. [phone],  Right Hand    BLOOD CULTURE [[id number]]  (Abnormal)  (Susceptibility) Collected: 06/24/24 1231    Order Status: Completed Specimen: Blood from Peripheral Updated: 06/27/24 1121     Significant Indicator POS     Source BLD     Site PERIPHERAL     Culture Result Growth detected by Bactec instrument. 06/25/2024  00:21      Enterococcus faecalis    Narrative:      CALL  Woo  183 tel. [phone],  CALLED  183 tel. [phone] 06/25/2024, 17:37, Voalted carlos alberto SAINI  Right Hand    Susceptibility       Enterococcus faecalis (1)       Antibiotic Interpretation Microscan   Method Status    Daptomycin Sensitive 2 mcg/mL WAQAR Final    Gent Synergy Sensitive <=500 mcg/mL WAQAR Final    Ampicillin Sensitive <=2 mcg/mL WAQAR Final    Linezolid Sensitive 2 mcg/mL WAQAR Final    Vancomycin Sensitive 1 mcg/mL WAQAR Final    Penicillin Sensitive 2 mcg/mL WAQAR Final                       GRAM STAIN [[id number]] Collected: 06/25/24 1400    Order Status: Completed Specimen: Wound Updated: 06/25/24 1934     Significant Indicator .     Source WND     Site L forearm fistula graft     Gram Stain Result Many WBCs.  No organisms seen.      Narrative:      Surgery - swabs received    Fungal Smear [[id number]] Collected: 06/25/24 1400    Order Status: Completed Specimen: Wound Updated: 06/25/24 1934     Significant Indicator NEG     Source WND     Site L forearm fistula graft     Fungal Smear Results No fungal elements seen.    Narrative:      Surgery - swabs received          ASSESSMENT/PLAN:   68 y.o.  admitted 6/24/2024. Pt has a past medical history of PAD with femoral-popliteal bypass, ESRD on dialysis via left arm graft, A-fib on Coumadin and CAD with stents, he  presented complaining of fevers, pain, redness and swelling in his right arm graft site ongoing for approximately 5 days.      Hospital Course:   Patient was the OR with vascular surgery on 6/25 with resection of the infected left arm dialysis graft pseudoaneurysm,  left greater saphenous vein harvest, reconstruction of left brachial artery, partial removal of the left arm dialysis graft.  Echocardiogram consistent with endocarditis as below.     Problem List  Aortic valve infective endocarditis, large lobulated mobile mass 1.7 x 1.1 cm with severe aortic insufficiency on RENEE  E faecalis bacteremia, infected left arm dialysis site  -Blood cultures on 6/24 +E faecalis, amp sensitive   Left arm dialysis graft site infection, pseudoaneurysm at site of anastomosis  -Status post or with vascular surgery as described above, partial removal of the prior graft  -Left forearm wound culture +E faecalis, amp sensitive  ESRD on HD  PAD     Plan:  -Continue dual beta-lactam therapy with renally dosed IV ampicillin and ceftriaxone  -On 6/30, patient underwent aortic valve replacement and left atrial appendage resection and ligation, follow cultures  -Repeat blood culture from 6/27, 6/29, 6/30 also positive, follow repeat blood cultures x 2 from 7/1, pending  -Dialysis catheter was placed while potentially still bacteremic, may need to be removed if bacteremia fails to clear despite surgery  -Monitor closely for any evidence of additional sites of seeding of infection     Dispo: Awaiting culture results and work-up as above.  Patient is at risk for infectious complications including death.   PICC: TBD     Discussed Dr. Barton.  This illness poses threat to life.  ID will follow

## 2024-07-02 NOTE — PROGRESS NOTES
Report received from Darek GOMEZ from [facility]. RN states he will bring patient after is done eating breakfast.

## 2024-07-02 NOTE — PROGRESS NOTES
Daily Progress Note:     Date of Service: 7/2/2024  Primary Team: [facility] ICU Gold Team   Attending: Catalino Barton M.D.   Senior Resident: Dr. Kiel Culp  Contact:  [phone]    Chief Complaint:  Infected vascular graft.    ID:  Brian Jasmine is a 68 y.o. male who presented 6/24/2024 with left arm infection.  Mr. Jasmine has a past medical history of peripheral arterial disease with femoral popliteal bypass, end-stage renal disease undergoing dialysis via a left arm graft, atrial fibrillation on Coumadin therapy, hypertension, coronary artery disease with stent to the circumflex that developed pain, redness, swelling in the right arm graft site about 5 days ago and did not go to dialysis and stopped taking all of his medications because he felt so poorly.  He has had fevers and chills.  He presents with the symptoms and here has a grossly infected site.  His creatinine is 8.8 with a potassium 3.8.  Vascular surgery has been consulted as has nephrology.  Attempt will be made at a temporary dialysis catheter until blood cultures are cleared for a possible tunneled catheter.  He states he has had at least 6 or 7 prior neck catheters in the past.  IV vancomycin has been initiated.    Subjective:  No acute complaints    Interval History:  I/O: 800/2025 (-1225; 24-hour). Chest tube 175 cc (24-hr).  Tele showed sinus rhythm rate note reported. 96.6 F. HR 51-65. BP 90//71. 85-96% on 0.5 NC.  WBC 13.4. Hgb 7.6. Plt 160. Na 133. K 3.9. Cl 94. CO2 24. AG 15. . Ca 8.5.      6/24/2024: Admitted to [facility]. General surgery was consulted for thrombosed AV graft of LUE suspected to be an infection pseudoaneurysm of LUE..  Nephro consulted for ESRD on HD. Received HD via temp cath.  6/25/2024: Underwent reconstruction of artery with vein patch.  6/26: ID consulted for e. Faecalis bacteremia. Received HD.  6/28: Received HD with EPO.  6/29: Cardiology consulted for atrial fibrillation and abnormal echo showing  echogenic mass with vegetation in aortic valve. Cardiac surgery consulted for aortic valve vegetation.  Interventional cardiology performed LHC which showed 80% obstruction of LAD in mid segment and 70% sequential stenosis in the mid-segment of RCA.  ID switched patient to renally-dosed dual beta-lactam.  6/30: Aortic valve replacement (25 mm Morgan inspiris), CABGx1, skeltonized LIMA free graft, TAHIR resection/ligation.  Postop LVEF 45-50%. Received 4 units pRBCS, 2 units plasma, and 1 unit platelets.  7/1: HD with EPO. Bcx x2 negative frowth to date. Pending final report.   7/2: Transferred to medical floor.      Consultants/Specialty:  Nephrology  General surgery  Cardiology  Cardiac Surgery   Infectious diseses    Review of Systems:    Review of Systems   Constitutional:  Negative for chills and fever.   HENT:  Negative for hearing loss and tinnitus.    Eyes:  Negative for blurred vision and double vision.   Respiratory:  Negative for cough and shortness of breath.    Cardiovascular:  Negative for chest pain, palpitations and leg swelling.   Gastrointestinal:  Negative for abdominal pain, heartburn and nausea.   Genitourinary:  Negative for dysuria and urgency.   Musculoskeletal:  Negative for myalgias and neck pain.   Neurological:  Negative for dizziness and headaches.   Psychiatric/Behavioral:  Negative for depression and suicidal ideas.        Objective Data:   Physical Exam:   Vitals:   Temp:  [35.9 °C (96.6 °F)-36.3 °C (97.3 °F)] 36.3 °C (97.3 °F)  Pulse:  [31-67] 63  Resp:  [7-37] 37  BP: ()/(50-91) 121/63  SpO2:  [79 %-99 %] 96 %    Physical Exam  Constitutional:       Appearance: Normal appearance.   HENT:      Head: Normocephalic and atraumatic.   Eyes:      General: No scleral icterus.     Extraocular Movements: Extraocular movements intact.      Pupils: Pupils are equal, round, and reactive to light.   Cardiovascular:      Rate and Rhythm: Normal rate and regular rhythm.      Heart sounds: No  murmur heard.     No friction rub. No gallop.   Pulmonary:      Effort: No respiratory distress.      Breath sounds: No wheezing or rhonchi.   Abdominal:      General: Abdomen is flat. There is no distension.      Palpations: Abdomen is soft.   Musculoskeletal:         General: No swelling. Normal range of motion.      Cervical back: Normal range of motion. No rigidity.   Skin:     General: Skin is warm and dry.      Capillary Refill: Capillary refill takes less than 2 seconds.      Coloration: Skin is not jaundiced.   Neurological:      Mental Status: He is alert and oriented to person, place, and time.      Cranial Nerves: No cranial nerve deficit.      Sensory: No sensory deficit.      Coordination: Coordination normal.           Labs:   Recent Results (from the past 24 hour(s))   POCT glucose device results    Collection Time: 07/01/24  7:46 AM   Result Value Ref Range    POC Glucose, Blood 145 (H) 65 - 99 mg/dL   HEMOGLOBIN AND HEMATOCRIT    Collection Time: 07/01/24  8:17 AM   Result Value Ref Range    Hemoglobin 8.4 (L) 14.0 - 18.0 g/dL    Hematocrit 24.9 (L) 42.0 - 52.0 %   POCT glucose device results    Collection Time: 07/01/24  9:46 AM   Result Value Ref Range    POC Glucose, Blood 138 (H) 65 - 99 mg/dL   POCT glucose device results    Collection Time: 07/01/24 11:03 AM   Result Value Ref Range    POC Glucose, Blood 138 (H) 65 - 99 mg/dL   POCT glucose device results    Collection Time: 07/01/24  5:14 PM   Result Value Ref Range    POC Glucose, Blood 136 (H) 65 - 99 mg/dL   POCT glucose device results    Collection Time: 07/01/24  8:16 PM   Result Value Ref Range    POC Glucose, Blood 160 (H) 65 - 99 mg/dL   CBC without Differential Critical Care 0130    Collection Time: 07/02/24  4:00 AM   Result Value Ref Range    WBC 13.4 (H) 4.8 - 10.8 K/uL    RBC 2.56 (L) 4.70 - 6.10 M/uL    Hemoglobin 7.6 (L) 14.0 - 18.0 g/dL    Hematocrit 23.1 (L) 42.0 - 52.0 %    MCV 90.2 81.4 - 97.8 fL    MCH 29.7 27.0 - 33.0 pg     MCHC 32.9 32.3 - 36.5 g/dL    RDW 52.4 (H) 35.9 - 50.0 fL    Platelet Count 160 (L) 164 - 446 K/uL    MPV 10.8 9.0 - 12.9 fL   Basic Metabolic Panel (BMP) Critical Care 0130    Collection Time: 07/02/24  4:00 AM   Result Value Ref Range    Sodium 133 (L) 135 - 145 mmol/L    Potassium 3.9 3.6 - 5.5 mmol/L    Chloride 94 (L) 96 - 112 mmol/L    Co2 24 20 - 33 mmol/L    Glucose 104 (H) 65 - 99 mg/dL    Bun 27 (H) 8 - 22 mg/dL    Creatinine 4.54 (HH) 0.50 - 1.40 mg/dL    Calcium 8.5 8.5 - 10.5 mg/dL    Anion Gap 15.0 7.0 - 16.0   ESTIMATED GFR    Collection Time: 07/02/24  4:00 AM   Result Value Ref Range    GFR (CKD-EPI) 13 (A) >60 mL/min/1.73 m 2       Imaging:   Independant Imaging Review: Completed  DX-CHEST-PORTABLE (1 VIEW)   Final Result         1.  Pulmonary edema and/or infiltrates are identified, which are stable since the prior exam.   2.  Trace left pleural effusion   3.  Cardiomegaly      DX-CHEST-LIMITED (1 VIEW)   Final Result         1.  Left IJ catheter, tip projects over the brachiocephalic vein/aorta region. This is short in position and does not extend into the SVC. Need to correlate clinically for venous blood return to exclude arterial line.   2.  Other findings are detailed above.      EC-RENEE W/O CONT   Final Result      DX-CHEST-2 VIEWS   Final Result         1.  No focal infiltrates.   2.  Perihilar interstitial prominence and bronchial wall cuffing, appearance suggests changes of underlying bronchial inflammation, consider bronchitis.      US-VEIN MAPPING LOWER EXTREMITY BILAT   Final Result      US-CAROTID DOPPLER UNILAT LEFT   Final Result      EC-RENEE W/O CONT   Final Result      EC-ECHOCARDIOGRAM COMPLETE W/O CONT   Final Result      US-SUSI SINGLE LEVEL BILAT   Final Result      US-EXTREMITY ARTERY LOWER BILAT   Final Result      DX-CHEST-PORTABLE (1 VIEW)   Final Result      1.  Satisfactory appearance of the right IJ catheter. No pneumothorax visualized.      US-HEMODIALYSIS GRAFT DUPLEX  COMP UPPER EXTREMITY   Final Result      US-HEMODIALYSIS GRAFT DUPLEX COMP UPPER EXTREMITY   Final Result      DX-CHEST-PORTABLE (1 VIEW)   Final Result      Mild enlargement of the cardiomediastinal silhouette without acute cardiopulmonary abnormality.      CL-LEFT HEART CATHETERIZATION WITH POSSIBLE INTERVENTION    (Results Pending)       Problem Representation:    Brian Jasmine is a 68 y.o. male who presented 6/24/2024 admitted for infection prostetic graft.    * Infected prosthetic vascular graft, initial encounter ([facility])- (present on admission)  Assessment & Plan  C/b pseudoaneurysm.  6/25/24 Left brachial artery interposition repair with GSV due to infected AVG arterial pseudoaneurysm, and partial AVG removal ([place])  - Plan for AV graft as outpatient once medically improved and discharged    Encounter for weaning from ventilator ([facility])  Assessment & Plan  Intubated 6/30 for surgery, extubated successfully at 2115 on 6/30.  - SpO2 > 90%  - Pulmonary hygiene     S/P AVR (aortic valve replacement)  Assessment & Plan  Underwent AVR on 6/30/24 for AV endocarditis w/E.faecalis bacteremia    - Monitor and optimize hemodynamics  - Goals SBP< 120 mmHg, MAP> 65   - Maintain intravascular euvolemia  - Monitor chest tube output for postoperative hemorrhage.       Bacteremia- (present on admission)  Assessment & Plan  E. Faecalis bacteremia with AV endocarditis - s/p AVR on 6/30.  Blood cultures from 7/1 show NGTD    especially with his new AVR  - Continue ampicillin and ceftriaxone  - Will need more permanent HD access once repeat cultures neg and patient stable    Coronary artery disease- (present on admission)  Assessment & Plan  Hx of PCI to OM1. With elevated trop but no chest pain.  Underwent CABG with LIMA to LAD  - continue DAPT   - Statin   - GDMT as tolerated  - Cardiology following     PAD (peripheral artery disease) ([facility])- (present on admission)  Assessment & Plan  Hx of previous fem-pop bypass    Chronic  atrial fibrillation (HCC)- (present on admission)  Assessment & Plan  S/p left atrial appendage resection and ligation  - Tele monitoring     End stage renal disease (HCC)- (present on admission)  Assessment & Plan  HD per nephrology - via temporary HD cath  - Monitor/replete lytes  - Strict I/Os - still makes urine  - Will need plans for more permanent access once stable    Renovascular hypertension- (present on admission)  Assessment & Plan  - continue home antihypertensives    DISPO: Admit to ICU      CODE STATUS: Full      Quality Measures:  Feeding: Cardiac diet/2g/1500 fluid restriction  Analgesia: Acetaminophen/Oxycodone/Morphine  Sedation: Precedex  Thromboprophylaxis: SCDs  Head of bed: >30 degrees  Ulcer prophylaxis: Omeprazole  Glycemic control: Sliding-scale insulin  Bowel care: bowel regimen  Indwelling lines: piv x1, ruiz, CBC ([facility]), HD triple lume ([facility]), chest tube x 2,   Deescalation of antibiotics: Ampicillin/ceftriaxone

## 2024-07-02 NOTE — CARE PLAN
Problem: Pain - Standard  Goal: Alleviation of pain or a reduction in pain to the patient’s comfort goal  Description: Target End Date:  Prior to discharge or change in level of care    Document on Vitals flowsheet    1.  Document pain using the appropriate pain scale per order or unit policy  2.  Educate and implement non-pharmacologic comfort measures (i.e. relaxation, distraction, massage, cold/heat therapy, etc.)  3.  Pain management medications as ordered  4.  Reassess pain after pain med administration per policy  5.  If opiods administered assess patient's response to pain medication is appropriate per POSS sedation scale  6.  Follow pain management plan developed in collaboration with patient and interdisciplinary team (including palliative care or pain specialists if applicable)  Outcome: Progressing     Problem: Hemodynamics  Goal: Patient's hemodynamics, fluid balance and neurologic status will be stable or improve  Description: Target End Date:  Prior to discharge or change in level of care    Document on Assessment and I/O flowsheet templates    1.  Monitor vital signs, pulse oximetry and cardiac monitor per provider order and/or policy  2.  Maintain blood pressure per provider order  3.  Hemodynamic monitoring per provider order  4.  Manage IV fluids and IV infusions  5.  Monitor intake and output  6.  Daily weights per unit policy or provider order  7.  Assess peripheral pulses and capillary refill  8.  Assess color and body temperature  9.  Position patient for maximum circulation/cardiac output  10. Monitor for signs/symptoms of excessive bleeding  11. Assess mental status, restlessness and changes in level of consciousness  12. Monitor temperature and report fever or hypothermia to provider immediately. Consideration of targeted temperature management.  Outcome: Progressing     Problem: Skin Integrity  Goal: Skin integrity is maintained or improved  Description: Target End Date:  Prior to  discharge or change in level of care    Document interventions on Skin Risk/Gary flowsheet groups and corresponding LDA    1.  Assess and monitor skin integrity, appearance and/or temperature  2.  Assess risk factors for impaired skin integrity and/or pressures ulcers  3.  Implement precautions to protect skin integrity in collaboration with interdisciplinary team  4.  Implement pressure ulcer prevention protocol if at risk for skin breakdown  5.  Confirm wound care consult if at risk for skin breakdown  6.  Ensure patient use of pressure relieving devices  (Low air loss bed, waffle overlay, heel protectors, ROHO cushion, etc)  Outcome: Progressing  Note: 2 RN skin check completed.      Problem: Post Op Day 1 CABG/Heart Valve Replacement  Goal: Optimal care of the post op CABG/heart valve replacement Post Op Day 1  Outcome: Progressing  Intervention: EKG and CXR completed  Note: EKG and CXR completed as ordered.   Intervention: All valve patients: PT/INR daily  Note: Labs completed as ordered, no valve.   Intervention: Antibiotics are discontinued within 24 hours of anesthesia end time unless indication documented for continuation beyond 24 hours  Note: Antibiotics discontinued per order.   Intervention: Daily weights in the morning  Note: Daily weight entered in EPIC.  Intervention: Up in chair for all meals  Note: Patient up to chair for dinner and breakfast this shift.   Intervention: Ambulate in am if stable. First ambulation 25 feet. Repeat x 3 as tolerated  Note: Ambulated x 2 as tolerated this shift.   Intervention: Discontinue ruiz catheter unless documented reason for continuation  Note: Ruiz remains in place per MD order.   Intervention: Assess surgical dressing and check provider orders for potential removal  Note: Mid-sternal incision dressing removed and cleansed per order.   Intervention: Ensure referal to intensive cardiac rehab is ordered, and smoking cessation education if appropriate  Note:  Referrals in process.   Intervention: [facility] trained RN to remove chest tubes if ordered by provider  Note: Chest tubes remain in place per MD order.   Intervention: IS q 1 hour while awake and record best IS volume  Note: IS encouraged, best 750 this shift.   Intervention: Knee high MIRIAM hose, on during the day, off at night  Note: SCDs placed at night and MIRIAM Hose applied for day.   Intervention: Saline lock IV  Note: IV available saline locked.   Intervention: Transfer to [facility] status, begin VS q 4 hours  Note: Patient remains ICU status at this time.   Intervention: After 24th hour post-anesthesia end time, transition patient to Cardiac Surgery SQ Insulin Protocol  Note: Patient has been transitioned to SQ insulin.   Intervention: If patient is CABG or on home beta-blocker, start/resume beta-blocker on POD 1 or POD 2 or document contraindication  Note: Beta Blocker is ordered, has been held due to hemodynamics, MD aware.   The patient is Stable - Low risk of patient condition declining or worsening    Shift Goals  Clinical Goals: Maintain hemodynamics  Patient Goals: Rest and pain control  Family Goals: NESTOR    Progress made toward(s) clinical / shift goals:  Progress toward goals as stated above.     Patient is not progressing towards the following goals:

## 2024-07-02 NOTE — PROGRESS NOTES
4 Eyes Skin Assessment Completed by DIALLO Calvillo and Herberth RN.    Head WDL  Ears Redness and Blanching  Nose WDL  Mouth WDL  Neck right CVC Triple Lumen Internal Jugular   Breast/Chest Incision Sternum   Shoulder Blades WDL  Spine WDL  Bruise on the back   (R) Arm/Elbow/Hand Redness and Blanching  (L) Arm/Elbow/Hand Redness and Blanching, Fistula Dressing   Abdomen Incision CT sites  Groin WDL  Scrotum/Coccyx/Buttocks Redness, Blanching, and Excoriation  (R) Leg Bruising  (L) Leg Incision Elkins site   (R) Heel/Foot/Toe Redness, Blanching, and Boggy, black toes, Scabs   (L) Heel/Foot/Toe Redness, Blanching, and Boggy, Scabs    Devices In Places Tele Box and Pulse Ox    Interventions In Place Pillows and Heels Loaded W/Pillows    Possible Skin Injury Yes    Pictures Uploaded Into Epic Yes  Wound Consult Placed Yes  RN Wound Prevention Protocol Ordered Yes

## 2024-07-02 NOTE — CARE PLAN
"  Problem: Pain - Standard  Goal: Alleviation of pain or a reduction in pain to the patient’s comfort goal  7/1/2024 1816 by Aleisha Hernandez [email or web address]  Outcome: Progressing     Problem: Knowledge Deficit - Standard  Goal: Patient and family/care givers will demonstrate understanding of plan of care, disease process/condition, diagnostic tests and medications  7/1/2024 1816 by Aleisha Hernandez R.N.  Outcome: Progressing     Problem: Post Op Day 1 CABG/Heart Valve Replacement  Goal: Optimal care of the post op CABG/heart valve replacement Post Op Day 1  7/1/2024 1816 by Aleisha Hernandez R.N.  Outcome: Progressing    Intervention: EKG and CXR completed  7/1/2024 1816 by Aleisha Hernandez R.N.  Note: Completed.     Intervention: All valve patients: PT/INR daily  Note: Not completed this AM.   Intervention: Antibiotics are discontinued within 24 hours of anesthesia end time unless indication documented for continuation beyond 24 hours  Note: N/A, pt with systemic infection.   Intervention: Daily weights in the morning  Note: AM Weight: 97.6 kg.   Intervention: Up in chair for all meals  Note: Completed for lunch and dinner, pt \"exhausted\" post dialysis, requesting delay walk to chair for dinner.   Intervention: Ambulate in am if stable. First ambulation 25 feet. Repeat x 3 as tolerated  Note: Ambulated 10 feet, pt max assist with FWW.   Intervention: Discontinue ruiz catheter unless documented reason for continuation  Note: N/a at this time per CTS.   Intervention: Assess surgical dressing and check provider orders for potential removal  Note: Completed, cleansed, OLEKSANDR.   Intervention: Ensure referal to intensive cardiac rehab is ordered, and smoking cessation education if appropriate  Note: Completed.   Intervention: [facility] trained RN to remove chest tubes if ordered by provider  Note: To remain in place at this time.   Intervention: IS q 1 hour while awake and record best IS volume  Note: Best IS: 750. "   Intervention: Knee high MIRIAM hose, on during the day, off at night  Note: Completed.   Intervention: Saline lock IV  Note: Completed.   Intervention: Transfer to [facility] status, begin VS q 4 hours  Note: N/A  Intervention: After 24th hour post-anesthesia end time, transition patient to Cardiac Surgery SQ Insulin Protocol  Note: Completed.   Intervention: If patient is CABG or on home beta-blocker, start/resume beta-blocker on POD 1 or POD 2 or document contraindication  Note: Completed, pt bradycardic, APRN notified, new orders to be placed.    The patient is Watcher - Medium risk of patient condition declining or worsening    Shift Goals  Clinical Goals: monitor h&h, rest, mobilize, pain management  Patient Goals: rest  Family Goals: updates on care    Progress made toward(s) clinical / shift goals:  medicated per mar, pt mobilized x1 this shift     Patient is not progressing towards the following goals:

## 2024-07-02 NOTE — THERAPY
Occupational Therapy   Initial Evaluation     Patient Name: Brian Jasmine  Age:  68 y.o., Sex:  male  Medical Record #: [id number]  Today's Date: 7/2/2024       Precautions: Fall Risk, Cardiac Precautions (See Comments), Sternal Precautions (See Comments)    Assessment    Patient is 68 y.o. male admitted for L arm pain, swelling, redness at fistula. PMH of hypertension, vascular disease, COPD, on dialysis ESRD. S/p aortic valve replacement on 6/30. Prior to admission pt reports being independent in ADLs living at home with his wife who is w/c bound. Pt completed LBD (underwear) Min A. Pt is very limited by fatigue. Attempted to do more ADLs, pt declined reporting that he did a lot today when walking in the hallways with nursing. Pt very motivated and will continue to assess other ADLs. Recommend post acute placement at this time.     Plan    Occupational Therapy Initial Treatment Plan   Treatment Interventions: Self Care / Activities of Daily Living, Therapeutic Exercises, Therapeutic Activity, Family / Caregiver Training  Treatment Frequency: 5 Times per Week  Duration: Until Therapy Goals Met    DC Equipment Recommendations: Unable to determine at this time  Discharge Recommendations: Recommend post-acute placement for additional occupational therapy services prior to discharge home        Objective       07/02/24 1532   Prior Living Situation   Prior Services None   Housing / Facility [address]   Steps Into Home 0  (pt reports he uses a ramp)   Steps In Home 0   Bathroom Set up Walk In Shower;Grab Bars;Shower Chair   Equipment Owned Tub / Shower Seat;4-Wheel Walker;Wheelchair   Lives with - Patient's Self Care Capacity Spouse   Comments pt reports that he lives w/ spouse who is w/c bound. their home is ADA accessible and he has his sister coming in town to help as needed   Prior Level of ADL Function   Self Feeding Independent   Grooming / Hygiene Independent   Bathing Independent   Dressing Independent    Toileting Independent   Prior Level of IADL Function   Medication Management Independent   Laundry Independent   Kitchen Mobility Independent   Finances Independent   Home Management Independent   Shopping Independent   Prior Level Of Mobility Independent With Device in Community   Driving / Transportation Driving Independent   Occupation (Pre-Hospital Vocational) Not Employed   Leisure Interests Family   History of Falls   History of Falls No   Precautions   Precautions Fall Risk;Cardiac Precautions (See Comments);Sternal Precautions (See Comments)   Vitals   Pulse Oximetry 94 %   O2 (LPM) 0   O2 Delivery Device None - Room Air   Pain   Intervention Repositioned;Rest   Pain 0 - 10 Group   Location Chest   Location Orientation Mid   Pain Rating Scale (NPRS) 6   Description Aching;Sore   Comfort Goal Comfort with Movement;Perform Activity   Therapist Pain Assessment Post Activity Pain Same as Prior to Activity   Non Verbal Descriptors   Non Verbal Scale  Sleeping   Cognition    Cognition / Consciousness WDL   Level of Consciousness Alert   Comments pleasant and cooperative   Passive ROM Upper Body   Passive ROM Upper Body Not Tested   Comments will continue to assess   Active ROM Upper Body   Active ROM Upper Body  Not Tested   Dominant Hand Right   Comments will continue to assess   Strength Upper Body   Upper Body Strength  Not Tested   Comments will continue to assess   Neurological Concerns   Neurological Concerns No   Coordination Upper Body   Coordination WDL   Balance Assessment   Sitting Balance (Static) Fair   Sitting Balance (Dynamic) Fair   Standing Balance (Static) Fair -   Standing Balance (Dynamic) Fair -   Weight Shift Sitting Fair   Weight Shift Standing Fair   Comments EOB and STS   Bed Mobility    Supine to Sit Minimal Assist   Sit to Supine Minimal Assist   Scooting Standby Assist   Comments supine to EOB   ADL Assessment   Grooming   (declined)   Upper Body Dressing   (decline d/t fatigue)    Lower Body Dressing Minimal Assist  (underwear)   Toileting   (declined)   Comments will continue to assess   How much help from another person does the patient currently need...   Putting on and taking off regular lower body clothing? 3   Bathing (including washing, rinsing, and drying)? 3   Toileting, which includes using a toilet, bedpan, or urinal? 3   Putting on and taking off regular upper body clothing? 3   Taking care of personal grooming such as brushing teeth? 3   Eating meals? 4   6 Clicks Daily Activity Score 19   Functional Mobility   Sit to Stand Moderate Assist   Bed, Chair, Wheelchair Transfer Moderate Assist   Transfer Method Stand Step   Mobility bed mobility, STS side steps at bedside   Comments FWW   Activity Tolerance   Sitting Edge of Bed 8 mins   Standing 2 mins   Comments limited by too   Patient / Family Goals   Patient / Family Goal #1 to get stronger   Short Term Goals   Short Term Goal # 1 complete UBD w/ sternal prxns SPV   Short Term Goal # 2 complete full g/h routine standing SPV   Short Term Goal # 3 complete toileting SPV   Short Term Goal # 4 complete ADL txf SPV   Education Group   Education Provided Sternal Precautions;Cardiac Precautions;Role of Occupational Therapist;Activities of Daily Living;Use of Call Light   Role of Occupational Therapist Patient Response Patient;Acceptance;Explanation;Verbal Demonstration   Cardiac Precautions Patient Response Patient;Acceptance;Explanation;Verbal Demonstration;Action Demonstration   Sternal Precautions Patient Response Patient;Acceptance;Explanation;Verbal Demonstration;Action Demonstration   ADL Patient Response Patient;Acceptance;Explanation   Use of Call Light Patient Response Patient;Acceptance;Explanation;Action Demonstration   Occupational Therapy Initial Treatment Plan    Treatment Interventions Self Care / Activities of Daily Living;Therapeutic Exercises;Therapeutic Activity;Family / Caregiver Training   Treatment Frequency  5 Times per Week   Duration Until Therapy Goals Met   Problem List   Problem List Decreased Active Daily Living Skills;Decreased Functional Mobility;Decreased Activity Tolerance;Impaired Posture / Trunk Alignment;Limited Knowledge of Post Op Precautions   Anticipated Discharge Equipment and Recommendations   DC Equipment Recommendations Unable to determine at this time   Discharge Recommendations Recommend post-acute placement for additional occupational therapy services prior to discharge home   Interdisciplinary Plan of Care Collaboration   IDT Collaboration with  Nursing   Patient Position at End of Therapy In Bed;Bed Alarm On;Call Light within Reach;Tray Table within Reach;Phone within Reach   Collaboration Comments RN updated

## 2024-07-03 LAB
ANION GAP SERPL CALC-SCNC: 16 MMOL/L (ref 7–16)
BACTERIA TISS AEROBE CULT: ABNORMAL
BACTERIA TISS AEROBE CULT: ABNORMAL
BUN SERPL-MCNC: 33 MG/DL (ref 8–22)
CALCIUM SERPL-MCNC: 7.9 MG/DL (ref 8.5–10.5)
CHLORIDE SERPL-SCNC: 95 MMOL/L (ref 96–112)
CO2 SERPL-SCNC: 21 MMOL/L (ref 20–33)
CREAT SERPL-MCNC: 5.41 MG/DL (ref 0.5–1.4)
EKG IMPRESSION: NORMAL
ERYTHROCYTE [DISTWIDTH] IN BLOOD BY AUTOMATED COUNT: 52 FL (ref 35.9–50)
GFR SERPLBLD CREATININE-BSD FMLA CKD-EPI: 11 ML/MIN/1.73 M 2
GLUCOSE SERPL-MCNC: 89 MG/DL (ref 65–99)
GRAM STN SPEC: ABNORMAL
HCT VFR BLD AUTO: 24.7 % (ref 42–52)
HGB BLD-MCNC: 8.5 G/DL (ref 14–18)
MAGNESIUM SERPL-MCNC: 2.9 MG/DL (ref 1.5–2.5)
MCH RBC QN AUTO: 31.3 PG (ref 27–33)
MCHC RBC AUTO-ENTMCNC: 34.4 G/DL (ref 32.3–36.5)
MCV RBC AUTO: 90.8 FL (ref 81.4–97.8)
PLATELET # BLD AUTO: 157 K/UL (ref 164–446)
PMV BLD AUTO: 10.6 FL (ref 9–12.9)
POTASSIUM SERPL-SCNC: 3.7 MMOL/L (ref 3.6–5.5)
POTASSIUM SERPL-SCNC: 3.8 MMOL/L (ref 3.6–5.5)
RBC # BLD AUTO: 2.72 M/UL (ref 4.7–6.1)
SIGNIFICANT IND 70042: ABNORMAL
SITE SITE: ABNORMAL
SODIUM SERPL-SCNC: 132 MMOL/L (ref 135–145)
SOURCE SOURCE: ABNORMAL
WBC # BLD AUTO: 14.8 K/UL (ref 4.8–10.8)

## 2024-07-03 PROCEDURE — 99233 SBSQ HOSP IP/OBS HIGH 50: CPT | Performed by: INTERNAL MEDICINE

## 2024-07-03 PROCEDURE — A9270 NON-COVERED ITEM OR SERVICE: HCPCS

## 2024-07-03 PROCEDURE — 700105 HCHG RX REV CODE 258: Performed by: INTERNAL MEDICINE

## 2024-07-03 PROCEDURE — 93010 ELECTROCARDIOGRAM REPORT: CPT | Performed by: STUDENT IN AN ORGANIZED HEALTH CARE EDUCATION/TRAINING PROGRAM

## 2024-07-03 PROCEDURE — 85027 COMPLETE CBC AUTOMATED: CPT

## 2024-07-03 PROCEDURE — 700111 HCHG RX REV CODE 636 W/ 250 OVERRIDE (IP): Mod: JZ | Performed by: NURSE PRACTITIONER

## 2024-07-03 PROCEDURE — 700111 HCHG RX REV CODE 636 W/ 250 OVERRIDE (IP): Performed by: INTERNAL MEDICINE

## 2024-07-03 PROCEDURE — 700101 HCHG RX REV CODE 250: Performed by: INTERNAL MEDICINE

## 2024-07-03 PROCEDURE — 94669 MECHANICAL CHEST WALL OSCILL: CPT

## 2024-07-03 PROCEDURE — 700102 HCHG RX REV CODE 250 W/ 637 OVERRIDE(OP): Performed by: NURSE PRACTITIONER

## 2024-07-03 PROCEDURE — 93005 ELECTROCARDIOGRAM TRACING: CPT | Performed by: NURSE PRACTITIONER

## 2024-07-03 PROCEDURE — 80048 BASIC METABOLIC PNL TOTAL CA: CPT

## 2024-07-03 PROCEDURE — 770020 HCHG ROOM/CARE - TELE (206)

## 2024-07-03 PROCEDURE — 83735 ASSAY OF MAGNESIUM: CPT

## 2024-07-03 PROCEDURE — 700111 HCHG RX REV CODE 636 W/ 250 OVERRIDE (IP): Mod: JZ,JG

## 2024-07-03 PROCEDURE — A9270 NON-COVERED ITEM OR SERVICE: HCPCS | Performed by: NURSE PRACTITIONER

## 2024-07-03 PROCEDURE — 84132 ASSAY OF SERUM POTASSIUM: CPT

## 2024-07-03 PROCEDURE — 90935 HEMODIALYSIS ONE EVALUATION: CPT

## 2024-07-03 PROCEDURE — 700102 HCHG RX REV CODE 250 W/ 637 OVERRIDE(OP)

## 2024-07-03 RX ORDER — AMIODARONE HYDROCHLORIDE 200 MG/1
400 TABLET ORAL 2 TIMES DAILY
Status: DISPENSED | OUTPATIENT
Start: 2024-07-03 | End: 2024-07-13

## 2024-07-03 RX ORDER — HEPARIN SODIUM 1000 [USP'U]/ML
INJECTION, SOLUTION INTRAVENOUS; SUBCUTANEOUS
Status: COMPLETED
Start: 2024-07-03 | End: 2024-07-03

## 2024-07-03 RX ADMIN — CEFTRIAXONE SODIUM 2000 MG: 10 INJECTION, POWDER, FOR SOLUTION INTRAVENOUS at 18:12

## 2024-07-03 RX ADMIN — ATORVASTATIN CALCIUM 40 MG: 40 TABLET, FILM COATED ORAL at 21:00

## 2024-07-03 RX ADMIN — AMIODARONE HYDROCHLORIDE 1 MG/MIN: 1.8 INJECTION, SOLUTION INTRAVENOUS at 13:19

## 2024-07-03 RX ADMIN — ACETAMINOPHEN 1000 MG: 500 TABLET, FILM COATED ORAL at 05:12

## 2024-07-03 RX ADMIN — HYDRALAZINE HYDROCHLORIDE 20 MG: 20 INJECTION INTRAMUSCULAR; INTRAVENOUS at 08:41

## 2024-07-03 RX ADMIN — OMEPRAZOLE 20 MG: 20 CAPSULE, DELAYED RELEASE ORAL at 05:11

## 2024-07-03 RX ADMIN — HEPARIN SODIUM 1200 UNITS: 1000 INJECTION, SOLUTION INTRAVENOUS; SUBCUTANEOUS at 17:22

## 2024-07-03 RX ADMIN — ACETAMINOPHEN 1000 MG: 500 TABLET, FILM COATED ORAL at 11:45

## 2024-07-03 RX ADMIN — OXYCODONE HYDROCHLORIDE 10 MG: 10 TABLET ORAL at 17:49

## 2024-07-03 RX ADMIN — AMIODARONE HYDROCHLORIDE 150 MG: 1.5 INJECTION, SOLUTION INTRAVENOUS at 13:22

## 2024-07-03 RX ADMIN — OXYCODONE HYDROCHLORIDE 10 MG: 10 TABLET ORAL at 08:31

## 2024-07-03 RX ADMIN — VALSARTAN 40 MG: 80 TABLET ORAL at 05:11

## 2024-07-03 RX ADMIN — OXYCODONE HYDROCHLORIDE 10 MG: 10 TABLET ORAL at 05:11

## 2024-07-03 RX ADMIN — ASPIRIN 81 MG: 81 TABLET, COATED ORAL at 05:11

## 2024-07-03 RX ADMIN — CEFTRIAXONE SODIUM 2000 MG: 10 INJECTION, POWDER, FOR SOLUTION INTRAVENOUS at 05:12

## 2024-07-03 RX ADMIN — ACETAMINOPHEN 1000 MG: 500 TABLET, FILM COATED ORAL at 23:54

## 2024-07-03 RX ADMIN — Medication 1 APPLICATOR: at 08:32

## 2024-07-03 RX ADMIN — EPOETIN ALFA-EPBX 10000 UNITS: 10000 INJECTION, SOLUTION INTRAVENOUS; SUBCUTANEOUS at 17:06

## 2024-07-03 RX ADMIN — HEPARIN SODIUM 1500 UNITS: 1000 INJECTION, SOLUTION INTRAVENOUS; SUBCUTANEOUS at 14:19

## 2024-07-03 RX ADMIN — OXYCODONE HYDROCHLORIDE 10 MG: 10 TABLET ORAL at 21:00

## 2024-07-03 RX ADMIN — ACETAMINOPHEN 1000 MG: 500 TABLET, FILM COATED ORAL at 17:51

## 2024-07-03 RX ADMIN — DOCUSATE SODIUM 100 MG: 100 CAPSULE, LIQUID FILLED ORAL at 17:50

## 2024-07-03 RX ADMIN — AMPICILLIN SODIUM 2000 MG: 2 INJECTION, POWDER, FOR SOLUTION INTRAVENOUS at 18:19

## 2024-07-03 RX ADMIN — METOPROLOL TARTRATE 25 MG: 25 TABLET, FILM COATED ORAL at 18:29

## 2024-07-03 RX ADMIN — OXYCODONE HYDROCHLORIDE 10 MG: 10 TABLET ORAL at 11:45

## 2024-07-03 RX ADMIN — AMIODARONE HYDROCHLORIDE 1 MG/MIN: 1.8 INJECTION, SOLUTION INTRAVENOUS at 18:28

## 2024-07-03 RX ADMIN — HEPARIN SODIUM 500 UNITS: 1000 INJECTION, SOLUTION INTRAVENOUS; SUBCUTANEOUS at 14:16

## 2024-07-03 RX ADMIN — Medication 1 APPLICATOR: at 21:00

## 2024-07-03 RX ADMIN — CLOPIDOGREL BISULFATE 75 MG: 75 TABLET ORAL at 05:11

## 2024-07-03 RX ADMIN — AMPICILLIN SODIUM 2000 MG: 2 INJECTION, POWDER, FOR SOLUTION INTRAVENOUS at 05:19

## 2024-07-03 ASSESSMENT — PATIENT HEALTH QUESTIONNAIRE - PHQ9
SUM OF ALL RESPONSES TO PHQ9 QUESTIONS 1 AND 2: 0
2. FEELING DOWN, DEPRESSED, IRRITABLE, OR HOPELESS: NOT AT ALL
1. LITTLE INTEREST OR PLEASURE IN DOING THINGS: NOT AT ALL

## 2024-07-03 ASSESSMENT — ENCOUNTER SYMPTOMS
CHILLS: 0
FEVER: 0

## 2024-07-03 ASSESSMENT — FIBROSIS 4 INDEX
FIB4 SCORE: 2.65
FIB4 SCORE: 2.65

## 2024-07-03 ASSESSMENT — PAIN DESCRIPTION - PAIN TYPE
TYPE: ACUTE PAIN

## 2024-07-03 NOTE — PROGRESS NOTES
Hospital Medicine Daily Progress Note    Date of Service  7/3/2024    Chief Complaint  Brian Jasmine is a 68 y.o. male admitted 6/24/2024 with left arm graft infection    Hospital Course  Brian Jasmine is a 68 y.o. male who presented 6/24/2024 with left arm infection.  Mr. Jasmine has a past medical history of peripheral arterial disease with femoral popliteal bypass, end-stage renal disease undergoing dialysis via a left arm graft, atrial fibrillation on Coumadin therapy, hypertension, coronary artery disease with stent to the circumflex that developed pain, redness, swelling in the left arm graft site about 5 days ago and did not go to dialysis and stopped taking all of his medications because he felt so poorly.  He has had fevers and chills.  He presents with the symptoms and here has a grossly infected site.  His creatinine is 8.8 with a potassium 3.8.  Vascular surgery has been consulted as has nephrology.  Attempt will be made at a temporary dialysis catheter until blood cultures are cleared for a possible tunneled catheter.  He states he has had at least 6 or 7 prior neck catheters in the past.  Patient was started on IV vancomycin.  Blood culture was positive for E faecalis.  Patient underwent a RENEE that revealed aortic valve infective endocarditis as well as severe aortic insufficiency.  CT surgery was consulted and patient underwent aortic valve replacement, CABG x 1 and TAHIR resection and ligation on 6/30/2024.    Interval Problem Update  Patient seen sitting in chair, breathing well on room air. He reports some chest wall pain.  His chest tubes and pacing wires have been removed.  His chest wound appears clean dry and intact.  Left arm appears slightly swollen. His blood cultures from 7/1 have been negative.  His white count is elevated at 14.8.  Continue IV ampicillin and IV ceftriaxone.  Case discussed with ID    I have discussed this patient's plan of care and discharge plan at IDT rounds today with  Case Management, Nursing, Nursing leadership, and other members of the IDT team.    Consultants/Specialty  cardiovascular surgeon, infectious disease, and vascular surgery    Code Status  Full Code    Disposition  The patient is not medically cleared for discharge to home or a post-acute facility.      I have placed the appropriate orders for post-discharge needs.    Review of Systems  Review of Systems   Constitutional:  Positive for malaise/fatigue.   Cardiovascular:  Positive for chest pain.        Physical Exam  Temp:  [36.1 °C (97 °F)-36.7 °C (98.1 °F)] 36.7 °C (98.1 °F)  Pulse:  [57-89] 71  Resp:  [16-20] 16  BP: (130-155)/(62-76) 151/73  SpO2:  [90 %-99 %] 99 %    Physical Exam  Vitals and nursing note reviewed.   Constitutional:       General: He is not in acute distress.  HENT:      Head: Normocephalic.      Mouth/Throat:      Mouth: Mucous membranes are moist.   Eyes:      Pupils: Pupils are equal, round, and reactive to light.   Cardiovascular:      Rate and Rhythm: Normal rate and regular rhythm.      Pulses: Normal pulses.      Heart sounds: Normal heart sounds.      Comments: Sternal incision clean dry and intact  Pulmonary:      Effort: Pulmonary effort is normal.      Breath sounds: Normal breath sounds.   Abdominal:      Palpations: Abdomen is soft.      Tenderness: There is no abdominal tenderness.   Musculoskeletal:         General: No swelling.      Cervical back: Neck supple.      Comments: Left arm swollen   Skin:     General: Skin is warm.      Coloration: Skin is not jaundiced.   Neurological:      General: No focal deficit present.      Mental Status: He is alert and oriented to person, place, and time.   Psychiatric:         Mood and Affect: Mood normal.         Behavior: Behavior normal.         Fluids    Intake/Output Summary (Last 24 hours) at 7/3/2024 0932  Last data filed at 7/3/2024 0500  Gross per 24 hour   Intake --   Output 0 ml   Net 0 ml       Laboratory  Recent Labs      07/01/24  0130 07/01/24  0500 07/01/24  0817 07/02/24  0400 07/03/24  0047   WBC 16.7*  --   --  13.4* 14.8*   RBC 2.32*  --   --  2.56* 2.72*   HEMOGLOBIN 6.8*   < > 8.4* 7.6* 8.5*   HEMATOCRIT 20.7*   < > 24.9* 23.1* 24.7*   MCV 89.2  --   --  90.2 90.8   MCH 29.3  --   --  29.7 31.3   MCHC 32.9  --   --  32.9 34.4   RDW 52.1*  --   --  52.4* 52.0*   PLATELETCT 169  --   --  160* 157*   MPV 10.5  --   --  10.8 10.6    < > = values in this interval not displayed.     Recent Labs     07/01/24  0130 07/01/24  0630 07/02/24  0400 07/03/24  0047   SODIUM 139  --  133* 132*   POTASSIUM 4.9 5.1 3.9 3.8   CHLORIDE 98  --  94* 95*   CO2 21  --  24 21   GLUCOSE 153*  --  104* 89   BUN 31*  --  27* 33*   CREATININE 4.95*  --  4.54* 5.41*   CALCIUM 8.2*  --  8.5 7.9*     Recent Labs     06/30/24  1355 06/30/24  2140   APTT 62.6*  --    INR 1.55* 1.40*               Imaging  DX-CHEST-PORTABLE (1 VIEW)   Final Result         1.  Pulmonary edema and/or infiltrates are identified, which are stable since the prior exam.   2.  Trace left pleural effusion   3.  Cardiomegaly      DX-CHEST-LIMITED (1 VIEW)   Final Result         1.  Left IJ catheter, tip projects over the brachiocephalic vein/aorta region. This is short in position and does not extend into the SVC. Need to correlate clinically for venous blood return to exclude arterial line.   2.  Other findings are detailed above.      EC-RENEE W/O CONT   Final Result      DX-CHEST-2 VIEWS   Final Result         1.  No focal infiltrates.   2.  Perihilar interstitial prominence and bronchial wall cuffing, appearance suggests changes of underlying bronchial inflammation, consider bronchitis.      US-VEIN MAPPING LOWER EXTREMITY BILAT   Final Result      US-CAROTID DOPPLER UNILAT LEFT   Final Result      EC-RENEE W/O CONT   Final Result      EC-ECHOCARDIOGRAM COMPLETE W/O CONT   Final Result      US-SUSI SINGLE LEVEL BILAT   Final Result      US-EXTREMITY ARTERY LOWER BILAT   Final Result       DX-CHEST-PORTABLE (1 VIEW)   Final Result      1.  Satisfactory appearance of the right IJ catheter. No pneumothorax visualized.      US-HEMODIALYSIS GRAFT DUPLEX COMP UPPER EXTREMITY   Final Result      US-HEMODIALYSIS GRAFT DUPLEX COMP UPPER EXTREMITY   Final Result      DX-CHEST-PORTABLE (1 VIEW)   Final Result      Mild enlargement of the cardiomediastinal silhouette without acute cardiopulmonary abnormality.      CL-LEFT HEART CATHETERIZATION WITH POSSIBLE INTERVENTION    (Results Pending)        Assessment/Plan  * Infected prosthetic vascular graft, initial encounter ([facility])- (present on admission)  Assessment & Plan  Vascular surgery consulted.   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 Left greater saphenous vein harvest. Reconstruction of left brachial artery using left greater saphenous vein   interposition graft. Partial removal of left upper arm dialysis graft.      S/P AVR (aortic valve replacement)  Assessment & Plan  Developed aortic valve endocarditis  Status post aortic valve replacement    Bacteremia- (present on admission)  Assessment & Plan  Probably related to infected graft, status post removal.  Blood cultures positive for Enterococcus  RENEE revealed aortic valve endocarditis  Patient is status post aortic valve replacement  Repeat blood cultures on 7/1 have been negative  Continue IV ampicillin and IV ceftriaxone      Coronary artery disease- (present on admission)  Assessment & Plan  Followed by cardiology in [place] with history of stent to the circumflex  S/p CABG X1 WITH SEKELTONIZED JIMENEZ to LAD  Continue DAPT and atorvastatin    PAD (peripheral artery disease) ([facility])- (present on admission)  Assessment & Plan  Hx of previous fem-pop bypass    Troponin level elevated  Assessment & Plan  Troponin is elevated at 200  He denies chest pain  EKG reveals an intraventricular conduction delay not clearly left nor right  bundle  Likely a stress response given his infection and lack of dialysis.    Follow-up echo    Chronic atrial fibrillation (HCC)- (present on admission)  Assessment & Plan  He had been on coumadin but hasn't been taking it  Currently on DAPT per CT surgery  Continue amiodarone and metoprolol      End stage renal disease (HCC)- (present on admission)  Assessment & Plan  He has not had dialysis since Wednesday is has not been feeling well  Creatinine is elevated at 8.8 unfortunately potassium is 3.8  Once temporary access has been obtained and dialysis will be done by nephrology      Note from nephrology reviewed    Renovascular hypertension- (present on admission)  Assessment & Plan  Restart his home Coreg with holding parameters         VTE prophylaxis: SCD    I have performed a physical exam and reviewed and updated ROS and Plan today (7/3/2024). In review of yesterday's note (7/2/2024), there are no changes except as documented above.    I spent 51 minutes, reviewing the chart, obtaining and/or reviewing separately obtained history. Performing a medically appropriate examination and evaluation.  Counseling and educating the patient. Ordering and reviewing medications, tests, or procedures.  Discussing the case with ID.  Documenting clinical information in EPIC. Independently interpreting results and communicating results to patient. Discussing future disposition of care with patient, RN and case management.

## 2024-07-03 NOTE — THERAPY
Occupational Therapy Contact Note    Patient Name: Brian Jasmine  Age:  68 y.o., Sex:  male  Medical Record #: [id number]  Today's Date: 7/3/2024    Attempted to see pt for OT session; pt in HD. Will reattempt as appropriate/able.

## 2024-07-03 NOTE — PROGRESS NOTES
Infectious Disease Progress Note    Author: Sarabjit Pulliam M.D. Date & Time of service: 7/3/2024  9:59 AM    Chief Complaint:  Bacteremia, endocarditis    Interval History:   patient remains afebrile, count 11,000, cardiothoracic surgery planning aortic valve replacement, cultures as below, creatinine 4.05   patient remains afebrile, underwent valve replacement surgery yesterday, white count bumped to 27.5, down to 16.7 today, cultures as below, creatinine 4.95, magnesium 3 point   patient is afebrile, white count is down to 13.4, cultures as below, creatinine 4.54,  7/3 patient remains afebrile, white count is 14.8, tolerating dual beta-lactam antibiotics, cultures as below, creatinine 5.41    Review of Systems:  Review of Systems   Constitutional:  Negative for chills and fever.   Skin:  Negative for itching and rash.     Hemodynamics:  Temp (24hrs), Av.4 °C (97.6 °F), Min:36.1 °C (97 °F), Max:36.7 °C (98.1 °F)  Temperature: 36.7 °C (98.1 °F)  Pulse  Av.7  Min: 31  Max: 136   Blood Pressure : (!) 151/73, NIBP: 116/72       Physical Exam:  Physical Exam  Constitutional:       General: He is not in acute distress.     Appearance: He is ill-appearing.   Cardiovascular:      Rate and Rhythm: Normal rate and regular rhythm.      Heart sounds: Normal heart sounds.   Pulmonary:      Effort: Pulmonary effort is normal.      Breath sounds: Normal breath sounds.      Comments: Well-approximated midline surgical site with no gross external evidence of uncontrolled infection  Abdominal:      General: Abdomen is flat. Bowel sounds are normal.      Palpations: Abdomen is soft.   Musculoskeletal:      Comments: Left arm in bandaging   Skin:     General: Skin is warm and dry.   Neurological:      General: No focal deficit present.      Mental Status: He is oriented to person, place, and time.   Psychiatric:         Mood and Affect: Mood normal.         Behavior: Behavior normal.       Meds:    Current  Facility-Administered Medications:     valsartan    hydrALAZINE    Respiratory Therapy Consult    NS    NS    electrolyte-A    Nozin nasal  swab    [] metoprolol tartrate **FOLLOWED BY** metoprolol tartrate    aspirin    clopidogrel    atorvastatin    Pharmacy Consult Request    acetaminophen **FOLLOWED BY** [START ON 7/10/2024] acetaminophen    oxyCODONE immediate-release **OR** oxyCODONE immediate-release **OR** fentaNYL    traMADol    sodium bicarbonate    ondansetron **OR** prochlorperazine    acetaminophen **OR** acetaminophen    docusate sodium    senna-docusate    senna-docusate    polyethylene glycol/lytes    magnesium hydroxide    bisacodyl    sodium phosphate    omeprazole    mag hydrox-al hydrox-simeth    diphenhydrAMINE    albuterol    cefTRIAXone (ROCEPHIN) IV    ampicillin    epoetin    labetalol    heparin    heparin    heparin    heparin    Labs:  Recent Labs     24  01324  0500 24  0817 24  0400 24   WBC 16.7*  --   --  13.4* 14.8*   RBC 2.32*  --   --  2.56* 2.72*   HEMOGLOBIN 6.8*   < > 8.4* 7.6* 8.5*   HEMATOCRIT 20.7*   < > 24.9* 23.1* 24.7*   MCV 89.2  --   --  90.2 90.8   MCH 29.3  --   --  29.7 31.3   RDW 52.1*  --   --  52.4* 52.0*   PLATELETCT 169  --   --  160* 157*   MPV 10.5  --   --  10.8 10.6    < > = values in this interval not displayed.     Recent Labs     24  01324  0630 24  0400 24   SODIUM 139  --  133* 132*   POTASSIUM 4.9 5.1 3.9 3.8   CHLORIDE 98  --  94* 95*   CO2 21  --  24 21   GLUCOSE 153*  --  104* 89   BUN 31*  --  27* 33*     Recent Labs     24  01324  0400 24   CREATININE 4.95* 4.54* 5.41*       Imaging:  US-EXTREMITY ARTERY LOWER BILAT    Result Date: 2024  Lower Extremity  Arterial Duplex Report  Vascular Laboratory  CONCLUSIONS  1.  Right lower extremity atherosclerosis with occlusion of the distal  fem/popliteal graft with 3 vessel runoff to the  right ankle.  3.  Left lower extremity atherosclerosis with patent fem/popliteal graft  and 3 vessel runoff to the ankle.  BROOKE ROSARIO  Exam Date:     2024 12:28  Room #:     Inpatient  Priority:     Routine  Ht (in):             Wt (lb):  Ordering Physician:        CHANTAL CRAWFORD  Referring Physician:       [id number], MUKUND MORALES  Sonographer:               Hipolito Anthony RVT, RDMS  Study Type:                Complete Bilateral  Technical Quality:         Adequate  Age:    68    Gender:     M  MRN:    [id number]  :    1955      BSA:  Indications:     Bypass Graft  CPT Codes:       24177  ICD Codes:       414.04  History:         History of bilateral fem-pop bypass grafts. No prior studies.  Limitations:                RIGHT  Waveform        Peak Systolic Velocity (cm/s)                  Prox    Prox-Mid  Mid    Mid-Dist  Distal  Biphasic                          274                      CFA  Biphasic        198                                        PFA                                                             SFA                                    29                       POP  Monophasic      73                                 18      AT  Monophasic      40                                 31      PT  Monophasic      72                                 51      NICHOLAS                LEFT  Waveform        Peak Systolic Velocity (cm/s)                  Prox    Prox-Mid  Mid    Mid-Dist  Distal  Biphasic                          206                      CFA  Biphasic        373                                        PFA                                                             SFA  Biphasic                                           74      POP  Biphasic        132                                137     AT  Biphasic        93                                 94      PT  Biphasic        47                                  59      NICHOLAS  FINDINGS  Right.  Diffuse plaque and multiphasic flow throughout the common femoral and  popliteal arteries.  Three vessel runoff to the ankle with monophasic flow.  Fully occluded fem to pop bypass graft seen with the following velocities:  Proximal anastamosis: 32 cm/sec.  Distal to proximal anastamosis: 14 cm/sec.  Distal anastamosis: No flow detected.  Distal to anastamosis: 29 cm/sec.  Left.  Diffuse plaque and multiphasic flow throughout the common femoral and  popliteal arteries.  Three vessel runoff to the ankle with biphasic flow.  Fem to pop bypass graft seen with the following velocities:  Proximal anastamosis: 32 cm/sec.  Distal to proximal anastamosis: 21 cm/sec.  Proximal thigh: 104 cm/sec.  Mid thigh: 107 cm/sec.  Distal thigh: 95 cm/sec.  Distal anastamosis: 183 cm/sec.  Distal to anastamosis: 132 cm/sec.  Abdominal aortic aneurysm seem with a measurement of 4.0 x 4.4 cm noted as  an incidental finding.  .  Nani Morataya  (Electronically Signed)  Final Date:      2024                   15:40    US-SUSI SINGLE LEVEL BILAT    Result Date: 2024   Vascular Laboratory  Conclusions  1.  Reduced right SUSI.  2.  Reduced left TBI.  BROOKE ROSARIO  Age:    68    Gender:     M  MRN:    [id number]  :    1955      BSA:  Exam Date:     2024 09:20  Room #:     Inpatient  Priority:     Routine  Ht (in):             Wt (lb):  Ordering Physician:        CHANTAL CRAWFORD  Referring Physician:       [id number] Elizondo  Sonographer:               Hipolito Anthony [facility]  Study Type:                Complete Bilateral  Technical Quality:         Adequate  Indications:     Ulcer of ankle  CPT Codes:       90202  ICD Codes:       707.13  History:         Ulceration of the bilateral ankles. No prior studies.  Limitations:                 RIGHT  Waveform            Systolic BPs (mmHg)                             117            Brachial  Triphasic                                Common Femoral  Biphasic                                 Popliteal  Bi, non-rev                83            Posterior Tibial  Bi, non-rev                72            Dorsalis Pedis                             285           Digit                             0.71          SUSI                             2.44          TBI                       LEFT  Waveform        Systolic BPs (mmHg)                                           Brachial  Triphasic                                Common Femoral  Triphasic                                Popliteal  Triphasic                  130           Posterior Tibial  Triphasic                  117           Dorsalis Pedis  Diminished                 45            Digit                             1.11          SUSI                             0.38          TBI  Findings  Right.  Doppler waveforms of the common femoral, popliteal, posterior tibial, and  dorsalis pedis arteries are of high amplitude and multiphasic.  Doppler waveforms at the ankle are brisk and biphasic.  The ankle-brachial index is dimished.  The toe-brachial index is not accurately measured due to calcification and  noncompressibility of the tibial vessels.  Left.  Unable to obtain brachial pressure due to unremovable dressing following AV  fistula repair.   Doppler waveforms of the common femoral, popliteal, posterior tibial, and  dorsalis pedis arteries are of high amplitude and multiphasic.  Doppler waveforms at the ankle are brisk and triphasic.  The ankle-brachial index is normal.  The toe-brachial index is abnormally reduced.  An arterial duplex was performed in accordance with lower extremity  arterial evaluation protocol - see separate report.  Nani Morataya  (Electronically Signed)  Final Date:      27 June 2024                   11:25    DX-CHEST-PORTABLE (1 VIEW)    Result Date: 6/24/2024 6/24/2024 2:53 PM HISTORY/REASON FOR EXAM:  Status post central  line placement TECHNIQUE/EXAM DESCRIPTION AND NUMBER OF VIEWS: Single portable view of the chest. COMPARISON: 2024 FINDINGS: Right IJ catheter tip projects over the SVC in satisfactory position. The mediastinal and cardiac silhouette is unremarkable. The pulmonary vascularity is within normal limits. The lung parenchyma is clear. There is no significant pleural effusion. There is no visible pneumothorax. There are no acute bony abnormalities.     1.  Satisfactory appearance of the right IJ catheter. No pneumothorax visualized.    US-HEMODIALYSIS GRAFT DUPLEX COMP UPPER EXTREMITY    Result Date: 2024   Upper Extremity  Venous Duplex Report  Vascular Laboratory  CONCLUSIONS  1.  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  2.  No acute DVT seen elsewhere in the left upper extremity.  ABRAHAMBROOKE  Exam Date:     2024 11:30  Room #:     Inpatient  Priority:     Stat  Ht (in):             Wt (lb):  Ordering Physician:        MARIO CLARK  Referring Physician:       [id number] Munson  Sonographer:               Hipolito Anthony RVT, RDMS  Study Type:                Complete Unilateral  Technical Quality:         Adequate  Age:    68    Gender:     M  MRN:    [id number]  :    1955      BSA:  Indications:     AV Fistula / Thrombosed  CPT Codes:       88161  ICD Codes:       996.73  History:         Left thrombosed AV Fistula. No prior studies.  Limitations:  PROCEDURES:  Left upper extremity venous duplex imaging.  The following venous structures were evaluated: internal jugular,  subclavian, axillary, brachial, cephalic, and basilic veins.  Serial compression, color, and spectral Doppler flow evaluations were  performed.  FINDINGS:  Left upper extremity.  All veins demonstrate complete color filling and compressibility with  normal venous flow dynamics including  spontaneous flow and respiratory  phasicity.  No superficial or deep venous thrombosis.  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  Fistula is thrombosed and  completely occluded from its proximal segment running its entire length to  its termination at the axillary vein.  Proximal segment of fistula is aneurysmal measuring 2.2 x 1.6 cm.  Nani LUIS A Rafia  (Electronically Signed)  Final Date:      2024                   12:41    US-HEMODIALYSIS GRAFT DUPLEX COMP UPPER EXTREMITY    Result Date: 2024   Upper Extremity  Venous Duplex Report  Vascular Laboratory  CONCLUSIONS  1.  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  2.  No acute DVT seen elsewhere in the left upper extremity.  BROOKE ROSARIO  Exam Date:     2024 11:30  Room #:     Inpatient  Priority:     Stat  Ht (in):             Wt (lb):  Ordering Physician:        MARIO CLARK  Referring Physician:       [id number] Munson  Sonographer:               Hipolito Anthony RVT, RDMS  Study Type:                Complete Unilateral  Technical Quality:         Adequate  Age:    68    Gender:     M  MRN:    [id number]  :    1955      BSA:  Indications:     AV Fistula / Thrombosed  CPT Codes:       48741  ICD Codes:       996.73  History:         Left thrombosed AV Fistula. No prior studies.  Limitations:  PROCEDURES:  Left upper extremity venous duplex imaging.  The following venous structures were evaluated: internal jugular,  subclavian, axillary, brachial, cephalic, and basilic veins.  Serial compression, color, and spectral Doppler flow evaluations were  performed.  FINDINGS:  Left upper extremity.  All veins demonstrate complete color filling and compressibility with  normal venous flow dynamics including spontaneous flow and respiratory  phasicity.  No superficial or deep venous thrombosis.   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  Fistula is thrombosed and  completely occluded from its proximal segment running its entire length to  its termination at the axillary vein.  Proximal segment of fistula is aneurysmal measuring 2.2 x 1.6 cm.  Nani Morataya  (Electronically Signed)  Final Date:      24 June 2024                   12:41    DX-CHEST-PORTABLE (1 VIEW)    Result Date: 6/24/2024 6/24/2024 10:29 AM HISTORY/REASON FOR EXAM:  Chest Pain. TECHNIQUE/EXAM DESCRIPTION AND NUMBER OF VIEWS: Single portable view of the chest. COMPARISON: None FINDINGS: The soft tissues and bony structures are unremarkable. The heart and mediastinal structures are enlarged. Pulmonary vascularity is normal. The lung fields are clear. There is no effusion or pneumothorax.     Mild enlargement of the cardiomediastinal silhouette without acute cardiopulmonary abnormality.      Micro:  As below    ASSESSMENT/PLAN:   68 y.o.  admitted 6/24/2024. Pt has a past medical history of PAD with femoral-popliteal bypass, ESRD on dialysis via left arm graft, A-fib on Coumadin and CAD with stents, he presented complaining of fevers, pain, redness and swelling in his right arm graft site ongoing for approximately 5 days.      Hospital Course:   Patient was the OR with vascular surgery on 6/25 with resection of the infected left arm dialysis graft pseudoaneurysm,  left greater saphenous vein harvest, reconstruction of left brachial artery, partial removal of the left arm dialysis graft.  Echocardiogram consistent with endocarditis as below.     Problem List  Aortic valve infective endocarditis, large lobulated mobile mass 1.7 x 1.1 cm with severe aortic insufficiency on RENEE  E faecalis bacteremia, infected left arm dialysis site  -Blood cultures on 6/24 +E faecalis, amp sensitive   Left arm dialysis graft site infection, pseudoaneurysm at site of anastomosis  -Status post or with vascular surgery as described above, partial removal of the  prior graft  -Left forearm wound culture +E faecalis, amp sensitive  ESRD on HD  PAD     Plan:  -Continue dual beta-lactam therapy with renally dosed IV ampicillin and ceftriaxone  -On 6/30, patient underwent aortic valve replacement and left atrial appendage resection and ligation, follow cultures  -Repeat blood culture from 6/27, 6/29, 6/30 also positive, follow repeat blood cultures x 2 from 7/1, pending, no growth till date  -Dialysis catheter was placed while potentially still bacteremic, may need to be removed if bacteremia fails to clear despite surgery  -Monitor closely for any evidence of additional sites of seeding of infection     Dispo: If blood cultures remain negative, anticipate completing 6 weeks of IV daptomycin with dialysis sessions  PICC: Likely no     Discussed with Dr. Little.  This illness poses threat to life.  ID will follow skin

## 2024-07-03 NOTE — ASSESSMENT & PLAN NOTE
7/16/2024  Developed aortic valve endocarditis  Status post aortic valve replacement - bioprosthetic valve    Continue coumadin with target INR 2-3  Complicated by bleeding events requiring PRBC and FFP  Hb now stable although continues to experiencing bleeding

## 2024-07-03 NOTE — PROGRESS NOTES
Cardiovascular Surgery Progress Note    Name: Brian Jasmine  MRN: [id number]  : 1955  Admit Date: 2024 10:20 AM  3 Days Post-Op     Procedure:  Procedure(s) and Anesthesia Type:     * REPLACEMENT, AORTIC VALVE WITH A 25 MM DICKEY INSPIRIS- Wound Class: Clean with DrainCABG X1 WITH SEKELTONIZED LIMA FREE GRAFT- Wound Class: Clean with Drain  LEFT ATRIAL APPENDAGE RESECTION AND LIGATION  ECHOCARDIOGRAM, TRANSESOPHAGEAL, INTRAOPERATIVE - Wound Class: Clean with Drain    Vitals:  Vitals:    24 2336 24 0409 24 0558   BP: 133/71 138/73 (!) 144/62    Pulse: 63 78 80    Resp: 18 17 18    Temp: 36.5 °C (97.7 °F) 36.6 °C (97.9 °F) 36.5 °C (97.7 °F)    TempSrc: Temporal Temporal Temporal    SpO2: 92% 90% 91%    Weight:    101 kg (222 lb 3.6 oz)   Height:          Temp (24hrs), Av.4 °C (97.5 °F), Min:36.1 °C (97 °F), Max:36.6 °C (97.9 °F)      Respiratory:   Respiration: 18, Pulse Oximetry: 91 %       Fluids:    Intake/Output Summary (Last 24 hours) at 7/3/2024 0731  Last data filed at 7/3/2024 0500  Gross per 24 hour   Intake 305 ml   Output 0 ml   Net 305 ml     Admit weight: Weight: 93 kg (205 lb)  Current weight: Weight: 101 kg (222 lb 3.6 oz) (24 0558)    Labs:  Recent Labs     24  0130 24  0500 24  0817 24  0400 24  0047   WBC 16.7*  --   --  13.4* 14.8*   RBC 2.32*  --   --  2.56* 2.72*   HEMOGLOBIN 6.8*   < > 8.4* 7.6* 8.5*   HEMATOCRIT 20.7*   < > 24.9* 23.1* 24.7*   MCV 89.2  --   --  90.2 90.8   MCH 29.3  --   --  29.7 31.3   MCHC 32.9  --   --  32.9 34.4   RDW 52.1*  --   --  52.4* 52.0*   PLATELETCT 169  --   --  160* 157*   MPV 10.5  --   --  10.8 10.6    < > = values in this interval not displayed.     Recent Labs     24  0130 24  0630 24  0400 24  0047   SODIUM 139  --  133* 132*   POTASSIUM 4.9 5.1 3.9 3.8   CHLORIDE 98  --  94* 95*   CO2   --  24 21   GLUCOSE 153*  --  104* 89   BUN 31*  --  27* 33*    CREATININE 4.95*  --  4.54* 5.41*   CALCIUM 8.2*  --  8.5 7.9*     Recent Labs     06/30/24  1355 06/30/24  2140   APTT 62.6*  --    INR 1.55* 1.40*       HgbA1c:  5.1    Diabetic Educator Consult:  N\A    Medications:  Scheduled Medications   Medication Dose Frequency    valsartan  40 mg Q DAY    Nozin nasal  swab  1 Applicator BID    metoprolol tartrate  25 mg BID    aspirin  81 mg DAILY    clopidogrel  75 mg DAILY    atorvastatin  40 mg QHS    Pharmacy Consult Request  1 Each PHARMACY TO DOSE    acetaminophen  1,000 mg Q6HRS    docusate sodium  100 mg BID    senna-docusate  1 Tablet Nightly    omeprazole  20 mg DAILY    cefTRIAXone (ROCEPHIN) IV  2,000 mg Q12HRS    ampicillin  2,000 mg Q12HRS    epoetin  10,000 Units MO, WE + FR        Exam:   Physical Exam  Vitals and nursing note reviewed.   Constitutional:       General: He is not in acute distress.     Appearance: Normal appearance.   HENT:      Head: Normocephalic.      Right Ear: External ear normal.      Left Ear: External ear normal.      Nose: Nose normal. No congestion.      Mouth/Throat:      Mouth: Mucous membranes are moist.      Pharynx: Oropharynx is clear.   Eyes:      Extraocular Movements: Extraocular movements intact.      Pupils: Pupils are equal, round, and reactive to light.   Cardiovascular:      Rate and Rhythm: Normal rate and regular rhythm.      Pulses: Normal pulses.      Heart sounds: Normal heart sounds.   Pulmonary:      Effort: Pulmonary effort is normal.      Breath sounds: Decreased breath sounds present.   Abdominal:      General: Bowel sounds are normal. There is no distension.      Palpations: Abdomen is soft.   Musculoskeletal:      Cervical back: Normal range of motion and neck supple. No tenderness.      Right lower leg: No edema.      Left lower leg: No edema.   Skin:     General: Skin is warm and dry.      Comments: Midline surgical incision   Neurological:      General: No focal deficit present.      Mental  Status: He is alert and oriented to person, place, and time. Mental status is at baseline.   Psychiatric:         Mood and Affect: Mood normal.         Behavior: Behavior normal.         Thought Content: Thought content normal.         Cardiac Medications:    ASA - Yes    Plavix - Yes    Post-operative Beta Blockers - Yes    Ace/ARB- Yes    Statin - Yes    Aldactone- No; contraindicated because of Normal EF    SGLT2i-  No contraindicated because of No; contraindicated because of Normal EF    Ejection Fraction:  50%    Telemetry:   7/1 SR  7/2 SB/SR  7/3 SR    Assessment/Plan:  POD 1  HDS, SR, neuro intact, sternal incision intact, abdomen soft - BM, fluid balance positive, wt up,  2 L NC. H/H 7.7/22.9- got blood this AM. Plan:  Recheck H/H- transfuse as appropriate. Keep chest tubes and pacing wires, IS/ambulate.     POD 2  HDS, SB, neuro intact, wounds intact, abdomen +BM, fluid balance positive, t/ up,  0.5 L NC. H/H 7.6/23.1.  Plan: Transfuse 1 unit RBCs. Remove chest tubes. Dialysis per neph. IS/ambulate. Transfer to [facility].     POD 3 HDS. SR.  Neuro intact.  Wounds CDI.  Abdomen soft nontender.  RA.  Cr 5.4 hgb 8.5.  Plan: Dialysis per neph.  Pacer wires removed.  Encourage IS/ambulation.      Disposition:  TBD

## 2024-07-03 NOTE — PROGRESS NOTES
Bedside report received from off going RN/tech: Margarita assumed care of patient.     Fall Risk Score: MODERATE RISK  Fall risk interventions in place: Place yellow fall risk ID band on patient, Provide patient/family education based on risk assessment, Educate patient/family to call staff for assistance when getting out of bed, Place fall precaution signage outside patient door, Place patient in room close to nursing station, Utilize bed/chair fall alarm, and Bed alarm connected correctly  Bed type: Regular (Gary Score less than 17 interventions in place)  Patient on cardiac monitor: Yes  IVF/IV medications: Infusion per MAR (List Med(s)) NS  Oxygen: Room Air  Bedside sitter: Not Applicable   Isolation: Not applicable

## 2024-07-03 NOTE — CARE PLAN
The patient is Stable - Low risk of patient condition declining or worsening    Shift Goals  Clinical Goals: increase ambulation, use IS, pain control  Patient Goals: rest, sleep  Family Goals: [place]    Progress made toward(s) clinical / shift goals:    Problem: Pain - Standard  Goal: Alleviation of pain or a reduction in pain to the patient’s comfort goal  Outcome: Progressing  Note: Patient rates pain 9/10. Patient medicated per MAR. Patient reports improvement and able to sleep after being medicated.      Problem: Post op day 2 CABG/Heart Valve Replacement  Goal: Optimal care of the post op CABG/heart valve replacement post op day 2  Outcome: Progressing  Intervention: Daily weights in the morning  Note: Daily weight complete by NOC CCT  Intervention: Up in chair for all meals  Note: Complete during day shift.   Intervention: Ambulate 4 times daily, increasing the distance each time  Note: Patient ambulated x3. Patient increasing distance each time. Patient motivated to increase distance on last walk with [facility] nurse in the morning.   Intervention: Stand at sink and wash up with assistance.  Clean incisions twice daily with soap and water.  Note: Patient states he is too tired to stand at sink and will try in the AM. Incision care given by [facility] RN.   Intervention: IS q 1 hour while awake and record best IS volume  Note: Best IS 1000ml during shift. Patient educated on use and importance of IS.   Intervention: Consider pacer wire removal by MD  Note: Pacer wires still in place and covered.   Intervention: Consider removal of ruzi and chest tube if not already done  Note: Ruiz and chest tubes removed in ICU before coming to tele.      Problem: Post Op Day 3 CABG/Heart Valve replacement  Goal: Optimal care of the post op CABG/Heart Valve replacement post op day 3  Outcome: Progressing  Intervention: Daily weights in the morning  Note: Daily weight completed by CNA   Intervention: Shower daily and clean incisions twice  daily with soap and water  Note: Incision care complete with NOC RN. Given with foam soap and washcloth.   Intervention: Up in chair for all meals  Note: Completed during day shift.   Intervention: Ambulate 4 times daily, increasing the distance each time  Note: Ambulated X 3. Increasing distance each time. Patient motivated to increase distance for last walk with NOC during morning med pass.   Intervention: IS q 1 hour while awake and record best IS volume  Note: Best IS 1000ml during shift. Patient educated on importance of IS.   Intervention: Consider removal of ruiz, chest tube and pacer wires if not already done  Note: Ruiz and chest tube removed prior to coming to tele. Pacer wires still in place and covered appropriately.        Patient is not progressing towards the following goals:

## 2024-07-03 NOTE — PROGRESS NOTES
Bedside report received from off going RN/tech: Livia, assumed care of patient.     Fall Risk Score: MODERATE RISK  Fall risk interventions in place: Place yellow fall risk ID band on patient, Provide patient/family education based on risk assessment, Educate patient/family to call staff for assistance when getting out of bed, Place fall precaution signage outside patient door, Place patient in room close to nursing station, and Utilize bed/chair fall alarm  Bed type: Regular (Gary Score less than 17 interventions in place)  Patient on cardiac monitor: Yes  IVF/IV medications: Not Applicable   Oxygen: Room Air  Bedside sitter: Not Applicable   Isolation: Not applicable

## 2024-07-03 NOTE — THERAPY
Physical Therapy Contact Note    Patient Name: Brian Jasmine  Age:  68 y.o., Sex:  male  Medical Record #: [id number]  Today's Date: 7/3/2024    PT consult received, pt in dialysis at time of attempt per RN. Will reattempt as able/appropriate.

## 2024-07-03 NOTE — PROGRESS NOTES
Pt transported via bed to dialysis. Pt had cell phone, cell phone  and glasses on her person on transfer. Pt in communication with his family about going to dialysis.

## 2024-07-03 NOTE — PROGRESS NOTES
Monitor Summary    SR 60s    Occasional PVC's and bigeminy    .19/.11/.50    New patient at 1005

## 2024-07-03 NOTE — PROGRESS NOTES
Markell LARA contacted to inform that pt went into Afib. APRN ordered to initiate the amiodarone protocol for cardiothoracic surgery with amiodarone 150 mg IV once now bolus.  No further orders at this time.

## 2024-07-03 NOTE — PROGRESS NOTES
[facility] -  PROGRESS NOTE               Author: Grabiel Augustin M.D. Date & Time: 7/3/2024  7:28 AM     HPI:  68-year-old  male with biventricular history of ESRD on hemodialysis q. Monday Wednesday Friday at [facility] through left upper arm AV fistula that was placed about 2 to 3 years ago.  He presented to the ER with left arm pain and swelling for the last 5 days.  His last dialysis was on Wednesday and had no dialysis in the last 5 days.  He felt sick on Friday and missed his dialysis.  Ultrasound of the left upper arm AV fistula showed there is no flow in the AV fistula and it has thrombosed.  A temporary right IJ dialysis catheter was placed in the ER.  Nephrology has been consulted for management of dialysis.  Patient denies any fever or chills, no nausea or vomiting, no chest pain.  He has some shortness of breath but is able to talk in full sentences and is in no acute respiratory distress at this time. No melena, hematochezia, hematemesis.  No HA, visual changes, or abdominal pain.     NEPHROLOGY DAILY PROGRESS:   6/24: consult note  6/25:  Afebrile, HD last night without any complications, plans noted for OR today with Dr. Tinoco for vascualr surgery   6/26: Underwent AVG repair yesterday with Dr. Tinoco, has LAMBERT drain, BP noted low this AM on dialysis, says BP 's run low on dialysis - not on Midodrine, ordered Midodrine to be used on dialysis   6/27: 0 Net UF with HD yesterday, limited by hypotension. Sitting up at side of bed eating breakfast. Bps improved 130s systolic.   6/28: HD today, seen  6/29: s/p HD yesterday over temp HD line. Tolerated well, no complaints.  6/30: in OR, not available   7/1: s/p AVR, 1V CABG and TAHIR resection/ligation, transferred to ICU, now off pressors, fatigued, post-op pain  7/2: Stable hemodynamics, tolerated HD, transferred out of ICU, chest wall pain improved  7/3: Stable hemodynamics, 7/1 BC remain negative, denies complaints  "    REVIEW OF SYSTEMS:    10 Point ROS performed, negative other than stated above    PMH/PSH/SH/FH:   Reviewed and unchanged since admission note    CURRENT MEDICATIONS:   Reviewed from admission to present day    VS:  VS:  BP (!) 144/62   Pulse 80   Temp 36.5 °C (97.7 °F) (Temporal)   Resp 18   Ht 1.854 m (6' 0.99\")   Wt 101 kg (222 lb 3.6 oz)   SpO2 91%   BMI 29.33 kg/m²   GENERAL: no acute distress  CV: No edema  RESP: non-labored  GI: Soft  MSK: No joint deformities   SKIN: No concerning rashes  NEURO: AOx3  PSYCH: Cooperative    Fluids:  In: 305 [Blood:305]  Out: 0     LABS:  Recent Labs     07/01/24  0130 07/01/24  0630 07/02/24  0400 07/03/24  0047   SODIUM 139  --  133* 132*   POTASSIUM 4.9 5.1 3.9 3.8   CHLORIDE 98  --  94* 95*   CO2 21  --  24 21   GLUCOSE 153*  --  104* 89   BUN 31*  --  27* 33*   CREATININE 4.95*  --  4.54* 5.41*   CALCIUM 8.2*  --  8.5 7.9*       IMAGING:   All imaging reviewed from admission to present day    IMPRESSION:  # ESRD  -Q. MWF at [facility]  # Bacteremia   - Blood Cx from 6/24, 6/27 + E. Faecalis  # Bacterial endocarditis:   -s/p aortic valve replacement 6/30  3 CAD  -s/p 1V CABG 6/30  #Thrombosed left upper arm AVG; infected pseudoaneurysm   -Right IJ temporary HD catheter placed in ER 6/24/2024  - S/P AVG repair, resection and pseudoaneurysm resection 6/25/24 by Dr. Tinoco along with  Reconstruction of left brachial artery using left greater saphenous vein   interposition graft.  # HTN  # Sepsis   # Anemia of CKD  # CKD-MBD  # Coronary artery disease  # Chronic A-fib  -s/p TAHIR resection/ligation        PLAN:  - HD today, continue q MWF schedule    -Given neg BC today placing order for permacath tomorrow as long as remain neg at 72 hr per ID  - EPO with HD  - No dietary protein restrictions  - Resume home meds   - Dose all meds per ESRD    Thank you    "

## 2024-07-04 LAB
ANION GAP SERPL CALC-SCNC: 15 MMOL/L (ref 7–16)
BACTERIA BLD CULT: NORMAL
BACTERIA SPEC ANAEROBE CULT: NORMAL
BUN SERPL-MCNC: 24 MG/DL (ref 8–22)
CALCIUM SERPL-MCNC: 8.1 MG/DL (ref 8.5–10.5)
CHLORIDE SERPL-SCNC: 95 MMOL/L (ref 96–112)
CO2 SERPL-SCNC: 24 MMOL/L (ref 20–33)
CREAT SERPL-MCNC: 4 MG/DL (ref 0.5–1.4)
ERYTHROCYTE [DISTWIDTH] IN BLOOD BY AUTOMATED COUNT: 53.5 FL (ref 35.9–50)
GFR SERPLBLD CREATININE-BSD FMLA CKD-EPI: 16 ML/MIN/1.73 M 2
GLUCOSE SERPL-MCNC: 103 MG/DL (ref 65–99)
HCT VFR BLD AUTO: 29.1 % (ref 42–52)
HGB BLD-MCNC: 9.7 G/DL (ref 14–18)
MCH RBC QN AUTO: 30.4 PG (ref 27–33)
MCHC RBC AUTO-ENTMCNC: 33.3 G/DL (ref 32.3–36.5)
MCV RBC AUTO: 91.2 FL (ref 81.4–97.8)
PLATELET # BLD AUTO: 181 K/UL (ref 164–446)
PMV BLD AUTO: 10.5 FL (ref 9–12.9)
POTASSIUM SERPL-SCNC: 3.8 MMOL/L (ref 3.6–5.5)
RBC # BLD AUTO: 3.19 M/UL (ref 4.7–6.1)
SIGNIFICANT IND 70042: NORMAL
SIGNIFICANT IND 70042: NORMAL
SITE SITE: NORMAL
SITE SITE: NORMAL
SODIUM SERPL-SCNC: 134 MMOL/L (ref 135–145)
SOURCE SOURCE: NORMAL
SOURCE SOURCE: NORMAL
WBC # BLD AUTO: 13.7 K/UL (ref 4.8–10.8)

## 2024-07-04 PROCEDURE — 85027 COMPLETE CBC AUTOMATED: CPT

## 2024-07-04 PROCEDURE — 700105 HCHG RX REV CODE 258: Performed by: INTERNAL MEDICINE

## 2024-07-04 PROCEDURE — 700111 HCHG RX REV CODE 636 W/ 250 OVERRIDE (IP): Mod: JZ | Performed by: NURSE PRACTITIONER

## 2024-07-04 PROCEDURE — 770020 HCHG ROOM/CARE - TELE (206)

## 2024-07-04 PROCEDURE — 99233 SBSQ HOSP IP/OBS HIGH 50: CPT | Performed by: INTERNAL MEDICINE

## 2024-07-04 PROCEDURE — A9270 NON-COVERED ITEM OR SERVICE: HCPCS | Performed by: NURSE PRACTITIONER

## 2024-07-04 PROCEDURE — 700102 HCHG RX REV CODE 250 W/ 637 OVERRIDE(OP): Performed by: NURSE PRACTITIONER

## 2024-07-04 PROCEDURE — 700101 HCHG RX REV CODE 250: Performed by: INTERNAL MEDICINE

## 2024-07-04 PROCEDURE — 700102 HCHG RX REV CODE 250 W/ 637 OVERRIDE(OP)

## 2024-07-04 PROCEDURE — 80048 BASIC METABOLIC PNL TOTAL CA: CPT

## 2024-07-04 PROCEDURE — A9270 NON-COVERED ITEM OR SERVICE: HCPCS

## 2024-07-04 PROCEDURE — 97162 PT EVAL MOD COMPLEX 30 MIN: CPT

## 2024-07-04 PROCEDURE — 700111 HCHG RX REV CODE 636 W/ 250 OVERRIDE (IP): Performed by: INTERNAL MEDICINE

## 2024-07-04 PROCEDURE — 97535 SELF CARE MNGMENT TRAINING: CPT

## 2024-07-04 PROCEDURE — 94669 MECHANICAL CHEST WALL OSCILL: CPT

## 2024-07-04 PROCEDURE — 99024 POSTOP FOLLOW-UP VISIT: CPT | Performed by: NURSE PRACTITIONER

## 2024-07-04 RX ADMIN — OMEPRAZOLE 20 MG: 20 CAPSULE, DELAYED RELEASE ORAL at 04:19

## 2024-07-04 RX ADMIN — ATORVASTATIN CALCIUM 40 MG: 40 TABLET, FILM COATED ORAL at 20:36

## 2024-07-04 RX ADMIN — OXYCODONE HYDROCHLORIDE 10 MG: 10 TABLET ORAL at 04:20

## 2024-07-04 RX ADMIN — OXYCODONE HYDROCHLORIDE 10 MG: 10 TABLET ORAL at 20:36

## 2024-07-04 RX ADMIN — AMIODARONE HYDROCHLORIDE 400 MG: 200 TABLET ORAL at 17:35

## 2024-07-04 RX ADMIN — CEFTRIAXONE SODIUM 2000 MG: 10 INJECTION, POWDER, FOR SOLUTION INTRAVENOUS at 17:36

## 2024-07-04 RX ADMIN — ACETAMINOPHEN 1000 MG: 500 TABLET, FILM COATED ORAL at 04:20

## 2024-07-04 RX ADMIN — CEFTRIAXONE SODIUM 2000 MG: 10 INJECTION, POWDER, FOR SOLUTION INTRAVENOUS at 04:20

## 2024-07-04 RX ADMIN — ACETAMINOPHEN 1000 MG: 500 TABLET, FILM COATED ORAL at 23:30

## 2024-07-04 RX ADMIN — OXYCODONE HYDROCHLORIDE 10 MG: 10 TABLET ORAL at 15:36

## 2024-07-04 RX ADMIN — Medication 1 APPLICATOR: at 20:36

## 2024-07-04 RX ADMIN — AMIODARONE HYDROCHLORIDE 0.5 MG/MIN: 1.8 INJECTION, SOLUTION INTRAVENOUS at 04:00

## 2024-07-04 RX ADMIN — ACETAMINOPHEN 1000 MG: 500 TABLET, FILM COATED ORAL at 12:03

## 2024-07-04 RX ADMIN — AMPICILLIN SODIUM 2000 MG: 2 INJECTION, POWDER, FOR SOLUTION INTRAVENOUS at 04:32

## 2024-07-04 RX ADMIN — OXYCODONE HYDROCHLORIDE 10 MG: 10 TABLET ORAL at 09:39

## 2024-07-04 RX ADMIN — AMPICILLIN SODIUM 2000 MG: 2 INJECTION, POWDER, FOR SOLUTION INTRAVENOUS at 17:56

## 2024-07-04 RX ADMIN — ASPIRIN 81 MG: 81 TABLET, COATED ORAL at 04:19

## 2024-07-04 RX ADMIN — ACETAMINOPHEN 1000 MG: 500 TABLET, FILM COATED ORAL at 17:35

## 2024-07-04 RX ADMIN — CLOPIDOGREL BISULFATE 75 MG: 75 TABLET ORAL at 04:27

## 2024-07-04 RX ADMIN — AMIODARONE HYDROCHLORIDE 400 MG: 200 TABLET ORAL at 03:02

## 2024-07-04 RX ADMIN — Medication 1 APPLICATOR: at 08:16

## 2024-07-04 RX ADMIN — VALSARTAN 40 MG: 80 TABLET ORAL at 04:19

## 2024-07-04 RX ADMIN — OXYCODONE HYDROCHLORIDE 10 MG: 10 TABLET ORAL at 01:03

## 2024-07-04 ASSESSMENT — ENCOUNTER SYMPTOMS
FEVER: 0
CHILLS: 0

## 2024-07-04 ASSESSMENT — COGNITIVE AND FUNCTIONAL STATUS - GENERAL
TOILETING: A LOT
STANDING UP FROM CHAIR USING ARMS: A LOT
MOBILITY SCORE: 16
SUGGESTED CMS G CODE MODIFIER MOBILITY: CK
WALKING IN HOSPITAL ROOM: A LITTLE
HELP NEEDED FOR BATHING: A LOT
PERSONAL GROOMING: A LITTLE
MOVING TO AND FROM BED TO CHAIR: A LITTLE
SUGGESTED CMS G CODE MODIFIER DAILY ACTIVITY: CK
DRESSING REGULAR LOWER BODY CLOTHING: A LOT
CLIMB 3 TO 5 STEPS WITH RAILING: A LOT
TURNING FROM BACK TO SIDE WHILE IN FLAT BAD: A LITTLE
MOVING FROM LYING ON BACK TO SITTING ON SIDE OF FLAT BED: A LITTLE
DRESSING REGULAR UPPER BODY CLOTHING: A LITTLE
DAILY ACTIVITIY SCORE: 16

## 2024-07-04 ASSESSMENT — GAIT ASSESSMENTS
GAIT LEVEL OF ASSIST: CONTACT GUARD ASSIST
DISTANCE (FEET): 100
ASSISTIVE DEVICE: FRONT WHEEL WALKER
DEVIATION: SHUFFLED GAIT;BRADYKINETIC

## 2024-07-04 ASSESSMENT — PAIN DESCRIPTION - PAIN TYPE
TYPE: ACUTE PAIN
TYPE: ACUTE PAIN
TYPE: ACUTE PAIN;CHRONIC PAIN
TYPE: ACUTE PAIN

## 2024-07-04 ASSESSMENT — FIBROSIS 4 INDEX: FIB4 SCORE: 2.3

## 2024-07-04 NOTE — THERAPY
"Occupational Therapy  Daily Treatment     Patient Name: Brian Jasmine  Age:  68 y.o., Sex:  male  Medical Record #: [id number]  Today's Date: 7/4/2024     Precautions  Precautions: Fall Risk, Cardiac Precautions (See Comments), Sternal Precautions (See Comments)    Assessment    Pt seen for OT session. Progressing with functional mobility, but req max A for STS/txfs. Pt reports he has a caregiver come in to assist his wife when he is at dialysis 3x/wk; assists with bathing and IADLs PRN. Reports also will have a caregiver who will be \"living in\" and available to assist PRN 24/7; will be driving and completing IADLs. Reports wife/sister will be puchasing pt an elevating recliner so able to get out of chair; reports has a BSC he can use as a RTS. Educated on edema mgmt, adaptive techniques for ADLs/txfs, sternal precautions, home safety/DME, adaptive clothing, energy conservation techniques w/home modifications, controlling descent during sitting, and importance of continued OOB activity. Recommend reinforcement; questionable retention. Continues to be limited by decreased functional mobility, activity tolerance, cognition, strength, balance, adherence to precautions, WOB w/activity, and pain which are currently affecting pt's ability to complete ADLs/IADLs at baseline. Will continue to follow.     Plan    Treatment Plan Status: Continue Current Treatment Plan  Type of Treatment: Self Care / Activities of Daily Living, Therapeutic Exercises, Therapeutic Activity, Family / Caregiver Training  Treatment Frequency: 5 Times per Week  Treatment Duration: Until Therapy Goals Met    DC Equipment Recommendations: Raised Toilet Seat without Arms, Sock Aide, Reacher  Discharge Recommendations: Recommend post-acute placement for additional occupational therapy services prior to discharge home (if refuses, recommend 24/7 heavy physical assistance and OTHH)     Objective     07/04/24 1454   Precautions   Precautions Fall " "Risk;Cardiac Precautions (See Comments);Sternal Precautions (See Comments)   Vitals   O2 Delivery Device None - Room Air   Vitals Comments increased WOB w/short functional ambulation to toilet   Pain 0 - 10 Group   Location Chest;Arm   Location Orientation Mid;Left   Therapist Pain Assessment Post Activity;During Activity;Nurse Notified  (not quantified)   Cognition    Cognition / Consciousness X   Level of Consciousness Alert   Safety Awareness Impaired   New Learning Impaired   Comments cooperative, flat affect. Poor insight into deficits and decreased foresight into difficulties he will have at home at [facility]. Req v/cs to maintain precautions when sitting up from reclined position in chair and scooting forward. Recommend reinforcement of all education.   Passive ROM Upper Body   Passive ROM Upper Body X   Comments LUE limited by edema   Active ROM Upper Body   Active ROM Upper Body  X   Dominant Hand Right   Comments LUE limited by edema/pain   Strength Upper Body   Upper Body Strength  X   Comments LUE limited by edema/pain and precautions.   Other Treatments   Other Treatments Provided Pt reports he will have a caregiver come in to assist his wife when he is at dialysis 3x/wk; assist with bathing and IADLs PRN. Reports also will have a caregiver who will be \"living in\" and available to assist PRN 24/7; will be driving and completing IADLs. Reports wife/sister will be puchasing pt an elevating recliner so able to get out of chair. Educated on edema mgmt, adaptive techniques for ADLs/txfs, sternal precautions (questionable retention), home safety/DME, adaptive clothing, energy conservation techniques w/home modifications, controlling descent during sitting, and importance of continued OOB activity. Recommend reinforcement; questionable retention.   Balance   Sitting Balance (Static) Fair +   Sitting Balance (Dynamic) Fair   Standing Balance (Static) Fair -   Standing Balance (Dynamic) Fair -   Weight Shift Sitting " Fair   Weight Shift Standing Fair   Skilled Intervention Verbal Cuing;Tactile Cuing;Compensatory Strategies   Comments w/FWW   Bed Mobility    Scooting Standby Assist   Skilled Intervention Verbal Cuing;Compensatory Strategies   Comments v/cs to not use BUEs for sitting up from reclined position or scooting self forward   Activities of Daily Living   Eating Supervision   Grooming   (declined need)   Bathing   (recommended RN use BSC as shower seat as pt req higher seat than shower bench)   Lower Body Dressing Moderate Assist  (socks and would req more assist for pulling up pants)   Toileting Moderate Assist  (on BSC as RTS)   Skilled Intervention Verbal Cuing;Tactile Cuing;Facilitation;Compensatory Strategies   Functional Mobility   Sit to Stand Maximal Assist  (from recliner; padded chair to be taller for staff)   Bed, Chair, Wheelchair Transfer Moderate Assist   Toilet Transfers Moderate Assist  (w/BSC as RTS (very high))   Transfer Method Stand Step   Mobility w/FWW recliner>toilet. requested to be left alone for BM; RN aware and ok'd   Skilled Intervention Verbal Cuing;Tactile Cuing;Sequencing;Facilitation;Compensatory Strategies;Postural Facilitation   Activity Tolerance   Comments limited by fatigue, increased SOB/WOB, and cognition   Patient / Family Goals   Patient / Family Goal #1 to get stronger and go home   Short Term Goals   Short Term Goal # 1 complete UBD w/ sternal prxns SPV   Goal Outcome # 1 Progressing as expected   Short Term Goal # 2 complete full g/h routine standing SPV   Goal Outcome # 2 Progressing as expected   Short Term Goal # 3 complete toileting SPV   Goal Outcome # 3 Progressing as expected   Short Term Goal # 4 complete ADL txf SPV   Goal Outcome # 4 Progressing slower than expected   Education Group   Education Provided Sternal Precautions;Pathology of bedrest   Sternal Precautions Patient Response Patient;Acceptance;Explanation;Demonstration;Verbal Demonstration;Reinforcement Needed    Pathology of Bedrest Patient Response Patient;Acceptance;Explanation;Verbal Demonstration;Reinforcement Needed

## 2024-07-04 NOTE — WOUND TEAM
[facility]  Inpatient Services  Wound and Skin Care Brief Evaluation    Admission Date: 6/24/2024     Last order of IP CONSULT TO WOUND CARE was found on 7/2/2024 from Hospital Encounter on 6/24/2024     HPI, PMH, SH: Reviewed    Chief Complaint   Patient presents with    Arm Pain    Rapid Heart Beat     Diagnosis: Infected prosthetic vascular graft, initial encounter ([facility]) [T82.7XXA]    Unit where seen by Wound Team: T821/00     Wound consult placed regarding back, R 5th toe, L hip, sacrum. Chart and images reviewed. This clinician in to assess patient. Patient pleasant and agreeable. Patient right 5th toe unchanged from previous assessment. Continue with betadine to keep clean and dry. Patient left hip, back, and sacrum [ink and intact .     R 5th toe      Left Hip      Back      No pressure injuries or advanced wound care needs identified. Wound consult completed. No further follow up unless indicated and consulted.          PREVENTATIVE INTERVENTIONS:    Q shift Gary - performed per nursing policy  Q shift pressure point assessments - performed per nursing policy    Surface/Positioning  Standard/trauma mattress - Currently in Place  Reposition q 2 hours - Currently in Place

## 2024-07-04 NOTE — CARE PLAN
The patient is Stable - Low risk of patient condition declining or worsening    Shift Goals  Clinical Goals: increase ambulation, pain control  Patient Goals: get sleep  Family Goals: na    Progress made toward(s) clinical / shift goals:    Problem: Post Op Day 3 CABG/Heart Valve replacement  Goal: Optimal care of the post op CABG/Heart Valve replacement post op day 3  Outcome: Progressing  Intervention: Daily weights in the morning  Note: Completed by NOC RN.   Intervention: Shower daily and clean incisions twice daily with soap and water  Note: Shower complete on day shift. Incision care given with soap and water with NOC RN  Intervention: Up in chair for all meals  Note: Patient refused eating in chair for dinner. Educated patient and patient states he will get up for breakfast.   Intervention: Ambulate 4 times daily, increasing the distance each time  Note: Patient ambulated x3. Patient will complete last walk with NOC RN in the morning.   Intervention: IS q 1 hour while awake and record best IS volume  Note: Best IS was 1250 during shift.   Intervention: Consider removal of ruiz, chest tube and pacer wires if not already done  Note: Ruiz and chest tube previously removed.  Pacer wires removed during dayshift.   Intervention: Assess need for case management and  for discharge planning  Note: Discussed with patient and family.        Patient is not progressing towards the following goals:

## 2024-07-04 NOTE — PROGRESS NOTES
Bedside report received from off going RN/tech: Livia, assumed care of patient.     Fall Risk Score: MODERATE RISK  Fall risk interventions in place: Place yellow fall risk ID band on patient, Provide patient/family education based on risk assessment, Educate patient/family to call staff for assistance when getting out of bed, Place fall precaution signage outside patient door, Place patient in room close to nursing station, and Utilize bed/chair fall alarm  Bed type: Regular (Gary Score less than 17 interventions in place)  Patient on cardiac monitor: Yes  IVF/IV medications: Infusion per MAR (List Med(s)) Amio running at 0.5  traced line to pump   Oxygen: Room Air  Bedside sitter: Not Applicable   Isolation: Not applicable

## 2024-07-04 NOTE — CARE PLAN
The patient is Stable - Low risk of patient condition declining or worsening    Shift Goals  Clinical Goals: Shower, Mobility, pain control  Patient Goals: Rest  Family Goals: NA    Progress made toward(s) clinical / shift goals:      Problem: Pain - Standard  Goal: Alleviation of pain or a reduction in pain to the patient’s comfort goal  Description: Target End Date:  Prior to discharge or change in level of care    Document on Vitals flowsheet    1.  Document pain using the appropriate pain scale per order or unit policy  2.  Educate and implement non-pharmacologic comfort measures (i.e. relaxation, distraction, massage, cold/heat therapy, etc.)  3.  Pain management medications as ordered  4.  Reassess pain after pain med administration per policy  5.  If opiods administered assess patient's response to pain medication is appropriate per POSS sedation scale  6.  Follow pain management plan developed in collaboration with patient and interdisciplinary team (including palliative care or pain specialists if applicable)  Outcome: Progressing     Problem: Knowledge Deficit - Standard  Goal: Patient and family/care givers will demonstrate understanding of plan of care, disease process/condition, diagnostic tests and medications  Description: Target End Date:  1-3 days or as soon as patient condition allows    Document in Patient Education    1.  Patient and family/caregiver oriented to unit, equipment, visitation policy and means for communicating concern  2.  Complete/review Learning Assessment  3.  Assess knowledge level of disease process/condition, treatment plan, diagnostic tests and medications  4.  Explain disease process/condition, treatment plan, diagnostic tests and medications  Outcome: Progressing     Problem: Hemodynamics  Goal: Patient's hemodynamics, fluid balance and neurologic status will be stable or improve  Description: Target End Date:  Prior to discharge or change in level of care    Document on  Assessment and I/O flowsheet templates    1.  Monitor vital signs, pulse oximetry and cardiac monitor per provider order and/or policy  2.  Maintain blood pressure per provider order  3.  Hemodynamic monitoring per provider order  4.  Manage IV fluids and IV infusions  5.  Monitor intake and output  6.  Daily weights per unit policy or provider order  7.  Assess peripheral pulses and capillary refill  8.  Assess color and body temperature  9.  Position patient for maximum circulation/cardiac output  10. Monitor for signs/symptoms of excessive bleeding  11. Assess mental status, restlessness and changes in level of consciousness  12. Monitor temperature and report fever or hypothermia to provider immediately. Consideration of targeted temperature management.  Outcome: Progressing     Problem: Fluid Volume  Goal: Fluid volume balance will be maintained  Description: Target End Date:  Prior to discharge or change in level of care    Document on I/O flowsheet    1.  Monitor intake and output as ordered  2.  Promote oral intake as appropriate  3.  Report inadequate intake or output to physician  4.  Administer IV therapy as ordered  5.  Weights per provider order  6.  Assess for signs and symptoms of bleeding  7.  Monitor for signs of fluid overload (respiratory changes, edema, weight gain, increased abdominal girth)  8.  Monitor of signs for inadequate fluid volume (poor skin turgor, dry mucous membranes)  9.  Instruct patient on adherence to fluid restrictions  Outcome: Progressing     Problem: Urinary - Renal Perfusion  Goal: Ability to achieve and maintain adequate renal perfusion and functioning will improve  Description: Target End Date:  Prior to discharge or change in level of care    Document on I/O and Assessment flowsheet    1.  Urine output will remain greater than 0.5ml/Kg/HR  2.  Monitor amount and/or characteristics of urine per order/policy. Specific gravity per order/policy  3.  Assess signs and symptoms  of renal dysfunction  Outcome: Progressing     Problem: Respiratory  Goal: Patient will achieve/maintain optimum respiratory ventilation and gas exchange  Description: Target End Date:  Prior to discharge or change in level of care    Document on Assessment flowsheet    1.  Assess and monitor rate, rhythm, depth and effort of respiration  2.  Breath sounds assessed qshift and/or as needed  3.  Assess O2 saturation, administer/titrate oxygen as ordered  4.  Position patient for maximum ventilatory efficiency  5.  Turn, cough, and deep breath with splinting to improve effectiveness  6.  Collaborate with RT to administer medication/treatments per order  7.  Encourage use of incentive spirometer and encourage patient to cough after use and utilize splinting techniques if applicable  8.  Airway suctioning  9.  Monitor sputum production for changes in color, consistency and frequency  10. Perform frequent oral hygiene  11. Alternate physical activity with rest periods  Outcome: Progressing     Problem: Fall Risk  Goal: Patient will remain free from falls  Description: Target End Date:  Prior to discharge or change in level of care    Document interventions on the Sandy Ravinder Fall Risk Assessment    1.  Assess for fall risk factors  2.  Implement fall precautions  Outcome: Progressing     Problem: Skin Integrity  Goal: Skin integrity is maintained or improved  Description: Target End Date:  Prior to discharge or change in level of care    Document interventions on Skin Risk/Gary flowsheet groups and corresponding LDA    1.  Assess and monitor skin integrity, appearance and/or temperature  2.  Assess risk factors for impaired skin integrity and/or pressures ulcers  3.  Implement precautions to protect skin integrity in collaboration with interdisciplinary team  4.  Implement pressure ulcer prevention protocol if at risk for skin breakdown  5.  Confirm wound care consult if at risk for skin breakdown  6.  Ensure patient  use of pressure relieving devices  (Low air loss bed, waffle overlay, heel protectors, ROHO cushion, etc)  Outcome: Progressing     Problem: Post Op Day 1 CABG/Heart Valve Replacement  Goal: Optimal care of the post op CABG/heart valve replacement Post Op Day 1  Outcome: Progressing     Problem: Post op day 2 CABG/Heart Valve Replacement  Goal: Optimal care of the post op CABG/heart valve replacement post op day 2  Outcome: Progressing     Problem: Post Op Day 5 CABG/Heart Valve Replacement  Goal: Optimal care of the Post Op CABG/Heart Valve replacement Post Op Day 5  Description: Target End Date:  end of post op day 5  Outcome: Progressing     Problem: Risk for Aspiration  Goal: Patient's risk for aspiration will be absent or decrease  Description: Target End Date:  Prior to discharge or change in level of care    1.   Complete dysphagia screening on admission  2.   NPO until dysphagia screening complete or medically cleared  3.   Collaborate with Speech Therapy, Clinical Dietitian and interdisciplinary team  4.   Implement aspiration precautions  5.   Assist patient up to chair for meals  6.   Elevate head of bed 90 degrees if patient is unable to get out of bed  7.   Encourage small bites  8.   Ensure foods/liquids are of appropriate consistency  9.   Assess for any signs/symptoms of aspiration  10. Assess breath sounds and vital signs after oral intake  Outcome: Progressing

## 2024-07-04 NOTE — PROGRESS NOTES
[facility] -  PROGRESS NOTE               Author: Grabiel Augustin M.D. Date & Time: 7/4/2024  7:40 AM     HPI:  68-year-old  male with biventricular history of ESRD on hemodialysis q. Monday Wednesday Friday at [facility] through left upper arm AV fistula that was placed about 2 to 3 years ago.  He presented to the ER with left arm pain and swelling for the last 5 days.  His last dialysis was on Wednesday and had no dialysis in the last 5 days.  He felt sick on Friday and missed his dialysis.  Ultrasound of the left upper arm AV fistula showed there is no flow in the AV fistula and it has thrombosed.  A temporary right IJ dialysis catheter was placed in the ER.  Nephrology has been consulted for management of dialysis.  Patient denies any fever or chills, no nausea or vomiting, no chest pain.  He has some shortness of breath but is able to talk in full sentences and is in no acute respiratory distress at this time. No melena, hematochezia, hematemesis.  No HA, visual changes, or abdominal pain.     NEPHROLOGY DAILY PROGRESS:   6/24: consult note  6/25:  Afebrile, HD last night without any complications, plans noted for OR today with Dr. Tinoco for vascualr surgery   6/26: Underwent AVG repair yesterday with Dr. Tinoco, has LAMBERT drain, BP noted low this AM on dialysis, says BP 's run low on dialysis - not on Midodrine, ordered Midodrine to be used on dialysis   6/27: 0 Net UF with HD yesterday, limited by hypotension. Sitting up at side of bed eating breakfast. Bps improved 130s systolic.   6/28: HD today, seen  6/29: s/p HD yesterday over temp HD line. Tolerated well, no complaints.  6/30: in OR, not available   7/1: s/p AVR, 1V CABG and TAHIR resection/ligation, transferred to ICU, now off pressors, fatigued, post-op pain  7/2: Stable hemodynamics, tolerated HD, transferred out of ICU, chest wall pain improved  7/3: Stable hemodynamics, 7/1 BC remain negative, denies complaints  "  7/4: No acute events, tolerated HD, chest wall pain improved, feeling better overall    REVIEW OF SYSTEMS:    10 Point ROS performed, negative other than stated above    PMH/PSH/SH/FH:   Reviewed and unchanged since admission note    CURRENT MEDICATIONS:   Reviewed from admission to present day    VS:  VS:  /68   Pulse 62   Temp 36.4 °C (97.5 °F) (Temporal)   Resp 16   Ht 1.854 m (6' 0.99\")   Wt 101 kg (223 lb 12.3 oz)   SpO2 92%   BMI 29.53 kg/m²   GENERAL: no acute distress  CV: No edema  RESP: non-labored  GI: Soft  MSK: No joint deformities   SKIN: No concerning rashes  NEURO: AOx3  PSYCH: Cooperative    Fluids:  In: 500 [Dialysis:500]  Out: 1000     LABS:  Recent Labs     07/02/24  0400 07/03/24  0047 07/03/24  1309 07/04/24  0110   SODIUM 133* 132*  --  134*   POTASSIUM 3.9 3.8 3.7 3.8   CHLORIDE 94* 95*  --  95*   CO2 24 21  --  24   GLUCOSE 104* 89  --  103*   BUN 27* 33*  --  24*   CREATININE 4.54* 5.41*  --  4.00*   CALCIUM 8.5 7.9*  --  8.1*       IMAGING:   All imaging reviewed from admission to present day    IMPRESSION:  # ESRD  -Q. MWF at [facility]  # Bacteremia   - Blood Cx from 6/24, 6/27 + E. Faecalis  # Bacterial endocarditis:   -s/p aortic valve replacement 6/30  3 CAD  -s/p 1V CABG 6/30  #Thrombosed left upper arm AVG; infected pseudoaneurysm   -Right IJ temporary HD catheter placed in ER 6/24/2024  - S/P AVG repair, resection and pseudoaneurysm resection 6/25/24 by Dr. Tinoco along with  Reconstruction of left brachial artery using left greater saphenous vein   interposition graft.  # HTN  # Sepsis   # Anemia of CKD  # CKD-MBD  # Coronary artery disease  # Chronic A-fib  -s/p TAHIR resection/ligation        PLAN:  - No HD today, continue q MWF schedule    -7/1 BC remain negative, placing order for permacath placement  - EPO with HD  - No dietary protein restrictions  - abx per primary  - Dose all meds per ESRD    Thank you    "

## 2024-07-04 NOTE — PROGRESS NOTES
Bedside report received from off going RN/tech: Margarita Rn, assumed care of patient.     Fall Risk Score: MODERATE RISK  Fall risk interventions in place: Place yellow fall risk ID band on patient, Provide patient/family education based on risk assessment, Educate patient/family to call staff for assistance when getting out of bed, Place fall precaution signage outside patient door, Place patient in room close to nursing station, and Utilize bed/chair fall alarm  Bed type: Regular (Gary Score less than 17 interventions in place)  Patient on cardiac monitor: Yes  IVF/IV medications: Infusion per MAR (List Med(s)) Amiodarone 0.5  Oxygen: Room Air  Bedside sitter: Not Applicable   Isolation: Not applicable    Pt is sleeping. Bed in low and locked position, call light within reach, will continue to  monitor.

## 2024-07-04 NOTE — PROGRESS NOTES
Hospital Medicine Daily Progress Note    Date of Service  7/4/2024    Chief Complaint  Brian Jasmine is a 68 y.o. male admitted 6/24/2024 with left arm graft infection    Hospital Course  Brian Jasmine is a 68 y.o. male who presented 6/24/2024 with left arm infection.  Mr. Jasmine has a past medical history of peripheral arterial disease with femoral popliteal bypass, end-stage renal disease undergoing dialysis via a left arm graft, atrial fibrillation on Coumadin therapy, hypertension, coronary artery disease with stent to the circumflex that developed pain, redness, swelling in the left arm graft site about 5 days ago and did not go to dialysis and stopped taking all of his medications because he felt so poorly.  He has had fevers and chills.  He presents with the symptoms and here has a grossly infected site.  His creatinine is 8.8 with a potassium 3.8.  Vascular surgery has been consulted as has nephrology.  Attempt will be made at a temporary dialysis catheter until blood cultures are cleared for a possible tunneled catheter.  He states he has had at least 6 or 7 prior neck catheters in the past.  Patient was started on IV vancomycin.  Blood culture was positive for E faecalis.  Patient underwent a RENEE that revealed aortic valve infective endocarditis as well as severe aortic insufficiency.  CT surgery was consulted and patient underwent aortic valve replacement, CABG x 1 and TAHIR resection and ligation on 6/30/2024.    Interval Problem Update  7/3 Patient seen sitting in chair, breathing well on room air. He reports some chest wall pain.  His chest tubes and pacing wires have been removed.  His chest wound appears clean dry and intact.  Left arm appears slightly swollen. His blood cultures from 7/1 have been negative.  His white count is elevated at 14.8.  Continue IV ampicillin and IV ceftriaxone.  Case discussed with ID    7/4 patient seen and examined at bedside.  He continues to report chest wall pain.   He attempted to work with physical therapy and reported some shortness of breath after walking 50 feet.  Blood cultures from 7/1 remain negative.  Plan is for permacath placement.  Patient will need IV daptomycin on discharge with stop date of 8/11/2024.  I discussed the plan of care with patient and infectious disease    I have discussed this patient's plan of care and discharge plan at IDT rounds today with Case Management, Nursing, Nursing leadership, and other members of the IDT team.    Consultants/Specialty  cardiovascular surgeon, infectious disease, and vascular surgery    Code Status  Full Code    Disposition  The patient is not medically cleared for discharge to home or a post-acute facility.      I have placed the appropriate orders for post-discharge needs.    Review of Systems  Review of Systems   Constitutional:  Positive for malaise/fatigue.   Cardiovascular:  Positive for chest pain.        Physical Exam  Temp:  [36.3 °C (97.3 °F)-36.9 °C (98.4 °F)] 36.3 °C (97.3 °F)  Pulse:  [53-88] 53  Resp:  [16-17] 17  BP: ()/(55-74) 146/71  SpO2:  [92 %-98 %] 95 %    Physical Exam  Vitals and nursing note reviewed.   Constitutional:       General: He is not in acute distress.  HENT:      Head: Normocephalic.      Mouth/Throat:      Mouth: Mucous membranes are moist.   Eyes:      Pupils: Pupils are equal, round, and reactive to light.   Cardiovascular:      Rate and Rhythm: Normal rate and regular rhythm.      Pulses: Normal pulses.      Heart sounds: Normal heart sounds.      Comments: Sternal incision clean dry and intact  Pulmonary:      Effort: Pulmonary effort is normal.      Breath sounds: Normal breath sounds.   Abdominal:      Palpations: Abdomen is soft.      Tenderness: There is no abdominal tenderness.   Musculoskeletal:         General: No swelling.      Cervical back: Neck supple.      Comments: Left arm swollen   Skin:     General: Skin is warm.      Coloration: Skin is not jaundiced.    Neurological:      General: No focal deficit present.      Mental Status: He is alert and oriented to person, place, and time.   Psychiatric:         Mood and Affect: Mood normal.         Behavior: Behavior normal.         Fluids    Intake/Output Summary (Last 24 hours) at 7/4/2024 1447  Last data filed at 7/4/2024 1127  Gross per 24 hour   Intake 700 ml   Output 1000 ml   Net -300 ml       Laboratory  Recent Labs     07/02/24  0400 07/03/24  0047 07/04/24  0110   WBC 13.4* 14.8* 13.7*   RBC 2.56* 2.72* 3.19*   HEMOGLOBIN 7.6* 8.5* 9.7*   HEMATOCRIT 23.1* 24.7* 29.1*   MCV 90.2 90.8 91.2   MCH 29.7 31.3 30.4   MCHC 32.9 34.4 33.3   RDW 52.4* 52.0* 53.5*   PLATELETCT 160* 157* 181   MPV 10.8 10.6 10.5     Recent Labs     07/02/24  0400 07/03/24  0047 07/03/24  1309 07/04/24  0110   SODIUM 133* 132*  --  134*   POTASSIUM 3.9 3.8 3.7 3.8   CHLORIDE 94* 95*  --  95*   CO2 24 21  --  24   GLUCOSE 104* 89  --  103*   BUN 27* 33*  --  24*   CREATININE 4.54* 5.41*  --  4.00*   CALCIUM 8.5 7.9*  --  8.1*                     Imaging  DX-CHEST-PORTABLE (1 VIEW)   Final Result         1.  Pulmonary edema and/or infiltrates are identified, which are stable since the prior exam.   2.  Trace left pleural effusion   3.  Cardiomegaly      DX-CHEST-LIMITED (1 VIEW)   Final Result         1.  Left IJ catheter, tip projects over the brachiocephalic vein/aorta region. This is short in position and does not extend into the SVC. Need to correlate clinically for venous blood return to exclude arterial line.   2.  Other findings are detailed above.      EC-RENEE W/O CONT   Final Result      DX-CHEST-2 VIEWS   Final Result         1.  No focal infiltrates.   2.  Perihilar interstitial prominence and bronchial wall cuffing, appearance suggests changes of underlying bronchial inflammation, consider bronchitis.      US-VEIN MAPPING LOWER EXTREMITY BILAT   Final Result      US-CAROTID DOPPLER UNILAT LEFT   Final Result      EC-RENEE W/O CONT    Final Result      EC-ECHOCARDIOGRAM COMPLETE W/O CONT   Final Result      US-SUSI SINGLE LEVEL BILAT   Final Result      US-EXTREMITY ARTERY LOWER BILAT   Final Result      DX-CHEST-PORTABLE (1 VIEW)   Final Result      1.  Satisfactory appearance of the right IJ catheter. No pneumothorax visualized.      US-HEMODIALYSIS GRAFT DUPLEX COMP UPPER EXTREMITY   Final Result      US-HEMODIALYSIS GRAFT DUPLEX COMP UPPER EXTREMITY   Final Result      DX-CHEST-PORTABLE (1 VIEW)   Final Result      Mild enlargement of the cardiomediastinal silhouette without acute cardiopulmonary abnormality.      CL-LEFT HEART CATHETERIZATION WITH POSSIBLE INTERVENTION    (Results Pending)   IR-HUERTA,GROSHONG PLACEMENT >5    (Results Pending)        Assessment/Plan  * Infected prosthetic vascular graft, initial encounter ([facility])- (present on admission)  Assessment & Plan  Vascular surgery consulted.   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 Left greater saphenous vein harvest. Reconstruction of left brachial artery using left greater saphenous vein   interposition graft. Partial removal of left upper arm dialysis graft.      S/P AVR (aortic valve replacement)  Assessment & Plan  Developed aortic valve endocarditis  Status post aortic valve replacement    Bacteremia- (present on admission)  Assessment & Plan  Probably related to infected graft, status post removal.  Blood cultures positive for Enterococcus  RENEE revealed aortic valve endocarditis  Patient is status post aortic valve replacement  Repeat blood cultures on 7/1 have been negative  Continue IV ampicillin and IV ceftriaxone while inpatient with plans to switch to IV dapto on discharge with stop date of 8/11/24      Coronary artery disease- (present on admission)  Assessment & Plan  Followed by cardiology in [place] with history of stent to the circumflex  S/p CABG X1 WITH SEKELTONIZED JIMEENZ to LAD  Continue  DAPT and atorvastatin    PAD (peripheral artery disease) (HCC)- (present on admission)  Assessment & Plan  Hx of previous fem-pop bypass    Troponin level elevated  Assessment & Plan  Troponin is elevated at 200  He denies chest pain  EKG reveals an intraventricular conduction delay not clearly left nor right bundle  Likely a stress response given his infection and lack of dialysis.    Follow-up echo    Chronic atrial fibrillation (HCC)- (present on admission)  Assessment & Plan  He had been on coumadin but hasn't been taking it  Currently on DAPT per CT surgery  Continue amiodarone and metoprolol      End stage renal disease (HCC)- (present on admission)  Assessment & Plan  Nephrology consulted  Continue HD   Plan for perma cath placement     Renovascular hypertension- (present on admission)  Assessment & Plan  Restart his home Coreg with holding parameters         VTE prophylaxis: SCD    I have performed a physical exam and reviewed and updated ROS and Plan today (7/4/2024). In review of yesterday's note (7/3/2024), there are no changes except as documented above.    I spent 52 minutes, reviewing the chart, obtaining and/or reviewing separately obtained history. Performing a medically appropriate examination and evaluation.  Counseling and educating the patient. Ordering and reviewing medications, tests, or procedures.  Discussing the case with ID.  Documenting clinical information in EPIC. Independently interpreting results and communicating results to patient. Discussing future disposition of care with patient, RN and case management.

## 2024-07-04 NOTE — PROGRESS NOTES
Infectious Disease Progress Note    Author: Sarabjit Pulliam M.D. Date & Time of service: 2024  11:44 AM    Chief Complaint:  Bacteremia, endocarditis    Interval History:   patient remains afebrile, count 11,000, cardiothoracic surgery planning aortic valve replacement, cultures as below, creatinine 4.05   patient remains afebrile, underwent valve replacement surgery yesterday, white count bumped to 27.5, down to 16.7 today, cultures as below, creatinine 4.95, magnesium 3 point   patient is afebrile, white count is down to 13.4, cultures as below, creatinine 4.54,  7/3 patient remains afebrile, white count is 14.8, tolerating dual beta-lactam antibiotics, cultures as below, creatinine 5.41   patient remains afebrile, count 13.7, cultures as below, doing well postop, creatinine 4, magnesium 2.9    Review of Systems:  Review of Systems   Constitutional:  Negative for chills and fever.   Skin:  Negative for itching and rash.     Hemodynamics:  Temp (24hrs), Av.5 °C (97.7 °F), Min:36.3 °C (97.3 °F), Max:36.9 °C (98.4 °F)  Temperature: 36.3 °C (97.3 °F), Monitored Temp: 36.4 °C (97.5 °F)  Pulse  Av.9  Min: 31  Max: 136   Blood Pressure : (!) 146/71       Physical Exam:  Physical Exam  Constitutional:       General: He is not in acute distress.     Appearance: He is ill-appearing.   Cardiovascular:      Rate and Rhythm: Normal rate and regular rhythm.      Heart sounds: Normal heart sounds.   Pulmonary:      Effort: Pulmonary effort is normal.      Breath sounds: Normal breath sounds.      Comments: Well-approximated midline surgical site with no gross external evidence of uncontrolled infection  Abdominal:      General: Abdomen is flat. Bowel sounds are normal.      Palpations: Abdomen is soft.   Musculoskeletal:      Comments: Left arm in bandaging   Skin:     General: Skin is warm and dry.   Neurological:      General: No focal deficit present.      Mental Status: He is oriented to person,  place, and time.   Psychiatric:         Mood and Affect: Mood normal.         Behavior: Behavior normal.       Meds:    Current Facility-Administered Medications:     amiodarone    epoetin    metoprolol tartrate    valsartan    hydrALAZINE    Respiratory Therapy Consult    electrolyte-A    Nozin nasal  swab    aspirin    clopidogrel    atorvastatin    Pharmacy Consult Request    acetaminophen **FOLLOWED BY** [START ON 7/10/2024] acetaminophen    oxyCODONE immediate-release **OR** oxyCODONE immediate-release **OR** fentaNYL    traMADol    sodium bicarbonate    ondansetron **OR** prochlorperazine    acetaminophen **OR** acetaminophen    docusate sodium    senna-docusate    senna-docusate    polyethylene glycol/lytes    magnesium hydroxide    bisacodyl    sodium phosphate    omeprazole    mag hydrox-al hydrox-simeth    diphenhydrAMINE    albuterol    cefTRIAXone (ROCEPHIN) IV    ampicillin    labetalol    heparin    heparin    heparin    heparin    Labs:  Recent Labs     07/02/24  0400 07/03/24  0047 07/04/24  0110   WBC 13.4* 14.8* 13.7*   RBC 2.56* 2.72* 3.19*   HEMOGLOBIN 7.6* 8.5* 9.7*   HEMATOCRIT 23.1* 24.7* 29.1*   MCV 90.2 90.8 91.2   MCH 29.7 31.3 30.4   RDW 52.4* 52.0* 53.5*   PLATELETCT 160* 157* 181   MPV 10.8 10.6 10.5     Recent Labs     07/02/24  0400 07/03/24  0047 07/03/24  1309 07/04/24  0110   SODIUM 133* 132*  --  134*   POTASSIUM 3.9 3.8 3.7 3.8   CHLORIDE 94* 95*  --  95*   CO2 24 21  --  24   GLUCOSE 104* 89  --  103*   BUN 27* 33*  --  24*     Recent Labs     07/02/24  0400 07/03/24  0047 07/04/24  0110   CREATININE 4.54* 5.41* 4.00*       Imaging:  US-EXTREMITY ARTERY LOWER BILAT    Result Date: 6/27/2024  Lower Extremity  Arterial Duplex Report  Vascular Laboratory  CONCLUSIONS  1.  Right lower extremity atherosclerosis with occlusion of the distal  fem/popliteal graft with 3 vessel runoff to the right ankle.  3.  Left lower extremity atherosclerosis with patent fem/popliteal graft   and 3 vessel runoff to the ankle.  BROOKE ROSARIO  Exam Date:     2024 12:28  Room #:     Inpatient  Priority:     Routine  Ht (in):             Wt (lb):  Ordering Physician:        CHANTAL CRAWFORD  Referring Physician:       [id number]  Sonographer:               Hipolito Anthony RVT, RDMS  Study Type:                Complete Bilateral  Technical Quality:         Adequate  Age:    68    Gender:     M  MRN:    [id number]  :    1955      BSA:  Indications:     Bypass Graft  CPT Codes:       90705  ICD Codes:       414.04  History:         History of bilateral fem-pop bypass grafts. No prior studies.  Limitations:                RIGHT  Waveform        Peak Systolic Velocity (cm/s)                  Prox    Prox-Mid  Mid    Mid-Dist  Distal  Biphasic                          274                      CFA  Biphasic        198                                        PFA                                                             SFA                                    29                       POP  Monophasic      73                                 18      AT  Monophasic      40                                 31      PT  Monophasic      72                                 51      NICHOLAS                LEFT  Waveform        Peak Systolic Velocity (cm/s)                  Prox    Prox-Mid  Mid    Mid-Dist  Distal  Biphasic                          206                      CFA  Biphasic        373                                        PFA                                                             SFA  Biphasic                                           74      POP  Biphasic        132                                137     AT  Biphasic        93                                 94      PT  Biphasic        47                                 59      NICHOLAS  FINDINGS  Right.  Diffuse plaque and multiphasic flow throughout the  common femoral and  popliteal arteries.  Three vessel runoff to the ankle with monophasic flow.  Fully occluded fem to pop bypass graft seen with the following velocities:  Proximal anastamosis: 32 cm/sec.  Distal to proximal anastamosis: 14 cm/sec.  Distal anastamosis: No flow detected.  Distal to anastamosis: 29 cm/sec.  Left.  Diffuse plaque and multiphasic flow throughout the common femoral and  popliteal arteries.  Three vessel runoff to the ankle with biphasic flow.  Fem to pop bypass graft seen with the following velocities:  Proximal anastamosis: 32 cm/sec.  Distal to proximal anastamosis: 21 cm/sec.  Proximal thigh: 104 cm/sec.  Mid thigh: 107 cm/sec.  Distal thigh: 95 cm/sec.  Distal anastamosis: 183 cm/sec.  Distal to anastamosis: 132 cm/sec.  Abdominal aortic aneurysm seem with a measurement of 4.0 x 4.4 cm noted as  an incidental finding.  .  Nani Morataya  (Electronically Signed)  Final Date:      2024                   15:40    US-SUSI SINGLE LEVEL BILAT    Result Date: 2024   Vascular Laboratory  Conclusions  1.  Reduced right SUSI.  2.  Reduced left TBI.  BROOKE ROSARIO  Age:    68    Gender:     M  MRN:    [id number]  :    1955      BSA:  Exam Date:     2024 09:20  Room #:     Inpatient  Priority:     Routine  Ht (in):             Wt (lb):  Ordering Physician:        CHANTAL CRAWFORD  Referring Physician:       [id number]  Sonographer:               Hipolito Anthony JIMENA [facility]  Study Type:                Complete Bilateral  Technical Quality:         Adequate  Indications:     Ulcer of ankle  CPT Codes:       72896  ICD Codes:       707.13  History:         Ulceration of the bilateral ankles. No prior studies.  Limitations:                 RIGHT  Waveform            Systolic BPs (mmHg)                             117           Brachial  Triphasic                                Common Femoral  Biphasic                                  Popliteal  Bi, non-rev                83            Posterior Tibial  Bi, non-rev                72            Dorsalis Pedis                             285           Digit                             0.71          SUIS                             2.44          TBI                       LEFT  Waveform        Systolic BPs (mmHg)                                           Brachial  Triphasic                                Common Femoral  Triphasic                                Popliteal  Triphasic                  130           Posterior Tibial  Triphasic                  117           Dorsalis Pedis  Diminished                 45            Digit                             1.11          SUSI                             0.38          TBI  Findings  Right.  Doppler waveforms of the common femoral, popliteal, posterior tibial, and  dorsalis pedis arteries are of high amplitude and multiphasic.  Doppler waveforms at the ankle are brisk and biphasic.  The ankle-brachial index is dimished.  The toe-brachial index is not accurately measured due to calcification and  noncompressibility of the tibial vessels.  Left.  Unable to obtain brachial pressure due to unremovable dressing following AV  fistula repair.   Doppler waveforms of the common femoral, popliteal, posterior tibial, and  dorsalis pedis arteries are of high amplitude and multiphasic.  Doppler waveforms at the ankle are brisk and triphasic.  The ankle-brachial index is normal.  The toe-brachial index is abnormally reduced.  An arterial duplex was performed in accordance with lower extremity  arterial evaluation protocol - see separate report.  Nani Morataya  (Electronically Signed)  Final Date:      27 June 2024                   11:25    DX-CHEST-PORTABLE (1 VIEW)    Result Date: 6/24/2024 6/24/2024 2:53 PM HISTORY/REASON FOR EXAM:  Status post central line placement TECHNIQUE/EXAM DESCRIPTION AND NUMBER OF VIEWS: Single portable view  of the chest. COMPARISON: 2024 FINDINGS: Right IJ catheter tip projects over the SVC in satisfactory position. The mediastinal and cardiac silhouette is unremarkable. The pulmonary vascularity is within normal limits. The lung parenchyma is clear. There is no significant pleural effusion. There is no visible pneumothorax. There are no acute bony abnormalities.     1.  Satisfactory appearance of the right IJ catheter. No pneumothorax visualized.    US-HEMODIALYSIS GRAFT DUPLEX COMP UPPER EXTREMITY    Result Date: 2024   Upper Extremity  Venous Duplex Report  Vascular Laboratory  CONCLUSIONS  1.  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  2.  No acute DVT seen elsewhere in the left upper extremity.  BROOKE ROSARIO  Exam Date:     2024 11:30  Room #:     Inpatient  Priority:     Stat  Ht (in):             Wt (lb):  Ordering Physician:        MARIO CLARK  Referring Physician:       [id number] Munson  Sonographer:               Hipolito Anthony RVT, RDMS  Study Type:                Complete Unilateral  Technical Quality:         Adequate  Age:    68    Gender:     M  MRN:    [id number]  :    1955      BSA:  Indications:     AV Fistula / Thrombosed  CPT Codes:       93846  ICD Codes:       996.73  History:         Left thrombosed AV Fistula. No prior studies.  Limitations:  PROCEDURES:  Left upper extremity venous duplex imaging.  The following venous structures were evaluated: internal jugular,  subclavian, axillary, brachial, cephalic, and basilic veins.  Serial compression, color, and spectral Doppler flow evaluations were  performed.  FINDINGS:  Left upper extremity.  All veins demonstrate complete color filling and compressibility with  normal venous flow dynamics including spontaneous flow and respiratory  phasicity.  No superficial or deep venous thrombosis.   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  Fistula is thrombosed and  completely occluded from its proximal segment running its entire length to  its termination at the axillary vein.  Proximal segment of fistula is aneurysmal measuring 2.2 x 1.6 cm.  Nani Morataya  (Electronically Signed)  Final Date:      2024                   12:41    US-HEMODIALYSIS GRAFT DUPLEX COMP UPPER EXTREMITY    Result Date: 2024   Upper Extremity  Venous Duplex Report  Vascular Laboratory  CONCLUSIONS  1.  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  2.  No acute DVT seen elsewhere in the left upper extremity.  ABRAHAMBROOKE  Exam Date:     2024 11:30  Room #:     Inpatient  Priority:     Stat  Ht (in):             Wt (lb):  Ordering Physician:        MARIO CLARK  Referring Physician:       [id number] Munson  Sonographer:               Hipolito Anthony RVT, RDMS  Study Type:                Complete Unilateral  Technical Quality:         Adequate  Age:    68    Gender:     M  MRN:    [id number]  :    1955      BSA:  Indications:     AV Fistula / Thrombosed  CPT Codes:       07370  ICD Codes:       996.73  History:         Left thrombosed AV Fistula. No prior studies.  Limitations:  PROCEDURES:  Left upper extremity venous duplex imaging.  The following venous structures were evaluated: internal jugular,  subclavian, axillary, brachial, cephalic, and basilic veins.  Serial compression, color, and spectral Doppler flow evaluations were  performed.  FINDINGS:  Left upper extremity.  All veins demonstrate complete color filling and compressibility with  normal venous flow dynamics including spontaneous flow and respiratory  phasicity.  No superficial or deep venous thrombosis.  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  Fistula is thrombosed and  completely occluded from its  proximal segment running its entire length to  its termination at the axillary vein.  Proximal segment of fistula is aneurysmal measuring 2.2 x 1.6 cm.  Nani GUNN Krystajuliocesar  (Electronically Signed)  Final Date:      24 June 2024                   12:41    DX-CHEST-PORTABLE (1 VIEW)    Result Date: 6/24/2024 6/24/2024 10:29 AM HISTORY/REASON FOR EXAM:  Chest Pain. TECHNIQUE/EXAM DESCRIPTION AND NUMBER OF VIEWS: Single portable view of the chest. COMPARISON: None FINDINGS: The soft tissues and bony structures are unremarkable. The heart and mediastinal structures are enlarged. Pulmonary vascularity is normal. The lung fields are clear. There is no effusion or pneumothorax.     Mild enlargement of the cardiomediastinal silhouette without acute cardiopulmonary abnormality.      Micro:  As below    ASSESSMENT/PLAN:   68 y.o.  admitted 6/24/2024. Pt has a past medical history of PAD with femoral-popliteal bypass, ESRD on dialysis via left arm graft, A-fib on Coumadin and CAD with stents, he presented complaining of fevers, pain, redness and swelling in his right arm graft site ongoing for approximately 5 days.      Hospital Course:   Patient was the OR with vascular surgery on 6/25 with resection of the infected left arm dialysis graft pseudoaneurysm,  left greater saphenous vein harvest, reconstruction of left brachial artery, partial removal of the left arm dialysis graft.  Echocardiogram consistent with endocarditis as below.     Problem List  Aortic valve infective endocarditis, large lobulated mobile mass 1.7 x 1.1 cm with severe aortic insufficiency on RENEE  E faecalis bacteremia, infected left arm dialysis site  -Blood cultures on 6/24 +E faecalis, amp sensitive   Left arm dialysis graft site infection, pseudoaneurysm at site of anastomosis  -Status post or with vascular surgery as described above, partial removal of the prior graft  -Left forearm wound culture +E faecalis, amp sensitive  ESRD on HD  PAD      Plan:  -Continue dual beta-lactam therapy with renally dosed IV ampicillin and ceftriaxone while inpatient  -On 6/30, patient underwent aortic valve replacement and left atrial appendage resection and ligation, follow cultures  -Repeat blood culture from 6/27, 6/29, 6/30 also positive, follow repeat blood cultures x 2 from 7/1, pending, no growth till date  -Dialysis catheter was placed while potentially still bacteremic, may need to be removed if bacteremia fails to clear despite surgery.  Nephrology is planning permacath placement, agree since repeat blood cultures have been negative now for 72 hours  -Monitor closely for any evidence of additional sites of seeding of infection     Dispo: On discharge, recommend IV daptomycin 800 mg - 800 mg - 1200 mg with his dialysis sessions on M - W - F  Stop date: 8/11/2024  At least weekly CBC with differential, CMP, CPK while on IV antibiotics  These orders can be placed by nephrology to be obtained at dialysis center  PICC: No     Discussed with Dr. Little.  This illness poses threat to life.  ID will sign off.  Please call with questions.  ID clinic follow-up in 2 to 3 weeks.  Okay to schedule with NP Shell

## 2024-07-04 NOTE — PROGRESS NOTES
Cardiovascular Surgery Progress Note    Name: Brian Jasmine  MRN: [id number]  : 1955  Admit Date: 2024 10:20 AM  4 Days Post-Op     Procedure:  Procedure(s) and Anesthesia Type:     * REPLACEMENT, AORTIC VALVE WITH A 25 MM DICKEY INSPIRIS- Wound Class: Clean with DrainCABG X1 WITH SEKELTONIZED LIMA FREE GRAFT- Wound Class: Clean with Drain  LEFT ATRIAL APPENDAGE RESECTION AND LIGATION  ECHOCARDIOGRAM, TRANSESOPHAGEAL, INTRAOPERATIVE - Wound Class: Clean with Drain    Vitals:  Vitals:    24 2200 24 2345 24 0400 24 0800   BP:  107/59 133/68 139/74   Pulse:  83 62 79   Resp:  16 16 17   Temp:    36.9 °C (98.4 °F)   TempSrc:  Temporal Temporal Temporal   SpO2:  93% 92% 95%   Weight: 101 kg (223 lb 12.3 oz)  101 kg (223 lb 12.3 oz)    Height:          Temp (24hrs), Av.7 °C (98 °F), Min:36.4 °C (97.5 °F), Max:36.9 °C (98.4 °F)      Respiratory:   Respiration: 17, Pulse Oximetry: 95 %       Fluids:    Intake/Output Summary (Last 24 hours) at 2024 0915  Last data filed at 7/3/2024 1719  Gross per 24 hour   Intake 500 ml   Output 1000 ml   Net -500 ml     Admit weight: Weight: 93 kg (205 lb)  Current weight: Weight: 101 kg (223 lb 12.3 oz) (24 0400)    Labs:  Recent Labs     24  0400 24  0047 24  0110   WBC 13.4* 14.8* 13.7*   RBC 2.56* 2.72* 3.19*   HEMOGLOBIN 7.6* 8.5* 9.7*   HEMATOCRIT 23.1* 24.7* 29.1*   MCV 90.2 90.8 91.2   MCH 29.7 31.3 30.4   MCHC 32.9 34.4 33.3   RDW 52.4* 52.0* 53.5*   PLATELETCT 160* 157* 181   MPV 10.8 10.6 10.5     Recent Labs     24  0400 24  0047 24  1309 24  0110   SODIUM 133* 132*  --  134*   POTASSIUM 3.9 3.8 3.7 3.8   CHLORIDE 94* 95*  --  95*   CO2 24 21  --  24   GLUCOSE 104* 89  --  103*   BUN 27* 33*  --  24*   CREATININE 4.54* 5.41*  --  4.00*   CALCIUM 8.5 7.9*  --  8.1*             HgbA1c:  5.1    Diabetic Educator Consult:  N\A    Medications:  Scheduled Medications   Medication Dose  Frequency    amiodarone  400 mg BID    epoetin  10,000 Units MO, WE + FR    metoprolol tartrate  25 mg BID    valsartan  40 mg Q DAY    Nozin nasal  swab  1 Applicator BID    aspirin  81 mg DAILY    clopidogrel  75 mg DAILY    atorvastatin  40 mg QHS    Pharmacy Consult Request  1 Each PHARMACY TO DOSE    acetaminophen  1,000 mg Q6HRS    docusate sodium  100 mg BID    senna-docusate  1 Tablet Nightly    omeprazole  20 mg DAILY    cefTRIAXone (ROCEPHIN) IV  2,000 mg Q12HRS    ampicillin  2,000 mg Q12HRS        Exam:   Physical Exam  Vitals and nursing note reviewed.   Constitutional:       General: He is not in acute distress.     Appearance: Normal appearance.   HENT:      Head: Normocephalic.      Right Ear: External ear normal.      Left Ear: External ear normal.      Nose: Nose normal. No congestion.      Mouth/Throat:      Mouth: Mucous membranes are moist.      Pharynx: Oropharynx is clear.   Eyes:      Extraocular Movements: Extraocular movements intact.      Pupils: Pupils are equal, round, and reactive to light.   Cardiovascular:      Rate and Rhythm: Normal rate and regular rhythm.      Pulses: Normal pulses.      Heart sounds: Normal heart sounds.   Pulmonary:      Effort: Pulmonary effort is normal.      Breath sounds: Decreased breath sounds present.   Abdominal:      General: Bowel sounds are normal. There is no distension.      Palpations: Abdomen is soft.   Musculoskeletal:      Cervical back: Normal range of motion and neck supple. No tenderness.      Right lower leg: No edema.      Left lower leg: No edema.   Skin:     General: Skin is warm and dry.      Comments: Midline surgical incision   Neurological:      General: No focal deficit present.      Mental Status: He is alert and oriented to person, place, and time. Mental status is at baseline.   Psychiatric:         Mood and Affect: Mood normal.         Behavior: Behavior normal.         Thought Content: Thought content normal.          Cardiac Medications:    ASA - Yes    Plavix - Yes    Post-operative Beta Blockers - Yes    Ace/ARB- Yes    Statin - Yes    Aldactone- No; contraindicated because of Normal EF    SGLT2i-  No contraindicated because of No; contraindicated because of Normal EF    Ejection Fraction:  50%    Telemetry:   7/1 SR  7/2 SB/SR  7/3 SR  7/4 SB Afib yesterday    Assessment/Plan:  POD 1  HDS, SR, neuro intact, sternal incision intact, abdomen soft - BM, fluid balance positive, wt up,  2 L NC. H/H 7.7/22.9- got blood this AM. Plan:  Recheck H/H- transfuse as appropriate. Keep chest tubes and pacing wires, IS/ambulate.     POD 2  HDS, SB, neuro intact, wounds intact, abdomen +BM, fluid balance positive, t/ up,  0.5 L NC. H/H 7.6/23.1.  Plan: Transfuse 1 unit RBCs. Remove chest tubes. Dialysis per neph. IS/ambulate. Transfer to [facility].     POD 3 HDS. SR.  Neuro intact.  Wounds CDI.  Abdomen soft nontender.  RA.  Cr 5.4 hgb 8.5.  Plan: Dialysis per neph.  Pacer wires removed.  Encourage IS/ambulation.      Pod 4 HDS. SB.  Neuro intact.  Wounds CDI.  Abdomen soft nontender +BM.  RA.  Cr 4 Hgb 9.7.  Plan: DC amio gtt continue amio oral.  Encourage IS/ambulation.    Disposition:  D

## 2024-07-04 NOTE — PROGRESS NOTES
3hr HD started @ 1419 and ended @ 1719,net UF = 500ml,low bp prior to start @ 86/50,got better @ the end. RIJ non TDC dressing CDI.Report given to Livia Solomon RN.

## 2024-07-04 NOTE — PROGRESS NOTES
Monitor Summary  Rhythm: A fib/ SB   Patient converted at 2238  Rate: 51-89  Ectopy: OPVC  - / .11 / -

## 2024-07-04 NOTE — CARE PLAN
The patient is Stable - Low risk of patient condition declining or worsening    Shift Goals  Clinical Goals: Shower, Mobility, pain control  Patient Goals: Rest  Family Goals: NA    Progress made toward(s) clinical / shift goals:      Problem: Post Op Day 4 CABG/Heart Valve Replacement  Goal: Optimal care of the Post Op CABG/Heart Valve replacement Post Op Day 4  7/4/2024 1553 by Perla Ramsay RMichelleN.  Outcome: Progressing  7/4/2024 1553 by Perla Ramsay RMichelleN.  Outcome: Progressing  Intervention: Shower daily and clean incisions twice daily with soap and water  Note: Pt tolerating daily shower. Able to do about 60% of his own cleaning. Able to do his own incision care with supervision  Intervention: Up in chair for all meals  Note: Pt up to chair for all meals of the day. Tolerating sitting in the chair for at least an hour.   Intervention: Ambulate 4 times daily, increasing the distance each time  Note: Pt ambulating with staff. Able to go a little further each time today.   Intervention: IS q 1 hour while awake and record best IS volume  Note: Best effort on IS is 1250

## 2024-07-04 NOTE — PROGRESS NOTES
2238 Patient converted back into SR.     0325 Patient's HR was sustaining 51. Patient on amio gtt. Updated CIC rapid about patients HR with amio gtt running.     0338 Reached out to [facility] [place]. Updated on patient being in SR and a HR of 51. Per PA okay to continue amio gtt and give PO dose.

## 2024-07-04 NOTE — PROGRESS NOTES
Monitor Summary     SB/SR/Afib 50-80's    (F) PVC, PAC  (R) trigeminy, bigeminy    0.20 / 0.08 / 0.48

## 2024-07-04 NOTE — THERAPY
"Physical Therapy   Initial Evaluation     Patient Name: Brian Jasmine  Age:  68 y.o., Sex:  male  Medical Record #: [id number]  Today's Date: 7/4/2024     Precautions  Precautions: Fall Risk;Cardiac Precautions (See Comments);Sternal Precautions (See Comments)    Assessment  Patient is 68 y.o. male admitted with infected prosthetic vascular graft in L arm now s/p resection & reconstruction on 6/25.  Pt found to have to mobile aortic valve echogenic mass consistent with vegetation with severe aortic regurgitation demonstrated on RENEE as well as A fi, now POD #4 aortic valve replacement, CABG x 1.  PMH includes CAD, PVD s/p bilateral femoral-popliteal bypass; ESRD on HD, HTN, HLD, and hx of tobacco use.      Pt presented for PT evaluation with decreased strength & activity tolerance limiting safety & functional mobility.  Notable edema in LUE, educated on gentle ROM for edema management.  Pt required mod A x 2 people to stand from recliner however once standing mobilized with CGA-min A using FWW.  Pt demo'd increased SOB & WOB after ~50 ft x2 of ambulation.  Pt educated on the following information from the \"Recovering from Heart Surgery\" handout: Hospital Exercise Program, Home Exercise Program, Normal Expectations after Open Heart Surgery, Sternal Precautions, Heart rate checking, RPE, Talk Test, Cardiac Surgery Discharge Concerns Decision Tree, Heart Healthy Program Cardiac Rehab Phase II, S/S of heart failure, role of acute vs post acute vs home health PT.  Will continue to follow for skilled therapy.     /60 sitting, 119/60 standing; HR 70s throughout session.      Plan    Physical Therapy Initial Treatment Plan   Treatment Plan : Bed Mobility, Equipment, Gait Training, Neuro Re-Education / Balance, Self Care / Home Evaluation, Therapeutic Activities, Therapeutic Exercise, Family / Caregiver Training  Treatment Frequency: 5 Times per Week  Duration: Until Therapy Goals Met    DC Equipment Recommendations: " None  Discharge Recommendations: Recommend post-acute placement for continued physical therapy services prior to discharge home.   (At this time pt would benefit from post acute however pending progress with STS and activity tolerance he may be able to DC home. Pt prefers to DC home due to wife's caregiving needs.  If pt to DC home would benefit from home health if available.)     Objective     07/04/24 0929   Precautions   Precautions Fall Risk;Cardiac Precautions (See Comments);Sternal Precautions (See Comments)   Pain 0 - 10 Group   Therapist Pain Assessment Post Activity Pain Same as Prior to Activity;Nurse Notified;0   Prior Living Situation   Prior Services None   Housing / Facility 1 Story House   Steps Into Home (ramp)   Equipment Owned Front-Wheel Walker;4-Wheel Walker;Ramp;Adjustable Bed Without Rails;Grab Bar(s) By Toilet;Grab Bar(s) In Tub / Shower;Single Point Cane   Lives with - Patient's Self Care Capacity Spouse   Comments Pt reported his wife primarily uses a WC so home is ADA accessible.  Pt reported having caregivers for his wife while he is at dialysis, his sister is coming to stay with them, and he has another caregiver coming next week.   Prior Level of Functional Mobility   Bed Mobility Independent   Transfer Status Independent   Ambulation Independent   Ambulation Distance household distances   Assistive Devices Used None   Cognition    Cognition / Consciousness WDL   Level of Consciousness Alert   Comments Pleasant & receptive to education.   Active ROM Lower Body    Active ROM Lower Body  WDL   Strength Lower Body   Lower Body Strength  WDL   Sensation Lower Body   Lower Extremity Sensation   WDL   Other Treatments   Other Treatments Provided Extensive education provided regarding [facility] handout including: sternal precautions & activity restrictions; HR monitoring; RPE scale; talk test; hospital & home walking programs; role of acute vs post acute vs home health PT.   Balance Assessment    Sitting Balance (Static) Fair   Sitting Balance (Dynamic) Fair   Standing Balance (Static) Fair -   Standing Balance (Dynamic) Fair -   Weight Shift Sitting Fair   Weight Shift Standing Fair   Bed Mobility    Supine to Sit (NT, up in chair pre session)   Sit to Supine Standby Assist   Comments HOB flat; has an adjustable bed at home with tension grab bars available   Gait Analysis   Gait Level Of Assist Contact Guard Assist   Assistive Device Front Wheel Walker   Distance (Feet) 100 (50 ft into hallway & back to room)   Deviation Shuffled Gait;Bradykinetic   Comments limited by increased WOB   Functional Mobility   Sit to Stand Minimal Assist (x 2 people)   Transfer Method Stand Step   Mobility bed mobility, ambulation, sitting in chair   Activity Tolerance   Comments limited by fatigue, increased SOB & WOB   Edema / Skin Assessment   Comments LUE edemetous with bandaged sutures; sternal incision   Short Term Goals    Short Term Goal # 1 Pt will perform supine <> sit without bed features with SPV in 6 visits to progress bed mobility   Short Term Goal # 2 Pt will perform STS with FWW and SPV in 6 visits to progress OOB mobility   Short Term Goal # 3 Pt will perform stand pivot transfers with FWW and SPV in 6 visits to progress functional OOB mobility   Short Term Goal # 4 Pt will ambulate 150 ft with FWW and SPV in 6 visits to progress functional gait   Education Group   Education Provided Role of Physical Therapist;Cardiac Precautions;Sternal Precautions;Use of Assistive Device   Cardiac Precautions Patient Response Patient;Acceptance;Explanation;Verbal Demonstration;Handout   Sternal Precautions Patient Response Patient;Acceptance;Explanation;Verbal Demonstration;Handout   Role of Physical Therapist Patient Response Patient;Acceptance;Explanation;Verbal Demonstration;Handout   Use of Assistive Device Patient Response Patient;Acceptance;Explanation;Verbal Demonstration

## 2024-07-05 ENCOUNTER — APPOINTMENT (OUTPATIENT)
Dept: RADIOLOGY | Facility: MEDICAL CENTER | Age: 69
DRG: 216 | End: 2024-07-05
Attending: INTERNAL MEDICINE
Payer: MEDICARE

## 2024-07-05 LAB
ANION GAP SERPL CALC-SCNC: 15 MMOL/L (ref 7–16)
BACTERIA BLD CULT: NORMAL
BUN SERPL-MCNC: 30 MG/DL (ref 8–22)
CALCIUM SERPL-MCNC: 8.2 MG/DL (ref 8.5–10.5)
CHLORIDE SERPL-SCNC: 96 MMOL/L (ref 96–112)
CO2 SERPL-SCNC: 23 MMOL/L (ref 20–33)
CREAT SERPL-MCNC: 4.91 MG/DL (ref 0.5–1.4)
ERYTHROCYTE [DISTWIDTH] IN BLOOD BY AUTOMATED COUNT: 55.4 FL (ref 35.9–50)
GFR SERPLBLD CREATININE-BSD FMLA CKD-EPI: 12 ML/MIN/1.73 M 2
GLUCOSE SERPL-MCNC: 95 MG/DL (ref 65–99)
HCT VFR BLD AUTO: 29.3 % (ref 42–52)
HGB BLD-MCNC: 9.7 G/DL (ref 14–18)
INR PPP: 1.03 (ref 0.87–1.13)
MCH RBC QN AUTO: 31.1 PG (ref 27–33)
MCHC RBC AUTO-ENTMCNC: 33.1 G/DL (ref 32.3–36.5)
MCV RBC AUTO: 93.9 FL (ref 81.4–97.8)
PHOSPHATE SERPL-MCNC: 5.2 MG/DL (ref 2.5–4.5)
PLATELET # BLD AUTO: 201 K/UL (ref 164–446)
PMV BLD AUTO: 10.6 FL (ref 9–12.9)
POTASSIUM SERPL-SCNC: 3.5 MMOL/L (ref 3.6–5.5)
PROTHROMBIN TIME: 13.6 SEC (ref 12–14.6)
RBC # BLD AUTO: 3.12 M/UL (ref 4.7–6.1)
SIGNIFICANT IND 70042: NORMAL
SITE SITE: NORMAL
SODIUM SERPL-SCNC: 134 MMOL/L (ref 135–145)
SOURCE SOURCE: NORMAL
WBC # BLD AUTO: 14.2 K/UL (ref 4.8–10.8)

## 2024-07-05 PROCEDURE — A9270 NON-COVERED ITEM OR SERVICE: HCPCS | Performed by: INTERNAL MEDICINE

## 2024-07-05 PROCEDURE — 0JH63XZ INSERTION OF TUNNELED VASCULAR ACCESS DEVICE INTO CHEST SUBCUTANEOUS TISSUE AND FASCIA, PERCUTANEOUS APPROACH: ICD-10-PCS | Performed by: RADIOLOGY

## 2024-07-05 PROCEDURE — 700102 HCHG RX REV CODE 250 W/ 637 OVERRIDE(OP)

## 2024-07-05 PROCEDURE — 85610 PROTHROMBIN TIME: CPT

## 2024-07-05 PROCEDURE — A9270 NON-COVERED ITEM OR SERVICE: HCPCS

## 2024-07-05 PROCEDURE — 77001 FLUOROGUIDE FOR VEIN DEVICE: CPT

## 2024-07-05 PROCEDURE — 770020 HCHG ROOM/CARE - TELE (206)

## 2024-07-05 PROCEDURE — 700102 HCHG RX REV CODE 250 W/ 637 OVERRIDE(OP): Performed by: INTERNAL MEDICINE

## 2024-07-05 PROCEDURE — 700102 HCHG RX REV CODE 250 W/ 637 OVERRIDE(OP): Performed by: NURSE PRACTITIONER

## 2024-07-05 PROCEDURE — 700111 HCHG RX REV CODE 636 W/ 250 OVERRIDE (IP): Performed by: INTERNAL MEDICINE

## 2024-07-05 PROCEDURE — 99233 SBSQ HOSP IP/OBS HIGH 50: CPT | Performed by: INTERNAL MEDICINE

## 2024-07-05 PROCEDURE — 700111 HCHG RX REV CODE 636 W/ 250 OVERRIDE (IP): Performed by: RADIOLOGY

## 2024-07-05 PROCEDURE — 700105 HCHG RX REV CODE 258: Performed by: INTERNAL MEDICINE

## 2024-07-05 PROCEDURE — 700101 HCHG RX REV CODE 250

## 2024-07-05 PROCEDURE — 700105 HCHG RX REV CODE 258: Performed by: RADIOLOGY

## 2024-07-05 PROCEDURE — 99223 1ST HOSP IP/OBS HIGH 75: CPT | Performed by: PHYSICAL MEDICINE & REHABILITATION

## 2024-07-05 PROCEDURE — A9270 NON-COVERED ITEM OR SERVICE: HCPCS | Performed by: NURSE PRACTITIONER

## 2024-07-05 PROCEDURE — C1750 CATH, HEMODIALYSIS,LONG-TERM: HCPCS

## 2024-07-05 PROCEDURE — 700111 HCHG RX REV CODE 636 W/ 250 OVERRIDE (IP)

## 2024-07-05 PROCEDURE — 80048 BASIC METABOLIC PNL TOTAL CA: CPT

## 2024-07-05 PROCEDURE — 02H633Z INSERTION OF INFUSION DEVICE INTO RIGHT ATRIUM, PERCUTANEOUS APPROACH: ICD-10-PCS | Performed by: RADIOLOGY

## 2024-07-05 PROCEDURE — 71045 X-RAY EXAM CHEST 1 VIEW: CPT

## 2024-07-05 PROCEDURE — 84100 ASSAY OF PHOSPHORUS: CPT

## 2024-07-05 PROCEDURE — 90935 HEMODIALYSIS ONE EVALUATION: CPT

## 2024-07-05 PROCEDURE — 700101 HCHG RX REV CODE 250: Performed by: INTERNAL MEDICINE

## 2024-07-05 PROCEDURE — 85027 COMPLETE CBC AUTOMATED: CPT

## 2024-07-05 RX ORDER — HEPARIN SODIUM 1000 [USP'U]/ML
1800 INJECTION, SOLUTION INTRAVENOUS; SUBCUTANEOUS
Status: DISCONTINUED | OUTPATIENT
Start: 2024-07-05 | End: 2024-07-17 | Stop reason: HOSPADM

## 2024-07-05 RX ORDER — ONDANSETRON 2 MG/ML
4 INJECTION INTRAMUSCULAR; INTRAVENOUS PRN
Status: ACTIVE | OUTPATIENT
Start: 2024-07-05 | End: 2024-07-05

## 2024-07-05 RX ORDER — MIDAZOLAM HYDROCHLORIDE 1 MG/ML
.5-2 INJECTION INTRAMUSCULAR; INTRAVENOUS PRN
Status: ACTIVE | OUTPATIENT
Start: 2024-07-05 | End: 2024-07-05

## 2024-07-05 RX ORDER — HEPARIN SODIUM 1000 [USP'U]/ML
INJECTION, SOLUTION INTRAVENOUS; SUBCUTANEOUS
Status: COMPLETED
Start: 2024-07-05 | End: 2024-07-05

## 2024-07-05 RX ORDER — SODIUM CHLORIDE 9 MG/ML
500 INJECTION, SOLUTION INTRAVENOUS
Status: ACTIVE | OUTPATIENT
Start: 2024-07-05 | End: 2024-07-05

## 2024-07-05 RX ORDER — LIDOCAINE HYDROCHLORIDE AND EPINEPHRINE 10; 10 MG/ML; UG/ML
INJECTION, SOLUTION INFILTRATION; PERINEURAL
Status: COMPLETED
Start: 2024-07-05 | End: 2024-07-05

## 2024-07-05 RX ORDER — VALSARTAN 80 MG/1
40 TABLET ORAL ONCE
Status: COMPLETED | OUTPATIENT
Start: 2024-07-05 | End: 2024-07-05

## 2024-07-05 RX ORDER — MIDAZOLAM HYDROCHLORIDE 1 MG/ML
INJECTION INTRAMUSCULAR; INTRAVENOUS
Status: COMPLETED
Start: 2024-07-05 | End: 2024-07-05

## 2024-07-05 RX ORDER — CEFAZOLIN SODIUM 1 G/3ML
INJECTION, POWDER, FOR SOLUTION INTRAMUSCULAR; INTRAVENOUS
Status: COMPLETED
Start: 2024-07-05 | End: 2024-07-05

## 2024-07-05 RX ORDER — WARFARIN SODIUM 5 MG/1
5 TABLET ORAL
Status: COMPLETED | OUTPATIENT
Start: 2024-07-05 | End: 2024-07-05

## 2024-07-05 RX ORDER — VALSARTAN 80 MG/1
80 TABLET ORAL
Status: DISCONTINUED | OUTPATIENT
Start: 2024-07-06 | End: 2024-07-12

## 2024-07-05 RX ADMIN — MIDAZOLAM HYDROCHLORIDE 0.5 MG: 1 INJECTION, SOLUTION INTRAMUSCULAR; INTRAVENOUS at 09:35

## 2024-07-05 RX ADMIN — CEFAZOLIN 2 G: 2 INJECTION, POWDER, FOR SOLUTION INTRAMUSCULAR; INTRAVENOUS at 09:14

## 2024-07-05 RX ADMIN — AMPICILLIN SODIUM 2000 MG: 2 INJECTION, POWDER, FOR SOLUTION INTRAVENOUS at 17:55

## 2024-07-05 RX ADMIN — EPOETIN ALFA-EPBX 10000 UNITS: 10000 INJECTION, SOLUTION INTRAVENOUS; SUBCUTANEOUS at 14:29

## 2024-07-05 RX ADMIN — Medication 1 APPLICATOR: at 07:55

## 2024-07-05 RX ADMIN — HEPARIN SODIUM 1500 UNITS: 1000 INJECTION, SOLUTION INTRAVENOUS; SUBCUTANEOUS at 13:30

## 2024-07-05 RX ADMIN — MIDAZOLAM HYDROCHLORIDE 0.5 MG: 1 INJECTION INTRAMUSCULAR; INTRAVENOUS at 09:35

## 2024-07-05 RX ADMIN — FENTANYL CITRATE 25 MCG: 50 INJECTION, SOLUTION INTRAMUSCULAR; INTRAVENOUS at 09:35

## 2024-07-05 RX ADMIN — HEPARIN SODIUM 500 UNITS: 1000 INJECTION, SOLUTION INTRAVENOUS; SUBCUTANEOUS at 13:25

## 2024-07-05 RX ADMIN — HEPARIN SODIUM: 1000 INJECTION, SOLUTION INTRAVENOUS; SUBCUTANEOUS at 09:48

## 2024-07-05 RX ADMIN — CEFTRIAXONE SODIUM 2000 MG: 10 INJECTION, POWDER, FOR SOLUTION INTRAVENOUS at 04:33

## 2024-07-05 RX ADMIN — CEFTRIAXONE SODIUM 2000 MG: 10 INJECTION, POWDER, FOR SOLUTION INTRAVENOUS at 17:48

## 2024-07-05 RX ADMIN — ACETAMINOPHEN 1000 MG: 500 TABLET, FILM COATED ORAL at 12:29

## 2024-07-05 RX ADMIN — OMEPRAZOLE 20 MG: 20 CAPSULE, DELAYED RELEASE ORAL at 04:32

## 2024-07-05 RX ADMIN — LIDOCAINE HYDROCHLORIDE AND EPINEPHRINE 10 ML: 10; 10 INJECTION, SOLUTION INFILTRATION; PERINEURAL at 09:37

## 2024-07-05 RX ADMIN — ASPIRIN 81 MG: 81 TABLET, COATED ORAL at 04:32

## 2024-07-05 RX ADMIN — ACETAMINOPHEN 1000 MG: 500 TABLET, FILM COATED ORAL at 17:48

## 2024-07-05 RX ADMIN — WARFARIN SODIUM 5 MG: 5 TABLET ORAL at 17:48

## 2024-07-05 RX ADMIN — AMIODARONE HYDROCHLORIDE 400 MG: 200 TABLET ORAL at 17:48

## 2024-07-05 RX ADMIN — AMPICILLIN SODIUM 2000 MG: 2 INJECTION, POWDER, FOR SOLUTION INTRAVENOUS at 04:46

## 2024-07-05 RX ADMIN — OXYCODONE HYDROCHLORIDE 10 MG: 10 TABLET ORAL at 20:05

## 2024-07-05 RX ADMIN — AMIODARONE HYDROCHLORIDE 400 MG: 200 TABLET ORAL at 04:32

## 2024-07-05 RX ADMIN — ACETAMINOPHEN 1000 MG: 500 TABLET, FILM COATED ORAL at 04:31

## 2024-07-05 RX ADMIN — Medication 1 APPLICATOR: at 19:34

## 2024-07-05 RX ADMIN — VALSARTAN 40 MG: 80 TABLET ORAL at 04:31

## 2024-07-05 RX ADMIN — OXYCODONE HYDROCHLORIDE 10 MG: 10 TABLET ORAL at 04:32

## 2024-07-05 RX ADMIN — CLOPIDOGREL BISULFATE 75 MG: 75 TABLET ORAL at 04:32

## 2024-07-05 RX ADMIN — ATORVASTATIN CALCIUM 40 MG: 40 TABLET, FILM COATED ORAL at 19:34

## 2024-07-05 RX ADMIN — CEFAZOLIN: 1 INJECTION, POWDER, FOR SOLUTION INTRAMUSCULAR; INTRAVENOUS at 09:15

## 2024-07-05 RX ADMIN — METOPROLOL TARTRATE 25 MG: 25 TABLET, FILM COATED ORAL at 17:48

## 2024-07-05 RX ADMIN — HEPARIN SODIUM 1800 UNITS: 1000 INJECTION, SOLUTION INTRAVENOUS; SUBCUTANEOUS at 16:35

## 2024-07-05 RX ADMIN — VALSARTAN 40 MG: 80 TABLET ORAL at 07:54

## 2024-07-05 RX ADMIN — OXYCODONE HYDROCHLORIDE 10 MG: 10 TABLET ORAL at 00:28

## 2024-07-05 ASSESSMENT — PAIN DESCRIPTION - PAIN TYPE
TYPE: ACUTE PAIN
TYPE: CHRONIC PAIN;ACUTE PAIN
TYPE: ACUTE PAIN

## 2024-07-05 ASSESSMENT — FIBROSIS 4 INDEX: FIB4 SCORE: 2.07

## 2024-07-05 NOTE — PROGRESS NOTES
Cardiovascular Surgery Progress Note    Name: Brian Jasmine  MRN: [id number]  : 1955  Admit Date: 2024 10:20 AM  5 Days Post-Op     Procedure:  Procedure(s) and Anesthesia Type:     * REPLACEMENT, AORTIC VALVE WITH A 25 MM DICKEY INSPIRIS- Wound Class: Clean with DrainCABG X1 WITH SEKELTONIZED LIMA FREE GRAFT- Wound Class: Clean with Drain  LEFT ATRIAL APPENDAGE RESECTION AND LIGATION  ECHOCARDIOGRAM, TRANSESOPHAGEAL, INTRAOPERATIVE - Wound Class: Clean with Drain    Vitals:  Vitals:    24 2343 240 24   BP: 132/62 (!) 149/70 (!) 153/73    Pulse: 63 60 61    Resp: 18 18 18    Temp:   36.5 °C (97.7 °F)    TempSrc: Temporal Temporal Temporal    SpO2: 95% 93% 94%    Weight:    102 kg (224 lb 13.9 oz)   Height:          Temp (24hrs), Av.6 °C (97.8 °F), Min:36.3 °C (97.3 °F), Max:36.9 °C (98.4 °F)      Respiratory:   Respiration: 18, Pulse Oximetry: 94 %       Fluids:    Intake/Output Summary (Last 24 hours) at 2024 0705  Last data filed at 2024 1127  Gross per 24 hour   Intake 200 ml   Output --   Net 200 ml     Admit weight: Weight: 93 kg (205 lb)  Current weight: Weight: 102 kg (224 lb 13.9 oz) (24)    Labs:  Recent Labs     24  0047 24  0110 24  0036   WBC 14.8* 13.7* 14.2*   RBC 2.72* 3.19* 3.12*   HEMOGLOBIN 8.5* 9.7* 9.7*   HEMATOCRIT 24.7* 29.1* 29.3*   MCV 90.8 91.2 93.9   MCH 31.3 30.4 31.1   MCHC 34.4 33.3 33.1   RDW 52.0* 53.5* 55.4*   PLATELETCT 157* 181 201   MPV 10.6 10.5 10.6     Recent Labs     24  0047 24  1309 24  0110 24  0036   SODIUM 132*  --  134* 134*   POTASSIUM 3.8 3.7 3.8 3.5*   CHLORIDE 95*  --  95* 96   CO2 21  --  24 23   GLUCOSE 89  --  103* 95   BUN 33*  --  24* 30*   CREATININE 5.41*  --  4.00* 4.91*   CALCIUM 7.9*  --  8.1* 8.2*             HgbA1c:  5.1    Diabetic Educator Consult:  N\A    Medications:  Scheduled Medications   Medication Dose Frequency    amiodarone  400  mg BID    epoetin  10,000 Units MO, WE + FR    metoprolol tartrate  25 mg BID    valsartan  40 mg Q DAY    Nozin nasal  swab  1 Applicator BID    aspirin  81 mg DAILY    clopidogrel  75 mg DAILY    atorvastatin  40 mg QHS    Pharmacy Consult Request  1 Each PHARMACY TO DOSE    acetaminophen  1,000 mg Q6HRS    docusate sodium  100 mg BID    senna-docusate  1 Tablet Nightly    omeprazole  20 mg DAILY    cefTRIAXone (ROCEPHIN) IV  2,000 mg Q12HRS    ampicillin  2,000 mg Q12HRS        Exam:   Physical Exam  Vitals and nursing note reviewed.   Constitutional:       General: He is not in acute distress.     Appearance: Normal appearance.   HENT:      Head: Normocephalic.      Right Ear: External ear normal.      Left Ear: External ear normal.      Nose: Nose normal. No congestion.      Mouth/Throat:      Mouth: Mucous membranes are moist.      Pharynx: Oropharynx is clear.   Eyes:      Extraocular Movements: Extraocular movements intact.      Pupils: Pupils are equal, round, and reactive to light.   Cardiovascular:      Rate and Rhythm: Normal rate and regular rhythm.      Pulses: Normal pulses.      Heart sounds: Normal heart sounds.   Pulmonary:      Effort: Pulmonary effort is normal.      Breath sounds: Decreased breath sounds present.   Abdominal:      General: Bowel sounds are normal. There is no distension.      Palpations: Abdomen is soft.   Musculoskeletal:      Cervical back: Normal range of motion and neck supple. No tenderness.      Right lower leg: No edema.      Left lower leg: No edema.   Skin:     General: Skin is warm and dry.      Comments: Midline surgical incision   Neurological:      General: No focal deficit present.      Mental Status: He is alert and oriented to person, place, and time. Mental status is at baseline.   Psychiatric:         Mood and Affect: Mood normal.         Behavior: Behavior normal.         Thought Content: Thought content normal.         Cardiac Medications:    ASA -  Yes    Plavix - No; contraindicated because of On Coumadin / NOAC    Post-operative Beta Blockers - Yes    Ace/ARB- Yes    Statin - Yes    Aldactone- No; contraindicated because of Normal EF    SGLT2i-  No contraindicated because of No; contraindicated because of Normal EF    Ejection Fraction:  50%    Telemetry:   7/1 SR  7/2 SB/SR  7/3 SR  7/4 SB Afib yesterday  7/5 SB    Assessment/Plan:  POD 1  HDS, SR, neuro intact, sternal incision intact, abdomen soft - BM, fluid balance positive, wt up,  2 L NC. H/H 7.7/22.9- got blood this AM. Plan:  Recheck H/H- transfuse as appropriate. Keep chest tubes and pacing wires, IS/ambulate.     POD 2  HDS, SB, neuro intact, wounds intact, abdomen +BM, fluid balance positive, t/ up,  0.5 L NC. H/H 7.6/23.1.  Plan: Transfuse 1 unit RBCs. Remove chest tubes. Dialysis per neph. IS/ambulate. Transfer to [facility].     POD 3 HDS. SR.  Neuro intact.  Wounds CDI.  Abdomen soft nontender.  RA.  Cr 5.4 hgb 8.5.  Plan: Dialysis per neph.  Pacer wires removed.  Encourage IS/ambulation.      Pod 4 HDS. SB.  Neuro intact.  Wounds CDI.  Abdomen soft nontender +BM.  RA.  Cr 4 Hgb 9.7.  Plan: DC amio gtt continue amio oral.  Encourage IS/ambulation.    POD 5  HTN, SB, neuro intact, wounds intact, abdomen soft +BM, fluid balance positive, wt up,  room air.  Plan:  Increase valsartan, IS/ambulate. Rehab eval pending.     Disposition:  PT/OT rec post acute- referral placed 7/5

## 2024-07-05 NOTE — PROGRESS NOTES
Pt presents to IR. Pt was consented by MD at bedside, confirmed by this RN and consent at bedside. Pt transferred to IR table in supine position. Patient underwent a tunneled hemodialysis catheter placement by Dr. Goldstein. Procedure site was marked by MD and verified using imaging guidance. Pt placed on monitor, prepped and draped in a sterile fashion. Vitals were taken every 5 minutes and remained stable during procedure (see doc flow sheet for results). CO2 waveform capnography was monitored and remained WNL throughout procedure. Report called to Dulce LANDRUM. Pt transported by stretcher with RN to T821.       [facility] Gl[facility] Long-Term Dialysis Catheter 14.6Mw11lb  REF: [id number]  LOT: [id number]  EXP: 01/31/2026

## 2024-07-05 NOTE — PROGRESS NOTES
[facility] Progress Note     Hemodialysis treatment ordered today per Dr. Grabiel Augustin x 3 hours.   Treatment initiated at 1330 completed at 1630.      Patient tolerated treatment; see electronic flow sheet for details.      Net UF 1500 mL.      Post tx, CVC flushed with saline then locked with heparin 1000 units/mL per designated amount in each wing then clamped and capped. Aspirate heparin prior to next CVC use.     Report given to Primary RN Perla Ramsay .

## 2024-07-05 NOTE — DISCHARGE PLANNING
[facility] Transitional Care Coordination    Referral from: MARCO Sutherland    Insurance Provider on Facesheet: [employer] MCR/PARKER FFS    Potential Rehab Diagnosis: Debility    Chart review indicates patient may have on going medical management and may have therapy needs to possibly meet inpatient rehab facility criteria with the goal of returning to community.    D/C support will need to be verified: S.O.    Physiatry consultation forwarded per protocol. Plan is for permacath placement.      Thank you for the referral.     1323-Spoke with Josselyn SNELY for 36 years regarding [facility] & D/C resources/support.  She is agreeable with an admission.  They live in a 2LV home that is ramped.  Brian has an electric w/c if nneds to utilize one. Josselyn utilizes a w/c and has a caregiver by the name of Mecca Moreland that assists her while Brian is @ Dialysis.  Josselyn is states that she is doing well enough to be home alone.  Spoke with Mecca, Caregiver.  She confirmed that she and another co-worker will provide 24/7 as well as transport to dialysis.  Mecca will attend family training. Submitted to insurance.     1416-Per Rehab PAR, no benefits with [facility].  TCC will no longer follow.  Please reach out to myself with any questions.

## 2024-07-05 NOTE — PROGRESS NOTES
Inpatient Anticoagulation Service Note for 7/5/2024      Reason for Anticoagulation: Aortic Mechanical Valve Replacement     [id number] VASc Score: 1  HAS-BLED Score: 3    Hemoglobin Value: (!) 9.7  Hematocrit Value: (!) 29.3  Lab Platelet Value: 201  Target INR: 2.0 to 3.0    INR from last 7 days       Date/Time INR Value    07/05/24 0833 1.03    06/30/24 2140 1.4    06/30/24 1355 1.55    06/29/24 1710 1.42          Dose from last 7 days       Date/Time Dose (mg)    07/05/24 1501 5          Significant Interactions: Amiodarone, Antibiotics, Antiplatelet Medications  Bridge Therapy: No   Reversal Agent Administered: Factor VII    Comments: New start Warfarin for hx of AV replacement 2' infective endocarditis. Patient has multiple DDI with Warfarin including amiodarone for hx of afib and current antibiotic tx for Enterococcus bacteremia and endocarditis. Noted DDI can increase serum concentration of Warfarin. Additionally, patient's admission complicated by post-op bleeding needing multiple blood products and factor VII. Confirmed with cardiac surgery team - no bridge for this patient. Will start dosing Warfarin at 5 mg x1 tonight and re-evaluate with repeat INR tomorrow AM. H/H currently stable.    Plan:  Warfarin 5 mg x1 dose tonight.  Education Material Provided?: No    Pharmacist suggested discharge dosing: TBD pending patient's INR as this is new start Warfarin.     Joselyn Dolan, PharmD

## 2024-07-05 NOTE — PROGRESS NOTES
[facility] -  PROGRESS NOTE               Author: Grabiel Augustin M.D. Date & Time: 7/5/2024  8:14 AM     HPI:  68-year-old  male with biventricular history of ESRD on hemodialysis q. Monday Wednesday Friday at [facility] through left upper arm AV fistula that was placed about 2 to 3 years ago.  He presented to the ER with left arm pain and swelling for the last 5 days.  His last dialysis was on Wednesday and had no dialysis in the last 5 days.  He felt sick on Friday and missed his dialysis.  Ultrasound of the left upper arm AV fistula showed there is no flow in the AV fistula and it has thrombosed.  A temporary right IJ dialysis catheter was placed in the ER.  Nephrology has been consulted for management of dialysis.  Patient denies any fever or chills, no nausea or vomiting, no chest pain.  He has some shortness of breath but is able to talk in full sentences and is in no acute respiratory distress at this time. No melena, hematochezia, hematemesis.  No HA, visual changes, or abdominal pain.     NEPHROLOGY DAILY PROGRESS:   6/24: consult note  6/25:  Afebrile, HD last night without any complications, plans noted for OR today with Dr. Tinoco for vascualr surgery   6/26: Underwent AVG repair yesterday with Dr. Tinoco, has LAMBERT drain, BP noted low this AM on dialysis, says BP 's run low on dialysis - not on Midodrine, ordered Midodrine to be used on dialysis   6/27: 0 Net UF with HD yesterday, limited by hypotension. Sitting up at side of bed eating breakfast. Bps improved 130s systolic.   6/28: HD today, seen  6/29: s/p HD yesterday over temp HD line. Tolerated well, no complaints.  6/30: in OR, not available   7/1: s/p AVR, 1V CABG and TAHIR resection/ligation, transferred to ICU, now off pressors, fatigued, post-op pain  7/2: Stable hemodynamics, tolerated HD, transferred out of ICU, chest wall pain improved  7/3: Stable hemodynamics, 7/1 BC remain negative, denies complaints  "  7/4: No acute events, tolerated HD, chest wall pain improved, feeling better overall  7/5: No acute events, no chest pain or SOB. S/p permacath placement this AM    REVIEW OF SYSTEMS:    10 Point ROS performed, negative other than stated above    PMH/PSH/SH/FH:   Reviewed and unchanged since admission note    CURRENT MEDICATIONS:   Reviewed from admission to present day    VS:  VS:  BP (!) 154/80   Pulse (!) 54   Temp 36.4 °C (97.5 °F) (Temporal)   Resp 18   Ht 1.854 m (6' 0.99\")   Wt 102 kg (224 lb 13.9 oz)   SpO2 94%   BMI 29.67 kg/m²   GENERAL: no acute distress  CV: No edema  RESP: non-labored  GI: Soft  MSK: No joint deformities   SKIN: No concerning rashes  NEURO: AOx3  PSYCH: Cooperative    Fluids:  In: 200 [P.O.:200]  Out: -     LABS:  Recent Labs     07/03/24  0047 07/03/24  1309 07/04/24  0110 07/05/24  0036   SODIUM 132*  --  134* 134*   POTASSIUM 3.8 3.7 3.8 3.5*   CHLORIDE 95*  --  95* 96   CO2 21  --  24 23   GLUCOSE 89  --  103* 95   BUN 33*  --  24* 30*   CREATININE 5.41*  --  4.00* 4.91*   CALCIUM 7.9*  --  8.1* 8.2*       IMAGING:   All imaging reviewed from admission to present day    IMPRESSION:  # ESRD  -Q. MWF at [facility]  # Bacteremia   - Blood Cx from 6/24, 6/27 + E. Faecalis  # Bacterial endocarditis:   -s/p aortic valve replacement 6/30  3 CAD  -s/p 1V CABG 6/30  #Thrombosed left upper arm AVG; infected pseudoaneurysm   -Right IJ temporary HD catheter placed in ER 6/24/2024  - S/P AVG repair, resection and pseudoaneurysm resection 6/25/24 by Dr. Tinoco along with  Reconstruction of left brachial artery using left greater saphenous vein interposition graft.  -permacath placed 7/5  # HTN  # Sepsis   # Anemia of CKD  # CKD-MBD  -phos at goal  # Coronary artery disease  # Chronic A-fib  -s/p TAHIR resection/ligation        PLAN:  - HD today, continue q MWF schedule   - Using permacath until graft cleared for use by vascular  - EPO with HD  - No dietary protein restrictions  - abx " per primary  - Dose all meds per ESRD    Thank you

## 2024-07-05 NOTE — OR SURGEON
Immediate Post- Operative Note    PostOp Diagnosis: RENAL FAILURE      Procedure(s): RIGHT INTERNAL JUGULAR 14.5 [place] citizen GLIDEPATH TUNNELED HEMODIALYSIS CATHETER PLACEMENT WITH U/S AND FLUOROSCOPIC GUIDANCE    REMOVAL OF TEMP CATH      Estimated Blood Loss: <20CC (FLUSHES)      FINDINGS: CATH TIP IN RA        Complications: NONE          7/5/2024     9:49 AM     Kaushal Goldstein M.D.

## 2024-07-05 NOTE — CONSULTS
Physical Medicine and Rehabilitation Consultation          Date of initial consultation: 7/5/2024  Consulting provider: JASKARAN Ca   Reason for consultation: assess for acute inpatient rehab appropriateness  LOS: 11 Day(s)    Chief complaint: Left arm graft infection    HPI: The patient is a 68 y.o. right hand dominant male with a past medical history of peripheral arterial disease with femoral-popliteal bypass, end-stage renal disease undergoing dialysis via left arm graft, atrial fibrillation on Coumadin, hypertension, CAD status post stent to circumflex;  who presented on 6/24/2024 10:20 AM with pain, erythema, swelling in the left arm around his graft site.  Patient missed 1 session of dialysis, developed fevers and chills, presented to the hospital with a grossly infected site.  Creatinine 8.8, potassium 3.8.  Patient was seen by vascular surgery and nephrology, started on IV vancomycin for blood cultures positive for E facialis.  Patient was seen Dr. Morales Tinoco MD for resection of infected left arm dialysis graft on 6/25.  Found infective endocarditis at aortic valve as well as severe aortic insufficiency.  Underwent left heart cath which found two-vessel CAD involving the LAD and RCA.  Patient underwent aortic valve replacement, CABG x 1, TAHIR resection and ligation on 6/30 by Dr. Zion Austin DO.    Current labs reflect mild leukocytosis with WBC 14, moderate blood loss anemia with hemoglobin 9.7, mild hypokalemia with potassium 3.5, ESRD with creatinine 4.91 and GFR 12, calcium 8.2, Phos 5.2, mag 2.9.  He is on ampicillin 2 g twice daily IV and ceftriaxone 2 g twice daily IV, both currently ordered through 8/10/2024    The patient currently reports overall doing well.  Patient feels he is progressing functionally.  He has some chest discomfort from his recent open heart surgery.  He endorses dyspnea on exertion, but he is stable on room air at rest.    ROS  Pertinent positives are  mentioned in the HPI, all others reviewed and are negative.    Social Hx:  1 SH  Ramped entry  With: Spouse.  Patient reports he has a large house, 2 intermittent caregivers, and hiring a third live-in caregiver    THERAPY:  Restrictions: Fall risk, cardiac precautions, sternal precautions  PT: Functional mobility   7/4: Walking 100 feet with front wheel walker contact-guard assist    OT: ADLs  7/4: Mod assist toileting and lower body dressing    SLP:   None    IMAGING:  Intraoperative RENEE 6/30/2024  EF 50%  Intraoperative RENEE during CABG, AVR, TAHIR ligation.  Pre-CPB images show low-normal LV function.  Large vegetation seen on the aortic valve with associated severe aortic insufficiency. Post CPB images show preserved   function. A new 25 mm Morgan Inspiris stented bioprosthetic valve is seen in the aortic position with normal leaflet motion, no paravalvular leak, and mean gradient of 7 mm Hg. Findings communicated at the time   of exam.     PROCEDURES:  Morales Tinoco MD 6/25/2024  1) Resection of infected left arm dialysis graft pseudoaneurysm at arterial anastomosis   2) Reconstruction of the left brachial artery using left greater saphenous vein interposition graft.  3) Partial removal of left upper arm dialysis graft.    Jonathan Purvis MD 6/29/2024  Cardiac catheterization  1.  Obstructive two-vessel coronary artery disease involving the LAD and RCA     Zion Austin DO 6/30/2024  REPLACEMENT, AORTIC VALVE WITH A 25 MM MORGAN INSPIRIS- Wound Class: Clean with DrainCABG X1 WITH SEKELTONIZED LIMA FREE GRAFT- Wound Class: Clean with Drain  LEFT ATRIAL APPENDAGE RESECTION AND LIGATION    Kaushal Goldstein MD 7/5/2024  RIGHT INTERNAL JUGULAR 14.5 Hebrew GLIDEPATH TUNNELED HEMODIALYSIS CATHETER PLACEMENT WITH U/S AND FLUOROSCOPIC GUIDANCE     PMH:  Past Medical History:   Diagnosis Date    Allergy     Seasonal    Aortic aneurysm (HCC)     CAD (coronary artery disease)     CHF (congestive heart failure) (HCC)      Chronic kidney disease (CKD), stage III (moderate)     COPD (chronic obstructive pulmonary disease) (HCC)     Hypertension     Impaired hearing     Indigestion     Renal failure     Sleep apnea        PSH:  Past Surgical History:   Procedure Laterality Date    AORTIC VALVE REPLACEMENT  6/30/2024    Procedure: REPLACEMENT, AORTIC VALVE;  Surgeon: Zion Austin D.O.;  Location: SURGERY [facility];  Service: Cardiothoracic    ECHOCARDIOGRAM, TRANSESOPHAGEAL, INTRAOPERATIVE  6/30/2024    Procedure: ECHOCARDIOGRAM, TRANSESOPHAGEAL, INTRAOPERATIVE;  Surgeon: Zion Austin D.O.;  Location: SURGERY [facility];  Service: Cardiothoracic    MULTIPLE CORONARY ARTERY BYPASS ENDO VEIN HARVEST  6/30/2024    Procedure: CABG X1;  Surgeon: Zion Austin D.O.;  Location: SURGERY [facility];  Service: Cardiothoracic    AV FISTULA REVISION Left 6/25/2024    Procedure: RESECTION OF INFECTED LEFT ARM ARTERIOVENOUS  GRAFT PSEUDOANEURYSM . RECONSTRUCT LEFT BRACHIAL ARTERY USING LEFT GREATER SAPHENOUS VEIN;  Surgeon: Doug Tinoco M.D.;  Location: SURGERY [facility];  Service: General    GASTROSCOPY N/A 02/17/2017    Procedure: GASTROSCOPY;  Surgeon: Morales Hinton M.D.;  Location: SURGERY [facility];  Service:     EGD ESOPHAGUS WITH ENDOSCOPIC US N/A 02/17/2017    Procedure: EGD ESOPHAGUS WITH ENDOSCOPIC US UPPER WITH POSSIBLE FNA;  Surgeon: Morales Hinton M.D.;  Location: [facility];  Service:     AV FISTULA CREATION Left        FHX:  Family History   Problem Relation Age of Onset    Heart Disease Mother     Cancer Father     Heart Disease Brother        Medications:  Current Facility-Administered Medications   Medication Dose    [START ON 7/6/2024] valsartan (Diovan) tablet 80 mg  80 mg    MD Alert...Warfarin per Pharmacy      NS (Bolus) 0.9 % infusion 500 mL  500 mL    ceFAZolin (Ancef) 2 g in  mL IVPB  2 g    fentaNYL (Sublimaze) injection 12.5-50 mcg  12.5-50 mcg    midazolam (Versed)  injection 0.5-2 mg  0.5-2 mg    ondansetron (Zofran) syringe/vial injection 4 mg  4 mg    LIDOCAINE-EPINEPHRINE 1 %-1:[id number] INJ SOLN      HEPARIN SODIUM (PORCINE) 1000 UNIT/ML INJ SOLN      amiodarone (Cordarone) tablet 400 mg  400 mg    epoetin (Retacrit) injection (Dialysis use only) 10,000 Units  10,000 Units    metoprolol tartrate (Lopressor) tablet 25 mg  25 mg    hydrALAZINE (Apresoline) injection 20 mg  20 mg    Respiratory Therapy Consult      electrolyte-A (Plasmalyte-A) infusion      Nozin nasal  swab  1 Applicator    aspirin EC tablet 81 mg  81 mg    atorvastatin (Lipitor) tablet 40 mg  40 mg    Pharmacy Consult Request ...Pain Management Review 1 Each  1 Each    acetaminophen (Tylenol) tablet 1,000 mg  1,000 mg    Followed by    [START ON 7/10/2024] acetaminophen (Tylenol) tablet 1,000 mg  1,000 mg    oxyCODONE immediate-release (Roxicodone) tablet 5 mg  5 mg    Or    oxyCODONE immediate release (Roxicodone) tablet 10 mg  10 mg    Or    fentaNYL (Sublimaze) injection 50 mcg  50 mcg    traMADol (Ultram) 50 MG tablet 50 mg  50 mg    sodium bicarbonate 8.4 % injection 50 mEq  50 mEq    ondansetron (Zofran) syringe/vial injection 8 mg  8 mg    Or    prochlorperazine (Compazine) injection 10 mg  10 mg    acetaminophen (Tylenol) tablet 650 mg  650 mg    Or    acetaminophen (Tylenol) suppository 650 mg  650 mg    docusate sodium (Colace) capsule 100 mg  100 mg    senna-docusate (Pericolace Or Senokot S) 8.6-50 MG per tablet 1 Tablet  1 Tablet    senna-docusate (Pericolace Or Senokot S) 8.6-50 MG per tablet 1 Tablet  1 Tablet    polyethylene glycol/lytes (Miralax) Packet 1 Packet  1 Packet    magnesium hydroxide (Milk Of Magnesia) suspension 30 mL  30 mL    bisacodyl (Dulcolax) suppository 10 mg  10 mg    sodium phosphate (Fleet) enema 133 mL  1 Each    omeprazole (PriLOSEC) capsule 20 mg  20 mg    mag hydrox-al hydrox-simeth (Maalox Plus Es Or Mylanta Ds) suspension 30 mL  30 mL    diphenhydrAMINE  "(Benadryl) tablet/capsule 25 mg  25 mg    albuterol (Proventil) 2.5mg/0.5ml nebulizer solution 2.5 mg  2.5 mg    cefTRIAXone (Rocephin) syringe 2,000 mg  2,000 mg    ampicillin (Omnipen) 2,000 mg in  mL IVPB  2,000 mg    labetalol (Normodyne/Trandate) injection 10 mg  10 mg    heparin injection 500 Units  500 Units    heparin injection 1,500 Units  1,500 Units    heparin intracatheter (for DIALYSIS USE ONLY) 1,200 Units  1,200 Units    heparin intracatheter (for DIALYSIS USE ONLY) 1,200 Units  1,200 Units       Allergies:  No Known Allergies      Physical Exam:  Vitals: BP (!) 160/81   Pulse 66   Temp 36.4 °C (97.5 °F) (Temporal)   Resp 14   Ht 1.854 m (6' 0.99\")   Wt 102 kg (224 lb 13.9 oz)   SpO2 96%   Gen: NAD  Head: NC/AT  Eyes/ Nose/ Mouth: PERRLA, moist mucous membranes  Cardio: RRR, good distal perfusion, warm extremities  Pulm: normal respiratory effort, no cyanosis   Abd: Soft NTND, negative borborygmi   Ext: No peripheral edema. No calf tenderness. No clubbing.    SKIN:  Midline sternal incision, open to air, nondraining    Mental status:  A&Ox4 (person, place, date, situation) answers questions appropriately follows commands  Speech: fluent, no aphasia or dysarthria    Motor:      Upper Extremity  Myotome R L   Shoulder flexion C5 5 5   Elbow flexion C5 5 5   Wrist extension C6 5 5   Elbow extension C7 5 5   Finger flexion C8 5 5   Finger abduction T1 5 5     Lower Extremity Myotome R L   Hip flexion L2 4/5 4/5   Knee extension L3 5 5   Ankle dorsiflexion L4 5 5   Toe extension L5 5 5   Ankle plantarflexion S1 5 5     Sensory:   intact to light touch through out    Labs: Reviewed and significant for   Recent Labs     07/03/24  0047 07/04/24  0110 07/05/24  0036 07/05/24  0833   RBC 2.72* 3.19* 3.12*  --    HEMOGLOBIN 8.5* 9.7* 9.7*  --    HEMATOCRIT 24.7* 29.1* 29.3*  --    PLATELETCT 157* 181 201  --    PROTHROMBTM  --   --   --  13.6   INR  --   --   --  1.03     Recent Labs     " 07/03/24  0047 07/03/24  1309 07/04/24  0110 07/05/24  0036   SODIUM 132*  --  134* 134*   POTASSIUM 3.8 3.7 3.8 3.5*   CHLORIDE 95*  --  95* 96   CO2 21  --  24 23   GLUCOSE 89  --  103* 95   BUN 33*  --  24* 30*   CREATININE 5.41*  --  4.00* 4.91*   CALCIUM 7.9*  --  8.1* 8.2*     Recent Results (from the past 24 hour(s))   CBC without Differential Critical Care 0130    Collection Time: 07/05/24 12:36 AM   Result Value Ref Range    WBC 14.2 (H) 4.8 - 10.8 K/uL    RBC 3.12 (L) 4.70 - 6.10 M/uL    Hemoglobin 9.7 (L) 14.0 - 18.0 g/dL    Hematocrit 29.3 (L) 42.0 - 52.0 %    MCV 93.9 81.4 - 97.8 fL    MCH 31.1 27.0 - 33.0 pg    MCHC 33.1 32.3 - 36.5 g/dL    RDW 55.4 (H) 35.9 - 50.0 fL    Platelet Count 201 164 - 446 K/uL    MPV 10.6 9.0 - 12.9 fL   Basic Metabolic Panel (BMP) Critical Care 0130    Collection Time: 07/05/24 12:36 AM   Result Value Ref Range    Sodium 134 (L) 135 - 145 mmol/L    Potassium 3.5 (L) 3.6 - 5.5 mmol/L    Chloride 96 96 - 112 mmol/L    Co2 23 20 - 33 mmol/L    Glucose 95 65 - 99 mg/dL    Bun 30 (H) 8 - 22 mg/dL    Creatinine 4.91 (HH) 0.50 - 1.40 mg/dL    Calcium 8.2 (L) 8.5 - 10.5 mg/dL    Anion Gap 15.0 7.0 - 16.0   PHOSPHORUS    Collection Time: 07/05/24 12:36 AM   Result Value Ref Range    Phosphorus 5.2 (H) 2.5 - 4.5 mg/dL   ESTIMATED GFR    Collection Time: 07/05/24 12:36 AM   Result Value Ref Range    GFR (CKD-EPI) 12 (A) >60 mL/min/1.73 m 2   PROTHROMBIN TIME    Collection Time: 07/05/24  8:33 AM   Result Value Ref Range    PT 13.6 12.0 - 14.6 sec    INR 1.03 0.87 - 1.13         ASSESSMENT:  Patient is a 68 y.o. male admitted with infected fistula graft, endocarditis, CAD now s/p resection of infected left arm dialysis graft on 6/25, aortic valve replacement and CABG x 2    Rehabilitation: Impaired ADLs and mobility  Barriers to transfer include: Insurance authorization, TCCs to verify disposition, medical clearance and bed availability. All cases are subject to administrative review.      [facility] Code / Diagnosis to Support: 0009 - Cardiac and 0017.4 - Medically Complex: Circulatory Disorders    Disposition recommendations:  -Good candidate for IPR.  Patient has deficits with mobility and ADLs secondary to his recent cardiac procedure, and resection of his left fistula.  Patient has great support from his spouse, and hired caregivers.  He has a stable discharge location which is his single-story home with a ramp to enter.  -TCC to verify discharge support.  Call spouse on her cell phone, [phone].  -PMR to follow in the periphery for rehab appropriateness, please reach out with questions or request for medical management    Medical Complexity:    Aortic valve endocarditis  -Found on TTE, confirmed on RENEE  -Blood cultures returned E facialis  -Status post aortic valve replacement by Dr. Zion Austin DO on 6/30  -Twice daily ampicillin and ceftriaxone IV through 8/10/2024  -ID following  -On discharge, ID recommending daptomycin 800 mg/800 mg / 1200 mg with dialysis MWF to stop date 8/11/24  -Continue PT OT while in house  -Good candidate for IPR    Two-vessel CAD  -Seen on cardiac catheterization by Dr. Purvis on 6/29  -Status post two-vessel CABG mitral Zion Austin DO on 6/30  -Aspirin 81 mg daily  -Atorvastatin 40 mg nightly    Infected left AV fistula  -Status post resection of infected left arm dialysis graft pseudoaneurysm at arterial anastomosis Dr. Morales Tinoco MD on 6/25/2024  -On antibiotics through 8/10    ESRD on HD  -Tunneled HD catheter placed in right IJ 7/5  -Nephrology following  -Typically gets HD MWF at [facility] continue MWF schedule  -EPO with HD  -Renally dose all medications  -No dietary protein restrictions    Atrial fibrillation  -Amiodarone 400 mg twice daily  -On warfarin as outpatient    DVT PPX: Heparin      Thank you for allowing us to participate in the care of this patient.     Patient was seen for >80 minutes on unit/floor of which > 50% of time  was spent on counseling and coordination of care regarding the above, including prognosis, risk reduction, benefits of treatment, and options for next stage of care.    Troy Padron, DO   Physical Medicine and Rehabilitation     Please note that this dictation was created using voice recognition software. I have made every reasonable attempt to correct obvious errors, but there may be errors of grammar and possibly content that I did not discover before finalizing the note.

## 2024-07-05 NOTE — PROGRESS NOTES
Hospital Medicine Daily Progress Note    Date of Service  7/5/2024    Chief Complaint  Brian Jasmine is a 68 y.o. male admitted 6/24/2024 with left arm graft infection    Hospital Course  Brian Jasmine is a 68 y.o. male who presented 6/24/2024 with left arm infection.  Mr. Jasmine has a past medical history of peripheral arterial disease with femoral popliteal bypass, end-stage renal disease undergoing dialysis via a left arm graft, atrial fibrillation on Coumadin therapy, hypertension, coronary artery disease with stent to the circumflex that developed pain, redness, swelling in the left arm graft site about 5 days ago and did not go to dialysis and stopped taking all of his medications because he felt so poorly.  He has had fevers and chills.  He presents with the symptoms and here has a grossly infected site.  His creatinine is 8.8 with a potassium 3.8.  Vascular surgery has been consulted as has nephrology.  Attempt will be made at a temporary dialysis catheter until blood cultures are cleared for a possible tunneled catheter.  He states he has had at least 6 or 7 prior neck catheters in the past.  Patient was started on IV vancomycin.  Blood culture was positive for E faecalis.  Patient underwent a RENEE that revealed aortic valve infective endocarditis as well as severe aortic insufficiency.  CT surgery was consulted and patient underwent aortic valve replacement, CABG x 1 and TAHIR resection and ligation on 6/30/2024.    Interval Problem Update  7/3 Patient seen sitting in chair, breathing well on room air. He reports some chest wall pain.  His chest tubes and pacing wires have been removed.  His chest wound appears clean dry and intact.  Left arm appears slightly swollen. His blood cultures from 7/1 have been negative.  His white count is elevated at 14.8.  Continue IV ampicillin and IV ceftriaxone.  Case discussed with ID    7/4 patient seen and examined at bedside.  He continues to report chest wall pain.   He attempted to work with physical therapy and reported some shortness of breath after walking 50 feet.  Blood cultures from 7/1 remain negative.  Plan is for permacath placement.  Patient will need IV daptomycin on discharge with stop date of 8/11/2024.  I discussed the plan of care with patient and infectious disease    7/5 patient underwent permacath placement.  Continue HD. PMR consulted for IPR evaluation. Case discussed with CT surgery, okay to DC Plavix and restarted on warfarin for history of A-fib.    I have discussed this patient's plan of care and discharge plan at IDT rounds today with Case Management, Nursing, Nursing leadership, and other members of the IDT team.    Consultants/Specialty  cardiovascular surgeon, infectious disease, and vascular surgery    Code Status  Full Code    Disposition  The patient is not medically cleared for discharge to home or a post-acute facility.      I have placed the appropriate orders for post-discharge needs.    Review of Systems  Review of Systems   Constitutional:  Positive for malaise/fatigue.   Cardiovascular:  Positive for chest pain.        Physical Exam  Temp:  [35.6 °C (96 °F)-36.5 °C (97.7 °F)] 36.4 °C (97.6 °F)  Pulse:  [54-76] 71  Resp:  [12-18] 16  BP: (132-176)/(62-93) 149/76  SpO2:  [92 %-99 %] 99 %    Physical Exam  Vitals and nursing note reviewed.   Constitutional:       General: He is not in acute distress.  HENT:      Head: Normocephalic.      Mouth/Throat:      Mouth: Mucous membranes are moist.   Eyes:      Pupils: Pupils are equal, round, and reactive to light.   Cardiovascular:      Rate and Rhythm: Normal rate and regular rhythm.      Pulses: Normal pulses.      Heart sounds: Normal heart sounds.      Comments: Sternal incision clean dry and intact  Pulmonary:      Effort: Pulmonary effort is normal.      Breath sounds: Normal breath sounds.   Abdominal:      Palpations: Abdomen is soft.      Tenderness: There is no abdominal tenderness.    Musculoskeletal:         General: No swelling.      Cervical back: Neck supple.      Comments: Left arm swollen   Skin:     General: Skin is warm.      Coloration: Skin is not jaundiced.   Neurological:      General: No focal deficit present.      Mental Status: He is alert and oriented to person, place, and time.   Psychiatric:         Mood and Affect: Mood normal.         Behavior: Behavior normal.         Fluids    Intake/Output Summary (Last 24 hours) at 7/5/2024 1651  Last data filed at 7/5/2024 1600  Gross per 24 hour   Intake 500 ml   Output 2000 ml   Net -1500 ml       Laboratory  Recent Labs     07/03/24  0047 07/04/24  0110 07/05/24  0036   WBC 14.8* 13.7* 14.2*   RBC 2.72* 3.19* 3.12*   HEMOGLOBIN 8.5* 9.7* 9.7*   HEMATOCRIT 24.7* 29.1* 29.3*   MCV 90.8 91.2 93.9   MCH 31.3 30.4 31.1   MCHC 34.4 33.3 33.1   RDW 52.0* 53.5* 55.4*   PLATELETCT 157* 181 201   MPV 10.6 10.5 10.6     Recent Labs     07/03/24  0047 07/03/24  1309 07/04/24  0110 07/05/24  0036   SODIUM 132*  --  134* 134*   POTASSIUM 3.8 3.7 3.8 3.5*   CHLORIDE 95*  --  95* 96   CO2 21  --  24 23   GLUCOSE 89  --  103* 95   BUN 33*  --  24* 30*   CREATININE 5.41*  --  4.00* 4.91*   CALCIUM 7.9*  --  8.1* 8.2*     Recent Labs     07/05/24  0833   INR 1.03                 Imaging  IR-MUKUND HUERTA PLACEMENT >5   Final Result      1. ULTRASOUND AND FLUOROSCOPIC GUIDED PLACEMENT OF A Right INTERNAL JUGULAR 14.5 Romansh 23 cm length [employer] TUNNELED DIALYSIS CATHETER.      2. THE HEMODIALYSIS CATHETER MAY BE USED IMMEDIATELY AS CLINICALLY INDICATED. FLUSHES PER PROTOCOL.      DX-CHEST-PORTABLE (1 VIEW)   Final Result      Likely developing mild central vascular congestion/edema. Small left and trace right pleural effusions are now seen      DX-CHEST-PORTABLE (1 VIEW)   Final Result         1.  Pulmonary edema and/or infiltrates are identified, which are stable since the prior exam.   2.  Trace left pleural effusion   3.  Cardiomegaly       DX-CHEST-LIMITED (1 VIEW)   Final Result         1.  Left IJ catheter, tip projects over the brachiocephalic vein/aorta region. This is short in position and does not extend into the SVC. Need to correlate clinically for venous blood return to exclude arterial line.   2.  Other findings are detailed above.      EC-RENEE W/O CONT   Final Result      DX-CHEST-2 VIEWS   Final Result         1.  No focal infiltrates.   2.  Perihilar interstitial prominence and bronchial wall cuffing, appearance suggests changes of underlying bronchial inflammation, consider bronchitis.      US-VEIN MAPPING LOWER EXTREMITY BILAT   Final Result      US-CAROTID DOPPLER UNILAT LEFT   Final Result      EC-RENEE W/O CONT   Final Result      EC-ECHOCARDIOGRAM COMPLETE W/O CONT   Final Result      US-SUSI SINGLE LEVEL BILAT   Final Result      US-EXTREMITY ARTERY LOWER BILAT   Final Result      DX-CHEST-PORTABLE (1 VIEW)   Final Result      1.  Satisfactory appearance of the right IJ catheter. No pneumothorax visualized.      US-HEMODIALYSIS GRAFT DUPLEX COMP UPPER EXTREMITY   Final Result      US-HEMODIALYSIS GRAFT DUPLEX COMP UPPER EXTREMITY   Final Result      DX-CHEST-PORTABLE (1 VIEW)   Final Result      Mild enlargement of the cardiomediastinal silhouette without acute cardiopulmonary abnormality.      CL-LEFT HEART CATHETERIZATION WITH POSSIBLE INTERVENTION    (Results Pending)        Assessment/Plan  * Infected prosthetic vascular graft, initial encounter (HCC)- (present on admission)  Assessment & Plan  Vascular surgery consulted.   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 Left greater saphenous vein harvest. Reconstruction of left brachial artery using left greater saphenous vein   interposition graft. Partial removal of left upper arm dialysis graft.      S/P AVR (aortic valve replacement)  Assessment & Plan  Developed aortic valve endocarditis  Status post aortic  valve replacement    Bacteremia- (present on admission)  Assessment & Plan  Probably related to infected graft, status post removal.  Blood cultures positive for Enterococcus  RENEE revealed aortic valve endocarditis  Patient is status post aortic valve replacement  Repeat blood cultures on 7/1 have been negative  Continue IV ampicillin and IV ceftriaxone while inpatient with plans to switch to IV dapto on discharge with stop date of 8/11/24      Coronary artery disease- (present on admission)  Assessment & Plan  Followed by cardiology in [place] with history of stent to the circumflex  S/p CABG X1 WITH SEKELTONIZED LIMA to LAD  Continue aspirin and atorvastatin    PAD (peripheral artery disease) (HCC)- (present on admission)  Assessment & Plan  Hx of previous fem-pop bypass    Troponin level elevated  Assessment & Plan  Troponin is elevated at 200  He denies chest pain  EKG reveals an intraventricular conduction delay not clearly left nor right bundle  Likely a stress response given his infection and lack of dialysis.    Follow-up echo    Chronic atrial fibrillation (HCC)- (present on admission)  Assessment & Plan  He had been on coumadin but hasn't been taking it  Continue amiodarone and metoprolol  Restart warfarin, monitor INR with goal 2-3      End stage renal disease (HCC)- (present on admission)  Assessment & Plan  Nephrology consulted  Continue HD   S/p perma cath placement     Renovascular hypertension- (present on admission)  Assessment & Plan  Restart his home Coreg with holding parameters         VTE prophylaxis: warfarin    I have performed a physical exam and reviewed and updated ROS and Plan today (7/5/2024). In review of yesterday's note (7/4/2024), there are no changes except as documented above.    I spent 51 minutes, reviewing the chart, obtaining and/or reviewing separately obtained history. Performing a medically appropriate examination and evaluation.  Counseling and educating the patient.  Ordering and reviewing medications, tests, or procedures.  Discussing the case with CT surgery.  Documenting clinical information in EPIC. Independently interpreting results and communicating results to patient. Discussing future disposition of care with patient, RN and case management.

## 2024-07-05 NOTE — THERAPY
Physical Therapy Contact Note    Patient Name: Brian Jasmine  Age:  68 y.o., Sex:  male  Medical Record #: [id number]  Today's Date: 7/5/2024 07/05/24 1540   Interdisciplinary Plan of Care Collaboration   Collaboration Comments PT tx attempted, pt currently off unit in dialysis.  Will round back as able.

## 2024-07-05 NOTE — PREADMISSION SCREENING NOTE
Pre-Admission Screening Form    Patient Information:   Name: Brian Jasmine     MRN: [id number]       : 1955      Age: 68 y.o.   Gender: male      Race: White [7]       Marital Status: Single [1]  Family Contact: Josselyn Gray Karen        Relationship: Significant other [13]  Sister [14]  Home Phone: [phone]             Cell Phone:     Advanced Directives:  Yes  Code Status:  FULL  Current Attending Provider: Oswaldo Little M.D.  Referring Physician: MARCO Sutherland  Physiatrist Consult: Dr. Padron   Referral Date: 24  Primary Payor Source:  [facility] MEDICARE  Secondary Payor Source:  MEDICAID S    Medical Information:   Date of Admission to Acute Care [place]2024  Room Number: T821/00  Rehabilitation Diagnosis: 0009 - Cardiac and 0017.4 - Medically Complex: Circulatory Disorders  Immunization History   Administered Date(s) Administered    Pneumococcal Conjugate Vaccine (Prevnar/PCV-13) 2019    Pneumococcal polysaccharide vaccine (PPSV-23) 2020    Tdap Vaccine 2019    Tuberculin Skin Test 2020     No Known Allergies  Past Medical History:   Diagnosis Date    Allergy     Seasonal    Aortic aneurysm (HCC)     CAD (coronary artery disease)     CHF (congestive heart failure) (HCC)     Chronic kidney disease (CKD), stage III (moderate)     COPD (chronic obstructive pulmonary disease) (HCC)     Hypertension     Impaired hearing     Indigestion     Renal failure     Sleep apnea      Past Surgical History:   Procedure Laterality Date    AORTIC VALVE REPLACEMENT  2024    Procedure: REPLACEMENT, AORTIC VALVE;  Surgeon: Zion Austin D.O.;  Location: SURGERY [facility];  Service: Cardiothoracic    ECHOCARDIOGRAM, TRANSESOPHAGEAL, INTRAOPERATIVE  2024    Procedure: ECHOCARDIOGRAM, TRANSESOPHAGEAL, INTRAOPERATIVE;  Surgeon: Zion Austin D.O.;  Location: SURGERY [facility];  Service: Cardiothoracic    MULTIPLE CORONARY ARTERY BYPASS ENDO VEIN  HARVEST  6/30/2024    Procedure: CABG X1;  Surgeon: Zion Austin D.O.;  Location: SURGERY [facility];  Service: Cardiothoracic    AV FISTULA REVISION Left 6/25/2024    Procedure: RESECTION OF INFECTED LEFT ARM ARTERIOVENOUS  GRAFT PSEUDOANEURYSM . RECONSTRUCT LEFT BRACHIAL ARTERY USING LEFT GREATER SAPHENOUS VEIN;  Surgeon: Doug Tinoco M.D.;  Location: SURGERY [facility];  Service: General    GASTROSCOPY N/A 02/17/2017    Procedure: GASTROSCOPY;  Surgeon: Morales Hinton M.D.;  Location: SURGERY [facility];  Service:     EGD ESOPHAGUS WITH ENDOSCOPIC US N/A 02/17/2017    Procedure: EGD ESOPHAGUS WITH ENDOSCOPIC US UPPER WITH POSSIBLE FNA;  Surgeon: Morales Hinton M.D.;  Location: SURGERY [facility];  Service:     AV FISTULA CREATION Left        History Leading to Admission, Conditions that Caused the Need for Rehab (CMS):     Dr. Mayen H&P:  Chief Complaint  Patient presents with   Arm Pain   Rapid Heart Beat        History of Presenting Illness  Brian Jasmine is a 68 y.o. male who presented 6/24/2024 with left arm infection.  Mr. Jasmine has a past medical history of peripheral arterial disease with femoral popliteal bypass, end-stage renal disease undergoing dialysis via a left arm graft, atrial fibrillation on Coumadin therapy, hypertension, coronary artery disease with stent to the circumflex that developed pain, redness, swelling in the right arm graft site about 5 days ago and did not go to dialysis and stopped taking all of his medications because he felt so poorly.  He has had fevers and chills.  He presents with the symptoms and here has a grossly infected site.  His creatinine is 8.8 with a potassium 3.8.  Vascular surgery has been consulted as has nephrology.  Attempt will be made at a temporary dialysis catheter until blood cultures are cleared for a possible tunneled catheter.  He states he has had at least 6 or 7 prior neck catheters in the past.  IV vancomycin has been  initiated.     Assessment/Plan:  Justification for Admission Status  I anticipate this patient will require at least two midnights for appropriate medical management, necessitating inpatient admission because IV antibiotics and surgery for infected dialysis graft     Patient will need a Med/Surg bed on MEDICAL service .  The need is secondary to as above.     * Infected prosthetic vascular graft, initial encounter (HCC)- (present on admission)  Assessment & Plan  Left arm graft is red, swollen, tender, grossly infected  Cultures have been drawn  IV vancomycin  Temporary dialysis access will be obtained  Vascular surgery has been consulted.      End stage renal disease ([facility])- (present on admission)  Assessment & Plan  He has not had dialysis since Wednesday is has not been feeling well  Creatinine is elevated at 8.8 unfortunately potassium is 3.8  Once temporary access has been obtained and dialysis will be done by nephrology     Coronary artery disease- (present on admission)  Assessment & Plan  Followed by cardiology in [place] with history of stent to the circumflex     PAD (peripheral artery disease) ([facility])- (present on admission)  Assessment & Plan  Hx of previous fem-pop bypass     Troponin level elevated- (present on admission)  Assessment & Plan  Troponin is elevated at 200  He denies chest pain  EKG reveals an intraventricular conduction delay not clearly left nor right bundle  Likely a stress response given his infection and lack of dialysis.     Chronic atrial fibrillation ([facility])- (present on admission)  Assessment & Plan  He had been on coumadin but hasn't been taking it     Renovascular hypertension- (present on admission)  Assessment & Plan  Restart his home Coreg with holding parameters           VTE prophylaxis: SCDs/TEDs    Dr. Tinoco (Surgery General) recommendations:  Assessment:  - Infected pseudoaneurysm of left distal upper arm arteriovenous fistula/graft.  -Thrombosed left upper arm  arteriovenous fistula/graft..  -End-stage renal disease on hemodialysis.  -Coronary artery disease.  -Congestive heart failure.  -COPD.     Plan:  I had a long discussion with patient.  He appears to have an infected pseudoaneurysm of the fistula/graft in the distal upper arm.  I discussed with him the option of undergoing operative resection of the infected pseudoaneurysm and reconstruction of the artery using vein patch tomorrow since he would need to be admitted, started on dialysis since he  has not been dialyzed since this past Wednesday, and medically optimized.  Risks of procedure were discussed with patient which include, but not limited to, bleeding, recurrent infection, heart attack, and perioperative anesthetic complications.  Patient indicated understanding and would like to proceed.  All questions were answered.     NPO after midnight.  HOLD Coumadin.     Dr. Mayen already placed temporary dialysis catheter and planned to admit patient.     Dr. Pearce (Nephrology) recommendations:  IMPRESSION:  # ESRD  -Q. MWF at [facility]  #Thrombosed left upper arm AV fistula              -Right IJ temporary HD catheter placed in ER 6/24/2024  # HTN  # Anemia of CKD  # CKD-MBD  #Coronary artery disease  #Chronic A-fib        PLAN:  -Hemodialysis today as has missed dialysis for the last 5 days   -Right IJ temporary HD catheter will need to be converted to a tunneled HD catheter prior to discharge , consult IR for the same   -  UF as tolerated  - No dietary protein restrictions  - Resume home meds   - Dose all meds per ESRD    Dr. Mercado (Infectious Disease) recommendations:  Assessment:       -Notes were reviewed from primary team, radiology, ED, surgery, specialists, etc.   -Reviewed labs to date, microbiology for current admit and prior  -Imaging was independently reviewed and interpreted        Plan:     Recommendations for antibiotics:   --- Continue vancomycin for now with pharmacy to dose      Recommendations for further/ongoing work-up, monitoring of clinical status and drug toxicity:   --- Follow-up repeat blood culture, ordered for a.m.  --- Follow-up prior blood cultures for antibiotic susceptibility  --- Follow-up wound cultures  --- Monitor lab  --- Renally dose medication  --- Dialysis catheter was placed while potentially still bacteremic, will eventually need to be removed or exchanged     Recommendations for intervention:   --- Status post or procedure as described above, wound management per surgery        Dispo: Awaiting culture results and work-up as above.  Patient is at risk for infectious complications including death.      PICC: TBD    Dr. Dalal (Cardiology) recommendations:  Assessment  1. Infective endocarditis aortic valve  2. Severe aortic regurgitation  3. Atrial fibrillation, newly diagnosed, rate controlled  4. Bacteremia, Enterococcus faecalis  5. CAD, PCI Cx marginal 2.25 x 25 mm AURA 6/30/2021 [facility]  6. ESRD on hemodialysis  7. Peripheral vascular disease, bilateral femoral-popliteal bypass 2021, 2023  8. Hypertension  9. Dyslipidemia  10. Prior tobacco use     Recommendation Discussion  1. Clinically stable from a cardiac standpoint currently with no ischemic symptoms, no heart failure findings.  2. I reviewed with the patient the findings of the transesophageal echocardiogram.  3. Recommend CT surgical consultation for aortic valve vegetation and severe aortic regurgitation  4. Continue ASCVD prevention.  Recommend resuming rosuvastatin, ezetimibe.  Target LDL of less than 70.  Recheck lipid panel  5. Check TFTs  6. For atrial fibrillation continue Eliquis for atrial fibrillation stroke thromboembolic prophylaxis.  Rate appears for the most part to be intrinsically controlled.  Currently appears to be in sinus rhythm.  Will repeat EKG.  7. I had a detailed discussion with the patient informing him of his various cardiac problems in particular his new  aortic valve findings.  I also spoke with Angela his significant other  8. No additional cardiac recommendations at this time  9. I personally discussed management plan with Mello Yen M.D.      Dr. Austin (Surgery Cardiac) recommendations:  IMPRESSION:  8 y.o. male with a past medical history significant for coronary artery disease (s/p PCI to OM 6/2021), peripheral vascular disease (s/p bilat femoral-popliteal bypass) ESRD on HD, chronic anticoagulation for AV fistula, HTN, HLD, hx of tobacco, now with infective endocarditis and severe aortic regurgitation.         PLAN:  I recommend aortic valve replacement, with modified maze procedure     The procedure, its risks, benefits, potential complications and alternative treatments were discussed with the patient in detail including the risks should he decide not to undergo my recommended treatment. All of his questions were answered to his satisfaction and he is willing to proceed with the operation. The risks include death, stroke, infection: to include a rare bacterial infection related to the use of the heart/lung machine, sonia-operative myocardial infarction, dysrhythmias, diaphragmatic paralysis, chest wall paresthesia, tracheostomy, kidney or other organ failure, possible return to the operating room for bleeding, bleeding requiring transfusion with its attendant risks including AIDS or hepatitis, dehiscence of surgical incisions, respiratory complications including the need for prolonged ventilator support, Protamine or other drug reaction, peripheral neuropathy, loss of limb, and miscount of surgical items. The operative mortality risk is approximately 8%. The STS mortality risk score for AVR is 6% and the morbidity and mortality risk score for AVR is 23%. The scores were discussed with patient.      Thank you for this very challenging consultation and participation in the patient’s care.  I will keep you apprised of all future developments.        The operation is scheduled this hospitalization in the next few days.     Dr. Padron (Physiatry) recommendations:  ASSESSMENT:  Patient is a 68 y.o. male admitted with infected fistula graft, endocarditis, CAD now s/p resection of infected left arm dialysis graft on 6/25, aortic valve replacement and CABG x 2     Rehabilitation: Impaired ADLs and mobility  Barriers to transfer include: Insurance authorization, TCCs to verify disposition, medical clearance and bed availability. All cases are subject to administrative review.      [facility] Code / Diagnosis to Support: 0009 - Cardiac and 0017.4 - Medically Complex: Circulatory Disorders     Disposition recommendations:  -Good candidate for IPR.  Patient has deficits with mobility and ADLs secondary to his recent cardiac procedure, and resection of his left fistula.  Patient has great support from his spouse, and hired caregivers.  He has a stable discharge location which is his single-story home with a ramp to enter.  -TCC to verify discharge support.  Call spouse on her cell phone, [phone].  -PMR to follow in the periphery for rehab appropriateness, please reach out with questions or request for medical management     Medical Complexity:     Aortic valve endocarditis  -Found on TTE, confirmed on RENEE  -Blood cultures returned E facialis  -Status post aortic valve replacement by Dr. Zion Austin DO on 6/30  -Twice daily ampicillin and ceftriaxone IV through 8/10/2024  -ID following  -On discharge, ID recommending daptomycin 800 mg/800 mg / 1200 mg with dialysis MWF to stop date 8/11/24  -Continue PT OT while in house  -Good candidate for IPR     Two-vessel CAD  -Seen on cardiac catheterization by Dr. Purvis on 6/29  -Status post two-vessel CABG mitral Zion Austin DO on 6/30  -Aspirin 81 mg daily  -Atorvastatin 40 mg nightly     Infected left AV fistula  -Status post resection of infected left arm dialysis graft pseudoaneurysm at arterial anastomosis   Morales Tinoco MD on 6/25/2024  -On antibiotics through 8/10     ESRD on HD  -Tunneled HD catheter placed in right IJ 7/5  -Nephrology following  -Typically gets HD MWF at [facility] continue MWF schedule  -EPO with HD  -Renally dose all medications  -No dietary protein restrictions     Atrial fibrillation  -Amiodarone 400 mg twice daily  -On warfarin as outpatient     Immediate Post OP Note     PreOp Diagnosis: Infected left arm dialysis graft pseudoaneurysm at arterial anastomosis.        PostOp Diagnosis: Same        Procedure(s):  1) Resection of infected left arm dialysis graft pseudoaneurysm at arterial anastomosis   2) Reconstruction of the left brachial artery using left greater saphenous vein interposition graft.  3) Partial removal of left upper arm dialysis graft.     Wound Class: Dirty or Infected     Surgeon(s):  Doug Tinoco M.D.     Anesthesiologist/Type of Anesthesia:  Anesthesiologist: Rubio Cates D.O./General     Surgical Staff:  Circulator: Mckenna Johnson R.N.; Teena Gomez R.N.  Scrub Person: Kathy Alan     Specimens removed if any:  ID Type Source Tests Collected by Time Destination  1 : Left Forearm Infected Fistula Graft Tissue Arm AFB CULTURE, FUNGAL CULTURE, AEROBIC/ANAEROBIC CULTURE (SURGERY) Doug Tinoco M.D. 6/25/2024  2:00 PM          Estimated Blood Loss: 200 mL.     IV fluids: 200 mL.     Findings: Complete blowout at arterial anastomosis.  5 cm of brachial artery around and at the arterial anastomosis was destroyed by infection, reconstructed using left greater saphenous vein interposition graft.     Complications: None.     Dictated, #[id number].           6/25/2024 3:45 PM Doug Tinoco M.D.    DATE OF SERVICE:  06/25/2024      SURGEON:  Doug Tinoco MD     ANESTHESIOLOGIST:  Rubio Cates DO     TYPE OF ANESTHESIA:  General anesthesia.     PREOPERATIVE DIAGNOSIS:  Infected left arm dialysis graft pseudoaneurysm at   arterial anastomosis.      POSTOPERATIVE DIAGNOSIS:  Blown-out, infected left arm dialysis graft pseudoaneurysm at   arterial anastomosis.     PROCEDURES:  1.  Resection of infected left upper arm dialysis graft pseudoaneurysm at the   arterial anastomosis.  2.  Left greater saphenous vein harvest.  3.  Reconstruction of left brachial artery using left greater saphenous vein   interposition graft.  4.  Partial removal of left upper arm dialysis graft.     INDICATIONS FOR PROCEDURE:  This is a 68-year-old male with multiple medical   problems, who was dialyzed via a left upper arm dialysis graft.  He developed   an infected pseudoaneurysm at the arterial anastomosis and at the same time   the graft became thrombosed.  He presented to [facility] and was admitted.  Discussion was made with the patient.  He would   like to undergo above procedures, fully understanding all risks.    CARDIAC CATHETERIZATION REPORT     REFERRING: Zion Austin DO     PROCEDURE PHYSICIAN: Jonathan Purvis MD, [facility], [facility]  ASSISTANT: Jc Whitfield MD     IMPRESSIONS:  1.  Obstructive two-vessel coronary artery disease involving the LAD and RCA     Recommendations:  CABG plus AVR     Pre-procedure diagnosis:  Endocarditis, surgical team recommending preoperative angiogram  Post-procedure diagnosis: Same     Procedure performed  Selective coronary angiography  Left heart catheterization  Instantaneous wave free ratio (iFR) (LAD)     Conscious sedation was supervised by myself and administered by trained personnel using fentanyl and versed between 1546 and 1629. The patient tolerated sedation without complication.      Procedure Description  1. Access: 5/6 Kazakh right radial artery Micropuncture technique was utilized following local anesthesia with lidocaine.  A radial cocktail containing verapamil and saline was administered in the radial artery sheath     2. Diagnostic description: The catheter was passed to the central circulation with the aide of J tipped  "0.35\" wire. A 5F TIG 4.0 and 6F EBU 3.5 were used to inject the coronary circulation and enter the left ventricle during invasive hemodynamic monitoring.  The EBU 3.5 was used to perform IFR using a Yoomba Omni wire system.  The IFR of the LAD was 0.75.  Left heart catheterization was performed after the EBU catheter inadvertently entered the left ventricle during attempts to engage the left main coronary artery     3. Closing: At completion of the procedure the relevant equipment was removed from the body and hemostasis achieved by Radial band     Findings   Hemodynamics:   Aorta: 123/52 mmHg  LVEDP: 30 mmHg     Coronary Anatomy              Left Main: Normal              LAD: 80% stenosis in the mid segment.  The IFR is 0.75              LCx:  Ostial 30% stenosis.  The OM1 has minimal luminal irregularities, the OM 2 has proximal 40% stenosis              RCA:  70% sequential stenosis in the midsegment of the vessel.  The PDA has minimal luminal irregularities, the PLB is normal                Technical Factors  1. Complications: None  2. Estimated Blood Loss: <50 cc  3. Specimens: None  4. Contrast Volume: 100 ml  5. Medications: Radial cocktail (Verapamil 2.5 mg, Nitroglycerin 100 mcg) Heparin 8000 units  6. Radiation (air kerma): 347 mGy    Operative report     PreOp Diagnosis: Bacterial endocarditis, severe aortic insufficiency, sepsis, multivessel coronary artery disease, end-stage renal disease on dialysis, chronic atrial fibrillation, peripheral arterial disease, hypertension, dyslipidemia        PostOp Diagnosis: Same as above        Procedure(s):  REPLACEMENT, AORTIC VALVE WITH A 25 MM DICKEY INSPIRIS- Wound Class: Clean with DrainCABG X1 WITH SEKELTONIZED LIMA FREE GRAFT- Wound Class: Clean with Drain  LEFT ATRIAL APPENDAGE RESECTION AND LIGATION  ECHOCARDIOGRAM, TRANSESOPHAGEAL, INTRAOPERATIVE - Wound Class: Clean with Drain        Surgeon(s):  Zion Austin D.O.     Anesthesiologist/Type of " Anesthesia:  Anesthesiologist: Gumaro Arnold M.D.  Perfusionist: Doris Bhagat/General     Surgical Staff:  Assistant: Onofre Sutherland P.A.-C.  Circulator: Claudia Arcos R.N.  Relief Circulator: Anita Chau R.N.  Scrub Person: Alma Davis; Naty Duran     Specimens removed if any:  ID Type Source Tests Collected by Time Destination  1 : AORTIC VALVE Tissue Heart Valve AFB CULTURE, FUNGAL CULTURE, GRAM STAIN ONLY, AEROBIC/ANAEROBIC CULTURE (SURGERY) Zion Austin D.O. 6/30/2024 10:25 AM    A : APPENDAGE Tissue Heart PATHOLOGY SPECIMEN Zion Austin D.O. 6/30/2024 10:22 AM    B : AORTIC VALVE Tissue Heart Valve PATHOLOGY SPECIMEN Zion Austin D.O. 6/30/2024 10:27 AM          Estimated Blood Loss: Cardiopulmonary bypass     Findings: Preoperative echo showed depressed ejection fraction     Consistent with preoperative RENEE.  Postoperative echo showed improvement of the wall motion with improved ejection fraction to low normal at 45-50.     Mammary artery was 3 mm size good quality conduit.  LAD had proximal calcification and was a good target distally.  Other coronaries had calcium throughout.  Patient had no venous conduit for use for RCA bypass.  This was communicated to his cardiologist.  Flow down the LIMA free graft to the LAD was 60 cc a minute at 150 mmHg.     Aortic valve had endocarditis of all 3 cusps with no involvement of the aortic root or annulus.  Sized for 25 mm valve.  After valve was replaced, he had good clearance of both coronary ostia with no gaps.  Postoperative echo showed a transvalvular gradient of 7 mmHg with no perivalvular leak.     Patient's anatomy was unfavorable for epicardial ablation for his chronic atrial fibrillation.  Given his sepsis, I elected to forego ablation and just ligated and excluded his appendage which was sent for specimen.     Patient weaned from bypass without issue.  He did have an episode off-pump where he injected there down his  LIMA graft which required emergent reinstitution of cardiopulmonary bypass and resting for 20 minutes, he then again weaned from bypass without issue with improved ejection fraction and good hemodynamics on appropriate inotropic and vasopressor support.        Complications: None immediate postoperatively     Complications: None immediate     Patient condition: Guarded to ICU     Chest tubes: Mediastinal: 32 [place]uran x2     Pacing wires: RV x 2     Pericardium: Open     Cross-clamp time: 119 minutes     Pump time: 174 minutes     Cardioplegia: Cold blood antegrade [place] cardioplegia given.  Initial induction with 1250 and mL.  Redosed at 75 minutes with 300 cc of warm blood.  Patient was rearrested for repair of bleeding at the left atrial appendage stump that was done with 300 cc of antegrade [place].  Hotshot was given with 300 cc of warm blood antegrade     Vent: Aortic root        Procedure:     After informed consent was obtained, the patient was brought to the operating room.  They were placed in supine position on the operating table.  General anesthesia was induced via endotracheal tube.  Lines, arterial line, C[place] were placed by the nursing and anesthesia teams.  They received pre-incision antibiotics and beta-blockade.  The patient was prepped in a sterile fashion from their chin to their feet.  Drapes were placed in a sterile fashion.  The procedure was begun with a timeout.     I was joined throughout the case by TOO Chaudhry.  He assisted in all aspects of the procedure from beginning to end.     I began by making a median sternotomy incision with 10 blade scalpel.  I deepened my sternal incision with left cautery to the sternum.  The sternum was divided in the midline using the sternal saw.  Hemostasis of the sternal edges was obtained using bone putty and electrocautery.  The mammary retractor was brought up at that point and the left hemisternum was elevated.  Using the electrocautery with  occasional clips for side branches, the mammary was dissected down from its takeoff at the subclavian down to its bifurcation into the muscular phrenic and inferior epigastric, in a skeletonized free fashion.    Once the mammary was completely dissected out, systemic heparinization was performed, using 400 units/kg of heparin, to achieve an ACT greater than 480 seconds. The mammary was ligated distally and divided.  There was excellent flow noted.  I placed a small clip at the distal end of the mammary, treated it with papaverine, and placed it back in the left chest for safe keeping.  The proximal portion was ligated with large clips and a silk tie the distal stump was secured.  The mammary retractor was removed and sternal retractor was placed in the chest open.     With the chest open, I then dissected out the innominate vein and the pericardium.  I then opened the pericardium and inverted T fashion.  I created pericardial well, suspending the pericardial edges from the skin edges using interrupted 2-0 Ethibond sutures.  Inspection of the heart and the aorta revealed no obvious pathology.  Palpation of the aorta revealed no calcifications precluding cannulation or cross-clamp.  I cannulated the aorta using a direct Seldinger's technique and RENEE guidance.    Next, I placed the venous cannula into the IVC via the right atrium.  Again this was confirmed with RENEE.  Next, I then placed a pursestring on the mid ascending aorta, through which I placed my antegrade needle.       With my cannulas in place, I then inspected the vein.  It was of reasonable quality.  The distal end was spatulated and it was marked to help with orienting the graft.  Cardiopulmonary bypass was then initiated.     Once on full flow, I then retracted the heart to inspect my targets.  The LAD was noted to be a reasonable target distally.  These were each marked with the [place] blade.  Finally, I dissected the pulmonary artery away from the distal  portion of the aorta to facilitate cross-clamp placement and complete isolation of the heart.  I then placed a cross-clamp and the heart was arrested.  Ice slush was placed on the heart to assist with cooling. CO2 was distilled over the field throughout the case.     After I had arrest, I examined the patient's pulmonary veins.  Given his body habitus and the fact that he was fairly ill individual with sepsis and endocarditis, I felt the added time of cross-clamp and bypass would be potentially harmful.  I therefore turned my attention to the just resecting the left atrial appendage.  The heart was repositioned to expose it and a pursestring was placed at its base using a 4-0 Prolene suture with pledget reinforcement.  This was then resected and sent for specimen.  The stump was oversewn in 2 layers with the 4-0 Prolene.     The heart was repositioned again to expose the LAD.  I dissected this out with a [place] blade and opened it with 11 blade scalpel.  I extended the arteriotomy with Cortez scissors.  I then created a keyhole defect in the pericardium through which I brought my mammary pedicle.  I then sized it to the site of the anastomosis and divided mammary pedicle, discarding the distal portion.  I then fashioned the distal end of the mammary.  An end LIMA to side LAD anastomosis was created with a running 7-0 Prolene suture.  Upon completion, the pedicle was opened and flow was noted to run distal.  It was tested with cardioplegia instillation on the graft found to be 60 mL minute at a pressure 150 mmHg.      I then turned my attention to the aortic valve.  I created a transverse oblique aortotomy which was carried down to the midportion of the noncoronary sinus.  The clearly infected cusp were resected and sent for specimen and culture.  Sizing was consistent with a 25 mm valve.  Annular sutures were placed circumferentially in an noneverting fashion using 2 oh Ethibonds with 3 x 3 mm pledgets.  The valve  was brought up and prepped on the back table.  Sutures were then passed through the sewing cuff and lowered into place.  After checking the both coronary ostia were clear of the valve and that it sat well on the annulus, the sutures were then secured with the cor knot cramping device.  Again both coronary ostia were checked and found to be completely clear of the valve with good seating of the valve.  The aortotomy was then closed with 4-0 Prolene suture with pledget reinforcement on the lateral and in 2 layers.     The frame of free graft proximal anastomosis was then created.  A 4 mm punch was used to create an aortotomy to which the free graft was sized and then anastomosed with a running 6-0 Prolene suture.  It was marked with a coronary marker.  Will  Patient was de-aired, flows dropped, and cross-clamp removed.  As the heart reperfused the placement of right ventricular pacing leads and my mediastinal chest tubes.  They were brought out through the epigastrium in the usual fashion.  Surgical sites were checked and I found had bleeding at the left atrial appendage stump.  I attempted to oversew this with pledgeted 4-0 Prolene sutures with the cross-clamp off.  Ultimately decided to reapply the cross-clamp earlier as the heart in order to get a secure closure.  This was done with 300 cc of antegrade [place].  A single pledgeted 4-0 Prolene suture was then placed with good hemostasis.  Cross-clamp was then removed after de-airing the patient.  After waiting an appropriate amount of time, the patient was then  from bypass.  Unfortunately after several minutes off bypass the patient had a sudden V-fib arrest consistent with intracoronary air.  He was defibrillated twice with no resumption of normal sinus rhythm.  He was recannulated with the venous cannula and heparin were given through the pump.  Cardiopulmonary bypass was then reinitiated for 20 minutes to clear the intra coronary air.  After that time  he again  from bypass without any issue with the minimal dose of inotropic and vasopressor support for his endocarditis sepsis.      Venous cannula and aortic root vent were removed.  Protamine was administered to reverse systemic heparinization.  As the protamine was administered, the patient's blood volume in the pump was administered back to the aortic cannula.  Once that was given back, that cannula was removed.  Sites were oversewn as needed.  Hemostasis was verified to be good.  Proceeded with closure of the sternum using interrupted #5 sternal wires and the zip fix sternal band system.  The wound was then irrigated and closed in layers using absorbable sutures.  The patient tolerated the procedure well, all instrument counts were correct x2, and he was taken to the ICU in guarded condition.     6/30/2024 1:21 PM LOREE Paz M.D.  Physician  Radiology     OR Surgeon     Signed     Date of Service: 7/5/2024  9:49 A    Immediate Post- Operative Note     PostOp Diagnosis: RENAL FAILURE        Procedure(s): RIGHT INTERNAL JUGULAR 14.5 FRENCH GLIDEPATH TUNNELED HEMODIALYSIS CATHETER PLACEMENT WITH U/S AND FLUOROSCOPIC GUIDANCE     REMOVAL OF TEMP CATH     Estimated Blood Loss: <20CC (FLUSHES)     FINDINGS: CATH TIP IN RA     Complications: NONE     7/5/2024     9:49 AM     Kaushal Goldstein M.D.    Co-morbidities:  See PMH  Potential Risk - Complications: Contractures, Deep Vein Thrombosis, Incontinence, Malnutrition, Pain, Pneumonia, Pressure Ulcer, and Urinary Tract Infection  Level of Risk: High    Ongoing Medical Management Needed (Medical/Nursing Needs):   Patient Active Problem List    Diagnosis Date Noted    Encounter for weaning from ventilator ([facility]) 07/01/2024    S/P AVR (aortic valve replacement) 06/30/2024    Bacteremia 06/26/2024    Infected prosthetic vascular graft, initial encounter ([facility]) 06/24/2024    Chronic atrial fibrillation ([facility]) 06/24/2024    Troponin  "level elevated 06/24/2024    PAD (peripheral artery disease) ([facility]) 06/24/2024    Coronary artery disease 06/24/2024    End stage renal disease ([facility]) 03/12/2019    Chronic obstructive pulmonary disease ([facility]) 03/12/2019    Renal artery stenosis ([facility]) 04/10/2018    Chronic combined systolic and diastolic congestive heart failure ([facility]) 04/13/2017    Obesity (BMI 30-39.9) 03/14/2017    History of alcohol abuse 02/28/2017    Renovascular hypertension 02/13/2017     A & O    Current Vital Signs:   Temperature: 36 °C (96.8 °F) Pulse: 64 Respiration: 17 Blood Pressure : 136/73  Weight: 102 kg (224 lb 13.9 oz) Height: 185.4 cm (6' 0.99\")  Pulse Oximetry: 95 % O2 (LPM): 0      Completed Laboratory Reports:  Recent Labs     07/03/24  0047 07/03/24  1309 07/04/24  0110 07/05/24  0036 07/05/24  0833   WBC 14.8*  --  13.7* 14.2*  --    HEMOGLOBIN 8.5*  --  9.7* 9.7*  --    HEMATOCRIT 24.7*  --  29.1* 29.3*  --    PLATELETCT 157*  --  181 201  --    SODIUM 132*  --  134* 134*  --    POTASSIUM 3.8 3.7 3.8 3.5*  --    BUN 33*  --  24* 30*  --    CREATININE 5.41*  --  4.00* 4.91*  --    GLUCOSE 89  --  103* 95  --    INR  --   --   --   --  1.03     Additional Labs: Not Applicable    Prior Living Situation:   Housing / Facility: [address]  Steps Into Home:  (ramp)  Steps In Home: 0  Lives with - Patient's Self Care Capacity: Spouse  Equipment Owned: Front-Wheel Walker, 4-Wheel Walker, Ramp, Adjustable Bed Without Rails, Grab Bar(s) By Toilet, Grab Bar(s) In Tub / Shower, Single Point Cane    Prior Level of Function / Living Situation:   Physical Therapy: Prior Services: None  Housing / Facility: 1 [address] House  Steps Into Home:  (ramp)  Steps In Home: 0  Bathroom Set up: Walk In Shower, Grab Bars, Shower Chair  Equipment Owned: Front-Wheel Walker, 4-Wheel Walker, Ramp, Adjustable Bed Without Rails, Grab Bar(s) By Toilet, Grab Bar(s) In Tub / Shower, Single Point Cane  Lives with - Patient's Self Care Capacity: Spouse  Bed Mobility: " Independent  Transfer Status: Independent  Ambulation: Independent  Assistive Devices Used: None  Current Level of Function:   Gait Level Of Assist: Contact Guard Assist  Assistive Device: Front Wheel Walker  Distance (Feet): 100 (50 ft into hallway & back to room)  Deviation: Shuffled Gait, Bradykinetic  Supine to Sit:  (NT, up in chair pre session)  Sit to Supine: Standby Assist  Scooting: Standby Assist  Skilled Intervention: Verbal Cuing, Compensatory Strategies  Comments: v/cs to not use BUEs for sitting up from reclined position or scooting self forward  Sit to Stand: Maximal Assist (from recliner; padded chair to be taller for staff)  Bed, Chair, Wheelchair Transfer: Moderate Assist  Toilet Transfers: Moderate Assist (w/BSC as RTS (very high))  Transfer Method: Stand Step  Skilled Intervention: Verbal Cuing, Tactile Cuing, Sequencing, Facilitation, Compensatory Strategies, Postural Facilitation  Sitting Edge of Bed: 8 mins  Standin mins  Occupational Therapy:   Self Feeding: Independent  Grooming / Hygiene: Independent  Bathing: Independent  Dressing: Independent  Toileting: Independent  Medication Management: Independent  Laundry: Independent  Kitchen Mobility: Independent  Finances: Independent  Home Management: Independent  Shopping: Independent  Prior Level Of Mobility: Independent With Device in Community  Driving / Transportation: Driving Independent  Prior Services: None  Housing / Facility: [address]  Occupation (Pre-Hospital Vocational): Not Employed  Leisure Interests: Family  Current Level of Function:   Eating: Supervision  Bathing:  (recommended RN use BSC as shower seat as pt req higher seat than shower bench)  Upper Body Dressing:  (decline d/t fatigue)  Lower Body Dressing: Moderate Assist (socks and would req more assist for pulling up pants)  Toileting: Moderate Assist (on BSC as RTS)  Skilled Intervention: Verbal Cuing, Tactile Cuing, Facilitation, Compensatory Strategies  Speech  Language Pathology:      Rehabilitation Prognosis/Potential: Good  Estimated Length of Stay: 7-10 days    Nursing:       MWF    Scope/Intensity of Services Recommended:  Physical Therapy: 1.5 hr / day  5 days / week. Therapeutic Interventions Required: Maximize Endurance, Mobility, Strength, and Safety  Occupational Therapy: 1.5 hr / day 5 days / week. Therapeutic Interventions Required: Maximize Self Care, ADLs, IADLs, and Energy Conservation  Rehabilitation Nursin/. Therapeutic Interventions Required: Monitor Pain, Skin, Wound(s), Vital Signs, Intake and Output, Labs, Safety, and Family Training  Rehabilitation Physician: 3 - 5 days / week. Therapeutic Interventions Required: Medical Management  Dietician: Nutritional Assessment/Education. Therapeutic Interventions Required: .    He requires 24-hour rehabilitation nursing to manage bowel and bladder function, skin care, surgical incision, nutrition and fluid intake, pain control, safety, medication management, and patient/family goals. In addition, rehabilitation nursing will reiterate and reinforce therapy skills and equipment use, including ADLs, as well as provide education to the patient and family. Brian Jasmine is willing to participate in and is able to tolerate the proposed plan of care.    Rehabilitation Goals and Plan (Expected frequency & duration of treatment in the IRF):   Return to the Community, Modified Independent Level of Care, and Family Able to Provide  Assistance  Anticipated Date of Rehabilitation Admission: 24  Patient/Family oriented IRF level of care/facility/plan: Yes  Patient/Family willing to participate in IRF care/facility/plan: Yes  Patient able to tolerate IRF level of care proposed: Yes  Patient has potential to benefit IRF level of care proposed: Yes  Comments: Not Applicable    Special Needs or Precautions - Medical Necessity:  Safety Concerns/Precautions:  Fall Risk / High Risk for Falls, Balance, and Bed /  Chair Alarm  Complex Wound Care: Surgical  Pain Management  Current Medications:    Current Facility-Administered Medications Ordered in Epic   Medication Dose Route Frequency Provider Last Rate Last Admin    [START ON 7/6/2024] valsartan (Diovan) tablet 80 mg  80 mg Oral Q DAY JASKARAN Ca MD Alert...Warfarin per Pharmacy   Other PRN MICHAEL CaRMichelleNMichelle        HEPARIN SODIUM (PORCINE) 1000 UNIT/ML INJ SOLN             amiodarone (Cordarone) tablet 400 mg  400 mg Oral BID KAYE CanalesPMichelleRMichelleN.   400 mg at 07/05/24 0432    epoetin (Retacrit) injection (Dialysis use only) 10,000 Units  10,000 Units Intravenous HELEN STEINER + FR Felipe Kaplan M.D.   10,000 Units at 07/03/24 1706    metoprolol tartrate (Lopressor) tablet 25 mg  25 mg Oral BID KAYE CanalesPMichelleRMichelleN.   25 mg at 07/03/24 1829    hydrALAZINE (Apresoline) injection 20 mg  20 mg Intravenous Q6HRS PRN MICHAEL CaRMichelleNMichelle   20 mg at 07/03/24 0841    Respiratory Therapy Consult   Nebulization Continuous RT Onofre Sutherland P.A.-C.        electrolyte-A (Plasmalyte-A) infusion   Intravenous PRN LILIA ChaudhryCMichelle   Stopped at 06/30/24 1814    Nozin nasal  swab  1 Applicator Each Nostril BID Onofre Sutherland P.A.-C.   1 Applicator at 07/05/24 0755    aspirin EC tablet 81 mg  81 mg Oral DAILY LILIA ChaudhryCMichelle   81 mg at 07/05/24 0432    atorvastatin (Lipitor) tablet 40 mg  40 mg Oral QHS Onofre Sutherland P.A.-C.   40 mg at 07/04/24 2036    Pharmacy Consult Request ...Pain Management Review 1 Each  1 Each Other PHARMACY TO DOSE Onofre Sutherland P.A.-C.        acetaminophen (Tylenol) tablet 1,000 mg  1,000 mg Oral Q6HRS Onofre Sutherland P.A.-C.   1,000 mg at 07/05/24 1229    Followed by    [START ON 7/10/2024] acetaminophen (Tylenol) tablet 1,000 mg  1,000 mg Oral Q6HRS PRN Onofre Sutherland P.A.-C.        oxyCODONE immediate-release (Roxicodone) tablet 5 mg  5 mg Oral Q3HRS PRN SHAWN Chaudhry-C.   5 mg at 07/02/24 0540    Or     oxyCODONE immediate release (Roxicodone) tablet 10 mg  10 mg Oral Q3HRS PRN SHAWN Chaudhry-CMichelle   10 mg at 07/05/24 0432    Or    fentaNYL (Sublimaze) injection 50 mcg  50 mcg Intravenous Q3HRS PRN ROBB ChaudhryA.-C.        traMADol (Ultram) 50 MG tablet 50 mg  50 mg Oral Q12HRS PRN ROBB ChaudhryAMichelle-C.   50 mg at 07/02/24 0825    sodium bicarbonate 8.4 % injection 50 mEq  50 mEq Intravenous Q HOUR PRN ROBB ChaudhryA.-C.        ondansetron (Zofran) syringe/vial injection 8 mg  8 mg Intravenous Q6HRS PRN SHAWN Chaudhry-CMichelle   8 mg at 07/01/24 0635    Or    prochlorperazine (Compazine) injection 10 mg  10 mg Intravenous Q6HRS PRN ROBB ChaudhryA.-C.        acetaminophen (Tylenol) tablet 650 mg  650 mg Oral Q4HRS PRN Onofre Sutherland P.A.-C.        Or    acetaminophen (Tylenol) suppository 650 mg  650 mg Rectal Q4HRS PRN ROBB ChaudhryAMichelle-C.        docusate sodium (Colace) capsule 100 mg  100 mg Oral BID LILIA ChaudhryCMichelle   100 mg at 07/03/24 1750    senna-docusate (Pericolace Or Senokot S) 8.6-50 MG per tablet 1 Tablet  1 Tablet Oral Nightly SHAWN Chaudhry-CMichelle   1 Tablet at 06/30/24 2337    senna-docusate (Pericolace Or Senokot S) 8.6-50 MG per tablet 1 Tablet  1 Tablet Oral Q24HRS PRN SHAWN Chaudhry-CMichelle        polyethylene glycol/lytes (Miralax) Packet 1 Packet  1 Packet Oral BID PRN Onofre Sutherland P.A.-C.        magnesium hydroxide (Milk Of Magnesia) suspension 30 mL  30 mL Oral QDAY PRN LILIA ChaudhryCMichelle        bisacodyl (Dulcolax) suppository 10 mg  10 mg Rectal Q24HRS PRN ROBB ChaudhryA.-C.        sodium phosphate (Fleet) enema 133 mL  1 Each Rectal Once PRN Onofre Sutherland P.A.-C.        omeprazole (PriLOSEC) capsule 20 mg  20 mg Oral DAILY Onofre Sutherland P.A.-C.   20 mg at 07/05/24 0432    mag hydrox-al hydrox-simeth (Maalox Plus Es Or Mylanta Ds) suspension 30 mL  30 mL Oral Q4HRS PRN Onofre Sutherland P.A.-C.        diphenhydrAMINE (Benadryl) tablet/capsule 25 mg  25 mg Oral HS PRN - MR X  1 Onofre Sutherland P.A.-C.        albuterol (Proventil) 2.5mg/0.5ml nebulizer solution 2.5 mg  2.5 mg Nebulization Q2HRS PRN (RT) Molly Che M.D.        cefTRIAXone (Rocephin) syringe 2,000 mg  2,000 mg Intravenous Q12HRS Sarabjit Pulliam M.D.   2,000 mg at 07/05/24 0433    ampicillin (Omnipen) 2,000 mg in  mL IVPB  2,000 mg Intravenous Q12HRS Sarabjit Pulliam M.D.   Stopped at 07/05/24 0516    labetalol (Normodyne/Trandate) injection 10 mg  10 mg Intravenous Q4HRS PRN Doug Tinoco M.D.   10 mg at 06/29/24 1818    heparin injection 500 Units  500 Units Intravenous DIALYSIS PRN Oswald Pearce M.D.   500 Units at 07/03/24 1416    heparin injection 1,500 Units  1,500 Units Intravenous DIALYSIS PRN Oswald Pearce M.D.   1,500 Units at 07/03/24 1419    heparin intracatheter (for DIALYSIS USE ONLY) 1,200 Units  1,200 Units Intracatheter DIALYSIS SHIMON Pearce M.D.   1,200 Units at 07/03/24 1722    heparin intracatheter (for DIALYSIS USE ONLY) 1,200 Units  1,200 Units Intracatheter DIALYSIS PRN Oswald Pearce M.D.   1,200 Units at 07/03/24 1722     No current [facility]-ordered outpatient medications on file.     Diet:   DIET ORDERS (From admission to next 24h)       Start     Ordered    07/03/24 1332  Diet Order Diet: Cardiac  ALL MEALS        Question:  Diet:  Answer:  Cardiac    07/03/24 1331                    Anticipated Discharge Destination / Patient/Family Goal:  Destination: Home with Assistance Support System:  24/7 Caregivers  Anticipated home health services: OT and PT  Previously used  service/ provider: Not Applicable  Anticipated DME Needs: [employer]  Outpatient Services: Outpatient Dialysis Note     Confirmed patient is active at:     [facility] [address], [place] [address]     Schedule: Mon, Wed, Fri   Time: 6:00 AM     Patient is followed by Dr. Frederick.     Spoke with Flor at facility who confirmed patient information.   Forwarded records for  review.     Xitlaly Reyes   Dialysis Coordinator / [facility]  Ph: [phone]  Alternative resources to address additional identified needs:   Future Appointments   Date Time Provider Department Center   7/29/2024  1:30 PM CT RESOURCE PROVIDER [facility] None      Pre-Screen Completed: 7/5/2024 1:36 PM Balta Nicholson L.P.N.

## 2024-07-05 NOTE — CARE PLAN
The patient is Stable - Low risk of patient condition declining or worsening    Shift Goals  Clinical Goals: increase ambulation, pain control, IS  Patient Goals: get better  Family Goals: na    Progress made toward(s) clinical / shift goals:    Problem: Pain - Standard  Goal: Alleviation of pain or a reduction in pain to the patient’s comfort goal  Outcome: Progressing  Note: Patient having 8/10 pain. Patient medicated per MAR. Comfort measures in place. Extra blanket given.      Problem: Fall Risk  Goal: Patient will remain free from falls  Outcome: Progressing  Note: Fall precautions in place. Bed alarm on.      Problem: Post Op Day 4 CABG/Heart Valve Replacement  Goal: Optimal care of the Post Op CABG/Heart Valve replacement Post Op Day 4  Outcome: Progressing  Intervention: Daily weights in the morning  Note: Completed with NOC RN.   Intervention: Shower daily and clean incisions twice daily with soap and water  Note: Incision care given with soap and water. Educated patient on incision care.   Intervention: Up in chair for all meals  Note: Completed on day shift.   Intervention: Ambulate 4 times daily, increasing the distance each time  Note: Ambulated 3 times today. Last walk with be complete with NOC RN in the morning.  Intervention: IS q 1 hour while awake and record best IS volume  Note: Best IS 1250   Intervention: Consider removal of ruiz, chest tube and pacer wires if not already done  Note: All removed.   Intervention: Discharge Education  Note: Educated patient on site care and mobilization at home.        Patient is not progressing towards the following goals:

## 2024-07-05 NOTE — PROGRESS NOTES
Bedside report received from off going RN/tech: Dulce, assumed care of patient.     Fall Risk Score: MODERATE RISK  Fall risk interventions in place: Place yellow fall risk ID band on patient, Provide patient/family education based on risk assessment, Educate patient/family to call staff for assistance when getting out of bed, Place fall precaution signage outside patient door, Place patient in room close to nursing station, and Utilize bed/chair fall alarm  Bed type: Regular (Gary Score less than 17 interventions in place)  Patient on cardiac monitor: Yes  IVF/IV medications: Not Applicable   Oxygen: Room Air  Bedside sitter: Not Applicable   Isolation: Not applicable

## 2024-07-05 NOTE — PROGRESS NOTES
Bedside report received from off going RN/tech: Margarita RN, assumed care of patient.     Fall Risk Score: MODERATE RISK  Fall risk interventions in place: Place yellow fall risk ID band on patient, Provide patient/family education based on risk assessment, Educate patient/family to call staff for assistance when getting out of bed, Place fall precaution signage outside patient door, Place patient in room close to nursing station, Utilize bed/chair fall alarm, Notify charge of high risk for huddle, and Bed alarm connected correctly  Bed type: Regular (Gary Score less than 17 interventions in place)  Patient on cardiac monitor: Yes  IVF/IV medications: Not Applicable   Oxygen: Room Air  Bedside sitter: Not Applicable   Isolation: Not applicable    Pt resting comfortably in bed. No needs expressed at this time. Bed in low and locked position, call light within reach, will continue to monitor.

## 2024-07-06 LAB
ANION GAP SERPL CALC-SCNC: 14 MMOL/L (ref 7–16)
BACTERIA BLD CULT: NORMAL
BACTERIA BLD CULT: NORMAL
BUN SERPL-MCNC: 23 MG/DL (ref 8–22)
CALCIUM SERPL-MCNC: 8.4 MG/DL (ref 8.5–10.5)
CHLORIDE SERPL-SCNC: 98 MMOL/L (ref 96–112)
CO2 SERPL-SCNC: 24 MMOL/L (ref 20–33)
CREAT SERPL-MCNC: 3.76 MG/DL (ref 0.5–1.4)
ERYTHROCYTE [DISTWIDTH] IN BLOOD BY AUTOMATED COUNT: 56.8 FL (ref 35.9–50)
GFR SERPLBLD CREATININE-BSD FMLA CKD-EPI: 17 ML/MIN/1.73 M 2
GLUCOSE SERPL-MCNC: 101 MG/DL (ref 65–99)
HCT VFR BLD AUTO: 28.8 % (ref 42–52)
HGB BLD-MCNC: 9.5 G/DL (ref 14–18)
INR PPP: 1.13 (ref 0.87–1.13)
MCH RBC QN AUTO: 31 PG (ref 27–33)
MCHC RBC AUTO-ENTMCNC: 33 G/DL (ref 32.3–36.5)
MCV RBC AUTO: 94.1 FL (ref 81.4–97.8)
PLATELET # BLD AUTO: 223 K/UL (ref 164–446)
PMV BLD AUTO: 10.6 FL (ref 9–12.9)
POTASSIUM SERPL-SCNC: 3.5 MMOL/L (ref 3.6–5.5)
PROTHROMBIN TIME: 14.6 SEC (ref 12–14.6)
RBC # BLD AUTO: 3.06 M/UL (ref 4.7–6.1)
SIGNIFICANT IND 70042: NORMAL
SIGNIFICANT IND 70042: NORMAL
SITE SITE: NORMAL
SITE SITE: NORMAL
SODIUM SERPL-SCNC: 136 MMOL/L (ref 135–145)
SOURCE SOURCE: NORMAL
SOURCE SOURCE: NORMAL
WBC # BLD AUTO: 13.7 K/UL (ref 4.8–10.8)

## 2024-07-06 PROCEDURE — 700111 HCHG RX REV CODE 636 W/ 250 OVERRIDE (IP): Performed by: INTERNAL MEDICINE

## 2024-07-06 PROCEDURE — 700102 HCHG RX REV CODE 250 W/ 637 OVERRIDE(OP): Mod: JZ | Performed by: INTERNAL MEDICINE

## 2024-07-06 PROCEDURE — 700102 HCHG RX REV CODE 250 W/ 637 OVERRIDE(OP)

## 2024-07-06 PROCEDURE — 85610 PROTHROMBIN TIME: CPT

## 2024-07-06 PROCEDURE — 700102 HCHG RX REV CODE 250 W/ 637 OVERRIDE(OP): Performed by: INTERNAL MEDICINE

## 2024-07-06 PROCEDURE — A9270 NON-COVERED ITEM OR SERVICE: HCPCS

## 2024-07-06 PROCEDURE — 99232 SBSQ HOSP IP/OBS MODERATE 35: CPT | Performed by: INTERNAL MEDICINE

## 2024-07-06 PROCEDURE — 80048 BASIC METABOLIC PNL TOTAL CA: CPT

## 2024-07-06 PROCEDURE — A9270 NON-COVERED ITEM OR SERVICE: HCPCS | Performed by: NURSE PRACTITIONER

## 2024-07-06 PROCEDURE — A9270 NON-COVERED ITEM OR SERVICE: HCPCS | Performed by: INTERNAL MEDICINE

## 2024-07-06 PROCEDURE — 770020 HCHG ROOM/CARE - TELE (206)

## 2024-07-06 PROCEDURE — 700102 HCHG RX REV CODE 250 W/ 637 OVERRIDE(OP): Performed by: NURSE PRACTITIONER

## 2024-07-06 PROCEDURE — A9270 NON-COVERED ITEM OR SERVICE: HCPCS | Mod: JZ | Performed by: INTERNAL MEDICINE

## 2024-07-06 PROCEDURE — 700101 HCHG RX REV CODE 250: Performed by: INTERNAL MEDICINE

## 2024-07-06 PROCEDURE — 700105 HCHG RX REV CODE 258: Performed by: INTERNAL MEDICINE

## 2024-07-06 PROCEDURE — 85027 COMPLETE CBC AUTOMATED: CPT

## 2024-07-06 RX ORDER — AMIODARONE HYDROCHLORIDE 200 MG/1
200 TABLET ORAL
Status: DISCONTINUED | OUTPATIENT
Start: 2024-07-13 | End: 2024-07-17 | Stop reason: HOSPADM

## 2024-07-06 RX ORDER — AMLODIPINE BESYLATE 10 MG/1
10 TABLET ORAL
Status: DISCONTINUED | OUTPATIENT
Start: 2024-07-06 | End: 2024-07-11

## 2024-07-06 RX ORDER — HYDRALAZINE HYDROCHLORIDE 25 MG/1
25 TABLET, FILM COATED ORAL EVERY 8 HOURS
Status: DISCONTINUED | OUTPATIENT
Start: 2024-07-06 | End: 2024-07-07

## 2024-07-06 RX ORDER — POTASSIUM CHLORIDE 20 MEQ/1
20 TABLET, EXTENDED RELEASE ORAL ONCE
Status: COMPLETED | OUTPATIENT
Start: 2024-07-06 | End: 2024-07-06

## 2024-07-06 RX ORDER — WARFARIN SODIUM 5 MG/1
5 TABLET ORAL
Status: COMPLETED | OUTPATIENT
Start: 2024-07-06 | End: 2024-07-06

## 2024-07-06 RX ADMIN — ASPIRIN 81 MG: 81 TABLET, COATED ORAL at 04:31

## 2024-07-06 RX ADMIN — ACETAMINOPHEN 1000 MG: 500 TABLET, FILM COATED ORAL at 12:53

## 2024-07-06 RX ADMIN — ACETAMINOPHEN 1000 MG: 500 TABLET, FILM COATED ORAL at 00:20

## 2024-07-06 RX ADMIN — VALSARTAN 80 MG: 80 TABLET ORAL at 05:07

## 2024-07-06 RX ADMIN — AMPICILLIN SODIUM 2000 MG: 2 INJECTION, POWDER, FOR SOLUTION INTRAVENOUS at 04:35

## 2024-07-06 RX ADMIN — METOPROLOL TARTRATE 25 MG: 25 TABLET, FILM COATED ORAL at 04:31

## 2024-07-06 RX ADMIN — OXYCODONE HYDROCHLORIDE 10 MG: 10 TABLET ORAL at 20:24

## 2024-07-06 RX ADMIN — HYDRALAZINE HYDROCHLORIDE 25 MG: 25 TABLET ORAL at 13:05

## 2024-07-06 RX ADMIN — CEFTRIAXONE SODIUM 2000 MG: 10 INJECTION, POWDER, FOR SOLUTION INTRAVENOUS at 04:34

## 2024-07-06 RX ADMIN — AMIODARONE HYDROCHLORIDE 400 MG: 200 TABLET ORAL at 04:30

## 2024-07-06 RX ADMIN — WARFARIN SODIUM 5 MG: 5 TABLET ORAL at 17:46

## 2024-07-06 RX ADMIN — OXYCODONE HYDROCHLORIDE 10 MG: 10 TABLET ORAL at 23:41

## 2024-07-06 RX ADMIN — ACETAMINOPHEN 1000 MG: 500 TABLET, FILM COATED ORAL at 23:40

## 2024-07-06 RX ADMIN — AMPICILLIN SODIUM 2000 MG: 2 INJECTION, POWDER, FOR SOLUTION INTRAVENOUS at 17:58

## 2024-07-06 RX ADMIN — AMLODIPINE BESYLATE 10 MG: 10 TABLET ORAL at 05:46

## 2024-07-06 RX ADMIN — OXYCODONE HYDROCHLORIDE 10 MG: 10 TABLET ORAL at 17:48

## 2024-07-06 RX ADMIN — OXYCODONE HYDROCHLORIDE 10 MG: 10 TABLET ORAL at 12:52

## 2024-07-06 RX ADMIN — Medication 1 APPLICATOR: at 20:24

## 2024-07-06 RX ADMIN — CEFTRIAXONE SODIUM 2000 MG: 10 INJECTION, POWDER, FOR SOLUTION INTRAVENOUS at 17:46

## 2024-07-06 RX ADMIN — POTASSIUM CHLORIDE 20 MEQ: 1500 TABLET, EXTENDED RELEASE ORAL at 09:04

## 2024-07-06 RX ADMIN — HYDRALAZINE HYDROCHLORIDE 25 MG: 25 TABLET ORAL at 21:56

## 2024-07-06 RX ADMIN — ACETAMINOPHEN 1000 MG: 500 TABLET, FILM COATED ORAL at 17:45

## 2024-07-06 RX ADMIN — OMEPRAZOLE 20 MG: 20 CAPSULE, DELAYED RELEASE ORAL at 04:31

## 2024-07-06 RX ADMIN — Medication 1 APPLICATOR: at 09:04

## 2024-07-06 RX ADMIN — ATORVASTATIN CALCIUM 40 MG: 40 TABLET, FILM COATED ORAL at 20:24

## 2024-07-06 RX ADMIN — OXYCODONE HYDROCHLORIDE 10 MG: 10 TABLET ORAL at 09:07

## 2024-07-06 RX ADMIN — AMIODARONE HYDROCHLORIDE 400 MG: 200 TABLET ORAL at 17:45

## 2024-07-06 RX ADMIN — OXYCODONE HYDROCHLORIDE 10 MG: 10 TABLET ORAL at 05:08

## 2024-07-06 RX ADMIN — ACETAMINOPHEN 1000 MG: 500 TABLET, FILM COATED ORAL at 04:31

## 2024-07-06 ASSESSMENT — FIBROSIS 4 INDEX: FIB4 SCORE: 1.87

## 2024-07-06 ASSESSMENT — PAIN DESCRIPTION - PAIN TYPE
TYPE: ACUTE PAIN

## 2024-07-06 NOTE — PROGRESS NOTES
Cardiovascular Surgery Progress Note    Name: Brian Jasmine  MRN: [id number]  : 1955  Admit Date: 2024 10:20 AM  6 Days Post-Op     Procedure:  Procedure(s) and Anesthesia Type:     * REPLACEMENT, AORTIC VALVE WITH A 25 MM DICKEY INSPIRIS- Wound Class: Clean with DrainCABG X1 WITH SEKELTONIZED LIMA FREE GRAFT- Wound Class: Clean with Drain  LEFT ATRIAL APPENDAGE RESECTION AND LIGATION  ECHOCARDIOGRAM, TRANSESOPHAGEAL, INTRAOPERATIVE - Wound Class: Clean with Drain    Vitals:  Vitals:    24 0022 24 0327 24 0330 24 0400   BP: (!) 153/78 (!) 172/94 (!) 172/94    Pulse: (!) 58 (!) 59 63    Resp: 18 18     Temp:  36.6 °C (97.9 °F)     TempSrc: Temporal Temporal     SpO2: 93% 92%     Weight:    95.4 kg (210 lb 5.1 oz)   Height:          Temp (24hrs), Av.3 °C (97.3 °F), Min:35.6 °C (96 °F), Max:36.6 °C (97.9 °F)      Respiratory:   Respiration: 18, Pulse Oximetry: 92 %       Fluids:    Intake/Output Summary (Last 24 hours) at 2024 0532  Last data filed at 2024 2100  Gross per 24 hour   Intake 1140 ml   Output 2000 ml   Net -860 ml     Admit weight: Weight: 93 kg (205 lb)  Current weight: Weight: 95.4 kg (210 lb 5.1 oz) (24 0400)    Labs:  Recent Labs     24  0110 24  0036 24  0019   WBC 13.7* 14.2* 13.7*   RBC 3.19* 3.12* 3.06*   HEMOGLOBIN 9.7* 9.7* 9.5*   HEMATOCRIT 29.1* 29.3* 28.8*   MCV 91.2 93.9 94.1   MCH 30.4 31.1 31.0   MCHC 33.3 33.1 33.0   RDW 53.5* 55.4* 56.8*   PLATELETCT 181 201 223   MPV 10.5 10.6 10.6     Recent Labs     24  0110 24  0036 24  0019   SODIUM 134* 134* 136   POTASSIUM 3.8 3.5* 3.5*   CHLORIDE 95* 96 98   CO2 24 23 24   GLUCOSE 103* 95 101*   BUN 24* 30* 23*   CREATININE 4.00* 4.91* 3.76*   CALCIUM 8.1* 8.2* 8.4*     Recent Labs     24  0833 24  0019   INR 1.03 1.13         HgbA1c:  5.1    Diabetic Educator Consult:  N\A    Medications:  Scheduled Medications   Medication Dose Frequency     valsartan  80 mg Q DAY    amiodarone  400 mg BID    epoetin  10,000 Units MO, WE + FR    metoprolol tartrate  25 mg BID    Nozin nasal  swab  1 Applicator BID    aspirin  81 mg DAILY    atorvastatin  40 mg QHS    Pharmacy Consult Request  1 Each PHARMACY TO DOSE    acetaminophen  1,000 mg Q6HRS    docusate sodium  100 mg BID    senna-docusate  1 Tablet Nightly    omeprazole  20 mg DAILY    cefTRIAXone (ROCEPHIN) IV  2,000 mg Q12HRS    ampicillin  2,000 mg Q12HRS        Exam:   Physical Exam  Vitals and nursing note reviewed.   Constitutional:       General: He is not in acute distress.     Appearance: Normal appearance.   HENT:      Head: Normocephalic.      Right Ear: External ear normal.      Left Ear: External ear normal.      Nose: Nose normal. No congestion.      Mouth/Throat:      Mouth: Mucous membranes are moist.      Pharynx: Oropharynx is clear.   Eyes:      Extraocular Movements: Extraocular movements intact.      Pupils: Pupils are equal, round, and reactive to light.   Cardiovascular:      Rate and Rhythm: Normal rate and regular rhythm.      Pulses: Normal pulses.      Heart sounds: Normal heart sounds.   Pulmonary:      Effort: Pulmonary effort is normal.      Breath sounds: Normal breath sounds.   Abdominal:      General: Bowel sounds are normal. There is no distension.      Palpations: Abdomen is soft.   Musculoskeletal:      Cervical back: Normal range of motion and neck supple. No tenderness.      Right lower leg: No edema.      Left lower leg: No edema.   Skin:     General: Skin is warm and dry.      Comments: Midline surgical incision   Neurological:      General: No focal deficit present.      Mental Status: He is alert and oriented to person, place, and time. Mental status is at baseline.   Psychiatric:         Mood and Affect: Mood normal.         Behavior: Behavior normal.         Thought Content: Thought content normal.         Cardiac Medications:    ASA - Yes    Plavix - No;  contraindicated because of On Coumadin / NOAC    Post-operative Beta Blockers - Yes    Ace/ARB- Yes    Statin - Yes    Aldactone- No; contraindicated because of Normal EF    SGLT2i-  No contraindicated because of No; contraindicated because of Normal EF    Ejection Fraction:  50%    Telemetry:   7/1 SR  7/2 SB/SR  7/3 SR  7/4 SB Afib yesterday  7/5 SB    Assessment/Plan:  POD 1  HDS, SR, neuro intact, sternal incision intact, abdomen soft - BM, fluid balance positive, wt up,  2 L NC. H/H 7.7/22.9- got blood this AM. Plan:  Recheck H/H- transfuse as appropriate. Keep chest tubes and pacing wires, IS/ambulate.     POD 2  HDS, SB, neuro intact, wounds intact, abdomen +BM, fluid balance positive, t/ up,  0.5 L NC. H/H 7.6/23.1.  Plan: Transfuse 1 unit RBCs. Remove chest tubes. Dialysis per neph. IS/ambulate. Transfer to [facility].     POD 3 HDS. SR.  Neuro intact.  Wounds CDI.  Abdomen soft nontender.  RA.  Cr 5.4 hgb 8.5.  Plan: Dialysis per neph.  Pacer wires removed.  Encourage IS/ambulation.      Pod 4 HDS. SB.  Neuro intact.  Wounds CDI.  Abdomen soft nontender +BM.  RA.  Cr 4 Hgb 9.7.  Plan: DC amio gtt continue amio oral.  Encourage IS/ambulation.    POD 5  HTN, SB, neuro intact, wounds intact, abdomen soft +BM, fluid balance positive, wt up,  room air.  Plan:  Increase valsartan, IS/ambulate. Rehab eval pending.     POD 6  HTN, SR, neuro intact, wounds intact, abdomen soft +BM, fluid balance positive, wt down,  room air.  Plan:  Add amlodipine- medically clear for IPR when BP improved. Will need IV abx on DC. IS/ambulate. HD per nephrology.     Disposition:  PT/OT rec post acute- referral placed 7/5

## 2024-07-06 NOTE — CARE PLAN
"The patient is Stable - Low risk of patient condition declining or worsening    Shift Goals  Clinical Goals: increase ambulation, pain control, IS use, incision care  Patient Goals: rest  Family Goals: [place]    Progress made toward(s) clinical / shift goals:    Problem: Pain - Standard  Goal: Alleviation of pain or a reduction in pain to the patient’s comfort goal  Outcome: Progressing  Note: Pt educated to pain scale and pain assessed. Pt medicated per MAR with PRN medication. Non-pharmacological pain management techniques encouraged.      Problem: Post Op Day 5 CABG/Heart Valve Replacement  Goal: Optimal care of the Post Op CABG/Heart Valve replacement Post Op Day 5  Outcome: Progressing  Intervention: Daily weights in the morning  Note: Daily weight to be obtained in the am.   Intervention: Shower daily and clean incisions twice daily with soap and water  Note: Daily shower and incision care complete.   Intervention: Up in chair for all meals  Note: Pt was up in chair for all meals today.   Intervention: Ambulate 4 times daily, increasing the distance each time  Note: 4x ambulation complete today.   Intervention: IS q 1 hour while awake and record best IS volume  Note: IS complete, best volume documented.   Intervention: Complete \"Home O2 Assessment' in vitals flowsheets if unable to wean off O2  Note: Pt is on room air.   Intervention: Consider removal of ruiz, chest tube and pacer wires if not already done  Note: Ruiz, CT, and pacer wires removed.   Intervention: Discharge Education  Note: DC education provided.        Patient is not progressing towards the following goals:      "

## 2024-07-06 NOTE — PROGRESS NOTES
[facility] -  PROGRESS NOTE               Author: Grabiel Augustin M.D. Date & Time: 7/6/2024  11:30 AM     HPI:  68-year-old  male with biventricular history of ESRD on hemodialysis q. Monday Wednesday Friday at [facility] through left upper arm AV fistula that was placed about 2 to 3 years ago.  He presented to the ER with left arm pain and swelling for the last 5 days.  His last dialysis was on Wednesday and had no dialysis in the last 5 days.  He felt sick on Friday and missed his dialysis.  Ultrasound of the left upper arm AV fistula showed there is no flow in the AV fistula and it has thrombosed.  A temporary right IJ dialysis catheter was placed in the ER.  Nephrology has been consulted for management of dialysis.  Patient denies any fever or chills, no nausea or vomiting, no chest pain.  He has some shortness of breath but is able to talk in full sentences and is in no acute respiratory distress at this time. No melena, hematochezia, hematemesis.  No HA, visual changes, or abdominal pain.     NEPHROLOGY DAILY PROGRESS:   6/24: consult note  6/25:  Afebrile, HD last night without any complications, plans noted for OR today with Dr. Tinoco for vascualr surgery   6/26: Underwent AVG repair yesterday with Dr. Tinoco, has LAMBERT drain, BP noted low this AM on dialysis, says BP 's run low on dialysis - not on Midodrine, ordered Midodrine to be used on dialysis   6/27: 0 Net UF with HD yesterday, limited by hypotension. Sitting up at side of bed eating breakfast. Bps improved 130s systolic.   6/28: HD today, seen  6/29: s/p HD yesterday over temp HD line. Tolerated well, no complaints.  6/30: in OR, not available   7/1: s/p AVR, 1V CABG and TAHIR resection/ligation, transferred to ICU, now off pressors, fatigued, post-op pain  7/2: Stable hemodynamics, tolerated HD, transferred out of ICU, chest wall pain improved  7/3: Stable hemodynamics, 7/1 BC remain negative, denies  "complaints   7/4: No acute events, tolerated HD, chest wall pain improved, feeling better overall  7/5: No acute events, no chest pain or SOB. S/p permacath placement this AM  7/6: No acute events, hypertensive, denies complaints    REVIEW OF SYSTEMS:    10 Point ROS performed, negative other than stated above    PMH/PSH/SH/FH:   Reviewed and unchanged since admission note    CURRENT MEDICATIONS:   Reviewed from admission to present day    VS:  VS:  BP (!) 171/80   Pulse (!) 59   Temp 36.3 °C (97.3 °F) (Temporal)   Resp 18   Ht 1.854 m (6' 0.99\")   Wt 95.4 kg (210 lb 5.1 oz)   SpO2 93%   BMI 27.75 kg/m²   GENERAL: no acute distress  CV: No edema  RESP: non-labored  GI: Soft  MSK: No joint deformities   SKIN: No concerning rashes  NEURO: AOx3  PSYCH: Cooperative    Fluids:  In: 1140 [P.O.:640; Dialysis:500]  Out: 2000     LABS:  Recent Labs     07/04/24  0110 07/05/24  0036 07/06/24  0019   SODIUM 134* 134* 136   POTASSIUM 3.8 3.5* 3.5*   CHLORIDE 95* 96 98   CO2 24 23 24   GLUCOSE 103* 95 101*   BUN 24* 30* 23*   CREATININE 4.00* 4.91* 3.76*   CALCIUM 8.1* 8.2* 8.4*       IMAGING:   All imaging reviewed from admission to present day    IMPRESSION:  # ESRD  -Q. MWF at [facility]  # Bacteremia   - Blood Cx from 6/24, 6/27 + E. Faecalis  # Bacterial endocarditis:   -s/p aortic valve replacement 6/30  3 CAD  -s/p 1V CABG 6/30  #Thrombosed left upper arm AVG; infected pseudoaneurysm   -Right IJ temporary HD catheter placed in ER 6/24/2024  - S/P AVG repair, resection and pseudoaneurysm resection 6/25/24 by Dr. Tinoco along with  Reconstruction of left brachial artery using left greater saphenous vein interposition graft.  -permacath placed 7/5  # HTN  # Sepsis   # Anemia of CKD  # CKD-MBD  -phos at goal  # Coronary artery disease  # Chronic A-fib  -s/p TAHIR resection/ligation        PLAN:  - No HD today, continue q MWF schedule   - Challenge UF  - Using permacath until graft cleared for use by vascular  - EPO " with HD  - Start hydral TID  - No dietary protein restrictions  - abx per primary  - Dose all meds per ESRD    Thank you

## 2024-07-06 NOTE — CARE PLAN
The patient is Stable - Low risk of patient condition declining or worsening    Shift Goals  Clinical Goals: Increase ambulation, pain control, IS use  Patient Goals: Go home  Family Goals: NA    Progress made toward(s) clinical / shift goals:      Problem: Post Op Day 5 CABG/Heart Valve Replacement  Goal: Optimal care of the Post Op CABG/Heart Valve replacement Post Op Day 5  Description: Target End Date:  end of post op day 5  Intervention: Shower daily and clean incisions twice daily with soap and water  Note: Pt tolerating showering. Able to do own incision care with minimal guidance. Pt does require education with qdaily showers and why they are needed.  Intervention: Up in chair for all meals  Note: Pt up to chair for 2/3 meals today. For lunch pt was in dialysis and unable to get to the chair. Pt needing extra education for the importance of why he needs to be up and out of bed at least three times a day for meals.  Intervention: Ambulate 4 times daily, increasing the distance each time  Note: Pt ambulated twice today for day shift. Extra education needed for why ambulation is important. Pt not increasing his distance each time. More education needed.   Intervention: IS q 1 hour while awake and record best IS volume  Note: Pt blew 1000 on the IS  Intervention: Discharge Education  Note: Pt recommended to rehab. Pt agreeable to rehab.        Problem: Knowledge Deficit - Standard  Goal: Patient and family/care givers will demonstrate understanding of plan of care, disease process/condition, diagnostic tests and medications  Description: Target End Date:  1-3 days or as soon as patient condition allows    Document in Patient Education    1.  Patient and family/caregiver oriented to unit, equipment, visitation policy and means for communicating concern  2.  Complete/review Learning Assessment  3.  Assess knowledge level of disease process/condition, treatment plan, diagnostic tests and medications  4.  Explain  disease process/condition, treatment plan, diagnostic tests and medications  Outcome: Not Progressing

## 2024-07-06 NOTE — PROGRESS NOTES
Inpatient Anticoagulation Service Note for 7/6/2024      Reason for Anticoagulation: Aortic Mechanical Valve Replacement     [id number] VASc Score: 1  HAS-BLED Score: 3    Hemoglobin Value: (!) 9.5  Hematocrit Value: (!) 28.8  Lab Platelet Value: 223  Target INR: 2.0 to 3.0    INR from last 7 days       Date/Time INR Value    07/06/24 0019 1.13    07/05/24 0833 1.03    06/30/24 2140 1.4    06/30/24 1355 1.55    06/29/24 1710 1.42          Dose from last 7 days       Date/Time Dose (mg)    07/06/24 1312 5    07/05/24 1501 5          Significant Interactions: Amiodarone, Antibiotics, Antiplatelet Medications  Bridge Therapy: No     Reversal Agent Administered: Factor VII  Comments: INR continues to be subtherapeutic, but trending upwards as expected with new start Warfarin. Will continue to dose 5 mg x1 dose tonight.    Plan:  Warfarin 5 mg x1 tonight.  Education Material Provided?: No    Pharmacist suggested discharge dosing:  TBD pending patient's INR as this is new start Warfarin.      Joselyn Dolan, PharmD

## 2024-07-06 NOTE — PROGRESS NOTES
Hospital Medicine Daily Progress Note    Date of Service  7/6/2024    Chief Complaint  Brian Jasmine is a 68 y.o. male admitted 6/24/2024 with left arm graft infection    Hospital Course  Brian Jasmine is a 68 y.o. male who presented 6/24/2024 with left arm infection.  Mr. Jasmine has a past medical history of peripheral arterial disease with femoral popliteal bypass, end-stage renal disease undergoing dialysis via a left arm graft, atrial fibrillation on Coumadin therapy, hypertension, coronary artery disease with stent to the circumflex that developed pain, redness, swelling in the left arm graft site about 5 days ago and did not go to dialysis and stopped taking all of his medications because he felt so poorly.  He has had fevers and chills.  He presents with the symptoms and here has a grossly infected site.  His creatinine is 8.8 with a potassium 3.8.  Vascular surgery has been consulted as has nephrology.  Attempt will be made at a temporary dialysis catheter until blood cultures are cleared for a possible tunneled catheter.  He states he has had at least 6 or 7 prior neck catheters in the past.  Patient was started on IV vancomycin.  Blood culture was positive for E faecalis.  Patient underwent a RENEE that revealed aortic valve infective endocarditis as well as severe aortic insufficiency.  CT surgery was consulted and patient underwent aortic valve replacement, CABG x 1 and TAHIR resection and ligation on 6/30/2024.    Interval Problem Update  7/3 Patient seen sitting in chair, breathing well on room air. He reports some chest wall pain.  His chest tubes and pacing wires have been removed.  His chest wound appears clean dry and intact.  Left arm appears slightly swollen. His blood cultures from 7/1 have been negative.  His white count is elevated at 14.8.  Continue IV ampicillin and IV ceftriaxone.  Case discussed with ID    7/4 patient seen and examined at bedside.  He continues to report chest wall pain.   He attempted to work with physical therapy and reported some shortness of breath after walking 50 feet.  Blood cultures from 7/1 remain negative.  Plan is for permacath placement.  Patient will need IV daptomycin on discharge with stop date of 8/11/2024.  I discussed the plan of care with patient and infectious disease    7/5 patient underwent permacath placement.  Continue HD. PMR consulted for IPR evaluation. Case discussed with CT surgery, okay to DC Plavix and restarted on warfarin for history of A-fib.    7/6 No acute events. Patient sitting in chair, does not appear to be in any distress. He has been hypertensive, started on hydralazine 25 mg TID. Plan for DC to SNF. Awaiting placement     I have discussed this patient's plan of care and discharge plan at IDT rounds today with Case Management, Nursing, Nursing leadership, and other members of the IDT team.    Consultants/Specialty  cardiovascular surgeon, infectious disease, and vascular surgery    Code Status  Full Code    Disposition  Medically Cleared  I have placed the appropriate orders for post-discharge needs.    Review of Systems  Review of Systems   Constitutional:  Positive for malaise/fatigue.   Cardiovascular:  Positive for chest pain.        Physical Exam  Temp:  [36.3 °C (97.3 °F)-36.6 °C (97.9 °F)] 36.3 °C (97.3 °F)  Pulse:  [58-73] 59  Resp:  [16-18] 18  BP: (131-172)/(70-94) 166/82  SpO2:  [92 %-99 %] 93 %    Physical Exam  Vitals and nursing note reviewed.   Constitutional:       General: He is not in acute distress.  HENT:      Head: Normocephalic.      Mouth/Throat:      Mouth: Mucous membranes are moist.   Eyes:      Pupils: Pupils are equal, round, and reactive to light.   Cardiovascular:      Rate and Rhythm: Normal rate and regular rhythm.      Pulses: Normal pulses.      Heart sounds: Normal heart sounds.      Comments: Sternal incision clean dry and intact  Pulmonary:      Effort: Pulmonary effort is normal.      Breath sounds: Normal  breath sounds.   Abdominal:      Palpations: Abdomen is soft.      Tenderness: There is no abdominal tenderness.   Musculoskeletal:         General: No swelling.      Cervical back: Neck supple.      Comments: Left arm swollen   Skin:     General: Skin is warm.      Coloration: Skin is not jaundiced.   Neurological:      General: No focal deficit present.      Mental Status: He is alert and oriented to person, place, and time.   Psychiatric:         Mood and Affect: Mood normal.         Behavior: Behavior normal.         Fluids    Intake/Output Summary (Last 24 hours) at 7/6/2024 1334  Last data filed at 7/6/2024 0859  Gross per 24 hour   Intake 1500 ml   Output 2000 ml   Net -500 ml       Laboratory  Recent Labs     07/04/24  0110 07/05/24  0036 07/06/24  0019   WBC 13.7* 14.2* 13.7*   RBC 3.19* 3.12* 3.06*   HEMOGLOBIN 9.7* 9.7* 9.5*   HEMATOCRIT 29.1* 29.3* 28.8*   MCV 91.2 93.9 94.1   MCH 30.4 31.1 31.0   MCHC 33.3 33.1 33.0   RDW 53.5* 55.4* 56.8*   PLATELETCT 181 201 223   MPV 10.5 10.6 10.6     Recent Labs     07/04/24  0110 07/05/24  0036 07/06/24  0019   SODIUM 134* 134* 136   POTASSIUM 3.8 3.5* 3.5*   CHLORIDE 95* 96 98   CO2 24 23 24   GLUCOSE 103* 95 101*   BUN 24* 30* 23*   CREATININE 4.00* 4.91* 3.76*   CALCIUM 8.1* 8.2* 8.4*     Recent Labs     07/05/24  0833 07/06/24  0019   INR 1.03 1.13                 Imaging  IR-HUERTAGOLD DELEONONG PLACEMENT >5   Final Result      1. ULTRASOUND AND FLUOROSCOPIC GUIDED PLACEMENT OF A Right INTERNAL JUGULAR 14.5 [place] 23 cm length [employer] TUNNELED DIALYSIS CATHETER.      2. THE HEMODIALYSIS CATHETER MAY BE USED IMMEDIATELY AS CLINICALLY INDICATED. FLUSHES PER PROTOCOL.      DX-CHEST-PORTABLE (1 VIEW)   Final Result      Likely developing mild central vascular congestion/edema. Small left and trace right pleural effusions are now seen      DX-CHEST-PORTABLE (1 VIEW)   Final Result         1.  Pulmonary edema and/or infiltrates are identified, which are stable since  the prior exam.   2.  Trace left pleural effusion   3.  Cardiomegaly      DX-CHEST-LIMITED (1 VIEW)   Final Result         1.  Left IJ catheter, tip projects over the brachiocephalic vein/aorta region. This is short in position and does not extend into the SVC. Need to correlate clinically for venous blood return to exclude arterial line.   2.  Other findings are detailed above.      EC-RENEE W/O CONT   Final Result      DX-CHEST-2 VIEWS   Final Result         1.  No focal infiltrates.   2.  Perihilar interstitial prominence and bronchial wall cuffing, appearance suggests changes of underlying bronchial inflammation, consider bronchitis.      US-VEIN MAPPING LOWER EXTREMITY BILAT   Final Result      US-CAROTID DOPPLER UNILAT LEFT   Final Result      EC-RENEE W/O CONT   Final Result      EC-ECHOCARDIOGRAM COMPLETE W/O CONT   Final Result      US-SUSI SINGLE LEVEL BILAT   Final Result      US-EXTREMITY ARTERY LOWER BILAT   Final Result      DX-CHEST-PORTABLE (1 VIEW)   Final Result      1.  Satisfactory appearance of the right IJ catheter. No pneumothorax visualized.      US-HEMODIALYSIS GRAFT DUPLEX COMP UPPER EXTREMITY   Final Result      US-HEMODIALYSIS GRAFT DUPLEX COMP UPPER EXTREMITY   Final Result      DX-CHEST-PORTABLE (1 VIEW)   Final Result      Mild enlargement of the cardiomediastinal silhouette without acute cardiopulmonary abnormality.      CL-LEFT HEART CATHETERIZATION WITH POSSIBLE INTERVENTION    (Results Pending)        Assessment/Plan  * Infected prosthetic vascular graft, initial encounter ([facility])- (present on admission)  Assessment & Plan  Vascular surgery consulted.   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 Left greater saphenous vein harvest. Reconstruction of left brachial artery using left greater saphenous vein   interposition graft. Partial removal of left upper arm dialysis graft.      S/P AVR (aortic valve  replacement)  Assessment & Plan  Developed aortic valve endocarditis  Status post aortic valve replacement    Bacteremia- (present on admission)  Assessment & Plan  Probably related to infected graft, status post removal.  Blood cultures positive for Enterococcus  RENEE revealed aortic valve endocarditis  Patient is status post aortic valve replacement  Repeat blood cultures on 7/1 have been negative  Continue IV ampicillin and IV ceftriaxone while inpatient with plans to switch to IV dapto on discharge with stop date of 8/11/24      Coronary artery disease- (present on admission)  Assessment & Plan  Followed by cardiology in [place] with history of stent to the circumflex  S/p CABG X1 WITH SEKELTONIZED LIMA to LAD  Continue aspirin and atorvastatin    PAD (peripheral artery disease) (HCC)- (present on admission)  Assessment & Plan  Hx of previous fem-pop bypass    Troponin level elevated  Assessment & Plan  Troponin is elevated at 200  He denies chest pain  EKG reveals an intraventricular conduction delay not clearly left nor right bundle  Likely a stress response given his infection and lack of dialysis.    Follow-up echo    Chronic atrial fibrillation (HCC)- (present on admission)  Assessment & Plan  He had been on coumadin but hasn't been taking it  Continue amiodarone and metoprolol  Restart warfarin, monitor INR with goal 2-3      End stage renal disease (HCC)- (present on admission)  Assessment & Plan  Nephrology consulted  Continue HD   S/p perma cath placement     Renovascular hypertension- (present on admission)  Assessment & Plan  Uncontrolled   Continue coreg, valsartan and amlodipine  Started on hydralazine          VTE prophylaxis: warfarin    I have performed a physical exam and reviewed and updated ROS and Plan today (7/6/2024). In review of yesterday's note (7/5/2024), there are no changes except as documented above.

## 2024-07-06 NOTE — PROGRESS NOTES
Bedside report received from off going RN/tech: Yeny LANDRUM, assumed care of patient.     Fall Risk Score: MODERATE RISK  Fall risk interventions in place: Place yellow fall risk ID band on patient, Provide patient/family education based on risk assessment, Educate patient/family to call staff for assistance when getting out of bed, Place fall precaution signage outside patient door, Place patient in room close to nursing station, Utilize bed/chair fall alarm, Notify charge of high risk for huddle, and Bed alarm connected correctly  Bed type: Regular (Gary Score less than 17 interventions in place)  Patient on cardiac monitor: Yes  IVF/IV medications: Not Applicable   Oxygen: Room Air  Bedside sitter: Not Applicable   Isolation: Not applicable    Pt up to the chair this morning. Chair alarm on, call light within reach, no other needs at this time. Will continue to monitor

## 2024-07-06 NOTE — DISCHARGE PLANNING
Case Management Discharge Planning    Admission Date: 6/24/2024  GMLOS: 11.2  ALOS: 12    6-Clicks ADL Score: 16  6-Clicks Mobility Score: 16  PT and/or OT Eval ordered: Yes  Post-acute Referrals Ordered: Yes  Post-acute Choice Obtained: Yes  Has referral(s) been sent to post-acute provider:  Yes      Anticipated Discharge Dispo: Discharge Disposition: Discharged to home/self care (01)    DME Needed: No    Action(s) Taken: Patient discussed in rounds, per CT surg may be medically cleared for rehab tomorrow. RN CM completed chart review; Per Rehab PAR, no benefits with [facility] . TCC no longer following.     RN CM met with patient and S/O Josselyn at bedside to discuss; explained that rehab has stopped following due to pt's insurance. Pt's S/O Josselyn stated she spoke with Balta with [facility] and told him she is willing to pay for patient's stay ar Rehab.     DIALLO WIGGINS contacted Balta via voalte who stated he did not recall having that conversation but if that is the case; Administration would need to review.     Other D/C plan is SNF; RN CM contacted Cristel with [facility] who is considering pt but needs his dialysis schedule. Per chart review dialysis schedule is MWF at 6AM in [facility].     Cristel stated [facility] can accommodate MWF at 11AM in [place]. Pt will need dialysis chair in [place] if [facility] will accept.     CM will contact Acute Rehab and request Administration review for S/O request.     Escalations Completed: None    Medically Clear: No    Next Steps: CM will follow and assist with DCP.    Barriers to Discharge: Medical clearance and Pending Placement    Is the patient up for discharge tomorrow: No

## 2024-07-06 NOTE — PROGRESS NOTES
Bedside report received from off going RN/tech: Perla, assumed care of patient.     Fall Risk Score: MODERATE RISK  Fall risk interventions in place: Place yellow fall risk ID band on patient, Provide patient/family education based on risk assessment, Educate patient/family to call staff for assistance when getting out of bed, Place fall precaution signage outside patient door, Place patient in room close to nursing station, Utilize bed/chair fall alarm, Notify charge of high risk for huddle, and Bed alarm connected correctly  Bed type: Regular (Gary Score less than 17 interventions in place)  Patient on cardiac monitor: Yes  IVF/IV medications: Not Applicable   Oxygen: Room Air  Bedside sitter: Not Applicable   Isolation: Not applicable

## 2024-07-07 ENCOUNTER — APPOINTMENT (OUTPATIENT)
Dept: RADIOLOGY | Facility: MEDICAL CENTER | Age: 69
DRG: 216 | End: 2024-07-07
Attending: INTERNAL MEDICINE
Payer: MEDICARE

## 2024-07-07 LAB
ANION GAP SERPL CALC-SCNC: 15 MMOL/L (ref 7–16)
APTT PPP: 40.2 SEC (ref 24.7–36)
BUN SERPL-MCNC: 32 MG/DL (ref 8–22)
CALCIUM SERPL-MCNC: 8.2 MG/DL (ref 8.5–10.5)
CHLORIDE SERPL-SCNC: 96 MMOL/L (ref 96–112)
CO2 SERPL-SCNC: 24 MMOL/L (ref 20–33)
CREAT SERPL-MCNC: 5.21 MG/DL (ref 0.5–1.4)
ERYTHROCYTE [DISTWIDTH] IN BLOOD BY AUTOMATED COUNT: 63.8 FL (ref 35.9–50)
GFR SERPLBLD CREATININE-BSD FMLA CKD-EPI: 11 ML/MIN/1.73 M 2
GLUCOSE SERPL-MCNC: 98 MG/DL (ref 65–99)
HCT VFR BLD AUTO: 29.8 % (ref 42–52)
HGB BLD-MCNC: 9.5 G/DL (ref 14–18)
INR PPP: 1.36 (ref 0.87–1.13)
INR PPP: 1.5 (ref 0.87–1.13)
MCH RBC QN AUTO: 30.3 PG (ref 27–33)
MCHC RBC AUTO-ENTMCNC: 31.9 G/DL (ref 32.3–36.5)
MCV RBC AUTO: 94.9 FL (ref 81.4–97.8)
PLATELET # BLD AUTO: 230 K/UL (ref 164–446)
PMV BLD AUTO: 9.9 FL (ref 9–12.9)
POTASSIUM SERPL-SCNC: 3.5 MMOL/L (ref 3.6–5.5)
PROTHROMBIN TIME: 17 SEC (ref 12–14.6)
PROTHROMBIN TIME: 18.3 SEC (ref 12–14.6)
RBC # BLD AUTO: 3.14 M/UL (ref 4.7–6.1)
SODIUM SERPL-SCNC: 135 MMOL/L (ref 135–145)
UFH PPP CHRO-ACNC: 0.12 IU/ML
UFH PPP CHRO-ACNC: >1.1 IU/ML
WBC # BLD AUTO: 13.2 K/UL (ref 4.8–10.8)

## 2024-07-07 PROCEDURE — A9270 NON-COVERED ITEM OR SERVICE: HCPCS | Performed by: INTERNAL MEDICINE

## 2024-07-07 PROCEDURE — 700101 HCHG RX REV CODE 250: Performed by: INTERNAL MEDICINE

## 2024-07-07 PROCEDURE — 99233 SBSQ HOSP IP/OBS HIGH 50: CPT | Performed by: INTERNAL MEDICINE

## 2024-07-07 PROCEDURE — 700111 HCHG RX REV CODE 636 W/ 250 OVERRIDE (IP): Performed by: INTERNAL MEDICINE

## 2024-07-07 PROCEDURE — 85520 HEPARIN ASSAY: CPT

## 2024-07-07 PROCEDURE — A9270 NON-COVERED ITEM OR SERVICE: HCPCS | Performed by: NURSE PRACTITIONER

## 2024-07-07 PROCEDURE — 73706 CT ANGIO LWR EXTR W/O&W/DYE: CPT | Mod: RT

## 2024-07-07 PROCEDURE — 700102 HCHG RX REV CODE 250 W/ 637 OVERRIDE(OP): Performed by: INTERNAL MEDICINE

## 2024-07-07 PROCEDURE — 700102 HCHG RX REV CODE 250 W/ 637 OVERRIDE(OP)

## 2024-07-07 PROCEDURE — 36415 COLL VENOUS BLD VENIPUNCTURE: CPT

## 2024-07-07 PROCEDURE — 770020 HCHG ROOM/CARE - TELE (206)

## 2024-07-07 PROCEDURE — 85610 PROTHROMBIN TIME: CPT

## 2024-07-07 PROCEDURE — 700102 HCHG RX REV CODE 250 W/ 637 OVERRIDE(OP): Performed by: NURSE PRACTITIONER

## 2024-07-07 PROCEDURE — 700117 HCHG RX CONTRAST REV CODE 255: Performed by: INTERNAL MEDICINE

## 2024-07-07 PROCEDURE — 80048 BASIC METABOLIC PNL TOTAL CA: CPT

## 2024-07-07 PROCEDURE — 700105 HCHG RX REV CODE 258: Performed by: INTERNAL MEDICINE

## 2024-07-07 PROCEDURE — 85730 THROMBOPLASTIN TIME PARTIAL: CPT

## 2024-07-07 PROCEDURE — A9270 NON-COVERED ITEM OR SERVICE: HCPCS

## 2024-07-07 PROCEDURE — 85027 COMPLETE CBC AUTOMATED: CPT

## 2024-07-07 RX ORDER — HEPARIN SODIUM 1000 [USP'U]/ML
80 INJECTION, SOLUTION INTRAVENOUS; SUBCUTANEOUS ONCE
Status: COMPLETED | OUTPATIENT
Start: 2024-07-07 | End: 2024-07-07

## 2024-07-07 RX ORDER — HYDRALAZINE HYDROCHLORIDE 50 MG/1
50 TABLET, FILM COATED ORAL EVERY 8 HOURS
Status: DISCONTINUED | OUTPATIENT
Start: 2024-07-07 | End: 2024-07-11

## 2024-07-07 RX ORDER — HEPARIN SODIUM 1000 [USP'U]/ML
40 INJECTION, SOLUTION INTRAVENOUS; SUBCUTANEOUS PRN
Status: DISCONTINUED | OUTPATIENT
Start: 2024-07-07 | End: 2024-07-10

## 2024-07-07 RX ORDER — HEPARIN SODIUM 5000 [USP'U]/100ML
0-30 INJECTION, SOLUTION INTRAVENOUS CONTINUOUS
Status: DISCONTINUED | OUTPATIENT
Start: 2024-07-07 | End: 2024-07-10

## 2024-07-07 RX ORDER — WARFARIN SODIUM 5 MG/1
5 TABLET ORAL DAILY
Status: DISCONTINUED | OUTPATIENT
Start: 2024-07-07 | End: 2024-07-10

## 2024-07-07 RX ADMIN — WARFARIN SODIUM 5 MG: 5 TABLET ORAL at 18:04

## 2024-07-07 RX ADMIN — ASPIRIN 81 MG: 81 TABLET, COATED ORAL at 05:05

## 2024-07-07 RX ADMIN — AMPICILLIN SODIUM 2000 MG: 2 INJECTION, POWDER, FOR SOLUTION INTRAVENOUS at 05:14

## 2024-07-07 RX ADMIN — IOHEXOL 90 ML: 350 INJECTION, SOLUTION INTRAVENOUS at 11:45

## 2024-07-07 RX ADMIN — CEFTRIAXONE SODIUM 2000 MG: 10 INJECTION, POWDER, FOR SOLUTION INTRAVENOUS at 05:05

## 2024-07-07 RX ADMIN — OXYCODONE HYDROCHLORIDE 10 MG: 10 TABLET ORAL at 17:59

## 2024-07-07 RX ADMIN — AMLODIPINE BESYLATE 10 MG: 10 TABLET ORAL at 05:04

## 2024-07-07 RX ADMIN — HYDRALAZINE HYDROCHLORIDE 50 MG: 50 TABLET ORAL at 13:11

## 2024-07-07 RX ADMIN — CEFTRIAXONE SODIUM 2000 MG: 10 INJECTION, POWDER, FOR SOLUTION INTRAVENOUS at 18:18

## 2024-07-07 RX ADMIN — Medication 1 APPLICATOR: at 08:27

## 2024-07-07 RX ADMIN — METOPROLOL TARTRATE 25 MG: 25 TABLET, FILM COATED ORAL at 18:04

## 2024-07-07 RX ADMIN — ACETAMINOPHEN 1000 MG: 500 TABLET, FILM COATED ORAL at 18:02

## 2024-07-07 RX ADMIN — ATORVASTATIN CALCIUM 40 MG: 40 TABLET, FILM COATED ORAL at 21:08

## 2024-07-07 RX ADMIN — ACETAMINOPHEN 1000 MG: 500 TABLET, FILM COATED ORAL at 13:08

## 2024-07-07 RX ADMIN — OXYCODONE HYDROCHLORIDE 10 MG: 10 TABLET ORAL at 21:10

## 2024-07-07 RX ADMIN — VALSARTAN 80 MG: 80 TABLET ORAL at 05:04

## 2024-07-07 RX ADMIN — AMIODARONE HYDROCHLORIDE 400 MG: 200 TABLET ORAL at 18:02

## 2024-07-07 RX ADMIN — HEPARIN SODIUM 15 UNITS/KG/HR: 1000 INJECTION, SOLUTION INTRAVENOUS; SUBCUTANEOUS at 22:34

## 2024-07-07 RX ADMIN — HEPARIN SODIUM 8200 UNITS: 1000 INJECTION, SOLUTION INTRAVENOUS; SUBCUTANEOUS at 15:30

## 2024-07-07 RX ADMIN — Medication 1 APPLICATOR: at 21:00

## 2024-07-07 RX ADMIN — OMEPRAZOLE 20 MG: 20 CAPSULE, DELAYED RELEASE ORAL at 05:04

## 2024-07-07 RX ADMIN — HEPARIN SODIUM 18 UNITS/KG/HR: 1000 INJECTION, SOLUTION INTRAVENOUS; SUBCUTANEOUS at 15:29

## 2024-07-07 RX ADMIN — AMPICILLIN SODIUM 2000 MG: 2 INJECTION, POWDER, FOR SOLUTION INTRAVENOUS at 18:17

## 2024-07-07 RX ADMIN — HYDRALAZINE HYDROCHLORIDE 25 MG: 25 TABLET ORAL at 05:04

## 2024-07-07 RX ADMIN — ACETAMINOPHEN 1000 MG: 500 TABLET, FILM COATED ORAL at 05:05

## 2024-07-07 RX ADMIN — AMIODARONE HYDROCHLORIDE 400 MG: 200 TABLET ORAL at 05:04

## 2024-07-07 RX ADMIN — OXYCODONE HYDROCHLORIDE 10 MG: 10 TABLET ORAL at 13:11

## 2024-07-07 RX ADMIN — HYDRALAZINE HYDROCHLORIDE 50 MG: 50 TABLET ORAL at 21:07

## 2024-07-07 ASSESSMENT — FIBROSIS 4 INDEX: FIB4 SCORE: 1.81

## 2024-07-07 ASSESSMENT — PAIN DESCRIPTION - PAIN TYPE: TYPE: ACUTE PAIN

## 2024-07-07 NOTE — THERAPY
Physical Therapy Contact Note    Patient Name: Brian Jasmine  Age:  68 y.o., Sex:  male  Medical Record #: [id number]  Today's Date: 7/7/2024    Attempted PT tx session. Per RN, pt with diminished right pedal pulse pending CT. Will hold for now for CT and vascular consult. Plan to f/u as able/appropriate.

## 2024-07-07 NOTE — PROGRESS NOTES
[facility] -  PROGRESS NOTE               Author: Grabiel Augustin M.D. Date & Time: 7/7/2024  10:20 AM     HPI:  68-year-old  male with biventricular history of ESRD on hemodialysis q. Monday Wednesday Friday at [facility] through left upper arm AV fistula that was placed about 2 to 3 years ago.  He presented to the ER with left arm pain and swelling for the last 5 days.  His last dialysis was on Wednesday and had no dialysis in the last 5 days.  He felt sick on Friday and missed his dialysis.  Ultrasound of the left upper arm AV fistula showed there is no flow in the AV fistula and it has thrombosed.  A temporary right IJ dialysis catheter was placed in the ER.  Nephrology has been consulted for management of dialysis.  Patient denies any fever or chills, no nausea or vomiting, no chest pain.  He has some shortness of breath but is able to talk in full sentences and is in no acute respiratory distress at this time. No melena, hematochezia, hematemesis.  No HA, visual changes, or abdominal pain.     NEPHROLOGY DAILY PROGRESS:   6/24: consult note  6/25:  Afebrile, HD last night without any complications, plans noted for OR today with Dr. Tinoco for vascualr surgery   6/26: Underwent AVG repair yesterday with Dr. Tinoco, has LAMBERT drain, BP noted low this AM on dialysis, says BP 's run low on dialysis - not on Midodrine, ordered Midodrine to be used on dialysis   6/27: 0 Net UF with HD yesterday, limited by hypotension. Sitting up at side of bed eating breakfast. Bps improved 130s systolic.   6/28: HD today, seen  6/29: s/p HD yesterday over temp HD line. Tolerated well, no complaints.  6/30: in OR, not available   7/1: s/p AVR, 1V CABG and TAHIR resection/ligation, transferred to ICU, now off pressors, fatigued, post-op pain  7/2: Stable hemodynamics, tolerated HD, transferred out of ICU, chest wall pain improved  7/3: Stable hemodynamics, 7/1 BC remain negative, denies  "complaints   7/4: No acute events, tolerated HD, chest wall pain improved, feeling better overall  7/5: No acute events, no chest pain or SOB. S/p permacath placement this AM  7/6: No acute events, hypertensive, denies complaints  7/7: No acute events, feels at baseline, no chest pain or SOB, Diminsed pulses RLE with discoloration-pending CTA    REVIEW OF SYSTEMS:    10 Point ROS performed, negative other than stated above    PMH/PSH/SH/FH:   Reviewed and unchanged since admission note    CURRENT MEDICATIONS:   Reviewed from admission to present day    VS:  VS:  BP (!) 162/79   Pulse 65   Temp 36.6 °C (97.9 °F) (Temporal)   Resp 18   Ht 1.854 m (6' 0.99\")   Wt 102 kg (223 lb 15.8 oz)   SpO2 96%   BMI 29.56 kg/m²   GENERAL: no acute distress  CV: +edema  RESP: non-labored  GI: Soft  MSK: No joint deformities   SKIN: No concerning rashes  NEURO: AOx3  PSYCH: Cooperative    Fluids:  In: 600 [P.O.:600]  Out: -     LABS:  Recent Labs     07/05/24  0036 07/06/24  0019 07/07/24  0450   SODIUM 134* 136 135   POTASSIUM 3.5* 3.5* 3.5*   CHLORIDE 96 98 96   CO2 23 24 24   GLUCOSE 95 101* 98   BUN 30* 23* 32*   CREATININE 4.91* 3.76* 5.21*   CALCIUM 8.2* 8.4* 8.2*       IMAGING:   All imaging reviewed from admission to present day    IMPRESSION:  # ESRD  -Q. MWF at [facility]  # Bacteremia   - Blood Cx from 6/24, 6/27 + E. Faecalis  # Bacterial endocarditis:   -s/p aortic valve replacement 6/30  3 CAD  -s/p 1V CABG 6/30  #Thrombosed left upper arm AVG; infected pseudoaneurysm   -Right IJ temporary HD catheter placed in ER 6/24/2024  - S/P AVG repair, resection and pseudoaneurysm resection 6/25/24 by Dr. Tinoco along with  Reconstruction of left brachial artery using left greater saphenous vein interposition graft.  -permacath placed 7/5  # HTN  # Sepsis   # Anemia of CKD  # CKD-MBD  -phos at goal  # Coronary artery disease  # Chronic A-fib  -s/p TAHIR resection/ligation        PLAN:  - No HD today, continue q MWF " schedule   - [place] UF  - Using permacath until graft cleared for use by vascular  - EPO with HD  - Hydral Increased  - No dietary protein restrictions  - abx per primary  - Dose all meds per ESRD    Thank you

## 2024-07-07 NOTE — PROGRESS NOTES
Monitor Summary  Rhythm: SB-SR, 1st degree  Rate: 53-60  Ectopy: (F) PVC  .22 / .12 / .61

## 2024-07-07 NOTE — CARE PLAN
The patient is Watcher - Medium risk of patient condition declining or worsening    Shift Goals  Clinical Goals: Hemodynamics, Mobility  Patient Goals: Sleep  Family Goals: NESTOR    Progress made toward(s) clinical / shift goals:      Problem: Post Op Day 5 CABG/Heart Valve Replacement  Goal: Optimal care of the Post Op CABG/Heart Valve replacement Post Op Day 5  Description: Target End Date:  end of post op day 5  Outcome: Progressing  Intervention: Daily weights in the morning  Note: Completed  Intervention: Up in chair for all meals  Note: Patient up in chair as tolerated,   Intervention: Ambulate 4 times daily, increasing the distance each time  Note: Patient encouraged to increase distance of walk however becomes short of breath. Completed 4 walks  Intervention: IS q 1 hour while awake and record best IS volume  Note: Patient encouraged to use IS while awak hourly. Best IS 1000  Intervention: Shower daily and clean incisions twice daily with soap and water  Note: Shower completed by day team. Incision cleaned by NOC  Intervention: Discharge Education  Note: To be completed at [facility]  Intervention: Add special instructions for cardiac surgery discharge instructions to after visit summary and review with patient  Note: To be completed at discharge  Intervention: Add special instructions for cardiac surgery discharge instructions to after visit summary and review with patient  Note: To be completed at discharge     Problem: Hemodynamics  Goal: Patient's hemodynamics, fluid balance and neurologic status will be stable or improve  Description: Target End Date:  Prior to discharge or change in level of care    Document on Assessment and I/O flowsheet templates    1.  Monitor vital signs, pulse oximetry and cardiac monitor per provider order and/or policy  2.  Maintain blood pressure per provider order  3.  Hemodynamic monitoring per provider order  4.  Manage IV fluids and IV infusions  5.  Monitor intake and  output  6.  Daily weights per unit policy or provider order  7.  Assess peripheral pulses and capillary refill  8.  Assess color and body temperature  9.  Position patient for maximum circulation/cardiac output  10. Monitor for signs/symptoms of excessive bleeding  11. Assess mental status, restlessness and changes in level of consciousness  12. Monitor temperature and report fever or hypothermia to provider immediately. Consideration of targeted temperature management.  Outcome: Progressing  Note: SB/SR Map >65, anuric     Problem: Pain - Standard  Goal: Alleviation of pain or a reduction in pain to the patient’s comfort goal  Description: Target End Date:  Prior to discharge or change in level of care    Document on Vitals flowsheet    1.  Document pain using the appropriate pain scale per order or unit policy  2.  Educate and implement non-pharmacologic comfort measures (i.e. relaxation, distraction, massage, cold/heat therapy, etc.)  3.  Pain management medications as ordered  4.  Reassess pain after pain med administration per policy  5.  If opiods administered assess patient's response to pain medication is appropriate per POSS sedation scale  6.  Follow pain management plan developed in collaboration with patient and interdisciplinary team (including palliative care or pain specialists if applicable)  Outcome: Progressing  Note: Patient assessed for pain. Patient reports moderate to severe generalized pain. PRN and scheduled meds per MAR     Problem: Hemodynamics  Goal: Patient's hemodynamics, fluid balance and neurologic status will be stable or improve  Description: Target End Date:  Prior to discharge or change in level of care    Document on Assessment and I/O flowsheet templates    1.  Monitor vital signs, pulse oximetry and cardiac monitor per provider order and/or policy  2.  Maintain blood pressure per provider order  3.  Hemodynamic monitoring per provider order  4.  Manage IV fluids and IV  infusions  5.  Monitor intake and output  6.  Daily weights per unit policy or provider order  7.  Assess peripheral pulses and capillary refill  8.  Assess color and body temperature  9.  Position patient for maximum circulation/cardiac output  10. Monitor for signs/symptoms of excessive bleeding  11. Assess mental status, restlessness and changes in level of consciousness  12. Monitor temperature and report fever or hypothermia to provider immediately. Consideration of targeted temperature management.  Outcome: Progressing  Note: SB/SR Map >65, anuric            Patient is not progressing towards the following goals:

## 2024-07-07 NOTE — PROGRESS NOTES
Inpatient Anticoagulation Service Note for 7/7/2024      Reason for Anticoagulation: Aortic Mechanical Valve Replacement  , Atrial Fibrillation   [id number] VASc Score: 1  HAS-BLED Score: 3    Hemoglobin Value: (!) 9.5  Hematocrit Value: (!) 29.8  Lab Platelet Value: 230  Target INR: 2.0 to 3.0    INR from last 7 days       Date/Time INR Value    07/07/24 0450 1.36    07/06/24 0019 1.13    07/05/24 0833 1.03    06/30/24 2140 1.4    06/30/24 1355 1.55          Dose from last 7 days       Date/Time Dose (mg)    07/07/24 0955 5    07/06/24 1312 5    07/05/24 1501 5          Average Dose (mg):  (new start)  Significant Interactions: Amiodarone, Antibiotics, Antiplatelet Medications  Bridge Therapy: No   Reversal Agent Administered: Factor VII (post-op)    Comments: Patient continues warfarin following mech AVR, history of AF. INR sub-therapeutic. CBC stable, no s/sx of active bleeding. DDI listed above. Cardiac diet ordered, variable PO intake per chart review (~ 50% of meals consumed). Previous doses received. INR sub-therapeutic, but will appropriate increase after 2 doses of warfarin. Will continue warfarin 5mg PO daily with repeat INR tomorrow. Pharmacy will continue to follow.    Plan:  warfarin 5mg PO daily  Education Material Provided?: No (needs prior to discharge)    Pharmacist suggested discharge dosing: TBD - tentative plan for warfarin 5mg po daily with repeat INR within 2-3 days of discharge     Shiloh Phelan, PharmD, BCCCP

## 2024-07-07 NOTE — PROGRESS NOTES
Cardiovascular Surgery Progress Note    Name: Brian Jasmine  MRN: [id number]  : 1955  Admit Date: 2024 10:20 AM  7 Days Post-Op     Procedure:  Procedure(s) and Anesthesia Type:     * REPLACEMENT, AORTIC VALVE WITH A 25 MM DICKEY INSPIRIS- Wound Class: Clean with DrainCABG X1 WITH SEKELTONIZED LIMA FREE GRAFT- Wound Class: Clean with Drain  LEFT ATRIAL APPENDAGE RESECTION AND LIGATION  ECHOCARDIOGRAM, TRANSESOPHAGEAL, INTRAOPERATIVE - Wound Class: Clean with Drain    Vitals:  Vitals:    24 0336 24 0400 24 0441 24 0505   BP: (!) 163/82 (!) 163/82 (!) 157/80    Pulse: (!) 58   (!) 56   Resp: 16      Temp: 36.6 °C (97.9 °F)      TempSrc: Temporal      SpO2: 93% 94%     Weight: 102 kg (223 lb 15.8 oz)      Height:          Temp (24hrs), Av.4 °C (97.5 °F), Min:36.3 °C (97.3 °F), Max:36.6 °C (97.9 °F)      Respiratory:   Respiration: 16, Pulse Oximetry: 94 %       Fluids:    Intake/Output Summary (Last 24 hours) at 2024 07  Last data filed at 2024 194  Gross per 24 hour   Intake 600 ml   Output --   Net 600 ml     Admit weight: Weight: 93 kg (205 lb)  Current weight: Weight: 102 kg (223 lb 15.8 oz) (24 0336)    Labs:  Recent Labs     24  0036 24  0019 24  0450   WBC 14.2* 13.7* 13.2*   RBC 3.12* 3.06* 3.14*   HEMOGLOBIN 9.7* 9.5* 9.5*   HEMATOCRIT 29.3* 28.8* 29.8*   MCV 93.9 94.1 94.9   MCH 31.1 31.0 30.3   MCHC 33.1 33.0 31.9*   RDW 55.4* 56.8* 63.8*   PLATELETCT 201 223 230   MPV 10.6 10.6 9.9     Recent Labs     24  0036 24  0019 24  0450   SODIUM 134* 136 135   POTASSIUM 3.5* 3.5* 3.5*   CHLORIDE 96 98 96   CO2  24 24   GLUCOSE 95 101* 98   BUN 30* 23* 32*   CREATININE 4.91* 3.76* 5.21*   CALCIUM 8.2* 8.4* 8.2*     Recent Labs     24  0833 24  0019 24  0450   INR 1.03 1.13 1.36*         HgbA1c:  5.1    Diabetic Educator Consult:  N\A    Medications:  Scheduled Medications   Medication Dose Frequency     hydrALAZINE  50 mg Q8HRS    amLODIPine  10 mg Q DAY    [START ON 7/13/2024] amiodarone  200 mg Q DAY    valsartan  80 mg Q DAY    amiodarone  400 mg BID    epoetin  10,000 Units MO, WE + FR    metoprolol tartrate  25 mg BID    Nozin nasal  swab  1 Applicator BID    aspirin  81 mg DAILY    atorvastatin  40 mg QHS    Pharmacy Consult Request  1 Each PHARMACY TO DOSE    acetaminophen  1,000 mg Q6HRS    docusate sodium  100 mg BID    senna-docusate  1 Tablet Nightly    omeprazole  20 mg DAILY    cefTRIAXone (ROCEPHIN) IV  2,000 mg Q12HRS    ampicillin  2,000 mg Q12HRS        Exam:   Physical Exam  Vitals and nursing note reviewed.   Constitutional:       General: He is not in acute distress.     Appearance: Normal appearance.   HENT:      Head: Normocephalic.      Right Ear: External ear normal.      Left Ear: External ear normal.      Nose: Nose normal. No congestion.      Mouth/Throat:      Mouth: Mucous membranes are moist.      Pharynx: Oropharynx is clear.   Eyes:      Extraocular Movements: Extraocular movements intact.      Pupils: Pupils are equal, round, and reactive to light.   Cardiovascular:      Rate and Rhythm: Normal rate and regular rhythm.      Pulses:            Right dorsalis pedis pulse not accessible.        Posterior tibial pulses are detected w/ Doppler on the right side.      Heart sounds: Normal heart sounds.      Comments: Purple toes right foot  Pulmonary:      Effort: Pulmonary effort is normal.      Breath sounds: Normal breath sounds.   Abdominal:      General: Bowel sounds are normal. There is no distension.      Palpations: Abdomen is soft.   Musculoskeletal:      Cervical back: Normal range of motion and neck supple. No tenderness.      Right lower leg: No edema.      Left lower leg: No edema.   Skin:     General: Skin is warm and dry.      Comments: Midline surgical incision   Neurological:      General: No focal deficit present.      Mental Status: He is alert and oriented to  person, place, and time. Mental status is at baseline.   Psychiatric:         Mood and Affect: Mood normal.         Behavior: Behavior normal.         Thought Content: Thought content normal.         Cardiac Medications:    ASA - Yes    Plavix - No; contraindicated because of On Coumadin / NOAC    Post-operative Beta Blockers - Yes    Ace/ARB- Yes    Statin - Yes    Aldactone- No; contraindicated because of Normal EF    SGLT2i-  No contraindicated because of No; contraindicated because of Normal EF    Ejection Fraction:  50%    Telemetry:   7/1 SR  7/2 SB/SR  7/3 SR  7/4 SB Afib yesterday  7/5 SB  76 SR  7/7 SR    Assessment/Plan:  POD 1  HDS, SR, neuro intact, sternal incision intact, abdomen soft - BM, fluid balance positive, wt up,  2 L NC. H/H 7.7/22.9- got blood this AM. Plan:  Recheck H/H- transfuse as appropriate. Keep chest tubes and pacing wires, IS/ambulate.     POD 2  HDS, SB, neuro intact, wounds intact, abdomen +BM, fluid balance positive, t/ up,  0.5 L NC. H/H 7.6/23.1.  Plan: Transfuse 1 unit RBCs. Remove chest tubes. Dialysis per neph. IS/ambulate. Transfer to [facility].     POD 3 HDS. SR.  Neuro intact.  Wounds CDI.  Abdomen soft nontender.  RA.  Cr 5.4 hgb 8.5.  Plan: Dialysis per neph.  Pacer wires removed.  Encourage IS/ambulation.      Pod 4 HDS. SB.  Neuro intact.  Wounds CDI.  Abdomen soft nontender +BM.  RA.  Cr 4 Hgb 9.7.  Plan: DC amio gtt continue amio oral.  Encourage IS/ambulation.    POD 5  HTN, SB, neuro intact, wounds intact, abdomen soft +BM, fluid balance positive, wt up,  room air.  Plan:  Increase valsartan, IS/ambulate. Rehab eval pending.     POD 6  HTN, SR, neuro intact, wounds intact, abdomen soft +BM, fluid balance positive, wt down,  room air.  Plan:  Add amlodipine- medically clear for IPR when BP improved. Will need IV abx on DC. IS/ambulate. HD per nephrology.     POD 7  HTN, SR, neuro intact, wounds intact, abdomen soft +BM, fluid balance positive, wt up,  room air. Purple  toes and poor pulses on right foot- hospitalist to consult vascular. Plan:  Hydralzine increased. Pending placement to IPR/SNF. Will need IV daptomycin 800 mg - 800 mg - 1200 mg with dialysis sessions on MWF, stop date 8/11/24.  Cardiac surgery will sign off, please call with questions. Follow up scheduled for 7/29/24 at 1:30 pm.     Disposition:  PT/OT rec post acute- referral placed 7/5

## 2024-07-07 NOTE — PROGRESS NOTES
Hospital Medicine Daily Progress Note    Date of Service  7/7/2024    Chief Complaint  Brian Jasmine is a 68 y.o. male admitted 6/24/2024 with left arm graft infection    Hospital Course  Brian Jasmine is a 68 y.o. male who presented 6/24/2024 with left arm infection.  Mr. Jasmine has a past medical history of peripheral arterial disease with femoral popliteal bypass, end-stage renal disease undergoing dialysis via a left arm graft, atrial fibrillation on Coumadin therapy, hypertension, coronary artery disease with stent to the circumflex that developed pain, redness, swelling in the left arm graft site about 5 days ago and did not go to dialysis and stopped taking all of his medications because he felt so poorly.  He has had fevers and chills.  He presents with the symptoms and here has a grossly infected site.  His creatinine is 8.8 with a potassium 3.8.  Vascular surgery has been consulted as has nephrology.  Attempt will be made at a temporary dialysis catheter until blood cultures are cleared for a possible tunneled catheter.  He states he has had at least 6 or 7 prior neck catheters in the past.  Patient was started on IV vancomycin.  Blood culture was positive for E faecalis.  Patient underwent a RENEE that revealed aortic valve infective endocarditis as well as severe aortic insufficiency.  CT surgery was consulted and patient underwent aortic valve replacement, CABG x 1 and TAHIR resection and ligation on 6/30/2024.    Interval Problem Update  7/3 Patient seen sitting in chair, breathing well on room air. He reports some chest wall pain.  His chest tubes and pacing wires have been removed.  His chest wound appears clean dry and intact.  Left arm appears slightly swollen. His blood cultures from 7/1 have been negative.  His white count is elevated at 14.8.  Continue IV ampicillin and IV ceftriaxone.  Case discussed with ID    7/4 patient seen and examined at bedside.  He continues to report chest wall pain.   He attempted to work with physical therapy and reported some shortness of breath after walking 50 feet.  Blood cultures from 7/1 remain negative.  Plan is for permacath placement.  Patient will need IV daptomycin on discharge with stop date of 8/11/2024.  I discussed the plan of care with patient and infectious disease    7/5 patient underwent permacath placement.  Continue HD. PMR consulted for IPR evaluation. Case discussed with CT surgery, okay to DC Plavix and restarted on warfarin for history of A-fib.    7/6 No acute events. Patient sitting in chair, does not appear to be in any distress. He has been hypertensive, started on hydralazine 25 mg TID. Plan for DC to SNF. Awaiting placement     7/7 Patient noted to have blue toes and cool right foot.  I ordered a stat CTA of RLE with IV contrast that showed occlusion of right common femoral to distal superficial femoral artery bypass graft, moderate stenosis of tibioperoneal trunk.  Occlusion of the proximal right anterior tibial artery and likely occlusion of the distal posterior tibial and peroneal arteries.  I discussed the findings with vascular surgery Dr. Tinoco who believes these findings are chronic and does not require any urgent surgical intervention.  Will continue warfarin and aspirin.  INR subtherapeutic therefore we will bridge with IV heparin.  Monitor xa    I have discussed this patient's plan of care and discharge plan at IDT rounds today with Case Management, Nursing, Nursing leadership, and other members of the IDT team.    Consultants/Specialty  cardiovascular surgeon, infectious disease, and vascular surgery    Code Status  Full Code    Disposition  The patient is not medically cleared for discharge to home or a post-acute facility.      I have placed the appropriate orders for post-discharge needs.    Review of Systems  Review of Systems   Constitutional:  Positive for malaise/fatigue.   Cardiovascular:  Negative for chest pain.         Physical Exam  Temp:  [36.3 °C (97.3 °F)-36.6 °C (97.9 °F)] 36.3 °C (97.3 °F)  Pulse:  [56-65] 61  Resp:  [16-18] 18  BP: (139-166)/(68-86) 166/79  SpO2:  [92 %-96 %] 93 %    Physical Exam  Vitals and nursing note reviewed.   Constitutional:       General: He is not in acute distress.  HENT:      Head: Normocephalic.      Mouth/Throat:      Mouth: Mucous membranes are moist.   Eyes:      Pupils: Pupils are equal, round, and reactive to light.   Cardiovascular:      Rate and Rhythm: Normal rate and regular rhythm.      Pulses: Normal pulses.      Heart sounds: Normal heart sounds.      Comments: Sternal incision clean dry and intact  Pulmonary:      Effort: Pulmonary effort is normal.      Breath sounds: Normal breath sounds.   Abdominal:      Palpations: Abdomen is soft.      Tenderness: There is no abdominal tenderness.   Musculoskeletal:         General: No swelling.      Cervical back: Neck supple.      Comments: Left arm swollen   Skin:     General: Skin is warm.      Coloration: Skin is not jaundiced.      Comments: Dusky appearing toes right greater than left   Neurological:      General: No focal deficit present.      Mental Status: He is alert and oriented to person, place, and time.   Psychiatric:         Mood and Affect: Mood normal.         Behavior: Behavior normal.         Fluids    Intake/Output Summary (Last 24 hours) at 7/7/2024 1422  Last data filed at 7/6/2024 1949  Gross per 24 hour   Intake 240 ml   Output --   Net 240 ml       Laboratory  Recent Labs     07/05/24  0036 07/06/24  0019 07/07/24  0450   WBC 14.2* 13.7* 13.2*   RBC 3.12* 3.06* 3.14*   HEMOGLOBIN 9.7* 9.5* 9.5*   HEMATOCRIT 29.3* 28.8* 29.8*   MCV 93.9 94.1 94.9   MCH 31.1 31.0 30.3   MCHC 33.1 33.0 31.9*   RDW 55.4* 56.8* 63.8*   PLATELETCT 201 223 230   MPV 10.6 10.6 9.9     Recent Labs     07/05/24  0036 07/06/24  0019 07/07/24  0450   SODIUM 134* 136 135   POTASSIUM 3.5* 3.5* 3.5*   CHLORIDE 96 98 96   CO2 23 24 24   GLUCOSE  95 101* 98   BUN 30* 23* 32*   CREATININE 4.91* 3.76* 5.21*   CALCIUM 8.2* 8.4* 8.2*     Recent Labs     07/05/24  0833 07/06/24  0019 07/07/24  0450   INR 1.03 1.13 1.36*                 Imaging  CT-CTA LOWER EXT WITH & W/O-POST PROCESS RIGHT   Final Result      1.  There appears to be occlusion of right common femoral to distal superficial femoral artery bypass graft.   2.  At least moderate stenosis of the tibioperoneal trunk. Occlusion of the proximal right anterior tibial artery and likely occlusion of the distal posterior tibial and peroneal arteries.   3.  Diffuse lower extremity edema. Correlate for cellulitis/infection.   4.  Subacute appearing left superior and inferior pubic rami fractures.      IR-HUERTA,GROSHONG PLACEMENT >5   Final Result      1. ULTRASOUND AND FLUOROSCOPIC GUIDED PLACEMENT OF A Right INTERNAL JUGULAR 14.5 Divehi 23 cm length [employer] TUNNELED DIALYSIS CATHETER.      2. THE HEMODIALYSIS CATHETER MAY BE USED IMMEDIATELY AS CLINICALLY INDICATED. FLUSHES PER PROTOCOL.      DX-CHEST-PORTABLE (1 VIEW)   Final Result      Likely developing mild central vascular congestion/edema. Small left and trace right pleural effusions are now seen      DX-CHEST-PORTABLE (1 VIEW)   Final Result         1.  Pulmonary edema and/or infiltrates are identified, which are stable since the prior exam.   2.  Trace left pleural effusion   3.  Cardiomegaly      DX-CHEST-LIMITED (1 VIEW)   Final Result         1.  Left IJ catheter, tip projects over the brachiocephalic vein/aorta region. This is short in position and does not extend into the SVC. Need to correlate clinically for venous blood return to exclude arterial line.   2.  Other findings are detailed above.      EC-RENEE W/O CONT   Final Result      DX-CHEST-2 VIEWS   Final Result         1.  No focal infiltrates.   2.  Perihilar interstitial prominence and bronchial wall cuffing, appearance suggests changes of underlying bronchial inflammation, consider  bronchitis.      US-VEIN MAPPING LOWER EXTREMITY BILAT   Final Result      US-CAROTID DOPPLER UNILAT LEFT   Final Result      EC-RENEE W/O CONT   Final Result      EC-ECHOCARDIOGRAM COMPLETE W/O CONT   Final Result      US-SUSI SINGLE LEVEL BILAT   Final Result      US-EXTREMITY ARTERY LOWER BILAT   Final Result      DX-CHEST-PORTABLE (1 VIEW)   Final Result      1.  Satisfactory appearance of the right IJ catheter. No pneumothorax visualized.      US-HEMODIALYSIS GRAFT DUPLEX COMP UPPER EXTREMITY   Final Result      US-HEMODIALYSIS GRAFT DUPLEX COMP UPPER EXTREMITY   Final Result      DX-CHEST-PORTABLE (1 VIEW)   Final Result      Mild enlargement of the cardiomediastinal silhouette without acute cardiopulmonary abnormality.      CL-LEFT HEART CATHETERIZATION WITH POSSIBLE INTERVENTION    (Results Pending)        Assessment/Plan  * Infected prosthetic vascular graft, initial encounter ([facility])- (present on admission)  Assessment & Plan  Vascular surgery consulted.   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 Left greater saphenous vein harvest. Reconstruction of left brachial artery using left greater saphenous vein   interposition graft. Partial removal of left upper arm dialysis graft.      S/P AVR (aortic valve replacement)- (present on admission)  Assessment & Plan  Developed aortic valve endocarditis  Status post aortic valve replacement    Bacteremia- (present on admission)  Assessment & Plan  Probably related to infected graft, status post removal.  Blood cultures positive for Enterococcus  RENEE revealed aortic valve endocarditis  Patient is status post aortic valve replacement  Repeat blood cultures on 7/1 have been negative  Continue IV ampicillin and IV ceftriaxone while inpatient with plans to switch to IV dapto on discharge with stop date of 8/11/24      Coronary artery disease- (present on admission)  Assessment & Plan  Followed by cardiology  in [place] with history of stent to the circumflex  S/p CABG X1 WITH SEKELTONIZED LIMA to LAD  Continue aspirin and atorvastatin    PAD (peripheral artery disease) (HCC)- (present on admission)  Assessment & Plan  Hx of previous fem-pop bypass  CTA of RLE with IV contrast that showed occlusion of right common femoral to distal superficial femoral artery bypass graft, moderate stenosis of tibioperoneal trunk.  Occlusion of the proximal right anterior tibial artery and likely occlusion of the distal posterior tibial and peroneal arteries.  I discussed the findings with vascular surgery Dr. Tinoco who believes these findings are chronic and does not require any urgent surgical intervention.  Will continue warfarin and aspirin.  INR subtherapeutic therefore we will bridge with IV heparin.  Monitor xa    Chronic atrial fibrillation (HCC)- (present on admission)  Assessment & Plan  He had been on coumadin but hasn't been taking it  Continue amiodarone and metoprolol  Restart warfarin, monitor INR with goal 2-3      End stage renal disease (HCC)- (present on admission)  Assessment & Plan  Nephrology consulted  Continue HD   S/p perma cath placement     Renovascular hypertension- (present on admission)  Assessment & Plan  Uncontrolled   Continue coreg, valsartan and amlodipine  Started on hydralazine          VTE prophylaxis: warfarin    I have performed a physical exam and reviewed and updated ROS and Plan today (7/7/2024). In review of yesterday's note (7/6/2024), there are no changes except as documented above.    I spent 52 minutes, reviewing the chart, obtaining and/or reviewing separately obtained history. Performing a medically appropriate examination and evaluation.  Counseling and educating the patient. Ordering and reviewing medications, tests, or procedures.  Discussing the case with vascular surgery.  Documenting clinical information in EPIC. Independently interpreting results and communicating results to  patient. Discussing future disposition of care with patient, RN and case management.

## 2024-07-07 NOTE — CARE PLAN
The patient is Unstable - High likelihood or risk of patient condition declining or worsening    Shift Goals  Clinical Goals: Hemodynamics, Mobility  Patient Goals: Sleep  Family Goals: NESTOR    Progress made toward(s) clinical / shift goals:    Problem: Pain - Standard  Goal: Alleviation of pain or a reduction in pain to the patient’s comfort goal  Outcome: Progressing     Problem: Knowledge Deficit - Standard  Goal: Patient and family/care givers will demonstrate understanding of plan of care, disease process/condition, diagnostic tests and medications  Outcome: Progressing     Problem: Hemodynamics  Goal: Patient's hemodynamics, fluid balance and neurologic status will be stable or improve  Outcome: Progressing     Problem: Fluid Volume  Goal: Fluid volume balance will be maintained  Outcome: Progressing     Problem: Urinary - Renal Perfusion  Goal: Ability to achieve and maintain adequate renal perfusion and functioning will improve  Outcome: Progressing     Problem: Respiratory  Goal: Patient will achieve/maintain optimum respiratory ventilation and gas exchange  Outcome: Progressing     Problem: Physical Regulation  Goal: Diagnostic test results will improve  Outcome: Progressing  Goal: Signs and symptoms of infection will decrease  Outcome: Progressing     Problem: Fall Risk  Goal: Patient will remain free from falls  Outcome: Progressing     Problem: Skin Integrity  Goal: Skin integrity is maintained or improved  Outcome: Progressing     Problem: Pre Op  Goal: Optimal preparation for CABG/Heart Valve surgery  Outcome: Progressing     Problem: Day of surgery post CABG/Heart valve replacement  Goal: Stabilization in immediate post op period  Outcome: Progressing     Problem: Post Op Day 1 CABG/Heart Valve Replacement  Goal: Optimal care of the post op CABG/heart valve replacement Post Op Day 1  Outcome: Progressing     Problem: Post op day 2 CABG/Heart Valve Replacement  Goal: Optimal care of the post op  CABG/heart valve replacement post op day 2  Outcome: Progressing  Intervention: Up in chair for all meals  Note: Up for breakfast. Not for lunch.   Intervention: Ambulate 4 times daily, increasing the distance each time  Note: Patient was not ambulated in A.M due to concern for right leg ischemia. Patient refusing to ambulate until later in the afternoon.      Problem: Post Op Day 3 CABG/Heart Valve replacement  Goal: Optimal care of the post op CABG/Heart Valve replacement post op day 3  Outcome: Progressing  Intervention: Shower daily and clean incisions twice daily with soap and water  Note: Patient refusing to shower. Stating he will do it till later.      Problem: Post Op Day 4 CABG/Heart Valve Replacement  Goal: Optimal care of the Post Op CABG/Heart Valve replacement Post Op Day 4  Outcome: Progressing     Problem: Post Op Day 5 CABG/Heart Valve Replacement  Goal: Optimal care of the Post Op CABG/Heart Valve replacement Post Op Day 5  Outcome: Progressing  Intervention: Up in chair for all meals  Note: Up for breakfast. Not for lunch.   Intervention: Ambulate 4 times daily, increasing the distance each time  Note: Patient was not ambulated in A.M due to concern for right leg ischemia. Patient refusing to ambulate until later in the afternoon.   Intervention: Shower daily and clean incisions twice daily with soap and water  Note: Patient refusing to shower. Stating he will do it till later.      Problem: Risk for Aspiration  Goal: Patient's risk for aspiration will be absent or decrease  Outcome: Progressing     Problem: Nutrition - Advanced  Goal: Patient will display progressive weight gain toward goal have adequate food and fluid intake  Outcome: Progressing     Problem: Self Care  Goal: Patient will have the ability to perform ADLs independently or with assistance (bathe, groom, dress, toilet and feed)  Outcome: Progressing     Problem: Bowel Elimination - Post Surgical  Goal: Patient will resume regular  bowel sounds and function with no discomfort or distention  Outcome: Progressing

## 2024-07-07 NOTE — CARE PLAN
The patient is Stable - Low risk of patient condition declining or worsening    Shift Goals  Clinical Goals: Increase mobility, IS use  Patient Goals: Rest, sleep  Family Goals: NA    Progress made toward(s) clinical / shift goals:      Problem: Post Op Day 6 CABG/Heart Valve Replacement  Goal: Optimal care of the Post Op CABG/Heart Valve replacement Post Op Day 5  Description: Target End Date:  end of post op day 5  Outcome: Progressing  Intervention: Up in chair for all meals  Note: Pt up to chair for 2/3 meals. Refusing chair for dinner. Pt tolerated the chair the other two times today. Sat in the chair for multiple hours.   Intervention: Ambulate 4 times daily, increasing the distance each time  Note: Pt ambulated twice on day shift. He was able to go a little bit further each time. Pt does need some prompting and education on the importance of walks.   Intervention: IS q 1 hour while awake and record best IS volume  Note: Pt best IS effort 1100  Intervention: Shower daily and clean incisions twice daily with soap and water  Note: Pt cleaned own incision with minimal guidance. Shower completed with assistance. Pt tolerating showers well.   Intervention: Discharge Education  Note: Pt educated on discharge needs. Educated on daily ambulation and sitting up in bed for all meals.

## 2024-07-08 ENCOUNTER — APPOINTMENT (OUTPATIENT)
Dept: RADIOLOGY | Facility: MEDICAL CENTER | Age: 69
DRG: 216 | End: 2024-07-08
Attending: INTERNAL MEDICINE
Payer: MEDICARE

## 2024-07-08 LAB
ANION GAP SERPL CALC-SCNC: 16 MMOL/L (ref 7–16)
BUN SERPL-MCNC: 38 MG/DL (ref 8–22)
CALCIUM SERPL-MCNC: 8.2 MG/DL (ref 8.5–10.5)
CHLORIDE SERPL-SCNC: 97 MMOL/L (ref 96–112)
CO2 SERPL-SCNC: 21 MMOL/L (ref 20–33)
CREAT SERPL-MCNC: 6.11 MG/DL (ref 0.5–1.4)
ERYTHROCYTE [DISTWIDTH] IN BLOOD BY AUTOMATED COUNT: 68.2 FL (ref 35.9–50)
GFR SERPLBLD CREATININE-BSD FMLA CKD-EPI: 9 ML/MIN/1.73 M 2
GLUCOSE SERPL-MCNC: 99 MG/DL (ref 65–99)
HCT VFR BLD AUTO: 29.9 % (ref 42–52)
HGB BLD-MCNC: 9.8 G/DL (ref 14–18)
INR PPP: 1.91 (ref 0.87–1.13)
MCH RBC QN AUTO: 31.3 PG (ref 27–33)
MCHC RBC AUTO-ENTMCNC: 32.8 G/DL (ref 32.3–36.5)
MCV RBC AUTO: 95.5 FL (ref 81.4–97.8)
PLATELET # BLD AUTO: 262 K/UL (ref 164–446)
PMV BLD AUTO: 10.1 FL (ref 9–12.9)
POTASSIUM SERPL-SCNC: 3.7 MMOL/L (ref 3.6–5.5)
PROTHROMBIN TIME: 22.2 SEC (ref 12–14.6)
RBC # BLD AUTO: 3.13 M/UL (ref 4.7–6.1)
SODIUM SERPL-SCNC: 134 MMOL/L (ref 135–145)
UFH PPP CHRO-ACNC: 0.44 IU/ML
UFH PPP CHRO-ACNC: 0.7 IU/ML
UFH PPP CHRO-ACNC: 0.76 IU/ML
WBC # BLD AUTO: 16.6 K/UL (ref 4.8–10.8)

## 2024-07-08 PROCEDURE — 700102 HCHG RX REV CODE 250 W/ 637 OVERRIDE(OP)

## 2024-07-08 PROCEDURE — 770020 HCHG ROOM/CARE - TELE (206)

## 2024-07-08 PROCEDURE — 700102 HCHG RX REV CODE 250 W/ 637 OVERRIDE(OP): Performed by: NURSE PRACTITIONER

## 2024-07-08 PROCEDURE — A9270 NON-COVERED ITEM OR SERVICE: HCPCS | Performed by: NURSE PRACTITIONER

## 2024-07-08 PROCEDURE — 700101 HCHG RX REV CODE 250: Performed by: INTERNAL MEDICINE

## 2024-07-08 PROCEDURE — 700111 HCHG RX REV CODE 636 W/ 250 OVERRIDE (IP): Performed by: INTERNAL MEDICINE

## 2024-07-08 PROCEDURE — 99233 SBSQ HOSP IP/OBS HIGH 50: CPT | Performed by: INTERNAL MEDICINE

## 2024-07-08 PROCEDURE — 80048 BASIC METABOLIC PNL TOTAL CA: CPT

## 2024-07-08 PROCEDURE — 700111 HCHG RX REV CODE 636 W/ 250 OVERRIDE (IP): Mod: JZ

## 2024-07-08 PROCEDURE — 700102 HCHG RX REV CODE 250 W/ 637 OVERRIDE(OP): Performed by: INTERNAL MEDICINE

## 2024-07-08 PROCEDURE — 700111 HCHG RX REV CODE 636 W/ 250 OVERRIDE (IP): Mod: JZ | Performed by: NURSE PRACTITIONER

## 2024-07-08 PROCEDURE — 36415 COLL VENOUS BLD VENIPUNCTURE: CPT

## 2024-07-08 PROCEDURE — 97530 THERAPEUTIC ACTIVITIES: CPT

## 2024-07-08 PROCEDURE — 700111 HCHG RX REV CODE 636 W/ 250 OVERRIDE (IP)

## 2024-07-08 PROCEDURE — 85027 COMPLETE CBC AUTOMATED: CPT

## 2024-07-08 PROCEDURE — A9270 NON-COVERED ITEM OR SERVICE: HCPCS | Performed by: INTERNAL MEDICINE

## 2024-07-08 PROCEDURE — 85610 PROTHROMBIN TIME: CPT

## 2024-07-08 PROCEDURE — 93926 LOWER EXTREMITY STUDY: CPT | Mod: RT

## 2024-07-08 PROCEDURE — 700105 HCHG RX REV CODE 258: Performed by: INTERNAL MEDICINE

## 2024-07-08 PROCEDURE — A9270 NON-COVERED ITEM OR SERVICE: HCPCS

## 2024-07-08 PROCEDURE — 85520 HEPARIN ASSAY: CPT | Mod: 91

## 2024-07-08 PROCEDURE — 90935 HEMODIALYSIS ONE EVALUATION: CPT

## 2024-07-08 RX ORDER — HEPARIN SODIUM 1000 [USP'U]/ML
INJECTION, SOLUTION INTRAVENOUS; SUBCUTANEOUS
Status: COMPLETED
Start: 2024-07-08 | End: 2024-07-08

## 2024-07-08 RX ADMIN — AMPICILLIN SODIUM 2000 MG: 2 INJECTION, POWDER, FOR SOLUTION INTRAVENOUS at 18:06

## 2024-07-08 RX ADMIN — HEPARIN SODIUM 1800 UNITS: 1000 INJECTION, SOLUTION INTRAVENOUS; SUBCUTANEOUS at 11:23

## 2024-07-08 RX ADMIN — HYDRALAZINE HYDROCHLORIDE 50 MG: 50 TABLET ORAL at 21:00

## 2024-07-08 RX ADMIN — METOPROLOL TARTRATE 25 MG: 25 TABLET, FILM COATED ORAL at 17:51

## 2024-07-08 RX ADMIN — OXYCODONE HYDROCHLORIDE 10 MG: 10 TABLET ORAL at 21:03

## 2024-07-08 RX ADMIN — HEPARIN SODIUM 15 UNITS/KG/HR: 1000 INJECTION, SOLUTION INTRAVENOUS; SUBCUTANEOUS at 07:47

## 2024-07-08 RX ADMIN — HYDRALAZINE HYDROCHLORIDE 50 MG: 50 TABLET ORAL at 14:15

## 2024-07-08 RX ADMIN — ACETAMINOPHEN 1000 MG: 500 TABLET, FILM COATED ORAL at 12:13

## 2024-07-08 RX ADMIN — AMIODARONE HYDROCHLORIDE 400 MG: 200 TABLET ORAL at 05:43

## 2024-07-08 RX ADMIN — OXYCODONE HYDROCHLORIDE 10 MG: 10 TABLET ORAL at 07:49

## 2024-07-08 RX ADMIN — CEFTRIAXONE SODIUM 2000 MG: 10 INJECTION, POWDER, FOR SOLUTION INTRAVENOUS at 05:37

## 2024-07-08 RX ADMIN — ONDANSETRON 8 MG: 2 INJECTION INTRAMUSCULAR; INTRAVENOUS at 20:55

## 2024-07-08 RX ADMIN — AMIODARONE HYDROCHLORIDE 400 MG: 200 TABLET ORAL at 17:51

## 2024-07-08 RX ADMIN — ACETAMINOPHEN 1000 MG: 500 TABLET, FILM COATED ORAL at 17:50

## 2024-07-08 RX ADMIN — AMLODIPINE BESYLATE 10 MG: 10 TABLET ORAL at 05:42

## 2024-07-08 RX ADMIN — CEFTRIAXONE SODIUM 2000 MG: 10 INJECTION, POWDER, FOR SOLUTION INTRAVENOUS at 17:53

## 2024-07-08 RX ADMIN — ASPIRIN 81 MG: 81 TABLET, COATED ORAL at 05:34

## 2024-07-08 RX ADMIN — VALSARTAN 80 MG: 80 TABLET ORAL at 05:35

## 2024-07-08 RX ADMIN — Medication 1 APPLICATOR: at 21:08

## 2024-07-08 RX ADMIN — AMPICILLIN SODIUM 2000 MG: 2 INJECTION, POWDER, FOR SOLUTION INTRAVENOUS at 05:49

## 2024-07-08 RX ADMIN — OXYCODONE HYDROCHLORIDE 10 MG: 10 TABLET ORAL at 14:15

## 2024-07-08 RX ADMIN — METOPROLOL TARTRATE 25 MG: 25 TABLET, FILM COATED ORAL at 05:44

## 2024-07-08 RX ADMIN — WARFARIN SODIUM 5 MG: 5 TABLET ORAL at 17:51

## 2024-07-08 RX ADMIN — ACETAMINOPHEN 1000 MG: 500 TABLET, FILM COATED ORAL at 05:35

## 2024-07-08 RX ADMIN — EPOETIN ALFA-EPBX 10000 UNITS: 10000 INJECTION, SOLUTION INTRAVENOUS; SUBCUTANEOUS at 10:14

## 2024-07-08 RX ADMIN — OMEPRAZOLE 20 MG: 20 CAPSULE, DELAYED RELEASE ORAL at 05:36

## 2024-07-08 RX ADMIN — HYDRALAZINE HYDROCHLORIDE 50 MG: 50 TABLET ORAL at 05:44

## 2024-07-08 RX ADMIN — OXYCODONE HYDROCHLORIDE 10 MG: 10 TABLET ORAL at 03:38

## 2024-07-08 RX ADMIN — ACETAMINOPHEN 1000 MG: 500 TABLET, FILM COATED ORAL at 23:55

## 2024-07-08 RX ADMIN — Medication 1 APPLICATOR: at 07:51

## 2024-07-08 RX ADMIN — HYDRALAZINE HYDROCHLORIDE 20 MG: 20 INJECTION INTRAMUSCULAR; INTRAVENOUS at 03:38

## 2024-07-08 RX ADMIN — ATORVASTATIN CALCIUM 40 MG: 40 TABLET, FILM COATED ORAL at 20:56

## 2024-07-08 ASSESSMENT — CHA2DS2 SCORE
AGE 75 OR GREATER: NO
CHA2DS2 VASC SCORE: 4
DIABETES: NO
VASCULAR DISEASE: YES
HYPERTENSION: YES
SEX: MALE
AGE 65 TO 74: YES
PRIOR STROKE OR TIA OR THROMBOEMBOLISM: NO
CHF OR LEFT VENTRICULAR DYSFUNCTION: YES

## 2024-07-08 ASSESSMENT — GAIT ASSESSMENTS
DEVIATION: OTHER (COMMENT)
DISTANCE (FEET): 70
ASSISTIVE DEVICE: FRONT WHEEL WALKER
GAIT LEVEL OF ASSIST: CONTACT GUARD ASSIST

## 2024-07-08 ASSESSMENT — COGNITIVE AND FUNCTIONAL STATUS - GENERAL
SUGGESTED CMS G CODE MODIFIER MOBILITY: CK
MOVING TO AND FROM BED TO CHAIR: A LITTLE
MOVING FROM LYING ON BACK TO SITTING ON SIDE OF FLAT BED: A LITTLE
MOBILITY SCORE: 16
CLIMB 3 TO 5 STEPS WITH RAILING: A LOT
WALKING IN HOSPITAL ROOM: A LITTLE
STANDING UP FROM CHAIR USING ARMS: A LOT
TURNING FROM BACK TO SIDE WHILE IN FLAT BAD: A LITTLE

## 2024-07-08 ASSESSMENT — FIBROSIS 4 INDEX: FIB4 SCORE: 1.59

## 2024-07-08 ASSESSMENT — PAIN DESCRIPTION - PAIN TYPE
TYPE: ACUTE PAIN

## 2024-07-08 NOTE — PROGRESS NOTES
Monitor Summary  Rhythm: Normal Sinus Rhythm - SB /w bbb and 1st degree  Rate: 59-73  Ectopy: PVCs, Couplets  .24 / .12 / .37

## 2024-07-08 NOTE — DISCHARGE PLANNING
Care Transition Team Assessment  LMSW met with pt to conduct assessment and verify demographics. Pt's PCP is Deandra Hawley though he was working on switching PCP's to Karina Breen in [place]. Pt verified address on file. Pt lives with his SO in a SS house with a ramp to enter. Pt stated that he was mostly independent with ADLS and IADLS prior to admission. Though pt and spouse have caregivers assistance at home. The caregivers are mostly focused on pt's SO Josselyn but assist pt when needed. There are 3 different caregivers present in pt's house. The caregivers come in every week and help assist pt's SO M,W,F 7-3 and pt when needed. Pt stated that they got a new caregiver that is going to be staying the nights Tuesdays and Thursdays. Pt has [employer] Medicare and Medicaid FFS. Pt stated that he is agreeable to post acute placement and if insurance does not pay they will pay out of pocket.     Information Source  Orientation Level: Oriented X4  Information Given By: Patient  Who is responsible for making decisions for patient? : Patient    Readmission Evaluation  Is this a readmission?: No    Elopement Risk  Legal Hold: No  Ambulatory or Self Mobile in Wheelchair: No-Not an Elopement Risk  Disoriented: No  Psychiatric Symptoms: None  History of Wandering: No  Elopement this Admit: No  Vocalizing Wanting to Leave: No  Displays Behaviors, Body Language Wanting to Leave: No-Not at Risk for Elopement  Elopement Risk: Not at Risk for Elopement    Interdisciplinary Discharge Planning  Primary Care Physician: ALEX HAWLEY, P.A.*  Lives with - Patient's Self Care Capacity: Spouse  Patient or legal guardian wants to designate a caregiver: No  Support Systems: Spouse / Significant Other  Housing / Facility: [address] [facility]  Prior Services: None    Discharge Preparedness  What is your plan after discharge?: Uncertain - pending medical team collaboration  What are your discharge supports?: Spouse, Other (comment)  (Caregivers)  Prior Functional Level: Independent with Activities of Daily Living, Needs Assist with Activities of Daily Living, Uses Cane, Uses Walker, Uses Wheelchair  Difficulity with ADLs: None  Difficulity with IADLs: None    Functional Assesment  Prior Functional Level: Independent with Activities of Daily Living, Needs Assist with Activities of Daily Living, Uses Cane, Uses Walker, Uses Wheelchair    Finances  Financial Barriers to Discharge: No  Prescription Coverage: Yes    Vision / Hearing Impairment  Vision Impairment : No  Right Eye Vision: Wears Glasses  Left Eye Vision: Wears Glasses  Hearing Impairment : No      Domestic Abuse  Have you ever been the victim of abuse or violence?: No  Possible Abuse/Neglect Reported to:: Not Applicable    Psychological Assessment  History of Substance Abuse: None  History of Psychiatric Problems: No  Non-compliant with Treatment: No  Newly Diagnosed Illness: No    Discharge Risks or Barriers  Discharge risks or barriers?: No    Anticipated Discharge Information  Discharge Disposition: Discharged to home/self care (01)  Discharge Address: [facility] [place] [address]  Discharge Contact Phone Number: [phone]

## 2024-07-08 NOTE — PROGRESS NOTES
Bedside report received from off going RN/tech: Leonel, assumed care of patient.     Fall Risk Score: LOW RISK  Fall risk interventions in place: Place yellow fall risk ID band on patient, Provide patient/family education based on risk assessment, Educate patient/family to call staff for assistance when getting out of bed, Place fall precaution signage outside patient door, and Utilize bed/chair fall alarm  Bed type: Regular (Gary Score less than 17 interventions in place)  Patient on cardiac monitor: Yes  IVF/IV medications: Infusion per MAR (List Med(s)) Heparin 15 units/kg/hr  Oxygen: Room Air  Bedside sitter: Not Applicable   Isolation: Not applicable

## 2024-07-08 NOTE — PROGRESS NOTES
[facility] -  PROGRESS NOTE               Author: JASKARAN Bernard Date & Time: 7/8/2024  12:53 PM     HPI:  68-year-old  male with biventricular history of ESRD on hemodialysis q. Monday Wednesday Friday at [facility] through left upper arm AV fistula that was placed about 2 to 3 years ago.  He presented to the ER with left arm pain and swelling for the last 5 days.  His last dialysis was on Wednesday and had no dialysis in the last 5 days.  He felt sick on Friday and missed his dialysis.  Ultrasound of the left upper arm AV fistula showed there is no flow in the AV fistula and it has thrombosed.  A temporary right IJ dialysis catheter was placed in the ER.  Nephrology has been consulted for management of dialysis.  Patient denies any fever or chills, no nausea or vomiting, no chest pain.  He has some shortness of breath but is able to talk in full sentences and is in no acute respiratory distress at this time. No melena, hematochezia, hematemesis.  No HA, visual changes, or abdominal pain.     NEPHROLOGY DAILY PROGRESS:   6/24: consult note  6/25:  Afebrile, HD last night without any complications, plans noted for OR today with Dr. Tinoco for vascualr surgery   6/26: Underwent AVG repair yesterday with Dr. Tinoco, has LAMBERT drain, BP noted low this AM on dialysis, says BP 's run low on dialysis - not on Midodrine, ordered Midodrine to be used on dialysis   6/27: 0 Net UF with HD yesterday, limited by hypotension. Sitting up at side of bed eating breakfast. Bps improved 130s systolic.   6/28: HD today, seen  6/29: s/p HD yesterday over temp HD line. Tolerated well, no complaints.  6/30: in OR, not available   7/1: s/p AVR, 1V CABG and TAHIR resection/ligation, transferred to ICU, now off pressors, fatigued, post-op pain  7/2: Stable hemodynamics, tolerated HD, transferred out of ICU, chest wall pain improved  7/3: Stable hemodynamics, 7/1 BC remain negative, denies  "complaints   7/4: No acute events, tolerated HD, chest wall pain improved, feeling better overall  7/5: No acute events, no chest pain or SOB. S/p permacath placement this AM  7/6: No acute events, hypertensive, denies complaints  7/7: No acute events, feels at baseline, no chest pain or SOB, Diminsed pulses RLE with discoloration-pending CTA  7/8: patient is sitting in a chair, doing well, just came back from dialysis, tolerated well, net UF 3000 ml    REVIEW OF SYSTEMS:    10 Point ROS performed, negative other than stated above    PMH/PSH/SH/FH:   Reviewed and unchanged since admission note    CURRENT MEDICATIONS:   Reviewed from admission to present day    VS:  VS:  /45   Pulse (!) 59   Temp 36.4 °C (97.5 °F) (Temporal)   Resp 18   Ht 1.854 m (6' 0.99\")   Wt 103 kg (227 lb 1.2 oz)   SpO2 93%   BMI 29.97 kg/m²   GENERAL: no acute distress  CV: +edema  RESP: non-labored  GI: Soft  MSK: No joint deformities   SKIN: No concerning rashes  NEURO: AOx3  PSYCH: Cooperative    Fluids:  In: 820 [P.O.:820]  Out: -     LABS:  Recent Labs     07/06/24  0019 07/07/24  0450 07/08/24  0314   SODIUM 136 135 134*   POTASSIUM 3.5* 3.5* 3.7   CHLORIDE 98 96 97   CO2 24 24 21   GLUCOSE 101* 98 99   BUN 23* 32* 38*   CREATININE 3.76* 5.21* 6.11*   CALCIUM 8.4* 8.2* 8.2*       IMAGING:   All imaging reviewed from admission to present day    IMPRESSION:  # ESRD  -Q. MWF at [facility]  # Bacteremia   - Blood Cx from 6/24, 6/27 + E. Faecalis  # Bacterial endocarditis:   -s/p aortic valve replacement 6/30  3 CAD  -s/p 1V CABG 6/30  #Thrombosed left upper arm AVG; infected pseudoaneurysm   -Right IJ temporary HD catheter placed in ER 6/24/2024  - S/P AVG repair, resection and pseudoaneurysm resection 6/25/24 by Dr. Tinoco along with  Reconstruction of left brachial artery using left greater saphenous vein interposition graft.  -permacath placed 7/5  # HTN  # Sepsis   # Anemia of CKD  # CKD-MBD  -phos at goal  # Coronary " artery disease  # Chronic A-fib  -s/p TAHIR resection/ligation        PLAN:  - s/p HD today, continue q MWF schedule   - Challening UF  - Using permacath until graft cleared for use by vascular  - EPO with HD  - Hydral Increased  - No dietary protein restrictions  - abx per primary  - Dose all meds per ESRD    Thank you

## 2024-07-08 NOTE — CARE PLAN
The patient is Watcher - Medium risk of patient condition declining or worsening    Shift Goals  Clinical Goals: Monitor vitals, labs, OHS care plan  Patient Goals: Rest  Family Goals: NESTOR    Progress made toward(s) clinical / shift goals:        Problem: Post Op Day 8 CABG/Heart Valve Replacement  Goal: Optimal care of the Post Op CABG/Heart Valve replacement Post Op Day 5  Outcome: Progressing  Note: Patient refusing TEDs as his feet are in pain 8/10.  Intervention: Up in chair for all meals  Note: Patient has been up to chair for all meals  Intervention: Ambulate 4 times daily, increasing the distance each time  Note: Patient has ambulated twice on this shift and went further the 2nd time with OT.  Intervention: IS q 1 hour while awake and record best IS volume  Note: Patient verbalized understanding to use IS during the day when up and awake. Best IS volume was 1200.  Intervention: Shower daily and clean incisions twice daily with soap and water  Note: Patient showered and all incisions cleaned

## 2024-07-08 NOTE — DISCHARGE PLANNING
Case Management Discharge Planning    Admission Date: 6/24/2024  GMLOS: 11.2  ALOS: 14    6-Clicks ADL Score: 16  6-Clicks Mobility Score: 16  PT and/or OT Eval ordered: Yes  PT/OT:Recommending post acute placement   Post-acute Referrals Ordered: NA  Post-acute Choice Obtained: NA  Has referral(s) been sent to post-acute provider:  NA      Anticipated Discharge Dispo: Discharge Disposition: Discharged to home/self care (01)      Action(s) Taken: LMSW rounded to pt's room to conduct assessment. Pt currently down in dialysis and will round back to complete assessment.

## 2024-07-08 NOTE — THERAPY
"Physical Therapy   Daily Treatment     Patient Name: Brian Jasmine  Age:  68 y.o., Sex:  male  Medical Record #: [id number]  Today's Date: 7/8/2024     Precautions  Precautions: Fall Risk;Cardiac Precautions (See Comments);Sternal Precautions (See Comments)  Comments: CABG x1    Assessment    Pt seen for PT tx session. Presents with edematous BUE/LE as well as blackened right>left toes. Pt cleared for mobility by vascular surgery. Pt reluctant to walk on affected right foot but eventually agreeable with max encouragement. Able to walk 70 ft with FWW, good pace with no LOB. Pt self-limited distance d/t increased RLE pain and fatigue. Remains limited by RLE pain and decreased sensation, impaired balance, generalized weakness, and decreased endurance. Will follow.    Plan    Treatment Plan Status: Modify Current Treatment Plan  Type of Treatment: Bed Mobility, Equipment, Family / Caregiver Training, Gait Training, Manual Therapy, Neuro Re-Education / Balance, Self Care / Home Evaluation, Therapeutic Activities, Therapeutic Exercise  Treatment Frequency: 4 Times per Week  Treatment Duration: Until Therapy Goals Met    DC Equipment Recommendations: Unable to determine at this time  Discharge Recommendations: Recommend post-acute placement for additional physical therapy services prior to discharge home      Subjective    Pt received resting in bed, agreeable to participate with max encouragement. \"I don't want to walk on it but they tell me I have to\" about right foot.      Objective       07/08/24 1507   Precautions   Precautions Fall Risk;Cardiac Precautions (See Comments);Sternal Precautions (See Comments)   Comments CABG x1   Vitals   O2 Delivery Device None - Room Air   Pain 0 - 10 Group   Therapist Pain Assessment During Activity;Nurse Notified  (c/o increased RLE pain with walking)   Cognition    Cognition / Consciousness X   Level of Consciousness Alert   Comments cooperative with max encouragement after dialysis " and before shower, flat affect, agreeable to d/c to SNF   Sensation Lower Body   Lower Extremity Sensation   X   Comments c/o numb lower RLE   Balance   Sitting Balance (Static) Fair +   Sitting Balance (Dynamic) Fair   Standing Balance (Static) Fair -   Standing Balance (Dynamic) Fair -   Weight Shift Sitting Fair   Weight Shift Standing Fair   Skilled Intervention Compensatory Strategies;Tactile Cuing;Verbal Cuing   Comments FWW   Bed Mobility    Supine to Sit Standby Assist   Scooting Standby Assist   Rolling Standby Assist   Skilled Intervention Compensatory Strategies;Verbal Cuing   Comments HOBE, up in chair post   Gait Analysis   Gait Level Of Assist Contact Guard Assist   Assistive Device Front Wheel Walker   Distance (Feet) 70   # of Times Distance was Traveled 1   Deviation Other (Comment)  (good pace, no LOB)   # of Stairs Climbed 0   Skilled Intervention Verbal Cuing   Comments pt self-limited distance d/t increased RLE pain and fatigue   Functional Mobility   Sit to Stand Moderate Assist   Bed, Chair, Wheelchair Transfer Minimal Assist   Transfer Method Stand Step   Mobility bed>up in hallway FWW>shower chair with CNA   Skilled Intervention Compensatory Strategies;Facilitation;Postural Facilitation;Sequencing;Tactile Cuing;Verbal Cuing   Comments good adherence to sternal prx during sit<>stand   6 Clicks Assessment - How much HELP from from another person do you currently need... (If the patient hasn't done an activity recently, how much help from another person do you think he/she would need if he/she tried?)   Turning from your back to your side while in a flat bed without using bedrails? 3   Moving from lying on your back to sitting on the side of a flat bed without using bedrails? 3   Moving to and from a bed to a chair (including a wheelchair)? 3   Standing up from a chair using your arms (e.g., wheelchair, or bedside chair)? 2   Walking in hospital room? 3   Climbing 3-5 steps with a railing? 2    6 clicks Mobility Score 16   Short Term Goals    Short Term Goal # 1 Pt will perform supine <> sit without bed features with SPV in 6 visits to progress bed mobility   Goal Outcome # 1 Progressing as expected   Short Term Goal # 2 Pt will perform STS with FWW and SPV in 6 visits to progress OOB mobility   Goal Outcome # 2 Progressing slower than expected   Short Term Goal # 3 Pt will perform stand pivot transfers with FWW and SPV in 6 visits to progress functional OOB mobility   Goal Outcome # 3 Progressing slower than expected   Short Term Goal # 4 Pt will ambulate 150 ft with FWW and SPV in 6 visits to progress functional gait   Goal Outcome # 4 Progressing slower than expected   Physical Therapy Treatment Plan   Physical Therapy Treatment Plan Modify Current Treatment Plan   Treatment Plan  Bed Mobility;Equipment;Family / Caregiver Training;Gait Training;Manual Therapy;Neuro Re-Education / Balance;Self Care / Home Evaluation;Therapeutic Activities;Therapeutic Exercise   Treatment Frequency 4 Times per Week   Duration Until Therapy Goals Met   Anticipated Discharge Equipment and Recommendations   DC Equipment Recommendations Unable to determine at this time   Discharge Recommendations Recommend post-acute placement for additional physical therapy services prior to discharge home   Interdisciplinary Plan of Care Collaboration   IDT Collaboration with  Nursing   Patient Position at End of Therapy Seated;Other (Comments)  (handoff to CNA on shower chair for impending shower)   Collaboration Comments RN updated   Session Information   Date / Session Number  7/8- 2(1/4, 7/14)

## 2024-07-08 NOTE — PROGRESS NOTES
Addended by: MICHAEL LUCIANO on: 10/8/2020 05:02 PM     Modules accepted: Orders     Inpatient Anticoagulation Service Note for 7/8/2024      Reason for Anticoagulation: Aortic Mechanical Valve Replacement  , Atrial Fibrillation   [id number] VASc Score: 4  HAS-BLED Score: 3    Hemoglobin Value: (!) 9.8  Hematocrit Value: (!) 29.9  Lab Platelet Value: 262  Target INR: 2.0 to 3.0    INR from last 7 days       Date/Time INR Value    07/08/24 0314 1.91    07/07/24 1402 1.5    07/07/24 0450 1.36    07/06/24 0019 1.13    07/05/24 0833 1.03          Dose from last 7 days       Date/Time Dose (mg)    07/07/24 0955 5    07/06/24 1312 5    07/05/24 1501 5          Average Dose (mg):  (new start)  Significant Interactions: Amiodarone, Antibiotics, Antiplatelet Medications  Bridge Therapy: Yes    Reversal Agent Administered:  (on 07/01)  Comments: INR is sub-therapeutic but appropriately increasing. Continue current dose with a heparin bridge. H/H low but stable.    Plan:  Warfarin 5mg   Education Material Provided?: No    Pharmacist suggested discharge dosing: Warfarin 5mg PO daily with an INR in 2 days.      Staci Garza, PharmD

## 2024-07-08 NOTE — PROGRESS NOTES
Assumed care of patient, received bedside report from NOC RN. Patient is A&O X 4. Pain 8/10, medicated per MAR. Vital signs stable overnight, on RA. On tele monitor, SR 62. POC discussed with patient and he verbalized understanding. Call light within reach and fall precautions in place. Bed locked and in lowest position.

## 2024-07-08 NOTE — PROGRESS NOTES
Monitor Summary  Rhythm: Normal Sinus Rhythm and Sinus Bradycardia  Rate: 50-60   Ectopy: PVCs  .24 / .12 / .37

## 2024-07-08 NOTE — PROGRESS NOTES
3hr HD started @ 0819 and ended @ 1119,tx well tolerated,VSS,net UF = 3000ml.RIJ TDC dressing changed,CDI,report given to Liana Calderón RN.

## 2024-07-09 ENCOUNTER — APPOINTMENT (OUTPATIENT)
Dept: RADIOLOGY | Facility: MEDICAL CENTER | Age: 69
DRG: 216 | End: 2024-07-09
Attending: INTERNAL MEDICINE
Payer: MEDICARE

## 2024-07-09 ENCOUNTER — APPOINTMENT (OUTPATIENT)
Dept: CARDIOLOGY | Facility: MEDICAL CENTER | Age: 69
DRG: 216 | End: 2024-07-09
Payer: MEDICARE

## 2024-07-09 LAB
ANION GAP SERPL CALC-SCNC: 14 MMOL/L (ref 7–16)
BUN SERPL-MCNC: 26 MG/DL (ref 8–22)
CALCIUM SERPL-MCNC: 8.5 MG/DL (ref 8.5–10.5)
CHLORIDE SERPL-SCNC: 98 MMOL/L (ref 96–112)
CO2 SERPL-SCNC: 22 MMOL/L (ref 20–33)
CREAT SERPL-MCNC: 4.46 MG/DL (ref 0.5–1.4)
ERYTHROCYTE [DISTWIDTH] IN BLOOD BY AUTOMATED COUNT: 70.1 FL (ref 35.9–50)
GFR SERPLBLD CREATININE-BSD FMLA CKD-EPI: 14 ML/MIN/1.73 M 2
GLUCOSE SERPL-MCNC: 94 MG/DL (ref 65–99)
HCT VFR BLD AUTO: 32.3 % (ref 42–52)
HGB BLD-MCNC: 10.4 G/DL (ref 14–18)
INR PPP: 2.32 (ref 0.87–1.13)
MCH RBC QN AUTO: 31 PG (ref 27–33)
MCHC RBC AUTO-ENTMCNC: 32.2 G/DL (ref 32.3–36.5)
MCV RBC AUTO: 96.4 FL (ref 81.4–97.8)
PLATELET # BLD AUTO: 284 K/UL (ref 164–446)
PMV BLD AUTO: 9.8 FL (ref 9–12.9)
POTASSIUM SERPL-SCNC: 3.6 MMOL/L (ref 3.6–5.5)
PROTHROMBIN TIME: 25.8 SEC (ref 12–14.6)
RBC # BLD AUTO: 3.35 M/UL (ref 4.7–6.1)
SODIUM SERPL-SCNC: 134 MMOL/L (ref 135–145)
UFH PPP CHRO-ACNC: 0.54 IU/ML
WBC # BLD AUTO: 14.8 K/UL (ref 4.8–10.8)

## 2024-07-09 PROCEDURE — 700105 HCHG RX REV CODE 258: Performed by: INTERNAL MEDICINE

## 2024-07-09 PROCEDURE — A9270 NON-COVERED ITEM OR SERVICE: HCPCS | Performed by: NURSE PRACTITIONER

## 2024-07-09 PROCEDURE — 700111 HCHG RX REV CODE 636 W/ 250 OVERRIDE (IP): Performed by: INTERNAL MEDICINE

## 2024-07-09 PROCEDURE — 97530 THERAPEUTIC ACTIVITIES: CPT

## 2024-07-09 PROCEDURE — A9270 NON-COVERED ITEM OR SERVICE: HCPCS

## 2024-07-09 PROCEDURE — 36415 COLL VENOUS BLD VENIPUNCTURE: CPT

## 2024-07-09 PROCEDURE — 700102 HCHG RX REV CODE 250 W/ 637 OVERRIDE(OP): Performed by: NURSE PRACTITIONER

## 2024-07-09 PROCEDURE — 99418 PROLNG IP/OBS E/M EA 15 MIN: CPT | Performed by: INTERNAL MEDICINE

## 2024-07-09 PROCEDURE — 93306 TTE W/DOPPLER COMPLETE: CPT

## 2024-07-09 PROCEDURE — 99233 SBSQ HOSP IP/OBS HIGH 50: CPT | Performed by: INTERNAL MEDICINE

## 2024-07-09 PROCEDURE — 85520 HEPARIN ASSAY: CPT

## 2024-07-09 PROCEDURE — 97535 SELF CARE MNGMENT TRAINING: CPT

## 2024-07-09 PROCEDURE — 85027 COMPLETE CBC AUTOMATED: CPT

## 2024-07-09 PROCEDURE — 700117 HCHG RX CONTRAST REV CODE 255

## 2024-07-09 PROCEDURE — 80048 BASIC METABOLIC PNL TOTAL CA: CPT

## 2024-07-09 PROCEDURE — A9270 NON-COVERED ITEM OR SERVICE: HCPCS | Performed by: INTERNAL MEDICINE

## 2024-07-09 PROCEDURE — 93306 TTE W/DOPPLER COMPLETE: CPT | Mod: 26 | Performed by: INTERNAL MEDICINE

## 2024-07-09 PROCEDURE — 700102 HCHG RX REV CODE 250 W/ 637 OVERRIDE(OP): Performed by: INTERNAL MEDICINE

## 2024-07-09 PROCEDURE — 700102 HCHG RX REV CODE 250 W/ 637 OVERRIDE(OP)

## 2024-07-09 PROCEDURE — 770020 HCHG ROOM/CARE - TELE (206)

## 2024-07-09 PROCEDURE — 93971 EXTREMITY STUDY: CPT | Mod: LT

## 2024-07-09 PROCEDURE — 85610 PROTHROMBIN TIME: CPT

## 2024-07-09 PROCEDURE — 93931 UPPER EXTREMITY STUDY: CPT | Mod: LT

## 2024-07-09 PROCEDURE — 700101 HCHG RX REV CODE 250: Performed by: INTERNAL MEDICINE

## 2024-07-09 RX ORDER — GAUZE BANDAGE 2" X 2"
100 BANDAGE TOPICAL DAILY
Status: DISCONTINUED | OUTPATIENT
Start: 2024-07-10 | End: 2024-07-17 | Stop reason: HOSPADM

## 2024-07-09 RX ORDER — FOLIC ACID 1 MG/1
1 TABLET ORAL DAILY
Status: DISCONTINUED | OUTPATIENT
Start: 2024-07-10 | End: 2024-07-17 | Stop reason: HOSPADM

## 2024-07-09 RX ADMIN — OXYCODONE HYDROCHLORIDE 10 MG: 10 TABLET ORAL at 14:29

## 2024-07-09 RX ADMIN — CEFTRIAXONE SODIUM 2000 MG: 10 INJECTION, POWDER, FOR SOLUTION INTRAVENOUS at 17:13

## 2024-07-09 RX ADMIN — AMIODARONE HYDROCHLORIDE 400 MG: 200 TABLET ORAL at 17:10

## 2024-07-09 RX ADMIN — VALSARTAN 80 MG: 80 TABLET ORAL at 05:17

## 2024-07-09 RX ADMIN — OMEPRAZOLE 20 MG: 20 CAPSULE, DELAYED RELEASE ORAL at 05:18

## 2024-07-09 RX ADMIN — ATORVASTATIN CALCIUM 40 MG: 40 TABLET, FILM COATED ORAL at 20:27

## 2024-07-09 RX ADMIN — CEFTRIAXONE SODIUM 2000 MG: 10 INJECTION, POWDER, FOR SOLUTION INTRAVENOUS at 06:07

## 2024-07-09 RX ADMIN — ACETAMINOPHEN 1000 MG: 500 TABLET, FILM COATED ORAL at 05:16

## 2024-07-09 RX ADMIN — OXYCODONE HYDROCHLORIDE 10 MG: 10 TABLET ORAL at 21:01

## 2024-07-09 RX ADMIN — HEPARIN SODIUM 13 UNITS/KG/HR: 1000 INJECTION, SOLUTION INTRAVENOUS; SUBCUTANEOUS at 01:10

## 2024-07-09 RX ADMIN — HYDRALAZINE HYDROCHLORIDE 50 MG: 50 TABLET ORAL at 23:05

## 2024-07-09 RX ADMIN — ACETAMINOPHEN 1000 MG: 500 TABLET, FILM COATED ORAL at 23:05

## 2024-07-09 RX ADMIN — OXYCODONE HYDROCHLORIDE 10 MG: 10 TABLET ORAL at 04:54

## 2024-07-09 RX ADMIN — AMPICILLIN SODIUM 2000 MG: 2 INJECTION, POWDER, FOR SOLUTION INTRAVENOUS at 17:17

## 2024-07-09 RX ADMIN — OXYCODONE HYDROCHLORIDE 10 MG: 10 TABLET ORAL at 09:16

## 2024-07-09 RX ADMIN — ACETAMINOPHEN 1000 MG: 500 TABLET, FILM COATED ORAL at 17:12

## 2024-07-09 RX ADMIN — HUMAN ALBUMIN MICROSPHERES AND PERFLUTREN 3 ML: 10; .22 INJECTION, SOLUTION INTRAVENOUS at 12:45

## 2024-07-09 RX ADMIN — HEPARIN SODIUM 13 UNITS/KG/HR: 1000 INJECTION, SOLUTION INTRAVENOUS; SUBCUTANEOUS at 18:24

## 2024-07-09 RX ADMIN — ACETAMINOPHEN 1000 MG: 500 TABLET, FILM COATED ORAL at 12:33

## 2024-07-09 RX ADMIN — METOPROLOL TARTRATE 25 MG: 25 TABLET, FILM COATED ORAL at 05:17

## 2024-07-09 RX ADMIN — HYDRALAZINE HYDROCHLORIDE 50 MG: 50 TABLET ORAL at 14:29

## 2024-07-09 RX ADMIN — Medication 1 APPLICATOR: at 20:27

## 2024-07-09 RX ADMIN — ASPIRIN 81 MG: 81 TABLET, COATED ORAL at 05:16

## 2024-07-09 RX ADMIN — AMLODIPINE BESYLATE 10 MG: 10 TABLET ORAL at 05:18

## 2024-07-09 RX ADMIN — OXYCODONE HYDROCHLORIDE 10 MG: 10 TABLET ORAL at 17:59

## 2024-07-09 RX ADMIN — HYDRALAZINE HYDROCHLORIDE 50 MG: 50 TABLET ORAL at 05:17

## 2024-07-09 RX ADMIN — WARFARIN SODIUM 5 MG: 5 TABLET ORAL at 17:11

## 2024-07-09 RX ADMIN — AMPICILLIN SODIUM 2000 MG: 2 INJECTION, POWDER, FOR SOLUTION INTRAVENOUS at 06:19

## 2024-07-09 RX ADMIN — Medication 1 APPLICATOR: at 09:15

## 2024-07-09 RX ADMIN — AMIODARONE HYDROCHLORIDE 400 MG: 200 TABLET ORAL at 05:16

## 2024-07-09 ASSESSMENT — COGNITIVE AND FUNCTIONAL STATUS - GENERAL
DAILY ACTIVITIY SCORE: 20
SUGGESTED CMS G CODE MODIFIER DAILY ACTIVITY: CJ
TOILETING: A LITTLE
DRESSING REGULAR LOWER BODY CLOTHING: A LITTLE
HELP NEEDED FOR BATHING: A LITTLE
DRESSING REGULAR UPPER BODY CLOTHING: A LITTLE

## 2024-07-09 ASSESSMENT — PAIN DESCRIPTION - PAIN TYPE
TYPE: ACUTE PAIN

## 2024-07-09 ASSESSMENT — GAIT ASSESSMENTS: DISTANCE (FEET): 40

## 2024-07-09 ASSESSMENT — FIBROSIS 4 INDEX: FIB4 SCORE: 1.47

## 2024-07-09 NOTE — DISCHARGE PLANNING
Case Management Discharge Planning    Admission Date: 6/24/2024  GMLOS: 11.2  ALOS: 15    6-Clicks ADL Score: 20  6-Clicks Mobility Score: 16  PT and/or OT Eval ordered: Yes  PT/OT:Recommending post acute placement   Post-acute Referrals Ordered: Yes  Post-acute Choice Obtained: NA  Has referral(s) been sent to post-acute provider:  NA      Anticipated Discharge Dispo: Discharge Disposition: Discharged to home/self care (01)  Discharge Address: [address] [place] [address] [address] [place] [address]  Discharge Contact Phone Number: [phone]    DME Needed: Pending hospital course     Action(s) Taken: Chart reviewed and discussed in IDT rounds. Echo pending. Pt not MC for DC. Pt is agreeable to placement upon MC. Pt will need HD chair to change location if attending SNF placement. Due to outpatient HD location local SNFs have been declining.     Escalations Completed: None    Medically Clear: No    Next Steps: f/u with pt and medical team to discuss dc needs and barriers.    Barriers to Discharge: Medical clearance    Is the patient up for discharge tomorrow: No

## 2024-07-09 NOTE — PROGRESS NOTES
Monitor Summary:  Rhythm: SB/SR  Rate: 52-61  Ectopy: (O) PVC, (R) PAC  Measurement: .24/.08/.58

## 2024-07-09 NOTE — PROGRESS NOTES
NOC HOSPITALIST CROSS COVER    Notified by RN regarding progression of discoloration of right lower extremity. Spoke with vascular overnight who suggested a repeat echo to see if the patient has newly reduced EF in the setting of recent CABG. Reduced cardiac may be responsible for patient's symptoms as he appears to be chronically hypertensive and symptoms appear to have started when the patient achieved normotension, and his ultrasounds appear to show chronic disease. No other recommendations at this time.           -----------------------------------------------------------------------------------------------------------    Electronically signed by:  WALT Rizo PA-C  Hospitalist Services

## 2024-07-09 NOTE — THERAPY
"Occupational Therapy  Daily Treatment     Patient Name: Brian Jasmine  Age:  68 y.o., Sex:  male  Medical Record #: [id number]  Today's Date: 7/9/2024     Precautions: Fall Risk, Cardiac Precautions (See Comments), Sternal Precautions (See Comments)  Comments: CABG x1    Assessment    Pt seen for OT tx to include: bed mobility, transfers, LB dressing, ongoing education on sternal/cardiac precautions, pacing strategies, compensatory ADL. See grid below for details. Pt declined mobilization to/from bathroom or toileting (completed earlier this morning); requested short ambulation in hallway instead. Pt is progressing towards OT goals. He is below functional baseline and is open to post-acute transitional care. Will continue to follow.     Plan    Treatment Plan Status: Continue Current Treatment Plan  Type of Treatment: Self Care / Activities of Daily Living, Therapeutic Exercises, Therapeutic Activity, Family / Caregiver Training  Treatment Frequency: 5 Times per Week  Treatment Duration: Until Therapy Goals Met    DC Equipment Recommendations: Unable to determine at this time  Discharge Recommendations: Recommend post-acute placement for additional occupational therapy services prior to discharge home    Subjective    \"I'm a little better every day.\"     Objective     07/09/24 0849   Treatment Charges   Charges Yes   OT Self Care / ADL (Units) 1   OT Therapy Activity (Units) 1   Total Time Spent   OT Time Spent Yes   OT Self Care / ADL (Minutes) 8   OT Therapy Activity (Minutes) 15   OT Total Time Spent (Calculated) 23    Services   Is patient using  services for this encounter? No   Precautions   Precautions Fall Risk;Cardiac Precautions (See Comments);Sternal Precautions (See Comments)   Vitals   Patient BP Position Sitting   Blood Pressure  120/58   Vitals Comments unable to assess SpO2 due to sensor malfunction; slight increased WOB with short ambulation   Pain   Pain Scales 0 to 10 Scale  "   Pain 0 - 10 Group   Location Arm;Foot   Location Orientation Left;Right   Therapist Pain Assessment During Activity;Nurse Notified  (R & L foot pain, LUE pain; not rated, agreeable to activity)   Non Verbal Descriptors   Non Verbal Scale  Calm;Unlabored Breathing   Cognition    Cognition / Consciousness WDL   Level of Consciousness Alert   Comments flat affect, but pleasant & participatory; able to recite and demo sternal precautions   Active ROM Upper Body   Active ROM Upper Body  X   Comments B shoulders limited by edema (L worse than R); appears to have arthritic changes as well   Other Treatments   Other Treatments Provided Ongoing education on sternal precautions during functional activity as well as pacing strategies and energy conservation. Ongoing discussion of post-acute recommendations and available assist at home.   Balance   Sitting Balance (Static) Good   Sitting Balance (Dynamic) Fair +   Standing Balance (Static) Fair   Standing Balance (Dynamic) Fair   Weight Shift Sitting Good   Weight Shift Standing Fair   Skilled Intervention Compensatory Strategies;Postural Facilitation;Verbal Cuing   Comments FWW for standing   Bed Mobility    Supine to Sit Standby Assist  (HOB elevated to 40 degrees, partial roll to L)   Sit to Supine   (up to chair post)   Scooting Standby Assist  (seated)   Skilled Intervention Compensatory Strategies;Postural Facilitation;Verbal Cuing   Activities of Daily Living   Grooming   (declined)   Upper Body Dressing   (declined gown change)   Lower Body Dressing Standby Assist  (doff/don B socks in tailor sit)   Skilled Intervention Compensatory Strategies;Verbal Cuing;Postural Facilitation   How much help from another person does the patient currently need...   Putting on and taking off regular lower body clothing? 3   Bathing (including washing, rinsing, and drying)? 3   Toileting, which includes using a toilet, bedpan, or urinal? 3   Putting on and taking off regular upper body  clothing? 3   Taking care of personal grooming such as brushing teeth? 4   Eating meals? 4   6 Clicks Daily Activity Score 20   Functional Mobility   Sit to Stand Contact Guard Assist   Bed, Chair, Wheelchair Transfer Standby Assist   Transfer Method Stand Step  (FWW)   Mobility Supine > EOB, short gait and transfers with FWW   Distance (Feet) 40   # of Times Distance was Traveled 2   Skilled Intervention Compensatory Strategies;Postural Facilitation;Verbal Cuing   ICU Target Mobility Level   ICU Mobility - Targeted Level Level 4   Visual Perception   Visual Perception  Not Tested   Activity Tolerance   Sitting in Chair >10 min; up post   Sitting Edge of Bed 7 min   Standing 5 min   Comments slight increased WOB with short gait   Patient / Family Goals   Patient / Family Goal #1 to get stronger and go home   Short Term Goals   Short Term Goal # 1 complete UBD w/ sternal prxns SPV   Goal Outcome # 1   (declined this session)   Short Term Goal # 2 complete full g/h routine standing SPV   Goal Outcome # 2   (declined this session)   Short Term Goal # 3 complete toileting SPV   Goal Outcome # 3   (declined this session)   Short Term Goal # 4 complete ADL txf SPV   Goal Outcome # 4 Progressing as expected   Short Term Goal # 5 Pt will complete LB dressing with supv  (7/9 new goal)   Goal Outcome # 5 Progressing as expected   Education Group   Education Provided Sternal Precautions;Energy Conservation   Sternal Precautions Patient Response Patient;Acceptance;Explanation;Demonstration;Verbal Demonstration;Action Demonstration;Reinforcement Needed  (lifting restriction, avoid B SF/SA/SE)   Energy Conservation Patient Response Patient;Acceptance;Explanation;Verbal Demonstration;Action Demonstration;Reinforcement Needed  (rest breaks, monitor WOB)   Occupational Therapy Treatment Plan    O.T. Treatment Plan Continue Current Treatment Plan   Anticipated Discharge Equipment and Recommendations   DC Equipment Recommendations  Unable to determine at this time   Discharge Recommendations Recommend post-acute placement for additional occupational therapy services prior to discharge home   Interdisciplinary Plan of Care Collaboration   IDT Collaboration with  Nursing   Patient Position at End of Therapy Seated;Chair Alarm On;Call Light within Reach;Tray Table within Reach;Phone within Reach   Collaboration Comments OT results and recs   Session Information   Date / Session Number  7/9 #3 (1/5, 7/15)

## 2024-07-09 NOTE — PROGRESS NOTES
Monitor Summary  Rhythm: Sinus Bradycardia /w bbb  Rate: 53-59  Ectopy: PVCs, PACs  .20 / .12 / .47

## 2024-07-09 NOTE — CARE PLAN
The patient is Watcher - Medium risk of patient condition declining or worsening    Shift Goals  Clinical Goals: monitor vital signs, EHCO, monitor pulses  Patient Goals: rest and decerease pain  Family Goals: NESTOR    Progress made toward(s) clinical / shift goals:        Problem: Pain - Standard  Goal: Alleviation of pain or a reduction in pain to the patient’s comfort goal  Outcome: Progressing   Assess and monitor for pain. Provide pharmacological and non-pharmacological interventions as appropriate. Re-evaluate and continue to monitor.   Problem: Knowledge Deficit - Standard  Goal: Patient and family/care givers will demonstrate understanding of plan of care, disease process/condition, diagnostic tests and medications  Outcome: Progressing   Discuss and review POC with patient/family. Re-educate as needed.  Problem: Hemodynamics  Goal: Patient's hemodynamics, fluid balance and neurologic status will be stable or improve  Outcome: Progressing   Assess pt BP, vital signs and peripheral pulses of all extremities.   Problem: Fluid Volume  Goal: Fluid volume balance will be maintained  Outcome: Progressing   Monitor fluid intake and output. Encourage and educate importance of adequate fluid intake. Re-educate as needed. Will continue to monitor.   Problem: Skin Integrity  Goal: Skin integrity is maintained or improved  Outcome: Progressing   Assess skin and monitor for skin breakdown. Alleviate pressure to bony prominences and provide assistance with turning, repositioning, ROM and mobility as appropriate. Use of walker as needed. Continue to monitor.

## 2024-07-09 NOTE — PROGRESS NOTES
Inpatient Anticoagulation Service Note for 7/9/2024      Reason for Anticoagulation: Aortic Mechanical Valve Replacement  , Atrial Fibrillation   [id number] VASc Score: 4  HAS-BLED Score: 3    Hemoglobin Value: (!) 10.4  Hematocrit Value: (!) 32.3  Lab Platelet Value: 284  Target INR: 2.0 to 3.0    INR from last 7 days       Date/Time INR Value    07/09/24 0247 2.32    07/08/24 0314 1.91    07/07/24 1402 1.5    07/07/24 0450 1.36    07/06/24 0019 1.13    07/05/24 0833 1.03          Dose from last 7 days       Date/Time Dose (mg)    07/09/24 1312 5    07/07/24 0955 5    07/06/24 1312 5    07/05/24 1501 5          Average Dose (mg):  (new start)  Significant Interactions: Amiodarone, Antibiotics, Antiplatelet Medications  Bridge Therapy: Yes  Bridge Therapy Start Date: 07/07/24  Days of Overlap Therapy: 3   INR Value Greater than 2 Prior to Discontinuation of Parenteral Anticoagulation: Not Applicable     Reversal Agent Administered: Not Applicable  Comments: INR is therapeutic (day 1) with 3 days of bridging as heparin was held post up due to bleeding. Continue heparin drip for now. H/H appears stable. No new DDI identified.  Will continue with 5mg daily.    Plan:  Warfarin 5mg tonight   Education Material Provided?: No    Pharmacist suggested discharge dosing: Warfarin 5mg PO daily with follow up in 2-3 days.      Staci Garza, NiurkaD

## 2024-07-09 NOTE — PROGRESS NOTES
Hospital Medicine Daily Progress Note    Date of Service  7/8/2024    Chief Complaint  Brian Jasmine is a 68 y.o. male admitted 6/24/2024 with left arm graft infection    Hospital Course  Brian Jasmine is a 68 y.o. male who presented 6/24/2024 with left arm infection.  Mr. Jasmine has a past medical history of peripheral arterial disease with femoral popliteal bypass, end-stage renal disease undergoing dialysis via a left arm graft, atrial fibrillation on Coumadin therapy, hypertension, coronary artery disease with stent to the circumflex that developed pain, redness, swelling in the left arm graft site about 5 days ago and did not go to dialysis and stopped taking all of his medications because he felt so poorly.  He has had fevers and chills.  He presents with the symptoms and here has a grossly infected site.  His creatinine is 8.8 with a potassium 3.8.  Vascular surgery has been consulted as has nephrology.  Attempt will be made at a temporary dialysis catheter until blood cultures are cleared for a possible tunneled catheter.  He states he has had at least 6 or 7 prior neck catheters in the past.  Patient was started on IV vancomycin.  Blood culture was positive for E faecalis.  Patient underwent a RENEE that revealed aortic valve infective endocarditis as well as severe aortic insufficiency.  CT surgery was consulted and patient underwent aortic valve replacement, CABG x 1 and TAHIR resection and ligation on 6/30/2024.    Interval Problem Update  7/3 Patient seen sitting in chair, breathing well on room air. He reports some chest wall pain.  His chest tubes and pacing wires have been removed.  His chest wound appears clean dry and intact.  Left arm appears slightly swollen. His blood cultures from 7/1 have been negative.  His white count is elevated at 14.8.  Continue IV ampicillin and IV ceftriaxone.  Case discussed with ID    7/4 patient seen and examined at bedside.  He continues to report chest wall pain.   He attempted to work with physical therapy and reported some shortness of breath after walking 50 feet.  Blood cultures from 7/1 remain negative.  Plan is for permacath placement.  Patient will need IV daptomycin on discharge with stop date of 8/11/2024.  I discussed the plan of care with patient and infectious disease    7/5 patient underwent permacath placement.  Continue HD. PMR consulted for IPR evaluation. Case discussed with CT surgery, okay to DC Plavix and restarted on warfarin for history of A-fib.    7/6 No acute events. Patient sitting in chair, does not appear to be in any distress. He has been hypertensive, started on hydralazine 25 mg TID. Plan for DC to SNF. Awaiting placement     7/7 Patient noted to have blue toes and cool right foot.  I ordered a stat CTA of RLE with IV contrast that showed occlusion of right common femoral to distal superficial femoral artery bypass graft, moderate stenosis of tibioperoneal trunk.  Occlusion of the proximal right anterior tibial artery and likely occlusion of the distal posterior tibial and peroneal arteries.  I discussed the findings with vascular surgery Dr. Tinoco who believes these findings are chronic and does not require any urgent surgical intervention.  Will continue warfarin and aspirin.  INR subtherapeutic therefore we will bridge with IV heparin.  Monitor xa    7/8 patient reports worsening right lower extremity pain up to his knee.  His toes appear more dusky.  I have consulted and discussed the case with vascular surgery Dr. Blanchard who will evaluate the patient and recommended ordering a stat SUSI which I have ordered.  Continue aspirin and IV heparin, monitor Xa.  Patient also noted to have left upper extremity swelling.  Have ordered left upper extremity ultrasound for further evaluation    I have discussed this patient's plan of care and discharge plan at IDT rounds today with Case Management, Nursing, Nursing leadership, and other members of the  IDT team.    Consultants/Specialty  cardiovascular surgeon, infectious disease, and vascular surgery    Code Status  Full Code    Disposition  The patient is not medically cleared for discharge to home or a post-acute facility.      I have placed the appropriate orders for post-discharge needs.    Review of Systems  Review of Systems   Constitutional:  Positive for malaise/fatigue.   Cardiovascular:  Positive for leg swelling. Negative for chest pain.   Musculoskeletal:  Positive for joint pain.        Physical Exam  Temp:  [36.3 °C (97.3 °F)-36.4 °C (97.5 °F)] 36.4 °C (97.5 °F)  Pulse:  [59-72] 60  Resp:  [18-19] 18  BP: (116-179)/(45-80) 136/73  SpO2:  [93 %] 93 %    Physical Exam  Vitals and nursing note reviewed.   Constitutional:       General: He is not in acute distress.     Appearance: He is ill-appearing.   HENT:      Head: Normocephalic.      Mouth/Throat:      Mouth: Mucous membranes are moist.   Eyes:      Pupils: Pupils are equal, round, and reactive to light.   Cardiovascular:      Rate and Rhythm: Normal rate and regular rhythm.      Pulses: Normal pulses.      Heart sounds: Normal heart sounds.      Comments: Sternal incision clean dry and intact  Pulmonary:      Breath sounds: Rales present.   Abdominal:      Palpations: Abdomen is soft.      Tenderness: There is no abdominal tenderness.   Musculoskeletal:         General: Swelling (LUE) present.      Cervical back: Neck supple.      Right lower leg: Edema present.      Left lower leg: Edema present.      Comments: Left arm swollen   Skin:     General: Skin is warm.      Coloration: Skin is not jaundiced.      Comments: Dusky appearing toes right greater than left   Neurological:      General: No focal deficit present.      Mental Status: He is alert and oriented to person, place, and time.   Psychiatric:         Mood and Affect: Mood normal.         Behavior: Behavior normal.         Fluids    Intake/Output Summary (Last 24 hours) at 7/8/2024  1715  Last data filed at 7/8/2024 1410  Gross per 24 hour   Intake 1100 ml   Output 3650 ml   Net -2550 ml       Laboratory  Recent Labs     07/06/24  0019 07/07/24  0450 07/08/24 0315   WBC 13.7* 13.2* 16.6*   RBC 3.06* 3.14* 3.13*   HEMOGLOBIN 9.5* 9.5* 9.8*   HEMATOCRIT 28.8* 29.8* 29.9*   MCV 94.1 94.9 95.5   MCH 31.0 30.3 31.3   MCHC 33.0 31.9* 32.8   RDW 56.8* 63.8* 68.2*   PLATELETCT 223 230 262   MPV 10.6 9.9 10.1     Recent Labs     07/06/24  0019 07/07/24 0450 07/08/24 0314   SODIUM 136 135 134*   POTASSIUM 3.5* 3.5* 3.7   CHLORIDE 98 96 97   CO2 24 24 21   GLUCOSE 101* 98 99   BUN 23* 32* 38*   CREATININE 3.76* 5.21* 6.11*   CALCIUM 8.4* 8.2* 8.2*     Recent Labs     07/07/24  0450 07/07/24  1402 07/08/24 0314   APTT  --  40.2*  --    INR 1.36* 1.50* 1.91*                 Imaging  US-EXTREMITY ARTERY UPPER BILAT         CT-CTA LOWER EXT WITH & W/O-POST PROCESS RIGHT   Final Result      1.  There appears to be occlusion of right common femoral to distal superficial femoral artery bypass graft.   2.  At least moderate stenosis of the tibioperoneal trunk. Occlusion of the proximal right anterior tibial artery and likely occlusion of the distal posterior tibial and peroneal arteries.   3.  Diffuse lower extremity edema. Correlate for cellulitis/infection.   4.  Subacute appearing left superior and inferior pubic rami fractures.      IR-BRADLEYGROSIXTO PLACEMENT >5   Final Result      1. ULTRASOUND AND FLUOROSCOPIC GUIDED PLACEMENT OF A Right INTERNAL JUGULAR 14.5 Maced[place] 23 cm length [employer] TUNNELED DIALYSIS CATHETER.      2. THE HEMODIALYSIS CATHETER MAY BE USED IMMEDIATELY AS CLINICALLY INDICATED. FLUSHES PER PROTOCOL.      DX-CHEST-PORTABLE (1 VIEW)   Final Result      Likely developing mild central vascular congestion/edema. Small left and trace right pleural effusions are now seen      DX-CHEST-PORTABLE (1 VIEW)   Final Result         1.  Pulmonary edema and/or infiltrates are identified, which  are stable since the prior exam.   2.  Trace left pleural effusion   3.  Cardiomegaly      DX-CHEST-LIMITED (1 VIEW)   Final Result         1.  Left IJ catheter, tip projects over the brachiocephalic vein/aorta region. This is short in position and does not extend into the SVC. Need to correlate clinically for venous blood return to exclude arterial line.   2.  Other findings are detailed above.      EC-RENEE W/O CONT   Final Result      DX-CHEST-2 VIEWS   Final Result         1.  No focal infiltrates.   2.  Perihilar interstitial prominence and bronchial wall cuffing, appearance suggests changes of underlying bronchial inflammation, consider bronchitis.      US-VEIN MAPPING LOWER EXTREMITY BILAT   Final Result      US-CAROTID DOPPLER UNILAT LEFT   Final Result      EC-RENEE W/O CONT   Final Result      EC-ECHOCARDIOGRAM COMPLETE W/O CONT   Final Result      US-SUSI SINGLE LEVEL BILAT   Final Result      US-EXTREMITY ARTERY LOWER BILAT   Final Result      DX-CHEST-PORTABLE (1 VIEW)   Final Result      1.  Satisfactory appearance of the right IJ catheter. No pneumothorax visualized.      US-HEMODIALYSIS GRAFT DUPLEX COMP UPPER EXTREMITY   Final Result      US-HEMODIALYSIS GRAFT DUPLEX COMP UPPER EXTREMITY   Final Result      DX-CHEST-PORTABLE (1 VIEW)   Final Result      Mild enlargement of the cardiomediastinal silhouette without acute cardiopulmonary abnormality.      CL-LEFT HEART CATHETERIZATION WITH POSSIBLE INTERVENTION    (Results Pending)   US-EXTREMITY VENOUS UPPER UNILAT LEFT    (Results Pending)   US-EXTREMITY ARTERY LOWER UNILAT RIGHT    (Results Pending)        Assessment/Plan  * Infected prosthetic vascular graft, initial encounter ([facility])- (present on admission)  Assessment & Plan  Vascular surgery consulted.   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 Left greater saphenous vein harvest. Reconstruction of left brachial artery using left  greater saphenous vein   interposition graft. Partial removal of left upper arm dialysis graft.      S/P AVR (aortic valve replacement)- (present on admission)  Assessment & Plan  Developed aortic valve endocarditis  Status post aortic valve replacement    Bacteremia- (present on admission)  Assessment & Plan  Probably related to infected graft, status post removal.  Blood cultures positive for Enterococcus  RENEE revealed aortic valve endocarditis  Patient is status post aortic valve replacement  Repeat blood cultures on 7/1 have been negative  Continue IV ampicillin and IV ceftriaxone while inpatient with plans to switch to IV dapto on discharge with stop date of 8/11/24      Coronary artery disease- (present on admission)  Assessment & Plan  Followed by cardiology in [place] with history of stent to the circumflex  S/p CABG X1 WITH SEKELTONIZED LIMA to LAD  Continue aspirin and atorvastatin    PAD (peripheral artery disease) ([facility])- (present on admission)  Assessment & Plan  Hx of previous fem-pop bypass  CTA of RLE with IV contrast that showed occlusion of right common femoral to distal superficial femoral artery bypass graft, moderate stenosis of tibioperoneal trunk.  Occlusion of the proximal right anterior tibial artery and likely occlusion of the distal posterior tibial and peroneal arteries.  I discussed the findings with vascular surgery Dr. Tinoco who believes these findings are chronic and does not require any urgent surgical intervention.  Will continue warfarin and aspirin.  INR subtherapeutic therefore we will bridge with IV heparin.  Monitor xa  Vascular surgery Dr Blanchard has been consulted    Chronic atrial fibrillation (HCC)- (present on admission)  Assessment & Plan  He had been on coumadin but hasn't been taking it  Continue amiodarone and metoprolol  Restart warfarin, monitor INR with goal 2-3. Bridging with IV heparin, monitor Xa.       End stage renal disease (HCC)- (present on  admission)  Assessment & Plan  Nephrology consulted  Continue HD   S/p perma cath placement     Renovascular hypertension- (present on admission)  Assessment & Plan  Uncontrolled   Continue coreg, valsartan and amlodipine  Started on hydralazine          VTE prophylaxis: warfarin    I have performed a physical exam and reviewed and updated ROS and Plan today (7/8/2024). In review of yesterday's note (7/7/2024), there are no changes except as documented above.    I spent 53 minutes, reviewing the chart, obtaining and/or reviewing separately obtained history. Performing a medically appropriate examination and evaluation.  Counseling and educating the patient. Ordering and reviewing medications, tests, or procedures.  Discussing the case with vascular surgery.  Documenting clinical information in EPIC. Independently interpreting results and communicating results to patient. Discussing future disposition of care with patient, RN and case management.

## 2024-07-09 NOTE — DIETARY
Nutrition Update: Follow-up for interview w/ pt  Day 15 of admit.  Brian Jasmine is a 68 y.o. male with admitting DX of Infected prosthetic vascular graft, initial encounter ([facility]) [T82.7XXA].    Current Diet: Cardiac     Per ADLs, PO variable from 0% to % w/ avg of 57%.    Problem: Nutritional:  Goal: Achieve adequate nutritional intake  Description: Patient will consume >50% of meals  Outcome: Met    Visited pt at bedside. Pt said that his UBW is 100-103kg and that he has not lost wt recently. He did mention that he lost 20lbs a couple of months ago d/t not feeling like eating. Pt expressed his food preferences.    Pt consented to nutrition focused physical exam (NFPE). RD palpated temporalis muscle, buccal fat pads, acromion process/clavicle area, and upper arm area. Pt w/ no significant muscle wasting and no significant fat loss.    Evaluation:   Skin: Has generalized RUE edema, 2+ pitting RLE edema, 3+ generalized LUE edema, and 2+ pitting edema. Note that pt has CHF in non hospital problem list.  Per chart review, pt has not had taken/documented wt in </= 1 year  Wt Readings from Last Encounters:   07/09/24 101 kg (223 lb 5.2 oz)   09/16/20 114 kg (252 lb)     Malnutrition Risk: Per RD judgement, does not meet ASPEN criteria at this time.     Recommendations/Plan:  RD to adjust meals per pt preference while adhering to cardiac diet.   Encourage intake of >50% of meals  Document intake of all meals as % taken in ADL's to provide interdisciplinary communication across all shifts.   Monitor weight.  Nutrition rep will continue to see patient for ongoing meal and snack preferences.     RD available PRN per department policy.

## 2024-07-09 NOTE — PROGRESS NOTES
Us of lower right extremity resulted, provider Lady Devries was contacted and contacted the charge RN to have doppler performed on the limb as the result showed complete occulusion of the fem pop bypass. Right pedal pulse not detected by doppler, post tibial pulse found with doppler. Vascular surgery was consulted. No further orders at this time.

## 2024-07-09 NOTE — PROGRESS NOTES
4 Eyes Skin Assessment Completed by DIALLO Gaines and DIALLO Calvillo.    Head WDL  Ears WDL  Nose Scab  Mouth WDL  Neck WDL  Breast/Chest Redness, Discoloration, and Incision, midline incision  Shoulder Blades WDL  Spine WDL  (R) Arm/Elbow/Hand Redness, Blanching, Bruising, and Scab  (L) Arm/Elbow/Hand Redness, Blanching, Bruising, Swelling, and Edema, incision with sutures on upper arm  Abdomen Incision, CT sites  Groin Bruising and Incision, staples on groin site  Scrotum/Coccyx/Buttocks Redness and Blanching  (R) Leg Non-Blanching, Bruising, Swelling, and Edema, cold and pale, Scabs  (L) Leg Non-Blanching, Scab, Bruising, Swelling, and Edema, cold and pale  (R) Heel/Foot/Toe Redness, Non-Blanching, Discoloration, Bruising, Swelling, and Edema, black toes, cold foot  (L) Heel/Foot/Toe Redness, Non-Blanching, Discoloration, Bruising, Swelling, and Edema, cold foot, black and purple toes          Devices In Places Tele Box, Pulse Ox, and Central Line      Interventions In Place Pillows, Dri-Bonifacio Pads, Heels Loaded W/Pillows, and Pressure Redistribution Mattress    Possible Skin Injury Yes    Pictures Uploaded Into Epic Yes  Wound Consult Placed Yes  RN Wound Prevention Protocol Ordered Yes

## 2024-07-09 NOTE — CARE PLAN
The patient is Watcher - Medium risk of patient condition declining or worsening    Shift Goals  Clinical Goals: Monitor vitals, labs  Patient Goals: Rest  Family Goals: NESTOR    Progress made toward(s) clinical / shift goals:        Problem: Pain - Standard  Goal: Alleviation of pain or a reduction in pain to the patient’s comfort goal  Outcome: Progressing  Flowsheets (Taken 7/8/2024 6930)  Pain Rating Scale (NPRS): 8  Note: Pain assessed using the 0-10 pain scale. Pt experiencing 8/10 pain in Feet. Pt educated on non-pharmacological interventions including repositioning, distraction and heat/cold packs.       Problem: Knowledge Deficit - Standard  Goal: Patient and family/care givers will demonstrate understanding of plan of care, disease process/condition, diagnostic tests and medications  Outcome: Progressing  Note: Pt educated on POC and disease processes. Pt verbalized understanding of medication regimen and interventions.      Problem: Fall Risk  Goal: Patient will remain free from falls  Outcome: Progressing  Note: Pt educated on importance of calling nurse before getting up. Fall precautions in place. Bed locked in lowest position. Call light and belongings within reach. Wristband and proper sign placed. Bed alarm on. No skid socks applied. Room free of clutter.

## 2024-07-09 NOTE — PROGRESS NOTES
[facility] -  PROGRESS NOTE               Author: JASKARAN Bernard Date & Time: 7/9/2024  12:48 PM     HPI:  68-year-old  male with biventricular history of ESRD on hemodialysis q. Monday Wednesday Friday at [facility] through left upper arm AV fistula that was placed about 2 to 3 years ago.  He presented to the ER with left arm pain and swelling for the last 5 days.  His last dialysis was on Wednesday and had no dialysis in the last 5 days.  He felt sick on Friday and missed his dialysis.  Ultrasound of the left upper arm AV fistula showed there is no flow in the AV fistula and it has thrombosed.  A temporary right IJ dialysis catheter was placed in the ER.  Nephrology has been consulted for management of dialysis.  Patient denies any fever or chills, no nausea or vomiting, no chest pain.  He has some shortness of breath but is able to talk in full sentences and is in no acute respiratory distress at this time. No melena, hematochezia, hematemesis.  No HA, visual changes, or abdominal pain.     NEPHROLOGY DAILY PROGRESS:   6/24: consult note  6/25:  Afebrile, HD last night without any complications, plans noted for OR today with Dr. Tinoco for vascualr surgery   6/26: Underwent AVG repair yesterday with Dr. Tinoco, has LAMBERT drain, BP noted low this AM on dialysis, says BP 's run low on dialysis - not on Midodrine, ordered Midodrine to be used on dialysis   6/27: 0 Net UF with HD yesterday, limited by hypotension. Sitting up at side of bed eating breakfast. Bps improved 130s systolic.   6/28: HD today, seen  6/29: s/p HD yesterday over temp HD line. Tolerated well, no complaints.  6/30: in OR, not available   7/1: s/p AVR, 1V CABG and TAHIR resection/ligation, transferred to ICU, now off pressors, fatigued, post-op pain  7/2: Stable hemodynamics, tolerated HD, transferred out of ICU, chest wall pain improved  7/3: Stable hemodynamics, 7/1 BC remain negative, denies  "complaints   7/4: No acute events, tolerated HD, chest wall pain improved, feeling better overall  7/5: No acute events, no chest pain or SOB. S/p permacath placement this AM  7/6: No acute events, hypertensive, denies complaints  7/7: No acute events, feels at baseline, no chest pain or SOB, Diminsed pulses RLE with discoloration-pending CTA  7/8: patient is sitting in a chair, doing well, just came back from dialysis, tolerated well, net UF 3000 ml  7/9: s/p iHD yesterday, no new issues reported    REVIEW OF SYSTEMS:    10 Point ROS performed, negative other than stated above    PMH/PSH/SH/FH:   Reviewed and unchanged since admission note    CURRENT MEDICATIONS:   Reviewed from admission to present day    VS:  VS:  /58   Pulse 65   Temp 36.7 °C (98.1 °F) (Temporal)   Resp 16   Ht 1.854 m (6' 0.99\")   Wt 101 kg (223 lb 5.2 oz)   SpO2 95%   BMI 29.47 kg/m²   GENERAL: no acute distress  CV: +edema  RESP: non-labored  GI: Soft  MSK: No joint deformities   SKIN: No concerning rashes  NEURO: AOx3  PSYCH: Cooperative    Fluids:  In: 1004 [P.O.:504; Dialysis:500]  Out: 4000     LABS:  Recent Labs     07/07/24  0450 07/08/24  0314 07/09/24  0247   SODIUM 135 134* 134*   POTASSIUM 3.5* 3.7 3.6   CHLORIDE 96 97 98   CO2 24 21 22   GLUCOSE 98 99 94   BUN 32* 38* 26*   CREATININE 5.21* 6.11* 4.46*   CALCIUM 8.2* 8.2* 8.5       IMAGING:   All imaging reviewed from admission to present day    IMPRESSION:  # ESRD  -Q. MWF at [facility]  # Bacteremia   - Blood Cx from 6/24, 6/27 + E. Faecalis  # Bacterial endocarditis:   -s/p aortic valve replacement 6/30  3 CAD  -s/p 1V CABG 6/30  #Thrombosed left upper arm AVG; infected pseudoaneurysm   -Right IJ temporary HD catheter placed in ER 6/24/2024  - S/P AVG repair, resection and pseudoaneurysm resection 6/25/24 by Dr. Tinoco along with  Reconstruction of left brachial artery using left greater saphenous vein interposition graft.  -permacath placed 7/5  # HTN  # " Sepsis   # Anemia of CKD  # CKD-MBD  -phos at goal  # Coronary artery disease  # Chronic A-fib  -s/p TAHIR resection/ligation        PLAN:  - continue q MWF schedule   - [place] UF  - Using permacath until graft cleared for use by vascular  - EPO with HD  - Hydral Increased  - No dietary protein restrictions  - abx per primary  - Dose all meds per ESRD    Thank you

## 2024-07-09 NOTE — PROGRESS NOTES
Bedside report received from off going RN/tech: Leonel, assumed care of patient.     Pt A/O 4, doppler pulses assessed of RLE. No pulses heard of pedal, 1+ pulse hear with doppler interior tibial.   Fall Risk Score: MODERATE RISK  Fall risk interventions in place: Place yellow fall risk ID band on patient, Provide patient/family education based on risk assessment, Educate patient/family to call staff for assistance when getting out of bed, Place fall precaution signage outside patient door, Place patient in room close to nursing station, and Utilize bed/chair fall alarm  Bed type: Regular (Gary Score less than 17 interventions in place)  Patient on cardiac monitor: Yes  IVF/IV medications: Infusion per MAR (List Med(s)) hep gtt  Oxygen: Room Air  Bedside sitter: Not Applicable   Isolation: Not applicable

## 2024-07-10 ENCOUNTER — ANESTHESIA EVENT (OUTPATIENT)
Dept: SURGERY | Facility: MEDICAL CENTER | Age: 69
DRG: 216 | End: 2024-07-10
Payer: MEDICARE

## 2024-07-10 ENCOUNTER — APPOINTMENT (OUTPATIENT)
Dept: RADIOLOGY | Facility: MEDICAL CENTER | Age: 69
DRG: 216 | End: 2024-07-10
Attending: STUDENT IN AN ORGANIZED HEALTH CARE EDUCATION/TRAINING PROGRAM
Payer: MEDICARE

## 2024-07-10 ENCOUNTER — APPOINTMENT (OUTPATIENT)
Dept: RADIOLOGY | Facility: MEDICAL CENTER | Age: 69
DRG: 216 | End: 2024-07-10
Attending: SURGERY
Payer: MEDICARE

## 2024-07-10 ENCOUNTER — ANESTHESIA (OUTPATIENT)
Dept: SURGERY | Facility: MEDICAL CENTER | Age: 69
DRG: 216 | End: 2024-07-10
Payer: MEDICARE

## 2024-07-10 LAB
ALBUMIN SERPL BCP-MCNC: 2.6 G/DL (ref 3.2–4.9)
ANION GAP SERPL CALC-SCNC: 17 MMOL/L (ref 7–16)
APTT PPP: >240 SEC (ref 24.7–36)
BUN SERPL-MCNC: 32 MG/DL (ref 8–22)
CALCIUM ALBUM COR SERPL-MCNC: 9.5 MG/DL (ref 8.5–10.5)
CALCIUM SERPL-MCNC: 8.4 MG/DL (ref 8.5–10.5)
CHLORIDE SERPL-SCNC: 96 MMOL/L (ref 96–112)
CO2 SERPL-SCNC: 20 MMOL/L (ref 20–33)
CREAT SERPL-MCNC: 5.8 MG/DL (ref 0.5–1.4)
ERYTHROCYTE [DISTWIDTH] IN BLOOD BY AUTOMATED COUNT: 72.8 FL (ref 35.9–50)
GFR SERPLBLD CREATININE-BSD FMLA CKD-EPI: 10 ML/MIN/1.73 M 2
GLUCOSE SERPL-MCNC: 118 MG/DL (ref 65–99)
HCT VFR BLD AUTO: 31.3 % (ref 42–52)
HGB BLD-MCNC: 9.8 G/DL (ref 14–18)
INR PPP: 3.2 (ref 0.87–1.13)
INR PPP: 5.31 (ref 0.87–1.13)
MCH RBC QN AUTO: 31.1 PG (ref 27–33)
MCHC RBC AUTO-ENTMCNC: 31.3 G/DL (ref 32.3–36.5)
MCV RBC AUTO: 99.4 FL (ref 81.4–97.8)
PHOSPHATE SERPL-MCNC: 6.2 MG/DL (ref 2.5–4.5)
PLATELET # BLD AUTO: 282 K/UL (ref 164–446)
PMV BLD AUTO: 9.4 FL (ref 9–12.9)
POTASSIUM SERPL-SCNC: 3.7 MMOL/L (ref 3.6–5.5)
PROTHROMBIN TIME: 33.3 SEC (ref 12–14.6)
PROTHROMBIN TIME: 49.5 SEC (ref 12–14.6)
RBC # BLD AUTO: 3.15 M/UL (ref 4.7–6.1)
SODIUM SERPL-SCNC: 133 MMOL/L (ref 135–145)
UFH PPP CHRO-ACNC: <0.1 IU/ML
UFH PPP CHRO-ACNC: >1.1 IU/ML
WBC # BLD AUTO: 15.2 K/UL (ref 4.8–10.8)

## 2024-07-10 PROCEDURE — C1769 GUIDE WIRE: HCPCS | Performed by: SURGERY

## 2024-07-10 PROCEDURE — 700101 HCHG RX REV CODE 250: Performed by: SURGERY

## 2024-07-10 PROCEDURE — C1876 STENT, NON-COA/NON-COV W/DEL: HCPCS | Performed by: SURGERY

## 2024-07-10 PROCEDURE — 700105 HCHG RX REV CODE 258: Performed by: STUDENT IN AN ORGANIZED HEALTH CARE EDUCATION/TRAINING PROGRAM

## 2024-07-10 PROCEDURE — C1751 CATH, INF, PER/CENT/MIDLINE: HCPCS | Performed by: SURGERY

## 2024-07-10 PROCEDURE — 85027 COMPLETE CBC AUTOMATED: CPT

## 2024-07-10 PROCEDURE — 700102 HCHG RX REV CODE 250 W/ 637 OVERRIDE(OP): Performed by: INTERNAL MEDICINE

## 2024-07-10 PROCEDURE — 502000 HCHG MISC OR IMPLANTS RC 0278: Performed by: SURGERY

## 2024-07-10 PROCEDURE — 90935 HEMODIALYSIS ONE EVALUATION: CPT

## 2024-07-10 PROCEDURE — 700101 HCHG RX REV CODE 250: Performed by: INTERNAL MEDICINE

## 2024-07-10 PROCEDURE — 160035 HCHG PACU - 1ST 60 MINS PHASE I: Performed by: SURGERY

## 2024-07-10 PROCEDURE — 80069 RENAL FUNCTION PANEL: CPT

## 2024-07-10 PROCEDURE — C1874 STENT, COATED/COV W/DEL SYS: HCPCS | Performed by: SURGERY

## 2024-07-10 PROCEDURE — 700105 HCHG RX REV CODE 258: Performed by: SURGERY

## 2024-07-10 PROCEDURE — 160041 HCHG SURGERY MINUTES - EA ADDL 1 MIN LEVEL 4: Performed by: SURGERY

## 2024-07-10 PROCEDURE — A9270 NON-COVERED ITEM OR SERVICE: HCPCS | Performed by: NURSE PRACTITIONER

## 2024-07-10 PROCEDURE — 110454 HCHG SHELL REV 250: Performed by: SURGERY

## 2024-07-10 PROCEDURE — 85730 THROMBOPLASTIN TIME PARTIAL: CPT

## 2024-07-10 PROCEDURE — 700111 HCHG RX REV CODE 636 W/ 250 OVERRIDE (IP): Performed by: INTERNAL MEDICINE

## 2024-07-10 PROCEDURE — 160029 HCHG SURGERY MINUTES - 1ST 30 MINS LEVEL 4: Performed by: SURGERY

## 2024-07-10 PROCEDURE — 85520 HEPARIN ASSAY: CPT

## 2024-07-10 PROCEDURE — A9270 NON-COVERED ITEM OR SERVICE: HCPCS | Performed by: INTERNAL MEDICINE

## 2024-07-10 PROCEDURE — 700102 HCHG RX REV CODE 250 W/ 637 OVERRIDE(OP): Performed by: NURSE PRACTITIONER

## 2024-07-10 PROCEDURE — A9270 NON-COVERED ITEM OR SERVICE: HCPCS

## 2024-07-10 PROCEDURE — C1725 CATH, TRANSLUMIN NON-LASER: HCPCS | Performed by: SURGERY

## 2024-07-10 PROCEDURE — 770020 HCHG ROOM/CARE - TELE (206)

## 2024-07-10 PROCEDURE — 36415 COLL VENOUS BLD VENIPUNCTURE: CPT

## 2024-07-10 PROCEDURE — 160036 HCHG PACU - EA ADDL 30 MINS PHASE I: Performed by: SURGERY

## 2024-07-10 PROCEDURE — 71045 X-RAY EXAM CHEST 1 VIEW: CPT

## 2024-07-10 PROCEDURE — 700111 HCHG RX REV CODE 636 W/ 250 OVERRIDE (IP): Mod: JZ,JG

## 2024-07-10 PROCEDURE — 700105 HCHG RX REV CODE 258: Performed by: INTERNAL MEDICINE

## 2024-07-10 PROCEDURE — 502240 HCHG MISC OR SUPPLY RC 0272: Performed by: SURGERY

## 2024-07-10 PROCEDURE — 85610 PROTHROMBIN TIME: CPT

## 2024-07-10 PROCEDURE — 700111 HCHG RX REV CODE 636 W/ 250 OVERRIDE (IP): Performed by: STUDENT IN AN ORGANIZED HEALTH CARE EDUCATION/TRAINING PROGRAM

## 2024-07-10 PROCEDURE — 34201 REMOVAL OF ARTERY CLOT: CPT | Mod: RT | Performed by: SURGERY

## 2024-07-10 PROCEDURE — 700102 HCHG RX REV CODE 250 W/ 637 OVERRIDE(OP)

## 2024-07-10 PROCEDURE — 99233 SBSQ HOSP IP/OBS HIGH 50: CPT | Performed by: INTERNAL MEDICINE

## 2024-07-10 PROCEDURE — 04CK0ZZ EXTIRPATION OF MATTER FROM RIGHT FEMORAL ARTERY, OPEN APPROACH: ICD-10-PCS | Performed by: SURGERY

## 2024-07-10 PROCEDURE — 700101 HCHG RX REV CODE 250: Performed by: STUDENT IN AN ORGANIZED HEALTH CARE EDUCATION/TRAINING PROGRAM

## 2024-07-10 PROCEDURE — 700117 HCHG RX CONTRAST REV CODE 255: Performed by: SURGERY

## 2024-07-10 PROCEDURE — 160002 HCHG RECOVERY MINUTES (STAT): Performed by: SURGERY

## 2024-07-10 PROCEDURE — 99418 PROLNG IP/OBS E/M EA 15 MIN: CPT | Performed by: INTERNAL MEDICINE

## 2024-07-10 PROCEDURE — 75710 ARTERY X-RAYS ARM/LEG: CPT | Mod: 26,59,RT | Performed by: SURGERY

## 2024-07-10 PROCEDURE — 047K0ZZ DILATION OF RIGHT FEMORAL ARTERY, OPEN APPROACH: ICD-10-PCS | Performed by: SURGERY

## 2024-07-10 PROCEDURE — 700111 HCHG RX REV CODE 636 W/ 250 OVERRIDE (IP): Performed by: SURGERY

## 2024-07-10 PROCEDURE — 160048 HCHG OR STATISTICAL LEVEL 1-5: Performed by: SURGERY

## 2024-07-10 PROCEDURE — 160009 HCHG ANES TIME/MIN: Performed by: SURGERY

## 2024-07-10 PROCEDURE — 37226 PR REVASCULARIZE FEM/POP ARTERY,ANGIOPLASTY/*: CPT | Mod: RT | Performed by: SURGERY

## 2024-07-10 PROCEDURE — C1894 INTRO/SHEATH, NON-LASER: HCPCS | Performed by: SURGERY

## 2024-07-10 PROCEDURE — 75774 ARTERY X-RAY EACH VESSEL: CPT | Mod: RT

## 2024-07-10 DEVICE — IMPLANTABLE DEVICE: Type: IMPLANTABLE DEVICE | Site: LEG | Status: FUNCTIONAL

## 2024-07-10 RX ORDER — DIPHENHYDRAMINE HYDROCHLORIDE 50 MG/ML
12.5 INJECTION INTRAMUSCULAR; INTRAVENOUS
Status: DISCONTINUED | OUTPATIENT
Start: 2024-07-10 | End: 2024-07-10 | Stop reason: HOSPADM

## 2024-07-10 RX ORDER — HYDROMORPHONE HYDROCHLORIDE 1 MG/ML
0.4 INJECTION, SOLUTION INTRAMUSCULAR; INTRAVENOUS; SUBCUTANEOUS
Status: DISCONTINUED | OUTPATIENT
Start: 2024-07-10 | End: 2024-07-10 | Stop reason: HOSPADM

## 2024-07-10 RX ORDER — ONDANSETRON 2 MG/ML
4 INJECTION INTRAMUSCULAR; INTRAVENOUS
Status: DISCONTINUED | OUTPATIENT
Start: 2024-07-10 | End: 2024-07-10 | Stop reason: HOSPADM

## 2024-07-10 RX ORDER — OXYCODONE HCL 5 MG/5 ML
10 SOLUTION, ORAL ORAL
Status: DISCONTINUED | OUTPATIENT
Start: 2024-07-10 | End: 2024-07-10 | Stop reason: HOSPADM

## 2024-07-10 RX ORDER — WARFARIN SODIUM 2.5 MG/1
2.5 TABLET ORAL DAILY
Status: DISCONTINUED | OUTPATIENT
Start: 2024-07-10 | End: 2024-07-10

## 2024-07-10 RX ORDER — HEPARIN SODIUM 1000 [USP'U]/ML
INJECTION, SOLUTION INTRAVENOUS; SUBCUTANEOUS PRN
Status: DISCONTINUED | OUTPATIENT
Start: 2024-07-10 | End: 2024-07-10 | Stop reason: SURG

## 2024-07-10 RX ORDER — EPHEDRINE SULFATE 50 MG/ML
INJECTION, SOLUTION INTRAVENOUS PRN
Status: DISCONTINUED | OUTPATIENT
Start: 2024-07-10 | End: 2024-07-10 | Stop reason: SURG

## 2024-07-10 RX ORDER — OXYCODONE HCL 5 MG/5 ML
5 SOLUTION, ORAL ORAL
Status: DISCONTINUED | OUTPATIENT
Start: 2024-07-10 | End: 2024-07-10 | Stop reason: HOSPADM

## 2024-07-10 RX ORDER — BUPIVACAINE HYDROCHLORIDE AND EPINEPHRINE 5; 5 MG/ML; UG/ML
INJECTION, SOLUTION PERINEURAL
Status: DISCONTINUED | OUTPATIENT
Start: 2024-07-10 | End: 2024-07-10 | Stop reason: HOSPADM

## 2024-07-10 RX ORDER — CEFAZOLIN SODIUM 1 G/3ML
INJECTION, POWDER, FOR SOLUTION INTRAMUSCULAR; INTRAVENOUS PRN
Status: DISCONTINUED | OUTPATIENT
Start: 2024-07-10 | End: 2024-07-10 | Stop reason: SURG

## 2024-07-10 RX ORDER — HYDROMORPHONE HYDROCHLORIDE 1 MG/ML
0.1 INJECTION, SOLUTION INTRAMUSCULAR; INTRAVENOUS; SUBCUTANEOUS
Status: DISCONTINUED | OUTPATIENT
Start: 2024-07-10 | End: 2024-07-10 | Stop reason: HOSPADM

## 2024-07-10 RX ORDER — ONDANSETRON 2 MG/ML
INJECTION INTRAMUSCULAR; INTRAVENOUS PRN
Status: DISCONTINUED | OUTPATIENT
Start: 2024-07-10 | End: 2024-07-10 | Stop reason: SURG

## 2024-07-10 RX ORDER — LIDOCAINE HYDROCHLORIDE 40 MG/ML
SOLUTION TOPICAL PRN
Status: DISCONTINUED | OUTPATIENT
Start: 2024-07-10 | End: 2024-07-10 | Stop reason: SURG

## 2024-07-10 RX ORDER — HEPARIN SODIUM 5000 [USP'U]/100ML
0-30 INJECTION, SOLUTION INTRAVENOUS CONTINUOUS
Status: DISCONTINUED | OUTPATIENT
Start: 2024-07-10 | End: 2024-07-10

## 2024-07-10 RX ORDER — PHENYLEPHRINE HCL IN 0.9% NACL 1 MG/10 ML
SYRINGE (ML) INTRAVENOUS PRN
Status: DISCONTINUED | OUTPATIENT
Start: 2024-07-10 | End: 2024-07-10 | Stop reason: SURG

## 2024-07-10 RX ORDER — HYDROMORPHONE HYDROCHLORIDE 1 MG/ML
0.2 INJECTION, SOLUTION INTRAMUSCULAR; INTRAVENOUS; SUBCUTANEOUS
Status: DISCONTINUED | OUTPATIENT
Start: 2024-07-10 | End: 2024-07-10 | Stop reason: HOSPADM

## 2024-07-10 RX ORDER — SODIUM CHLORIDE 9 MG/ML
INJECTION, SOLUTION INTRAVENOUS
Status: COMPLETED | OUTPATIENT
Start: 2024-07-10 | End: 2024-07-10

## 2024-07-10 RX ORDER — ROCURONIUM BROMIDE 10 MG/ML
INJECTION, SOLUTION INTRAVENOUS PRN
Status: DISCONTINUED | OUTPATIENT
Start: 2024-07-10 | End: 2024-07-10 | Stop reason: SURG

## 2024-07-10 RX ORDER — HALOPERIDOL 5 MG/ML
1 INJECTION INTRAMUSCULAR
Status: DISCONTINUED | OUTPATIENT
Start: 2024-07-10 | End: 2024-07-10 | Stop reason: HOSPADM

## 2024-07-10 RX ORDER — SODIUM CHLORIDE, SODIUM LACTATE, POTASSIUM CHLORIDE, CALCIUM CHLORIDE 600; 310; 30; 20 MG/100ML; MG/100ML; MG/100ML; MG/100ML
INJECTION, SOLUTION INTRAVENOUS CONTINUOUS
Status: DISCONTINUED | OUTPATIENT
Start: 2024-07-10 | End: 2024-07-10 | Stop reason: HOSPADM

## 2024-07-10 RX ORDER — SODIUM CHLORIDE, SODIUM LACTATE, POTASSIUM CHLORIDE, AND CALCIUM CHLORIDE .6; .31; .03; .02 G/100ML; G/100ML; G/100ML; G/100ML
500 INJECTION, SOLUTION INTRAVENOUS ONCE
Status: ACTIVE | OUTPATIENT
Start: 2024-07-11 | End: 2024-07-12

## 2024-07-10 RX ORDER — SODIUM CHLORIDE 9 MG/ML
INJECTION, SOLUTION INTRAVENOUS
Status: DISCONTINUED | OUTPATIENT
Start: 2024-07-10 | End: 2024-07-10 | Stop reason: SURG

## 2024-07-10 RX ORDER — EPHEDRINE SULFATE 50 MG/ML
5 INJECTION, SOLUTION INTRAVENOUS
Status: DISCONTINUED | OUTPATIENT
Start: 2024-07-10 | End: 2024-07-10 | Stop reason: HOSPADM

## 2024-07-10 RX ORDER — LIDOCAINE HYDROCHLORIDE 20 MG/ML
INJECTION, SOLUTION EPIDURAL; INFILTRATION; INTRACAUDAL; PERINEURAL PRN
Status: DISCONTINUED | OUTPATIENT
Start: 2024-07-10 | End: 2024-07-10 | Stop reason: SURG

## 2024-07-10 RX ORDER — HYDRALAZINE HYDROCHLORIDE 20 MG/ML
5 INJECTION INTRAMUSCULAR; INTRAVENOUS
Status: DISCONTINUED | OUTPATIENT
Start: 2024-07-10 | End: 2024-07-10 | Stop reason: HOSPADM

## 2024-07-10 RX ADMIN — NOREPINEPHRINE BITARTRATE 0.08 MCG/KG/MIN: 1 INJECTION, SOLUTION, CONCENTRATE INTRAVENOUS at 14:36

## 2024-07-10 RX ADMIN — Medication 1 APPLICATOR: at 21:24

## 2024-07-10 RX ADMIN — Medication 50 MCG: at 14:05

## 2024-07-10 RX ADMIN — HEPARIN SODIUM 1800 UNITS: 1000 INJECTION, SOLUTION INTRAVENOUS; SUBCUTANEOUS at 11:18

## 2024-07-10 RX ADMIN — HEPARIN SODIUM 9000 UNITS: 1000 INJECTION, SOLUTION INTRAVENOUS; SUBCUTANEOUS at 14:08

## 2024-07-10 RX ADMIN — ROCURONIUM BROMIDE 10 MG: 50 INJECTION, SOLUTION INTRAVENOUS at 15:03

## 2024-07-10 RX ADMIN — HYDRALAZINE HYDROCHLORIDE 50 MG: 50 TABLET ORAL at 05:19

## 2024-07-10 RX ADMIN — ATORVASTATIN CALCIUM 40 MG: 40 TABLET, FILM COATED ORAL at 21:24

## 2024-07-10 RX ADMIN — AMIODARONE HYDROCHLORIDE 400 MG: 200 TABLET ORAL at 05:18

## 2024-07-10 RX ADMIN — PROPOFOL 100 MG: 10 INJECTION, EMULSION INTRAVENOUS at 12:33

## 2024-07-10 RX ADMIN — ACETAMINOPHEN 1000 MG: 500 TABLET, FILM COATED ORAL at 05:17

## 2024-07-10 RX ADMIN — AMIODARONE HYDROCHLORIDE 400 MG: 200 TABLET ORAL at 18:51

## 2024-07-10 RX ADMIN — SODIUM CHLORIDE: 9 INJECTION, SOLUTION INTRAVENOUS at 12:54

## 2024-07-10 RX ADMIN — FENTANYL CITRATE 100 MCG: 50 INJECTION, SOLUTION INTRAMUSCULAR; INTRAVENOUS at 12:33

## 2024-07-10 RX ADMIN — EPHEDRINE SULFATE 20 MG: 50 INJECTION, SOLUTION INTRAVENOUS at 13:19

## 2024-07-10 RX ADMIN — EPOETIN ALFA-EPBX 10000 UNITS: 10000 INJECTION, SOLUTION INTRAVENOUS; SUBCUTANEOUS at 09:30

## 2024-07-10 RX ADMIN — Medication 100 MCG: at 14:27

## 2024-07-10 RX ADMIN — THERA TABS 1 TABLET: TAB at 05:20

## 2024-07-10 RX ADMIN — METOPROLOL TARTRATE 25 MG: 25 TABLET, FILM COATED ORAL at 18:51

## 2024-07-10 RX ADMIN — PROPOFOL 120 MG: 10 INJECTION, EMULSION INTRAVENOUS at 13:07

## 2024-07-10 RX ADMIN — HEPARIN SODIUM 4100 UNITS: 1000 INJECTION, SOLUTION INTRAVENOUS; SUBCUTANEOUS at 03:47

## 2024-07-10 RX ADMIN — ROCURONIUM BROMIDE 60 MG: 50 INJECTION, SOLUTION INTRAVENOUS at 13:08

## 2024-07-10 RX ADMIN — LIDOCAINE HYDROCHLORIDE 4 ML: 40 SOLUTION TOPICAL at 13:08

## 2024-07-10 RX ADMIN — CEFTRIAXONE SODIUM 2000 MG: 10 INJECTION, POWDER, FOR SOLUTION INTRAVENOUS at 18:50

## 2024-07-10 RX ADMIN — CEFAZOLIN 2 G: 1 INJECTION, POWDER, FOR SOLUTION INTRAMUSCULAR; INTRAVENOUS at 12:33

## 2024-07-10 RX ADMIN — VALSARTAN 80 MG: 80 TABLET ORAL at 05:17

## 2024-07-10 RX ADMIN — HEPARIN SODIUM 3000 UNITS: 1000 INJECTION, SOLUTION INTRAVENOUS; SUBCUTANEOUS at 14:39

## 2024-07-10 RX ADMIN — FOLIC ACID 1 MG: 1 TABLET ORAL at 05:20

## 2024-07-10 RX ADMIN — EPHEDRINE SULFATE 10 MG: 50 INJECTION, SOLUTION INTRAVENOUS at 13:53

## 2024-07-10 RX ADMIN — LIDOCAINE HYDROCHLORIDE 60 MG: 20 INJECTION, SOLUTION EPIDURAL; INFILTRATION; INTRACAUDAL at 12:33

## 2024-07-10 RX ADMIN — ASPIRIN 81 MG: 81 TABLET, COATED ORAL at 05:17

## 2024-07-10 RX ADMIN — Medication 100 MG: at 05:19

## 2024-07-10 RX ADMIN — AMLODIPINE BESYLATE 10 MG: 10 TABLET ORAL at 05:19

## 2024-07-10 RX ADMIN — OXYCODONE HYDROCHLORIDE 10 MG: 10 TABLET ORAL at 00:44

## 2024-07-10 RX ADMIN — AMPICILLIN SODIUM 2000 MG: 2 INJECTION, POWDER, FOR SOLUTION INTRAVENOUS at 19:07

## 2024-07-10 RX ADMIN — Medication 100 MCG: at 14:13

## 2024-07-10 RX ADMIN — Medication 100 MCG: at 14:17

## 2024-07-10 RX ADMIN — AMPICILLIN SODIUM 2000 MG: 2 INJECTION, POWDER, FOR SOLUTION INTRAVENOUS at 05:22

## 2024-07-10 RX ADMIN — EPHEDRINE SULFATE 10 MG: 50 INJECTION, SOLUTION INTRAVENOUS at 14:00

## 2024-07-10 RX ADMIN — HEPARIN SODIUM 3000 UNITS: 1000 INJECTION, SOLUTION INTRAVENOUS; SUBCUTANEOUS at 15:27

## 2024-07-10 RX ADMIN — Medication 50 MCG: at 14:02

## 2024-07-10 RX ADMIN — ONDANSETRON 4 MG: 2 INJECTION INTRAMUSCULAR; INTRAVENOUS at 15:15

## 2024-07-10 RX ADMIN — EPHEDRINE SULFATE 10 MG: 50 INJECTION, SOLUTION INTRAVENOUS at 13:42

## 2024-07-10 RX ADMIN — HYDRALAZINE HYDROCHLORIDE 50 MG: 50 TABLET ORAL at 21:29

## 2024-07-10 RX ADMIN — CEFTRIAXONE SODIUM 2000 MG: 10 INJECTION, POWDER, FOR SOLUTION INTRAVENOUS at 05:14

## 2024-07-10 RX ADMIN — OMEPRAZOLE 20 MG: 20 CAPSULE, DELAYED RELEASE ORAL at 05:18

## 2024-07-10 ASSESSMENT — PAIN DESCRIPTION - PAIN TYPE
TYPE: ACUTE PAIN

## 2024-07-10 ASSESSMENT — FIBROSIS 4 INDEX: FIB4 SCORE: 1.48

## 2024-07-10 ASSESSMENT — PAIN SCALES - GENERAL: PAIN_LEVEL: 0

## 2024-07-10 NOTE — PROGRESS NOTES
[facility] -  PROGRESS NOTE               Author: January Hare M.D. Date & Time: 7/10/2024  12:50 PM     HPI:  68-year-old  male with biventricular history of ESRD on hemodialysis q. Monday Wednesday Friday at [facility] through left upper arm AV fistula that was placed about 2 to 3 years ago.  He presented to the ER with left arm pain and swelling for the last 5 days.  His last dialysis was on Wednesday and had no dialysis in the last 5 days.  He felt sick on Friday and missed his dialysis.  Ultrasound of the left upper arm AV fistula showed there is no flow in the AV fistula and it has thrombosed.  A temporary right IJ dialysis catheter was placed in the ER.  Nephrology has been consulted for management of dialysis.  Patient denies any fever or chills, no nausea or vomiting, no chest pain.  He has some shortness of breath but is able to talk in full sentences and is in no acute respiratory distress at this time. No melena, hematochezia, hematemesis.  No HA, visual changes, or abdominal pain.     NEPHROLOGY DAILY PROGRESS:   6/24: consult note  6/25:  Afebrile, HD last night without any complications, plans noted for OR today with Dr. Tinoco for vascualr surgery   6/26: Underwent AVG repair yesterday with Dr. Tinoco, has LAMBERT drain, BP noted low this AM on dialysis, says BP 's run low on dialysis - not on Midodrine, ordered Midodrine to be used on dialysis   6/27: 0 Net UF with HD yesterday, limited by hypotension. Sitting up at side of bed eating breakfast. Bps improved 130s systolic.   6/28: HD today, seen  6/29: s/p HD yesterday over temp HD line. Tolerated well, no complaints.  6/30: in OR, not available   7/1: s/p AVR, 1V CABG and TAHIR resection/ligation, transferred to ICU, now off pressors, fatigued, post-op pain  7/2: Stable hemodynamics, tolerated HD, transferred out of ICU, chest wall pain improved  7/3: Stable hemodynamics, 7/1 BC remain negative, denies  "complaints   7/4: No acute events, tolerated HD, chest wall pain improved, feeling better overall  7/5: No acute events, no chest pain or SOB. S/p permacath placement this AM  7/6: No acute events, hypertensive, denies complaints  7/7: No acute events, feels at baseline, no chest pain or SOB, Diminsed pulses RLE with discoloration-pending CTA  7/8: patient is sitting in a chair, doing well, just came back from dialysis, tolerated well, net UF 3000 ml  7/9: s/p iHD yesterday, no new issues reported  7/10: No events, BP stable, stable on RA, pt seen and examined on HD this am, VSS--see dialysis treatment sheet for full details, denies any CP/SOB, reports right foot pain controlled at this time, to go to OR today for revascularization of right foot    REVIEW OF SYSTEMS:    10 point ROS reviewed and is as per HPI/daily summary or otherwise negative    PMH/PSH/SH/FH:   Reviewed and unchanged since admission note    CURRENT MEDICATIONS:   Reviewed from admission to present day    VS:  /58   Pulse (!) 58   Temp 36.4 °C (97.6 °F) (Temporal)   Resp 18   Ht 1.854 m (6' 0.99\")   Wt 103 kg (227 lb 1.2 oz)   SpO2 94%   BMI 29.97 kg/m²     Physical Exam  Vitals and nursing note reviewed.   Constitutional:       General: He is not in acute distress.     Appearance: He is obese.   HENT:      Head: Normocephalic and atraumatic.   Eyes:      General: No scleral icterus.  Cardiovascular:      Rate and Rhythm: Normal rate and regular rhythm.      Comments: +R chest PC  Pulmonary:      Effort: Pulmonary effort is normal. No respiratory distress.      Breath sounds: Normal breath sounds.   Abdominal:      General: Bowel sounds are normal. There is no distension.      Palpations: Abdomen is soft.   Musculoskeletal:         General: No deformity.      Cervical back: Normal range of motion and neck supple.   Skin:     General: Skin is warm and dry.      Findings: No rash.      Comments: +dusky toes right foot   Neurological:    "   General: No focal deficit present.      Mental Status: He is alert and oriented to person, place, and time.   Psychiatric:         Mood and Affect: Mood normal.         Behavior: Behavior normal. Behavior is cooperative.         Fluids:  In: 840 [P.O.:840]  Out: 0     LABS:  Recent Labs     07/08/24  0314 07/09/24  0247 07/10/24  0209   SODIUM 134* 134* 133*   POTASSIUM 3.7 3.6 3.7   CHLORIDE 97 98 96   CO2 21 22 20   GLUCOSE 99 94 118*   BUN 38* 26* 32*   CREATININE 6.11* 4.46* 5.80*   CALCIUM 8.2* 8.5 8.4*       IMAGING:   All imaging reviewed from admission to present day    IMPRESSION:  # ESRD  -Q. MWF at [facility]  # Bacteremia   - Blood Cx from 6/24, 6/27 + E. Faecalis  # Bacterial endocarditis:   -s/p aortic valve replacement 6/30  3 CAD  -s/p 1V CABG 6/30  #Thrombosed left upper arm AVG; infected pseudoaneurysm   -Right IJ temporary HD catheter placed in ER 6/24/2024  - S/P AVG repair, resection and pseudoaneurysm resection 6/25/24 by Dr. Tinoco along with  Reconstruction of left brachial artery using left greater saphenous vein interposition graft.  -permacath placed 7/5  # HTN  # Sepsis   # Anemia of CKD  # CKD-MBD  -phos at goal  # Coronary artery disease  # Chronic A-fib  -s/p TAHIR resection/ligation        PLAN:  - HD today (WED) and continue q MWF schedule   - Challenging UF  - Vascular surgery re-consulted due to worsening right leg pain and discoloration and plan is for revascularization later today  - Using permacath until graft cleared for use by vascular  - EPO with HD  - Cont current BP meds  - No dietary protein restrictions  - abx per primary  - Dose all meds per ESRD

## 2024-07-10 NOTE — PROGRESS NOTES
Called report to Courtney in Pre-op. Pt will be transported from dialysis to pre-op for procedure. Monitor room called and notified.

## 2024-07-10 NOTE — PROGRESS NOTES
Bedside report received from off going RN/tech: Chari, assumed care of patient.     Fall Risk Score: MODERATE RISK  Fall risk interventions in place: Place yellow fall risk ID band on patient, Provide patient/family education based on risk assessment, Educate patient/family to call staff for assistance when getting out of bed, Place fall precaution signage outside patient door, Place patient in room close to nursing station, Utilize bed/chair fall alarm, and Bed alarm connected correctly  Bed type: Regular (Gary Score less than 17 interventions in place)  Patient on cardiac monitor: Yes  IVF/IV medications: Infusion per MAR (List Med(s)) Heparin  Oxygen: Room Air  Bedside sitter: Not Applicable   Isolation: Not applicable

## 2024-07-10 NOTE — PROGRESS NOTES
Inpatient Anticoagulation Service Note for 7/10/2024      Reason for Anticoagulation: Aortic Mechanical Valve Replacement  , Atrial Fibrillation   [id number] VASc Score: 4  HAS-BLED Score: 3    Hemoglobin Value: (!) 9.8  Hematocrit Value: (!) 31.3  Lab Platelet Value: 282  Target INR: 2.0 to 3.0    INR from last 7 days       Date/Time INR Value    07/10/24 0209 3.2    07/09/24 0247 2.32    07/08/24 0314 1.91    07/07/24 1402 1.5    07/07/24 0450 1.36    07/06/24 0019 1.13    07/05/24 0833 1.03          Dose from last 7 days       Date/Time Dose (mg)    07/10/24 0901 2.5    07/09/24 1312 5    07/07/24 0955 5    07/06/24 1312 5    07/05/24 1501 5          Average Dose (mg):  (new start)  Significant Interactions: Amiodarone, Antibiotics, Antiplatelet Medications  Bridge Therapy: No  Bridge Therapy Start Date: 07/07/24  Days of Overlap Therapy: 4 (supra-therapeutic INR; recent bleeding post-op)  INR Value Greater than 2 Prior to Discontinuation of Parenteral Anticoagulation: Yes    Reversal Agent Administered: Not Applicable  Comments: INR is slightly supra-therapeutic but the significantly increased from yesterday likely due to the amiodarone. H/H is stable and no indication of bleeding identified. Will reduce dose by 50% and monitor closely. Given two therapeutic INR's, the heparin bridge was stopped today. No new DDI identified. Start 2.5mg daily dosing today.    Plan:  Warfarin 2.5mg tonight  Education Material Provided?: No (Needs Education)    Pharmacist suggested discharge dosing: Warfarin 2.5mg PO daily with follow up in 2-3 days.      Staci Garza, PharmD

## 2024-07-10 NOTE — THERAPY
Occupational Therapy Contact Note    Patient Name: Brian Jasmine  Age:  68 y.o., Sex:  male  Medical Record #: [id number]  Today's Date: 7/10/2024    Attempted to see pt for OT session; pt off the floor for fem-pop bypass revascularization procedure. Will hold and reattempt as appropriate/able.

## 2024-07-10 NOTE — WOUND TEAM
Wound team attempted to assess patient, but patient not in room.  Wound team to attempt at a later date.

## 2024-07-10 NOTE — DISCHARGE PLANNING
-[id number]  DPA spoke with Krzysztof at [facility] regarding pt acceptance. Facility does transport pts to [facility], but Krzysztof is unsure if facility's  can accommodate pt's schedule. Facility recently admitted several HD pts, which may limit the ability to transport this pt. Krzysztof to check with her  and follow up with DPA.     -[phone]  DPA informed Hiral with [facility] of pt's new dialysis schedule. Hiral to follow up with her  and call DPA back regarding facility's ability to accommodate transport to dialysis.

## 2024-07-10 NOTE — PROGRESS NOTES
[facility] Progress Notes    UF net removed: 2000mL     HD treatment completed as ordered per Dr Hare for 3hrs. Started at 0818, ended at 1118.     Patient tolerated dialysis treatment without complication. See HD flowsheets for reference.     Post HD vital signs stable. CVC patent with good flow. Heparin 1/1000 locked of 1.8mL on each blue and red port. Report given to Chari LANDRUM.

## 2024-07-10 NOTE — THERAPY
Physical Therapy Contact Note    Patient Name: Brian Jasmine  Age:  68 y.o., Sex:  male  Medical Record #: [id number]  Today's Date: 7/10/2024    Attempted PT tx session, pt off the floor for right fem-pop bypass at time of attempt. Will f/u post-op as able/appropriate.

## 2024-07-10 NOTE — CARE PLAN
The patient is Watcher - Medium risk of patient condition declining or worsening    Shift Goals  Clinical Goals: wound care, walk, monitor pulses, monitor vitals  Patient Goals: rest, pain control  Family Goals: NESTOR    Progress made toward(s) clinical / shift goals:        Problem: Pain - Standard  Goal: Alleviation of pain or a reduction in pain to the patient’s comfort goal  Outcome: Progressing     Problem: Knowledge Deficit - Standard  Goal: Patient and family/care givers will demonstrate understanding of plan of care, disease process/condition, diagnostic tests and medications  Outcome: Progressing     Problem: Hemodynamics  Goal: Patient's hemodynamics, fluid balance and neurologic status will be stable or improve  Outcome: Progressing     Problem: Post Op Day 5 CABG/Heart Valve Replacement  Goal: Optimal care of the Post Op CABG/Heart Valve replacement Post Op Day 5  Outcome: Progressing  Note: Patient refused beginning shift walk, completed incision care and CHG. Patient off O2          Patient is not progressing towards the following goals:      Problem: Urinary - Renal Perfusion  Goal: Ability to achieve and maintain adequate renal perfusion and functioning will improve  Outcome: Not Progressing  Note: Creat is increasing, patient remains oliguric

## 2024-07-10 NOTE — OP REPORT
VASCULAR SURGERY SERVICE                       Operative Note  _____________________________________________________    Date: 7/10/2024    Patient: Brian Jasmine  : 1955  MRN: [id number]  _____________________________________________________      Preoperative Diagnosis:  -Right lower extremity ischemia with rest pain and nonhealing wounds of the right foot    Postoperative Diagnosis:  -Right lower extremity ischemia with rest pain and nonhealing wounds of the right foot    Procedure:  -Open thrombectomy of right femoral to above-knee popliteal PTFE bypass graft (70049)  -Direct sheath insertion in the right femoral to popliteal bypass graft (43400)  -Right lower extremity angiogram (69070)  -Stent placement in the proximal anastomosis of the right femoral to popliteal bypass graft and also the distal anastomosis of the right femoral to popliteal bypass graft (40359)    _____________________________________________________    Surgeon:                                 Jonathan Blanchard MD    Assistant:   Elba Villarreal PA-C    Anesthesia:                             General anesthesia plus local anesthetic    EBL:                                        400cc    Heparin:                                  Systemically heparinized    Findings:   Chronic thrombus throughout the length of the femoropopliteal bypass graft with stenoses at the proximal and distal anastomosis    Complications:                        none    Disposition:                             Tolerated well, sent to recovery in stable condition    Justification for use of Surgical First Assist:  An experienced first assistant was utilized during this operation due to the complexity of the operation.  My assistant participated with patient preparation for surgery, incision, surgical exposure including retraction, dissection, and ligation to isolate the target structures and preserve nearby structures, and closure of the field of dissection.  The  presence of the expert assist increased the efficiency of the operation and decreased the risk of intraoperative surgical complications.    _____________________________________________________      History:  68 y.o. male who has had a complex hospitalization.  Patient initially presented with an infected left upper extremity dialysis AV graft and the had an infected pseudoaneurysm.  He underwent brachial artery repair with interposition saphenous vein and also removal of the infected AV graft on 6/25/2024.  Patient shortly thereafter developed endocarditis of the aortic valve and on 6/30/2024 he underwent open aortic valve replacement and also single-vessel CABG.  He has been recovering fairly well from that.  During this process he developed worsening pain and discoloration of his right forefoot and toes and also has developed some small wounds on the toes.  Arterial workup shows that he has a 3-year-old femoral to above-knee popliteal artery PTFE bypass graft which has been occluded for an unknown duration of time though likely several weeks or months.  He has a left femoropopliteal PTFE bypass graft which is patent and no issues with the left foot.  Although the patient has not had an acute change in the arterial status of his right lower extremity, I suspect that systemic hypoperfusion at various parts during his hospitalization most likely put his right foot at risk for ischemic complications as the right femoropopliteal bypass graft was occluded prior to this hospitalization.    The right foot will require revascularization in order to recover.  It appears that the existing femoropopliteal bypass graft might be amenable to thrombectomy with stent placement for any residual disease.  This would be preferable over putting in a new femoropopliteal bypass graft at this time since that will be a more invasive procedure with higher risk of infection which he is already recovering from.    I conducted a thorough  preoperative discussion regarding our findings and recommendations.  I explained the operation, alternatives, and potential risks, including but not limited to bleeding, infection, injury to vessels or nerves, possible multiple incisions, use of xray and contrast exposure, risks of anesthesia, and global risks such as stroke, heart attack, pulmonary complications, and even potentially not surviving the operation or the recovery.  All questions were answered. They understand and agree to proceed.    Procedure Summary:  Following informed consent, the patient was placed supine on the operating table, and general anesthesia was administered.  The patient was prepped and draped sterile in the usual fashion. Surgical time-out was called, and everyone was in agreement.  The patient did receive preoperative prophylactic antibiotics.      Local anesthetic was infiltrated over the mid thigh and then a longitudinal incision was made overlying the course of the graft.  The graft was located in the subcutaneous tissue and easily exposed.  The patient was systemically heparinized and then a transverse graftotomy was made and chronic lamellated thrombus was encountered.  The graft was swept proximally and distally with a smart claw catheter graft thrombectomy device.  Subsequent angiogram showed residual stenosis of the proximal anastomosis which was treated with a 7 mm x 40 mm self-expanding uncovered drug-coated Yenny stent with 6 mm post stent angioplasty and restored good patency of the proximal anastomosis.  Angiogram also showed residual disease at the distal anastomosis with stenosis as well as disease of the proximal and mid popliteal artery.  This segment was treated initially with a 6 mm x 100 mm self-expanding uncovered drug-coated Yenny stent from the mid popliteal artery all the way up through the distal anastomosis of the bypass graft.  The stent needed to be extended further into the bypass graft to address all  of the residual disease and so the stent was extended with a 7 mm x 40 mm self-expanding uncovered plain Innova stent and then the entire stent construct was treated with 6 mm post stent angioplasty to complete the deployment.  Subsequent angiogram showed excellent flow through the distal end of the graft into the popliteal artery.  The patient does have known severe three-vessel tibial runoff disease however there is a fair amount of collateralization and there appears to be single-vessel posterior tibial runoff to the ankle.  At this point the graft was flushed and the graftotomy was closed with 5-0 [place]-Justin suture and then flow was restored and there was a brisk posterior tibial artery signal at the ankle.  The wound was irrigated and suctioned clean and topical hemostatic was applied.  The incision was closed with multiple layers of absorbable suture and skin glue.    The patient was fully systemically heparinized at the conclusion of the case and a heparin drip will be started postoperatively to help maximize bypass patency.    The patient was then sent to recovery in stable condition.  All counts were correct at the conclusion of the case.      Postoperative Plan:    The patient was fully systemically heparinized at the conclusion of the case and a heparin drip will be started postoperatively to help maximize bypass patency.  If the bypass graft demonstrates limited durability with early rethrombosis, or if the left foot fails to recover despite restoring the bypass graft, then the patient will either be facing a femoral to posterior tibial bypass (likely with artificial conduit) or the patient could be facing a left below the knee amputation due to the severity of his tibial artery disease.      Jonathan Blanchard MD  [facility]

## 2024-07-10 NOTE — DISCHARGE PLANNING
Care Transition Team Discharge Planning    Anticipated Discharge Information  Discharge Disposition: D/T to SNF with Medicare cert in anticipation of skilled care (03)      Discharge Plan:  RNCM contacted dialysis coordinator Deya requesting MWF 11AM [facility] chair for [facility] to consider patient. Awaiting response.     0845: Per deya, she is working to find an accommodating clinic. MD updated in IDT rounds    1250: RNFELICIANO received update from dialysis coordinator that [facility] can accommodate mwf 11am dialysis. Requested DPA contact [facility] to see if that location works for them.

## 2024-07-10 NOTE — PROGRESS NOTES
"                 VASCULAR SURGERY               Inpatient Progress Note  _________________________________    Vitals (7/9/2024)  /67   Pulse (!) 54   Temp 36.7 °C (98.1 °F) (Temporal)   Resp 18   Ht 1.854 m (6' 0.99\")   Wt 103 kg (227 lb 1.2 oz)   SpO2 92%   BMI 29.97 kg/m²   _________________________________      7/9/24 seen and examined. Patient has a known chronically thrombosed right femoral-AKPopliteal bypass and has developed pain and discoloration and small sores of the toes of the right foot consistent with ischemia.  Given that there has not been any recent change in the status of the arteries of the right lower extremity, these findings are most likely related to systemic causes of hypoperfusion, particularly around the time of his cardiac surgery.  An ECHO is pending. Despite the fact that his fem-pop occlusion is chronic, the ischemic changes in his foot will require revascularization of the right lower extremity in order for the foot to recover, which means the patient is facing another right fem-pop bypass once he is medically cleared.        7/10/24 no significant changes overnight.  The patient has an occluded right femoral to above-knee popliteal artery PTFE bypass and it appears that we may be able to recanalize this bypass with thrombectomy, otherwise a new femoral to above-knee popliteal artery bypass will likely be an alternative option.  Planning to go to the OR today to revascularize the right leg.  Patient had an echocardiogram done yesterday which shows a normal ejection fraction and overall the patient appears to be adequately optimized to undergo the planned procedure.  Patient also had dialysis this morning.  Procedure and risks discussed, questions answered and he agrees to proceed.      Jonathan Blanchard MD  [facility]  [place] preferred or call my office [phone]  __________________________________________________________________  Patient:Brian" Mitali   MRN:[id number]   CSN:[id number]

## 2024-07-10 NOTE — PROGRESS NOTES
Bedside report received from off going RN/tech: Leida, assumed care of patient.   Pt A/O x 4. Lower extremity pulses checked with night shift RN.     Fall Risk Score: HIGH RISK  Fall risk interventions in place: Place yellow fall risk ID band on patient, Provide patient/family education based on risk assessment, Educate patient/family to call staff for assistance when getting out of bed, Place fall precaution signage outside patient door, Place patient in room close to nursing station, and Utilize bed/chair fall alarm  Bed type: Regular (Gary Score less than 17 interventions in place)  Patient on cardiac monitor: Yes  IVF/IV medications: Infusion per MAR (List Med(s)) hep  gtt @ 17u/kg/hr  Oxygen: Room Air  Bedside sitter: Not Applicable   Isolation: Not applicable

## 2024-07-10 NOTE — PROGRESS NOTES
[facility] Medicine Daily Progress Note    Date of Service  7/9/2024    Chief Complaint  Brian Jasmine is a 68 y.o. male admitted 6/24/2024 with left arm graft infection    Hospital Course    Interval Problem Update  Patient seen and examine at bedside  I reviewed the chart along with vitals, labs, imaging, test (both pending and resulted) and recommendations from specialists and interdisciplinary team.  69yo BMI 29.47 M former smoker, w/ h/o CAD w/ stents Afib on Coumadin, PVD s/p fempop bypass, ESRD on dialysis via L arm graft presented 6/24/2024 with L arm pain, redness, swelling 5d prior to admission. He also complained of fevers and chills. He missed dialysis and stopped taking all of his medications because he felt so poorly. AT ED, afebrile, hemodynamically stable. Leukocytosis, anemia, elevated BUN and Cr- on labs. INR 1.46. BNP 21K and elevated troponin. Nephrology consulted, temporary access catheter placed for dialysis. Anticoagulation resumed thereafter as he was subtherapeutic. Started on antibiotics for infected left arm graft. 6/24 blood cx positve for enterococci. ID consulted. Echo EF 53% normal RV however RENEE showed aortic valve vegetation consistent with endocarditis. Vascular, Cardiology and Cardiothoracic Surgery consulted. S/p resection of infected left arm dialysis graft pseudoaneurysm at arterial anastomosis, reconstruction of the left brachial artery using left greater saphenous vein interposition graft by Dr. Tinoco, Vascular on 6/25. S/p cardiac cath by Dr. Purvis, Cardiuology on 6/29. S/p AV replacement and CABG x 1 by Dr. Curiel, Cardiothoracic on 6/30. Repeat blood cultures cleared on 7/1 and tunneled catheter placed by Dr. Goldstein, IR for dialysis on 7/5. ID recommended IV daptomycin [phone] with his MWF  dialysis sessions STOP 8/11. Outpatient dialysis being arranged.     Patient developed worsening right leg ischemia, reconsulted vascular. On IV heparin.   L arm swelling,  basilic DVT on Doppler but no arterial issue, already on blood thinner therapeutic. LAST NIGHT progression of RLE discoloration. Vascular surgeon recommended repeat echo to check EF for reduced perfusion, result EF normal, RV dilated, grade 1 diastolic. I spoke with Dr. Blanchard. He is planning for revascularization since echo EF is okay. Patient updated.   WBC 14.8, Hb 10.4, K 3.6, bicarb 22, BUN 26, Cr- 4.46, INR 2.3  No discolorationof R leg  today. Patient minor discomfort arm.   PT recommended SNF when cleared    I have discussed this patient's plan of care and discharge plan at IDT rounds today with Case Management, Nursing, Nursing leadership, and other members of the IDT team.    Consultants/Specialty  cardiovascular surgeon, infectious disease, and vascular surgery    Code Status  Full Code    Disposition  The patient is not medically cleared for discharge to home or a post-acute facility.      I have placed the appropriate orders for post-discharge needs.    Review of Systems  Review of Systems   Constitutional:  Positive for malaise/fatigue.   Cardiovascular:  Positive for leg swelling. Negative for chest pain.   Musculoskeletal:  Positive for joint pain.        Physical Exam  Temp:  [36.6 °C (97.9 °F)-36.7 °C (98.1 °F)] 36.7 °C (98.1 °F)  Pulse:  [53-65] 53  Resp:  [16-18] 16  BP: (114-148)/(58-73) 131/70  SpO2:  [95 %-96 %] 95 %    Physical Exam  Vitals and nursing note reviewed.   Constitutional:       General: He is not in acute distress.     Appearance: He is ill-appearing.   HENT:      Head: Normocephalic.      Mouth/Throat:      Mouth: Mucous membranes are moist.   Eyes:      Pupils: Pupils are equal, round, and reactive to light.   Cardiovascular:      Rate and Rhythm: Normal rate and regular rhythm.      Pulses: Normal pulses.      Heart sounds: Normal heart sounds.      Comments: Sternal incision clean dry and intact  Pulmonary:      Breath sounds: Rales present.   Abdominal:      Palpations:  Abdomen is soft.      Tenderness: There is no abdominal tenderness.   Musculoskeletal:         General: Swelling (LUE) present.      Cervical back: Neck supple.      Right lower leg: Edema present.      Left lower leg: Edema present.      Comments: Left arm swollen   Skin:     General: Skin is warm.      Coloration: Skin is not jaundiced.      Comments: Dusky appearing toes right greater than left   Neurological:      General: No focal deficit present.      Mental Status: He is alert and oriented to person, place, and time.   Psychiatric:         Mood and Affect: Mood normal.         Behavior: Behavior normal.         Fluids    Intake/Output Summary (Last 24 hours) at 7/9/2024 1826  Last data filed at 7/9/2024 1200  Gross per 24 hour   Intake 624 ml   Output 350 ml   Net 274 ml       Laboratory  Recent Labs     07/07/24  0450 07/08/24 0315 07/09/24  0247   WBC 13.2* 16.6* 14.8*   RBC 3.14* 3.13* 3.35*   HEMOGLOBIN 9.5* 9.8* 10.4*   HEMATOCRIT 29.8* 29.9* 32.3*   MCV 94.9 95.5 96.4   MCH 30.3 31.3 31.0   MCHC 31.9* 32.8 32.2*   RDW 63.8* 68.2* 70.1*   PLATELETCT 230 262 284   MPV 9.9 10.1 9.8     Recent Labs     07/07/24  0450 07/08/24 0314 07/09/24  0247   SODIUM 135 134* 134*   POTASSIUM 3.5* 3.7 3.6   CHLORIDE 96 97 98   CO2 24 21 22   GLUCOSE 98 99 94   BUN 32* 38* 26*   CREATININE 5.21* 6.11* 4.46*   CALCIUM 8.2* 8.2* 8.5     Recent Labs     07/07/24  1402 07/08/24  0314 07/09/24  0247   APTT 40.2*  --   --    INR 1.50* 1.91* 2.32*                 Imaging  US-EXTREMITY ARTERY UPPER UNILAT LEFT   Final Result      US-EXTREMITY VENOUS UPPER UNILAT LEFT   Final Result      EC-ECHOCARDIOGRAM COMPLETE W/ CONT   Final Result      US-EXTREMITY ARTERY LOWER UNILAT RIGHT   Final Result      CT-CTA LOWER EXT WITH & W/O-POST PROCESS RIGHT   Final Result      1.  There appears to be occlusion of right common femoral to distal superficial femoral artery bypass graft.   2.  At least moderate stenosis of the tibioperoneal  trunk. Occlusion of the proximal right anterior tibial artery and likely occlusion of the distal posterior tibial and peroneal arteries.   3.  Diffuse lower extremity edema. Correlate for cellulitis/infection.   4.  Subacute appearing left superior and inferior pubic rami fractures.      IR-MUKUND HUERTA PLACEMENT >5   Final Result      1. ULTRASOUND AND FLUOROSCOPIC GUIDED PLACEMENT OF A Right INTERNAL JUGULAR 14.5 [place] 23 cm length [employer] TUNNELED DIALYSIS CATHETER.      2. THE HEMODIALYSIS CATHETER MAY BE USED IMMEDIATELY AS CLINICALLY INDICATED. FLUSHES PER PROTOCOL.      DX-CHEST-PORTABLE (1 VIEW)   Final Result      Likely developing mild central vascular congestion/edema. Small left and trace right pleural effusions are now seen      DX-CHEST-PORTABLE (1 VIEW)   Final Result         1.  Pulmonary edema and/or infiltrates are identified, which are stable since the prior exam.   2.  Trace left pleural effusion   3.  Cardiomegaly      DX-CHEST-LIMITED (1 VIEW)   Final Result         1.  Left IJ catheter, tip projects over the brachiocephalic vein/aorta region. This is short in position and does not extend into the SVC. Need to correlate clinically for venous blood return to exclude arterial line.   2.  Other findings are detailed above.      EC-RENEE W/O CONT   Final Result      DX-CHEST-2 VIEWS   Final Result         1.  No focal infiltrates.   2.  Perihilar interstitial prominence and bronchial wall cuffing, appearance suggests changes of underlying bronchial inflammation, consider bronchitis.      US-VEIN MAPPING LOWER EXTREMITY BILAT   Final Result      US-CAROTID DOPPLER UNILAT LEFT   Final Result      EC-RENEE W/O CONT   Final Result      EC-ECHOCARDIOGRAM COMPLETE W/O CONT   Final Result      US-SUSI SINGLE LEVEL BILAT   Final Result      US-EXTREMITY ARTERY LOWER BILAT   Final Result      DX-CHEST-PORTABLE (1 VIEW)   Final Result      1.  Satisfactory appearance of the right IJ catheter. No  pneumothorax visualized.      US-HEMODIALYSIS GRAFT DUPLEX COMP UPPER EXTREMITY   Final Result      US-HEMODIALYSIS GRAFT DUPLEX COMP UPPER EXTREMITY   Final Result      DX-CHEST-PORTABLE (1 VIEW)   Final Result      Mild enlargement of the cardiomediastinal silhouette without acute cardiopulmonary abnormality.      CL-LEFT HEART CATHETERIZATION WITH POSSIBLE INTERVENTION    (Results Pending)        Assessment/Plan  * Infected prosthetic vascular graft, initial encounter ([facility])- (present on admission)  Assessment & Plan  Vascular surgery consulted.   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 Left greater saphenous vein harvest. Reconstruction of left brachial artery using left greater saphenous vein   interposition graft. Partial removal of left upper arm dialysis graft.      S/P AVR (aortic valve replacement)- (present on admission)  Assessment & Plan  Developed aortic valve endocarditis  Status post aortic valve replacement    Bacteremia- (present on admission)  Assessment & Plan  Probably related to infected graft, status post removal.  Blood cultures positive for Enterococcus  RENEE revealed aortic valve endocarditis  Patient is status post aortic valve replacement  Repeat blood cultures on 7/1 have been negative  Continue IV ampicillin and IV ceftriaxone while inpatient with plans to switch to IV dapto on discharge with stop date of 8/11/24  SNF for IV Abx      Coronary artery disease- (present on admission)  Assessment & Plan  Followed by cardiology in [place] with history of stent to the circumflex  S/p CABG X1 WITH SEKELTONIZED LIMA to LAD  Continue aspirin and atorvastatin    PAD (peripheral artery disease) ([facility])- (present on admission)  Assessment & Plan  Hx of previous fem-pop bypass  CTA of RLE with IV contrast that showed occlusion of right common femoral to distal superficial femoral artery bypass graft, moderate stenosis of  tibioperoneal trunk.  Occlusion of the proximal right anterior tibial artery and likely occlusion of the distal posterior tibial and peroneal arteries.  I discussed the findings with vascular surgery Dr. Tinoco who believes these findings are chronic and does not require any urgent surgical intervention.  Will continue warfarin and aspirin.  INR subtherapeutic therefore we will bridge with IV heparin.  Monitor xa  Vascular surgery Dr Blanchard has been consulted    Chronic atrial fibrillation (HCC)- (present on admission)  Assessment & Plan  He had been on coumadin but hasn't been taking it  Continue amiodarone and metoprolol  Restart warfarin, monitor INR with goal 2-3. Bridging with IV heparin, monitor Xa.   Vitals:    07/09/24 1759   BP:    Pulse:    Resp: 16   Temp:    SpO2:      HR stable      End stage renal disease (HCC)- (present on admission)  Assessment & Plan  Nephrology consulted  Continue HD   S/p perma cath placement     History of alcohol abuse- (present on admission)  Assessment & Plan  Give vitamins    Renovascular hypertension- (present on admission)  Assessment & Plan  Uncontrolled   Continue coreg, valsartan and amlodipine  Started on hydralazine   Vitals:    07/09/24 1759   BP:    Pulse:    Resp: 16   Temp:    SpO2:      Stable BP         VTE prophylaxis: warfarin    I have performed a physical exam and reviewed and updated ROS and Plan today (7/9/2024). In review of yesterday's note (7/8/2024), there are no changes except as documented above.    Patient is has a high medical complexity, complex decision making and is at high risk for complication, morbidity, and mortality, thus requiring the highest level of my preparedness for sudden, emergent intervention. Medical decision making is therefore complex. I provided  services, which included ordering labs and/or imaging, and discussing the case with various consultants.medication orders, frequent reevaluations of the patient's condition and  response to treatment. Time was also devoted to counseling and coordinating care including review of records, pertinent lab data and studies, as well as discussing diagnostic evaluation and work up, planned therapeutic interventions and future disposition of care. Where indicated, the assessment and plan reflect discussion of patient with consultants, other healthcare providers, family members, and additional research needed to obtain further information in formulating the plan of care for Brian Jasmine. Total time spent was 66 minutes.

## 2024-07-11 LAB
ANION GAP SERPL CALC-SCNC: 12 MMOL/L (ref 7–16)
BUN SERPL-MCNC: 25 MG/DL (ref 8–22)
CALCIUM SERPL-MCNC: 8.3 MG/DL (ref 8.5–10.5)
CHLORIDE SERPL-SCNC: 96 MMOL/L (ref 96–112)
CO2 SERPL-SCNC: 24 MMOL/L (ref 20–33)
CREAT SERPL-MCNC: 4.46 MG/DL (ref 0.5–1.4)
ERYTHROCYTE [DISTWIDTH] IN BLOOD BY AUTOMATED COUNT: 71.8 FL (ref 35.9–50)
ERYTHROCYTE [DISTWIDTH] IN BLOOD BY AUTOMATED COUNT: 75.4 FL (ref 35.9–50)
EXTRA TUBE BLU BLU: NORMAL
EXTRA TUBE BLU BLU: NORMAL
GFR SERPLBLD CREATININE-BSD FMLA CKD-EPI: 14 ML/MIN/1.73 M 2
GLUCOSE SERPL-MCNC: 146 MG/DL (ref 65–99)
HCT VFR BLD AUTO: 23.2 % (ref 42–52)
HCT VFR BLD AUTO: 23.8 % (ref 42–52)
HCT VFR BLD AUTO: 24 % (ref 42–52)
HGB BLD-MCNC: 7.3 G/DL (ref 14–18)
HGB BLD-MCNC: 7.3 G/DL (ref 14–18)
HGB BLD-MCNC: 7.6 G/DL (ref 14–18)
INR PPP: 3.47 (ref 0.87–1.13)
INR PPP: 3.65 (ref 0.87–1.13)
MCH RBC QN AUTO: 30.8 PG (ref 27–33)
MCH RBC QN AUTO: 31.2 PG (ref 27–33)
MCHC RBC AUTO-ENTMCNC: 30.7 G/DL (ref 32.3–36.5)
MCHC RBC AUTO-ENTMCNC: 31.5 G/DL (ref 32.3–36.5)
MCV RBC AUTO: 100.4 FL (ref 81.4–97.8)
MCV RBC AUTO: 99.1 FL (ref 81.4–97.8)
PLATELET # BLD AUTO: 283 K/UL (ref 164–446)
PLATELET # BLD AUTO: 339 K/UL (ref 164–446)
PMV BLD AUTO: 10 FL (ref 9–12.9)
PMV BLD AUTO: 9.9 FL (ref 9–12.9)
POTASSIUM SERPL-SCNC: 3.9 MMOL/L (ref 3.6–5.5)
PROTHROMBIN TIME: 35.4 SEC (ref 12–14.6)
PROTHROMBIN TIME: 36.9 SEC (ref 12–14.6)
RBC # BLD AUTO: 2.34 M/UL (ref 4.7–6.1)
RBC # BLD AUTO: 2.37 M/UL (ref 4.7–6.1)
SODIUM SERPL-SCNC: 132 MMOL/L (ref 135–145)
WBC # BLD AUTO: 11.8 K/UL (ref 4.8–10.8)
WBC # BLD AUTO: 18.1 K/UL (ref 4.8–10.8)

## 2024-07-11 PROCEDURE — 85018 HEMOGLOBIN: CPT

## 2024-07-11 PROCEDURE — A9270 NON-COVERED ITEM OR SERVICE: HCPCS

## 2024-07-11 PROCEDURE — 700102 HCHG RX REV CODE 250 W/ 637 OVERRIDE(OP): Performed by: NURSE PRACTITIONER

## 2024-07-11 PROCEDURE — 36415 COLL VENOUS BLD VENIPUNCTURE: CPT

## 2024-07-11 PROCEDURE — A9270 NON-COVERED ITEM OR SERVICE: HCPCS | Performed by: INTERNAL MEDICINE

## 2024-07-11 PROCEDURE — 97530 THERAPEUTIC ACTIVITIES: CPT

## 2024-07-11 PROCEDURE — 770020 HCHG ROOM/CARE - TELE (206)

## 2024-07-11 PROCEDURE — 85610 PROTHROMBIN TIME: CPT

## 2024-07-11 PROCEDURE — A9270 NON-COVERED ITEM OR SERVICE: HCPCS | Performed by: NURSE PRACTITIONER

## 2024-07-11 PROCEDURE — 85014 HEMATOCRIT: CPT

## 2024-07-11 PROCEDURE — 700111 HCHG RX REV CODE 636 W/ 250 OVERRIDE (IP): Performed by: INTERNAL MEDICINE

## 2024-07-11 PROCEDURE — 99418 PROLNG IP/OBS E/M EA 15 MIN: CPT | Performed by: INTERNAL MEDICINE

## 2024-07-11 PROCEDURE — 700101 HCHG RX REV CODE 250: Performed by: INTERNAL MEDICINE

## 2024-07-11 PROCEDURE — 85027 COMPLETE CBC AUTOMATED: CPT

## 2024-07-11 PROCEDURE — 80048 BASIC METABOLIC PNL TOTAL CA: CPT

## 2024-07-11 PROCEDURE — 700102 HCHG RX REV CODE 250 W/ 637 OVERRIDE(OP): Performed by: INTERNAL MEDICINE

## 2024-07-11 PROCEDURE — 87040 BLOOD CULTURE FOR BACTERIA: CPT | Mod: 91

## 2024-07-11 PROCEDURE — 99233 SBSQ HOSP IP/OBS HIGH 50: CPT | Performed by: INTERNAL MEDICINE

## 2024-07-11 PROCEDURE — 700102 HCHG RX REV CODE 250 W/ 637 OVERRIDE(OP)

## 2024-07-11 PROCEDURE — 700105 HCHG RX REV CODE 258: Performed by: INTERNAL MEDICINE

## 2024-07-11 RX ADMIN — Medication 1 APPLICATOR: at 21:38

## 2024-07-11 RX ADMIN — CEFTRIAXONE SODIUM 2000 MG: 10 INJECTION, POWDER, FOR SOLUTION INTRAVENOUS at 17:48

## 2024-07-11 RX ADMIN — AMPICILLIN SODIUM 2000 MG: 2 INJECTION, POWDER, FOR SOLUTION INTRAVENOUS at 04:57

## 2024-07-11 RX ADMIN — AMIODARONE HYDROCHLORIDE 400 MG: 200 TABLET ORAL at 04:24

## 2024-07-11 RX ADMIN — DOCUSATE SODIUM 100 MG: 100 CAPSULE, LIQUID FILLED ORAL at 17:48

## 2024-07-11 RX ADMIN — THERA TABS 1 TABLET: TAB at 04:24

## 2024-07-11 RX ADMIN — ATORVASTATIN CALCIUM 40 MG: 40 TABLET, FILM COATED ORAL at 21:38

## 2024-07-11 RX ADMIN — Medication 1 APPLICATOR: at 08:57

## 2024-07-11 RX ADMIN — OXYCODONE HYDROCHLORIDE 10 MG: 10 TABLET ORAL at 08:46

## 2024-07-11 RX ADMIN — AMPICILLIN SODIUM 2000 MG: 2 INJECTION, POWDER, FOR SOLUTION INTRAVENOUS at 17:57

## 2024-07-11 RX ADMIN — HYDRALAZINE HYDROCHLORIDE 50 MG: 50 TABLET ORAL at 04:27

## 2024-07-11 RX ADMIN — OXYCODONE HYDROCHLORIDE 10 MG: 10 TABLET ORAL at 21:38

## 2024-07-11 RX ADMIN — CEFTRIAXONE SODIUM 2000 MG: 10 INJECTION, POWDER, FOR SOLUTION INTRAVENOUS at 04:23

## 2024-07-11 RX ADMIN — OMEPRAZOLE 20 MG: 20 CAPSULE, DELAYED RELEASE ORAL at 04:26

## 2024-07-11 RX ADMIN — OXYCODONE HYDROCHLORIDE 10 MG: 10 TABLET ORAL at 04:28

## 2024-07-11 RX ADMIN — ASPIRIN 81 MG: 81 TABLET, COATED ORAL at 04:24

## 2024-07-11 RX ADMIN — OXYCODONE HYDROCHLORIDE 10 MG: 10 TABLET ORAL at 13:49

## 2024-07-11 RX ADMIN — AMIODARONE HYDROCHLORIDE 400 MG: 200 TABLET ORAL at 17:48

## 2024-07-11 RX ADMIN — FOLIC ACID 1 MG: 1 TABLET ORAL at 04:24

## 2024-07-11 RX ADMIN — Medication 100 MG: at 04:26

## 2024-07-11 ASSESSMENT — COGNITIVE AND FUNCTIONAL STATUS - GENERAL
CLIMB 3 TO 5 STEPS WITH RAILING: A LOT
SUGGESTED CMS G CODE MODIFIER MOBILITY: CK
MOVING FROM LYING ON BACK TO SITTING ON SIDE OF FLAT BED: A LITTLE
WALKING IN HOSPITAL ROOM: A LITTLE
STANDING UP FROM CHAIR USING ARMS: A LITTLE
DAILY ACTIVITIY SCORE: 20
SUGGESTED CMS G CODE MODIFIER MOBILITY: CK
CLIMB 3 TO 5 STEPS WITH RAILING: A LOT
MOBILITY SCORE: 17
TURNING FROM BACK TO SIDE WHILE IN FLAT BAD: A LITTLE
TURNING FROM BACK TO SIDE WHILE IN FLAT BAD: A LITTLE
STANDING UP FROM CHAIR USING ARMS: A LITTLE
DRESSING REGULAR UPPER BODY CLOTHING: A LITTLE
TOILETING: A LITTLE
HELP NEEDED FOR BATHING: A LITTLE
DRESSING REGULAR LOWER BODY CLOTHING: A LITTLE
MOVING FROM LYING ON BACK TO SITTING ON SIDE OF FLAT BED: A LITTLE
WALKING IN HOSPITAL ROOM: A LITTLE
MOVING TO AND FROM BED TO CHAIR: A LITTLE
MOBILITY SCORE: 17
SUGGESTED CMS G CODE MODIFIER DAILY ACTIVITY: CJ
MOVING TO AND FROM BED TO CHAIR: A LITTLE

## 2024-07-11 ASSESSMENT — PAIN DESCRIPTION - PAIN TYPE
TYPE: ACUTE PAIN

## 2024-07-11 ASSESSMENT — FIBROSIS 4 INDEX: FIB4 SCORE: 1.47

## 2024-07-11 ASSESSMENT — GAIT ASSESSMENTS
DISTANCE (FEET): 10
GAIT LEVEL OF ASSIST: MINIMAL ASSIST
ASSISTIVE DEVICE: FRONT WHEEL WALKER

## 2024-07-11 NOTE — CARE PLAN
The patient is Watcher - Medium risk of patient condition declining or worsening    Shift Goals  Clinical Goals: monitor bleeding, monitor vitals and labs  Patient Goals: rest  Family Goals: na    Progress made toward(s) clinical / shift goals:    Problem: Knowledge Deficit - Standard  Goal: Patient and family/care givers will demonstrate understanding of plan of care, disease process/condition, diagnostic tests and medications  Outcome: Progressing  Note: POC discussed with patient. All questions answered.      Problem: Fall Risk  Goal: Patient will remain free from falls  Outcome: Progressing  Note: Fall precautions in place. Bed alarm on.      Problem: Knowledge Deficit - Standard  Goal: Patient and family/care givers will demonstrate understanding of plan of care, disease process/condition, diagnostic tests and medications  Outcome: Progressing  Note: POC discussed with patient. All questions answered.      Problem: Fall Risk  Goal: Patient will remain free from falls  Outcome: Progressing  Note: Fall precautions in place. Bed alarm on.      Problem: Pain - Standard  Goal: Alleviation of pain or a reduction in pain to the patient’s comfort goal  Flowsheets (Taken 7/11/2024 5368)  Pain Rating Scale (NPRS): 8  Note: Patient reports 8/10 pain. Patient medicated per MAR. Patient reports improvement. Comfort measures in place.        Patient is not progressing towards the following goals:

## 2024-07-11 NOTE — PROGRESS NOTES
1900 bedside report with day RN, Assessed patients right groin/thigh site from vascular surgery. Hematoma noted post procedure, unchanged per day RN. Ice in place per MD Blanchard, post tibial pulse dopplered with day RN. MD Blanchard to evaluate site once MD is out of OR.   2100 oozing on central line site/ fistula site continued, Rapid RN to evaluate sites. Pressure applied and dressing changed.   Per rapid RN recommendation surgicel ordered and applied to central line site.   2138 MD Blanchard at bedside to look at sites. Per MD site looks good. Continue vascular checks.   2340 pressures soft. On call APRN updated. Patient asymptomatic, RN to continue to monitor. Upon reassessment BP had improved.  0245 lab called with drop in HGB 9.8 to 7.3. on call updated. Redraw ordered for 0800.

## 2024-07-11 NOTE — DISCHARGE PLANNING
Care Transition Team Discharge Planning    Anticipated Discharge Information  Discharge Disposition: D/T to SNF with Medicare cert in anticipation of skilled care (03)  Discharge Address: [address] [place] [address]  Discharge Contact Phone Number: [phone]    Discharge Plan:  Per DPA, [facility] able to accept MWF 11AM [facility] location. [facility] brochure given to patient and choice obtained for [facility], faxed to DPA. Patient states if insurance does not cover SNF stay that his GF will pay for it. RNCM to continue to follow for insurance auth.

## 2024-07-11 NOTE — OR NURSING
1545: Pt arrived via bed with MARCO Villarreal, anesthesia and RN. Report received. See flowsheet for VS. Right groin site CDI. Right posttibial pulse heard by doppler, baseline per APRN. Team aware of HOTN, ok to proceed to room with left central line in place. Orders reviewed and initiated.   1641: Dr. Sousa notified of chest xray. Ok to use central line, per MD.   1715: Dr. Blanchard at bedside. Assessment of RLE CMS and pulses. Aware of RLE mottling. Ok to proceed with transfer to room.    1738: MARCO Villarreal notified of critical PTT and Xa.   1740: Report called to DIALLO Marcelino. All questions answered. VSS. Right posttibial pulse heard by doppler. No patient distress. Alert and oriented x4. Pt transported to room in stable condition on 3L NC and transport monitor with ACLS RN. Bedside assessment with receiving RN. Dr. Blanchard notified of critical PTT and Xa. Right groin site noted to be more discolored with slight firmness, Dr. Blanchard notified. Orders received for Q shift vascular checks and to place ice pack. Receiving RN updated.

## 2024-07-11 NOTE — PROGRESS NOTES
Hospital Medicine Daily Progress Note    Date of Service  7/11/2024    Chief Complaint  Brian Jasmine is a 68 y.o. male admitted 6/24/2024 with left arm graft infection    Hospital Course    Interval Problem Update  Patient seen and examine at bedside  I reviewed the chart along with vitals, labs, imaging, test (both pending and resulted) and recommendations from specialists and interdisciplinary team.  67yo BMI 29.47 M former smoker, w/ h/o CAD w/ stents Afib on Coumadin, PVD s/p fempop bypass, ESRD on dialysis via L arm graft presented 6/24/2024 with L arm pain, redness, swelling 5d prior to admission. He also complained of fevers and chills. He missed dialysis and stopped taking all of his medications because he felt so poorly. AT ED, afebrile, hemodynamically stable. Leukocytosis, anemia, elevated BUN and Cr- on labs. INR 1.46. BNP 21K and elevated troponin. Nephrology consulted, temporary access catheter placed for dialysis. Anticoagulation resumed thereafter as he was subtherapeutic. Started on antibiotics for infected left arm graft. 6/24 blood cx positve for enterococci. ID consulted. Echo EF 53% normal RV however RENEE showed aortic valve vegetation consistent with endocarditis. Vascular, Cardiology and Cardiothoracic Surgery consulted. S/p resection of infected left arm dialysis graft pseudoaneurysm at arterial anastomosis, reconstruction of the left brachial artery using left greater saphenous vein interposition graft by Dr. Tinoco, Vascular on 6/25. S/p cardiac cath by Dr. Purvis, Cardiuology on 6/29. S/p AV replacement and CABG x 1 by Dr. Curiel, Cardiothoracic on 6/30. Repeat blood cultures cleared on 7/1 and tunneled catheter placed by Dr. Goldstein, IR for dialysis on 7/5.     Managing dialysis graft infection, endocarditis, R leg ischemia, basilic superficial VT.  ID recommended IV daptomycin [phone] with his MWF  dialysis sessions STOP 8/11. Repeat echo to check EF for reduced perfusion,  result EF normal, RV dilated, grade 1 diastolic. S/p thrombectomy and stent at R fem-pop bypass by Dr. Blanchard 7/10  WBC 14.8-15.2-18.1 prob reactive, CXR atelectasis otherwise clear to me, but will CHECK U/A, blood cultures CONTINUE antibiotics, Hb 10.4-9.8-7.3 prob acute blood loss no obvious bleeding, FOLLOW H/H, ORDERED iron panel, K 3.6-3.7-3.9, bicarb 22-20-24, BUN 26-32, Cr- 4.46-5.8-4.46, INR 3+, supratherapeutic INR thus warfarin held.  Postop instructions: anticoagulation resumed and may need fem-tib bypass or amputation as he has severe tibial disease. Patient otherwise stable he says pain and swelling improved slightly.   ID recommended IV daptomycin [phone] with his MWF  dialysis sessions STOP 8/11. Outpatient dialysis being arranged. PT recommended SNF. [facility] evaluating.    I have discussed this patient's plan of care and discharge plan at IDT rounds today with Case Management, Nursing, Nursing leadership, and other members of the IDT team.    Consultants/Specialty  cardiovascular surgeon, infectious disease, and vascular surgery    Code Status  Full Code    Disposition  The patient is not medically cleared for discharge to home or a post-acute facility.      I have placed the appropriate orders for post-discharge needs.    Review of Systems  Review of Systems   Constitutional:  Positive for malaise/fatigue.   Cardiovascular:  Positive for leg swelling. Negative for chest pain.   Musculoskeletal:  Positive for joint pain.        Physical Exam  Temp:  [35.9 °C (96.6 °F)-36.9 °C (98.5 °F)] 36.9 °C (98.5 °F)  Pulse:  [53-63] 57  Resp:  [12-23] 17  BP: ()/(44-85) 101/44  SpO2:  [91 %-100 %] 96 %    Physical Exam  Vitals and nursing note reviewed.   Constitutional:       General: He is not in acute distress.     Appearance: He is ill-appearing.   HENT:      Head: Normocephalic.      Mouth/Throat:      Mouth: Mucous membranes are moist.   Eyes:      Pupils: Pupils are equal, round, and  reactive to light.   Cardiovascular:      Rate and Rhythm: Normal rate and regular rhythm.      Pulses: Normal pulses.      Heart sounds: Normal heart sounds.      Comments: Sternal incision clean dry and intact  Pulmonary:      Breath sounds: Rales present.   Abdominal:      Palpations: Abdomen is soft.      Tenderness: There is no abdominal tenderness.   Musculoskeletal:         General: Swelling (LUE) present.      Cervical back: Neck supple.      Right lower leg: Edema present.      Left lower leg: Edema present.      Comments: Left arm swollen   Skin:     General: Skin is warm.      Coloration: Skin is not jaundiced.      Comments: Dusky appearing toes right greater than left   Neurological:      General: No focal deficit present.      Mental Status: He is alert and oriented to person, place, and time.   Psychiatric:         Mood and Affect: Mood normal.         Behavior: Behavior normal.         Fluids  No intake or output data in the 24 hours ending 07/11/24 1644      Laboratory  Recent Labs     07/10/24  0209 07/11/24  0217 07/11/24  0853 07/11/24  1345   WBC 15.2* 11.8*  --  18.1*   RBC 3.15* 2.34*  --  2.37*   HEMOGLOBIN 9.8* 7.3* 7.6* 7.3*   HEMATOCRIT 31.3* 23.2* 24.0* 23.8*   MCV 99.4* 99.1*  --  100.4*   MCH 31.1 31.2  --  30.8   MCHC 31.3* 31.5*  --  30.7*   RDW 72.8* 71.8*  --  75.4*   PLATELETCT 282 283  --  339   MPV 9.4 9.9  --  10.0     Recent Labs     07/09/24  0247 07/10/24  0209 07/11/24  0217   SODIUM 134* 133* 132*   POTASSIUM 3.6 3.7 3.9   CHLORIDE 98 96 96   CO2 22 20 24   GLUCOSE 94 118* 146*   BUN 26* 32* 25*   CREATININE 4.46* 5.80* 4.46*   CALCIUM 8.5 8.4* 8.3*     Recent Labs     07/10/24  1612 07/10/24  2242 07/11/24  0500   APTT >240.0*  --   --    INR 5.31* 3.47* 3.65*                 Imaging  DX-CHEST-LIMITED (1 VIEW)   Final Result      1.  Line position as described above, correlate for function and desired position   2.  Bibasilar underinflation atelectasis which could obscure  an additional process.      DX-PORTABLE FLUORO > 1 HOUR   Final Result      Portable fluoroscopy utilized for 15 minutes 46 seconds.         INTERPRETING LOCATION: [address] , [place], [address]      DX-OPERATIVE ANGIOGRAM EACH PROJ   Final Result      Digitized intraoperative radiograph is submitted for review. This examination is not for diagnostic purpose but for guidance during a surgical procedure. Please see the patient's chart for full procedural details.         INTERPRETING LOCATION: [address], [place], [address]      US-EXTREMITY ARTERY UPPER UNILAT LEFT   Final Result      US-EXTREMITY VENOUS UPPER UNILAT LEFT   Final Result      EC-ECHOCARDIOGRAM COMPLETE W/ CONT   Final Result      US-EXTREMITY ARTERY LOWER UNILAT RIGHT   Final Result      CT-CTA LOWER EXT WITH & W/O-POST PROCESS RIGHT   Final Result      1.  There appears to be occlusion of right common femoral to distal superficial femoral artery bypass graft.   2.  At least moderate stenosis of the tibioperoneal trunk. Occlusion of the proximal right anterior tibial artery and likely occlusion of the distal posterior tibial and peroneal arteries.   3.  Diffuse lower extremity edema. Correlate for cellulitis/infection.   4.  Subacute appearing left superior and inferior pubic rami fractures.      IR-HUERTAMUKUND DELEON PLACEMENT >5   Final Result      1. ULTRASOUND AND FLUOROSCOPIC GUIDED PLACEMENT OF A Right INTERNAL JUGULAR 14.5 Italian 23 cm length [employer] TUNNELED DIALYSIS CATHETER.      2. THE HEMODIALYSIS CATHETER MAY BE USED IMMEDIATELY AS CLINICALLY INDICATED. FLUSHES PER PROTOCOL.      DX-CHEST-PORTABLE (1 VIEW)   Final Result      Likely developing mild central vascular congestion/edema. Small left and trace right pleural effusions are now seen      DX-CHEST-PORTABLE (1 VIEW)   Final Result         1.  Pulmonary edema and/or infiltrates are identified, which are stable since the prior exam.   2.  Trace left pleural effusion   3.  Cardiomegaly       DX-CHEST-LIMITED (1 VIEW)   Final Result         1.  Left IJ catheter, tip projects over the brachiocephalic vein/aorta region. This is short in position and does not extend into the SVC. Need to correlate clinically for venous blood return to exclude arterial line.   2.  Other findings are detailed above.      EC-RENEE W/O CONT   Final Result      DX-CHEST-2 VIEWS   Final Result         1.  No focal infiltrates.   2.  Perihilar interstitial prominence and bronchial wall cuffing, appearance suggests changes of underlying bronchial inflammation, consider bronchitis.      US-VEIN MAPPING LOWER EXTREMITY BILAT   Final Result      US-CAROTID DOPPLER UNILAT LEFT   Final Result      EC-RENEE W/O CONT   Final Result      EC-ECHOCARDIOGRAM COMPLETE W/O CONT   Final Result      US-SUSI SINGLE LEVEL BILAT   Final Result      US-EXTREMITY ARTERY LOWER BILAT   Final Result      DX-CHEST-PORTABLE (1 VIEW)   Final Result      1.  Satisfactory appearance of the right IJ catheter. No pneumothorax visualized.      US-HEMODIALYSIS GRAFT DUPLEX COMP UPPER EXTREMITY   Final Result      US-HEMODIALYSIS GRAFT DUPLEX COMP UPPER EXTREMITY   Final Result      DX-CHEST-PORTABLE (1 VIEW)   Final Result      Mild enlargement of the cardiomediastinal silhouette without acute cardiopulmonary abnormality.      CL-LEFT HEART CATHETERIZATION WITH POSSIBLE INTERVENTION    (Results Pending)        Assessment/Plan  * Infected prosthetic vascular graft, initial encounter ([facility])- (present on admission)  Assessment & Plan  Vascular surgery consulted.   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 Left greater saphenous vein harvest. Reconstruction of left brachial artery using left greater saphenous vein   interposition graft. Partial removal of left upper arm dialysis graft.      S/P AVR (aortic valve replacement)- (present on admission)  Assessment & Plan  Developed aortic valve  endocarditis  Status post aortic valve replacement    Bacteremia- (present on admission)  Assessment & Plan  Probably related to infected graft, status post removal.  Blood cultures positive for Enterococcus  RENEE revealed aortic valve endocarditis  Patient is status post aortic valve replacement  Repeat blood cultures on 7/1 have been negative  Continue IV ampicillin and IV ceftriaxone while inpatient with plans to switch to IV dapto on discharge with stop date of 8/11/24  SNF for IV Abx      Coronary artery disease- (present on admission)  Assessment & Plan  Followed by cardiology in [place] with history of stent to the circumflex  S/p CABG X1 WITH SEKELTONIZED LIMA to LAD  Continue aspirin and atorvastatin    PAD (peripheral artery disease) ([facility])- (present on admission)  Assessment & Plan  Hx of previous fem-pop bypass  CTA of RLE with IV contrast that showed occlusion of right common femoral to distal superficial femoral artery bypass graft, moderate stenosis of tibioperoneal trunk.  Occlusion of the proximal right anterior tibial artery and likely occlusion of the distal posterior tibial and peroneal arteries.  I discussed the findings with vascular surgery Dr. Tinoco who believes these findings are chronic and does not require any urgent surgical intervention.  Will continue warfarin and aspirin.  INR subtherapeutic therefore we will bridge with IV heparin.  Monitor xa  Vascular surgery Dr Blanchard has been consulted  S/p surgery EF okay  Resume anticoagulation  Monitor for progress, may need fem tib bypass or amputation if not improving.    Chronic atrial fibrillation (HCC)- (present on admission)  Assessment & Plan  He had been on coumadin but hasn't been taking it  Continue amiodarone and metoprolol  Restart warfarin, monitor INR with goal 2-3. Bridging with IV heparin, monitor Xa.   Vitals:    07/11/24 1630   BP: (!) 93/35   Pulse: 61   Resp: 17   Temp:    SpO2: 95%     HR stable      End stage renal  disease (HCC)- (present on admission)  Assessment & Plan  Nephrology consulted  Continue HD   S/p perma cath placement     History of alcohol abuse- (present on admission)  Assessment & Plan  Give vitamins    Renovascular hypertension- (present on admission)  Assessment & Plan  Uncontrolled   Continue coreg, valsartan and amlodipine  Started on hydralazine   Vitals:    07/11/24 1630   BP: (!) 93/35   Pulse: 61   Resp: 17   Temp:    SpO2: 95%     Low normal BP         VTE prophylaxis: warfarin    I have performed a physical exam and reviewed and updated ROS and Plan today (7/11/2024). In review of yesterday's note (7/10/2024), there are no changes except as documented above.    Patient is has a high medical complexity, complex decision making and is at high risk for complication, morbidity, and mortality, thus requiring the highest level of my preparedness for sudden, emergent intervention. Medical decision making is therefore complex. I provided  services, which included ordering labs and/or imaging, and discussing the case with various consultants.medication orders, frequent reevaluations of the patient's condition and response to treatment. Time was also devoted to counseling and coordinating care including review of records, pertinent lab data and studies, as well as discussing diagnostic evaluation and work up, planned therapeutic interventions and future disposition of care. Where indicated, the assessment and plan reflect discussion of patient with consultants, other healthcare providers, family members, and additional research needed to obtain further information in formulating the plan of care for Brian Jasmine. Total time spent was 68 minutes.

## 2024-07-11 NOTE — PROGRESS NOTES
Bedside report received from off going RN/tech: Margarita, assumed care of patient.   Pt A/Ox4, pulses checked with doppler with night shift RN.   Fall Risk Score: HIGH RISK  Fall risk interventions in place: Place yellow fall risk ID band on patient, Provide patient/family education based on risk assessment, Educate patient/family to call staff for assistance when getting out of bed, Place fall precaution signage outside patient door, Place patient in room close to nursing station, and Utilize bed/chair fall alarm  Bed type: Regular (Gary Score less than 17 interventions in place)  Patient on cardiac monitor: Yes  IVF/IV medications: Infusion per MAR (List Med(s)) NS 10ml/hr  Oxygen: Room Air  Bedside sitter: Not Applicable   Isolation: Not applicable

## 2024-07-11 NOTE — DISCHARGE PLANNING
-[phone]  DPA informed by Hiral at [facility] that facility can accommodate pt's new dialysis schedule.     -[phone]  RISA spoke with Rylee from [facility], she will submit for insurance auth.     -[phone]  DPA informed by Hiral at [facility] that facility received insurance auth for pt. DPA notified [facility] that pt may not be cleared for a couple days, insurance auth is good for 12 according to Hiral.

## 2024-07-11 NOTE — THERAPY
Called patient asking  Has the patient had a reduction in migraine days per month from baseline?    Patient states \"yes\", he has had no migraines since starting Aj[facility]   Physical Therapy   Daily Treatment     Patient Name: Brian Jasmine  Age:  68 y.o., Sex:  male  Medical Record #: [id number]  Today's Date: 7/11/2024     Precautions  Precautions: Fall Risk;Cardiac Precautions (See Comments);Sternal Precautions (See Comments)  Comments: CABG x 1 6/30, RLE fem-pop bypass 7/10    Assessment    Pt agreeable to PT tx session, continues to demo deficits in balance, strength, activity tolerance, safety awareness, and overall functional mobility.  Pt demo'd improved seated rocking with cues, able to ambulate approx. 10 ft around bed from chair.  Encouraged pt to continue mobilizing daily with nursing staff including mobilizing to toilet/BSC as needed, sitting up in chair 3x/day for meals, and ambulating daily as tolerated.  Will continue to follow for skilled therapy.    Plan    Treatment Plan Status: Continue Current Treatment Plan  Type of Treatment: Bed Mobility, Equipment, Family / Caregiver Training, Gait Training, Manual Therapy, Neuro Re-Education / Balance, Self Care / Home Evaluation, Therapeutic Activities, Therapeutic Exercise  Treatment Frequency: 4 Times per Week  Treatment Duration: Until Therapy Goals Met    DC Equipment Recommendations: Unable to determine at this time  Discharge Recommendations: Recommend post-acute placement for additional physical therapy services prior to discharge home     Objective     07/11/24 1412   Precautions   Precautions Fall Risk;Cardiac Precautions (See Comments);Sternal Precautions (See Comments)   Comments CABG x 1 6/30, RLE fem-pop bypass 7/10   Pain 0 - 10 Group   Therapist Pain Assessment During Activity;Nurse Notified (Not quantified)   Cognition    Cognition / Consciousness WDL   Level of Consciousness Alert   Comments Pleasant & cooperative, flat affect   Other Treatments   Other Treatments Provided Noted BUE edematous, 2 Coban wraps on RUE with significant indentations when removed.   Balance   Sitting Balance (Static) Good   Sitting  Balance (Dynamic) Fair +   Standing Balance (Static) Fair   Standing Balance (Dynamic) Fair -   Weight Shift Sitting Good   Weight Shift Standing Fair   Skilled Intervention Verbal Cuing;Tactile Cuing;Facilitation   Bed Mobility    Supine to Sit (NT, up in chair pre session)   Sit to Supine Standby Assist   Scooting Standby Assist   Skilled Intervention Verbal Cuing;Tactile Cuing;Compensatory Strategies   Comments HOB flat, log roll   Gait Analysis   Gait Level Of Assist Minimal Assist   Assistive Device Front Wheel Walker   Distance (Feet) 10   # of Times Distance was Traveled 1   Deviation (shuffled steps; unsteady; quickly fatigued)   Weight Bearing Status No restrictions   Skilled Intervention Verbal Cuing;Tactile Cuing;Compensatory Strategies   Comments Pt fatigued from sitting in chair since 0530, agreeable to walk around bed to go back to bed   Functional Mobility   Sit to Stand Minimal Assist   Bed, Chair, Wheelchair Transfer Contact Guard Assist   Transfer Method Stand Step   Mobility chair > amb around bed > back to bed   Skilled Intervention Verbal Cuing;Tactile Cuing;Facilitation   Comments Improved rocking for momentum to stand   Activity Tolerance   Sitting in Chair Pre session   Sitting Edge of Bed 1 min   Standing 2 min   Short Term Goals    Short Term Goal # 1 Pt will perform supine <> sit without bed features with SPV in 6 visits to progress bed mobility   Goal Outcome # 1 Progressing as expected   Short Term Goal # 2 Pt will perform STS with FWW and SPV in 6 visits to progress OOB mobility   Goal Outcome # 2 Progressing as expected   Short Term Goal # 3 Pt will perform stand pivot transfers with FWW and SPV in 6 visits to progress functional OOB mobility   Goal Outcome # 3 Progressing as expected   Short Term Goal # 4 Pt will ambulate 150 ft with FWW and SPV in 6 visits to progress functional gait   Goal Outcome # 4 Goal not met   Physical Therapy Treatment Plan   Physical Therapy Treatment Plan  Continue Current Treatment Plan

## 2024-07-11 NOTE — PROGRESS NOTES
Inpatient Anticoagulation Service Note for 7/10/2024      Reason for Anticoagulation: Aortic Mechanical Valve Replacement  , Atrial Fibrillation   [id number] VASc Score: 4  HAS-BLED Score: 3    Hemoglobin Value: (!) 9.8  Hematocrit Value: (!) 31.3  Lab Platelet Value: 282  Target INR: 2.0 to 3.0    INR from last 7 days       Date/Time INR Value    07/10/24 1612 5.31    07/10/24 0209 3.2    07/09/24 0247 2.32    07/08/24 0314 1.91    07/07/24 1402 1.5    07/07/24 0450 1.36    07/06/24 0019 1.13    07/05/24 0833 1.03          Dose from last 7 days       Date/Time Dose (mg)    07/10/24 0901 0    07/09/24 1312 5    07/07/24 0955 5    07/06/24 1312 5    07/05/24 1501 5          Average Dose (mg):  (new start)  Significant Interactions: Amiodarone, Antibiotics, Antiplatelet Medications  Bridge Therapy: No  Bridge Therapy Start Date: 07/07/24  Days of Overlap Therapy: 4   INR Value Greater than 2 Prior to Discontinuation of Parenteral Anticoagulation: Yes     Reversal Agent Administered: Not Applicable  Comments: Given continued elevation post-operatively in INR will hold warfarin dose tonight.    Plan:  Hold warfarin  Education Material Provided?: No (Needs Education)    Pharmacist suggested discharge dosing: TBKJ Orona, PharmD

## 2024-07-11 NOTE — PROGRESS NOTES
[facility] -  PROGRESS NOTE               Author: January Hare M.D. Date & Time: 7/11/2024  9:03 AM     HPI:  68-year-old  male with biventricular history of ESRD on hemodialysis q. Monday Wednesday Friday at [facility] through left upper arm AV fistula that was placed about 2 to 3 years ago.  He presented to the ER with left arm pain and swelling for the last 5 days.  His last dialysis was on Wednesday and had no dialysis in the last 5 days.  He felt sick on Friday and missed his dialysis.  Ultrasound of the left upper arm AV fistula showed there is no flow in the AV fistula and it has thrombosed.  A temporary right IJ dialysis catheter was placed in the ER.  Nephrology has been consulted for management of dialysis.  Patient denies any fever or chills, no nausea or vomiting, no chest pain.  He has some shortness of breath but is able to talk in full sentences and is in no acute respiratory distress at this time. No melena, hematochezia, hematemesis.  No HA, visual changes, or abdominal pain.     NEPHROLOGY DAILY PROGRESS:   6/24: consult note  6/25:  Afebrile, HD last night without any complications, plans noted for OR today with Dr. Tinoco for vascualr surgery   6/26: Underwent AVG repair yesterday with Dr. Tinoco, has LAMBERT drain, BP noted low this AM on dialysis, says BP 's run low on dialysis - not on Midodrine, ordered Midodrine to be used on dialysis   6/27: 0 Net UF with HD yesterday, limited by hypotension. Sitting up at side of bed eating breakfast. Bps improved 130s systolic.   6/28: HD today, seen  6/29: s/p HD yesterday over temp HD line. Tolerated well, no complaints.  6/30: in OR, not available   7/1: s/p AVR, 1V CABG and TAHIR resection/ligation, transferred to ICU, now off pressors, fatigued, post-op pain  7/2: Stable hemodynamics, tolerated HD, transferred out of ICU, chest wall pain improved  7/3: Stable hemodynamics, 7/1 BC remain negative, denies  "complaints   7/4: No acute events, tolerated HD, chest wall pain improved, feeling better overall  7/5: No acute events, no chest pain or SOB. S/p permacath placement this AM  7/6: No acute events, hypertensive, denies complaints  7/7: No acute events, feels at baseline, no chest pain or SOB, Diminsed pulses RLE with discoloration-pending CTA  7/8: patient is sitting in a chair, doing well, just came back from dialysis, tolerated well, net UF 3000 ml  7/9: s/p iHD yesterday, no new issues reported  7/10: No events, BP stable, stable on RA, pt seen and examined on HD this am, VSS--see dialysis treatment sheet for full details, denies any CP/SOB, reports right foot pain controlled at this time, to go to OR today for revascularization of right foot  7/11: No events, tolerated HD yest with 2L UF, hypotensive overnight and BP borderline low this am, stable on 2L NC O2, underwent thrombectomy and stent placement of right fem-pop bypass graft yest, reports right toes are not as dark, denies any CP/SOB, feels tired today but denies any lightheadedness or dizziness    REVIEW OF SYSTEMS:    10 point ROS reviewed and is as per HPI/daily summary or otherwise negative    PMH/PSH/SH/FH:   Reviewed and unchanged since admission note    CURRENT MEDICATIONS:   Reviewed from admission to present day    VS:  /44   Pulse (!) 57   Temp 36.9 °C (98.5 °F) (Temporal)   Resp 17   Ht 1.854 m (6' 0.99\")   Wt 109 kg (240 lb 4.8 oz)   SpO2 96%   BMI 31.71 kg/m²     Physical Exam  Vitals and nursing note reviewed.   Constitutional:       General: He is not in acute distress.     Appearance: He is obese.   HENT:      Head: Normocephalic and atraumatic.   Eyes:      General: No scleral icterus.  Cardiovascular:      Rate and Rhythm: Normal rate and regular rhythm.      Comments: +R chest PC  Pulmonary:      Effort: Pulmonary effort is normal. No respiratory distress.      Breath sounds: Normal breath sounds.   Abdominal:      General: " Bowel sounds are normal. There is no distension.      Palpations: Abdomen is soft.   Musculoskeletal:         General: No deformity.      Cervical back: Normal range of motion and neck supple.   Skin:     General: Skin is warm and dry.      Findings: No rash.      Comments: +dusky toes right foot that are lighter in color today   Neurological:      General: No focal deficit present.      Mental Status: He is alert and oriented to person, place, and time.   Psychiatric:         Mood and Affect: Mood normal.         Behavior: Behavior normal. Behavior is cooperative.         Fluids:  In: 900 [I.V.:400; Dialysis:500]  Out: 2900     LABS:  Recent Labs     07/09/24  0247 07/10/24  0209 07/11/24  0217   SODIUM 134* 133* 132*   POTASSIUM 3.6 3.7 3.9   CHLORIDE 98 96 96   CO2 22 20 24   GLUCOSE 94 118* 146*   BUN 26* 32* 25*   CREATININE 4.46* 5.80* 4.46*   CALCIUM 8.5 8.4* 8.3*       IMAGING:   All imaging reviewed from admission to present day    IMPRESSION:  # ESRD  -Q. MWF at [facility]  # Bacteremia   - Blood Cx from 6/24, 6/27 + E. Faecalis  # Bacterial endocarditis:   -s/p aortic valve replacement 6/30  3 CAD  -s/p 1V CABG 6/30  #Thrombosed left upper arm AVG; infected pseudoaneurysm   -Right IJ temporary HD catheter placed in ER 6/24/2024  - S/P AVG repair, resection and pseudoaneurysm resection 6/25/24 by Dr. Tinoco along with  Reconstruction of left brachial artery using left greater saphenous vein interposition graft.  -permacath placed 7/5  # HTN--BP low  # Sepsis   # Anemia of CKD  # CKD-MBD  -phos at goal  # Coronary artery disease  # Chronic A-fib  -s/p TAHIR resection/ligation        PLAN:  - No HD today (THURS), continue q MWF schedule   - Challenging UF  - Vascular surgery following  - Holding parameters adjusted on BP meds  - MD amlodipine and hydralazine for now  - Avoid aggressive lowering of BP   - Using permacath until graft cleared for use by vascular  - EPO with HD  - No dietary protein  restrictions  - abx per primary  - Dose all meds per ESRD

## 2024-07-11 NOTE — THERAPY
Occupational Therapy Contact Note    Patient Name: Brian Jasmine  Age:  68 y.o., Sex:  male  Medical Record #: [id number]  Today's Date: 7/11/2024    Discussed missed therapy with RN       07/11/24 [id number]   Interdisciplinary Plan of Care Collaboration   Collaboration Comments Pt now s/p open thrombectomy/bypass. Attempted OT tx. Per RN pt just finished with PT and very fatigued. Recommend hold OT tx and attempt again at later time.

## 2024-07-11 NOTE — CARE PLAN
The patient is Watcher - Medium risk of patient condition declining or worsening    Shift Goals  Clinical Goals: monitor Bleeding, manage pain, monitor pulses  Patient Goals: rest and comfort  Family Goals: na    Progress made toward(s) clinical / shift goals:          Problem: Knowledge Deficit - Standard  Goal: Patient and family/care givers will demonstrate understanding of plan of care, disease process/condition, diagnostic tests and medications  Outcome: Progressing   Discuss and review POC with patient/family. Re-educate as needed.  Problem: Hemodynamics  Goal: Patient's hemodynamics, fluid balance and neurologic status will be stable or improve  Outcome: Progressing     Problem: Fall Risk  Goal: Patient will remain free from falls  Outcome: Progressing   Treaded socks and bed/strip alarm on, side rails up x 1. Call light within reach. Pt educated to call for assistance. Reinforce as needed. Continue to monitor.    Problem: Skin Integrity  Goal: Skin integrity is maintained or improved  Outcome: Progressing   Assess skin and monitor for skin breakdown. Alleviate pressure to bony prominences and provide assistance with turning, repositioning, ROM and mobility as appropriate. Use of heel float boats, tap system, q2hr turn, heel and sacral mepalex as needed. Continue to monitor.

## 2024-07-11 NOTE — PROGRESS NOTES
Monitor Summary:   Rhythm: sb/sr  Rate: 57-61  Measurement: .19/.14/.57  Ectopy: r pac, r pvc, r coup, r trig

## 2024-07-11 NOTE — PROGRESS NOTES
Inpatient Anticoagulation Service Note for 7/11/2024      Reason for Anticoagulation: Aortic Mechanical Valve Replacement  , Atrial Fibrillation   [id number] VASc Score: 4  HAS-BLED Score: 3    Hemoglobin Value: (!) 7.3  Hematocrit Value: (!) 23.8  Lab Platelet Value: 339  Target INR: 2.0 to 3.0    INR from last 7 days       Date/Time INR Value    07/11/24 0500 3.65    07/10/24 2242 3.47    07/10/24 1612 5.31    07/10/24 0209 3.2    07/09/24 0247 2.32    07/08/24 0314 1.91    07/07/24 1402 1.5    07/07/24 0450 1.36    07/06/24 0019 1.13    07/05/24 0833 1.03          Dose from last 7 days       Date/Time Dose (mg)    07/11/24 1628 0    07/10/24 0901 0    07/09/24 1312 5    07/07/24 0955 5    07/06/24 1312 5    07/05/24 1501 5          Average Dose (mg):  (new start)  Significant Interactions: Amiodarone, Antibiotics, Antiplatelet Medications  Bridge Therapy: No  Bridge Therapy Start Date: 07/07/24  Days of Overlap Therapy: 4   INR Value Greater than 2 Prior to Discontinuation of Parenteral Anticoagulation: Yes     Reversal Agent Administered: Not Applicable  Comments: INR is supra-therapeutic but trending down. H/H is low but appears stable; platelets are stable; no active bleeding noted. No new DDI. Holding dose for high INR and possible procedure.    Plan:  Hold off on warfarin; no dose tonight  Education Material Provided?: No (Needs Education)    Pharmacist suggested discharge dosing: Warfarin 2.5mg PO daily with follow up within 2 days.      Staci Garza, PharmD

## 2024-07-11 NOTE — PROGRESS NOTES
4 Eyes Skin Assessment Completed by DIALLO Marcelino and DIALLO Johnson.    Head WDL  Ears WDL  Nose Scab  Mouth WDL  Neck WDL  Breast/Chest Redness, Discoloration, and Incision HD cath, CVC line  Shoulder Blades WDL  Spine Redness and Blanching  (R) Arm/Elbow/Hand Redness, Bruising, and Swelling  (L) Arm/Elbow/Hand Redness, Blanching, Bruising, Swelling, and Edema incision to inner arm   Abdomen Redness, Blanching, and Incision  Groin Redness and Blanching  Scrotum/Coccyx/Buttocks Redness and Blanching  (R) Leg Redness, Blanching, Swelling, and Edema cold pale.   (L) Leg Redness, Blanching, Swelling, and Edema cold pale  (R) Heel/Foot/Toe Redness, Non-Blanching, and Edema  (L) Heel/Foot/Toe Redness, Non-Blanching, Discoloration, and Edema          Devices In Places Tele Box, Blood Pressure Cuff, and Pulse Ox      Interventions In Place Sacral Mepilex, Pillows, Elbow Mepilex, and Q2 Turns    Possible Skin Injury Yes    Pictures Uploaded Into Epic Yes  Wound Consult Placed Yes  RN Wound Prevention Protocol Ordered Yes

## 2024-07-11 NOTE — PROGRESS NOTES
Bedside report received from off going RN/tech: Elisa, assumed care of patient.     Fall Risk Score: HIGH RISK  Fall risk interventions in place: Place yellow fall risk ID band on patient, Provide patient/family education based on risk assessment, Educate patient/family to call staff for assistance when getting out of bed, Place fall precaution signage outside patient door, Place patient in room close to nursing station, and Utilize bed/chair fall alarm  Bed type: Regular (Gary Score less than 17 interventions in place)  Patient on cardiac monitor: Yes  IVF/IV medications: Infusion per MAR (List Med(s)) Ampicillin   Oxygen: How many liters 2L traced to wall O2  Bedside sitter: Not Applicable   Isolation: Not applicable

## 2024-07-11 NOTE — PROGRESS NOTES
Patient returned from surgery with PACU RN. Patient placed back on monitor and tele room notified. Patient vitals stable. Patient A&O X 4. Patient noted by the PACU RN to have changes to right thrombectomy site. Stated it was red in PACU, incision site now blue and purple with noticeable swelling and firmness on either side of incision. Patient toes are purple and foot is cold. Post-tibial pulse heard with doppler. Patient fingers on right side also newly purple and cool. Radial pulse heard with doppler. Patient oozing blood from where the ART line and old PIV was placed. Patient also oozing blood from left arm incision site. Patient notably pale. MD Blanchard notified by PACU RN of changes and provided with photo of right thigh incision site. MD Blanchard asked bedside RN to place an ice pack on incision site. Ice pack was placed. MD Sarkar also notified of patient return to floor and status change.

## 2024-07-11 NOTE — PROGRESS NOTES
Hospital Medicine Daily Progress Note    Date of Service  7/11/2024    Chief Complaint  Brian Jasmine is a 68 y.o. male admitted 6/24/2024 with left arm graft infection    Hospital Course    Interval Problem Update  Patient seen and examine at bedside  I reviewed the chart along with vitals, labs, imaging, test (both pending and resulted) and recommendations from specialists and interdisciplinary team.  67yo BMI 29.47 M former smoker, w/ h/o CAD w/ stents Afib on Coumadin, PVD s/p fempop bypass, ESRD on dialysis via L arm graft presented 6/24/2024 with L arm pain, redness, swelling 5d prior to admission. He also complained of fevers and chills. He missed dialysis and stopped taking all of his medications because he felt so poorly. AT ED, afebrile, hemodynamically stable. Leukocytosis, anemia, elevated BUN and Cr- on labs. INR 1.46. BNP 21K and elevated troponin. Nephrology consulted, temporary access catheter placed for dialysis. Anticoagulation resumed thereafter as he was subtherapeutic. Started on antibiotics for infected left arm graft. 6/24 blood cx positve for enterococci. ID consulted. Echo EF 53% normal RV however RENEE showed aortic valve vegetation consistent with endocarditis. Vascular, Cardiology and Cardiothoracic Surgery consulted. S/p resection of infected left arm dialysis graft pseudoaneurysm at arterial anastomosis, reconstruction of the left brachial artery using left greater saphenous vein interposition graft by Dr. Tinoco, Vascular on 6/25. S/p cardiac cath by Dr. Purvis, Cardiuology on 6/29. S/p AV replacement and CABG x 1 by Dr. Curiel, Cardiothoracic on 6/30. Repeat blood cultures cleared on 7/1 and tunneled catheter placed by Dr. Goldstein, IR for dialysis on 7/5. ID recommended IV daptomycin [phone] with his MWF  dialysis sessions STOP 8/11. Outpatient dialysis being arranged.   Patient developed worsening right leg ischemia, reconsulted vascular. On IV heparin.   L arm swelling,  basilic DVT on Doppler but no arterial issue, already on blood thinner therapeutic. LAST NIGHT progression of RLE discoloration. Vascular surgeon recommended repeat echo to check EF for reduced perfusion, result EF normal, RV dilated, grade 1 diastolic. I spoke with Dr. Blanchard. He is planning for revascularization since echo EF is okay. Patient updated.   WBC 14.8-15.2, Hb 10.4-9.8, K 3.6, bicarb 22, BUN 26, Cr- 4.46, INR 2.3  No discolorationof R leg  today. Patient minor discomfort arm.    Plans to revascularize the leg TODAY as echo normal EF.  PT recommended SNF when cleared    I have discussed this patient's plan of care and discharge plan at IDT rounds today with Case Management, Nursing, Nursing leadership, and other members of the IDT team.    Consultants/Specialty  cardiovascular surgeon, infectious disease, and vascular surgery    Code Status  Full Code    Disposition  Medically Cleared  I have placed the appropriate orders for post-discharge needs.    Review of Systems  Review of Systems   Constitutional:  Positive for malaise/fatigue.   Cardiovascular:  Positive for leg swelling. Negative for chest pain.   Musculoskeletal:  Positive for joint pain.        Physical Exam  Temp:  [35.9 °C (96.6 °F)-36.9 °C (98.5 °F)] 36.9 °C (98.5 °F)  Pulse:  [53-63] 57  Resp:  [12-23] 17  BP: ()/(44-85) 101/44  SpO2:  [91 %-100 %] 96 %    Physical Exam  Vitals and nursing note reviewed.   Constitutional:       General: He is not in acute distress.     Appearance: He is ill-appearing.   HENT:      Head: Normocephalic.      Mouth/Throat:      Mouth: Mucous membranes are moist.   Eyes:      Pupils: Pupils are equal, round, and reactive to light.   Cardiovascular:      Rate and Rhythm: Normal rate and regular rhythm.      Pulses: Normal pulses.      Heart sounds: Normal heart sounds.      Comments: Sternal incision clean dry and intact  Pulmonary:      Breath sounds: Rales present.   Abdominal:      Palpations:  Abdomen is soft.      Tenderness: There is no abdominal tenderness.   Musculoskeletal:         General: Swelling (LUE) present.      Cervical back: Neck supple.      Right lower leg: Edema present.      Left lower leg: Edema present.      Comments: Left arm swollen   Skin:     General: Skin is warm.      Coloration: Skin is not jaundiced.      Comments: Dusky appearing toes right greater than left   Neurological:      General: No focal deficit present.      Mental Status: He is alert and oriented to person, place, and time.   Psychiatric:         Mood and Affect: Mood normal.         Behavior: Behavior normal.         Fluids  No intake or output data in the 24 hours ending 07/11/24 1644      Laboratory  Recent Labs     07/10/24  0209 07/11/24  0217 07/11/24  0853 07/11/24  1345   WBC 15.2* 11.8*  --  18.1*   RBC 3.15* 2.34*  --  2.37*   HEMOGLOBIN 9.8* 7.3* 7.6* 7.3*   HEMATOCRIT 31.3* 23.2* 24.0* 23.8*   MCV 99.4* 99.1*  --  100.4*   MCH 31.1 31.2  --  30.8   MCHC 31.3* 31.5*  --  30.7*   RDW 72.8* 71.8*  --  75.4*   PLATELETCT 282 283  --  339   MPV 9.4 9.9  --  10.0     Recent Labs     07/09/24  0247 07/10/24  0209 07/11/24  0217   SODIUM 134* 133* 132*   POTASSIUM 3.6 3.7 3.9   CHLORIDE 98 96 96   CO2 22 20 24   GLUCOSE 94 118* 146*   BUN 26* 32* 25*   CREATININE 4.46* 5.80* 4.46*   CALCIUM 8.5 8.4* 8.3*     Recent Labs     07/10/24  1612 07/10/24  2242 07/11/24  0500   APTT >240.0*  --   --    INR 5.31* 3.47* 3.65*                 Imaging  DX-CHEST-LIMITED (1 VIEW)   Final Result      1.  Line position as described above, correlate for function and desired position   2.  Bibasilar underinflation atelectasis which could obscure an additional process.      DX-PORTABLE FLUORO > 1 HOUR   Final Result      Portable fluoroscopy utilized for 15 minutes 46 seconds.         INTERPRETING LOCATION: [address] [place], [place] [address], [id number]      DX-OPERATIVE ANGIOGRAM EACH PROJ   Final Result      Digitized intraoperative radiograph is  submitted for review. This examination is not for diagnostic purpose but for guidance during a surgical procedure. Please see the patient's chart for full procedural details.         INTERPRETING LOCATION: [address], [place], [address]      US-EXTREMITY ARTERY UPPER UNILAT LEFT   Final Result      US-EXTREMITY VENOUS UPPER UNILAT LEFT   Final Result      EC-ECHOCARDIOGRAM COMPLETE W/ CONT   Final Result      US-EXTREMITY ARTERY LOWER UNILAT RIGHT   Final Result      CT-CTA LOWER EXT WITH & W/O-POST PROCESS RIGHT   Final Result      1.  There appears to be occlusion of right common femoral to distal superficial femoral artery bypass graft.   2.  At least moderate stenosis of the tibioperoneal trunk. Occlusion of the proximal right anterior tibial artery and likely occlusion of the distal posterior tibial and peroneal arteries.   3.  Diffuse lower extremity edema. Correlate for cellulitis/infection.   4.  Subacute appearing left superior and inferior pubic rami fractures.      IR-HUERTA,GROSHONG PLACEMENT >5   Final Result      1. ULTRASOUND AND FLUOROSCOPIC GUIDED PLACEMENT OF A Right INTERNAL JUGULAR 14.5 Chinese 23 cm length [employer] TUNNELED DIALYSIS CATHETER.      2. THE HEMODIALYSIS CATHETER MAY BE USED IMMEDIATELY AS CLINICALLY INDICATED. FLUSHES PER PROTOCOL.      DX-CHEST-PORTABLE (1 VIEW)   Final Result      Likely developing mild central vascular congestion/edema. Small left and trace right pleural effusions are now seen      DX-CHEST-PORTABLE (1 VIEW)   Final Result         1.  Pulmonary edema and/or infiltrates are identified, which are stable since the prior exam.   2.  Trace left pleural effusion   3.  Cardiomegaly      DX-CHEST-LIMITED (1 VIEW)   Final Result         1.  Left IJ catheter, tip projects over the brachiocephalic vein/aorta region. This is short in position and does not extend into the SVC. Need to correlate clinically for venous blood return to exclude arterial line.   2.  Other findings  are detailed above.      EC-RENEE W/O CONT   Final Result      DX-CHEST-2 VIEWS   Final Result         1.  No focal infiltrates.   2.  Perihilar interstitial prominence and bronchial wall cuffing, appearance suggests changes of underlying bronchial inflammation, consider bronchitis.      US-VEIN MAPPING LOWER EXTREMITY BILAT   Final Result      US-CAROTID DOPPLER UNILAT LEFT   Final Result      EC-RENEE W/O CONT   Final Result      EC-ECHOCARDIOGRAM COMPLETE W/O CONT   Final Result      US-SUSI SINGLE LEVEL BILAT   Final Result      US-EXTREMITY ARTERY LOWER BILAT   Final Result      DX-CHEST-PORTABLE (1 VIEW)   Final Result      1.  Satisfactory appearance of the right IJ catheter. No pneumothorax visualized.      US-HEMODIALYSIS GRAFT DUPLEX COMP UPPER EXTREMITY   Final Result      US-HEMODIALYSIS GRAFT DUPLEX COMP UPPER EXTREMITY   Final Result      DX-CHEST-PORTABLE (1 VIEW)   Final Result      Mild enlargement of the cardiomediastinal silhouette without acute cardiopulmonary abnormality.      CL-LEFT HEART CATHETERIZATION WITH POSSIBLE INTERVENTION    (Results Pending)        Assessment/Plan  * Infected prosthetic vascular graft, initial encounter ([facility])- (present on admission)  Assessment & Plan  Vascular surgery consulted.   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 Left greater saphenous vein harvest. Reconstruction of left brachial artery using left greater saphenous vein   interposition graft. Partial removal of left upper arm dialysis graft.      S/P AVR (aortic valve replacement)- (present on admission)  Assessment & Plan  Developed aortic valve endocarditis  Status post aortic valve replacement    Bacteremia- (present on admission)  Assessment & Plan  Probably related to infected graft, status post removal.  Blood cultures positive for Enterococcus  RENEE revealed aortic valve endocarditis  Patient is status post aortic valve  replacement  Repeat blood cultures on 7/1 have been negative  Continue IV ampicillin and IV ceftriaxone while inpatient with plans to switch to IV dapto on discharge with stop date of 8/11/24  SNF for IV Abx      Coronary artery disease- (present on admission)  Assessment & Plan  Followed by cardiology in [place] with history of stent to the circumflex  S/p CABG X1 WITH SEKELTONIZED LIMA to LAD  Continue aspirin and atorvastatin    PAD (peripheral artery disease) ([facility])- (present on admission)  Assessment & Plan  Hx of previous fem-pop bypass  CTA of RLE with IV contrast that showed occlusion of right common femoral to distal superficial femoral artery bypass graft, moderate stenosis of tibioperoneal trunk.  Occlusion of the proximal right anterior tibial artery and likely occlusion of the distal posterior tibial and peroneal arteries.  I discussed the findings with vascular surgery Dr. Tinoco who believes these findings are chronic and does not require any urgent surgical intervention.  Will continue warfarin and aspirin.  INR subtherapeutic therefore we will bridge with IV heparin.  Monitor xa  Vascular surgery Dr Blanchard has been consulted  Plans to revascularize RLE.    Chronic atrial fibrillation (HCC)- (present on admission)  Assessment & Plan  He had been on coumadin but hasn't been taking it  Continue amiodarone and metoprolol  Restart warfarin, monitor INR with goal 2-3. Bridging with IV heparin, monitor Xa.   BP stable.    HR stable      End stage renal disease (HCC)- (present on admission)  Assessment & Plan  Nephrology consulted  Continue HD   S/p perma cath placement     History of alcohol abuse- (present on admission)  Assessment & Plan  Give vitamins    Renovascular hypertension- (present on admission)  Assessment & Plan  Uncontrolled   Continue coreg, valsartan and amlodipine  Started on hydralazine     Stable BP         VTE prophylaxis: warfarin    I have performed a physical exam and reviewed  and updated ROS and Plan today (7/11/2024). In review of yesterday's note (7/10/2024), there are no changes except as documented above.    Patient is has a high medical complexity, complex decision making and is at high risk for complication, morbidity, and mortality, thus requiring the highest level of my preparedness for sudden, emergent intervention. Medical decision making is therefore complex. I provided  services, which included ordering labs and/or imaging, and discussing the case with various consultants.medication orders, frequent reevaluations of the patient's condition and response to treatment. Time was also devoted to counseling and coordinating care including review of records, pertinent lab data and studies, as well as discussing diagnostic evaluation and work up, planned therapeutic interventions and future disposition of care. Where indicated, the assessment and plan reflect discussion of patient with consultants, other healthcare providers, family members, and additional research needed to obtain further information in formulating the plan of care for Brian Jasmine. Total time spent was 66 minutes.

## 2024-07-11 NOTE — PROGRESS NOTES
"                 VASCULAR SURGERY               Inpatient Progress Note  _________________________________    Vitals (7/11/2024)  /44   Pulse (!) 57   Temp 36.9 °C (98.5 °F) (Temporal)   Resp 17   Ht 1.854 m (6' 0.99\")   Wt 109 kg (240 lb 4.8 oz)   SpO2 96%   BMI 31.71 kg/m²   _________________________________    7/10/24 underwent thrombectomy and stenting of right femoropopliteal bypass graft ([place])    7/11/24 doing well.  No significant changes overnight.  The patient is sitting up in the chair.  He reports his right foot is feeling somewhat better.  Pain controlled.    BP (!) 93/35   Pulse 61   Temp 36.9 °C (98.5 °F) (Temporal)   Resp 17   Ht 1.854 m (6' 0.99\")   Wt 109 kg (240 lb 4.8 oz)   SpO2 95%   BMI 31.71 kg/m²     Alert, conversant, no distress  Short incision in the midportion of the medial right thigh closed with skin glue, mild ecchymosis, no significant hematoma  Right femoropopliteal bypass is patent with a brisk Doppler signal at the distal end of the bypass and also in the right posterior tibial artery at the ankle  The forefoot and toes are discolored bilaterally, likely residual ischemic changes from when the patient had endocarditis and had to undergo open aortic valve replacement    A/P)  Doing well from vascular standpoint.  The right femoropopliteal bypass has been recanalized and the right foot is much better perfused and has a good chance of making a full recovery    No additional surgery planned from vascular standpoint at this time.  However if the bypass does experience early rethrombosis then we could consider a right femoral to tibial bypass however this will be difficult as I do not believe the patient has adequate saphenous vein to construct the bypass and also the distal target would be the distal posterior tibial artery which appears to be small in caliber on angiogram.    Also the staples from his left groin incision can be removed anytime and I put an order " in for that.    Appreciate Hospitalist service's support.  Okay to disposition anytime from vascular surgery standpoint.  Recommend outpatient follow-up in 2 to 3 weeks so I can reassess his foot.      Jonathan Blanchard MD  [facility]  [employer] preferred or call my office [phone]  __________________________________________________________________  Patient:Brian Jasmine   MRN:[id number]   CSN:[id number]

## 2024-07-12 LAB
ABO GROUP BLD: NORMAL
ALBUMIN SERPL BCP-MCNC: 2.4 G/DL (ref 3.2–4.9)
ANION GAP SERPL CALC-SCNC: 13 MMOL/L (ref 7–16)
BARCODED ABORH UBTYP: 5100
BARCODED ABORH UBTYP: 5100
BARCODED PRD CODE UBPRD: NORMAL
BARCODED PRD CODE UBPRD: NORMAL
BARCODED UNIT NUM UBUNT: NORMAL
BARCODED UNIT NUM UBUNT: NORMAL
BLD GP AB SCN SERPL QL: NORMAL
BUN SERPL-MCNC: 29 MG/DL (ref 8–22)
CALCIUM ALBUM COR SERPL-MCNC: 9.5 MG/DL (ref 8.5–10.5)
CALCIUM SERPL-MCNC: 8.2 MG/DL (ref 8.5–10.5)
CHLORIDE SERPL-SCNC: 97 MMOL/L (ref 96–112)
CO2 SERPL-SCNC: 24 MMOL/L (ref 20–33)
COMPONENT R 8504R: NORMAL
COMPONENT R 8504R: NORMAL
CREAT SERPL-MCNC: 5.54 MG/DL (ref 0.5–1.4)
ERYTHROCYTE [DISTWIDTH] IN BLOOD BY AUTOMATED COUNT: 69.9 FL (ref 35.9–50)
ERYTHROCYTE [DISTWIDTH] IN BLOOD BY AUTOMATED COUNT: 74.1 FL (ref 35.9–50)
FERRITIN SERPL-MCNC: 1426 NG/ML (ref 22–322)
GFR SERPLBLD CREATININE-BSD FMLA CKD-EPI: 11 ML/MIN/1.73 M 2
GLUCOSE SERPL-MCNC: 99 MG/DL (ref 65–99)
HCT VFR BLD AUTO: 21.4 % (ref 42–52)
HCT VFR BLD AUTO: 24.5 % (ref 42–52)
HGB BLD-MCNC: 6.7 G/DL (ref 14–18)
HGB BLD-MCNC: 7.8 G/DL (ref 14–18)
INR PPP: 3.89 (ref 0.87–1.13)
IRON SATN MFR SERPL: 90 % (ref 15–55)
IRON SERPL-MCNC: 160 UG/DL (ref 50–180)
MCH RBC QN AUTO: 31 PG (ref 27–33)
MCH RBC QN AUTO: 31.6 PG (ref 27–33)
MCHC RBC AUTO-ENTMCNC: 31.3 G/DL (ref 32.3–36.5)
MCHC RBC AUTO-ENTMCNC: 31.8 G/DL (ref 32.3–36.5)
MCV RBC AUTO: 100.9 FL (ref 81.4–97.8)
MCV RBC AUTO: 97.2 FL (ref 81.4–97.8)
PHOSPHATE SERPL-MCNC: 5.8 MG/DL (ref 2.5–4.5)
PLATELET # BLD AUTO: 213 K/UL (ref 164–446)
PLATELET # BLD AUTO: 254 K/UL (ref 164–446)
PMV BLD AUTO: 9.8 FL (ref 9–12.9)
PMV BLD AUTO: 9.9 FL (ref 9–12.9)
POTASSIUM SERPL-SCNC: 3.5 MMOL/L (ref 3.6–5.5)
PROCALCITONIN SERPL-MCNC: 1.07 NG/ML
PRODUCT TYPE UPROD: NORMAL
PRODUCT TYPE UPROD: NORMAL
PROTHROMBIN TIME: 38.8 SEC (ref 12–14.6)
RBC # BLD AUTO: 2.12 M/UL (ref 4.7–6.1)
RBC # BLD AUTO: 2.52 M/UL (ref 4.7–6.1)
RH BLD: NORMAL
SODIUM SERPL-SCNC: 134 MMOL/L (ref 135–145)
TIBC SERPL-MCNC: 177 UG/DL (ref 250–450)
UIBC SERPL-MCNC: 17 UG/DL (ref 110–370)
UNIT STATUS USTAT: NORMAL
UNIT STATUS USTAT: NORMAL
WBC # BLD AUTO: 13.5 K/UL (ref 4.8–10.8)
WBC # BLD AUTO: 15.7 K/UL (ref 4.8–10.8)

## 2024-07-12 PROCEDURE — 700111 HCHG RX REV CODE 636 W/ 250 OVERRIDE (IP)

## 2024-07-12 PROCEDURE — 84145 PROCALCITONIN (PCT): CPT

## 2024-07-12 PROCEDURE — 86900 BLOOD TYPING SEROLOGIC ABO: CPT

## 2024-07-12 PROCEDURE — 700102 HCHG RX REV CODE 250 W/ 637 OVERRIDE(OP): Performed by: INTERNAL MEDICINE

## 2024-07-12 PROCEDURE — 770001 HCHG ROOM/CARE - MED/SURG/GYN PRIV*

## 2024-07-12 PROCEDURE — 86850 RBC ANTIBODY SCREEN: CPT

## 2024-07-12 PROCEDURE — A9270 NON-COVERED ITEM OR SERVICE: HCPCS | Performed by: INTERNAL MEDICINE

## 2024-07-12 PROCEDURE — 36430 TRANSFUSION BLD/BLD COMPNT: CPT

## 2024-07-12 PROCEDURE — 80069 RENAL FUNCTION PANEL: CPT

## 2024-07-12 PROCEDURE — 36415 COLL VENOUS BLD VENIPUNCTURE: CPT

## 2024-07-12 PROCEDURE — 700101 HCHG RX REV CODE 250: Performed by: INTERNAL MEDICINE

## 2024-07-12 PROCEDURE — 83540 ASSAY OF IRON: CPT

## 2024-07-12 PROCEDURE — 700105 HCHG RX REV CODE 258: Performed by: INTERNAL MEDICINE

## 2024-07-12 PROCEDURE — A9270 NON-COVERED ITEM OR SERVICE: HCPCS | Performed by: NURSE PRACTITIONER

## 2024-07-12 PROCEDURE — 700111 HCHG RX REV CODE 636 W/ 250 OVERRIDE (IP): Performed by: INTERNAL MEDICINE

## 2024-07-12 PROCEDURE — 86901 BLOOD TYPING SEROLOGIC RH(D): CPT

## 2024-07-12 PROCEDURE — 85027 COMPLETE CBC AUTOMATED: CPT

## 2024-07-12 PROCEDURE — 86923 COMPATIBILITY TEST ELECTRIC: CPT

## 2024-07-12 PROCEDURE — A9270 NON-COVERED ITEM OR SERVICE: HCPCS

## 2024-07-12 PROCEDURE — P9016 RBC LEUKOCYTES REDUCED: HCPCS

## 2024-07-12 PROCEDURE — 700102 HCHG RX REV CODE 250 W/ 637 OVERRIDE(OP): Performed by: NURSE PRACTITIONER

## 2024-07-12 PROCEDURE — 82728 ASSAY OF FERRITIN: CPT

## 2024-07-12 PROCEDURE — 700102 HCHG RX REV CODE 250 W/ 637 OVERRIDE(OP)

## 2024-07-12 PROCEDURE — 83550 IRON BINDING TEST: CPT

## 2024-07-12 PROCEDURE — 85610 PROTHROMBIN TIME: CPT

## 2024-07-12 PROCEDURE — 99233 SBSQ HOSP IP/OBS HIGH 50: CPT | Performed by: INTERNAL MEDICINE

## 2024-07-12 PROCEDURE — 86480 TB TEST CELL IMMUN MEASURE: CPT

## 2024-07-12 PROCEDURE — 90935 HEMODIALYSIS ONE EVALUATION: CPT

## 2024-07-12 RX ORDER — HEPARIN SODIUM 1000 [USP'U]/ML
INJECTION, SOLUTION INTRAVENOUS; SUBCUTANEOUS
Status: COMPLETED
Start: 2024-07-12 | End: 2024-07-12

## 2024-07-12 RX ADMIN — HEPARIN SODIUM 1800 UNITS: 1000 INJECTION, SOLUTION INTRAVENOUS; SUBCUTANEOUS at 12:51

## 2024-07-12 RX ADMIN — CEFTRIAXONE SODIUM 2000 MG: 10 INJECTION, POWDER, FOR SOLUTION INTRAVENOUS at 18:00

## 2024-07-12 RX ADMIN — OXYCODONE HYDROCHLORIDE 10 MG: 10 TABLET ORAL at 04:54

## 2024-07-12 RX ADMIN — Medication 1 APPLICATOR: at 09:00

## 2024-07-12 RX ADMIN — EPOETIN ALFA-EPBX 10000 UNITS: 10000 INJECTION, SOLUTION INTRAVENOUS; SUBCUTANEOUS at 11:39

## 2024-07-12 RX ADMIN — METOPROLOL TARTRATE 25 MG: 25 TABLET, FILM COATED ORAL at 16:38

## 2024-07-12 RX ADMIN — AMPICILLIN SODIUM 2000 MG: 2 INJECTION, POWDER, FOR SOLUTION INTRAVENOUS at 16:47

## 2024-07-12 RX ADMIN — THERA TABS 1 TABLET: TAB at 04:52

## 2024-07-12 RX ADMIN — FOLIC ACID 1 MG: 1 TABLET ORAL at 04:52

## 2024-07-12 RX ADMIN — ATORVASTATIN CALCIUM 40 MG: 40 TABLET, FILM COATED ORAL at 20:56

## 2024-07-12 RX ADMIN — AMPICILLIN SODIUM 2000 MG: 2 INJECTION, POWDER, FOR SOLUTION INTRAVENOUS at 04:50

## 2024-07-12 RX ADMIN — CEFTRIAXONE SODIUM 2000 MG: 10 INJECTION, POWDER, FOR SOLUTION INTRAVENOUS at 04:58

## 2024-07-12 RX ADMIN — AMIODARONE HYDROCHLORIDE 400 MG: 200 TABLET ORAL at 16:36

## 2024-07-12 RX ADMIN — OMEPRAZOLE 20 MG: 20 CAPSULE, DELAYED RELEASE ORAL at 04:53

## 2024-07-12 RX ADMIN — Medication 1 APPLICATOR: at 20:57

## 2024-07-12 RX ADMIN — ASPIRIN 81 MG: 81 TABLET, COATED ORAL at 04:52

## 2024-07-12 RX ADMIN — DOCUSATE SODIUM 100 MG: 100 CAPSULE, LIQUID FILLED ORAL at 16:36

## 2024-07-12 RX ADMIN — OXYCODONE HYDROCHLORIDE 10 MG: 10 TABLET ORAL at 20:56

## 2024-07-12 RX ADMIN — Medication 100 MG: at 04:53

## 2024-07-12 RX ADMIN — AMIODARONE HYDROCHLORIDE 400 MG: 200 TABLET ORAL at 04:52

## 2024-07-12 ASSESSMENT — PAIN DESCRIPTION - PAIN TYPE
TYPE: ACUTE PAIN

## 2024-07-12 ASSESSMENT — FIBROSIS 4 INDEX: FIB4 SCORE: 1.95

## 2024-07-12 NOTE — PROGRESS NOTES
Pt going down to dialysis. Monitor room notified.Patient being wheeled off the floor by transport.

## 2024-07-12 NOTE — PROGRESS NOTES
Monitor Summary  Rhythm: SB  Rate: 57-59  Ectopy: PVC, COUP, PAC  .17 / .09 / .53

## 2024-07-12 NOTE — CARE PLAN
The patient is Stable - Low risk of patient condition declining or worsening    Shift Goals  Clinical Goals: vascular checks, monitor bleeding, manage pain  Patient Goals: rest  Family Goals: na    Progress made toward(s) clinical / shift goals:    Problem: Pain - Standard  Goal: Alleviation of pain or a reduction in pain to the patient’s comfort goal  Outcome: Progressing  Note: Patient reports 8/10 pain. Patient medicated per MAR. Patient reports improvement.      Problem: Knowledge Deficit - Standard  Goal: Patient and family/care givers will demonstrate understanding of plan of care, disease process/condition, diagnostic tests and medications  Outcome: Progressing  Note: POC discussed with patient. Monitoring patient surgery sites.      Problem: Fall Risk  Goal: Patient will remain free from falls  Outcome: Progressing  Note: Fall precautions in place. Bed alarm on.        Patient is not progressing towards the following goals:

## 2024-07-12 NOTE — PROGRESS NOTES
Inpatient Anticoagulation Service Note for 7/12/2024      Reason for Anticoagulation: Aortic Mechanical Valve Replacement  , Atrial Fibrillation   [id number] VASc Score: 4  HAS-BLED Score: 3    Hemoglobin Value: (!) 7.8  Hematocrit Value: (!) 24.5  Lab Platelet Value: 254  Target INR: 2.0 to 3.0    INR from last 7 days       Date/Time INR Value    07/12/24 0139 3.89    07/11/24 0500 3.65    07/10/24 2242 3.47    07/10/24 1612 5.31    07/10/24 0209 3.2    07/09/24 0247 2.32    07/08/24 0314 1.91    07/07/24 1402 1.5    07/07/24 0450 1.36    07/06/24 0019 1.13          Dose from last 7 days       Date/Time Dose (mg)    07/12/24 1335 0    07/11/24 1628 0    07/10/24 0901 0    07/09/24 1312 5    07/07/24 0955 5    07/06/24 1312 5    07/05/24 1501 5          Average Dose (mg):  (new start)  Significant Interactions: Amiodarone, Antibiotics, Antiplatelet Medications  Bridge Therapy: No  Bridge Therapy Start Date: 07/07/24 end 7/10/24  Days of Overlap Therapy: 4 (supratherapeutic INR; recent post op bleeding)  INR Value Greater than 2 Prior to Discontinuation of Parenteral Anticoagulation: Yes     Reversal Agent Administered: Not Applicable  Assessment:   Patient was started on warfarin on 7/5/2024 for aortic valve replacement secondary to infective endocarditis and afib.   Patient was started on warfarin 5 mg daily however on 7/10 INR started to become supratherapeutic reaching up to 5.31 (unclear if falsely elevated given trend) therefore warfarin was held.   Heparin bridge was also stopped given supratherapeutic levels and recent post-op bleeding.   No signs/symptoms of bleeding  DDI: Amiodarone, antibiotics  INR remains above goal and is trending up likely seeing effects from drug interactions. Continue to hold warfarin until INR < 3.    Plan:  HOLD on 7/12  Education Material Provided?: No (Needs Education)    Pharmacist suggested discharge dosing: TBD dependent on INR trend. Likely will require a reduced dose of ~ 3 mg  daily with close INR follow up within 48 hours of discharge.     Robina Mcclellan, NiurkaD

## 2024-07-12 NOTE — PROGRESS NOTES
Bedside report received from off going RN/tech: Elisa, assumed care of patient.     Fall Risk Score: MODERATE RISK  Fall risk interventions in place: Place yellow fall risk ID band on patient, Provide patient/family education based on risk assessment, Educate patient/family to call staff for assistance when getting out of bed, Place fall precaution signage outside patient door, Place patient in room close to nursing station, and Utilize bed/chair fall alarm  Bed type: Regular (Gary Score less than 17 interventions in place)  Patient on cardiac monitor: Yes  IVF/IV medications: Infusion per MAR (List Med(s)) TKO at 10ml/hr  Oxygen: Room Air  Bedside sitter: Not Applicable   Isolation: Not applicable

## 2024-07-12 NOTE — PROGRESS NOTES
[facility] Services Progress Note     Hemodialysis treatment ordered today per Dr. January Hare x 3 hours.   Treatment initiated at 0949 completed at 1249.   SBP at 90's; asymptomatic    See electronic flow sheet for details.      Net UF 0 mL.   Limited by soft SBP at 's     Post tx, CVC flushed with saline then locked with heparin 1000 units/mL per designated amount in each wing then clamped and capped. Aspirate heparin prior to next CVC use.     Report given to Primary RN Elizabeth Barreto .

## 2024-07-12 NOTE — PROGRESS NOTES
[facility] -  PROGRESS NOTE               Author: January Hare M.D. Date & Time: 7/12/2024  10:51 AM     HPI:  68-year-old  male with biventricular history of ESRD on hemodialysis q. Monday Wednesday Friday at [facility] through left upper arm AV fistula that was placed about 2 to 3 years ago.  He presented to the ER with left arm pain and swelling for the last 5 days.  His last dialysis was on Wednesday and had no dialysis in the last 5 days.  He felt sick on Friday and missed his dialysis.  Ultrasound of the left upper arm AV fistula showed there is no flow in the AV fistula and it has thrombosed.  A temporary right IJ dialysis catheter was placed in the ER.  Nephrology has been consulted for management of dialysis.  Patient denies any fever or chills, no nausea or vomiting, no chest pain.  He has some shortness of breath but is able to talk in full sentences and is in no acute respiratory distress at this time. No melena, hematochezia, hematemesis.  No HA, visual changes, or abdominal pain.     NEPHROLOGY DAILY PROGRESS:   6/24: consult note  6/25:  Afebrile, HD last night without any complications, plans noted for OR today with Dr. Tinoco for vascualr surgery   6/26: Underwent AVG repair yesterday with Dr. Tinoco, has LAMBERT drain, BP noted low this AM on dialysis, says BP 's run low on dialysis - not on Midodrine, ordered Midodrine to be used on dialysis   6/27: 0 Net UF with HD yesterday, limited by hypotension. Sitting up at side of bed eating breakfast. Bps improved 130s systolic.   6/28: HD today, seen  6/29: s/p HD yesterday over temp HD line. Tolerated well, no complaints.  6/30: in OR, not available   7/1: s/p AVR, 1V CABG and TAHIR resection/ligation, transferred to ICU, now off pressors, fatigued, post-op pain  7/2: Stable hemodynamics, tolerated HD, transferred out of ICU, chest wall pain improved  7/3: Stable hemodynamics, 7/1 BC remain negative, denies  "complaints   7/4: No acute events, tolerated HD, chest wall pain improved, feeling better overall  7/5: No acute events, no chest pain or SOB. S/p permacath placement this AM  7/6: No acute events, hypertensive, denies complaints  7/7: No acute events, feels at baseline, no chest pain or SOB, Diminsed pulses RLE with discoloration-pending CTA  7/8: patient is sitting in a chair, doing well, just came back from dialysis, tolerated well, net UF 3000 ml  7/9: s/p iHD yesterday, no new issues reported  7/10: No events, BP stable, stable on RA, pt seen and examined on HD this am, VSS--see dialysis treatment sheet for full details, denies any CP/SOB, reports right foot pain controlled at this time, to go to OR today for revascularization of right foot  7/11: No events, tolerated HD yest with 2L UF, hypotensive overnight and BP borderline low this am, stable on 2L NC O2, underwent thrombectomy and stent placement of right fem-pop bypass graft yest, reports right toes are not as dark, denies any CP/SOB, feels tired today but denies any lightheadedness or dizziness  7/12: No events, BP stable this morning borderline low on HD, patient seen and examined on HD this morning, VSS--see dialysis treatment sheet for full details, denies any CP/SOB, reports right foot pain is improving    REVIEW OF SYSTEMS:    10 point ROS reviewed and is as per HPI/daily summary or otherwise negative    PMH/PSH/SH/FH:   Reviewed and unchanged since admission note    CURRENT MEDICATIONS:   Reviewed from admission to present day    VS:  /53   Pulse 61   Temp 36.5 °C (97.7 °F) (Temporal)   Resp 14   Ht 1.854 m (6' 0.99\")   Wt 109 kg (240 lb 4.8 oz)   SpO2 95%   BMI 31.71 kg/m²     Physical Exam  Vitals and nursing note reviewed.   Constitutional:       General: He is not in acute distress.     Appearance: He is obese.   HENT:      Head: Normocephalic and atraumatic.   Eyes:      General: No scleral icterus.  Cardiovascular:      Rate and " Rhythm: Normal rate and regular rhythm.      Comments: +R chest PC  Pulmonary:      Effort: Pulmonary effort is normal. No respiratory distress.      Breath sounds: Normal breath sounds.   Abdominal:      General: Bowel sounds are normal. There is no distension.      Palpations: Abdomen is soft.   Musculoskeletal:         General: No deformity.      Cervical back: Normal range of motion and neck supple.      Right lower leg: Edema present.      Left lower leg: Edema present.   Skin:     General: Skin is warm and dry.      Findings: No rash.      Comments: +dusky toes right foot that are lighter in color today   Neurological:      General: No focal deficit present.      Mental Status: He is alert and oriented to person, place, and time.   Psychiatric:         Mood and Affect: Mood normal.         Behavior: Behavior normal. Behavior is cooperative.         Fluids:  In: 778 [P.O.:240; Blood:538]  Out: -     LABS:  Recent Labs     07/10/24  0209 07/11/24  0217 07/12/24  0139   SODIUM 133* 132* 134*   POTASSIUM 3.7 3.9 3.5*   CHLORIDE 96 96 97   CO2 20 24 24   GLUCOSE 118* 146* 99   BUN 32* 25* 29*   CREATININE 5.80* 4.46* 5.54*   CALCIUM 8.4* 8.3* 8.2*       IMAGING:   All imaging reviewed from admission to present day    IMPRESSION:  # ESRD  -Q. MWF at [facility]  # Bacteremia   - Blood Cx from 6/24, 6/27 + E. Faecalis  # Bacterial endocarditis:   -s/p aortic valve replacement 6/30  3 CAD  -s/p 1V CABG 6/30  #Thrombosed left upper arm AVG; infected pseudoaneurysm   -Right IJ temporary HD catheter placed in ER 6/24/2024  - S/P AVG repair, resection and pseudoaneurysm resection 6/25/24 by Dr. Tinoco along with  Reconstruction of left brachial artery using left greater saphenous vein interposition graft.  -permacath placed 7/5  # HTN--BP low  # Sepsis   # Anemia of CKD  # CKD-MBD  -phos at goal  # Coronary artery disease  # Chronic A-fib  -s/p TAHIR resection/ligation        PLAN:  - No HD today (THURS), continue q  MWF schedule   - Challenging UF as BP tolerates, limited UF today due to hypotension  - Vascular surgery following  - Holding parameters adjusted on BP meds  - DC losartan for now  -Consider decreasing metoprolol if BP remains low  - Avoid aggressive lowering of BP   - Using permacath until graft cleared for use by vascular  - EPO with HD  - No dietary protein restrictions  - abx per primary  - Dose all meds per ESRD

## 2024-07-12 NOTE — WOUND TEAM
[facility]  Inpatient Services  Wound and Skin Care Brief Evaluation    Admission Date: 6/24/2024     Last order of IP CONSULT TO WOUND CARE was found on 7/8/2024 from Hospital Encounter on 6/24/2024     HPI, PMH, SH: Reviewed    Chief Complaint   Patient presents with    Arm Pain    Rapid Heart Beat     Diagnosis: Infected prosthetic vascular graft, initial encounter ([facility], located [facility]) [T82.7XXA]    Unit where seen by Wound Team: T821/00     Wound consult placed regarding BL feet, toes, and heels. Chart and images reviewed. This clinician in to assess patient. Patient pleasant and agreeable. Patient right 5th toe with ischemic ulcer. Betadine ordered to keep clean and dry. No other wounds visualized to bilateral feet and toes. BL heels intact with blanchable redness. Offloading dressings applied.     No pressure injuries or advanced wound care needs identified. Wound consult completed. No further follow up unless indicated and consulted.           Wound 06/24/24 Arterial Ulcer MTH 5 Right right 5th plantar toe ischemic (Active)   Wound Image    07/11/24 1700   Site Assessment Black;Red 07/11/24 1700   Periwound Assessment Intact 07/11/24 1700   Margins Attached edges 07/11/24 1700   Closure Open to air 07/11/24 1700   Drainage Amount None 07/11/24 1700   Treatments Cleansed;Site care 07/11/24 1700   Wound Cleansing Povidone-Iodine 07/11/24 1700   Dressing Status Open to Air 07/11/24 1700   Dressing Change/Treatment Frequency Every Shift, and As Needed 07/11/24 1700   NEXT Dressing Change/Treatment Date 07/11/24 07/11/24 1700   NEXT Weekly Photo (Inpatient Only) 07/18/24 07/11/24 1700   Wound Team Following Not following 07/11/24 1700   Non-staged Wound Description Full thickness 06/25/24 1500          PREVENTATIVE INTERVENTIONS:    Q shift Gary - performed per nursing policy  Q shift pressure point assessments - performed per nursing policy    Surface/Positioning  Standard/trauma mattress - Currently in  Place    Offloading/Redistribution  Heel offloading dressing (Silicone dressing) - Applied this Visit

## 2024-07-12 NOTE — PROGRESS NOTES
Vascular surgery    Patient awake alert sitting up in a chair  Bilateral feet are edematous  No complaints with respect to discomfort of his feet  Incisions okay    Patient deconditioned  Vascular surgery will follow loosely    Seferino Boston MD

## 2024-07-12 NOTE — PROGRESS NOTES
Hospital Medicine Daily Progress Note    Date of Service  7/12/2024    Chief Complaint  Brian Jasmine is a 68 y.o. male admitted 6/24/2024 with left arm graft infection    Hospital Course    Interval Problem Update  Patient seen and examine at bedside  I reviewed the chart along with vitals, labs, imaging, test (both pending and resulted) and recommendations from specialists and interdisciplinary team.  69yo BMI 29.47 M former smoker, w/ h/o CAD w/ stents Afib on Coumadin, PVD s/p fempop bypass, ESRD on dialysis via L arm graft presented 6/24/2024 with L arm pain, redness, swelling 5d prior to admission. He also complained of fevers and chills. He missed dialysis and stopped taking all of his medications because he felt so poorly. AT ED, afebrile, hemodynamically stable. Leukocytosis, anemia, elevated BUN and Cr- on labs. INR 1.46. BNP 21K and elevated troponin. Nephrology consulted, temporary access catheter placed for dialysis. Anticoagulation resumed thereafter as he was subtherapeutic. Started on antibiotics for infected left arm graft. 6/24 blood cx positve for enterococci. ID consulted. Echo EF 53% normal RV however RENEE showed aortic valve vegetation consistent with endocarditis. Vascular, Cardiology and Cardiothoracic Surgery consulted. S/p resection of infected left arm dialysis graft pseudoaneurysm at arterial anastomosis, reconstruction of the left brachial artery using left greater saphenous vein interposition graft by Dr. Tinoco, Vascular on 6/25. S/p cardiac cath by Dr. Purvis, Cardiuology on 6/29. S/p AV replacement and CABG x 1 by Dr. Curiel, Cardiothoracic on 6/30. Repeat blood cultures cleared on 7/1 and tunneled catheter placed by Dr. Goldstein, IR for dialysis on 7/5.     Managing dialysis graft infection, endocarditis, R leg ischemia, basilic superficial VT.  ID recommended IV daptomycin [phone] with his MWF  dialysis sessions STOP 8/11. Repeat echo to check EF for reduced perfusion,  result EF normal, RV dilated, grade 1 diastolic. S/p thrombectomy and stent at R fem-pop bypass by Dr. Blanchard 7/10  WBC 14.8-15.2-13.5-15.7, Hb 10.4-9.8-6.7-7.8 reviewed iron panel its OK no active bleeding, K 3.6-3.7-3.5 replace get Mg level, bicarb normalized, BUN 26-32-29, Cr- 4.46-5.8-5.54 on dialysis, INR 2.32-5.31-3.89  Postop instructions: anticoagulation resumed and may need fem-tib bypass or amputation as he has severe tibial disease.  Supratheraeputic INR.  Vascular cleared. Nephrology likely clears  ID recommended IV daptomycin [phone] with his MWF  dialysis sessions STOP 8/11. Outpatient dialysis being arranged. PT recommended SNF. [facility] evaluating.    I have discussed this patient's plan of care and discharge plan at IDT rounds today with Case Management, Nursing, Nursing leadership, and other members of the IDT team.    Consultants/Specialty  cardiovascular surgeon, infectious disease, and vascular surgery    Code Status  Full Code    Disposition  Medically Cleared  I have placed the appropriate orders for post-discharge needs.    Review of Systems  Review of Systems   Constitutional:  Positive for malaise/fatigue.   Cardiovascular:  Positive for leg swelling. Negative for chest pain.   Musculoskeletal:  Positive for joint pain.        Physical Exam  Temp:  [36.1 °C (96.9 °F)-36.6 °C (97.9 °F)] 36.4 °C (97.5 °F)  Pulse:  [53-63] 63  Resp:  [12-17] 16  BP: ()/(35-58) 129/58  SpO2:  [92 %-96 %] 95 %    Physical Exam  Vitals and nursing note reviewed.   Constitutional:       General: He is not in acute distress.     Appearance: He is ill-appearing.   HENT:      Head: Normocephalic.      Mouth/Throat:      Mouth: Mucous membranes are moist.   Eyes:      Pupils: Pupils are equal, round, and reactive to light.   Cardiovascular:      Rate and Rhythm: Normal rate and regular rhythm.      Pulses: Normal pulses.      Heart sounds: Normal heart sounds.      Comments: Sternal incision clean dry  and intact  Pulmonary:      Breath sounds: Rales present.   Abdominal:      Palpations: Abdomen is soft.      Tenderness: There is no abdominal tenderness.   Musculoskeletal:         General: Swelling (LUE) present.      Cervical back: Neck supple.      Right lower leg: Edema present.      Left lower leg: Edema present.      Comments: Left arm swollen   Skin:     General: Skin is warm.      Coloration: Skin is not jaundiced.      Comments: Dusky appearing toes right greater than left   Neurological:      General: No focal deficit present.      Mental Status: He is alert and oriented to person, place, and time.   Psychiatric:         Mood and Affect: Mood normal.         Behavior: Behavior normal.         Fluids    Intake/Output Summary (Last 24 hours) at 7/12/2024 1609  Last data filed at 7/12/2024 1251  Gross per 24 hour   Intake 1278 ml   Output 500 ml   Net 778 ml         Laboratory  Recent Labs     07/11/24  1345 07/12/24  0139 07/12/24  0901   WBC 18.1* 13.5* 15.7*   RBC 2.37* 2.12* 2.52*   HEMOGLOBIN 7.3* 6.7* 7.8*   HEMATOCRIT 23.8* 21.4* 24.5*   .4* 100.9* 97.2   MCH 30.8 31.6 31.0   MCHC 30.7* 31.3* 31.8*   RDW 75.4* 74.1* 69.9*   PLATELETCT 339 213 254   MPV 10.0 9.8 9.9     Recent Labs     07/10/24  0209 07/11/24  0217 07/12/24  0139   SODIUM 133* 132* 134*   POTASSIUM 3.7 3.9 3.5*   CHLORIDE 96 96 97   CO2 20 24 24   GLUCOSE 118* 146* 99   BUN 32* 25* 29*   CREATININE 5.80* 4.46* 5.54*   CALCIUM 8.4* 8.3* 8.2*     Recent Labs     07/10/24  1612 07/10/24  2242 07/11/24  0500 07/12/24  0139   APTT >240.0*  --   --   --    INR 5.31* 3.47* 3.65* 3.89*                 Imaging  DX-CHEST-LIMITED (1 VIEW)   Final Result      1.  Line position as described above, correlate for function and desired position   2.  Bibasilar underinflation atelectasis which could obscure an additional process.      DX-PORTABLE FLUORO > 1 HOUR   Final Result      Portable fluoroscopy utilized for 15 minutes 46 seconds.          INTERPRETING LOCATION: [address] , [place], [address]      DX-OPERATIVE ANGIOGRAM EACH PROJ   Final Result      Digitized intraoperative radiograph is submitted for review. This examination is not for diagnostic purpose but for guidance during a surgical procedure. Please see the patient's chart for full procedural details.         INTERPRETING LOCATION: [address], [place], [address]      US-EXTREMITY ARTERY UPPER UNILAT LEFT   Final Result      US-EXTREMITY VENOUS UPPER UNILAT LEFT   Final Result      EC-ECHOCARDIOGRAM COMPLETE W/ CONT   Final Result      US-EXTREMITY ARTERY LOWER UNILAT RIGHT   Final Result      CT-CTA LOWER EXT WITH & W/O-POST PROCESS RIGHT   Final Result      1.  There appears to be occlusion of right common femoral to distal superficial femoral artery bypass graft.   2.  At least moderate stenosis of the tibioperoneal trunk. Occlusion of the proximal right anterior tibial artery and likely occlusion of the distal posterior tibial and peroneal arteries.   3.  Diffuse lower extremity edema. Correlate for cellulitis/infection.   4.  Subacute appearing left superior and inferior pubic rami fractures.      IR-HUERTA,GROSHONG PLACEMENT >5   Final Result      1. ULTRASOUND AND FLUOROSCOPIC GUIDED PLACEMENT OF A Right INTERNAL JUGULAR 14.5 Amharic 23 cm length [employer] TUNNELED DIALYSIS CATHETER.      2. THE HEMODIALYSIS CATHETER MAY BE USED IMMEDIATELY AS CLINICALLY INDICATED. FLUSHES PER PROTOCOL.      DX-CHEST-PORTABLE (1 VIEW)   Final Result      Likely developing mild central vascular congestion/edema. Small left and trace right pleural effusions are now seen      DX-CHEST-PORTABLE (1 VIEW)   Final Result         1.  Pulmonary edema and/or infiltrates are identified, which are stable since the prior exam.   2.  Trace left pleural effusion   3.  Cardiomegaly      DX-CHEST-LIMITED (1 VIEW)   Final Result         1.  Left IJ catheter, tip projects over the brachiocephalic vein/aorta region. This is  short in position and does not extend into the SVC. Need to correlate clinically for venous blood return to exclude arterial line.   2.  Other findings are detailed above.      EC-RENEE W/O CONT   Final Result      DX-CHEST-2 VIEWS   Final Result         1.  No focal infiltrates.   2.  Perihilar interstitial prominence and bronchial wall cuffing, appearance suggests changes of underlying bronchial inflammation, consider bronchitis.      US-VEIN MAPPING LOWER EXTREMITY BILAT   Final Result      US-CAROTID DOPPLER UNILAT LEFT   Final Result      EC-RENEE W/O CONT   Final Result      EC-ECHOCARDIOGRAM COMPLETE W/O CONT   Final Result      US-SUSI SINGLE LEVEL BILAT   Final Result      US-EXTREMITY ARTERY LOWER BILAT   Final Result      DX-CHEST-PORTABLE (1 VIEW)   Final Result      1.  Satisfactory appearance of the right IJ catheter. No pneumothorax visualized.      US-HEMODIALYSIS GRAFT DUPLEX COMP UPPER EXTREMITY   Final Result      US-HEMODIALYSIS GRAFT DUPLEX COMP UPPER EXTREMITY   Final Result      DX-CHEST-PORTABLE (1 VIEW)   Final Result      Mild enlargement of the cardiomediastinal silhouette without acute cardiopulmonary abnormality.      CL-LEFT HEART CATHETERIZATION WITH POSSIBLE INTERVENTION    (Results Pending)        Assessment/Plan  * Infected prosthetic vascular graft, initial encounter ([facility])- (present on admission)  Assessment & Plan  Vascular surgery consulted.   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 Left greater saphenous vein harvest. Reconstruction of left brachial artery using left greater saphenous vein   interposition graft. Partial removal of left upper arm dialysis graft.      S/P AVR (aortic valve replacement)- (present on admission)  Assessment & Plan  Developed aortic valve endocarditis  Status post aortic valve replacement    Bacteremia- (present on admission)  Assessment & Plan  Probably related to infected graft, status  post removal.  Blood cultures positive for Enterococcus  RENEE revealed aortic valve endocarditis  Patient is status post aortic valve replacement  Repeat blood cultures on 7/1 have been negative  Continue IV ampicillin and IV ceftriaxone while inpatient with plans to switch to IV dapto on discharge with stop date of 8/11/24  SNF for IV Abx      Coronary artery disease- (present on admission)  Assessment & Plan  Followed by cardiology in [place] with history of stent to the circumflex  S/p CABG X1 WITH SEKELTONIZED LIMA to LAD  Continue aspirin and atorvastatin    PAD (peripheral artery disease) ([facility])- (present on admission)  Assessment & Plan  Hx of previous fem-pop bypass  CTA of RLE with IV contrast that showed occlusion of right common femoral to distal superficial femoral artery bypass graft, moderate stenosis of tibioperoneal trunk.  Occlusion of the proximal right anterior tibial artery and likely occlusion of the distal posterior tibial and peroneal arteries.  I discussed the findings with vascular surgery Dr. Tinoco who believes these findings are chronic and does not require any urgent surgical intervention.  Will continue warfarin and aspirin.  INR subtherapeutic therefore we will bridge with IV heparin.  Monitor xa  Vascular surgery Dr Blanchard has been consulted  S/p surgery EF okay  Resume anticoagulation  Monitor for progress, may need fem tib bypass or amputation if not improving.    Chronic atrial fibrillation (HCC)- (present on admission)  Assessment & Plan  He had been on coumadin but hasn't been taking it  Continue amiodarone and metoprolol  Restart warfarin, monitor INR with goal 2-3. Bridging with IV heparin, monitor Xa.   Vitals:    07/12/24 1456   BP: 129/58   Pulse: 63   Resp: 16   Temp: 36.4 °C (97.5 °F)   SpO2: 95%     HR stable      End stage renal disease (HCC)- (present on admission)  Assessment & Plan  Nephrology consulted  Continue HD   S/p perma cath placement     History of alcohol  abuse- (present on admission)  Assessment & Plan  Give vitamins    Renovascular hypertension- (present on admission)  Assessment & Plan  Uncontrolled   Continue coreg, valsartan and amlodipine  Started on hydralazine   Vitals:    07/12/24 1456   BP: 129/58   Pulse: 63   Resp: 16   Temp: 36.4 °C (97.5 °F)   SpO2: 95%     Low normal BP         VTE prophylaxis: warfarin    I have performed a physical exam and reviewed and updated ROS and Plan today (7/12/2024). In review of yesterday's note (7/11/2024), there are no changes except as documented above.    Patient is has a high medical complexity, complex decision making and is at high risk for complication, morbidity, and mortality, thus requiring the highest level of my preparedness for sudden, emergent intervention. Medical decision making is therefore complex. I provided  services, which included ordering labs and/or imaging, and discussing the case with various consultants.medication orders, frequent reevaluations of the patient's condition and response to treatment. Time was also devoted to counseling and coordinating care including review of records, pertinent lab data and studies, as well as discussing diagnostic evaluation and work up, planned therapeutic interventions and future disposition of care. Where indicated, the assessment and plan reflect discussion of patient with consultants, other healthcare providers, family members, and additional research needed to obtain further information in formulating the plan of care for Brian Jasmine. Total time spent was 51 minutes.

## 2024-07-12 NOTE — PROGRESS NOTES
4 Eyes Skin Assessment Completed by DIALLO Johnson and DIALLO Dee.    Head WDL  Ears Redness  Nose WDL  Mouth WDL  Neck Redness L triple IJ with oozing noted.   Breast/Chest Redness R HD cath in place dressing CDI placed by dialysis RN. Midline incision intact. Shoulder Blades WDL  Spine Redness and Blanching  (R) Arm/Elbow/Hand Redness, Bruising, Swelling, and Discoloration, Slow to elizabeth. Elbow mepilex applied  (L) Arm/Elbow/Hand Redness and Bruising/Swelling. L>R  Abdomen Redness and Bruising  Groin Redness and Blanching L groin site CDI.  Scrotum/Coccyx/Buttocks Redness, Blanching, and Moisture Fissure  (R) Leg Redness, Swelling, and Edema   (L) Leg Redness, Swelling, and Edema   (R) Heel/Foot/Toe Redness, Discoloration, and Boggy Non blanching areas to toes  (L) Heel/Foot/Toe Redness, Discoloration, and Boggy Non blanching areas to toes noted.          Devices In Places Tele Box, Blood Pressure Cuff, and Pulse Ox      Interventions In Place Gray Ear Foams, Heel Mepilex, Sacral Mepilex, Chair Waffle, TAP System, Pillows, and Q2 Turns    Possible Skin Injury Yes    Pictures Uploaded Into Epic Yes  Wound Consult Placed Yes  RN Wound Prevention Protocol Ordered Yes

## 2024-07-13 LAB
ALBUMIN SERPL BCP-MCNC: 2.5 G/DL (ref 3.2–4.9)
ANION GAP SERPL CALC-SCNC: 13 MMOL/L (ref 7–16)
BUN SERPL-MCNC: 20 MG/DL (ref 8–22)
CALCIUM ALBUM COR SERPL-MCNC: 9.2 MG/DL (ref 8.5–10.5)
CALCIUM SERPL-MCNC: 8 MG/DL (ref 8.5–10.5)
CHLORIDE SERPL-SCNC: 101 MMOL/L (ref 96–112)
CO2 SERPL-SCNC: 23 MMOL/L (ref 20–33)
CREAT SERPL-MCNC: 4.18 MG/DL (ref 0.5–1.4)
ERYTHROCYTE [DISTWIDTH] IN BLOOD BY AUTOMATED COUNT: 75.9 FL (ref 35.9–50)
GFR SERPLBLD CREATININE-BSD FMLA CKD-EPI: 15 ML/MIN/1.73 M 2
GLUCOSE BLD STRIP.AUTO-MCNC: 107 MG/DL (ref 65–99)
GLUCOSE SERPL-MCNC: 91 MG/DL (ref 65–99)
HCT VFR BLD AUTO: 21.2 % (ref 42–52)
HCT VFR BLD AUTO: 26.5 % (ref 42–52)
HGB BLD-MCNC: 6.8 G/DL (ref 14–18)
HGB BLD-MCNC: 8.4 G/DL (ref 14–18)
INR PPP: 1.6 (ref 0.87–1.13)
INR PPP: 2.15 (ref 0.87–1.13)
MCH RBC QN AUTO: 31.5 PG (ref 27–33)
MCHC RBC AUTO-ENTMCNC: 32.1 G/DL (ref 32.3–36.5)
MCV RBC AUTO: 98.1 FL (ref 81.4–97.8)
PHOSPHATE SERPL-MCNC: 3.8 MG/DL (ref 2.5–4.5)
PLATELET # BLD AUTO: 190 K/UL (ref 164–446)
PMV BLD AUTO: 10 FL (ref 9–12.9)
POTASSIUM SERPL-SCNC: 4.1 MMOL/L (ref 3.6–5.5)
PROTHROMBIN TIME: 19.3 SEC (ref 12–14.6)
PROTHROMBIN TIME: 24.3 SEC (ref 12–14.6)
RBC # BLD AUTO: 2.16 M/UL (ref 4.7–6.1)
SODIUM SERPL-SCNC: 137 MMOL/L (ref 135–145)
WBC # BLD AUTO: 11.5 K/UL (ref 4.8–10.8)

## 2024-07-13 PROCEDURE — 85018 HEMOGLOBIN: CPT

## 2024-07-13 PROCEDURE — 770001 HCHG ROOM/CARE - MED/SURG/GYN PRIV*

## 2024-07-13 PROCEDURE — 97602 WOUND(S) CARE NON-SELECTIVE: CPT

## 2024-07-13 PROCEDURE — 85027 COMPLETE CBC AUTOMATED: CPT

## 2024-07-13 PROCEDURE — 36430 TRANSFUSION BLD/BLD COMPNT: CPT

## 2024-07-13 PROCEDURE — 80069 RENAL FUNCTION PANEL: CPT

## 2024-07-13 PROCEDURE — 700101 HCHG RX REV CODE 250: Performed by: INTERNAL MEDICINE

## 2024-07-13 PROCEDURE — A9270 NON-COVERED ITEM OR SERVICE: HCPCS | Performed by: NURSE PRACTITIONER

## 2024-07-13 PROCEDURE — P9016 RBC LEUKOCYTES REDUCED: HCPCS

## 2024-07-13 PROCEDURE — 82962 GLUCOSE BLOOD TEST: CPT

## 2024-07-13 PROCEDURE — 85014 HEMATOCRIT: CPT

## 2024-07-13 PROCEDURE — 85610 PROTHROMBIN TIME: CPT

## 2024-07-13 PROCEDURE — 700102 HCHG RX REV CODE 250 W/ 637 OVERRIDE(OP): Performed by: INTERNAL MEDICINE

## 2024-07-13 PROCEDURE — 700105 HCHG RX REV CODE 258: Performed by: INTERNAL MEDICINE

## 2024-07-13 PROCEDURE — 700111 HCHG RX REV CODE 636 W/ 250 OVERRIDE (IP): Mod: JZ | Performed by: NURSE PRACTITIONER

## 2024-07-13 PROCEDURE — 86923 COMPATIBILITY TEST ELECTRIC: CPT

## 2024-07-13 PROCEDURE — A9270 NON-COVERED ITEM OR SERVICE: HCPCS | Performed by: INTERNAL MEDICINE

## 2024-07-13 PROCEDURE — A9270 NON-COVERED ITEM OR SERVICE: HCPCS

## 2024-07-13 PROCEDURE — 700102 HCHG RX REV CODE 250 W/ 637 OVERRIDE(OP)

## 2024-07-13 PROCEDURE — 700102 HCHG RX REV CODE 250 W/ 637 OVERRIDE(OP): Performed by: NURSE PRACTITIONER

## 2024-07-13 PROCEDURE — 700111 HCHG RX REV CODE 636 W/ 250 OVERRIDE (IP): Performed by: INTERNAL MEDICINE

## 2024-07-13 PROCEDURE — 99233 SBSQ HOSP IP/OBS HIGH 50: CPT | Performed by: INTERNAL MEDICINE

## 2024-07-13 PROCEDURE — 36415 COLL VENOUS BLD VENIPUNCTURE: CPT

## 2024-07-13 RX ORDER — WARFARIN SODIUM 2 MG/1
2 TABLET ORAL DAILY
Status: DISCONTINUED | OUTPATIENT
Start: 2024-07-13 | End: 2024-07-13

## 2024-07-13 RX ADMIN — THERA TABS 1 TABLET: TAB at 05:48

## 2024-07-13 RX ADMIN — Medication 1 APPLICATOR: at 21:16

## 2024-07-13 RX ADMIN — HYDRALAZINE HYDROCHLORIDE 20 MG: 20 INJECTION INTRAMUSCULAR; INTRAVENOUS at 15:59

## 2024-07-13 RX ADMIN — AMPICILLIN SODIUM 2000 MG: 2 INJECTION, POWDER, FOR SOLUTION INTRAVENOUS at 05:48

## 2024-07-13 RX ADMIN — CEFTRIAXONE SODIUM 2000 MG: 10 INJECTION, POWDER, FOR SOLUTION INTRAVENOUS at 18:10

## 2024-07-13 RX ADMIN — OXYCODONE HYDROCHLORIDE 10 MG: 10 TABLET ORAL at 00:02

## 2024-07-13 RX ADMIN — ASPIRIN 81 MG: 81 TABLET, COATED ORAL at 05:48

## 2024-07-13 RX ADMIN — OXYCODONE HYDROCHLORIDE 10 MG: 10 TABLET ORAL at 22:49

## 2024-07-13 RX ADMIN — OXYCODONE HYDROCHLORIDE 10 MG: 10 TABLET ORAL at 13:27

## 2024-07-13 RX ADMIN — AMIODARONE HYDROCHLORIDE 200 MG: 200 TABLET ORAL at 21:14

## 2024-07-13 RX ADMIN — CEFTRIAXONE SODIUM 2000 MG: 10 INJECTION, POWDER, FOR SOLUTION INTRAVENOUS at 05:51

## 2024-07-13 RX ADMIN — Medication 1 APPLICATOR: at 11:56

## 2024-07-13 RX ADMIN — WARFARIN SODIUM 2 MG: 2 TABLET ORAL at 18:18

## 2024-07-13 RX ADMIN — METOPROLOL TARTRATE 25 MG: 25 TABLET, FILM COATED ORAL at 05:48

## 2024-07-13 RX ADMIN — OMEPRAZOLE 20 MG: 20 CAPSULE, DELAYED RELEASE ORAL at 05:48

## 2024-07-13 RX ADMIN — SENNOSIDES AND DOCUSATE SODIUM 1 TABLET: 50; 8.6 TABLET ORAL at 21:14

## 2024-07-13 RX ADMIN — OXYCODONE HYDROCHLORIDE 10 MG: 10 TABLET ORAL at 16:41

## 2024-07-13 RX ADMIN — ATORVASTATIN CALCIUM 40 MG: 40 TABLET, FILM COATED ORAL at 21:14

## 2024-07-13 RX ADMIN — AMPICILLIN SODIUM 2000 MG: 2 INJECTION, POWDER, FOR SOLUTION INTRAVENOUS at 18:14

## 2024-07-13 RX ADMIN — FOLIC ACID 1 MG: 1 TABLET ORAL at 05:49

## 2024-07-13 RX ADMIN — LABETALOL HYDROCHLORIDE 10 MG: 5 INJECTION, SOLUTION INTRAVENOUS at 23:21

## 2024-07-13 RX ADMIN — OXYCODONE HYDROCHLORIDE 10 MG: 10 TABLET ORAL at 09:50

## 2024-07-13 RX ADMIN — METOPROLOL TARTRATE 25 MG: 25 TABLET, FILM COATED ORAL at 21:15

## 2024-07-13 RX ADMIN — OXYCODONE HYDROCHLORIDE 10 MG: 10 TABLET ORAL at 03:25

## 2024-07-13 RX ADMIN — Medication 100 MG: at 05:49

## 2024-07-13 ASSESSMENT — PAIN DESCRIPTION - PAIN TYPE
TYPE: ACUTE PAIN
TYPE: ACUTE PAIN;SURGICAL PAIN
TYPE: ACUTE PAIN
TYPE: ACUTE PAIN
TYPE: ACUTE PAIN;SURGICAL PAIN
TYPE: ACUTE PAIN
TYPE: ACUTE PAIN
TYPE: ACUTE PAIN;SURGICAL PAIN
TYPE: ACUTE PAIN
TYPE: ACUTE PAIN;SURGICAL PAIN
TYPE: ACUTE PAIN
TYPE: ACUTE PAIN;SURGICAL PAIN
TYPE: ACUTE PAIN
TYPE: ACUTE PAIN

## 2024-07-13 ASSESSMENT — FIBROSIS 4 INDEX: FIB4 SCORE: 2.19

## 2024-07-13 NOTE — PROGRESS NOTES
Bedside report received from off going RN/tech: Kristen, assumed care of patient.     Fall Risk Score: MODERATE RISK  Fall risk interventions in place: Place yellow fall risk ID band on patient, Provide patient/family education based on risk assessment, Educate patient/family to call staff for assistance when getting out of bed, Place fall precaution signage outside patient door, Place patient in room close to nursing station, and Utilize bed/chair fall alarm  Bed type: Regular (Gary Score less than 17 interventions in place)  Patient on cardiac monitor: No   IVF/IV medications: Not Applicable   Oxygen: Room Air  Bedside sitter: Not Applicable   Isolation: Not applicable

## 2024-07-13 NOTE — PROGRESS NOTES
4 Eyes Skin Assessment Completed by DIALLO Hines and DIALLO Benoit.    Head WDL  Ears WDL  Nose WDL  Mouth WDL  Neck triple lumen IJ to left neck, bloody drainage present  Breast/Chest MLI to chest, x2 old chest tube sites OLEKSANDR. HD catheter to R chest  Shoulder Blades WDL  Spine WDL  (R) Arm/Elbow/Hand bruising, discoloration, edema, red/blanching elbow  (L) Arm/Elbow/Hand fistula site with DIP, CDI. Redness to forearm, blanching  Abdomen WDL  Groin Left groin site with DIP, CDI  Scrotum/Coccyx/Buttocks Redness and Blanching  (R) Leg Edema  (L) Leg Edema  (R) Heel/Foot/Toe Redness and Blanching, meplex in place. Red spot to top of foot, edema  (L) Heel/Foot/Toe Redness and Blanching, meplex in place. Edema, toes mottled purple          Devices In Places Blood Pressure Cuff, Pulse Ox, and Central Line, Hemodialysis catheter to R chest      Interventions In Place Heel Mepilex, Waffle Overlay, TAP System, Pillows, Q2 Turns, Dri-Bonifacio Pads, and Heels Loaded W/Pillows    Possible Skin Injury Yes, previously noted    Pictures Uploaded Into Epic Yes, previously done  Wound Consult Placed Yes, previously done  RN Wound Prevention Protocol Ordered Yes, previously done

## 2024-07-13 NOTE — PROGRESS NOTES
Inpatient Anticoagulation Service Note for 7/13/2024    Reason for Anticoagulation: Bioprosthetic AVR, Atrial Fibrillation  Target INR: 2.0 to 3.0    [id number] VASc Score: 4  HAS-BLED Score: 3    Hemoglobin Value: (!) 8.4  Hematocrit Value: (!) 26.5  Lab Platelet Value: 190    INR from last 7 days       Date/Time INR Value     07/13/24 1504 1.60     07/13/24 0110 2.15    07/12/24 0139 3.89    07/11/24 0500 3.65    07/10/24 2242 3.47    07/10/24 1612 5.31    07/10/24 0209 3.2    07/09/24 0247 2.32    07/08/24 0314 1.91    07/07/24 1402 1.5    07/07/24 0450 1.36          Dose from last 7 days       Date/Time Dose (mg)    07/13/24 1542 2    07/12/24 1335 0    07/11/24 1628 0    07/10/24 0901 0    07/09/24 1312 5    07/07/24 0955 5          Assessment:  - Warfarin for hx of bioprosthetic AVR & Afib w/ chadsvasc 4. Additional PHM includes CAD s/p PCI in 2021, PVD s/p BL fem-pop bypass, recent CABG x1 on 6/30, and ESRD on iHD. Admitted w/ RLE ischemia & c/f infected prosthetic graft, now s/p thrombectomy & repeat fem-pop bypass on 7/10.   - Warfarin was resumed 7/5; required perioperative heparin gtt bridge.  - INR abruptly decreased from supratherapeutic range into subtherapeutic range overnight (3.9 > 1.6). Goal INR 2 to 3. Warfarin had been held since 7/9 given supratherapeutic INR's; likely caused by acute drug interactions w/ amiodarone & ampicillin (both known to increase warfarin concentrations). Cessation of warfarin the past few days is likely driving the subtherapeutic INR's.  - Anticoagulation complicated by acute on chronic anemia postop w/ noted bleeding from IJ & HD lines, requiring numerous rounds of PRBCs & FFP (most recently transfused last night).  - Remains on cardiac diet.    Plan:  - Patient is high thrombotic risk postop & anticipate INR will continue downtrending over the next 48 hours further into subtherapeutic range, however complicated by ongoing bleeding & anemia noted. Resume conservative  warfarin at 2 mg daily, starting tonight.   - Deferring bridge therapy for now, risk > benefit.  - Monitor closely for worsening bleeding.  - Repeat INR & CBC in AM.    Pharmacist suggested discharge dosing:  - Labile INR's thus far, numerous procedures, & ongoing bleeding. TBD pending subsequent INR trends. Recommend prompt INR check within 3-4 days of discharge.    Pharmacy will continue following.     Shlomo Callaway, NiurkaD

## 2024-07-13 NOTE — DISCHARGE PLANNING
Case Management Discharge Planning    Admission Date: 6/24/2024  GMLOS: 11.2  ALOS: 19    6-Clicks ADL Score: 20  6-Clicks Mobility Score: 17    Anticipated Discharge Dispo: Discharge Disposition: D/T to SNF with Medicare cert in anticipation of skilled care (03)  Discharge Address: [address] [place] [address] [address] [place] [address]  Discharge Contact Phone Number: [phone]    Auth has been obtained for pt to DC to [facility] once medically cleared.   Call [facility] Tomorrow, 7/14, if pt is medically cleared to go.       1200 Update  Provider reports possibility of medical clearance for SNF on Monday, 7/14.

## 2024-07-13 NOTE — WOUND TEAM
[facility]  Inpatient Services  Wound and Skin Care Brief Evaluation    Admission Date: 6/24/2024     Last order of IP CONSULT TO WOUND CARE was found on 7/12/2024 from Hospital Encounter on 6/24/2024     HPI, PMH, SH: Reviewed    Chief Complaint   Patient presents with    Arm Pain    Rapid Heart Beat     Diagnosis: Infected prosthetic vascular graft, initial encounter ([facility]) [T82.7XXA]    Unit where seen by Wound Team: T412/02     Wound consult placed regarding sacrum & BL heels. Chart and images reviewed. This clinician in to assess patient. Patient pleasant and agreeable.  Sacrum with slight moisture fissure, otherwise blanching and intact, offloading adhesive foam applied at this time.  BL heels with blanchable erythema, scant dirt to R heel. Offloading adhesive foams in place    No pressure injuries or advanced wound care needs identified. Wound consult completed. Wound team signing off, re-consult if patient has further advanced wound care needs.         PREVENTATIVE INTERVENTIONS:    Q shift Gary - performed per nursing policy  Q shift pressure point assessments - performed per nursing policy    Surface/Positioning  Reposition q 2 hours - Currently in Place  Waffle cushion - Currently in Place    Offloading/Redistribution  Sacral offloading dressing (Silicone dressing) - Applied this Visit  Heel offloading dressing (Silicone dressing) - Currently in Place

## 2024-07-13 NOTE — CARE PLAN
The patient is Stable - Low risk of patient condition declining or worsening    Shift Goals  Clinical Goals: monitor hgb, pain control, CMS checks  Patient Goals: rest  Family Goals: n/a    Progress made toward(s) clinical / shift goals:  Pt medicated per MAR, resting. Pain controlled through orders. Pulses heard by doppler, all present. 1 unit PRBC transfused this AM for HGB of 6.8. Central line dressing changed, betadine applied to 5th right toe. Updated pt on POC.    Patient is not progressing towards the following goals:

## 2024-07-13 NOTE — PROGRESS NOTES
Hospital Medicine Daily Progress Note    Date of Service  7/13/2024    Chief Complaint  Brian Jasmine is a 68 y.o. male admitted 6/24/2024 with left arm graft infection    Hospital Course    Interval Problem Update  Patient was seen and examined at bedside.  No acute events overnight. Patient is resting comfortably in bed and in no acute distress.     ID recommended IV daptomycin [phone] with his MWF  dialysis sessions 8/11.   Anticipate SNF placement  PRBC x1 overnight  S/p OR 07/10 - Open thrombectomy of right femoral to above-knee popliteal PTFE bypass graft. Stent placement in the proximal anastomosis of the right femoral to popliteal bypass graft and also the distal anastomosis of the right femoral to popliteal bypass graft     I have discussed this patient's plan of care and discharge plan at IDT rounds today with Case Management, Nursing, Nursing leadership, and other members of the IDT team.    Consultants/Specialty  cardiovascular surgeon, infectious disease, and vascular surgery    Code Status  Full Code    Disposition  The patient is not medically cleared for discharge to home or a post-acute facility.      I have placed the appropriate orders for post-discharge needs.    Review of Systems  Review of Systems   Constitutional:  Positive for malaise/fatigue.   Cardiovascular:  Positive for leg swelling. Negative for chest pain.   Musculoskeletal:  Positive for joint pain.        Physical Exam  Temp:  [36.1 °C (96.9 °F)-36.8 °C (98.2 °F)] 36.3 °C (97.3 °F)  Pulse:  [58-92] 60  Resp:  [16-18] 17  BP: (113-151)/(51-84) 151/64  SpO2:  [92 %-95 %] 95 %    Physical Exam  Vitals and nursing note reviewed.   Constitutional:       General: He is not in acute distress.     Appearance: He is ill-appearing.   HENT:      Head: Normocephalic.      Mouth/Throat:      Mouth: Mucous membranes are moist.   Eyes:      Pupils: Pupils are equal, round, and reactive to light.   Cardiovascular:      Rate and Rhythm:  Normal rate and regular rhythm.      Pulses: Normal pulses.      Heart sounds: Normal heart sounds.      Comments: Sternal incision clean dry and intact  Pulmonary:      Breath sounds: Rales present.   Abdominal:      Palpations: Abdomen is soft.      Tenderness: There is no abdominal tenderness.   Musculoskeletal:         General: Swelling (LUE) present.      Cervical back: Neck supple.      Right lower leg: Edema present.      Left lower leg: Edema present.      Comments: Left arm swollen   Skin:     General: Skin is warm.      Coloration: Skin is not jaundiced.      Comments: Dusky appearing toes right greater than left   Neurological:      General: No focal deficit present.      Mental Status: He is alert and oriented to person, place, and time.   Psychiatric:         Mood and Affect: Mood normal.         Behavior: Behavior normal.         Fluids    Intake/Output Summary (Last 24 hours) at 7/13/2024 1235  Last data filed at 7/13/2024 0602  Gross per 24 hour   Intake 985 ml   Output 500 ml   Net 485 ml         Laboratory  Recent Labs     07/12/24  0139 07/12/24  0901 07/13/24  0110   WBC 13.5* 15.7* 11.5*   RBC 2.12* 2.52* 2.16*   HEMOGLOBIN 6.7* 7.8* 6.8*   HEMATOCRIT 21.4* 24.5* 21.2*   .9* 97.2 98.1*   MCH 31.6 31.0 31.5   MCHC 31.3* 31.8* 32.1*   RDW 74.1* 69.9* 75.9*   PLATELETCT 213 254 190   MPV 9.8 9.9 10.0     Recent Labs     07/11/24  0217 07/12/24  0139 07/13/24  0110   SODIUM 132* 134* 137   POTASSIUM 3.9 3.5* 4.1   CHLORIDE 96 97 101   CO2 24 24 23   GLUCOSE 146* 99 91   BUN 25* 29* 20   CREATININE 4.46* 5.54* 4.18*   CALCIUM 8.3* 8.2* 8.0*     Recent Labs     07/10/24  1612 07/10/24  2242 07/11/24  0500 07/12/24  0139 07/13/24  0110   APTT >240.0*  --   --   --   --    INR 5.31*   < > 3.65* 3.89* 2.15*    < > = values in this interval not displayed.                 Imaging  DX-CHEST-LIMITED (1 VIEW)   Final Result      1.  Line position as described above, correlate for function and desired  position   2.  Bibasilar underinflation atelectasis which could obscure an additional process.      DX-PORTABLE FLUORO > 1 HOUR   Final Result      Portable fluoroscopy utilized for 15 minutes 46 seconds.         INTERPRETING LOCATION: [address] , [place], [address]      DX-OPERATIVE ANGIOGRAM EACH PROJ   Final Result      Digitized intraoperative radiograph is submitted for review. This examination is not for diagnostic purpose but for guidance during a surgical procedure. Please see the patient's chart for full procedural details.         INTERPRETING LOCATION: [address] , [place], [address]      US-EXTREMITY ARTERY UPPER UNILAT LEFT   Final Result      US-EXTREMITY VENOUS UPPER UNILAT LEFT   Final Result      EC-ECHOCARDIOGRAM COMPLETE W/ CONT   Final Result      US-EXTREMITY ARTERY LOWER UNILAT RIGHT   Final Result      CT-CTA LOWER EXT WITH & W/O-POST PROCESS RIGHT   Final Result      1.  There appears to be occlusion of right common femoral to distal superficial femoral artery bypass graft.   2.  At least moderate stenosis of the tibioperoneal trunk. Occlusion of the proximal right anterior tibial artery and likely occlusion of the distal posterior tibial and peroneal arteries.   3.  Diffuse lower extremity edema. Correlate for cellulitis/infection.   4.  Subacute appearing left superior and inferior pubic rami fractures.      IR-MUKUND HUERTA PLACEMENT >5   Final Result      1. ULTRASOUND AND FLUOROSCOPIC GUIDED PLACEMENT OF A Right INTERNAL JUGULAR 14.5 [place] 23 cm length [employer] TUNNELED DIALYSIS CATHETER.      2. THE HEMODIALYSIS CATHETER MAY BE USED IMMEDIATELY AS CLINICALLY INDICATED. FLUSHES PER PROTOCOL.      DX-CHEST-PORTABLE (1 VIEW)   Final Result      Likely developing mild central vascular congestion/edema. Small left and trace right pleural effusions are now seen      DX-CHEST-PORTABLE (1 VIEW)   Final Result         1.  Pulmonary edema and/or infiltrates are identified, which are stable since  the prior exam.   2.  Trace left pleural effusion   3.  Cardiomegaly      DX-CHEST-LIMITED (1 VIEW)   Final Result         1.  Left IJ catheter, tip projects over the brachiocephalic vein/aorta region. This is short in position and does not extend into the SVC. Need to correlate clinically for venous blood return to exclude arterial line.   2.  Other findings are detailed above.      EC-RENEE W/O CONT   Final Result      DX-CHEST-2 VIEWS   Final Result         1.  No focal infiltrates.   2.  Perihilar interstitial prominence and bronchial wall cuffing, appearance suggests changes of underlying bronchial inflammation, consider bronchitis.      US-VEIN MAPPING LOWER EXTREMITY BILAT   Final Result      US-CAROTID DOPPLER UNILAT LEFT   Final Result      EC-RENEE W/O CONT   Final Result      EC-ECHOCARDIOGRAM COMPLETE W/O CONT   Final Result      US-SUSI SINGLE LEVEL BILAT   Final Result      US-EXTREMITY ARTERY LOWER BILAT   Final Result      DX-CHEST-PORTABLE (1 VIEW)   Final Result      1.  Satisfactory appearance of the right IJ catheter. No pneumothorax visualized.      US-HEMODIALYSIS GRAFT DUPLEX COMP UPPER EXTREMITY   Final Result      US-HEMODIALYSIS GRAFT DUPLEX COMP UPPER EXTREMITY   Final Result      DX-CHEST-PORTABLE (1 VIEW)   Final Result      Mild enlargement of the cardiomediastinal silhouette without acute cardiopulmonary abnormality.      CL-LEFT HEART CATHETERIZATION WITH POSSIBLE INTERVENTION    (Results Pending)        Assessment/Plan  * Infected prosthetic vascular graft, initial encounter (HCC)- (present on admission)  Assessment & Plan  Vascular surgery consulted.   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 Left greater saphenous vein harvest. Reconstruction of left brachial artery using left greater saphenous vein   interposition graft. Partial removal of left upper arm dialysis graft.      Bacteremia- (present on  admission)  Assessment & Plan  Probably related to infected graft, status post removal.  Blood cultures positive for Enterococcus  RENEE revealed aortic valve endocarditis  Patient is status post aortic valve replacement  Repeat blood cultures on 7/1 have been negative  Continue IV ampicillin and IV ceftriaxone while inpatient with plans to switch to IV dapto on discharge with stop date of 8/11/24  SNF for IV Abx      S/P AVR (aortic valve replacement)- (present on admission)  Assessment & Plan  Developed aortic valve endocarditis  Status post aortic valve replacement    End stage renal disease (HCC)- (present on admission)  Assessment & Plan  Nephrology consulted  Continue HD   S/p perma cath placement     Coronary artery disease- (present on admission)  Assessment & Plan  Followed by cardiology in [place] with history of stent to the circumflex  S/p CABG X1 WITH SEKELTONIZED LIMA to LAD  Continue aspirin and atorvastatin    PAD (peripheral artery disease) ([facility])- (present on admission)  Assessment & Plan  Hx of previous fem-pop bypass  CTA of RLE with IV contrast that showed occlusion of right common femoral to distal superficial femoral artery bypass graft, moderate stenosis of tibioperoneal trunk.  Occlusion of the proximal right anterior tibial artery and likely occlusion of the distal posterior tibial and peroneal arteries.  I discussed the findings with vascular surgery Dr. Tinoco who believes these findings are chronic and does not require any urgent surgical intervention.  Will continue warfarin and aspirin.  INR subtherapeutic therefore we will bridge with IV heparin.  Monitor xa  Vascular surgery Dr Blanchard has been consulted  S/p surgery EF okay  Resume anticoagulation  Monitor for progress, may need fem tib bypass or amputation if not improving.    Chronic atrial fibrillation ([facility])- (present on admission)  Assessment & Plan  He had been on coumadin but hasn't been taking it  Continue amiodarone and  metoprolol  Restart warfarin, monitor INR with goal 2-3. Bridging with IV heparin, monitor Xa.   HR stable      History of alcohol abuse- (present on admission)  Assessment & Plan  Give vitamins    Renovascular hypertension- (present on admission)  Assessment & Plan  Uncontrolled   Continue coreg, valsartan and amlodipine  Started on hydralazine   Low normal BP         VTE prophylaxis: warfarin    I have performed a physical exam and reviewed and updated ROS and Plan today (7/13/2024). In review of yesterday's note (7/12/2024), there are no changes except as documented above.    Greater than 53 minutes spent prepping to see patient (e.g. review of tests) obtaining and/or reviewing separately obtained history. Performing a medically appropriate examination and/ evaluation.  Counseling and educating the patient/family/caregiver.  Ordering medications, tests, or procedures.  Referring and communicating with other health care professionals.  Documenting clinical information in EPIC.  Independently interpreting results and communicating results to patient/family/caregiver.  Care coordination.

## 2024-07-13 NOTE — PROGRESS NOTES
Per Monae LARA to change RT IJ PC dsg d/t bleeding issue. Surgicel, gauze applied to RT IJ PC exit site. Clots around site noted. No bleed at this time. Bleeding noted around sutures sites. Pressure gauzes applied. Pt stable, no c/o. Report to DELFINA Irizarry RN at bedside.

## 2024-07-13 NOTE — PROGRESS NOTES
[facility] -  PROGRESS NOTE               Author: JASKARAN Bernard Date & Time: 7/13/2024  12:54 PM     HPI:  68-year-old  male with biventricular history of ESRD on hemodialysis q. Monday Wednesday Friday at [facility] through left upper arm AV fistula that was placed about 2 to 3 years ago.  He presented to the ER with left arm pain and swelling for the last 5 days.  His last dialysis was on Wednesday and had no dialysis in the last 5 days.  He felt sick on Friday and missed his dialysis.  Ultrasound of the left upper arm AV fistula showed there is no flow in the AV fistula and it has thrombosed.  A temporary right IJ dialysis catheter was placed in the ER.  Nephrology has been consulted for management of dialysis.  Patient denies any fever or chills, no nausea or vomiting, no chest pain.  He has some shortness of breath but is able to talk in full sentences and is in no acute respiratory distress at this time. No melena, hematochezia, hematemesis.  No HA, visual changes, or abdominal pain.     NEPHROLOGY DAILY PROGRESS:   6/24: consult note  6/25:  Afebrile, HD last night without any complications, plans noted for OR today with Dr. Tinoco for vascualr surgery   6/26: Underwent AVG repair yesterday with Dr. Tinoco, has LAMBERT drain, BP noted low this AM on dialysis, says BP 's run low on dialysis - not on Midodrine, ordered Midodrine to be used on dialysis   6/27: 0 Net UF with HD yesterday, limited by hypotension. Sitting up at side of bed eating breakfast. Bps improved 130s systolic.   6/28: HD today, seen  6/29: s/p HD yesterday over temp HD line. Tolerated well, no complaints.  6/30: in OR, not available   7/1: s/p AVR, 1V CABG and TAHIR resection/ligation, transferred to ICU, now off pressors, fatigued, post-op pain  7/2: Stable hemodynamics, tolerated HD, transferred out of ICU, chest wall pain improved  7/3: Stable hemodynamics, 7/1 BC remain negative, denies  "complaints   7/4: No acute events, tolerated HD, chest wall pain improved, feeling better overall  7/5: No acute events, no chest pain or SOB. S/p permacath placement this AM  7/6: No acute events, hypertensive, denies complaints  7/7: No acute events, feels at baseline, no chest pain or SOB, Diminsed pulses RLE with discoloration-pending CTA  7/8: patient is sitting in a chair, doing well, just came back from dialysis, tolerated well, net UF 3000 ml  7/9: s/p iHD yesterday, no new issues reported  7/10: No events, BP stable, stable on RA, pt seen and examined on HD this am, VSS--see dialysis treatment sheet for full details, denies any CP/SOB, reports right foot pain controlled at this time, to go to OR today for revascularization of right foot  7/11: No events, tolerated HD yest with 2L UF, hypotensive overnight and BP borderline low this am, stable on 2L NC O2, underwent thrombectomy and stent placement of right fem-pop bypass graft yest, reports right toes are not as dark, denies any CP/SOB, feels tired today but denies any lightheadedness or dizziness  7/12: No events, BP stable this morning borderline low on HD, patient seen and examined on HD this morning, VSS--see dialysis treatment sheet for full details, denies any CP/SOB, reports right foot pain is improving  7/13: s/p iHD yesterday, net UF 0 ml, hypotensive, oozing blood around CVC and dialysis catheter    REVIEW OF SYSTEMS:    10 point ROS reviewed and is as per HPI/daily summary or otherwise negative    PMH/PSH/SH/FH:   Reviewed and unchanged since admission note    CURRENT MEDICATIONS:   Reviewed from admission to present day    VS:  BP (!) 151/64 Comment: Butch Mckee notified   Pulse 60   Temp 36.3 °C (97.3 °F) (Temporal)   Resp 17   Ht 1.854 m (6' 0.99\")   Wt 109 kg (240 lb 4.8 oz)   SpO2 95%   BMI 31.71 kg/m²     Physical Exam  Vitals and nursing note reviewed.   Constitutional:       General: He is not in acute distress.     Appearance: He " is obese.   HENT:      Head: Normocephalic and atraumatic.   Eyes:      General: No scleral icterus.  Cardiovascular:      Rate and Rhythm: Normal rate and regular rhythm.      Comments: +R chest PC  Pulmonary:      Effort: Pulmonary effort is normal. No respiratory distress.      Breath sounds: Normal breath sounds.   Abdominal:      General: Bowel sounds are normal. There is no distension.      Palpations: Abdomen is soft.   Musculoskeletal:         General: No deformity.      Cervical back: Normal range of motion and neck supple.      Right lower leg: Edema present.      Left lower leg: Edema present.   Skin:     General: Skin is warm and dry.      Findings: No rash.      Comments: +dusky toes right foot that are lighter in color today   Neurological:      General: No focal deficit present.      Mental Status: He is alert and oriented to person, place, and time.   Psychiatric:         Mood and Affect: Mood normal.         Behavior: Behavior normal. Behavior is cooperative.         Fluids:  In: 985 [P.O.:50; Blood:435; Dialysis:500]  Out: 500     LABS:  Recent Labs     07/11/24  0217 07/12/24  0139 07/13/24  0110   SODIUM 132* 134* 137   POTASSIUM 3.9 3.5* 4.1   CHLORIDE 96 97 101   CO2 24 24 23   GLUCOSE 146* 99 91   BUN 25* 29* 20   CREATININE 4.46* 5.54* 4.18*   CALCIUM 8.3* 8.2* 8.0*       IMAGING:   All imaging reviewed from admission to present day    IMPRESSION:  # ESRD  -Q. MWF at [facility]  # Bacteremia   - Blood Cx from 6/24, 6/27 + E. Faecalis  # Bacterial endocarditis:   -s/p aortic valve replacement 6/30  3 CAD  -s/p 1V CABG 6/30  #Thrombosed left upper arm AVG; infected pseudoaneurysm   -Right IJ temporary HD catheter placed in ER 6/24/2024  - S/P AVG repair, resection and pseudoaneurysm resection 6/25/24 by Dr. Tinoco along with  Reconstruction of left brachial artery using left greater saphenous vein interposition graft.  -permacath placed 7/5  # HTN--BP low  # Sepsis   # Anemia of CKD  #  CKD-MBD  -phos at goal  # Coronary artery disease  # Chronic A-fib  -s/p TAHIR resection/ligation        PLAN:  - q MWF schedule   - dialysis RN to change TDC dressing - oozing   - Challenging UF as BP tolerates, limited UF today due to hypotension  - Vascular surgery following  - Holding parameters adjusted on BP meds  - DC losartan for now  - Consider decreasing metoprolol if BP remains low  - Avoid aggressive lowering of BP   - Using permacath until graft cleared for use by vascular  - EPO with HD  - No dietary protein restrictions  - abx per primary  - Dose all meds per ESRD

## 2024-07-13 NOTE — CARE PLAN
Problem: Knowledge Deficit - Standard  Goal: Patient and family/care givers will demonstrate understanding of plan of care, disease process/condition, diagnostic tests and medications  Description: Target End Date:  1-3 days or as soon as patient condition allows    Document in Patient Education    1.  Patient and family/caregiver oriented to unit, equipment, visitation policy and means for communicating concern  2.  Complete/review Learning Assessment  3.  Assess knowledge level of disease process/condition, treatment plan, diagnostic tests and medications  4.  Explain disease process/condition, treatment plan, diagnostic tests and medications  Outcome: Progressing     Problem: Hemodynamics  Goal: Patient's hemodynamics, fluid balance and neurologic status will be stable or improve  Description: Target End Date:  Prior to discharge or change in level of care    Document on Assessment and I/O flowsheet templates    1.  Monitor vital signs, pulse oximetry and cardiac monitor per provider order and/or policy  2.  Maintain blood pressure per provider order  3.  Hemodynamic monitoring per provider order  4.  Manage IV fluids and IV infusions  5.  Monitor intake and output  6.  Daily weights per unit policy or provider order  7.  Assess peripheral pulses and capillary refill  8.  Assess color and body temperature  9.  Position patient for maximum circulation/cardiac output  10. Monitor for signs/symptoms of excessive bleeding  11. Assess mental status, restlessness and changes in level of consciousness  12. Monitor temperature and report fever or hypothermia to provider immediately. Consideration of targeted temperature management.  Outcome: Progressing     Problem: Post Op Day 5 CABG/Heart Valve Replacement  Goal: Optimal care of the Post Op CABG/Heart Valve replacement Post Op Day 5  Description: Target End Date:  end of post op day 5  Outcome: Progressing  Intervention: Ambulate 4 times daily, increasing the  distance each time  Note: Ambulate x 2 per Day shift.   The patient is Watcher - Medium risk of patient condition declining or worsening    Shift Goals  Clinical Goals: monitor bleeding, pain management  Patient Goals: rest  Family Goals: na    Progress made toward(s) clinical / shift goals:  Pt updated on POC. All questions answered.    Patient is not progressing towards the following goals:

## 2024-07-13 NOTE — PROGRESS NOTES
"Pt admitted to room T412 via transport in bed from T8 at 2038.  Pt pain reported at 8 on a scale of 0-10. Oriented to room call light and smoking policy.  Reviewed plan of care (equipment, incentive spirometer, sequential compression devices, medications, activity, diet, fall precautions, skin care, and pain) with patient and family. Welcome packet given and reviewed with pt , all questions answered. Education provided on oral hygiene program.      BP (!) 146/61   Pulse 92   Temp 36.3 °C (97.3 °F) (Temporal)   Resp 16   Ht 1.854 m (6' 0.99\")   Wt 109 kg (240 lb 4.8 oz)   SpO2 94%   BMI 31.71 kg/m²     AA&Ox4. Denies CP/SOB.  See 2 RN skin note  Tolerating cardiac diet. Denies N/V.  + void. + BM. Last BM 7/12  Pt ambulates x1-2 with FWW.  All needs met at this time. Call light within reach. Pt calls appropriately. Bed low and locked, non skid socks in place. Hourly rounding in place.  "

## 2024-07-14 PROBLEM — E66.01 SEVERE OBESITY (HCC): Status: ACTIVE | Noted: 2024-07-14

## 2024-07-14 LAB
ALBUMIN SERPL BCP-MCNC: 2.5 G/DL (ref 3.2–4.9)
ANION GAP SERPL CALC-SCNC: 11 MMOL/L (ref 7–16)
ANISOCYTOSIS BLD QL SMEAR: ABNORMAL
BASOPHILS # BLD AUTO: 0.6 % (ref 0–1.8)
BASOPHILS # BLD: 0.06 K/UL (ref 0–0.12)
BUN SERPL-MCNC: 26 MG/DL (ref 8–22)
CALCIUM ALBUM COR SERPL-MCNC: 9.2 MG/DL (ref 8.5–10.5)
CALCIUM SERPL-MCNC: 8 MG/DL (ref 8.5–10.5)
CHLORIDE SERPL-SCNC: 103 MMOL/L (ref 96–112)
CO2 SERPL-SCNC: 22 MMOL/L (ref 20–33)
COMMENT 1642: NORMAL
CREAT SERPL-MCNC: 5.13 MG/DL (ref 0.5–1.4)
EOSINOPHIL # BLD AUTO: 0.6 K/UL (ref 0–0.51)
EOSINOPHIL NFR BLD: 6.1 % (ref 0–6.9)
ERYTHROCYTE [DISTWIDTH] IN BLOOD BY AUTOMATED COUNT: 77.3 FL (ref 35.9–50)
GFR SERPLBLD CREATININE-BSD FMLA CKD-EPI: 12 ML/MIN/1.73 M 2
GLUCOSE SERPL-MCNC: 91 MG/DL (ref 65–99)
HCT VFR BLD AUTO: 26.8 % (ref 42–52)
HGB BLD-MCNC: 8.6 G/DL (ref 14–18)
IMM GRANULOCYTES # BLD AUTO: 0.04 K/UL (ref 0–0.11)
IMM GRANULOCYTES NFR BLD AUTO: 0.4 % (ref 0–0.9)
INR PPP: 1.36 (ref 0.87–1.13)
LYMPHOCYTES # BLD AUTO: 0.94 K/UL (ref 1–4.8)
LYMPHOCYTES NFR BLD: 9.6 % (ref 22–41)
MACROCYTES BLD QL SMEAR: ABNORMAL
MAGNESIUM SERPL-MCNC: 1.9 MG/DL (ref 1.5–2.5)
MCH RBC QN AUTO: 30.7 PG (ref 27–33)
MCHC RBC AUTO-ENTMCNC: 32.1 G/DL (ref 32.3–36.5)
MCV RBC AUTO: 95.7 FL (ref 81.4–97.8)
MONOCYTES # BLD AUTO: 0.58 K/UL (ref 0–0.85)
MONOCYTES NFR BLD AUTO: 5.9 % (ref 0–13.4)
MORPHOLOGY BLD-IMP: NORMAL
NEUTROPHILS # BLD AUTO: 7.62 K/UL (ref 1.82–7.42)
NEUTROPHILS NFR BLD: 77.4 % (ref 44–72)
NRBC # BLD AUTO: 0 K/UL
NRBC BLD-RTO: 0 /100 WBC (ref 0–0.2)
PHOSPHATE SERPL-MCNC: 4.2 MG/DL (ref 2.5–4.5)
PLATELET # BLD AUTO: 163 K/UL (ref 164–446)
PLATELET BLD QL SMEAR: NORMAL
PMV BLD AUTO: 9.9 FL (ref 9–12.9)
POTASSIUM SERPL-SCNC: 3.8 MMOL/L (ref 3.6–5.5)
PROTHROMBIN TIME: 17 SEC (ref 12–14.6)
RBC # BLD AUTO: 2.8 M/UL (ref 4.7–6.1)
RBC BLD AUTO: PRESENT
SODIUM SERPL-SCNC: 136 MMOL/L (ref 135–145)
WBC # BLD AUTO: 9.8 K/UL (ref 4.8–10.8)

## 2024-07-14 PROCEDURE — 770001 HCHG ROOM/CARE - MED/SURG/GYN PRIV*

## 2024-07-14 PROCEDURE — 83735 ASSAY OF MAGNESIUM: CPT

## 2024-07-14 PROCEDURE — 700102 HCHG RX REV CODE 250 W/ 637 OVERRIDE(OP): Performed by: INTERNAL MEDICINE

## 2024-07-14 PROCEDURE — A9270 NON-COVERED ITEM OR SERVICE: HCPCS | Performed by: INTERNAL MEDICINE

## 2024-07-14 PROCEDURE — 85025 COMPLETE CBC W/AUTO DIFF WBC: CPT

## 2024-07-14 PROCEDURE — 700111 HCHG RX REV CODE 636 W/ 250 OVERRIDE (IP): Mod: JZ | Performed by: NURSE PRACTITIONER

## 2024-07-14 PROCEDURE — 85610 PROTHROMBIN TIME: CPT

## 2024-07-14 PROCEDURE — 700102 HCHG RX REV CODE 250 W/ 637 OVERRIDE(OP)

## 2024-07-14 PROCEDURE — 700102 HCHG RX REV CODE 250 W/ 637 OVERRIDE(OP): Performed by: NURSE PRACTITIONER

## 2024-07-14 PROCEDURE — A9270 NON-COVERED ITEM OR SERVICE: HCPCS | Performed by: NURSE PRACTITIONER

## 2024-07-14 PROCEDURE — 700105 HCHG RX REV CODE 258: Performed by: INTERNAL MEDICINE

## 2024-07-14 PROCEDURE — A9270 NON-COVERED ITEM OR SERVICE: HCPCS

## 2024-07-14 PROCEDURE — 700111 HCHG RX REV CODE 636 W/ 250 OVERRIDE (IP): Performed by: INTERNAL MEDICINE

## 2024-07-14 PROCEDURE — 700101 HCHG RX REV CODE 250: Performed by: INTERNAL MEDICINE

## 2024-07-14 PROCEDURE — 36415 COLL VENOUS BLD VENIPUNCTURE: CPT

## 2024-07-14 PROCEDURE — 80069 RENAL FUNCTION PANEL: CPT

## 2024-07-14 PROCEDURE — 99233 SBSQ HOSP IP/OBS HIGH 50: CPT | Performed by: INTERNAL MEDICINE

## 2024-07-14 RX ORDER — WARFARIN SODIUM 2 MG/1
2 TABLET ORAL DAILY
Status: DISCONTINUED | OUTPATIENT
Start: 2024-07-14 | End: 2024-07-15

## 2024-07-14 RX ADMIN — AMPICILLIN SODIUM 2000 MG: 2 INJECTION, POWDER, FOR SOLUTION INTRAVENOUS at 17:57

## 2024-07-14 RX ADMIN — Medication 1 APPLICATOR: at 09:11

## 2024-07-14 RX ADMIN — Medication 1 APPLICATOR: at 20:34

## 2024-07-14 RX ADMIN — OXYCODONE HYDROCHLORIDE 10 MG: 10 TABLET ORAL at 22:27

## 2024-07-14 RX ADMIN — CEFTRIAXONE SODIUM 2000 MG: 10 INJECTION, POWDER, FOR SOLUTION INTRAVENOUS at 06:40

## 2024-07-14 RX ADMIN — THERA TABS 1 TABLET: TAB at 06:27

## 2024-07-14 RX ADMIN — ATORVASTATIN CALCIUM 40 MG: 40 TABLET, FILM COATED ORAL at 20:31

## 2024-07-14 RX ADMIN — Medication 100 MG: at 06:27

## 2024-07-14 RX ADMIN — METOPROLOL TARTRATE 25 MG: 25 TABLET, FILM COATED ORAL at 06:27

## 2024-07-14 RX ADMIN — HYDRALAZINE HYDROCHLORIDE 20 MG: 20 INJECTION INTRAMUSCULAR; INTRAVENOUS at 12:17

## 2024-07-14 RX ADMIN — AMIODARONE HYDROCHLORIDE 200 MG: 200 TABLET ORAL at 20:32

## 2024-07-14 RX ADMIN — OMEPRAZOLE 20 MG: 20 CAPSULE, DELAYED RELEASE ORAL at 06:27

## 2024-07-14 RX ADMIN — OXYCODONE HYDROCHLORIDE 10 MG: 10 TABLET ORAL at 12:17

## 2024-07-14 RX ADMIN — FOLIC ACID 1 MG: 1 TABLET ORAL at 06:27

## 2024-07-14 RX ADMIN — METOPROLOL TARTRATE 25 MG: 25 TABLET, FILM COATED ORAL at 20:32

## 2024-07-14 RX ADMIN — OXYCODONE HYDROCHLORIDE 10 MG: 10 TABLET ORAL at 09:13

## 2024-07-14 RX ADMIN — ASPIRIN 81 MG: 81 TABLET, COATED ORAL at 06:27

## 2024-07-14 RX ADMIN — AMPICILLIN SODIUM 2000 MG: 2 INJECTION, POWDER, FOR SOLUTION INTRAVENOUS at 06:45

## 2024-07-14 RX ADMIN — WARFARIN SODIUM 2 MG: 2 TABLET ORAL at 17:59

## 2024-07-14 RX ADMIN — CEFTRIAXONE SODIUM 2000 MG: 10 INJECTION, POWDER, FOR SOLUTION INTRAVENOUS at 17:56

## 2024-07-14 RX ADMIN — OXYCODONE HYDROCHLORIDE 10 MG: 10 TABLET ORAL at 17:50

## 2024-07-14 ASSESSMENT — PAIN DESCRIPTION - PAIN TYPE
TYPE: ACUTE PAIN;SURGICAL PAIN
TYPE: ACUTE PAIN
TYPE: ACUTE PAIN;SURGICAL PAIN
TYPE: ACUTE PAIN;SURGICAL PAIN
TYPE: ACUTE PAIN
TYPE: ACUTE PAIN;SURGICAL PAIN

## 2024-07-14 ASSESSMENT — FIBROSIS 4 INDEX: FIB4 SCORE: 2.55

## 2024-07-14 ASSESSMENT — ENCOUNTER SYMPTOMS
SORE THROAT: 0
SHORTNESS OF BREATH: 0
COUGH: 0

## 2024-07-14 NOTE — PROGRESS NOTES
Vascular surgery    Patient has had no significant interval changes.  He did have slight separation of his left upper extremity fistula incision.  This is likely due to the generalized anasarca and poor healing  Is only slightly  and therefore I believe that conservative treatment is appropriate with just with an Ace bandage and dry gauze.    Recommendations will be to wrap the left upper extremity from the hand up past the incision for the next week or so until the incision is healed.    Vascular surgery will keep an eye on it.    Seferino Boston MD

## 2024-07-14 NOTE — PROGRESS NOTES
2 RN skin check complete.     Devices in place: Permacath, IJ, L AVGraft.  Skin assessed under devices: above as much as possible.     Truncal edema noted throughout. Right former IJ site with dried blood to steri-strips. Right chest HD catheter with pressure dressing in place, changed by dialysis RN, capped. Left neck triple lumen IJ, dressing changed with pressurized dressing placed, new drainage noted despite dressing change. BUE swelling/ecchymosis. Left AVG incision no longer approximated, opened with ambulating to/from bathroom, MD notified. Approximated, steri-strips placed, dry gauze/tegaderm pressure dressing placed, redness/edema noted above/below site; CDI. Sternal incision approximated/scabbed with OLEKSANDR young. Two upper abdominal stabs (former drain sites) scabbed. BUE edema/ecchymosis noted. Right forearm skin tear noted, mepitel one placed. Protective elbow mepilex placed. Abdomen obese, round. Right upper inner thigh bruising noted. Right upper thigh incision approximated with OLEKSANDR young. Former left groin incision site; approximated, OLEKSANDR, nearly healed. Shiny skin noted to calves. Bilateral feet pitting edema noted. Left top of foot with small circular excoriation noted. Blanchable redness noted to toes/heels. Right 5th toe black with betadine paint noted. Blanchable redness noted to sacrum.     Confirmed pressure ulcers found on: NA.  New potential pressure ulcers noted on: NA.   Wound team rounded on patient.     The following interventions in place: TAP system in place. Protective mepilex replaced to elbows. Mepilex removed from heels/sacrum d/t BM/wearing shoes. Cueing for Q2 turns in place. Pillows for offloading limbs. Multiple blankets placed for warmth.

## 2024-07-14 NOTE — CARE PLAN
"The patient is Stable - Low risk of patient condition declining or worsening    Shift Goals  Clinical Goals: Updates from Dr. Boston, Left IJ dressing change, mobility, pain control, rest  Patient Goals: Sit in chair, POC, rest  Family Goals: n/a    Progress made toward(s) clinical / shift goals:  MD placed dressing to LUE, vascular following. Left IJ dressing changed with surgifoam application, patient tolerated. Patient up in chair for approximately 15 minutes at beginning of shift; ambulated to/from bathroom with x 1 assist FWW, difficult pushing up from chair/toilet d/t \"right leg\". Medicated for pain; see MAR. Patient tolerated left IJ dressing change. Patient napped in between interventions.     Patient is not progressing towards the following goals: NA.       "

## 2024-07-14 NOTE — PROGRESS NOTES
Bedside report received from NOC RN; assumed care. Improved drainage from HD site. Continued leakage noted form left IJ site. No new drainage noted from LUE upon initial check. Dr. Boston bedside to exam, ace wrap in place. Assessment complete. A&O x 4. VSS, intermittent HTN. Medicated for pain; see MAR. 95% on RA. SOB noted with exertion. Numbness/tingling/pain reported to hands/feet, most notable RLE. + CMS/neuro intact, doppler utilized with pulse checks. Patient denied nausea, vomiting. Intermittent dizziness upon standing/getting OOB, resolves once upright. Sternal incision scabbed. Abdomen round, distended. + void + eructation. + flatus. LBM 07/14. Patient tolerating CIC diet. Patient up x 1-2 with wide based steady gait noted with FWW. Patient up to bathroom/chair this morning. Paitent ambulated to/from bathroom with FWW, difficulty pushing up from bed/chair noted. Discussed plan of care with patient. All questions answered.  High fall risk. Bed/strip alarm engaged. Bed in locked/lowest position.  Call light/personal belongings within reach.  All needs met, patient reported feeling tired and wanting to rest at present time.

## 2024-07-14 NOTE — PROGRESS NOTES
"Bedside report received at 1900.     Patient is A&O x 4.  Patient reports 7/10 pain. Pt repositioned.  Patient is a one person assist with FWW.  BP (!) 155/69   Pulse 70   Temp 36.5 °C (97.7 °F) (Temporal)   Resp 17   Ht 1.854 m (6' 0.99\")   Wt 105 kg (232 lb 2.3 oz)   SpO2 95%   BMI 30.63 kg/m²     Call light is within reach. All questions answered at this time. Hourly rounding in place.   "

## 2024-07-14 NOTE — CARE PLAN
The patient is Stable - Low risk of patient condition declining or worsening    Shift Goals  Clinical Goals: Mobility, pain/nausea control, IV ABX, redress perma cath/IJ, rest  Patient Goals: Pain control, POC  Family Goals: n/a    Progress made toward(s) clinical / shift goals:  Medicated for pain; see MAR. Patient tolerating IV ABX. Dressings changed to HD catheter, Left IJ, Left AVGraft, right 5th toe. Patient in chair and ambulated to/from bathroom with FWW, steady wide based gait noted once up from seated position.     Patient is not progressing towards the following goals: Oozing drainage from access sites noted, MD/vascular MD notified. Labs drawn to check coag panel.

## 2024-07-14 NOTE — PROGRESS NOTES
Hospital Medicine Daily Progress Note    Date of Service  7/14/2024    Chief Complaint  Brian Jasmine is a 68 y.o. male admitted 6/24/2024 with left arm graft infection    Hospital Course    Interval Problem Update  Patient was seen and examined at bedside.  No acute events overnight. Patient is resting comfortably in bed and in no acute distress.     ID recommended IV daptomycin [phone] with his MWF  dialysis sessions 8/11.   Anticipate SNF placement  PRBC x1 overnight  S/p OR 07/10 - Open thrombectomy of right femoral to above-knee popliteal PTFE bypass graft. Stent placement in the proximal anastomosis of the right femoral to popliteal bypass graft and also the distal anastomosis of the right femoral to popliteal bypass graft     I have discussed this patient's plan of care and discharge plan at IDT rounds today with Case Management, Nursing, Nursing leadership, and other members of the IDT team.    Consultants/Specialty  cardiovascular surgeon, infectious disease, and vascular surgery    Code Status  Full Code    Disposition  The patient is not medically cleared for discharge to home or a post-acute facility.      I have placed the appropriate orders for post-discharge needs.    Review of Systems  Review of Systems   Constitutional:  Positive for malaise/fatigue.   HENT:  Negative for congestion and sore throat.    Respiratory:  Negative for cough and shortness of breath.    Cardiovascular:  Positive for leg swelling. Negative for chest pain.   Musculoskeletal:  Positive for joint pain.        Physical Exam  Temp:  [36.1 °C (97 °F)-36.8 °C (98.3 °F)] 36.1 °C (97 °F)  Pulse:  [55-70] 60  Resp:  [16-20] 16  BP: (101-155)/(47-69) 101/47  SpO2:  [92 %-98 %] 97 %    Physical Exam  Vitals and nursing note reviewed.   Constitutional:       General: He is not in acute distress.     Appearance: He is obese. He is ill-appearing.   HENT:      Head: Normocephalic.      Right Ear: External ear normal.      Left Ear:  External ear normal.      Mouth/Throat:      Mouth: Mucous membranes are moist.   Eyes:      General: No scleral icterus.     Pupils: Pupils are equal, round, and reactive to light.   Neck:      Comments: weeping from left IJ line  Cardiovascular:      Rate and Rhythm: Normal rate and regular rhythm.      Pulses: Normal pulses.      Heart sounds: Normal heart sounds.      Comments: Sternal incision clean dry and intact  Pulmonary:      Breath sounds: No wheezing.   Abdominal:      Palpations: Abdomen is soft.      Tenderness: There is no abdominal tenderness. There is no guarding or rebound.   Musculoskeletal:         General: Swelling (LUE) present.      Cervical back: Neck supple.      Right lower leg: Edema present.      Left lower leg: Edema present.      Comments: Left arm swollen   Skin:     General: Skin is warm.      Coloration: Skin is not jaundiced.   Neurological:      General: No focal deficit present.      Mental Status: He is alert and oriented to person, place, and time.   Psychiatric:         Mood and Affect: Mood normal.         Behavior: Behavior normal.         Fluids    Intake/Output Summary (Last 24 hours) at 7/14/2024 1626  Last data filed at 7/14/2024 0332  Gross per 24 hour   Intake 120 ml   Output 0 ml   Net 120 ml         Laboratory  Recent Labs     07/12/24  0901 07/13/24  0110 07/13/24  1504 07/14/24  0151   WBC 15.7* 11.5*  --  9.8   RBC 2.52* 2.16*  --  2.80*   HEMOGLOBIN 7.8* 6.8* 8.4* 8.6*   HEMATOCRIT 24.5* 21.2* 26.5* 26.8*   MCV 97.2 98.1*  --  95.7   MCH 31.0 31.5  --  30.7   MCHC 31.8* 32.1*  --  32.1*   RDW 69.9* 75.9*  --  77.3*   PLATELETCT 254 190  --  163*   MPV 9.9 10.0  --  9.9     Recent Labs     07/12/24  0139 07/13/24  0110 07/14/24  0151   SODIUM 134* 137 136   POTASSIUM 3.5* 4.1 3.8   CHLORIDE 97 101 103   CO2 24 23 22   GLUCOSE 99 91 91   BUN 29* 20 26*   CREATININE 5.54* 4.18* 5.13*   CALCIUM 8.2* 8.0* 8.0*     Recent Labs     07/12/24  0139 07/13/24  0110  07/13/24  1504   INR 3.89* 2.15* 1.60*                 Imaging  DX-CHEST-LIMITED (1 VIEW)   Final Result      1.  Line position as described above, correlate for function and desired position   2.  Bibasilar underinflation atelectasis which could obscure an additional process.      DX-PORTABLE FLUORO > 1 HOUR   Final Result      Portable fluoroscopy utilized for 15 minutes 46 seconds.         INTERPRETING LOCATION: [address] [facility], [facility], [address]      DX-OPERATIVE ANGIOGRAM EACH PROJ   Final Result      Digitized intraoperative radiograph is submitted for review. This examination is not for diagnostic purpose but for guidance during a surgical procedure. Please see the patient's chart for full procedural details.         INTERPRETING LOCATION: [address] , [place], [address]      US-EXTREMITY ARTERY UPPER UNILAT LEFT   Final Result      US-EXTREMITY VENOUS UPPER UNILAT LEFT   Final Result      EC-ECHOCARDIOGRAM COMPLETE W/ CONT   Final Result      US-EXTREMITY ARTERY LOWER UNILAT RIGHT   Final Result      CT-CTA LOWER EXT WITH & W/O-POST PROCESS RIGHT   Final Result      1.  There appears to be occlusion of right common femoral to distal superficial femoral artery bypass graft.   2.  At least moderate stenosis of the tibioperoneal trunk. Occlusion of the proximal right anterior tibial artery and likely occlusion of the distal posterior tibial and peroneal arteries.   3.  Diffuse lower extremity edema. Correlate for cellulitis/infection.   4.  Subacute appearing left superior and inferior pubic rami fractures.      IR-HUERTA,GROSIXTO PLACEMENT >5   Final Result      1. ULTRASOUND AND FLUOROSCOPIC GUIDED PLACEMENT OF A Right INTERNAL JUGULAR 14.5 [place] 23 cm length [employer] TUNNELED DIALYSIS CATHETER.      2. THE HEMODIALYSIS CATHETER MAY BE USED IMMEDIATELY AS CLINICALLY INDICATED. FLUSHES PER PROTOCOL.      DX-CHEST-PORTABLE (1 VIEW)   Final Result      Likely developing mild central vascular congestion/edema. Small  left and trace right pleural effusions are now seen      DX-CHEST-PORTABLE (1 VIEW)   Final Result         1.  Pulmonary edema and/or infiltrates are identified, which are stable since the prior exam.   2.  Trace left pleural effusion   3.  Cardiomegaly      DX-CHEST-LIMITED (1 VIEW)   Final Result         1.  Left IJ catheter, tip projects over the brachiocephalic vein/aorta region. This is short in position and does not extend into the SVC. Need to correlate clinically for venous blood return to exclude arterial line.   2.  Other findings are detailed above.      EC-RENEE W/O CONT   Final Result      DX-CHEST-2 VIEWS   Final Result         1.  No focal infiltrates.   2.  Perihilar interstitial prominence and bronchial wall cuffing, appearance suggests changes of underlying bronchial inflammation, consider bronchitis.      US-VEIN MAPPING LOWER EXTREMITY BILAT   Final Result      US-CAROTID DOPPLER UNILAT LEFT   Final Result      EC-RENEE W/O CONT   Final Result      EC-ECHOCARDIOGRAM COMPLETE W/O CONT   Final Result      US-SUSI SINGLE LEVEL BILAT   Final Result      US-EXTREMITY ARTERY LOWER BILAT   Final Result      DX-CHEST-PORTABLE (1 VIEW)   Final Result      1.  Satisfactory appearance of the right IJ catheter. No pneumothorax visualized.      US-HEMODIALYSIS GRAFT DUPLEX COMP UPPER EXTREMITY   Final Result      US-HEMODIALYSIS GRAFT DUPLEX COMP UPPER EXTREMITY   Final Result      DX-CHEST-PORTABLE (1 VIEW)   Final Result      Mild enlargement of the cardiomediastinal silhouette without acute cardiopulmonary abnormality.      CL-LEFT HEART CATHETERIZATION WITH POSSIBLE INTERVENTION    (Results Pending)        Assessment/Plan  * Infected prosthetic vascular graft, initial encounter (HCC)- (present on admission)  Assessment & Plan  Vascular surgery consulted.   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 Left greater saphenous vein harvest.  Reconstruction of left brachial artery using left greater saphenous vein   interposition graft. Partial removal of left upper arm dialysis graft.      Bacteremia- (present on admission)  Assessment & Plan  Probably related to infected graft, status post removal.  Blood cultures positive for Enterococcus  RENEE revealed aortic valve endocarditis  Patient is status post aortic valve replacement  Repeat blood cultures on 7/1 have been negative  Continue IV ampicillin and IV ceftriaxone while inpatient with plans to switch to IV dapto on discharge with stop date of 8/11/24      S/P AVR (aortic valve replacement)- (present on admission)  Assessment & Plan  Developed aortic valve endocarditis  Status post aortic valve replacement    Continue coumadin with target INR 2-3  Complicated by bleeding events requiring PRBC and FFP  Hb now stable although continues to experiencing bleeding    End stage renal disease (HCC)- (present on admission)  Assessment & Plan  Nephrology consulted  Continue HD   S/p perma cath placement     Severe obesity ([facility])  Assessment & Plan  BMI 30    Coronary artery disease- (present on admission)  Assessment & Plan  Followed by cardiology in [place] with history of stent to the circumflex  S/p CABG X1 WITH SEKELTONIZED LIMA to LAD  Continue aspirin and atorvastatin    PAD (peripheral artery disease) ([facility])- (present on admission)  Assessment & Plan  Hx of previous fem-pop bypass  CTA of RLE with IV contrast that showed occlusion of right common femoral to distal superficial femoral artery bypass graft, moderate stenosis of tibioperoneal trunk.  Occlusion of the proximal right anterior tibial artery and likely occlusion of the distal posterior tibial and peroneal arteries.  I discussed the findings with vascular surgery Dr. Tinoco who believes these findings are chronic and does not require any urgent surgical intervention.  Will continue warfarin and aspirin.  INR subtherapeutic therefore we will  bridge with IV heparin.  Monitor xa  Vascular surgery Dr Blanchard has been consulted  S/p surgery EF okay  Resume anticoagulation  Monitor for progress, may need fem tib bypass or amputation if not improving.    Chronic atrial fibrillation (HCC)- (present on admission)  Assessment & Plan  He had been on coumadin but hasn't been taking it  Continue amiodarone and metoprolol  Restart warfarin, monitor INR with goal 2-3. Bridging with IV heparin, monitor Xa.   HR stable      History of alcohol abuse- (present on admission)  Assessment & Plan  Give vitamins    Renovascular hypertension- (present on admission)  Assessment & Plan  Uncontrolled   Continue coreg, valsartan and amlodipine  Started on hydralazine   Low normal BP         VTE prophylaxis: warfarin    I have performed a physical exam and reviewed and updated ROS and Plan today (7/14/2024). In review of yesterday's note (7/13/2024), there are no changes except as documented above.    Greater than 51 minutes spent prepping to see patient (e.g. review of tests) obtaining and/or reviewing separately obtained history. Performing a medically appropriate examination and/ evaluation.  Counseling and educating the patient/family/caregiver.  Ordering medications, tests, or procedures.  Referring and communicating with other health care professionals.  Documenting clinical information in EPIC.  Independently interpreting results and communicating results to patient/family/caregiver.  Care coordination.

## 2024-07-14 NOTE — PROGRESS NOTES
Bedside report received from [facility] RN; assumed care. Drainage noted from HD/left IJ site at beginning of shift. Assessment complete. A&O x 4. VSS, intermittent HTN during day d/t pain/mobility. Medicated for pain/BP; see MAR. 95% on RA. SOB noted with exertion. Numbness/tingling/pain reported to hands/feet. + CMS intact, doppler utilized with pulse checks Q4. Patient denied nausea, vomiting, dizziness. Sternal incision scabbed. Abdomen round, distended. + void + eructation. + flatus. LBM 07/13. Patient tolerating CIC diet, patient noted to snack on candy, education provided. Patient up x 1-2 with wide based steady gait noted with FWW. Patient up in a chair for approximately 2 hours. Paitent ambulated to/from bathroom with FWW, difficulty pushing up from bed/chair noted, intermittently refusing cardiac pillow.     Dialysis RN changed dressing to right HD, mild leaking noted around suture site; pressure dressing applied without biopatch d/t leaking. Left IJ dressing changed by this RN/RN nursing student, x 3 clots noted at site with two trickle bleeds, MD notified. Pressure dressing applied. In ambulating from bathroom, left AVG incision noted with drainage, upon assessment, incisional site opened. Approximated with steri-strips and pressure dressing. Vascular surgeon and MD notified.     Discussed plan of care with patient. All questions answered.  High fall risk. Bed/strip alarm engaged. Bed in locked/lowest position.  Call light/personal belongings within reach.  All needs met, intermittent naps noted throughout shift.

## 2024-07-14 NOTE — CARE PLAN
The patient is Watcher - Medium risk of patient condition declining or worsening    Shift Goals  Clinical Goals: Patient will ambulate at least twice by end of shift.  Patient Goals: Patient will rest comfortably throughout the shift.  Family Goals: n/a    Progress made toward(s) clinical / shift goals:  Patient ambulated with 2 person assist and a FWW this shift. Pt's dressing changed per protocol. POC discussed with pt, and pt received IV ABX per MAR.     Patient is not progressing towards the following goals:

## 2024-07-14 NOTE — PROGRESS NOTES
[facility] -  PROGRESS NOTE               Author: JASKARAN Bernard Date & Time: 7/14/2024  12:47 PM     HPI:  68-year-old  male with biventricular history of ESRD on hemodialysis q. Monday Wednesday Friday at [facility] through left upper arm AV fistula that was placed about 2 to 3 years ago.  He presented to the ER with left arm pain and swelling for the last 5 days.  His last dialysis was on Wednesday and had no dialysis in the last 5 days.  He felt sick on Friday and missed his dialysis.  Ultrasound of the left upper arm AV fistula showed there is no flow in the AV fistula and it has thrombosed.  A temporary right IJ dialysis catheter was placed in the ER.  Nephrology has been consulted for management of dialysis.  Patient denies any fever or chills, no nausea or vomiting, no chest pain.  He has some shortness of breath but is able to talk in full sentences and is in no acute respiratory distress at this time. No melena, hematochezia, hematemesis.  No HA, visual changes, or abdominal pain.     NEPHROLOGY DAILY PROGRESS:   6/24: consult note  6/25:  Afebrile, HD last night without any complications, plans noted for OR today with Dr. Tinoco for vascualr surgery   6/26: Underwent AVG repair yesterday with Dr. Tinoco, has LAMBERT drain, BP noted low this AM on dialysis, says BP 's run low on dialysis - not on Midodrine, ordered Midodrine to be used on dialysis   6/27: 0 Net UF with HD yesterday, limited by hypotension. Sitting up at side of bed eating breakfast. Bps improved 130s systolic.   6/28: HD today, seen  6/29: s/p HD yesterday over temp HD line. Tolerated well, no complaints.  6/30: in OR, not available   7/1: s/p AVR, 1V CABG and TAHIR resection/ligation, transferred to ICU, now off pressors, fatigued, post-op pain  7/2: Stable hemodynamics, tolerated HD, transferred out of ICU, chest wall pain improved  7/3: Stable hemodynamics, 7/1 BC remain negative, denies  "complaints   7/4: No acute events, tolerated HD, chest wall pain improved, feeling better overall  7/5: No acute events, no chest pain or SOB. S/p permacath placement this AM  7/6: No acute events, hypertensive, denies complaints  7/7: No acute events, feels at baseline, no chest pain or SOB, Diminsed pulses RLE with discoloration-pending CTA  7/8: patient is sitting in a chair, doing well, just came back from dialysis, tolerated well, net UF 3000 ml  7/9: s/p iHD yesterday, no new issues reported  7/10: No events, BP stable, stable on RA, pt seen and examined on HD this am, VSS--see dialysis treatment sheet for full details, denies any CP/SOB, reports right foot pain controlled at this time, to go to OR today for revascularization of right foot  7/11: No events, tolerated HD yest with 2L UF, hypotensive overnight and BP borderline low this am, stable on 2L NC O2, underwent thrombectomy and stent placement of right fem-pop bypass graft yest, reports right toes are not as dark, denies any CP/SOB, feels tired today but denies any lightheadedness or dizziness  7/12: No events, BP stable this morning borderline low on HD, patient seen and examined on HD this morning, VSS--see dialysis treatment sheet for full details, denies any CP/SOB, reports right foot pain is improving  7/13: s/p iHD yesterday, net UF 0 ml, hypotensive, oozing blood around CVC and dialysis catheter  7/14: oozing from TDC resolved, still present around CVC,slight separation of his left upper extremity fistula incision - Dr Botson's note reviewed, advised on  conservative management at this point.      REVIEW OF SYSTEMS:    10 point ROS reviewed and is as per HPI/daily summary or otherwise negative    PMH/PSH/SH/FH:   Reviewed and unchanged since admission note    CURRENT MEDICATIONS:   Reviewed from admission to present day    VS:  BP (!) 155/66   Pulse (!) 58   Temp 36.1 °C (97 °F) (Temporal)   Resp 16   Ht 1.854 m (6' 0.99\")   Wt 105 kg (231 " lb 4.2 oz)   SpO2 95%   BMI 30.52 kg/m²     Physical Exam  Vitals and nursing note reviewed.   Constitutional:       General: He is not in acute distress.     Appearance: He is obese.   HENT:      Head: Normocephalic and atraumatic.   Eyes:      General: No scleral icterus.  Cardiovascular:      Rate and Rhythm: Normal rate and regular rhythm.      Comments: +R chest PC  Pulmonary:      Effort: Pulmonary effort is normal. No respiratory distress.      Breath sounds: Normal breath sounds.   Abdominal:      General: Bowel sounds are normal. There is no distension.      Palpations: Abdomen is soft.   Musculoskeletal:         General: No deformity.      Cervical back: Normal range of motion and neck supple.      Right lower leg: Edema present.      Left lower leg: Edema present.   Skin:     General: Skin is warm and dry.      Findings: No rash.      Comments: +dusky toes right foot that are lighter in color today   Neurological:      General: No focal deficit present.      Mental Status: He is alert and oriented to person, place, and time.   Psychiatric:         Mood and Affect: Mood normal.         Behavior: Behavior normal. Behavior is cooperative.         Fluids:  In: 120 [P.O.:120]  Out: 0     LABS:  Recent Labs     07/12/24  0139 07/13/24  0110 07/14/24  0151   SODIUM 134* 137 136   POTASSIUM 3.5* 4.1 3.8   CHLORIDE 97 101 103   CO2 24 23 22   GLUCOSE 99 91 91   BUN 29* 20 26*   CREATININE 5.54* 4.18* 5.13*   CALCIUM 8.2* 8.0* 8.0*       IMAGING:   All imaging reviewed from admission to present day    IMPRESSION:  # ESRD  -Q. MWF at [facility]  # Bacteremia   - Blood Cx from 6/24, 6/27 + E. Faecalis  # Bacterial endocarditis:   -s/p aortic valve replacement 6/30  3 CAD  -s/p 1V CABG 6/30  #Thrombosed left upper arm AVG; infected pseudoaneurysm   -Right IJ temporary HD catheter placed in ER 6/24/2024  - S/P AVG repair, resection and pseudoaneurysm resection 6/25/24 by Dr. Tinoco along with  Reconstruction of  left brachial artery using left greater saphenous vein interposition graft.  -permacath placed 7/5  # HTN--BP low  # Sepsis   # Anemia of CKD  # CKD-MBD  -phos at goal  # Coronary artery disease  # Chronic A-fib  -s/p TAHIR resection/ligation        PLAN:  - q MWF schedule   - Challenging UF as BP tolerates  - Vascular surgery following  - Holding parameters adjusted on BP meds  - DC losartan for now  - Avoid aggressive lowering of BP   - If BP trends up and remains high, can restart ARB and monitor  - Using permacath until graft cleared for use by vascular  - EPO with HD  - No dietary protein restrictions  - abx per primary  - Dose all meds per ESRD

## 2024-07-15 LAB
ALBUMIN SERPL BCP-MCNC: 2.4 G/DL (ref 3.2–4.9)
ANION GAP SERPL CALC-SCNC: 13 MMOL/L (ref 7–16)
BUN SERPL-MCNC: 31 MG/DL (ref 8–22)
CALCIUM ALBUM COR SERPL-MCNC: 9.5 MG/DL (ref 8.5–10.5)
CALCIUM SERPL-MCNC: 8.2 MG/DL (ref 8.5–10.5)
CHLORIDE SERPL-SCNC: 101 MMOL/L (ref 96–112)
CO2 SERPL-SCNC: 21 MMOL/L (ref 20–33)
CREAT SERPL-MCNC: 5.73 MG/DL (ref 0.5–1.4)
ERYTHROCYTE [DISTWIDTH] IN BLOOD BY AUTOMATED COUNT: 76.1 FL (ref 35.9–50)
GAMMA INTERFERON BACKGROUND BLD IA-ACNC: 0.03 IU/ML
GFR SERPLBLD CREATININE-BSD FMLA CKD-EPI: 10 ML/MIN/1.73 M 2
GLUCOSE SERPL-MCNC: 98 MG/DL (ref 65–99)
HCT VFR BLD AUTO: 26.5 % (ref 42–52)
HGB BLD-MCNC: 8.3 G/DL (ref 14–18)
INR PPP: 1.29 (ref 0.87–1.13)
M TB IFN-G BLD-IMP: NEGATIVE
M TB IFN-G CD4+ BCKGRND COR BLD-ACNC: 0 IU/ML
MCH RBC QN AUTO: 30.7 PG (ref 27–33)
MCHC RBC AUTO-ENTMCNC: 31.3 G/DL (ref 32.3–36.5)
MCV RBC AUTO: 98.1 FL (ref 81.4–97.8)
MITOGEN IGNF BCKGRD COR BLD-ACNC: 4.62 IU/ML
PHOSPHATE SERPL-MCNC: 5.2 MG/DL (ref 2.5–4.5)
PLATELET # BLD AUTO: 214 K/UL (ref 164–446)
PMV BLD AUTO: 9.9 FL (ref 9–12.9)
POTASSIUM SERPL-SCNC: 4.4 MMOL/L (ref 3.6–5.5)
PROTHROMBIN TIME: 16.2 SEC (ref 12–14.6)
QFT TB2 - NIL TBQ2: -0.01 IU/ML
RBC # BLD AUTO: 2.7 M/UL (ref 4.7–6.1)
SODIUM SERPL-SCNC: 135 MMOL/L (ref 135–145)
WBC # BLD AUTO: 12.7 K/UL (ref 4.8–10.8)

## 2024-07-15 PROCEDURE — 700102 HCHG RX REV CODE 250 W/ 637 OVERRIDE(OP): Performed by: INTERNAL MEDICINE

## 2024-07-15 PROCEDURE — 770001 HCHG ROOM/CARE - MED/SURG/GYN PRIV*

## 2024-07-15 PROCEDURE — 90935 HEMODIALYSIS ONE EVALUATION: CPT

## 2024-07-15 PROCEDURE — 99232 SBSQ HOSP IP/OBS MODERATE 35: CPT | Performed by: INTERNAL MEDICINE

## 2024-07-15 PROCEDURE — 700105 HCHG RX REV CODE 258: Performed by: INTERNAL MEDICINE

## 2024-07-15 PROCEDURE — A9270 NON-COVERED ITEM OR SERVICE: HCPCS | Performed by: NURSE PRACTITIONER

## 2024-07-15 PROCEDURE — 700111 HCHG RX REV CODE 636 W/ 250 OVERRIDE (IP)

## 2024-07-15 PROCEDURE — 700102 HCHG RX REV CODE 250 W/ 637 OVERRIDE(OP): Performed by: NURSE PRACTITIONER

## 2024-07-15 PROCEDURE — 700111 HCHG RX REV CODE 636 W/ 250 OVERRIDE (IP): Mod: JZ | Performed by: NURSE PRACTITIONER

## 2024-07-15 PROCEDURE — 80069 RENAL FUNCTION PANEL: CPT

## 2024-07-15 PROCEDURE — 700101 HCHG RX REV CODE 250: Performed by: INTERNAL MEDICINE

## 2024-07-15 PROCEDURE — A9270 NON-COVERED ITEM OR SERVICE: HCPCS | Performed by: INTERNAL MEDICINE

## 2024-07-15 PROCEDURE — 97535 SELF CARE MNGMENT TRAINING: CPT

## 2024-07-15 PROCEDURE — 85027 COMPLETE CBC AUTOMATED: CPT

## 2024-07-15 PROCEDURE — A9270 NON-COVERED ITEM OR SERVICE: HCPCS

## 2024-07-15 PROCEDURE — 700111 HCHG RX REV CODE 636 W/ 250 OVERRIDE (IP): Performed by: INTERNAL MEDICINE

## 2024-07-15 PROCEDURE — 700102 HCHG RX REV CODE 250 W/ 637 OVERRIDE(OP)

## 2024-07-15 PROCEDURE — 85610 PROTHROMBIN TIME: CPT

## 2024-07-15 RX ORDER — WARFARIN SODIUM 3 MG/1
3 TABLET ORAL DAILY
Status: DISCONTINUED | OUTPATIENT
Start: 2024-07-15 | End: 2024-07-17 | Stop reason: HOSPADM

## 2024-07-15 RX ORDER — CALCIUM CARBONATE 500 MG/1
500 TABLET, CHEWABLE ORAL 4 TIMES DAILY PRN
Status: DISCONTINUED | OUTPATIENT
Start: 2024-07-15 | End: 2024-07-17 | Stop reason: HOSPADM

## 2024-07-15 RX ORDER — HEPARIN SODIUM 1000 [USP'U]/ML
INJECTION, SOLUTION INTRAVENOUS; SUBCUTANEOUS
Status: COMPLETED
Start: 2024-07-15 | End: 2024-07-15

## 2024-07-15 RX ADMIN — WARFARIN SODIUM 3 MG: 3 TABLET ORAL at 17:13

## 2024-07-15 RX ADMIN — HYDRALAZINE HYDROCHLORIDE 20 MG: 20 INJECTION INTRAMUSCULAR; INTRAVENOUS at 00:05

## 2024-07-15 RX ADMIN — OXYCODONE HYDROCHLORIDE 10 MG: 10 TABLET ORAL at 03:53

## 2024-07-15 RX ADMIN — CEFTRIAXONE SODIUM 2000 MG: 10 INJECTION, POWDER, FOR SOLUTION INTRAVENOUS at 05:39

## 2024-07-15 RX ADMIN — EPOETIN ALFA-EPBX 10000 UNITS: 10000 INJECTION, SOLUTION INTRAVENOUS; SUBCUTANEOUS at 11:14

## 2024-07-15 RX ADMIN — OMEPRAZOLE 20 MG: 20 CAPSULE, DELAYED RELEASE ORAL at 05:38

## 2024-07-15 RX ADMIN — ASPIRIN 81 MG: 81 TABLET, COATED ORAL at 05:37

## 2024-07-15 RX ADMIN — HEPARIN SODIUM 1800 UNITS: 1000 INJECTION, SOLUTION INTRAVENOUS; SUBCUTANEOUS at 11:37

## 2024-07-15 RX ADMIN — CEFTRIAXONE SODIUM 2000 MG: 10 INJECTION, POWDER, FOR SOLUTION INTRAVENOUS at 17:15

## 2024-07-15 RX ADMIN — OXYCODONE HYDROCHLORIDE 10 MG: 10 TABLET ORAL at 22:15

## 2024-07-15 RX ADMIN — AMPICILLIN SODIUM 2000 MG: 2 INJECTION, POWDER, FOR SOLUTION INTRAVENOUS at 05:44

## 2024-07-15 RX ADMIN — THERA TABS 1 TABLET: TAB at 05:37

## 2024-07-15 RX ADMIN — Medication 1 APPLICATOR: at 11:58

## 2024-07-15 RX ADMIN — Medication 1 APPLICATOR: at 22:15

## 2024-07-15 RX ADMIN — METOPROLOL TARTRATE 25 MG: 25 TABLET, FILM COATED ORAL at 22:15

## 2024-07-15 RX ADMIN — AMPICILLIN SODIUM 2000 MG: 2 INJECTION, POWDER, FOR SOLUTION INTRAVENOUS at 17:30

## 2024-07-15 RX ADMIN — Medication 100 MG: at 05:38

## 2024-07-15 RX ADMIN — OXYCODONE HYDROCHLORIDE 10 MG: 10 TABLET ORAL at 11:57

## 2024-07-15 RX ADMIN — OXYCODONE HYDROCHLORIDE 10 MG: 10 TABLET ORAL at 07:29

## 2024-07-15 RX ADMIN — OXYCODONE HYDROCHLORIDE 10 MG: 10 TABLET ORAL at 17:11

## 2024-07-15 RX ADMIN — ATORVASTATIN CALCIUM 40 MG: 40 TABLET, FILM COATED ORAL at 22:16

## 2024-07-15 RX ADMIN — METOPROLOL TARTRATE 25 MG: 25 TABLET, FILM COATED ORAL at 05:38

## 2024-07-15 RX ADMIN — FOLIC ACID 1 MG: 1 TABLET ORAL at 05:38

## 2024-07-15 RX ADMIN — AMIODARONE HYDROCHLORIDE 200 MG: 200 TABLET ORAL at 22:16

## 2024-07-15 ASSESSMENT — COGNITIVE AND FUNCTIONAL STATUS - GENERAL
TOILETING: A LITTLE
DRESSING REGULAR UPPER BODY CLOTHING: A LITTLE
HELP NEEDED FOR BATHING: A LITTLE
DAILY ACTIVITIY SCORE: 20
SUGGESTED CMS G CODE MODIFIER DAILY ACTIVITY: CJ
DRESSING REGULAR LOWER BODY CLOTHING: A LITTLE

## 2024-07-15 ASSESSMENT — PAIN DESCRIPTION - PAIN TYPE
TYPE: ACUTE PAIN;SURGICAL PAIN
TYPE: ACUTE PAIN
TYPE: ACUTE PAIN;SURGICAL PAIN
TYPE: ACUTE PAIN;SURGICAL PAIN
TYPE: ACUTE PAIN
TYPE: ACUTE PAIN;SURGICAL PAIN
TYPE: ACUTE PAIN

## 2024-07-15 ASSESSMENT — ENCOUNTER SYMPTOMS
SHORTNESS OF BREATH: 0
SORE THROAT: 0
COUGH: 0

## 2024-07-15 NOTE — PROGRESS NOTES
"Bedside report received at 1850.     Patient is A&O x 4.  Patient denies pain.  Patient is a two person assist with FWW.  Toileting offered and patient assisted to bathroom.  POC discussed with pt.  Bed locked and in lowest position.  BP (!) 141/60   Pulse 62   Temp 36.1 °C (97 °F) (Temporal)   Resp 16   Ht 1.854 m (6' 0.99\")   Wt 105 kg (231 lb 4.2 oz)   SpO2 97%   BMI 30.52 kg/m²    Call light is within reach. All questions answered at this time. Hourly rounding in place.   "

## 2024-07-15 NOTE — DISCHARGE PLANNING
Case Management Discharge Planning    Admission Date: 6/24/2024  GMLOS: 11.2  ALOS: 21    6-Clicks ADL Score: 20  6-Clicks Mobility Score: 17    Anticipated Discharge Dispo: Discharge Disposition: D/T to SNF with Medicare cert in anticipation of skilled care (03)  Discharge Address: [address] [place] [address] [address] [place] [address]  Discharge Contact Phone Number: [phone]    Pt discussed during IDT Rounds.  [facility] dialysis coordinator is awaiting result of TB Test for setting up new chair at [facility].   Provider reports pt will need 2 more days in patient.   Pt to discharge to [facility] with dialysis chair arranged.     1400 Update  Pts TB resulted, negative.  LMSW sent Voalte to [facility].

## 2024-07-15 NOTE — CARE PLAN
The patient is Watcher - Medium risk of patient condition declining or worsening    Shift Goals  Clinical Goals: Patients IJ dressing will be changed this shift and closely monitored for excessive drainage.  Patient Goals: Patient will rest comfortably throughout shift and receive pain medication before dressing change.  Family Goals: n/a    Progress made toward(s) clinical / shift goals:  Patients dressing was changed this shift and then reinforced. Patient ambulated twice this shift and received medications per MAR.     Patient is not progressing towards the following goals:

## 2024-07-15 NOTE — PROGRESS NOTES
Hospital Medicine Daily Progress Note    Date of Service  7/15/2024    Chief Complaint  Brian Jasmine is a 68 y.o. male admitted 6/24/2024 with left arm graft infection    Hospital Course  67yo BMI 29.47 M former smoker, w/ h/o CAD w/ stents Afib on Coumadin, PVD s/p fempop bypass, ESRD on dialysis via L arm graft presented 6/24/2024 with L arm pain, redness, swelling 5d prior to admission. He also complained of fevers and chills. He missed dialysis and stopped taking all of his medications because he felt so poorly. AT ED, afebrile, hemodynamically stable. Leukocytosis, anemia, elevated BUN and Cr- on labs. INR 1.46. BNP 21K and elevated troponin. Nephrology consulted, temporary access catheter placed for dialysis. Anticoagulation resumed thereafter as he was subtherapeutic. Started on antibiotics for infected left arm graft. 6/24 blood cx positve for enterococci. ID consulted. Echo EF 53% normal RV however RENEE showed aortic valve vegetation consistent with endocarditis. Vascular, Cardiology and Cardiothoracic Surgery consulted. S/p resection of infected left arm dialysis graft pseudoaneurysm at arterial anastomosis, reconstruction of the left brachial artery using left greater saphenous vein interposition graft by Dr. Tinoco, Vascular on 6/25. S/p cardiac cath by Dr. Purvis, Cardiuology on 6/29. S/p AV replacement and CABG x 1 by Dr. Curiel, Cardiothoracic on 6/30. Repeat blood cultures cleared on 7/1 and tunneled catheter placed by Dr. Goldstein, IR for dialysis on 7/5.      Managing dialysis graft infection, endocarditis, R leg ischemia, basilic superficial VT.  ID recommended IV daptomycin [phone] with his MWF  dialysis sessions STOP 8/11. Repeat echo to check EF for reduced perfusion, result EF normal, RV dilated, grade 1 diastolic. S/p thrombectomy and stent at R fem-pop bypass by Dr. Blanchard 7/10  WBC 14.8-15.2-13.5-15.7, Hb 10.4-9.8-6.7-7.8 reviewed iron panel its OK no active bleeding, K 3.6-3.7-3.5  replace get Mg level, bicarb normalized, BUN 26-32-29, Cr- 4.46-5.8-5.54 on dialysis, INR 2.32-5.31-3.89  Postop instructions: anticoagulation resumed and may need fem-tib bypass or amputation as he has severe tibial disease.  Supratheraeputic INR.  Vascular cleared. Nephrology likely clears  ID recommended IV daptomycin [phone] with his MWF  dialysis sessions STOP 8/11. Outpatient dialysis being arranged. PT recommended SNF. [facility] evaluating.    Interval Problem Update  Patient was seen and examined at bedside.  No acute events overnight. Patient is resting comfortably in bed and in no acute distress.     ID recommended IV daptomycin [phone] with his MWF  dialysis sessions 8/11.   Anticipate SNF placement  S/p OR 07/10 - Open thrombectomy of right femoral to above-knee popliteal PTFE bypass graft. Stent placement in the proximal anastomosis of the right femoral to popliteal bypass graft and also the distal anastomosis of the right femoral to popliteal bypass graft     I have discussed this patient's plan of care and discharge plan at IDT rounds today with Case Management, Nursing, Nursing leadership, and other members of the IDT team.    Consultants/Specialty  cardiovascular surgeon, infectious disease, and vascular surgery    Code Status  Full Code    Disposition  The patient is not medically cleared for discharge to home or a post-acute facility.      I have placed the appropriate orders for post-discharge needs.    Review of Systems  Review of Systems   Constitutional:  Positive for malaise/fatigue.   HENT:  Negative for congestion and sore throat.    Respiratory:  Negative for cough and shortness of breath.    Cardiovascular:  Positive for leg swelling. Negative for chest pain.   Musculoskeletal:  Positive for joint pain.        Physical Exam  Temp:  [36 °C (96.8 °F)-36.6 °C (97.9 °F)] 36 °C (96.8 °F)  Pulse:  [56-62] 59  Resp:  [16-17] 16  BP: (106-168)/(60-78) 155/70  SpO2:  [93 %-97 %] 97  %    Physical Exam  Vitals and nursing note reviewed.   Constitutional:       General: He is not in acute distress.     Appearance: He is obese. He is ill-appearing.   HENT:      Head: Normocephalic.      Right Ear: External ear normal.      Left Ear: External ear normal.      Mouth/Throat:      Mouth: Mucous membranes are moist.   Eyes:      General: No scleral icterus.     Pupils: Pupils are equal, round, and reactive to light.   Neck:      Comments: weeping from left IJ line  Cardiovascular:      Rate and Rhythm: Normal rate and regular rhythm.      Pulses: Normal pulses.      Heart sounds: Normal heart sounds.      Comments: Sternal incision clean dry and intact  Pulmonary:      Breath sounds: No wheezing.   Abdominal:      Palpations: Abdomen is soft.      Tenderness: There is no abdominal tenderness. There is no guarding or rebound.   Musculoskeletal:         General: Swelling (LUE) present.      Cervical back: Neck supple.      Right lower leg: Edema present.      Left lower leg: Edema present.      Comments: Left arm swollen   Skin:     General: Skin is warm.      Coloration: Skin is not jaundiced.   Neurological:      General: No focal deficit present.      Mental Status: He is alert and oriented to person, place, and time.   Psychiatric:         Mood and Affect: Mood normal.         Behavior: Behavior normal.         Fluids    Intake/Output Summary (Last 24 hours) at 7/15/2024 1640  Last data filed at 7/15/2024 1134  Gross per 24 hour   Intake 500 ml   Output 3500 ml   Net -3000 ml         Laboratory  Recent Labs     07/13/24  0110 07/13/24  1504 07/14/24  0151 07/15/24  0402   WBC 11.5*  --  9.8 12.7*   RBC 2.16*  --  2.80* 2.70*   HEMOGLOBIN 6.8* 8.4* 8.6* 8.3*   HEMATOCRIT 21.2* 26.5* 26.8* 26.5*   MCV 98.1*  --  95.7 98.1*   MCH 31.5  --  30.7 30.7   MCHC 32.1*  --  32.1* 31.3*   RDW 75.9*  --  77.3* 76.1*   PLATELETCT 190  --  163* 214   MPV 10.0  --  9.9 9.9     Recent Labs     07/13/24  0110  07/14/24  0151 07/15/24  0402   SODIUM 137 136 135   POTASSIUM 4.1 3.8 4.4   CHLORIDE 101 103 101   CO2 23 22 21   GLUCOSE 91 91 98   BUN 20 26* 31*   CREATININE 4.18* 5.13* 5.73*   CALCIUM 8.0* 8.0* 8.2*     Recent Labs     07/13/24  1504 07/14/24  1351 07/15/24  0402   INR 1.60* 1.36* 1.29*                 Imaging  DX-CHEST-LIMITED (1 VIEW)   Final Result      1.  Line position as described above, correlate for function and desired position   2.  Bibasilar underinflation atelectasis which could obscure an additional process.      DX-PORTABLE FLUORO > 1 HOUR   Final Result      Portable fluoroscopy utilized for 15 minutes 46 seconds.         INTERPRETING LOCATION: [address] [place] [address], [id number]      DX-OPERATIVE ANGIOGRAM EACH PROJ   Final Result      Digitized intraoperative radiograph is submitted for review. This examination is not for diagnostic purpose but for guidance during a surgical procedure. Please see the patient's chart for full procedural details.         INTERPRETING LOCATION: [address] [place] [address], [id number]      US-EXTREMITY ARTERY UPPER UNILAT LEFT   Final Result      US-EXTREMITY VENOUS UPPER UNILAT LEFT   Final Result      EC-ECHOCARDIOGRAM COMPLETE W/ CONT   Final Result      US-EXTREMITY ARTERY LOWER UNILAT RIGHT   Final Result      CT-CTA LOWER EXT WITH & W/O-POST PROCESS RIGHT   Final Result      1.  There appears to be occlusion of right common femoral to distal superficial femoral artery bypass graft.   2.  At least moderate stenosis of the tibioperoneal trunk. Occlusion of the proximal right anterior tibial artery and likely occlusion of the distal posterior tibial and peroneal arteries.   3.  Diffuse lower extremity edema. Correlate for cellulitis/infection.   4.  Subacute appearing left superior and inferior pubic rami fractures.      IR-MUKUND HUERTA PLACEMENT >5   Final Result      1. ULTRASOUND AND FLUOROSCOPIC GUIDED PLACEMENT OF A Right INTERNAL JUGULAR 14.5 Chinese 23 cm length  [employer] TUNNELED DIALYSIS CATHETER.      2. THE HEMODIALYSIS CATHETER MAY BE USED IMMEDIATELY AS CLINICALLY INDICATED. FLUSHES PER PROTOCOL.      DX-CHEST-PORTABLE (1 VIEW)   Final Result      Likely developing mild central vascular congestion/edema. Small left and trace right pleural effusions are now seen      DX-CHEST-PORTABLE (1 VIEW)   Final Result         1.  Pulmonary edema and/or infiltrates are identified, which are stable since the prior exam.   2.  Trace left pleural effusion   3.  Cardiomegaly      DX-CHEST-LIMITED (1 VIEW)   Final Result         1.  Left IJ catheter, tip projects over the brachiocephalic vein/aorta region. This is short in position and does not extend into the SVC. Need to correlate clinically for venous blood return to exclude arterial line.   2.  Other findings are detailed above.      EC-RENEE W/O CONT   Final Result      DX-CHEST-2 VIEWS   Final Result         1.  No focal infiltrates.   2.  Perihilar interstitial prominence and bronchial wall cuffing, appearance suggests changes of underlying bronchial inflammation, consider bronchitis.      US-VEIN MAPPING LOWER EXTREMITY BILAT   Final Result      US-CAROTID DOPPLER UNILAT LEFT   Final Result      EC-RENEE W/O CONT   Final Result      EC-ECHOCARDIOGRAM COMPLETE W/O CONT   Final Result      US-SUSI SINGLE LEVEL BILAT   Final Result      US-EXTREMITY ARTERY LOWER BILAT   Final Result      DX-CHEST-PORTABLE (1 VIEW)   Final Result      1.  Satisfactory appearance of the right IJ catheter. No pneumothorax visualized.      US-HEMODIALYSIS GRAFT DUPLEX COMP UPPER EXTREMITY   Final Result      US-HEMODIALYSIS GRAFT DUPLEX COMP UPPER EXTREMITY   Final Result      DX-CHEST-PORTABLE (1 VIEW)   Final Result      Mild enlargement of the cardiomediastinal silhouette without acute cardiopulmonary abnormality.      CL-LEFT HEART CATHETERIZATION WITH POSSIBLE INTERVENTION    (Results Pending)        Assessment/Plan  * Infected prosthetic vascular  graft, initial encounter ([facility])- (present on admission)  Assessment & Plan  Vascular surgery consulted.   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 Left greater saphenous vein harvest. Reconstruction of left brachial artery using left greater saphenous vein   interposition graft. Partial removal of left upper arm dialysis graft.      Bacteremia- (present on admission)  Assessment & Plan  Probably related to infected graft, status post removal.  Blood cultures positive for Enterococcus  RENEE revealed aortic valve endocarditis  Patient is status post aortic valve replacement  Repeat blood cultures on 7/1 have been negative  Continue IV ampicillin and IV ceftriaxone while inpatient with plans to switch to IV dapto on discharge with stop date of 8/11/24      S/P AVR (aortic valve replacement)- (present on admission)  Assessment & Plan  Developed aortic valve endocarditis  Status post aortic valve replacement    Continue coumadin with target INR 2-3  Complicated by bleeding events requiring PRBC and FFP  Hb now stable although continues to experiencing bleeding    End stage renal disease ([facility])- (present on admission)  Assessment & Plan  Nephrology consulted  Continue HD   S/p perma cath placement     Severe obesity ([facility])  Assessment & Plan  BMI 30    Coronary artery disease- (present on admission)  Assessment & Plan  Followed by cardiology in [place] with history of stent to the circumflex  S/p CABG X1 WITH SEKELTONIZED LIMA to LAD  Continue aspirin and atorvastatin    PAD (peripheral artery disease) ([facility])- (present on admission)  Assessment & Plan  Hx of previous fem-pop bypass  CTA of RLE with IV contrast that showed occlusion of right common femoral to distal superficial femoral artery bypass graft, moderate stenosis of tibioperoneal trunk.  Occlusion of the proximal right anterior tibial artery and likely occlusion of the distal posterior tibial  and peroneal arteries.  I discussed the findings with vascular surgery Dr. Tinoco who believes these findings are chronic and does not require any urgent surgical intervention.  Will continue warfarin and aspirin.  INR subtherapeutic therefore we will bridge with IV heparin.  Monitor xa  Vascular surgery Dr Blanchard has been consulted  S/p surgery EF okay  Resume anticoagulation  Monitor for progress, may need fem tib bypass or amputation if not improving.    Chronic atrial fibrillation (HCC)- (present on admission)  Assessment & Plan  He had been on coumadin but hasn't been taking it  Continue amiodarone and metoprolol  Restart warfarin, monitor INR with goal 2-3. Bridging with IV heparin, monitor Xa.   HR stable      History of alcohol abuse- (present on admission)  Assessment & Plan  Give vitamins    Renovascular hypertension- (present on admission)  Assessment & Plan  Uncontrolled   Continue coreg, valsartan and amlodipine  Started on hydralazine   Low normal BP         VTE prophylaxis: warfarin    I have performed a physical exam and reviewed and updated ROS and Plan today (7/15/2024). In review of yesterday's note (7/14/2024), there are no changes except as documented above.    Greater than 47 minutes spent prepping to see patient (e.g. review of tests) obtaining and/or reviewing separately obtained history. Performing a medically appropriate examination and/ evaluation.  Counseling and educating the patient/family/caregiver.  Ordering medications, tests, or procedures.  Referring and communicating with other health care professionals.  Documenting clinical information in EPIC.  Independently interpreting results and communicating results to patient/family/caregiver.  Care coordination.

## 2024-07-15 NOTE — PROGRESS NOTES
Inpatient Anticoagulation Service Note for 7/14/2024    Reason for Anticoagulation: Bioprosthetic AVR, Atrial Fibrillation  Target INR: 2.0 to 3.0    [id number] VASc Score: 4  HAS-BLED Score: 3    Hemoglobin Value: (!) 8.6  Hematocrit Value: (!) 26.8  Lab Platelet Value: (!) 163    INR from last 7 days       Date/Time INR Value    07/13/24 1504 1.6    07/13/24 0110 2.15    07/12/24 0139 3.89    07/11/24 0500 3.65    07/10/24 2242 3.47    07/10/24 1612 5.31    07/10/24 0209 3.2    07/09/24 0247 2.32    07/08/24 0314 1.91          Dose from last 7 days       Date/Time Dose (mg)    07/14/24 1739 2    07/13/24 1542 2    07/12/24 1335 0    07/11/24 1628 0    07/10/24 0901 0    07/09/24 1312 5          Assessment:  - Warfarin for hx of bioprosthetic AVR & Afib w/ chadsvasc 4. Additional PHM includes CAD s/p PCI in 2021, PVD s/p BL fem-pop bypass, recent CABG x1 on 6/30, and ESRD on iHD. Admitted w/ RLE ischemia & c/f infected prosthetic graft, now s/p thrombectomy & repeat fem-pop bypass on 7/10.   - Warfarin was resumed 7/5; required perioperative heparin gtt bridge.  - INR abruptly decreased from supratherapeutic range into subtherapeutic range over the past 48 hours (3.9 > 1.3). Goal INR 2 to 3. Warfarin had been held since 7/9 given supratherapeutic INR's; likely caused by acute drug interactions w/ amiodarone & ampicillin (both known to increase warfarin concentrations). Cessation of warfarin the past few days is likely driving the now subtherapeutic INR's.  - Anticoagulation complicated by acute on chronic anemia postop w/ noted bleeding from IJ & HD lines, requiring numerous rounds of PRBCs & FFP (most recently transfused 7/13). Hgb stable.  - Remains on cardiac diet.     Plan:  - Patient is high thrombotic risk postop & anticipate INR will continue downtrending over the next 48 hours further into subtherapeutic range, however complicated by ongoing bleeding & anemia noted. Continue conservative warfarin 2 mg  daily.  - Deferring bridge therapy for now, risk > benefit.  - Monitor closely for worsening bleeding from line sites.  - Repeat INR & CBC in AM.     Pharmacist suggested discharge dosing:  - Labile INR's thus far, numerous procedures, & ongoing bleeding. TBD pending subsequent INR trends. Recommend prompt INR check within 3-4 days of discharge.     Pharmacy will continue following.     Shlomo Callaway, NiurkaD

## 2024-07-15 NOTE — DISCHARGE PLANNING
@1020  Received call from Rylee at [facility] advised insurance auth is approved and have a bed and transport available for today. Advised per LSW Talylor pt is not medically clear but will check in 1045 rounds with the hospitalist.

## 2024-07-15 NOTE — PROGRESS NOTES
2 RN skin check complete.      Devices in place: HD cath, left lateral neck IJ, L AVGraft.  Skin assessed under devices: above as much as possible.      Truncal edema noted throughout. Right former IJ site with dried blood to steri-strips. Right chest HD catheter with pressure dressing in place, scant old drainage noted beneath dressing, scant serosang drainage noted on dressing, capped. Left neck triple lumen IJ, dressing changed with pressurized dressing placed, new drainage noted despite dressing change with surgifoam placement; reinforced. BUE swelling/ecchymosis noted. Left upper arm AVG covered with nonadhesive foam, ace wrap, dressed by MD. Sternal incision approximated/scabbed with dermabond OLEKSANDR. Two upper abdominal stabs (former drain sites) scabbed  Right forearm skin tear noted, mepitel one in place. Protective elbow mepilex in place, blanchable elbows noted. Abdomen obese, round. Right upper inner thigh bruising noted. Right upper thigh incision approximated with dermabond OLEKSANDR. Former left groin incision site; approximated, OLEKSANDR, nearly healed. Shiny skin noted to calves. Bilateral feet pitting edema noted. Left top of foot with small circular excoriation noted. Blanchable redness noted to toes/heels. Right 5th toe black with betadine paint noted. Scab noted to left 4th toe near nailbed. Blanchable redness noted to sacrum.      Confirmed pressure ulcers found on: NA.  New potential pressure ulcers noted on: NA.   Wound team rounded on patient.      The following interventions in place: TAP system in place. Protective mepilex replaced to elbows. Pillows being utilized for offloading limbs. Cueing for Q2 turns in place. Multiple blankets placed for warmth. Large waffle cushion below bottom while in bed.

## 2024-07-15 NOTE — PROGRESS NOTES
Inpatient Anticoagulation Service Note for 7/15/2024      Reason for Anticoagulation: Bioprosthetic Valve Replacement, Atrial Fibrillation   [id number] VASc Score: 4  HAS-BLED Score: 3    Hemoglobin Value: (!) 8.3  Hematocrit Value: (!) 26.5  Lab Platelet Value: 214  Target INR: 2.0 to 3.0    INR from last 7 days       Date/Time INR Value    07/15/24 0402 1.29    07/14/24 1351 1.36    07/13/24 1504 1.6    07/13/24 0110 2.15    07/12/24 0139 3.89    07/11/24 0500 3.65    07/10/24 2242 3.47    07/10/24 1612 5.31    07/10/24 0209 3.2    07/09/24 0247 2.32          Dose from last 7 days       Date/Time Dose (mg)    07/15/24 0932 3    07/14/24 1739 2    07/13/24 1542 2    07/12/24 1335 0    07/11/24 1628 0    07/10/24 0901 0    07/09/24 1312 5          Average Dose (mg):  (new start)  Significant Interactions: Amiodarone, Antibiotics, Antiplatelet Medications  Bridge Therapy: No  Bridge Therapy Start Date: 07/07/24  Days of Overlap Therapy: 4   INR Value Greater than 2 Prior to Discontinuation of Parenteral Anticoagulation: Yes     Reversal Agent Administered: Not Applicable  Comments: INR is subtherapeutic and continues to trend down. H/H is low but appears stable; IJ is still draining. No new DDI. Will start to cautiously increase the warfarin dose.    Plan:  Warfarin 3mg tonight  Education Material Provided?: No (Needs Education)    Pharmacist suggested discharge dosing: Warfarin 3mg PO daily with INR follow up in 2 days.      Staci Garza, NiurkaD

## 2024-07-15 NOTE — PROGRESS NOTES
"Bedside report received from [facility] RN; assumed care. Continued leakage noted form left IJ site, dressing reinforced prior to dialysis..Assessment complete. A&O x 4. VSS, HTN noted. 96% on RA. SOB noted with exertion. No new numbness/tingling reported to hands/feet. Indigestion and mild nausea reported this morning. Patient refused breakfast tray. Intermittent dizziness upon standing/getting OOB, resolves once upright. Medicated for pain; see MAR. + CMS/neuro intact, doppler utilized with pulse checks. Sternal incision scabbed. Abdomen round, distended.  Hyperactive BS x 4 noted. + void + eructation. + flatus (patient refusing to \"fart in bed\" d/t fear of incontinence. LBM 07/14 x 2 over NOC shift. Patient eating less of diet, snacking throughout shifts. Patient up x 1-2 with wide based steady gait noted with FWW. Patient up to bathroom this morning at change of shift. Discussed plan of care/dialysis/OT with patient. All questions answered.  High fall risk. Bed/strip alarm engaged. Bed in locked/lowest position.  Call light/personal belongings within reach.  All needs met, patient escorted off floor with transport to dialysis at present time.   "

## 2024-07-15 NOTE — PROGRESS NOTES
[facility] -  PROGRESS NOTE               Author: Darek Stevens D.O. Date & Time: 7/15/2024  12:56 PM     HPI:  68-year-old  male with biventricular history of ESRD on hemodialysis q. Monday Wednesday Friday at [facility] through left upper arm AV fistula that was placed about 2 to 3 years ago.  He presented to the ER with left arm pain and swelling for the last 5 days.  His last dialysis was on Wednesday and had no dialysis in the last 5 days.  He felt sick on Friday and missed his dialysis.  Ultrasound of the left upper arm AV fistula showed there is no flow in the AV fistula and it has thrombosed.  A temporary right IJ dialysis catheter was placed in the ER.  Nephrology has been consulted for management of dialysis.  Patient denies any fever or chills, no nausea or vomiting, no chest pain.  He has some shortness of breath but is able to talk in full sentences and is in no acute respiratory distress at this time. No melena, hematochezia, hematemesis.  No HA, visual changes, or abdominal pain.     NEPHROLOGY DAILY PROGRESS:   6/24: consult note  6/25:  Afebrile, HD last night without any complications, plans noted for OR today with Dr. Tinoco for vascualr surgery   6/26: Underwent AVG repair yesterday with Dr. Tinoco, has LAMBERT drain, BP noted low this AM on dialysis, says BP 's run low on dialysis - not on Midodrine, ordered Midodrine to be used on dialysis   6/27: 0 Net UF with HD yesterday, limited by hypotension. Sitting up at side of bed eating breakfast. Bps improved 130s systolic.   6/28: HD today, seen  6/29: s/p HD yesterday over temp HD line. Tolerated well, no complaints.  6/30: in OR, not available   7/1: s/p AVR, 1V CABG and TAHIR resection/ligation, transferred to ICU, now off pressors, fatigued, post-op pain  7/2: Stable hemodynamics, tolerated HD, transferred out of ICU, chest wall pain improved  7/3: Stable hemodynamics, 7/1 BC remain negative, denies complaints    7/4: No acute events, tolerated HD, chest wall pain improved, feeling better overall  7/5: No acute events, no chest pain or SOB. S/p permacath placement this AM  7/6: No acute events, hypertensive, denies complaints  7/7: No acute events, feels at baseline, no chest pain or SOB, Diminsed pulses RLE with discoloration-pending CTA  7/8: patient is sitting in a chair, doing well, just came back from dialysis, tolerated well, net UF 3000 ml  7/9: s/p iHD yesterday, no new issues reported  7/10: No events, BP stable, stable on RA, pt seen and examined on HD this am, VSS--see dialysis treatment sheet for full details, denies any CP/SOB, reports right foot pain controlled at this time, to go to OR today for revascularization of right foot  7/11: No events, tolerated HD yest with 2L UF, hypotensive overnight and BP borderline low this am, stable on 2L NC O2, underwent thrombectomy and stent placement of right fem-pop bypass graft yest, reports right toes are not as dark, denies any CP/SOB, feels tired today but denies any lightheadedness or dizziness  7/12: No events, BP stable this morning borderline low on HD, patient seen and examined on HD this morning, VSS--see dialysis treatment sheet for full details, denies any CP/SOB, reports right foot pain is improving  7/13: s/p iHD yesterday, net UF 0 ml, hypotensive, oozing blood around CVC and dialysis catheter  7/14: oozing from TDC resolved, still present around CVC,slight separation of his left upper extremity fistula incision - Dr Boston's note reviewed, advised on  conservative management at this point.    7/15: Pt seen on HD and tolerating. Reports no issues    REVIEW OF SYSTEMS:    10 point ROS reviewed and is as per HPI/daily summary or otherwise negative    PMH/PSH/SH/FH:   Reviewed and unchanged since admission note    CURRENT MEDICATIONS:   Reviewed from admission to present day    VS:  BP (!) 155/70   Pulse 60   Temp 36 °C (96.8 °F) (Temporal)   Resp 16    "Ht 1.854 m (6' 0.99\")   Wt 105 kg (231 lb 4.2 oz)   SpO2 97%   BMI 30.52 kg/m²     Physical Exam  Vitals and nursing note reviewed.   Constitutional:       General: He is not in acute distress.     Appearance: He is obese.   HENT:      Head: Normocephalic and atraumatic.   Eyes:      General: No scleral icterus.  Cardiovascular:      Rate and Rhythm: Normal rate and regular rhythm.      Comments: +R chest PC  Pulmonary:      Effort: Pulmonary effort is normal. No respiratory distress.      Breath sounds: Normal breath sounds.   Abdominal:      General: Bowel sounds are normal. There is no distension.      Palpations: Abdomen is soft.   Musculoskeletal:         General: No deformity.      Cervical back: Normal range of motion and neck supple.      Right lower leg: Edema present.      Left lower leg: Edema present.   Skin:     General: Skin is warm and dry.      Findings: No rash.   Neurological:      General: No focal deficit present.      Mental Status: He is alert and oriented to person, place, and time.   Psychiatric:         Mood and Affect: Mood normal.         Behavior: Behavior normal. Behavior is cooperative.         Fluids:  No intake/output data recorded.    LABS:  Recent Labs     07/13/24  0110 07/14/24  0151 07/15/24  0402   SODIUM 137 136 135   POTASSIUM 4.1 3.8 4.4   CHLORIDE 101 103 101   CO2 23 22 21   GLUCOSE 91 91 98   BUN 20 26* 31*   CREATININE 4.18* 5.13* 5.73*   CALCIUM 8.0* 8.0* 8.2*       IMAGING:   All imaging reviewed from admission to present day    IMPRESSION:    # ESRD  -Q. MWF at [facility]  # Bacteremia   - Blood Cx from 6/24, 6/27 + E. Faecalis  # Bacterial endocarditis:   -s/p aortic valve replacement 6/30  3 CAD  -s/p 1V CABG 6/30  #Thrombosed left upper arm AVG; infected pseudoaneurysm   -Right IJ temporary HD catheter placed in ER 6/24/2024  - S/P AVG repair, resection and pseudoaneurysm resection 6/25/24 by Dr. Tinoco along with  Reconstruction of left brachial artery " using left greater saphenous vein interposition graft.  -permacath placed 7/5  # HTN--BP low  # Sepsis   # Anemia of CKD  # CKD-MBD  -phos at goal  # Coronary artery disease  # Chronic A-fib  -s/p TAHIR resection/ligation        PLAN:    - pt seen on HD and tolerating  - cont HD qMWF schedule   - Challenging UF as BP tolerates  - Holding parameters adjusted on BP meds  - DC losartan for now  - Avoid aggressive lowering of BP   - If BP trends up and remains high, can restart ARB and monitor  - Using permacath until graft cleared for use by vascular  - Vascular surgery following  - EPO 10,000U IV with HD  - No dietary protein restrictions  - abx per primary  - Dose all meds per ESRD    Please page nephrology with any questions or concerns

## 2024-07-15 NOTE — PROGRESS NOTES
3hr HD started @ 0833 and completed @ 1134,tx well tolerated.VSS.Net UF = 3000ml,RIJ TDC dressing CDI.Report given to Rylee Blair RN.

## 2024-07-16 LAB
ALBUMIN SERPL BCP-MCNC: 2.5 G/DL (ref 3.2–4.9)
ANION GAP SERPL CALC-SCNC: 12 MMOL/L (ref 7–16)
BACTERIA BLD CULT: NORMAL
BACTERIA BLD CULT: NORMAL
BASOPHILS # BLD AUTO: 0.8 % (ref 0–1.8)
BASOPHILS # BLD: 0.08 K/UL (ref 0–0.12)
BUN SERPL-MCNC: 23 MG/DL (ref 8–22)
CALCIUM ALBUM COR SERPL-MCNC: 9.1 MG/DL (ref 8.5–10.5)
CALCIUM SERPL-MCNC: 7.9 MG/DL (ref 8.5–10.5)
CHLORIDE SERPL-SCNC: 100 MMOL/L (ref 96–112)
CO2 SERPL-SCNC: 22 MMOL/L (ref 20–33)
CREAT SERPL-MCNC: 4.35 MG/DL (ref 0.5–1.4)
EOSINOPHIL # BLD AUTO: 0.65 K/UL (ref 0–0.51)
EOSINOPHIL NFR BLD: 6.1 % (ref 0–6.9)
ERYTHROCYTE [DISTWIDTH] IN BLOOD BY AUTOMATED COUNT: 76 FL (ref 35.9–50)
GFR SERPLBLD CREATININE-BSD FMLA CKD-EPI: 14 ML/MIN/1.73 M 2
GLUCOSE SERPL-MCNC: 97 MG/DL (ref 65–99)
HCT VFR BLD AUTO: 25.1 % (ref 42–52)
HGB BLD-MCNC: 8 G/DL (ref 14–18)
IMM GRANULOCYTES # BLD AUTO: 0.04 K/UL (ref 0–0.11)
IMM GRANULOCYTES NFR BLD AUTO: 0.4 % (ref 0–0.9)
INR PPP: 1.32 (ref 0.87–1.13)
LYMPHOCYTES # BLD AUTO: 1.12 K/UL (ref 1–4.8)
LYMPHOCYTES NFR BLD: 10.5 % (ref 22–41)
MAGNESIUM SERPL-MCNC: 1.8 MG/DL (ref 1.5–2.5)
MCH RBC QN AUTO: 31.4 PG (ref 27–33)
MCHC RBC AUTO-ENTMCNC: 31.9 G/DL (ref 32.3–36.5)
MCV RBC AUTO: 98.4 FL (ref 81.4–97.8)
MONOCYTES # BLD AUTO: 0.77 K/UL (ref 0–0.85)
MONOCYTES NFR BLD AUTO: 7.2 % (ref 0–13.4)
NEUTROPHILS # BLD AUTO: 7.98 K/UL (ref 1.82–7.42)
NEUTROPHILS NFR BLD: 75 % (ref 44–72)
NRBC # BLD AUTO: 0 K/UL
NRBC BLD-RTO: 0 /100 WBC (ref 0–0.2)
PHOSPHATE SERPL-MCNC: 4.6 MG/DL (ref 2.5–4.5)
PLATELET # BLD AUTO: 185 K/UL (ref 164–446)
PMV BLD AUTO: 9.9 FL (ref 9–12.9)
POTASSIUM SERPL-SCNC: 4 MMOL/L (ref 3.6–5.5)
PROTHROMBIN TIME: 16.6 SEC (ref 12–14.6)
RBC # BLD AUTO: 2.55 M/UL (ref 4.7–6.1)
SIGNIFICANT IND 70042: NORMAL
SIGNIFICANT IND 70042: NORMAL
SITE SITE: NORMAL
SITE SITE: NORMAL
SODIUM SERPL-SCNC: 134 MMOL/L (ref 135–145)
SOURCE SOURCE: NORMAL
SOURCE SOURCE: NORMAL
WBC # BLD AUTO: 10.6 K/UL (ref 4.8–10.8)

## 2024-07-16 PROCEDURE — 700101 HCHG RX REV CODE 250: Performed by: INTERNAL MEDICINE

## 2024-07-16 PROCEDURE — 700111 HCHG RX REV CODE 636 W/ 250 OVERRIDE (IP): Performed by: INTERNAL MEDICINE

## 2024-07-16 PROCEDURE — 80069 RENAL FUNCTION PANEL: CPT

## 2024-07-16 PROCEDURE — 97116 GAIT TRAINING THERAPY: CPT | Mod: CQ

## 2024-07-16 PROCEDURE — A9270 NON-COVERED ITEM OR SERVICE: HCPCS | Performed by: INTERNAL MEDICINE

## 2024-07-16 PROCEDURE — 700102 HCHG RX REV CODE 250 W/ 637 OVERRIDE(OP): Performed by: INTERNAL MEDICINE

## 2024-07-16 PROCEDURE — 97530 THERAPEUTIC ACTIVITIES: CPT | Mod: CQ

## 2024-07-16 PROCEDURE — 700105 HCHG RX REV CODE 258: Performed by: INTERNAL MEDICINE

## 2024-07-16 PROCEDURE — 700102 HCHG RX REV CODE 250 W/ 637 OVERRIDE(OP)

## 2024-07-16 PROCEDURE — 700102 HCHG RX REV CODE 250 W/ 637 OVERRIDE(OP): Performed by: NURSE PRACTITIONER

## 2024-07-16 PROCEDURE — A9270 NON-COVERED ITEM OR SERVICE: HCPCS | Performed by: NURSE PRACTITIONER

## 2024-07-16 PROCEDURE — 99233 SBSQ HOSP IP/OBS HIGH 50: CPT | Performed by: INTERNAL MEDICINE

## 2024-07-16 PROCEDURE — 700111 HCHG RX REV CODE 636 W/ 250 OVERRIDE (IP): Mod: JZ | Performed by: NURSE PRACTITIONER

## 2024-07-16 PROCEDURE — A9270 NON-COVERED ITEM OR SERVICE: HCPCS

## 2024-07-16 PROCEDURE — 770001 HCHG ROOM/CARE - MED/SURG/GYN PRIV*

## 2024-07-16 PROCEDURE — 85610 PROTHROMBIN TIME: CPT

## 2024-07-16 PROCEDURE — 85025 COMPLETE CBC W/AUTO DIFF WBC: CPT

## 2024-07-16 PROCEDURE — 83735 ASSAY OF MAGNESIUM: CPT

## 2024-07-16 RX ADMIN — THERA TABS 1 TABLET: TAB at 05:03

## 2024-07-16 RX ADMIN — OXYCODONE HYDROCHLORIDE 10 MG: 10 TABLET ORAL at 08:15

## 2024-07-16 RX ADMIN — ATORVASTATIN CALCIUM 40 MG: 40 TABLET, FILM COATED ORAL at 20:19

## 2024-07-16 RX ADMIN — CEFTRIAXONE SODIUM 2000 MG: 10 INJECTION, POWDER, FOR SOLUTION INTRAVENOUS at 16:34

## 2024-07-16 RX ADMIN — OXYCODONE HYDROCHLORIDE 10 MG: 10 TABLET ORAL at 12:20

## 2024-07-16 RX ADMIN — FOLIC ACID 1 MG: 1 TABLET ORAL at 05:03

## 2024-07-16 RX ADMIN — OXYCODONE HYDROCHLORIDE 10 MG: 10 TABLET ORAL at 05:17

## 2024-07-16 RX ADMIN — Medication 1 APPLICATOR: at 08:14

## 2024-07-16 RX ADMIN — AMPICILLIN SODIUM 2000 MG: 2 INJECTION, POWDER, FOR SOLUTION INTRAVENOUS at 16:41

## 2024-07-16 RX ADMIN — HYDRALAZINE HYDROCHLORIDE 20 MG: 20 INJECTION INTRAMUSCULAR; INTRAVENOUS at 21:16

## 2024-07-16 RX ADMIN — Medication 100 MG: at 05:03

## 2024-07-16 RX ADMIN — CEFTRIAXONE SODIUM 2000 MG: 10 INJECTION, POWDER, FOR SOLUTION INTRAVENOUS at 05:03

## 2024-07-16 RX ADMIN — ANTACID TABLETS 500 MG: 500 TABLET, CHEWABLE ORAL at 16:35

## 2024-07-16 RX ADMIN — OXYCODONE HYDROCHLORIDE 10 MG: 10 TABLET ORAL at 20:21

## 2024-07-16 RX ADMIN — OMEPRAZOLE 20 MG: 20 CAPSULE, DELAYED RELEASE ORAL at 05:03

## 2024-07-16 RX ADMIN — METOPROLOL TARTRATE 25 MG: 25 TABLET, FILM COATED ORAL at 05:03

## 2024-07-16 RX ADMIN — METOPROLOL TARTRATE 25 MG: 25 TABLET, FILM COATED ORAL at 16:34

## 2024-07-16 RX ADMIN — OXYCODONE HYDROCHLORIDE 10 MG: 10 TABLET ORAL at 15:37

## 2024-07-16 RX ADMIN — AMIODARONE HYDROCHLORIDE 200 MG: 200 TABLET ORAL at 16:35

## 2024-07-16 RX ADMIN — WARFARIN SODIUM 3 MG: 3 TABLET ORAL at 16:34

## 2024-07-16 RX ADMIN — AMPICILLIN SODIUM 2000 MG: 2 INJECTION, POWDER, FOR SOLUTION INTRAVENOUS at 05:13

## 2024-07-16 RX ADMIN — ASPIRIN 81 MG: 81 TABLET, COATED ORAL at 05:03

## 2024-07-16 ASSESSMENT — PAIN DESCRIPTION - PAIN TYPE
TYPE: ACUTE PAIN

## 2024-07-16 ASSESSMENT — COGNITIVE AND FUNCTIONAL STATUS - GENERAL
WALKING IN HOSPITAL ROOM: A LITTLE
HELP NEEDED FOR BATHING: A LITTLE
MOVING FROM LYING ON BACK TO SITTING ON SIDE OF FLAT BED: A LITTLE
SUGGESTED CMS G CODE MODIFIER MOBILITY: CK
MOVING FROM LYING ON BACK TO SITTING ON SIDE OF FLAT BED: A LITTLE
CLIMB 3 TO 5 STEPS WITH RAILING: A LITTLE
MOBILITY SCORE: 18
STANDING UP FROM CHAIR USING ARMS: A LITTLE
TOILETING: A LITTLE
DAILY ACTIVITIY SCORE: 21
MOVING TO AND FROM BED TO CHAIR: A LITTLE
STANDING UP FROM CHAIR USING ARMS: A LITTLE
MOVING TO AND FROM BED TO CHAIR: A LITTLE
TURNING FROM BACK TO SIDE WHILE IN FLAT BAD: A LITTLE
CLIMB 3 TO 5 STEPS WITH RAILING: A LITTLE
DRESSING REGULAR LOWER BODY CLOTHING: A LITTLE
SUGGESTED CMS G CODE MODIFIER MOBILITY: CK
WALKING IN HOSPITAL ROOM: A LITTLE
TURNING FROM BACK TO SIDE WHILE IN FLAT BAD: A LITTLE
SUGGESTED CMS G CODE MODIFIER DAILY ACTIVITY: CJ
MOBILITY SCORE: 18

## 2024-07-16 ASSESSMENT — FIBROSIS 4 INDEX: FIB4 SCORE: 2.25

## 2024-07-16 ASSESSMENT — ENCOUNTER SYMPTOMS
SORE THROAT: 0
SHORTNESS OF BREATH: 0
COUGH: 0

## 2024-07-16 ASSESSMENT — GAIT ASSESSMENTS
ASSISTIVE DEVICE: FRONT WHEEL WALKER
DISTANCE (FEET): 25
GAIT LEVEL OF ASSIST: STANDBY ASSIST

## 2024-07-16 NOTE — PROGRESS NOTES
[facility] -  PROGRESS NOTE               Author: Darek Stevens D.O. Date & Time: 7/16/2024  12:31 PM     HPI:  68-year-old  male with biventricular history of ESRD on hemodialysis q. Monday Wednesday Friday at [facility] through left upper arm AV fistula that was placed about 2 to 3 years ago.  He presented to the ER with left arm pain and swelling for the last 5 days.  His last dialysis was on Wednesday and had no dialysis in the last 5 days.  He felt sick on Friday and missed his dialysis.  Ultrasound of the left upper arm AV fistula showed there is no flow in the AV fistula and it has thrombosed.  A temporary right IJ dialysis catheter was placed in the ER.  Nephrology has been consulted for management of dialysis.  Patient denies any fever or chills, no nausea or vomiting, no chest pain.  He has some shortness of breath but is able to talk in full sentences and is in no acute respiratory distress at this time. No melena, hematochezia, hematemesis.  No HA, visual changes, or abdominal pain.     NEPHROLOGY DAILY PROGRESS:   6/24: consult note  6/25:  Afebrile, HD last night without any complications, plans noted for OR today with Dr. Tinoco for vascualr surgery   6/26: Underwent AVG repair yesterday with Dr. Tinoco, has LAMBERT drain, BP noted low this AM on dialysis, says BP 's run low on dialysis - not on Midodrine, ordered Midodrine to be used on dialysis   6/27: 0 Net UF with HD yesterday, limited by hypotension. Sitting up at side of bed eating breakfast. Bps improved 130s systolic.   6/28: HD today, seen  6/29: s/p HD yesterday over temp HD line. Tolerated well, no complaints.  6/30: in OR, not available   7/1: s/p AVR, 1V CABG and TAHIR resection/ligation, transferred to ICU, now off pressors, fatigued, post-op pain  7/2: Stable hemodynamics, tolerated HD, transferred out of ICU, chest wall pain improved  7/3: Stable hemodynamics, 7/1 BC remain negative, denies complaints    7/4: No acute events, tolerated HD, chest wall pain improved, feeling better overall  7/5: No acute events, no chest pain or SOB. S/p permacath placement this AM  7/6: No acute events, hypertensive, denies complaints  7/7: No acute events, feels at baseline, no chest pain or SOB, Diminsed pulses RLE with discoloration-pending CTA  7/8: patient is sitting in a chair, doing well, just came back from dialysis, tolerated well, net UF 3000 ml  7/9: s/p iHD yesterday, no new issues reported  7/10: No events, BP stable, stable on RA, pt seen and examined on HD this am, VSS--see dialysis treatment sheet for full details, denies any CP/SOB, reports right foot pain controlled at this time, to go to OR today for revascularization of right foot  7/11: No events, tolerated HD yest with 2L UF, hypotensive overnight and BP borderline low this am, stable on 2L NC O2, underwent thrombectomy and stent placement of right fem-pop bypass graft yest, reports right toes are not as dark, denies any CP/SOB, feels tired today but denies any lightheadedness or dizziness  7/12: No events, BP stable this morning borderline low on HD, patient seen and examined on HD this morning, VSS--see dialysis treatment sheet for full details, denies any CP/SOB, reports right foot pain is improving  7/13: s/p iHD yesterday, net UF 0 ml, hypotensive, oozing blood around CVC and dialysis catheter  7/14: oozing from TDC resolved, still present around CVC,slight separation of his left upper extremity fistula incision - Dr Boston's note reviewed, advised on  conservative management at this point.    7/15: Pt seen on HD and tolerating. Reports no issues  7/16: Pt has HD placement found for his SNF. Feels well this AM.     REVIEW OF SYSTEMS:    10 point ROS reviewed and is as per HPI/daily summary or otherwise negative    PMH/PSH/SH/FH:   Reviewed and unchanged since admission note    CURRENT MEDICATIONS:   Reviewed from admission to present day    VS:  BP (!)  "148/69   Pulse 60   Temp 36.4 °C (97.5 °F) (Temporal)   Resp 17   Ht 1.854 m (6' 0.99\")   Wt 103 kg (227 lb 4.7 oz)   SpO2 93%   BMI 29.99 kg/m²     Physical Exam  Vitals and nursing note reviewed.   Constitutional:       General: He is not in acute distress.     Appearance: He is obese.   HENT:      Head: Normocephalic and atraumatic.   Eyes:      General: No scleral icterus.  Cardiovascular:      Rate and Rhythm: Normal rate and regular rhythm.      Comments: +R chest PC  Pulmonary:      Effort: Pulmonary effort is normal. No respiratory distress.      Breath sounds: Normal breath sounds.   Abdominal:      General: Bowel sounds are normal. There is no distension.      Palpations: Abdomen is soft.   Musculoskeletal:         General: No deformity.      Cervical back: Normal range of motion and neck supple.      Right lower leg: Edema present.      Left lower leg: Edema present.   Skin:     General: Skin is warm and dry.      Findings: No rash.   Neurological:      General: No focal deficit present.      Mental Status: He is alert and oriented to person, place, and time.   Psychiatric:         Mood and Affect: Mood normal.         Behavior: Behavior normal. Behavior is cooperative.         Fluids:  In: 560 [P.O.:60; Dialysis:500]  Out: 3500     LABS:  Recent Labs     07/14/24  0151 07/15/24  0402 07/16/24  0202   SODIUM 136 135 134*   POTASSIUM 3.8 4.4 4.0   CHLORIDE 103 101 100   CO2 22 21 22   GLUCOSE 91 98 97   BUN 26* 31* 23*   CREATININE 5.13* 5.73* 4.35*   CALCIUM 8.0* 8.2* 7.9*       IMAGING:   All imaging reviewed from admission to present day    IMPRESSION:    # ESRD   -Q. MWF at [facility]   - temp placement at [facility] for planned stay at [facility]  # Bacteremia   - Blood Cx from 6/24, 6/27 + E. Faecalis  # Bacterial endocarditis:   -s/p aortic valve replacement 6/30  3 CAD  -s/p 1V CABG 6/30  #Thrombosed left upper arm AVG; infected pseudoaneurysm   -Right IJ temporary HD catheter placed in " ER 6/24/2024  - S/P AVG repair, resection and pseudoaneurysm resection 6/25/24 by Dr. Tinoco along with  Reconstruction of left brachial artery using left greater saphenous vein interposition graft.  -permacath placed 7/5  # HTN--BP low  # Sepsis   # Anemia of CKD  # CKD-MBD  -phos at goal  # Coronary artery disease  # Chronic A-fib  -s/p TAHIR resection/ligation        PLAN:    - no indication for HD today  - cont HD qMWF schedule   - Challenging UF as BP tolerates  - Holding parameters adjusted on BP meds, avoid aggressive lowering of BP   - pt off  losartan for now  - If BP trends up and remains high, can restart ARB and monitor  - Using permacath until graft cleared for use by vascular  - Vascular surgery following  - EPO 10,000U IV with HD  - No dietary protein restrictions  - abx per primary  - Dose all meds per ESRD    Please page nephrology with any questions or concerns

## 2024-07-16 NOTE — PROGRESS NOTES
2 RN skin check complete.      Devices in place: HD cath right chest, left lateral neck IJ, L AV Graft.  Skin assessed under devices: above as much as possible.      Truncal, BUE/BLE edema noted. Right former IJ site with dried blood to steri-strips. Right chest HD catheter with pressure dressing in place, scant old drainage noted beneath dressing, scant serosang drainage noted on dressing, capped. Left neck triple lumen IJ, dressing changed with pressurized dressing placed, trickle bleeding noted d/t articulation of neck. Dressing changed x 2 during AM shift. Surgifoam placed beneath/above insertion site. BUE purpura/ecchymosis noted. Skin tear to right forearm with mepitel one in place. Left upper arm AVG dressing changed with additional steri-strips placed, surgifoam d/t x 2 trickle bleeds, non-adhesive foam, and ace wrap  Sternal incision approximated/scabbed with dermabond, OLEKSANDR. Two upper abdominal stabs (former drain sites) scabbed  Protective elbow mepilex in place, blanchable elbows noted. Abdomen obese, round. Right upper inner thigh bruising improving. Right upper thigh incision approximated with dermabond, OLEKSANDR, sonia-incisional redness noted. Former left groin incision site; approximated, OLEKSANDR, nearly healed. Shiny skin noted to calves. Bilateral feet pitting edema noted. Left top of foot with small circular excoriation noted. Blanchable redness noted to toes/heels. Right 5th toe black with betadine paint noted. Scab noted to left 4th toe near nailbed. Blanchable redness noted to sacrum.      Confirmed pressure ulcers found on: NA.  New potential pressure ulcers noted on: NA.   Wound team rounded on patient.      The following interventions in place: TAP system in place. Protective mepilex refused heels/bottom.  Pillows being utilized for offloading limbs. Cueing for Q2 turns in place. Multiple blankets placed for warmth. Large waffle cushion below bottom while in bed.

## 2024-07-16 NOTE — PROGRESS NOTES
"                 VASCULAR SURGERY               Inpatient Progress Note  _________________________________    Vitals (7/16/2024)  BP (!) 148/69   Pulse 60   Temp 36.4 °C (97.5 °F) (Temporal)   Resp 17   Ht 1.854 m (6' 0.99\")   Wt 103 kg (227 lb 4.7 oz)   SpO2 93%   BMI 29.99 kg/m²   _________________________________      7/16/2024  Status post right fem-pop bypass thrombectomy and left arm AV graft resection   He is doing well from vascular standpoint. Reports right  leg/ foot pain has significantly improved.       Vitals stable  Pleasant male in no apparent distress  Hands well perfused, left upper extremity incision without active drainage.   Right upper thigh incision clean and dry.  Feet warm, +doppler signals.       A/P:  Please place dry gauze between the toes of right foot daily.  Change gauze, kerlex, and ace wrap to left arm daily. Elevate left arm.   Appreciate Hospitalist service's support  Vascular surgery to sign off today, please call with questions  Patient to follow up with vascular surgery clinic in about 2 weeks.     The patient was also seen and evaluated by Dr. Tinoco who agrees with the above plan.       Elba Villarreal PA-C  [facility]  Voalte preferred or call my office [phone]  __________________________________________________________________  Patient:Brian Jasmine   MRN:[id number]   CSN:[id number]  "

## 2024-07-16 NOTE — THERAPY
"Physical Therapy   Daily Treatment     Patient Name: Brian Jasmine  Age:  68 y.o., Sex:  male  Medical Record #: [id number]  Today's Date: 7/16/2024     Precautions  Precautions: Fall Risk;Cardiac Precautions (See Comments);Sternal Precautions (See Comments)  Comments: CABG x 1 6/30, RLE fem-pop bypass 7/10    Assessment    Pt greeted and seen for PT treatment. Pt req'd Nisha to move side lying to sitting EOB. He is able to stand from EOB then used FWW to ambulated 25x2 w/ SBA, pt was steady throughout. Pt needed second attempt to come to stand from sitting in lower chair. He declined ambulating in the jones this time due to B foot pain and no present footwear, his family is bringing shoes either today or tomorrow. Pt motivated. Pt currently limited by decreased strength and decreased activity tolerance which negatively impacts functional mobility. Pt will continue to benefit from skilled PT to address deficits.       Plan    Treatment Plan Status: Continue Current Treatment Plan  Type of Treatment: Bed Mobility, Equipment, Family / Caregiver Training, Gait Training, Manual Therapy, Neuro Re-Education / Balance, Self Care / Home Evaluation, Therapeutic Activities, Therapeutic Exercise  Treatment Frequency: 4 Times per Week  Treatment Duration: Until Therapy Goals Met    DC Equipment Recommendations: Unable to determine at this time  Discharge Recommendations: Recommend post-acute placement for additional physical therapy services prior to discharge home      Subjective  \"Once I have my shoes I will walk farther\"       07/16/24 1005   Pain 0 - 10 Group   Therapist Pain Assessment Post Activity Pain Same as Prior to Activity;Nurse Notified  (no c/o pain)   Cognition    Comments Pleasant and cooperative   Balance   Sitting Balance (Static) Good   Sitting Balance (Dynamic) Fair +   Standing Balance (Static) Fair   Standing Balance (Dynamic) Fair -   Weight Shift Sitting Good   Weight Shift Standing Fair   Skilled " Intervention Verbal Cuing   Comments w/ FWW   Bed Mobility    Supine to Sit Minimal Assist   Scooting Standby Assist   Rolling Standby Assist   Skilled Intervention Verbal Cuing;Sequencing;Compensatory Strategies;Facilitation   Comments HOB elevated, sleeps in adjustable bed at home. Pt need Nisha to initiate from sidelying to come up onto hand, pt maintained prx   Gait Analysis   Gait Level Of Assist Standby Assist   Assistive Device Front Wheel Walker   Distance (Feet) 25   # of Times Distance was Traveled 2   Weight Bearing Status No restrictions   Skilled Intervention Verbal Cuing;Sequencing   Comments w/ FWW. Pt declined ambulating in hallway, 2/2 neuropathy, pain in B feet. Agreeable to two short distances. Pt reported family bringing in shoes today.   Functional Mobility   Sit to Stand Standby Assist   Bed, Chair, Wheelchair Transfer Contact Guard Assist   Transfer Method Stand Step   Mobility bed>chair>walking in room> chair   Skilled Intervention Verbal Cuing;Sequencing;Compensatory Strategies   Comments Pt able to stand upon second attempt from chair. VC for eccentric control to sitting. Pt reported sacral fx May 2024 and decreased mobility prior to this admission however motivated to return to [facility].   Short Term Goals    Short Term Goal # 1 Pt will perform supine <> sit without bed features with SPV in 6 visits to progress bed mobility   Goal Outcome # 1 Progressing as expected   Short Term Goal # 2 Pt will perform STS with FWW and SPV in 6 visits to progress OOB mobility   Goal Outcome # 2 Progressing as expected   Short Term Goal # 3 Pt will perform stand pivot transfers with FWW and SPV in 6 visits to progress functional OOB mobility   Goal Outcome # 3 Progressing as expected   Short Term Goal # 4 Pt will ambulate 150 ft with FWW and SPV in 6 visits to progress functional gait   Goal Outcome # 4 Goal not met

## 2024-07-16 NOTE — DISCHARGE PLANNING
Outpatient Dialysis Note    Patient has been temporarily placed at:    [facility]  [address], [place] [address]    Ph: [phone]     Schedule: Mon, Wed, Fri   Time: 11:30 AM    Patient to start Fri 7/19 at 11:00 AM    Voalte message to FELICIANO Anaya to relay.     Patient may return to home clinic [facility] upon d/c from [facility].     Xitlaly Reyes   Dialysis Coordinator / [facility]   Ph: [phone]

## 2024-07-16 NOTE — PROGRESS NOTES
"Bedside report received.  Assessment complete.  A&O x 4. Patient calls appropriately.  Patient ambulates with front wheel walker and one assist. Bed alarm on.   Patient has 7/10 pain. Patient medicated per MAR.  Denies N&V. Tolerating cardiac diet.  Surgical sites CDI.  Anuric to void, + flatus, + BM.  Patient denies SOB.  SCD's refused.  Patient is calm and cooperative with the care plan.  Review plan with of care with patient. Call light and personal belongings within reach. Hourly rounding in place. All needs met at this time.    BP (!) 148/69   Pulse 60   Temp 36.4 °C (97.5 °F) (Temporal)   Resp 17   Ht 1.854 m (6' 0.99\")   Wt 103 kg (227 lb 4.7 oz)   SpO2 93%   BMI 29.99 kg/m²     "

## 2024-07-16 NOTE — PROGRESS NOTES
Hospital Medicine Daily Progress Note    Date of Service  7/16/2024    Chief Complaint  Brian Jasmine is a 68 y.o. male admitted 6/24/2024 with left arm graft infection    Hospital Course  69yo BMI 29.47 M former smoker, w/ h/o CAD w/ stents Afib on Coumadin, PVD s/p fempop bypass, ESRD on dialysis via L arm graft presented 6/24/2024 with L arm pain, redness, swelling 5d prior to admission. He also complained of fevers and chills. He missed dialysis and stopped taking all of his medications because he felt so poorly. AT ED, afebrile, hemodynamically stable. Leukocytosis, anemia, elevated BUN and Cr- on labs. INR 1.46. BNP 21K and elevated troponin. Nephrology consulted, temporary access catheter placed for dialysis. Anticoagulation resumed thereafter as he was subtherapeutic. Started on antibiotics for infected left arm graft. 6/24 blood cx positve for enterococci. ID consulted. Echo EF 53% normal RV however RENEE showed aortic valve vegetation consistent with endocarditis. Vascular, Cardiology and Cardiothoracic Surgery consulted. S/p resection of infected left arm dialysis graft pseudoaneurysm at arterial anastomosis, reconstruction of the left brachial artery using left greater saphenous vein interposition graft by Dr. Tinoco, Vascular on 6/25. S/p cardiac cath by Dr. Purvis, Cardiuology on 6/29. S/p AV replacement and CABG x 1 by Dr. Curiel, Cardiothoracic on 6/30. Repeat blood cultures cleared on 7/1 and tunneled catheter placed by Dr. Goldstein, IR for dialysis on 7/5.      Managing dialysis graft infection, endocarditis, R leg ischemia, basilic superficial VT.  ID recommended IV daptomycin [phone] with his MWF  dialysis sessions STOP 8/11. Repeat echo to check EF for reduced perfusion, result EF normal, RV dilated, grade 1 diastolic. S/p thrombectomy and stent at R fem-pop bypass by Dr. Blanchard 7/10      WBC 14.8-15.2-13.5-15.7, Hb 10.4-9.8-6.7-7.8 reviewed iron panel its OK no active bleeding, K  3.6-3.7-3.5 replace get Mg level, bicarb normalized, BUN 26-32-29, Cr- 4.46-5.8-5.54 on dialysis, INR 2.32-5.31-3.89  Postop instructions: anticoagulation resumed and may need fem-tib bypass or amputation as he has severe tibial disease.  Supratheraeputic INR.  Vascular cleared. Nephrology likely clears    Continue IV ampicillin and IV ceftriaxone while inpatient with plans to switch to IV dapto on discharge with stop date of 8/11/24  ID recommended IV daptomycin [phone] with his MWF  dialysis sessions STOP 8/11. Outpatient dialysis being arranged. PT recommended SNF. [facility] evaluating.    Interval Problem Update  Patient was seen and examined at bedside.  No acute events overnight. Patient is resting comfortably in bed and in no acute distress.     ID recommended IV daptomycin [phone] with his MWF  dialysis sessions 8/11.   Anticipate SNF placement  S/p OR 07/10 - Open thrombectomy of right femoral to above-knee popliteal PTFE bypass graft. Stent placement in the proximal anastomosis of the right femoral to popliteal bypass graft and also the distal anastomosis of the right femoral to popliteal bypass graft     Patient was seen and examined at bedside.  I have personally reviewed and interpreted vitals, labs, and imaging.    7/16.  Afebrile.  Has had bradycardia.  Intermittent hypertension.  On room air.  Denies fevers, chills, chest pain, shortness of breath.  Complains of chronic foot and ankle pain.  Plan is for placement with outpatient dialysis.  Awaiting therapeutic INR.  Wbc 12.7 > 10.6  Hg 8.6 > 8.3 > 8  INR 1.32  Na 134  Cr 4.35    I have discussed this patient's plan of care and discharge plan at IDT rounds today with Case Management, Nursing, Nursing leadership, and other members of the IDT team.    Consultants/Specialty  cardiovascular surgeon, infectious disease, and vascular surgery    Code Status  Full Code    Disposition  The patient is not medically cleared for discharge to home or  a post-acute facility.  Anticipate discharge to: skilled nursing facility    I have placed the appropriate orders for post-discharge needs.    Review of Systems  Review of Systems   Constitutional:  Positive for malaise/fatigue.   HENT:  Negative for congestion and sore throat.    Respiratory:  Negative for cough and shortness of breath.    Cardiovascular:  Positive for leg swelling. Negative for chest pain.   Musculoskeletal:  Positive for joint pain.        Physical Exam  Temp:  [36 °C (96.8 °F)-36.6 °C (97.9 °F)] 36.6 °C (97.9 °F)  Pulse:  [56-63] 62  Resp:  [16] 16  BP: (138-168)/(59-84) 156/84  SpO2:  [92 %-97 %] 93 %    Physical Exam  Vitals and nursing note reviewed.   Constitutional:       General: He is not in acute distress.     Appearance: He is obese. He is ill-appearing.   HENT:      Head: Normocephalic.      Right Ear: External ear normal.      Left Ear: External ear normal.      Mouth/Throat:      Mouth: Mucous membranes are moist.   Eyes:      General: No scleral icterus.     Pupils: Pupils are equal, round, and reactive to light.   Neck:      Comments: weeping from left IJ line  Cardiovascular:      Rate and Rhythm: Normal rate and regular rhythm.      Pulses: Normal pulses.      Heart sounds: Normal heart sounds.      Comments: Sternal incision clean dry and intact  Pulmonary:      Breath sounds: No wheezing.   Abdominal:      Palpations: Abdomen is soft.      Tenderness: There is no abdominal tenderness. There is no guarding or rebound.   Musculoskeletal:         General: Swelling (LUE) present.      Cervical back: Neck supple.      Right lower leg: Edema present.      Left lower leg: Edema present.      Comments: Left arm swollen   Skin:     General: Skin is warm.      Coloration: Skin is not jaundiced.   Neurological:      General: No focal deficit present.      Mental Status: He is alert and oriented to person, place, and time.   Psychiatric:         Mood and Affect: Mood normal.          Behavior: Behavior normal.         Fluids    Intake/Output Summary (Last 24 hours) at 7/16/2024 0640  Last data filed at 7/15/2024 1134  Gross per 24 hour   Intake 500 ml   Output 3500 ml   Net -3000 ml         Laboratory  Recent Labs     07/14/24  0151 07/15/24  0402 07/16/24  0202   WBC 9.8 12.7* 10.6   RBC 2.80* 2.70* 2.55*   HEMOGLOBIN 8.6* 8.3* 8.0*   HEMATOCRIT 26.8* 26.5* 25.1*   MCV 95.7 98.1* 98.4*   MCH 30.7 30.7 31.4   MCHC 32.1* 31.3* 31.9*   RDW 77.3* 76.1* 76.0*   PLATELETCT 163* 214 185   MPV 9.9 9.9 9.9     Recent Labs     07/14/24  0151 07/15/24  0402 07/16/24  0202   SODIUM 136 135 134*   POTASSIUM 3.8 4.4 4.0   CHLORIDE 103 101 100   CO2 22 21 22   GLUCOSE 91 98 97   BUN 26* 31* 23*   CREATININE 5.13* 5.73* 4.35*   CALCIUM 8.0* 8.2* 7.9*     Recent Labs     07/14/24  1351 07/15/24  0402 07/16/24  0202   INR 1.36* 1.29* 1.32*                 Imaging  DX-CHEST-LIMITED (1 VIEW)   Final Result      1.  Line position as described above, correlate for function and desired position   2.  Bibasilar underinflation atelectasis which could obscure an additional process.      DX-PORTABLE FLUORO > 1 HOUR   Final Result      Portable fluoroscopy utilized for 15 minutes 46 seconds.         INTERPRETING LOCATION: [address] [place] [address], [address]      DX-OPERATIVE ANGIOGRAM EACH PROJ   Final Result      Digitized intraoperative radiograph is submitted for review. This examination is not for diagnostic purpose but for guidance during a surgical procedure. Please see the patient's chart for full procedural details.         INTERPRETING LOCATION: [address] [place] [address], [address]      US-EXTREMITY ARTERY UPPER UNILAT LEFT   Final Result      US-EXTREMITY VENOUS UPPER UNILAT LEFT   Final Result      EC-ECHOCARDIOGRAM COMPLETE W/ CONT   Final Result      US-EXTREMITY ARTERY LOWER UNILAT RIGHT   Final Result      CT-CTA LOWER EXT WITH & W/O-POST PROCESS RIGHT   Final Result      1.  There appears to be occlusion of right common  femoral to distal superficial femoral artery bypass graft.   2.  At least moderate stenosis of the tibioperoneal trunk. Occlusion of the proximal right anterior tibial artery and likely occlusion of the distal posterior tibial and peroneal arteries.   3.  Diffuse lower extremity edema. Correlate for cellulitis/infection.   4.  Subacute appearing left superior and inferior pubic rami fractures.      IR-MUKUND HUERTA PLACEMENT >5   Final Result      1. ULTRASOUND AND FLUOROSCOPIC GUIDED PLACEMENT OF A Right INTERNAL JUGULAR 14.5 Upper sorbian 23 cm length [employer] TUNNELED DIALYSIS CATHETER.      2. THE HEMODIALYSIS CATHETER MAY BE USED IMMEDIATELY AS CLINICALLY INDICATED. FLUSHES PER PROTOCOL.      DX-CHEST-PORTABLE (1 VIEW)   Final Result      Likely developing mild central vascular congestion/edema. Small left and trace right pleural effusions are now seen      DX-CHEST-PORTABLE (1 VIEW)   Final Result         1.  Pulmonary edema and/or infiltrates are identified, which are stable since the prior exam.   2.  Trace left pleural effusion   3.  Cardiomegaly      DX-CHEST-LIMITED (1 VIEW)   Final Result         1.  Left IJ catheter, tip projects over the brachiocephalic vein/aorta region. This is short in position and does not extend into the SVC. Need to correlate clinically for venous blood return to exclude arterial line.   2.  Other findings are detailed above.      EC-RENEE W/O CONT   Final Result      DX-CHEST-2 VIEWS   Final Result         1.  No focal infiltrates.   2.  Perihilar interstitial prominence and bronchial wall cuffing, appearance suggests changes of underlying bronchial inflammation, consider bronchitis.      US-VEIN MAPPING LOWER EXTREMITY BILAT   Final Result      US-CAROTID DOPPLER UNILAT LEFT   Final Result      EC-RENEE W/O CONT   Final Result      EC-ECHOCARDIOGRAM COMPLETE W/O CONT   Final Result      US-SUSI SINGLE LEVEL BILAT   Final Result      US-EXTREMITY ARTERY LOWER BILAT   Final Result       DX-CHEST-PORTABLE (1 VIEW)   Final Result      1.  Satisfactory appearance of the right IJ catheter. No pneumothorax visualized.      US-HEMODIALYSIS GRAFT DUPLEX COMP UPPER EXTREMITY   Final Result      US-HEMODIALYSIS GRAFT DUPLEX COMP UPPER EXTREMITY   Final Result      DX-CHEST-PORTABLE (1 VIEW)   Final Result      Mild enlargement of the cardiomediastinal silhouette without acute cardiopulmonary abnormality.      CL-LEFT HEART CATHETERIZATION WITH POSSIBLE INTERVENTION    (Results Pending)        Assessment/Plan  * Infected prosthetic vascular graft, initial encounter ([facility])- (present on admission)  Assessment & Plan  7/16/2024  Vascular surgery consulted.   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 Left greater saphenous vein harvest. Reconstruction of left brachial artery using left greater saphenous vein   interposition graft. Partial removal of left upper arm dialysis graft.    Severe obesity ([facility])  Assessment & Plan  Body mass index is 30.52 kg/m².  Counseled about diet and exercise    S/P AVR (aortic valve replacement)- (present on admission)  Assessment & Plan  7/16/2024  Developed aortic valve endocarditis  Status post aortic valve replacement - bioprosthetic valve    Continue coumadin with target INR 2-3  Complicated by bleeding events requiring PRBC and FFP  Hb now stable although continues to experiencing bleeding    Bacteremia- (present on admission)  Assessment & Plan  7/16/2024  Probably related to infected graft, status post removal.  Blood cultures positive for Enterococcus  RENEE revealed aortic valve endocarditis  Patient is status post aortic valve replacement  Repeat blood cultures on 7/1 have been negative  Continue IV ampicillin and IV ceftriaxone while inpatient with plans to switch to IV dapto on discharge with stop date of 8/11/24    Coronary artery disease- (present on admission)  Assessment & Plan  7/16/2024  Followed  by cardiology in [place] with history of stent to the circumflex  S/p CABG X1 WITH SEKELTONIZED LIMA to LAD  Continue aspirin and atorvastatin    PAD (peripheral artery disease) (HCC)- (present on admission)  Assessment & Plan  7/16/2024  Hx of previous fem-pop bypass  CTA of RLE with IV contrast that showed occlusion of right common femoral to distal superficial femoral artery bypass graft, moderate stenosis of tibioperoneal trunk.  Occlusion of the proximal right anterior tibial artery and likely occlusion of the distal posterior tibial and peroneal arteries.  I discussed the findings with vascular surgery Dr. Tinoco who believes these findings are chronic and does not require any urgent surgical intervention.  Will continue warfarin and aspirin.  INR subtherapeutic therefore we will bridge with IV heparin.  Monitor xa  Vascular surgery Dr Blanchard has been consulted  S/p surgery EF okay  Resume anticoagulation  Monitor for progress, may need fem tib bypass or amputation if not improving.    Chronic atrial fibrillation (HCC)- (present on admission)  Assessment & Plan  7/16/2024  He had been on coumadin but hasn't been taking it  Continue amiodarone and metoprolol  Restart warfarin, monitor INR with goal 2-3. Bridging with IV heparin, monitor Xa.   HR stable    End stage renal disease (HCC)- (present on admission)  Assessment & Plan  7/16/2024  Nephrology consulted  Continue HD   S/p perma cath placement     History of alcohol abuse- (present on admission)  Assessment & Plan  7/16/2024  Give vitamins    Renovascular hypertension- (present on admission)  Assessment & Plan  7/16/2024  Uncontrolled   Continue coreg, valsartan and amlodipine  Started on hydralazine   Low normal BP         VTE prophylaxis: warfarin    I have performed a physical exam and reviewed and updated ROS and Plan today (7/16/2024). In review of yesterday's note (7/15/2024), there are no changes except as documented above.    Greater than 51  minutes spent prepping to see patient (e.g. review of tests) obtaining and/or reviewing separately obtained history. Performing a medically appropriate examination and/ evaluation.  Counseling and educating the patient/family/caregiver.  Ordering medications, tests, or procedures.  Referring and communicating with other health care professionals.  Documenting clinical information in EPIC.  Independently interpreting results and communicating results to patient/family/caregiver.  Care coordination.

## 2024-07-16 NOTE — PROGRESS NOTES
Bedside report received, assessment completed    A&O x  4, pt calls appropriately  Mobility: Up with x1-2, Assistive Devices: FWW  Fall Risk Assessment: Moderate, bed alarm refused, door notifications in use  Pain Assessment / Reassessment completed, medication provided per MAR  Diet: Cardiac  LDA:   IV Access: L triple lumen IJ, CDI/ flushed/ SL     GI/: + void, + flatus, 7/15 BM  DVT Prophylaxis: Warfarin, SCD refused    Reviewed plan of care with patient, bed in lowest position and locked, pt resting comfortably now, call light within reach, all needs met at this time. Interventions will be executed per plan of care

## 2024-07-16 NOTE — CARE PLAN
Problem: Pain - Standard  Goal: Alleviation of pain or a reduction in pain to the patient’s comfort goal  Outcome: Progressing     Problem: Knowledge Deficit - Standard  Goal: Patient and family/care givers will demonstrate understanding of plan of care, disease process/condition, diagnostic tests and medications  Outcome: Progressing     Problem: Hemodynamics  Goal: Patient's hemodynamics, fluid balance and neurologic status will be stable or improve  Outcome: Progressing     Problem: Fall Risk  Goal: Patient will remain free from falls  Outcome: Progressing     Problem: Skin Integrity  Goal: Skin integrity is maintained or improved  Outcome: Progressing   The patient is Stable - Low risk of patient condition declining or worsening    Shift Goals  Clinical Goals: hemodynamic stability  Patient Goals: rest  Family Goals: n/a    Progress made toward(s) clinical / shift goals:  patient remained hemodynamically stable    Patient is not progressing towards the following goals:

## 2024-07-16 NOTE — PROGRESS NOTES
4 Eyes Skin Assessment Completed by DIALLO Parra and Hever RN.    Head WDL  Ears WDL  Nose WDL  Mouth WDL  Neck L triple lumen IJ and R former IJ  Breast/Chest MLI with dermabond OLEKSANDR  Shoulder Blades WDL  Spine WDL  (R) Arm/Elbow/Hand Bruising and Swelling and skin tear to FA with mepitel  (L) Arm/Elbow/Hand AVG to upper arm closed with dermabond, steri strips, gauze and ace wrap, bruising, and swelling  Abdomen X2 RUQ incisions with dermabond OLEKSANDR  Groin L groin incision OLEKSANDR  Scrotum/Coccyx/Buttocks Redness and Blanching  (R) Leg R upper thigh incision OLEKSANDR, swelling and shiny  (L) Leg Bruising, Swelling, and Shiny  (R) Heel/Foot/Toe Swelling and 5th toes black  (L) Heel/Foot/Toe Swelling          Devices In Places Blood Pressure Cuff and Pulse Ox, and triple lumen IJ      Interventions In Place Waffle Overlay, TAP System, Pillows, Q2 Turns, Dri-Bonifacio Pads, and Heels Loaded W/Pillows    Possible Skin Injury Yes    Pictures Uploaded Into Epic N/A  Wound Consult Placed N/A  RN Wound Prevention Protocol Ordered Yes

## 2024-07-16 NOTE — CARE PLAN
The patient is Stable - Low risk of patient condition declining or worsening    Shift Goals  Clinical Goals: Dialysis, pain control, mobility, dressing changes, POC, rest  Patient Goals: Dialysis  Family Goals: n/a    Progress made toward(s) clinical / shift goals:  Patient reported being fatigued pre/post dialysis. Medicated for pain; see MAR. Patient stood EOB with OT during shift. POC discussed with patient/family who arrived from out of town to surprise patient. Patient slept intermittently during shift. Sought clarification of dressing changes for L AV graft from MD. No new orders received. To perform 2 RN skin check/dressing changes after change of shift. IJ dressing change performed mid-shift.     Patient is not progressing towards the following goals: NA.

## 2024-07-16 NOTE — THERAPY
"Occupational Therapy  Daily Treatment     Patient Name: Brian Jasmine  Age:  68 y.o., Sex:  male  Medical Record #: [id number]  Today's Date: 7/15/2024     Precautions  Precautions: Fall Risk, Cardiac Precautions (See Comments), Sternal Precautions (See Comments)  Comments: CABG x 1 6/30, RLE fem-pop bypass 7/10    Assessment    Pt is slowly progressing with his ADLs and related functional mobility. Pt declined mobility further than standing at the EOB for standing g/h due to fatigue. Pt was able to stand from EOB with CGA and FWW. Education provided on UBD modification to maintain sternal precautions with pt return demo'ing with his own pullover tshirt. Pt stood with SBA for oral care with an elevated tray table. Pt continues to be limited by impaired endurance, activity tolerance, strength, and functional mobility. Continue to recommend post-acute placement at this time.     Plan    Treatment Plan Status: (P) Continue Current Treatment Plan  Type of Treatment: Self Care / Activities of Daily Living, Therapeutic Exercises, Therapeutic Activity, Family / Caregiver Training  Treatment Frequency: 5 Times per Week  Treatment Duration: Until Therapy Goals Met    DC Equipment Recommendations: (P) Unable to determine at this time  Discharge Recommendations: (P) Recommend post-acute placement for additional occupational therapy services prior to discharge home    Subjective    \"I'm tired from dialysis.\"     Objective     07/15/24 1622   Vitals   Pulse Oximetry 94 %   O2 (LPM) 0   O2 Delivery Device None - Room Air   Pain   Intervention Declines   Non Verbal Descriptors   Non Verbal Scale  Calm   Cognition    Cognition / Consciousness WDL   Comments Pleasant and cooperative   Other Treatments   Other Treatments Provided Education on sitting in the chair for meals and mobilizing with nursing when needing to use the restroom to further progress his endurance   Balance   Sitting Balance (Static) Good   Sitting Balance (Dynamic) " Fair +   Standing Balance (Static) Fair   Standing Balance (Dynamic) Fair -   Weight Shift Sitting Good   Weight Shift Standing Fair   Skilled Intervention Verbal Cuing   Comments FWW in standing   Bed Mobility    Supine to Sit Contact Guard Assist   Sit to Supine Standby Assist   Scooting Standby Assist   Rolling Standby Assist   Skilled Intervention Verbal Cuing   Comments HOB elevated, reports he has an adjustable bed at home   Activities of Daily Living   Grooming Standby Assist;Standing  (oral care standing at elevated tray table, declined to walk to the bathroom to perform)   Upper Body Dressing Supervision  (doff/don pullover tshirt for training on UBD with sternal precautions)   Lower Body Dressing Minimal Assist  (don slip on shoes)   Skilled Intervention Compensatory Strategies;Sequencing;Tactile Cuing;Verbal Cuing   How much help from another person does the patient currently need...   Putting on and taking off regular lower body clothing? 3   Bathing (including washing, rinsing, and drying)? 3   Toileting, which includes using a toilet, bedpan, or urinal? 3   Putting on and taking off regular upper body clothing? 3   Taking care of personal grooming such as brushing teeth? 4   Eating meals? 4   6 Clicks Daily Activity Score 20   Functional Mobility   Sit to Stand Contact Guard Assist   Mobility EOB, STS x2, declined further mobility   Activity Tolerance   Sitting Edge of Bed 15+ min   Standing 2 min   Patient / Family Goals   Patient / Family Goal #1 to get stronger and go home   Goal #1 Outcome Progressing as expected   Short Term Goals   Short Term Goal # 1 complete UBD w/ sternal prxns SPV   Goal Outcome # 1 Goal met   Short Term Goal # 2 complete full g/h routine standing SPV   Goal Outcome # 2 Progressing as expected   Short Term Goal # 3 complete toileting SPV   Goal Outcome # 3 Goal not met   Short Term Goal # 4 complete ADL txf SPV   Goal Outcome # 4 Goal not met   Short Term Goal # 5 Pt will  complete LB dressing with supv   Goal Outcome # 5 Goal not met   Education Group   Sternal Precautions Patient Response Patient;Acceptance;Explanation;Verbal Demonstration;Action Demonstration;Reinforcement Needed   ADL Patient Response Patient;Acceptance;Explanation;Verbal Demonstration;Action Demonstration;Reinforcement Needed   Occupational Therapy Treatment Plan    O.T. Treatment Plan Continue Current Treatment Plan   Anticipated Discharge Equipment and Recommendations   DC Equipment Recommendations Unable to determine at this time   Discharge Recommendations Recommend post-acute placement for additional occupational therapy services prior to discharge home   Interdisciplinary Plan of Care Collaboration   IDT Collaboration with  Nursing;Family / Caregiver   Patient Position at End of Therapy In Bed;Call Light within Reach;Tray Table within Reach;Phone within Reach;Family / Friend in Room   Collaboration Comments RN updated and aware of no strip bed alarm and bed extender does not allow built-in alarm to be used,strip alarm to be added when pt next gets up; pt's family present and supportive   Session Information   Date / Session Number  7/15 #4 (2/5, 7/15)

## 2024-07-16 NOTE — DISCHARGE PLANNING
Case Management Discharge Planning    Admission Date: 6/24/2024  GMLOS: 11.2  ALOS: 22    6-Clicks ADL Score: 21  6-Clicks Mobility Score: 18      Anticipated Discharge Dispo: Discharge Disposition: D/T to SNF with Medicare cert in anticipation of skilled care (03)  Discharge Address: [address] [place] [address] [address] [place] [address]  Discharge Contact Phone Number: [phone]    Pt discussed during IDT rounds.   Pharmacy was waiting on therapuetic IR.   LMSW was requested to get update on temporary dialysis chair.   Tisha states temporary chair at [facility] is arranged.  M,W,F at 1130. Starting 7/19/24.

## 2024-07-16 NOTE — PROGRESS NOTES
Inpatient Anticoagulation Service Note for 7/16/2024    Reason for Anticoagulation: Bioprosthetic AVR, Atrial Fibrillation  Target INR: 2.0 to 3.0    [id number] VASc Score: 4  HAS-BLED Score: 3    Hemoglobin Value: (!) 8  Hematocrit Value: (!) 25.1  Lab Platelet Value: 185    INR from last 7 days       Date/Time INR Value    07/16/24 0202 1.32    07/15/24 0402 1.29    07/14/24 1351 1.36    07/13/24 1504 1.6    07/13/24 0110 2.15    07/12/24 0139 3.89    07/11/24 0500 3.65    07/10/24 2242 3.47    07/10/24 1612 5.31    07/10/24 0209 3.2          Dose from last 7 days       Date/Time Dose (mg)    07/16/24 1648 3    07/15/24 0932 3    07/14/24 1739 2    07/13/24 1542 2    07/12/24 1335 0    07/11/24 1628 0    07/10/24 0901 0          Assessment:  - Warfarin for hx of bioprosthetic AVR & Afib w/ chadsvasc 4. Additional PHM includes CAD s/p PCI in 2021, PVD s/p BL fem-pop bypass, recent CABG x1 on 6/30, and ESRD on iHD. Admitted w/ RLE ischemia & c/f infected prosthetic graft, now s/p thrombectomy & repeat fem-pop bypass on 7/10.   - Warfarin was resumed 7/5; required perioperative heparin gtt bridge.  - INR remains subtherapeutic ~1.3; previously downtrended from supratherapeutic range (INR peaked @ 5.3 on 7/10). Goal INR 2 to 3. Warfarin had been held since 7/9 given supratherapeutic INR's, which were likely caused by acute drug interactions w/ amiodarone & ampicillin (both known to increase warfarin concentrations). Cessation of warfarin for several consecutive days likely caused the now subtherapeutic INR's, however anticipate further INR movement towards goal w/in the next 48 hours as warfarin doses the past few days begin taking effect.  - Anticoagulation complicated by acute on chronic anemia postop w/ noted bleeding from IJ & HD lines, requiring numerous rounds of PRBCs & FFP (most recently transfused 7/13). Hgb stable.  - Remains on cardiac diet.     Plan:  - Continue conservative warfarin 3 mg daily.   - High  thrombotic risk postop & given hx above, however continuing to defer bridge therapy as risk > benefit in setting of persistent surgical site bleeding. Bridging technically not indicated as AVR is bioprosthetic & AF w/ chadsvasc of only 4. This should not be a barrier to discharge.  - Monitor closely for bleeding.  - Repeat INR & CBC in AM.     Pharmacist suggested discharge dosing:  - Consider warfarin 3 mg daily on discharge.  - Recommend prompt INR check within 3-4 days of discharge.     Pharmacy will continue following.     Shlomo Callaway, NiurkaD

## 2024-07-16 NOTE — CARE PLAN
The patient is Stable - Low risk of patient condition declining or worsening    Shift Goals  Clinical Goals: Pain control, abx, rest, and skin integirty  Patient Goals: pain control and rest  Family Goals: n/a    Progress made toward(s) clinical / shift goals:  Pain controlled per MAR. Abx administered as ordered. Skin integrity maintained with TAPS, Q2 turns, barrier paste, and offloading. Pt slept intermittently throughout shift.       Problem: Pain - Standard  Goal: Alleviation of pain or a reduction in pain to the patient’s comfort goal  Outcome: Progressing     Problem: Hemodynamics  Goal: Patient's hemodynamics, fluid balance and neurologic status will be stable or improve  Outcome: Progressing

## 2024-07-16 NOTE — PROGRESS NOTES
Virtual Nurse rounding complete.    Round Needs: Needs repositioning. CNA notified at this time.

## 2024-07-17 VITALS
HEIGHT: 73 IN | OXYGEN SATURATION: 93 % | BODY MASS INDEX: 30.12 KG/M2 | SYSTOLIC BLOOD PRESSURE: 146 MMHG | DIASTOLIC BLOOD PRESSURE: 64 MMHG | WEIGHT: 227.29 LBS | TEMPERATURE: 97.2 F | HEART RATE: 63 BPM | RESPIRATION RATE: 18 BRPM

## 2024-07-17 PROBLEM — Z99.11 ENCOUNTER FOR WEANING FROM VENTILATOR (HCC): Status: RESOLVED | Noted: 2024-07-01 | Resolved: 2024-07-17

## 2024-07-17 LAB
ALBUMIN SERPL BCP-MCNC: 2.5 G/DL (ref 3.2–4.9)
ANION GAP SERPL CALC-SCNC: 14 MMOL/L (ref 7–16)
BUN SERPL-MCNC: 25 MG/DL (ref 8–22)
CALCIUM ALBUM COR SERPL-MCNC: 9.8 MG/DL (ref 8.5–10.5)
CALCIUM SERPL-MCNC: 8.6 MG/DL (ref 8.5–10.5)
CHLORIDE SERPL-SCNC: 101 MMOL/L (ref 96–112)
CO2 SERPL-SCNC: 22 MMOL/L (ref 20–33)
CREAT SERPL-MCNC: 5.23 MG/DL (ref 0.5–1.4)
ERYTHROCYTE [DISTWIDTH] IN BLOOD BY AUTOMATED COUNT: 74.1 FL (ref 35.9–50)
GFR SERPLBLD CREATININE-BSD FMLA CKD-EPI: 11 ML/MIN/1.73 M 2
GLUCOSE SERPL-MCNC: 77 MG/DL (ref 65–99)
HCT VFR BLD AUTO: 26.4 % (ref 42–52)
HGB BLD-MCNC: 8.1 G/DL (ref 14–18)
INR PPP: 1.5 (ref 0.87–1.13)
MCH RBC QN AUTO: 30.6 PG (ref 27–33)
MCHC RBC AUTO-ENTMCNC: 30.7 G/DL (ref 32.3–36.5)
MCV RBC AUTO: 99.6 FL (ref 81.4–97.8)
PHOSPHATE SERPL-MCNC: 5.2 MG/DL (ref 2.5–4.5)
PLATELET # BLD AUTO: 179 K/UL (ref 164–446)
PMV BLD AUTO: 9.9 FL (ref 9–12.9)
POTASSIUM SERPL-SCNC: 4.1 MMOL/L (ref 3.6–5.5)
PROTHROMBIN TIME: 18.3 SEC (ref 12–14.6)
RBC # BLD AUTO: 2.65 M/UL (ref 4.7–6.1)
SODIUM SERPL-SCNC: 137 MMOL/L (ref 135–145)
WBC # BLD AUTO: 10.1 K/UL (ref 4.8–10.8)

## 2024-07-17 PROCEDURE — 700102 HCHG RX REV CODE 250 W/ 637 OVERRIDE(OP): Performed by: INTERNAL MEDICINE

## 2024-07-17 PROCEDURE — 700102 HCHG RX REV CODE 250 W/ 637 OVERRIDE(OP): Performed by: NURSE PRACTITIONER

## 2024-07-17 PROCEDURE — 700102 HCHG RX REV CODE 250 W/ 637 OVERRIDE(OP)

## 2024-07-17 PROCEDURE — 700105 HCHG RX REV CODE 258: Performed by: INTERNAL MEDICINE

## 2024-07-17 PROCEDURE — 85610 PROTHROMBIN TIME: CPT

## 2024-07-17 PROCEDURE — 700111 HCHG RX REV CODE 636 W/ 250 OVERRIDE (IP): Mod: JZ | Performed by: NURSE PRACTITIONER

## 2024-07-17 PROCEDURE — 700111 HCHG RX REV CODE 636 W/ 250 OVERRIDE (IP): Mod: JZ,JG | Performed by: STUDENT IN AN ORGANIZED HEALTH CARE EDUCATION/TRAINING PROGRAM

## 2024-07-17 PROCEDURE — 90935 HEMODIALYSIS ONE EVALUATION: CPT

## 2024-07-17 PROCEDURE — 700111 HCHG RX REV CODE 636 W/ 250 OVERRIDE (IP): Performed by: INTERNAL MEDICINE

## 2024-07-17 PROCEDURE — 80069 RENAL FUNCTION PANEL: CPT

## 2024-07-17 PROCEDURE — A9270 NON-COVERED ITEM OR SERVICE: HCPCS

## 2024-07-17 PROCEDURE — 99239 HOSP IP/OBS DSCHRG MGMT >30: CPT | Performed by: INTERNAL MEDICINE

## 2024-07-17 PROCEDURE — A9270 NON-COVERED ITEM OR SERVICE: HCPCS | Performed by: INTERNAL MEDICINE

## 2024-07-17 PROCEDURE — 700101 HCHG RX REV CODE 250: Performed by: INTERNAL MEDICINE

## 2024-07-17 PROCEDURE — 700111 HCHG RX REV CODE 636 W/ 250 OVERRIDE (IP)

## 2024-07-17 PROCEDURE — A9270 NON-COVERED ITEM OR SERVICE: HCPCS | Performed by: NURSE PRACTITIONER

## 2024-07-17 PROCEDURE — 85027 COMPLETE CBC AUTOMATED: CPT

## 2024-07-17 RX ORDER — AMOXICILLIN 250 MG
1 CAPSULE ORAL NIGHTLY
Status: SHIPPED
Start: 2024-07-17

## 2024-07-17 RX ORDER — DAPTOMYCIN 50 MG/ML
INJECTION, POWDER, LYOPHILIZED, FOR SOLUTION INTRAVENOUS
Status: ACTIVE | DISCHARGE
Start: 2024-07-17

## 2024-07-17 RX ORDER — PSEUDOEPHEDRINE HCL 30 MG
100 TABLET ORAL 2 TIMES DAILY
Status: SHIPPED
Start: 2024-07-17

## 2024-07-17 RX ORDER — OMEPRAZOLE 20 MG/1
20 CAPSULE, DELAYED RELEASE ORAL DAILY
Status: SHIPPED
Start: 2024-07-18

## 2024-07-17 RX ORDER — LANOLIN ALCOHOL/MO/W.PET/CERES
100 CREAM (GRAM) TOPICAL DAILY
Status: SHIPPED
Start: 2024-07-18

## 2024-07-17 RX ORDER — AMIODARONE HYDROCHLORIDE 200 MG/1
200 TABLET ORAL DAILY
Status: SHIPPED
Start: 2024-07-17

## 2024-07-17 RX ORDER — HEPARIN SODIUM 1000 [USP'U]/ML
INJECTION, SOLUTION INTRAVENOUS; SUBCUTANEOUS
Status: COMPLETED
Start: 2024-07-17 | End: 2024-07-17

## 2024-07-17 RX ORDER — OXYCODONE HYDROCHLORIDE 5 MG/1
5 TABLET ORAL EVERY 6 HOURS PRN
Qty: 15 TABLET | Refills: 0 | Status: SHIPPED | OUTPATIENT
Start: 2024-07-17 | End: 2024-07-22

## 2024-07-17 RX ORDER — ASPIRIN 81 MG/1
81 TABLET ORAL DAILY
Status: SHIPPED
Start: 2024-07-18

## 2024-07-17 RX ORDER — FOLIC ACID 1 MG/1
1 TABLET ORAL DAILY
Status: SHIPPED
Start: 2024-07-18

## 2024-07-17 RX ORDER — ATORVASTATIN CALCIUM 40 MG/1
40 TABLET, FILM COATED ORAL
Status: SHIPPED
Start: 2024-07-17

## 2024-07-17 RX ORDER — WARFARIN SODIUM 3 MG/1
3 TABLET ORAL DAILY
Status: ACTIVE | DISCHARGE
Start: 2024-07-17

## 2024-07-17 RX ADMIN — CEFTRIAXONE SODIUM 2000 MG: 10 INJECTION, POWDER, FOR SOLUTION INTRAVENOUS at 05:05

## 2024-07-17 RX ADMIN — EPOETIN ALFA-EPBX 10000 UNITS: 10000 INJECTION, SOLUTION INTRAVENOUS; SUBCUTANEOUS at 08:08

## 2024-07-17 RX ADMIN — OXYCODONE HYDROCHLORIDE 5 MG: 5 TABLET ORAL at 15:12

## 2024-07-17 RX ADMIN — OXYCODONE HYDROCHLORIDE 10 MG: 10 TABLET ORAL at 02:20

## 2024-07-17 RX ADMIN — HEPARIN SODIUM 1800 UNITS: 1000 INJECTION, SOLUTION INTRAVENOUS; SUBCUTANEOUS at 11:04

## 2024-07-17 RX ADMIN — ASPIRIN 81 MG: 81 TABLET, COATED ORAL at 05:07

## 2024-07-17 RX ADMIN — HYDRALAZINE HYDROCHLORIDE 20 MG: 20 INJECTION INTRAMUSCULAR; INTRAVENOUS at 05:07

## 2024-07-17 RX ADMIN — AMPICILLIN SODIUM 2000 MG: 2 INJECTION, POWDER, FOR SOLUTION INTRAVENOUS at 05:05

## 2024-07-17 ASSESSMENT — PAIN DESCRIPTION - PAIN TYPE
TYPE: ACUTE PAIN
TYPE: ACUTE PAIN
TYPE: ACUTE PAIN;CHRONIC PAIN

## 2024-07-17 ASSESSMENT — CHA2DS2 SCORE
HYPERTENSION: YES
DIABETES: NO
PRIOR STROKE OR TIA OR THROMBOEMBOLISM: NO
CHF OR LEFT VENTRICULAR DYSFUNCTION: YES
AGE 75 OR GREATER: NO
CHA2DS2 VASC SCORE: 4
AGE 65 TO 74: YES
SEX: MALE
VASCULAR DISEASE: YES

## 2024-07-17 NOTE — PROGRESS NOTES
Received report from previous shift RN  Assessment complete.  A&O x 4. Patient calls appropriately.  Patient ambulates with x1 assist and FWW   Patient has 4/10 pain. Pain managed with prescribed medications.  Denies N&V. Tolerating diet.  Skin per flowsheets.  + void, + flatus, last BM 07/15.  Patient denies SOB.  SCD's on.  Patient pleasant and cooperative with plan of care.  Review plan with of care with patient. Call light and personal belongings with in reach. Hourly rounding in place. All needs met at this time.

## 2024-07-17 NOTE — DISCHARGE PLANNING
0845  Agency/Facility Name: [facility]  Spoke To: Carrie  Outcome: Per Carrie wanted to check time pt is having dialysis today. Advised pt is medicallly clear to d/c today . Per carrie they will have bed available and latest they can do transport at 4:30 today. Notified ALIN Anaya      @0900  Per Carrie they will  the patient at 1500 today.Notified ALIN Anaya.

## 2024-07-17 NOTE — DISCHARGE SUMMARY
Discharge Summary    CHIEF COMPLAINT ON ADMISSION  Chief Complaint   Patient presents with    Arm Pain    Rapid Heart Beat       Reason for Admission  EMS     Admission Date  6/24/2024    CODE STATUS  Full Code    HPI & HOSPITAL COURSE  67yo BMI 29.47 M former smoker, with history of CAD with stents, Afib on Coumadin, PVD s/p fempop bypass, ESRD on dialysis via L arm graft presented 6/24/2024 with L arm pain, redness, swelling 5d prior to admission. He also complained of fevers and chills. He missed dialysis and stopped taking all of his medications because he felt so poorly. At ED, afebrile, hemodynamically stable. Leukocytosis, anemia, elevated BUN and Cr- on labs. INR 1.46. BNP 21K and elevated troponin. Nephrology consulted, temporary access catheter placed for dialysis. Anticoagulation resumed thereafter as he was subtherapeutic. Started on antibiotics for infected left arm graft. 6/24 blood cx positve for enterococci. ID consulted. Echo EF 53% normal RV however RENEE showed aortic valve vegetation consistent with endocarditis. Vascular, Cardiology and Cardiothoracic Surgery consulted.  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 Tinoco, Vascular on 6/25.  Status post cardiac cath by Dr. Purvis, Cardiuology on 6/29 showing obstructive two-vessel coronary artery disease involving the LAD and RCA.  Status post AV replacement and CABG x 1 by Dr. Curiel, Cardiothoracic on 6/30. Repeat blood cultures cleared on 7/1 and tunneled catheter placed by Dr. Goldstein, IR for dialysis on 7/5.   Repeat echo to check EF for reduced perfusion, result EF normal, RV dilated, grade 1 diastolic. S/p thrombectomy and stent at R fem-pop bypass by Dr. Blanchard 7/10  Continue IV ampicillin and IV ceftriaxone while inpatient for bacteremia/endocarditis with plans to switch to IV dapto  on discharge with stop date of 8/11/24  ID recommended IV daptomycin [phone] with his MWF dialysis sessions STOP 8/11.   Patient was started on Coumadin for anticoagulation.  INR on day of discharge was 1.5.  Discussed with pharmacy and will likely be therapeutic in another day or 2  Medically stable to discharge to [facility].  Continue dialysis as outpatient with daptomycin as above follow-up with primary care, vascular surgery, cardiology, cardiothoracic surgery, infectious disease as outpatient.    Therefore, he is discharged in fair and stable condition to skilled nursing facility.    The patient met 2-midnight criteria for an inpatient stay at the time of discharge.    Discharge Date  7/17/2024    FOLLOW UP ITEMS POST DISCHARGE  None    DISCHARGE DIAGNOSES  Principal Problem:    Infected prosthetic vascular graft, initial encounter (HCC) (POA: Yes)  Active Problems:    Renovascular hypertension (Chronic) (POA: Yes)    History of alcohol abuse (POA: Yes)    End stage renal disease (HCC) (POA: Yes)    Chronic atrial fibrillation (HCC) (POA: Yes)    PAD (peripheral artery disease) (HCC) (POA: Yes)    Coronary artery disease (POA: Yes)    Bacteremia (POA: Yes)    S/P AVR (aortic valve replacement) (POA: Yes)    Severe obesity (HCC) (POA: Yes)  Resolved Problems:    Encounter for weaning from ventilator ([facility]) (POA: Yes)      FOLLOW UP  Future Appointments   Date Time Provider Department Center   7/29/2024  1:30 PM CT RESOURCE PROVIDER [facility] None     Ta Lowery M.D.  [address] [facility] [address]  [facility] [address]  [phone]    Go on 7/24/2024  Please go to your hospital follow up with Ta Lowery M.D. on Wednesday, July 24th, 2024, check in at 12:45pm for an 1:00pm appointment time.    Jonathan Blanchard M.D.  [address]  [address]  [facility] [address] [phone]    Schedule an appointment as soon as possible for a visit in 2 week(s)  For Progress Check    [facility]  [place]  [address]  [place] [address]  [phone]        Kaushal Johnson, P.A.-C.  [address] [facility] [address] [facility] [address]  [phone]    Follow up        MEDICATIONS ON DISCHARGE     Medication List        START taking these medications        Instructions   albuterol 2.5mg/0.5ml Nebu  Commonly known as: Proventil   Take 0.5 mL by nebulization every four hours as needed for Shortness of Breath.  Dose: 2.5 mg     amiodarone 200 MG Tabs  Commonly known as: Cordarone   Take 1 Tablet by mouth every day.  Dose: 200 mg     aspirin 81 MG EC tablet  Start taking on: July 18, 2024   Take 1 Tablet by mouth every day.  Dose: 81 mg     atorvastatin 40 MG Tabs  Commonly known as: Lipitor   Take 1 Tablet by mouth at bedtime.  Dose: 40 mg     DAPTOmycin 500 MG Solr  Commonly known as: Cubicin   To be given during dialysis MWF.  [id number]     docusate sodium 100 MG Caps   Take 100 mg by mouth 2 times a day.  Dose: 100 mg     folic acid 1 MG Tabs  Start taking on: July 18, 2024  Commonly known as: Folvite   Take 1 Tablet by mouth every day.  Dose: 1 mg     metoprolol tartrate 25 MG Tabs  Commonly known as: Lopressor   Take 1 Tablet by mouth 2 times a day.  Dose: 25 mg     omeprazole 20 MG delayed-release capsule  Start taking on: July 18, 2024  Commonly known as: PriLOSEC   Take 1 Capsule by mouth every day.  Dose: 20 mg     senna-docusate 8.6-50 MG Tabs  Commonly known as: Pericolace Or Senokot S   Take 1 Tablet by mouth every evening.  Dose: 1 Tablet     thiamine 100 MG tablet  Start taking on: July 18, 2024  Commonly known as: Thiamine   Take 1 Tablet by mouth every day.  Dose: 100 mg            CHANGE how you take these medications        Instructions   oxyCODONE immediate-release 5 MG Tabs  What changed:   medication strength  how much to take  reasons to take this  Commonly known as: Roxicodone   Take 1 Tablet by mouth every 6 hours as needed for Severe Pain for up to 5 days.  Dose: 5 mg      warfarin 3 MG Tabs  What changed:   medication strength  how much to take  how to take this  when to take this  Commonly known as: Coumadin   Take 1 Tablet by mouth every day at 6 PM.  Dose: 3 mg            CONTINUE taking these medications        Instructions   ezetimibe 10 MG Tabs  Commonly known as: Zetia   Take 10 mg by mouth every day.  Dose: 10 mg     RETACRIT INJ   Inject 400 Units as directed every Monday, Wednesday, and Friday.  Dose: 400 Units            STOP taking these medications      Eliquis 2.5mg Tabs  Generic drug: apixaban     potassium citrate SR 10 MEQ (1080 MG) Tbcr  Commonly known as: Urocit-K SR     rosuvastatin 10 MG Tabs  Commonly known as: Crestor     valsartan 80 MG Tabs  Commonly known as: Diovan              Allergies  No Known Allergies    DIET  Orders Placed This Encounter   Procedures    Diet Order Diet: Cardiac     Standing Status:   Standing     Number of Occurrences:   1     Order Specific Question:   Diet:     Answer:   Cardiac [6]       ACTIVITY  As tolerated.  Weight bearing as tolerated    CONSULTATIONS  Vascular surgery, cardiology, cardiothoracic surgery, infectious disease, nephrology    PROCEDURES  CABG, cardiac catheterization, dialysis catheter, AV fistula    LABORATORY  Lab Results   Component Value Date    SODIUM 137 07/17/2024    POTASSIUM 4.1 07/17/2024    CHLORIDE 101 07/17/2024    CO2 22 07/17/2024    GLUCOSE 77 07/17/2024    BUN 25 (H) 07/17/2024    CREATININE 5.23 (HH) 07/17/2024    GLOMRATE 7 (L) 02/15/2023        Lab Results   Component Value Date    WBC 10.1 07/17/2024    HEMOGLOBIN 8.1 (L) 07/17/2024    HEMATOCRIT 26.4 (L) 07/17/2024    PLATELETCT 179 07/17/2024        I discussed medications and side effects with the patient.  I discussed prognosis and importance of medical compliance with the patient.  I counseled the patient about diet, exercise, weight loss, smoking cessation, and life style modifications.  All questions and concerns have been  addressed.  Total time of the discharge process was 39 minutes.

## 2024-07-17 NOTE — CARE PLAN
The patient is Stable - Low risk of patient condition declining or worsening    Shift Goals  Clinical Goals: vs monitoring  Patient Goals: rest  Family Goals: n/a    Progress made toward(s) clinical / shift goals:    Problem: Pain - Standard  Goal: Alleviation of pain or a reduction in pain to the patient’s comfort goal  Description: Target End Date:  Prior to discharge or change in level of care    Document on Vitals flowsheet    1.  Document pain using the appropriate pain scale per order or unit policy  2.  Educate and implement non-pharmacologic comfort measures (i.e. relaxation, distraction, massage, cold/heat therapy, etc.)  3.  Pain management medications as ordered  4.  Reassess pain after pain med administration per policy    Outcome: Progressing

## 2024-07-17 NOTE — PROGRESS NOTES
3hr HD started @ 0759 and completed @ 1100,Tx well tolerated.Net UF = 2600ml- adjusted per bp tolerance.VSS post HD,RIJ TDC dressing CDI,declined dressing change today. Report given to Siara Freyer RN.

## 2024-07-17 NOTE — PROGRESS NOTES
Report received from candie RN  Pt AAOx4, Alert. Responds appropriately  RA  Pain managed per MAR, resting comfortably.  +void, +flatus  cardiac diet, tolerating well. No n/v  Ambulating to BR with steady gait  Calls appropriately for assistance      POC reviewed, education provided. Bed locked and in low position, pt instructed to call for assistance. Comprehension verbalized. Call light within reach, all needs met at this time.

## 2024-07-17 NOTE — DISCHARGE INSTRUCTIONS
Discharge Instructions per KJ Tobar.O.    DIET: Diet Order Diet: Cardiac    ACTIVITY: As tolerated    A proper diet that is low in grease, fat, and salt, along with 30 minutes of exercise per day will lead to weight loss, and better controlled blood sugar and blood pressure.    DIAGNOSIS: Infected prosthetic vascular graft, initial encounter (HCC)    Follow up with your Primary Care Provider Kaushal Johnson P.A.-C. as scheduled or sooner if your symptoms persist or worsen.  Return to Emergency Room for sever chest pain, shortness of breath, signs of a stroke, or any other emergencies.

## 2024-07-17 NOTE — DISCHARGE PLANNING
Case Management Discharge Planning    Admission Date: 6/24/2024  GMLOS: 11.2  ALOS: 23    6-Clicks ADL Score: 21  6-Clicks Mobility Score: 18      Anticipated Discharge Dispo: Discharge Disposition: D/T to SNF with Medicare cert in anticipation of skilled care (03)  Discharge Address: [address] [place] [address] [address] [place] [address]  Discharge Contact Phone Number: [phone]    DEBBIE completed PASRR and LOC for this pt.   Pt to discharge to [facility] today.   [facility] to provide transport at 1500.  COBRA complete for bedside RN.       PASRR - [id number]   LOC - [id number]

## 2024-07-17 NOTE — PROGRESS NOTES
Inpatient Anticoagulation Service Note for 7/17/2024    Reason for Anticoagulation: Bioprosthetic Valve Replacement, Atrial Fibrillation   [id number] VASc Score: 4  HAS-BLED Score: 5    Hemoglobin Value: (!) 8.1  Hematocrit Value: (!) 26.4  Lab Platelet Value: 179  Target INR: 2.0 to 3.0    INR from last 7 days       Date/Time INR Value    07/17/24 0510 1.5    07/16/24 0202 1.32    07/15/24 0402 1.29    07/14/24 1351 1.36    07/13/24 1504 1.6    07/13/24 0110 2.15    07/12/24 0139 3.89    07/11/24 0500 3.65    07/10/24 2242 3.47    07/10/24 1612 5.31          Dose from last 7 days       Date/Time Dose (mg)    07/17/24 0851 3    07/16/24 1648 3    07/15/24 0932 3    07/14/24 1739 2    07/13/24 1542 2    07/12/24 1335 0    07/11/24 1628 0    07/10/24 0901 0          Average Dose (mg):  (New Start)    Significant Interactions:   Amiodarone, Antibiotics, Antiplatelet Medications    Bridge Therapy:   No    Bridge Therapy Start Date:   N/A    Days of Overlap Therapy:   N/A    INR Value Greater than 2 Prior to Discontinuation of Parenteral Anticoagulation: Not Applicable     Reversal Agent Administered:   Not Applicable    Warfarin for history of bioprosthetic AVR & Afib w/ chadsvasc 4. INR goal is 2-3. Patient additionally w/ history of CAD s/p PCI in 2021, PVD s/p BL fem-pop bypass, recent CABG x1 on 6/30, and ESRD on iHD. Patient admitted with RLE ischemia & c/f infected prosthetic graft, now s/p thrombectomy & repeat fem-pop bypass on 7/10.     Anticoagulation complicated by acute on chronic anemia postop w/ noted bleeding from IJ & HD lines, requiring numerous rounds of PRBCs & FFP (most recently transfused 7/13). Hgb low but stable. Remains on cardiac diet.    Plan:   Continue conservative warfarin 3 mg daily.     INR today remains subtherapeutic at 1.50 (day prior was 1.32) showing up trend. Sub-therapeutic INR likely d/t to warfarin being held for several consecutive days after supratherapeutic INR values previously  (likely secondary to drug-drug interactions with amiodarone and antibiotics, which can increase INR). Will dose with 3mg again today since INR is trending up, likely will start to see INR continue to increase within the next 24-48 hours.     High thrombotic risk postop & given hx above, however continuing to defer bridge therapy as risk > benefit in setting of persistent surgical site bleeding. Bridging technically not indicated as AVR is bioprosthetic & AF w/ chadsvasc of only 4. This should not be a barrier to discharge.    Pharmacist suggested discharge dosing:   In the event patient were to discharge today, anticipate patient can be discharged on warfarin 3mg once daily pending INR trend.     Recommend INR 48-72 hours within discharge with close follow-up with anticoagulation clinic or warfarin managing provider.        Julia Rodriguez, NiurkaD

## 2024-07-23 LAB
FUNGUS SPEC CULT: NORMAL
FUNGUS SPEC FUNGUS STN: NORMAL
SIGNIFICANT IND 70042: NORMAL
SITE SITE: NORMAL
SOURCE SOURCE: NORMAL

## 2024-07-29 ENCOUNTER — APPOINTMENT (OUTPATIENT)
Dept: CARDIOTHORACIC SURGERY | Facility: MEDICAL CENTER | Age: 69
End: 2024-07-29
Payer: MEDICARE

## 2024-07-30 ENCOUNTER — TELEPHONE (OUTPATIENT)
Dept: INFECTIOUS DISEASES | Facility: MEDICAL CENTER | Age: 69
End: 2024-07-30
Payer: MEDICARE

## 2024-08-06 ENCOUNTER — OFFICE VISIT (OUTPATIENT)
Dept: VASCULAR SURGERY | Facility: MEDICAL CENTER | Age: 69
End: 2024-08-06
Payer: MEDICARE

## 2024-08-06 ENCOUNTER — OFFICE VISIT (OUTPATIENT)
Dept: CARDIOTHORACIC SURGERY | Facility: MEDICAL CENTER | Age: 69
End: 2024-08-06
Payer: MEDICARE

## 2024-08-06 VITALS
DIASTOLIC BLOOD PRESSURE: 74 MMHG | OXYGEN SATURATION: 93 % | SYSTOLIC BLOOD PRESSURE: 132 MMHG | WEIGHT: 227 LBS | BODY MASS INDEX: 30.09 KG/M2 | TEMPERATURE: 97 F | HEART RATE: 53 BPM | HEIGHT: 73 IN

## 2024-08-06 VITALS
BODY MASS INDEX: 30.09 KG/M2 | HEART RATE: 54 BPM | SYSTOLIC BLOOD PRESSURE: 132 MMHG | TEMPERATURE: 97.9 F | WEIGHT: 227 LBS | DIASTOLIC BLOOD PRESSURE: 74 MMHG | HEIGHT: 73 IN | OXYGEN SATURATION: 99 %

## 2024-08-06 DIAGNOSIS — Z98.890 POST-OPERATIVE STATE: ICD-10-CM

## 2024-08-06 DIAGNOSIS — I77.9 PAOD (PERIPHERAL ARTERIAL OCCLUSIVE DISEASE) (HCC): ICD-10-CM

## 2024-08-06 PROCEDURE — 3075F SYST BP GE 130 - 139MM HG: CPT | Performed by: SURGERY

## 2024-08-06 PROCEDURE — 3078F DIAST BP <80 MM HG: CPT | Performed by: SURGERY

## 2024-08-06 PROCEDURE — 3078F DIAST BP <80 MM HG: CPT | Performed by: NURSE PRACTITIONER

## 2024-08-06 PROCEDURE — 3075F SYST BP GE 130 - 139MM HG: CPT | Performed by: NURSE PRACTITIONER

## 2024-08-06 PROCEDURE — 99024 POSTOP FOLLOW-UP VISIT: CPT | Performed by: NURSE PRACTITIONER

## 2024-08-06 PROCEDURE — 99024 POSTOP FOLLOW-UP VISIT: CPT | Performed by: SURGERY

## 2024-08-06 ASSESSMENT — ENCOUNTER SYMPTOMS
BLURRED VISION: 0
HEADACHES: 0
COUGH: 0
BRUISES/BLEEDS EASILY: 0
PALPITATIONS: 0
ABDOMINAL PAIN: 0
SHORTNESS OF BREATH: 0
VOMITING: 0
MYALGIAS: 0
FEVER: 0
NAUSEA: 0
SPUTUM PRODUCTION: 0
DOUBLE VISION: 0
CHILLS: 0
NERVOUS/ANXIOUS: 0
DIZZINESS: 0
WHEEZING: 0
WEIGHT LOSS: 0
FOCAL WEAKNESS: 0

## 2024-08-06 ASSESSMENT — FIBROSIS 4 INDEX
FIB4 SCORE: 2.33
FIB4 SCORE: 2.33

## 2024-08-06 NOTE — PROGRESS NOTES
INFECTIOUS  DISEASE  OUTPATIENT CLINIC  NOTE   Subjective   Primary care provider: Kaushal Johnson P.A.-C..     Reason for Follow Up:   Follow-up for   1. Bacteremia        2. End stage renal disease (HCC)        3. Infection of prosthetic vascular graft, subsequent encounter  CBC WITH DIFFERENTIAL    Comp Metabolic Panel    amoxicillin (AMOXIL) 500 MG Cap    DISCONTINUED: amoxicillin (AMOXIL) 500 MG Cap      4. Heart valve vegetation        5. S/P AVR (aortic valve replacement)        6. Enterococcus faecalis infection          HPI: Patient previously seen and treated by ID team as inpatient during hospital admission.   Brian Jasmine is a 68 y.o.  admitted 6/24/2024. Pt has a past medical history of PAD with femoral-popliteal bypass, ESRD on dialysis via left arm graft, A-fib on Coumadin and CAD with stents, he presented complaining of fevers, pain, redness and swelling in his right arm graft site. OR with vascular surgery on 6/25 with resection of the infected left arm dialysis graft pseudoaneurysm,  left greater saphenous vein harvest, reconstruction of left brachial artery, partial removal of the left arm dialysis graft. Partial removal of the prior graft. Left forearm wound culture +E faecalis, amp sensitive. Blood cultures on 6/24 +E faecalis, amp sensitive. RENEE Aortic valve infective endocarditis, large lobulated mobile mass 1.7 x 1.1 cm with severe aortic insufficiency. Repeat blood culture from 6/27, 6/29, 6/30 also positive. 6/30, patient underwent aortic valve replacement and left atrial appendage resection and ligation. Aortic valve tissue positive for E faecalis. Repeat blood cultures cleared on 7/1 and tunneled catheter placed by Dr. Goldstein, IR for dialysis on 7/5. Repeat echo to check EF for reduced perfusion, result EF normal, RV dilated, grade 1 diastolic. S/p thrombectomy and stent at R fem-pop bypass by Dr. Blanchard 7/10. On discharge, 6 weeks of IV daptomycin 800 mg - 800 mg - 1200 mg with his  dialysis sessions on M - W - F, end date 8/11/24.     08/07/24- Today Patient reports feeling well. Pt stating that the wound is healing well.  Denies drainage, pungent odor, redness, pain. Denies feeling generally ill, fevers/chills, general malaise, headache, n/v/d.  Patient currently on a 6-week course of IV daptomycin with his dialysis sessions on M - W - F, end date 8/11/24.  Patient has been tolerating IV daptomycin with no complaints of side effects.  Most recent labs reviewed from 7/31/2024 scanned in the media tab, WBC 7.1, stable anemia 9.6/31.1, platelets , elevated creatinine and decreased GFR consistent with ESRD, mildly low protein 5.9, sed rate 61, CRP 44, CPK 34 and stable.  Pending phone call back from vascular surgeon to discuss if portion of vascular graft that was left in place was synthetic versus biologic.  If synthetic graft then patient will need to be placed on chronic suppression with oral antibiotic therapy.  If biologic then will have a shorter course of antibiotic therapy.  After IV antibiotics therapy start p.o. amoxicillin 500  mg twice daily (renal dosing) until confirmation from vascular surgery about length of treatment.  Repeat labs CBC/CMP in 2 weeks for continuous monitoring.    Current Antimicrobials: IV daptomycin 800 mg/800 mg / 1200 mg, M-W-F,  end date 8/11/2024 then to transition to p.o. amoxicillin 500 mg twice daily, end date TBD  Previous Antimicrobials:    Other Current Medications:  Home Medications    Medication Sig Taking? Last Dose Authorizing Provider   amoxicillin (AMOXIL) 500 MG Cap Take 1 Capsule by mouth 2 times a day for 30 days. Yes  JASKARAN Porter   warfarin (COUMADIN) 3 MG Tab Take 1 Tablet by mouth every day at 6 PM. Yes Taking Dereje Delgado D.O.   aspirin 81 MG EC tablet Take 1 Tablet by mouth every day. Yes Taking Dereje Delgado D.O.   amiodarone (CORDARONE) 200 MG Tab Take 1 Tablet by mouth every day. Yes Taking Dereje Delgado  D.O.   albuterol (PROVENTIL) 2.5mg/0.5ml Nebu Soln Take 0.5 mL by nebulization every four hours as needed for Shortness of Breath. Yes Taking Dereje Delgado D.O.   atorvastatin (LIPITOR) 40 MG Tab Take 1 Tablet by mouth at bedtime. Yes Taking Dereje Delgado D.O.   folic acid (FOLVITE) 1 MG Tab Take 1 Tablet by mouth every day. Yes Taking Dereje Delgado D.O.   docusate sodium 100 MG Cap Take 100 mg by mouth 2 times a day. Yes Taking Dereje Delgado D.O.   metoprolol tartrate (LOPRESSOR) 25 MG Tab Take 1 Tablet by mouth 2 times a day. Yes Taking Dereje Delgado D.O.   omeprazole (PRILOSEC) 20 MG delayed-release capsule Take 1 Capsule by mouth every day. Yes Taking Dereje Delgado D.O.   senna-docusate (PERICOLACE OR SENOKOT S) 8.6-50 MG Tab Take 1 Tablet by mouth every evening. Yes Taking Dereje Delgado D.O.   thiamine (THIAMINE) 100 MG tablet Take 1 Tablet by mouth every day. Yes Taking Dereje Delgado D.O.   DAPTOmycin (CUBICIN) 500 MG Recon Soln To be given during dialysis MWF.  [id number] Yes Taking Dereje Delgado D.O.   Epoetin Jhonny-epbx (RETACRIT INJ) Inject 400 Units as directed every Monday, Wednesday, and Friday. Yes Taking Physician Outpatient   ezetimibe (ZETIA) 10 MG Tab Take 10 mg by mouth every day. Yes Taking Physician Outpatient        PMH:  Past Medical History:   Diagnosis Date    Allergy     Seasonal    Aortic aneurysm (HCC)     CAD (coronary artery disease)     CHF (congestive heart failure) (HCC)     Chronic kidney disease (CKD), stage III (moderate)     COPD (chronic obstructive pulmonary disease) (HCC)     Hypertension     Impaired hearing     Indigestion     Renal failure     Sleep apnea      Past Surgical History:   Procedure Laterality Date    FEMORAL POPLITEAL BYPASS Right 7/10/2024    Procedure: RIGHT LOWER EXTREMITY THROMBECTOMY, ANGIOGRAM, STENTING;  Surgeon: Jonathan Blanchard M.D.;  Location: SURGERY [facility];  Service: Vascular    ANGIOGRAM Right 7/10/2024     Procedure: ANGIOGRAM;  Surgeon: Jonathan Blanchard M.D.;  Location: SURGERY [facility];  Service: Vascular    AORTIC VALVE REPLACEMENT  2024    Procedure: REPLACEMENT, AORTIC VALVE;  Surgeon: Zion Austin D.O.;  Location: SURGERY [facility];  Service: Cardiothoracic    ECHOCARDIOGRAM, TRANSESOPHAGEAL, INTRAOPERATIVE  2024    Procedure: ECHOCARDIOGRAM, TRANSESOPHAGEAL, INTRAOPERATIVE;  Surgeon: Zion Austin D.O.;  Location: SURGERY [facility];  Service: Cardiothoracic    MULTIPLE CORONARY ARTERY BYPASS ENDO VEIN HARVEST  2024    Procedure: CABG X1;  Surgeon: Zion Austin D.O.;  Location: SURGERY [facility];  Service: Cardiothoracic    AV FISTULA REVISION Left 2024    Procedure: RESECTION OF INFECTED LEFT ARM ARTERIOVENOUS  GRAFT PSEUDOANEURYSM . RECONSTRUCT LEFT BRACHIAL ARTERY USING LEFT GREATER SAPHENOUS VEIN;  Surgeon: Doug Tinoco M.D.;  Location: SURGERY [facility];  Service: General    GASTROSCOPY N/A 2017    Procedure: GASTROSCOPY;  Surgeon: Morales Hinton M.D.;  Location: [facility];  Service:     EGD ESOPHAGUS WITH ENDOSCOPIC US N/A 2017    Procedure: EGD ESOPHAGUS WITH ENDOSCOPIC US UPPER WITH POSSIBLE FNA;  Surgeon: Morales Hinton M.D.;  Location: [facility];  Service:     AV FISTULA CREATION Left      Family History   Problem Relation Age of Onset    Heart Disease Mother     Cancer Father     Heart Disease Brother      Social History     Socioeconomic History    Marital status: Single     Spouse name: Not on file    Number of children: Not on file    Years of education: Not on file    Highest education level: Not on file   Occupational History    Not on file   Tobacco Use    Smoking status: Former     Current packs/day: 0.00     Average packs/day: 0.5 packs/day for 42.2 years (21.1 ttl pk-yrs)     Types: Cigarettes     Start date: 1977     Quit date: 3/4/2019     Years since quittin.4    Smokeless tobacco:  Never   Vaping Use    Vaping status: Never Used   Substance and Sexual Activity    Alcohol use: No     Comment: Hx of alcohol use    Drug use: No    Sexual activity: Not Currently     Partners: Female   Other Topics Concern    Not on file   Social History Narrative    Not on file     Social Determinants of Health     Financial Resource Strain: Not on file   Food Insecurity: No Food Insecurity (6/25/2024)    Hunger Vital Sign     Worried About Running Out of Food in the Last Year: Never true     Ran Out of Food in the Last Year: Never true   Transportation Needs: No Transportation Needs (6/25/2024)    PRAPARE - Transportation     Lack of Transportation (Medical): No     Lack of Transportation (Non-Medical): No   Physical Activity: Not on file   Stress: Not on file   Social Connections: Not on file   Intimate Partner Violence: Not At Risk (6/25/2024)    Humiliation, Afraid, Rape, and Kick questionnaire     Fear of Current or Ex-Partner: No     Emotionally Abused: No     Physically Abused: No     Sexually Abused: No   Housing Stability: Low Risk  (6/25/2024)    Housing Stability Vital Sign     Unable to Pay for Housing in the Last Year: No     Number of Places Lived in the Last Year: 1     Unstable Housing in the Last Year: No           Allergies/Intolerances:  No Known Allergies    ROS:   Review of Systems   Constitutional:  Positive for malaise/fatigue. Negative for chills, fever and weight loss.   HENT:  Negative for congestion and hearing loss.    Eyes:  Negative for blurred vision and double vision.   Respiratory:  Negative for cough, sputum production, shortness of breath and wheezing.    Cardiovascular:  Negative for chest pain and palpitations.   Gastrointestinal:  Negative for abdominal pain, constipation, diarrhea, nausea and vomiting.   Genitourinary:  Negative for dysuria.   Musculoskeletal:  Negative for myalgias.   Skin:  Negative for itching and rash.   Neurological:  Negative for dizziness, focal  "weakness and headaches.   Endo/Heme/Allergies:  Does not bruise/bleed easily.   Psychiatric/Behavioral:  The patient is not nervous/anxious.       ROS was reviewed and were negative except as above.    Objective    Most Recent Vital Signs:  /62 (BP Location: Left arm, Patient Position: Sitting, BP Cuff Size: Adult)   Pulse (!) 59   Temp 36.7 °C (98.1 °F) (Temporal)   Resp 18   Ht 1.854 m (6' 1\")   Wt 93 kg (205 lb)   SpO2 93%   BMI 27.05 kg/m²     Physical Exam:  Physical Exam  Vitals reviewed.   Constitutional:       Appearance: Normal appearance.   HENT:      Head: Normocephalic and atraumatic.      Nose: Nose normal.      Mouth/Throat:      Mouth: Mucous membranes are moist.   Eyes:      Pupils: Pupils are equal, round, and reactive to light.   Cardiovascular:      Rate and Rhythm: Regular rhythm. Bradycardia present.   Pulmonary:      Effort: Pulmonary effort is normal.      Breath sounds: Normal breath sounds.   Abdominal:      Palpations: Abdomen is soft.   Musculoskeletal:         General: No tenderness.      Cervical back: Normal range of motion and neck supple.   Skin:     General: Skin is warm and dry.      Coloration: Skin is not jaundiced.      Findings: No erythema or rash.      Comments: Previous left forearm vascular graft site healing appropriately with no surrounding erythema, swelling or drainage.  No open wounds.   Neurological:      Mental Status: He is alert and oriented to person, place, and time.      Motor: No weakness.   Psychiatric:         Mood and Affect: Mood normal.         Behavior: Behavior normal.          Pertinent Lab/Imaging Results:  [unfilled]  @CMP@  WBC   Date/Time Value Ref Range Status   07/17/2024 05:10 AM 10.1 4.8 - 10.8 K/uL Final     RBC   Date/Time Value Ref Range Status   07/17/2024 05:10 AM 2.65 (L) 4.70 - 6.10 M/uL Final     Hemoglobin   Date/Time Value Ref Range Status   07/17/2024 05:10 AM 8.1 (L) 14.0 - 18.0 g/dL Final     Hematocrit   Date/Time Value " Ref Range Status   07/17/2024 05:10 AM 26.4 (L) 42.0 - 52.0 % Final     MCV   Date/Time Value Ref Range Status   07/17/2024 05:10 AM 99.6 (H) 81.4 - 97.8 fL Final     MCH   Date/Time Value Ref Range Status   07/17/2024 05:10 AM 30.6 27.0 - 33.0 pg Final     MCHC   Date/Time Value Ref Range Status   07/17/2024 05:10 AM 30.7 (L) 32.3 - 36.5 g/dL Final     MPV   Date/Time Value Ref Range Status   07/17/2024 05:10 AM 9.9 9.0 - 12.9 fL Final      Sodium   Date/Time Value Ref Range Status   07/17/2024 05:10  135 - 145 mmol/L Final     Potassium   Date/Time Value Ref Range Status   07/17/2024 05:10 AM 4.1 3.6 - 5.5 mmol/L Final     Chloride   Date/Time Value Ref Range Status   07/17/2024 05:10  96 - 112 mmol/L Final     Co2   Date/Time Value Ref Range Status   07/17/2024 05:10 AM 22 20 - 33 mmol/L Final     Glucose   Date/Time Value Ref Range Status   07/17/2024 05:10 AM 77 65 - 99 mg/dL Final     Bun   Date/Time Value Ref Range Status   07/17/2024 05:10 AM 25 (H) 8 - 22 mg/dL Final     Creatinine   Date/Time Value Ref Range Status   07/17/2024 05:10 AM 5.23 (HH) 0.50 - 1.40 mg/dL Final     Glom Filt Rate, Est   Date/Time Value Ref Range Status   02/15/2023 03:59 AM 7 (L) >60 mL/min/1.7 Final     Comment:     Estimated GFR derived from the MDRD Study equation can be used in patients who are in the hospital.  However, it is important to pay attention to potential inaccuracies due to the non-steady state of serum creatinine, co-morbidities that cause malnutrition, and the use of medications that interfere with the measurement of serum creatinine.    The estimated GFR is only accurate for patients greater than 18 years of age.     Alkaline Phosphatase   Date/Time Value Ref Range Status   06/29/2024 05:10 PM 97 30 - 99 U/L Final     AST(SGOT)   Date/Time Value Ref Range Status   06/29/2024 05:10 PM 26 12 - 45 U/L Final     ALT(SGPT)   Date/Time Value Ref Range Status   06/29/2024 05:10 PM 17 2 - 50 U/L Final  "    Total Bilirubin   Date/Time Value Ref Range Status   06/29/2024 05:10 PM 0.2 0.1 - 1.5 mg/dL Final      No results found for: \"CPKTOTAL\"       No results found for: \"BLOODCULTU\", \"BLDCULT\", \"BCHOLD\"    No results found for: \"BLOODCULTU\", \"BLDCULT\", \"BCHOLD\"       CULTURE TISSUE W/ GRM STAIN  Order: [id number]   Status: Final result       Visible to patient: Yes (not seen)       Next appt: 08/07/2024 at 09:30 AM in Infectious Diseases (Patrick Shell, A.P.R.N.)    Specimen Information: Tissue   0 Result Notes      Component 1 mo ago   Significant Indicator POS Positive (POS)   Source TISS   Site aortic valve   Culture Result - Abnormal    Gram Stain Result  Abnormal   * CORRECTED REPORT **  Correct result:  Rare WBCs.  Moderate Gram positive cocci.  Erroneous result:  Rare WBCs.  No organisms seen.    Culture Result  Abnormal   Enterococcus faecalis  Moderate growth    Resulting Agency M        Susceptibility     Enterococcus faecalis     WAQAR     Ampicillin <=2 mcg/mL Sensitive     Daptomycin 2 mcg/mL Sensitive     Gent Synergy <=500 mcg/mL Sensitive     Linezolid 2 mcg/mL Sensitive     Penicillin 2 mcg/mL Sensitive     Vancomycin 1 mcg/mL Sensitive                  Specimen Collected: 06/30/24 10:25 AM Last Resulted: 07/03/24  9:33 AM      CULTURE WOUND W/ GRAM STAIN  Order: [id number]   Status: Final result       Visible to patient: Yes (not seen)       Next appt: 08/07/2024 at 09:30 AM in Infectious Diseases (Patrick Hyde, A.P.R.N.)    Specimen Information: Wound   0 Result Notes      Component 1 mo ago   Significant Indicator POS Positive (POS)   Source WND   Site L forearm fistula graft   Culture Result - Abnormal    Gram Stain Result Many WBCs.  No organisms seen.   Culture Result  Abnormal   Enterococcus faecalis  Moderate growth    Resulting Agency M        Susceptibility     Enterococcus faecalis     WAQAR     Ampicillin <=2 mcg/mL Sensitive     Daptomycin 2 mcg/mL Sensitive     Gent Synergy <=500 mcg/mL " Sensitive     Linezolid 2 mcg/mL Sensitive     Penicillin 2 mcg/mL Sensitive     Tetracycline <=4 mcg/mL Sensitive     Vancomycin 1 mcg/mL Sensitive                  Specimen Collected: 06/25/24  2:00 PM Last Resulted: 06/28/24  9:24 AM      BLOOD CULTURE  Order: [id number]   Status: Final result       Visible to patient: Yes (not seen)       Next appt: 08/07/2024 at 09:30 AM in Infectious Diseases (Patrick Hyde A.P.R.NMichelle)    Specimen Information: Peripheral; Blood   0 Result Notes      Component 1 mo ago   Significant Indicator POS Positive (POS)   Source BLD   Site PERIPHERAL   Culture Result Growth detected by Bactec instrument. 06/25/2024  00:21 Abnormal    Culture Result Enterococcus faecalis Abnormal    Resulting Agency M        Susceptibility     Enterococcus faecalis     WAQAR     Ampicillin <=2 mcg/mL Sensitive     Daptomycin 2 mcg/mL Sensitive     Gent Synergy <=500 mcg/mL Sensitive     Linezolid 2 mcg/mL Sensitive     Penicillin 2 mcg/mL Sensitive     Vancomycin 1 mcg/mL Sensitive                  Narrative  Performed by: MARIAN  CALL  Woo  183 tel. [phone],  CALLED  183 tel. [phone] 06/25/2024, 17:37, Voalted carlos alberto SAINI  Right Hand      Specimen Collected: 06/24/24 12:31 PM Last Resulted: 06/27/24 11:21 AM        Impression/Assessment      1. Bacteremia        2. End stage renal disease (HCC)        3. Infection of prosthetic vascular graft, subsequent encounter  CBC WITH DIFFERENTIAL    Comp Metabolic Panel    amoxicillin (AMOXIL) 500 MG Cap    DISCONTINUED: amoxicillin (AMOXIL) 500 MG Cap      4. Heart valve vegetation        5. S/P AVR (aortic valve replacement)        6. Enterococcus faecalis infection          Brian Jasmine is a 68 y.o.  admitted 6/24/2024. Pt has a past medical history of PAD with femoral-popliteal bypass, ESRD on dialysis via left arm graft, A-fib on Coumadin and CAD with stents, he presented complaining of fevers, pain, redness and swelling in his right arm graft site. 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 Partial removal of the prior graft. Left forearm wound culture +E faecalis, amp sensitive. Blood cultures on 6/24 +E faecalis, amp sensitive. RENEE Aortic valve infective endocarditis, large lobulated mobile mass 1.7 x 1.1 cm with severe aortic insufficiency. Repeat blood culture from 6/27, 6/29, 6/30 also positive. 6/30, patient underwent aortic valve replacement and left atrial appendage resection and ligation. Aortic valve tissue positive for E faecalis. Repeat blood cultures cleared on 7/1 and tunneled catheter placed by Dr. Goldstein, IR for dialysis on 7/5. Repeat echo to check EF for reduced perfusion, result EF normal, RV dilated, grade 1 diastolic. S/p thrombectomy and stent at R fem-pop bypass by Dr. Blanchard 7/10. On discharge, 6 weeks of IV daptomycin 800 mg - 800 mg - 1200 mg with his dialysis sessions on M - W - F, end date 8/11/24.  After 8/11/2024 transition to p.o. amoxicillin 500 mg twice daily, end date TBD    - dual beta-lactam therapy with renally dosed IV ampicillin and ceftriaxone while inpatient   PLAN:   - IV daptomycin 800 mg/800 mg / 1200 mg, M-W-F,  end date 8/11/2024 then to transition to p.o. amoxicillin 500 mg twice daily, end date TBD  - Repeat Labs CBC/CMP/CPK weekly while receiving IV antibiotics  - Repeat labs CBC/CMP 2 weeks after starting oral antibiotic  - Medication education provided and S/S of side effects discussed   - Recommend routine follow up with vascular surgery  - Continue wound care to left forearm  - Recommended to start po probiotic  - Education provided on S/S of infection and when to report to ER/ call 911         Return visit: 2 weeks. Follow up with primary care physician for chronic medical problems      I have performed a physical exam,  updated ROS  and plan today. I have reviewed previous images, labs, and provider notes.      SIERRA Porter.    All Patients should seek medical re-evaluation or report to the ER for new, increasing or worsening symptoms. In some circumstances medical conditions can change from the initial evaluation and may require emergent medical re-evaluation. This includes but is not limited to chest pain, shortness of breath, atypical abdominal pain, atypical headache, ALOC, fever >101, low blood pressure, high respiratory rate (above 30), low oxygen saturation (below 90%), acute delirium, abnormal bleeding, inability to tolerate any intake, weakness on one side of the body, any worsened or concerning conditions.    Please note that this dictation was created using voice recognition software. I have worked with technical experts from [email or web address]  [email or web address] to optimize the interface.  I have made every reasonable attempt to correct obvious errors, but there may be errors of grammar and possibly content that I did not discover before finalizing the note.

## 2024-08-06 NOTE — PROGRESS NOTES
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 Doing well. [facility] but looks good enough to go home, probably held up by IV antibiotic logistics and he is seeing ID tomorrow.  Left arm fully healed, looks amazing, strong palpable left radial pulse, no evidence of residual infection eventhough there is still some PTFE conduit in the arm.  RLE incision healing well, bypass patent. Strong DP/PT signals bilaterally. Discoloration of the toes bilaterally.  Sternal incision healing well. Overall doing very well. Follow-up 1 month for ongoing progress check or sooner if concerns arise.      Jonathan Blanchard MD  [facility]  [phone]  [address], [place] [address]  "

## 2024-08-06 NOTE — PROGRESS NOTES
"CHIEF COMPLAINT: Post-op visit     PROCEDURE: Dr Austin   6/29/24 [facility] AVR with CABG x 1    HPI: He is doing well.  Using wheelchair for ambulation.  His sternal incision is nearly healed with a small scab at the superior portion.  Still getting IV abx for endocarditis.  Currently at [facility] facility.  We [facility] and [facility].      /74 (BP Location: Left arm, Patient Position: Sitting, BP Cuff Size: Adult)   Pulse (!) 53   Temp 36.1 °C (97 °F) (Temporal)   Ht 1.854 m (6' 1\")   Wt 103 kg (227 lb)   SpO2 93%     PHYSICAL EXAM:  Cardiac: S1S2  Neuro:  AAO x 3  Resp:  CTA  Wounds:  CDI  Sternum:  Stable    PLAN: Discharge from clinic.    Continue coumadin for 3 months or per your Cardiologist's recommendations.  We reviewed post operative sternal precautions, weight limits and driving precautions moving forward.  We reviewed SBE prophylaxis.      Overall, we are very pleased with the patient’s progress and we have instructed the patient to follow-up with us in the future should they have any concerns related to their surgery. Otherwise, we will see the patient on a PRN basis. The patient will continue to follow-up with their Cardiologist and PCP.  The patient has been informed that any further prescription refills should be done through their primary care physician and/or cardiologist.  They acknowledged understanding.  Thank you for allowing us to participate in the care of this very pleasant patient and please let us know if there is any way we may be of further assistance.    "

## 2024-08-07 ENCOUNTER — OFFICE VISIT (OUTPATIENT)
Dept: INFECTIOUS DISEASES | Facility: MEDICAL CENTER | Age: 69
End: 2024-08-07
Attending: NURSE PRACTITIONER
Payer: MEDICARE

## 2024-08-07 VITALS
HEART RATE: 59 BPM | OXYGEN SATURATION: 93 % | WEIGHT: 205 LBS | TEMPERATURE: 98.1 F | HEIGHT: 73 IN | RESPIRATION RATE: 18 BRPM | DIASTOLIC BLOOD PRESSURE: 62 MMHG | SYSTOLIC BLOOD PRESSURE: 132 MMHG | BODY MASS INDEX: 27.17 KG/M2

## 2024-08-07 DIAGNOSIS — T82.7XXD INFECTION OF PROSTHETIC VASCULAR GRAFT, SUBSEQUENT ENCOUNTER: ICD-10-CM

## 2024-08-07 DIAGNOSIS — R78.81 BACTEREMIA: ICD-10-CM

## 2024-08-07 DIAGNOSIS — N18.6 END STAGE RENAL DISEASE (HCC): ICD-10-CM

## 2024-08-07 DIAGNOSIS — A49.8 ENTEROCOCCUS FAECALIS INFECTION: ICD-10-CM

## 2024-08-07 DIAGNOSIS — I33.0 HEART VALVE VEGETATION: ICD-10-CM

## 2024-08-07 DIAGNOSIS — Z95.2 S/P AVR (AORTIC VALVE REPLACEMENT): ICD-10-CM

## 2024-08-07 PROCEDURE — 3078F DIAST BP <80 MM HG: CPT | Performed by: NURSE PRACTITIONER

## 2024-08-07 PROCEDURE — 3075F SYST BP GE 130 - 139MM HG: CPT | Performed by: NURSE PRACTITIONER

## 2024-08-07 PROCEDURE — 99212 OFFICE O/P EST SF 10 MIN: CPT | Performed by: NURSE PRACTITIONER

## 2024-08-07 PROCEDURE — 99214 OFFICE O/P EST MOD 30 MIN: CPT | Performed by: NURSE PRACTITIONER

## 2024-08-07 RX ORDER — AMOXICILLIN 500 MG/1
1000 CAPSULE ORAL 2 TIMES DAILY
Qty: 120 CAPSULE | Refills: 0 | Status: SHIPPED | OUTPATIENT
Start: 2024-08-12 | End: 2024-08-07

## 2024-08-07 RX ORDER — POTASSIUM CHLORIDE 750 MG/1
TABLET, EXTENDED RELEASE ORAL
COMMUNITY
Start: 2024-06-26 | End: 2024-08-07

## 2024-08-07 RX ORDER — AMOXICILLIN 500 MG/1
500 CAPSULE ORAL 2 TIMES DAILY
Qty: 60 CAPSULE | Refills: 0 | Status: SHIPPED | OUTPATIENT
Start: 2024-08-12 | End: 2024-08-22 | Stop reason: SDUPTHER

## 2024-08-07 ASSESSMENT — ENCOUNTER SYMPTOMS
CONSTIPATION: 0
DIARRHEA: 0

## 2024-08-07 ASSESSMENT — FIBROSIS 4 INDEX: FIB4 SCORE: 2.33

## 2024-08-11 LAB
MYCOBACTERIUM SPEC CULT: NORMAL
RHODAMINE-AURAMINE STN SPEC: NORMAL
SIGNIFICANT IND 70042: NORMAL
SITE SITE: NORMAL
SOURCE SOURCE: NORMAL

## 2024-08-22 ENCOUNTER — OFFICE VISIT (OUTPATIENT)
Dept: INFECTIOUS DISEASES | Facility: MEDICAL CENTER | Age: 69
End: 2024-08-22
Attending: NURSE PRACTITIONER
Payer: MEDICARE

## 2024-08-22 VITALS
RESPIRATION RATE: 18 BRPM | DIASTOLIC BLOOD PRESSURE: 62 MMHG | WEIGHT: 194.4 LBS | TEMPERATURE: 98.1 F | HEART RATE: 84 BPM | SYSTOLIC BLOOD PRESSURE: 120 MMHG | BODY MASS INDEX: 25.76 KG/M2 | OXYGEN SATURATION: 90 % | HEIGHT: 73 IN

## 2024-08-22 DIAGNOSIS — T82.7XXD INFECTION OF PROSTHETIC VASCULAR GRAFT, SUBSEQUENT ENCOUNTER: ICD-10-CM

## 2024-08-22 DIAGNOSIS — N18.6 END STAGE RENAL DISEASE (HCC): ICD-10-CM

## 2024-08-22 DIAGNOSIS — A49.8 ENTEROCOCCUS FAECALIS INFECTION: ICD-10-CM

## 2024-08-22 DIAGNOSIS — Z95.2 S/P AVR (AORTIC VALVE REPLACEMENT): ICD-10-CM

## 2024-08-22 DIAGNOSIS — I33.0 HEART VALVE VEGETATION: ICD-10-CM

## 2024-08-22 PROCEDURE — 99212 OFFICE O/P EST SF 10 MIN: CPT | Performed by: NURSE PRACTITIONER

## 2024-08-22 PROCEDURE — 3074F SYST BP LT 130 MM HG: CPT | Performed by: NURSE PRACTITIONER

## 2024-08-22 PROCEDURE — 3078F DIAST BP <80 MM HG: CPT | Performed by: NURSE PRACTITIONER

## 2024-08-22 RX ORDER — AMOXICILLIN 500 MG/1
500 CAPSULE ORAL 2 TIMES DAILY
Qty: 180 CAPSULE | Refills: 1 | Status: SHIPPED | OUTPATIENT
Start: 2024-08-22

## 2024-08-22 ASSESSMENT — ENCOUNTER SYMPTOMS
PALPITATIONS: 0
BLURRED VISION: 0
SHORTNESS OF BREATH: 0
CONSTIPATION: 0
FEVER: 0
MYALGIAS: 0
COUGH: 0
DIZZINESS: 0
NERVOUS/ANXIOUS: 0
NAUSEA: 0
ABDOMINAL PAIN: 0
SPUTUM PRODUCTION: 0
DOUBLE VISION: 0
DIARRHEA: 0
VOMITING: 0
WHEEZING: 0
CHILLS: 0
WEIGHT LOSS: 0
FOCAL WEAKNESS: 0
BRUISES/BLEEDS EASILY: 0
HEADACHES: 0

## 2024-08-22 ASSESSMENT — FIBROSIS 4 INDEX: FIB4 SCORE: 2.33

## 2024-08-22 NOTE — PROGRESS NOTES
INFECTIOUS  DISEASE  OUTPATIENT CLINIC  NOTE   Subjective   Primary care provider: Kaushal Johnson P.A.-C..     Reason for Follow Up:   Follow-up for   1. End stage renal disease (HCC)        2. Infection of prosthetic vascular graft, subsequent encounter  CBC WITH DIFFERENTIAL    Comp Metabolic Panel    amoxicillin (AMOXIL) 500 MG Cap      3. Heart valve vegetation        4. S/P AVR (aortic valve replacement)        5. Enterococcus faecalis infection            HPI: Patient previously seen and treated by ID team as inpatient during hospital admission.   Brian Jasmine is a 68 y.o.  admitted 6/24/2024. Pt has a past medical history of PAD with femoral-popliteal bypass, ESRD on dialysis via left arm graft, A-fib on Coumadin and CAD with stents, he presented complaining of fevers, pain, redness and swelling in his right arm graft site. OR with vascular surgery on 6/25 with resection of the infected left arm dialysis graft pseudoaneurysm,  left greater saphenous vein harvest, reconstruction of left brachial artery, partial removal of the left arm dialysis graft.  Left forearm wound culture +E faecalis, amp sensitive. Blood cultures on 6/24 +E faecalis, amp sensitive. RENEE Aortic valve infective endocarditis, large lobulated mobile mass 1.7 x 1.1 cm with severe aortic insufficiency. Repeat blood culture from 6/27, 6/29, 6/30 also positive. 6/30, patient underwent aortic valve replacement and left atrial appendage resection and ligation. Aortic valve tissue positive for E faecalis. Repeat blood cultures cleared on 7/1 and tunneled catheter placed by Dr. Goldstein, IR for dialysis on 7/5. Repeat echo to check EF for reduced perfusion, result EF normal, RV dilated, grade 1 diastolic. S/p thrombectomy and stent at R fem-pop bypass by Dr. Blanchard 7/10. On discharge, 6 weeks of IV daptomycin 800 mg - 800 mg - 1200 mg with his dialysis sessions on M - W - F, end date 8/11/24.     08/07/24- Today Patient reports feeling well. Pt  stating that the wound is healing well.  Denies drainage, pungent odor, redness, pain. Denies feeling generally ill, fevers/chills, general malaise, headache, n/v/d.  Patient currently on a 6-week course of IV daptomycin with his dialysis sessions on M - W - F, end date 8/11/24.  Patient has been tolerating IV daptomycin with no complaints of side effects.  Most recent labs reviewed from 7/31/2024 scanned in the media tab, WBC 7.1, stable anemia 9.6/31.1, platelets , elevated creatinine and decreased GFR consistent with ESRD, mildly low protein 5.9, sed rate 61, CRP 44, CPK 34 and stable.  Pending phone call back from vascular surgeon to discuss if portion of vascular graft that was left in place was synthetic versus biologic.  If synthetic graft then patient will need to be placed on chronic suppression with oral antibiotic therapy.  If biologic then will have a shorter course of antibiotic therapy.  After IV antibiotics therapy start p.o. amoxicillin 500  mg twice daily (renal dosing) until confirmation from vascular surgery about length of treatment.  Repeat labs CBC/CMP in 2 weeks for continuous monitoring.    8/22/24-patient following up after completion of IV daptomycin now on p.o. amoxicillin 500 mg twice daily.  Will continue amoxicillin 500 mg twice daily for 6 months from date of vascular surgery resection, end date 12/25/2024.  Unable to confirm if previous graft was synthetic versus biologic as procedure was originally in year 2000. Repeat labs yet to be completed.  Labs CBC/CMP once a month, standing order, 6 count to be completed at dialysis.  Patient reports he has been tolerating p.o. amoxicillin with complaints of possible side effect of itching.  Patient unavailable to confirm itching recurred before antibiotics or after.  Recommend he stop amoxicillin for 2 days to see if he has any relief in symptoms.  If he has relief in symptoms we will transition to alternative antibiotic.  If itching  continues then he has other medication/environmental factor. Today he denies any nausea, vomiting, fever, chills, constipation or diarrhea.  Amoxicillin refill sent to pharmacy.  Left arm AV fistula site completely healed with no surrounding erythema, swelling or drainage.  No open wounds.    Current Antimicrobials: p.o. amoxicillin 500 mg twice daily, end date 12/25/24  Previous Antimicrobials: Daptomycin    Other Current Medications:  Home Medications    Medication Sig Taking? Last Dose Authorizing Provider   amoxicillin (AMOXIL) 500 MG Cap Take 1 Capsule by mouth 2 times a day. Yes  JASKARAN Porter   warfarin (COUMADIN) 3 MG Tab Take 1 Tablet by mouth every day at 6 PM. Yes Taking Dereje Delgado D.O.   aspirin 81 MG EC tablet Take 1 Tablet by mouth every day. Yes Taking Dereje Delgado D.O.   amiodarone (CORDARONE) 200 MG Tab Take 1 Tablet by mouth every day. Yes Taking Dereje Delgado D.O.   albuterol (PROVENTIL) 2.5mg/0.5ml Nebu Soln Take 0.5 mL by nebulization every four hours as needed for Shortness of Breath. Yes Taking Dereje Delgado D.O.   atorvastatin (LIPITOR) 40 MG Tab Take 1 Tablet by mouth at bedtime. Yes Taking Dereje Delgado D.O.   folic acid (FOLVITE) 1 MG Tab Take 1 Tablet by mouth every day. Yes Taking Dereje Delgado D.O.   metoprolol tartrate (LOPRESSOR) 25 MG Tab Take 1 Tablet by mouth 2 times a day. Yes Taking Dereje Delgado D.O.   omeprazole (PRILOSEC) 20 MG delayed-release capsule Take 1 Capsule by mouth every day. Yes Taking Dereje Delgado D.O.   thiamine (THIAMINE) 100 MG tablet Take 1 Tablet by mouth every day. Yes Taking Dereje Delgado D.O.   Epoetin Jhonny-epbx (RETACRIT INJ) Inject 400 Units as directed every Monday, Wednesday, and Friday. Yes Taking Physician Outpatient   ezetimibe (ZETIA) 10 MG Tab Take 10 mg by mouth every day. Yes Taking Physician Outpatient        PMH:  Past Medical History:   Diagnosis Date    Allergy     Seasonal    Aortic aneurysm (HCC)      CAD (coronary artery disease)     CHF (congestive heart failure) ([facility])     Chronic kidney disease (CKD), stage III (moderate)     COPD (chronic obstructive pulmonary disease) ([facility])     Hypertension     Impaired hearing     Indigestion     Renal failure     Sleep apnea      Past Surgical History:   Procedure Laterality Date    FEMORAL POPLITEAL BYPASS Right 7/10/2024    Procedure: RIGHT LOWER EXTREMITY THROMBECTOMY, ANGIOGRAM, STENTING;  Surgeon: Jonathan Blanchard M.D.;  Location: SURGERY [facility];  Service: Vascular    ANGIOGRAM Right 7/10/2024    Procedure: ANGIOGRAM;  Surgeon: Jonathan Blanchard M.D.;  Location: SURGERY [facility];  Service: Vascular    AORTIC VALVE REPLACEMENT  6/30/2024    Procedure: REPLACEMENT, AORTIC VALVE;  Surgeon: Zion Austin D.O.;  Location: [facility];  Service: Cardiothoracic    ECHOCARDIOGRAM, TRANSESOPHAGEAL, INTRAOPERATIVE  6/30/2024    Procedure: ECHOCARDIOGRAM, TRANSESOPHAGEAL, INTRAOPERATIVE;  Surgeon: Zion Austin D.O.;  Location: SURGERY [facility];  Service: Cardiothoracic    MULTIPLE CORONARY ARTERY BYPASS ENDO VEIN HARVEST  6/30/2024    Procedure: CABG X1;  Surgeon: Zion Austin D.O.;  Location: SURGERY [facility];  Service: Cardiothoracic    AV FISTULA REVISION Left 6/25/2024    Procedure: RESECTION OF INFECTED LEFT ARM ARTERIOVENOUS  GRAFT PSEUDOANEURYSM . RECONSTRUCT LEFT BRACHIAL ARTERY USING LEFT GREATER SAPHENOUS VEIN;  Surgeon: Doug Tinoco M.D.;  Location: [facility];  Service: General    GASTROSCOPY N/A 02/17/2017    Procedure: GASTROSCOPY;  Surgeon: Morales Hinton M.D.;  Location: [facility];  Service:     EGD ESOPHAGUS WITH ENDOSCOPIC US N/A 02/17/2017    Procedure: EGD ESOPHAGUS WITH ENDOSCOPIC US UPPER WITH POSSIBLE FNA;  Surgeon: Morales Hinton M.D.;  Location: [facility];  Service:     AV FISTULA CREATION Left      Family History   Problem Relation Age of Onset    Heart Disease  Mother     Cancer Father     Heart Disease Brother      Social History     Socioeconomic History    Marital status: Single     Spouse name: Not on file    Number of children: Not on file    Years of education: Not on file    Highest education level: Not on file   Occupational History    Not on file   Tobacco Use    Smoking status: Former     Current packs/day: 0.00     Average packs/day: 0.5 packs/day for 42.2 years (21.1 ttl pk-yrs)     Types: Cigarettes     Start date: 1977     Quit date: 3/4/2019     Years since quittin.4    Smokeless tobacco: Never   Vaping Use    Vaping status: Never Used   Substance and Sexual Activity    Alcohol use: No     Comment: Hx of alcohol use    Drug use: No    Sexual activity: Not Currently     Partners: Female   Other Topics Concern    Not on file   Social History Narrative    Not on file     Social Determinants of Health     Financial Resource Strain: Not on file   Food Insecurity: No Food Insecurity (2024)    Hunger Vital Sign     Worried About Running Out of Food in the Last Year: Never true     Ran Out of Food in the Last Year: Never true   Transportation Needs: No Transportation Needs (2024)    PRAPARE - Transportation     Lack of Transportation (Medical): No     Lack of Transportation (Non-Medical): No   Physical Activity: Not on file   Stress: Not on file   Social Connections: Not on file   Intimate Partner Violence: Not At Risk (2024)    Humiliation, Afraid, Rape, and Kick questionnaire     Fear of Current or Ex-Partner: No     Emotionally Abused: No     Physically Abused: No     Sexually Abused: No   Housing Stability: Low Risk  (2024)    Housing Stability Vital Sign     Unable to Pay for Housing in the Last Year: No     Number of Places Lived in the Last Year: 1     Unstable Housing in the Last Year: No           Allergies/Intolerances:  No Known Allergies    ROS:   Review of Systems   Constitutional:  Negative for chills, fever, malaise/fatigue  "and weight loss.   HENT:  Negative for congestion and hearing loss.    Eyes:  Negative for blurred vision and double vision.   Respiratory:  Negative for cough, sputum production, shortness of breath and wheezing.    Cardiovascular:  Negative for chest pain and palpitations.   Gastrointestinal:  Negative for abdominal pain, constipation, diarrhea, nausea and vomiting.   Genitourinary:  Negative for dysuria.   Musculoskeletal:  Negative for myalgias.   Skin:  Positive for itching. Negative for rash.   Neurological:  Negative for dizziness, focal weakness and headaches.   Endo/Heme/Allergies:  Does not bruise/bleed easily.   Psychiatric/Behavioral:  The patient is not nervous/anxious.       ROS was reviewed and were negative except as above.    Objective    Most Recent Vital Signs:  /62 (BP Location: Left arm, Patient Position: Sitting, BP Cuff Size: Adult)   Pulse 84   Temp 36.7 °C (98.1 °F) (Temporal)   Resp 18   Ht 1.854 m (6' 1\")   Wt 88.2 kg (194 lb 6.4 oz)   SpO2 90%   BMI 25.65 kg/m²     Physical Exam:  Physical Exam  Vitals reviewed.   Constitutional:       Appearance: Normal appearance.   HENT:      Head: Normocephalic and atraumatic.      Nose: Nose normal.      Mouth/Throat:      Mouth: Mucous membranes are moist.   Eyes:      Pupils: Pupils are equal, round, and reactive to light.   Cardiovascular:      Rate and Rhythm: Normal rate and regular rhythm.   Pulmonary:      Effort: Pulmonary effort is normal.      Breath sounds: Normal breath sounds.   Abdominal:      Palpations: Abdomen is soft.   Musculoskeletal:         General: No tenderness.      Cervical back: Normal range of motion and neck supple.   Skin:     General: Skin is warm and dry.      Coloration: Skin is not jaundiced.      Findings: No erythema or rash.      Comments: Previous left forearm vascular graft site healing appropriately with no surrounding erythema, swelling or drainage.  No open wounds.   Neurological:      Mental " Status: He is alert and oriented to person, place, and time.      Motor: No weakness.   Psychiatric:         Mood and Affect: Mood normal.         Behavior: Behavior normal.          Pertinent Lab/Imaging Results:  [unfilled]  @CMP@  WBC   Date/Time Value Ref Range Status   07/17/2024 05:10 AM 10.1 4.8 - 10.8 K/uL Final     RBC   Date/Time Value Ref Range Status   07/17/2024 05:10 AM 2.65 (L) 4.70 - 6.10 M/uL Final     Hemoglobin   Date/Time Value Ref Range Status   07/17/2024 05:10 AM 8.1 (L) 14.0 - 18.0 g/dL Final     Hematocrit   Date/Time Value Ref Range Status   07/17/2024 05:10 AM 26.4 (L) 42.0 - 52.0 % Final     MCV   Date/Time Value Ref Range Status   07/17/2024 05:10 AM 99.6 (H) 81.4 - 97.8 fL Final     MCH   Date/Time Value Ref Range Status   07/17/2024 05:10 AM 30.6 27.0 - 33.0 pg Final     MCHC   Date/Time Value Ref Range Status   07/17/2024 05:10 AM 30.7 (L) 32.3 - 36.5 g/dL Final     MPV   Date/Time Value Ref Range Status   07/17/2024 05:10 AM 9.9 9.0 - 12.9 fL Final      Sodium   Date/Time Value Ref Range Status   07/17/2024 05:10  135 - 145 mmol/L Final     Potassium   Date/Time Value Ref Range Status   07/17/2024 05:10 AM 4.1 3.6 - 5.5 mmol/L Final     Chloride   Date/Time Value Ref Range Status   07/17/2024 05:10  96 - 112 mmol/L Final     Co2   Date/Time Value Ref Range Status   07/17/2024 05:10 AM 22 20 - 33 mmol/L Final     Glucose   Date/Time Value Ref Range Status   07/17/2024 05:10 AM 77 65 - 99 mg/dL Final     Bun   Date/Time Value Ref Range Status   07/17/2024 05:10 AM 25 (H) 8 - 22 mg/dL Final     Creatinine   Date/Time Value Ref Range Status   07/17/2024 05:10 AM 5.23 (HH) 0.50 - 1.40 mg/dL Final     Glom Filt Rate, Est   Date/Time Value Ref Range Status   02/15/2023 03:59 AM 7 (L) >60 mL/min/1.7 Final     Comment:     Estimated GFR derived from the MDRD Study equation can be used in patients who are in the hospital.  However, it is important to pay attention to potential  "inaccuracies due to the non-steady state of serum creatinine, co-morbidities that cause malnutrition, and the use of medications that interfere with the measurement of serum creatinine.    The estimated GFR is only accurate for patients greater than 18 years of age.     Alkaline Phosphatase   Date/Time Value Ref Range Status   06/29/2024 05:10 PM 97 30 - 99 U/L Final     AST(SGOT)   Date/Time Value Ref Range Status   06/29/2024 05:10 PM 26 12 - 45 U/L Final     ALT(SGPT)   Date/Time Value Ref Range Status   06/29/2024 05:10 PM 17 2 - 50 U/L Final     Total Bilirubin   Date/Time Value Ref Range Status   06/29/2024 05:10 PM 0.2 0.1 - 1.5 mg/dL Final      No results found for: \"CPKTOTAL\"       No results found for: \"BLOODCULTU\", \"BLDCULT\", \"BCHOLD\"    No results found for: \"BLOODCULTU\", \"BLDCULT\", \"BCHOLD\"       CULTURE TISSUE W/ GRM STAIN  Order: [id number]   Status: Final result       Visible to patient: Yes (not seen)       Next appt: 08/07/2024 at 09:30 AM in Infectious Diseases (LUIS A Porter.P.R.NMichelle)    Specimen Information: Tissue   0 Result Notes      Component 1 mo ago   Significant Indicator POS Positive (POS)   Source TISS   Site aortic valve   Culture Result - Abnormal    Gram Stain Result  Abnormal   * CORRECTED REPORT **  Correct result:  Rare WBCs.  Moderate Gram positive cocci.  Erroneous result:  Rare WBCs.  No organisms seen.    Culture Result  Abnormal   Enterococcus faecalis  Moderate growth    Resulting Agency M        Susceptibility     Enterococcus faecalis     WAQAR     Ampicillin <=2 mcg/mL Sensitive     Daptomycin 2 mcg/mL Sensitive     Gent Synergy <=500 mcg/mL Sensitive     Linezolid 2 mcg/mL Sensitive     Penicillin 2 mcg/mL Sensitive     Vancomycin 1 mcg/mL Sensitive                  Specimen Collected: 06/30/24 10:25 AM Last Resulted: 07/03/24  9:33 AM      CULTURE WOUND W/ GRAM STAIN  Order: [id number]   Status: Final result       Visible to patient: Yes (not seen)       Next appt: " 08/07/2024 at 09:30 AM in Infectious Diseases (Patrick Shell, A.P.R.N.)    Specimen Information: Wound   0 Result Notes      Component 1 mo ago   Significant Indicator POS Positive (POS)   Source WND   Site L forearm fistula graft   Culture Result - Abnormal    Gram Stain Result Many WBCs.  No organisms seen.   Culture Result  Abnormal   Enterococcus faecalis  Moderate growth    Resulting Agency M        Susceptibility     Enterococcus faecalis     WAQAR     Ampicillin <=2 mcg/mL Sensitive     Daptomycin 2 mcg/mL Sensitive     Gent Synergy <=500 mcg/mL Sensitive     Linezolid 2 mcg/mL Sensitive     Penicillin 2 mcg/mL Sensitive     Tetracycline <=4 mcg/mL Sensitive     Vancomycin 1 mcg/mL Sensitive                  Specimen Collected: 06/25/24  2:00 PM Last Resulted: 06/28/24  9:24 AM      BLOOD CULTURE  Order: [id number]   Status: Final result       Visible to patient: Yes (not seen)       Next appt: 08/07/2024 at 09:30 AM in Infectious Diseases (Patrick Shell, A.P.R.N.)    Specimen Information: Peripheral; Blood   0 Result Notes      Component 1 mo ago   Significant Indicator POS Positive (POS)   Source BLD   Site PERIPHERAL   Culture Result Growth detected by Bactec instrument. 06/25/2024  00:21 Abnormal    Culture Result Enterococcus faecalis Abnormal    Resulting Agency M        Susceptibility     Enterococcus faecalis     WAQAR     Ampicillin <=2 mcg/mL Sensitive     Daptomycin 2 mcg/mL Sensitive     Gent Synergy <=500 mcg/mL Sensitive     Linezolid 2 mcg/mL Sensitive     Penicillin 2 mcg/mL Sensitive     Vancomycin 1 mcg/mL Sensitive                  Narrative  Performed by: MARIAN REYEZ  Woo  [address] tel. [phone],  CALLED  183 tel. [phone] 06/25/2024, 17:37, Voalted carlos alberto SAINI  Right Hand      Specimen Collected: 06/24/24 12:31 PM Last Resulted: 06/27/24 11:21 AM        Impression/Assessment      1. End stage renal disease (HCC)        2. Infection of prosthetic vascular graft, subsequent encounter  CBC WITH  DIFFERENTIAL    Comp Metabolic Panel    amoxicillin (AMOXIL) 500 MG Cap      3. Heart valve vegetation        4. S/P AVR (aortic valve replacement)        5. Enterococcus faecalis infection          Brian Jasmine is a 68 y.o.  admitted 6/24/2024. Pt has a past medical history of PAD with femoral-popliteal bypass, ESRD on dialysis via left arm graft, A-fib on Coumadin and CAD with stents, he presented complaining of fevers, pain, redness and swelling in his right arm graft site. OR with vascular surgery on 6/25 with resection of the infected left arm dialysis graft pseudoaneurysm,  left greater saphenous vein harvest, reconstruction of left brachial artery, partial removal of the left arm dialysis graft. Left forearm wound culture +E faecalis, amp sensitive. Blood cultures on 6/24 +E faecalis, amp sensitive. RENEE Aortic valve infective endocarditis, large lobulated mobile mass 1.7 x 1.1 cm with severe aortic insufficiency. Repeat blood culture from 6/27, 6/29, 6/30 also positive. 6/30, patient underwent aortic valve replacement and left atrial appendage resection and ligation. Aortic valve tissue positive for E faecalis. Repeat blood cultures cleared on 7/1 and tunneled catheter placed by IR for dialysis on 7/5. Repeat echo to check EF for reduced perfusion, result EF normal, RV dilated, grade 1 diastolic. S/p thrombectomy and stent at R fem-pop bypass by Dr. Blanchard 7/10. On discharge, 6 weeks of IV daptomycin 800 mg - 800 mg - 1200 mg with his dialysis sessions on M - W - F, end date 8/11/24.  After 8/11/2024 transition to p.o. amoxicillin 500 mg twice daily, for 6 months to possibly 1 year.  Unable to confirm if vascular graft was synthetic versus biologic    8/22/24-patient following up after completion of IV daptomycin now on p.o. amoxicillin 500 mg twice daily.  Will continue amoxicillin 500 mg twice daily for 6 months from date of vascular surgery resection, end date 12/25/2024.  Unable to confirm if previous  graft was synthetic versus biologic as procedure was originally in year 2000. Repeat labs yet to be completed.  Labs CBC/CMP once a month, standing order, 6 count to be completed at dialysis.  Patient reports he has been tolerating p.o. amoxicillin with complaints of possible side effect of itching.  Patient unavailable to confirm itching recurred before antibiotics or after.  Recommend he stop amoxicillin for 2 days to see if he has any relief in symptoms.  If he has relief in symptoms we will transition to alternative antibiotic.  If itching continues then he has other medication/environmental factor. Today he denies any nausea, vomiting, fever, chills, constipation or diarrhea.  Amoxicillin refill sent to pharmacy.  Left arm AV fistula site completely healed with no surrounding erythema, swelling or drainage.  No open wounds.    - dual beta-lactam therapy with renally dosed IV ampicillin and ceftriaxone while inpatient   PLAN:   - Continue p.o. amoxicillin 500 mg twice daily for 6 months (12/25/24)  to possibly 1 year 6/25/25).  Patient will stop medication for 2 days to monitor for any relief in itching.  If he has relief will need to transition to alternative antibiotic.  If he does not have relief in itching then he has other medication/environmental factor  - Repeat labs CBC/CMP once a month, standing order, 6 count.  Patient to get labs completed during dialysis and faxed to [facility]  - Medication education provided and S/S of side effects discussed   - Recommend routine follow up with vascular surgery  - Continue po probiotic  - Education provided on S/S of infection and when to report to ER/ call 911         Return visit: 1 month. Follow up with primary care physician for chronic medical problems      I have performed a physical exam,  updated ROS and plan today. I have reviewed previous images, labs, and provider notes.      SIERRA Porter.    All Patients should seek medical re-evaluation or report  to the ER for new, increasing or worsening symptoms. In some circumstances medical conditions can change from the initial evaluation and may require emergent medical re-evaluation. This includes but is not limited to chest pain, shortness of breath, atypical abdominal pain, atypical headache, ALOC, fever >101, low blood pressure, high respiratory rate (above 30), low oxygen saturation (below 90%), acute delirium, abnormal bleeding, inability to tolerate any intake, weakness on one side of the body, any worsened or concerning conditions.    Please note that this dictation was created using voice recognition software. I have worked with technical experts from [facility] to optimize the interface.  I have made every reasonable attempt to correct obvious errors, but there may be errors of grammar and possibly content that I did not discover before finalizing the note.

## 2024-08-27 ENCOUNTER — TELEPHONE (OUTPATIENT)
Dept: INFECTIOUS DISEASES | Facility: MEDICAL CENTER | Age: 69
End: 2024-08-27
Payer: MEDICARE

## 2024-08-27 NOTE — TELEPHONE ENCOUNTER
Critical lab results creatinine 4.9 GFR 12.  Patient with a history of ESRD on dialysis.  Labs consistent with previous ESRD.  Continue current antibiotic course.  Repeat labs CBC/CMP once a month, standing order

## 2024-09-23 ASSESSMENT — ENCOUNTER SYMPTOMS
FOCAL WEAKNESS: 0
CONSTIPATION: 0
COUGH: 0
ABDOMINAL PAIN: 0
SPUTUM PRODUCTION: 0
NAUSEA: 0
BRUISES/BLEEDS EASILY: 0
CHILLS: 0
VOMITING: 0
SHORTNESS OF BREATH: 0
HEADACHES: 0
MYALGIAS: 0
DIZZINESS: 0
NERVOUS/ANXIOUS: 0
WHEEZING: 0
DOUBLE VISION: 0
FEVER: 0
PALPITATIONS: 0
BLURRED VISION: 0
DIARRHEA: 0
WEIGHT LOSS: 0

## 2024-09-23 NOTE — PROGRESS NOTES
INFECTIOUS  DISEASE  OUTPATIENT CLINIC  NOTE   Subjective   Primary care provider: Kaushal Johnson P.A.-C..     Reason for Follow Up:   Follow-up for   1. End stage renal disease (HCC)        2. Infection of prosthetic vascular graft, subsequent encounter  amoxicillin (AMOXIL) 500 MG Cap      3. Heart valve vegetation        4. S/P AVR (aortic valve replacement)        5. Enterococcus faecalis infection        6. Bacteremia        7. Transaminitis  Comp Metabolic Panel      HPI: Patient previously seen and treated by ID team as inpatient during hospital admission.   Brian Jasmine is a 68 y.o.  admitted 6/24/2024. Pt has a past medical history of PAD with femoral-popliteal bypass, ESRD on dialysis via left arm graft, A-fib on Coumadin and CAD with stents, he presented complaining of fevers, pain, redness and swelling in his right arm graft site. OR with vascular surgery on 6/25 with resection of the infected left arm dialysis graft pseudoaneurysm,  left greater saphenous vein harvest, reconstruction of left brachial artery, partial removal of the left arm dialysis graft.  Left forearm wound culture +E faecalis, amp sensitive. Blood cultures on 6/24 +E faecalis, amp sensitive. RENEE Aortic valve infective endocarditis, large lobulated mobile mass 1.7 x 1.1 cm with severe aortic insufficiency. Repeat blood culture from 6/27, 6/29, 6/30 also positive. 6/30, patient underwent aortic valve replacement and left atrial appendage resection and ligation. Aortic valve tissue positive for E faecalis. Repeat blood cultures cleared on 7/1 and tunneled catheter placed by Dr. Goldstein, IR for dialysis on 7/5. Repeat echo to check EF for reduced perfusion, result EF normal, RV dilated, grade 1 diastolic. S/p thrombectomy and stent at R fem-pop bypass by Dr. Blanchard 7/10. On discharge, 6 weeks of IV daptomycin 800 mg - 800 mg - 1200 mg with his dialysis sessions on M - W - F, end date 8/11/24.     08/07/24- Today Patient reports  feeling well. Pt stating that the wound is healing well.  Denies drainage, pungent odor, redness, pain. Denies feeling generally ill, fevers/chills, general malaise, headache, n/v/d.  Patient currently on a 6-week course of IV daptomycin with his dialysis sessions on M - W - F, end date 8/11/24.  Patient has been tolerating IV daptomycin with no complaints of side effects.  Most recent labs reviewed from 7/31/2024 scanned in the media tab, WBC 7.1, stable anemia 9.6/31.1, platelets , elevated creatinine and decreased GFR consistent with ESRD, mildly low protein 5.9, sed rate 61, CRP 44, CPK 34 and stable.  Pending phone call back from vascular surgeon to discuss if portion of vascular graft that was left in place was synthetic versus biologic.  If synthetic graft then patient will need to be placed on chronic suppression with oral antibiotic therapy.  If biologic then will have a shorter course of antibiotic therapy.  After IV antibiotics therapy start p.o. amoxicillin 500  mg twice daily (renal dosing) until confirmation from vascular surgery about length of treatment.  Repeat labs CBC/CMP in 2 weeks for continuous monitoring.    8/22/24-patient following up after completion of IV daptomycin now on p.o. amoxicillin 500 mg twice daily.  Will continue amoxicillin 500 mg twice daily for 6 months from date of vascular surgery resection, end date 12/25/2024.  Unable to confirm if previous graft was synthetic versus biologic as procedure was originally in year 2000. Repeat labs yet to be completed.  Labs CBC/CMP once a month, standing order, 6 count to be completed at dialysis.  Patient reports he has been tolerating p.o. amoxicillin with complaints of possible side effect of itching.  Patient unavailable to confirm itching recurred before antibiotics or after.  Recommend he stop amoxicillin for 2 days to see if he has any relief in symptoms.  If he has relief in symptoms we will transition to alternative  antibiotic.  If itching continues then he has other medication/environmental factor. Today he denies any nausea, vomiting, fever, chills, constipation or diarrhea.  Amoxicillin refill sent to pharmacy.  Left arm AV fistula site completely healed with no surrounding erythema, swelling or drainage.  No open wounds.    9/24/24-patient following up while on a 6-month course of p.o. amoxicillin 500 mg twice daily.  Patient reports that he is no longer experiencing itching.  Most likely this was due to his ESRD.  He is tolerating Augmentin with no complaints of side effects.  Today he denies any nausea, vomiting, fever, chills, constipation or diarrhea.  Patient is following up with vascular surgery and will confirm if vascular graft was synthetic versus biologic.  Most recent labs reviewed from 9/23/2024, WBC 8.2, stable anemia slowly improving 13.0/41.4, neutrophils WNL, CMP with chronic and stable elevated creatinine 7.2 decreased GFR 8, consistent with ESRD.  Sudden transaminitis AST 63 .  Denies EtOH abuse.  States he has not had alcohol in 8 years.  Only changes in medication has been with his Coumadin adjustments. Patient is on multiple medications that are process by the liver.  Previously after starting amoxicillin repeat labs did not show transaminitis after being on the medication for 3 weeks.  Low threshold at this time that amoxicillin is causing this elevation is majority of the drug is excreted through urine.  Typically latency for liver injury would occur a few days to 2 weeks after starting antibiotic but repeat labs were completed 3 weeks after.  Repeat CMP ordered for close monitoring.  We discussed continuing with amoxicillin for up to 1 year as this would give us the greatest chances of clearing his infection.  6 months would be minimum length of treatment.  Patient would prefer to stay on the antibiotic for 1 year.  Medication refilled      Current Antimicrobials: p.o. amoxicillin 500 mg twice  daily, end date 6/25/25  Previous Antimicrobials: Daptomycin    Other Current Medications:  Home Medications    Medication Sig Taking? Last Dose Authorizing Provider   amoxicillin (AMOXIL) 500 MG Cap Take 1 Capsule by mouth 2 times a day. Yes  JASKARAN Porter   oxyCODONE immediate release (ROXICODONE) 10 MG immediate release tablet Take 10 mg by mouth every 6 hours as needed. Yes Taking Physician Outpatient   hydrALAZINE (APRESOLINE) 25 MG Tab Take 75 mg by mouth 3 times a day. Yes Taking Physician Outpatient   valsartan (DIOVAN) 80 MG Tab Take 80 mg by mouth every day. Yes Taking Physician Outpatient   warfarin (COUMADIN) 5 MG Tab Take 5 mg by mouth every day. Yes Taking Physician Outpatient   potassium chloride ER (KLOR-CON) 10 MEQ tablet Take 10 mEq by mouth every day. Yes Taking Physician Outpatient   warfarin (COUMADIN) 3 MG Tab Take 1 Tablet by mouth every day at 6 PM. Yes Taking Dereje Delgado D.O.   aspirin 81 MG EC tablet Take 1 Tablet by mouth every day. Yes Taking Dereje Delgado D.O.   amiodarone (CORDARONE) 200 MG Tab Take 1 Tablet by mouth every day. Yes Taking Dereje Delgado D.O.   albuterol (PROVENTIL) 2.5mg/0.5ml Nebu Soln Take 0.5 mL by nebulization every four hours as needed for Shortness of Breath. Yes Taking Dereje Delgado D.O.   atorvastatin (LIPITOR) 40 MG Tab Take 1 Tablet by mouth at bedtime. Yes Taking Dereje Delgado D.O.   folic acid (FOLVITE) 1 MG Tab Take 1 Tablet by mouth every day. Yes Taking Dereje Delgado D.O.   metoprolol tartrate (LOPRESSOR) 25 MG Tab Take 1 Tablet by mouth 2 times a day. Yes Taking Dereje Delgado D.O.   omeprazole (PRILOSEC) 20 MG delayed-release capsule Take 1 Capsule by mouth every day. Yes Taking Dereje Delgado D.O.   thiamine (THIAMINE) 100 MG tablet Take 1 Tablet by mouth every day. Yes Taking Dereje Delgado D.O.   Epoetin Jhonny-epbx (RETACRIT INJ) Inject 400 Units as directed every Monday, Wednesday, and Friday. Yes Taking Physician  Outpatient   ezetimibe (ZETIA) 10 MG Tab Take 10 mg by mouth every day. Yes Taking Physician Outpatient        PMH:  Past Medical History:   Diagnosis Date    Allergy     Seasonal    Aortic aneurysm (HCC)     CAD (coronary artery disease)     CHF (congestive heart failure) (HCC)     Chronic kidney disease (CKD), stage III (moderate)     COPD (chronic obstructive pulmonary disease) (HCC)     Hypertension     Impaired hearing     Indigestion     Renal failure     Sleep apnea      Past Surgical History:   Procedure Laterality Date    FEMORAL POPLITEAL BYPASS Right 7/10/2024    Procedure: RIGHT LOWER EXTREMITY THROMBECTOMY, ANGIOGRAM, STENTING;  Surgeon: Jonathan Blanchard M.D.;  Location: [facility];  Service: Vascular    ANGIOGRAM Right 7/10/2024    Procedure: ANGIOGRAM;  Surgeon: Jonathan Blanchard M.D.;  Location: [facility];  Service: Vascular    AORTIC VALVE REPLACEMENT  6/30/2024    Procedure: REPLACEMENT, AORTIC VALVE;  Surgeon: Zion Austin D.O.;  Location: [facility];  Service: Cardiothoracic    ECHOCARDIOGRAM, TRANSESOPHAGEAL, INTRAOPERATIVE  6/30/2024    Procedure: ECHOCARDIOGRAM, TRANSESOPHAGEAL, INTRAOPERATIVE;  Surgeon: Zion Austin D.O.;  Location: SUR[facility]ERY [facility];  Service: Cardiothoracic    MULTIPLE CORONARY ARTERY BYPASS ENDO VEIN HARVEST  6/30/2024    Procedure: CABG X1;  Surgeon: Zion Austin D.O.;  Location: [facility];  Service: Cardiothoracic    AV FISTULA REVISION Left 6/25/2024    Procedure: RESECTION OF INFECTED LEFT ARM ARTERIOVENOUS  GRAFT PSEUDOANEURYSM . RECONSTRUCT LEFT BRACHIAL ARTERY USING LEFT GREATER SAPHENOUS VEIN;  Surgeon: Doug Tinoco M.D.;  Location: [facility];  Service: General    GASTROSCOPY N/A 02/17/2017    Procedure: GASTROSCOPY;  Surgeon: Morales Hinton M.D.;  Location: [facility];  Service:     EGD ESOPHAGUS WITH ENDOSCOPIC US N/A 02/17/2017    Procedure: EGD ESOPHAGUS WITH  ENDOSCOPIC US UPPER WITH POSSIBLE FNA;  Surgeon: Morales Hinton M.D.;  Location: SURGERY [facility];  Service:     AV FISTULA CREATION Left      Family History   Problem Relation Age of Onset    Heart Disease Mother     Cancer Father     Heart Disease Brother      Social History     Socioeconomic History    Marital status: Single     Spouse name: Not on file    Number of children: Not on file    Years of education: Not on file    Highest education level: Not on file   Occupational History    Not on file   Tobacco Use    Smoking status: Former     Current packs/day: 0.00     Average packs/day: 0.5 packs/day for 42.2 years (21.1 ttl pk-yrs)     Types: Cigarettes     Start date: 1977     Quit date: 3/4/2019     Years since quittin.5    Smokeless tobacco: Never   Vaping Use    Vaping status: Never Used   Substance and Sexual Activity    Alcohol use: No     Comment: Hx of alcohol use    Drug use: No    Sexual activity: Not Currently     Partners: Female   Other Topics Concern    Not on file   Social History Narrative    Not on file     Social Determinants of Health     Financial Resource Strain: Low Risk  (2024)    Overall Financial Resource Strain (CARDIA)     Difficulty of Paying Living Expenses: Not hard at all   Food Insecurity: No Food Insecurity (2024)    Hunger Vital Sign     Worried About Running Out of Food in the Last Year: Never true     Ran Out of Food in the Last Year: Never true   Transportation Needs: No Transportation Needs (2024)    PRAPARE - Transportation     Lack of Transportation (Medical): No     Lack of Transportation (Non-Medical): No   Physical Activity: Unknown (2024)    Exercise Vital Sign     Days of Exercise per Week: 7 days     Minutes of Exercise per Session: Not on file   Stress: No Stress Concern Present (2024)    [employer] [place] [employer] - Occupational Stress Questionnaire     Feeling of Stress : Not at all   Social Connections:  "Moderately Isolated (9/18/2024)    Social Connection and Isolation Panel [NHANES]     Frequency of Communication with Friends and Family: More than three times a week     Frequency of Social Gatherings with Friends and Family: Never     Attends [employer]ology Services: Never     Active Member of Clubs or Organizations: No     Attends Club or Organization Meetings: Never     Marital Status: Living with partner   Intimate Partner Violence: Not At Risk (9/18/2024)    Humiliation, Afraid, Rape, and Kick questionnaire     Fear of Current or Ex-Partner: No     Emotionally Abused: No     Physically Abused: No     Sexually Abused: No   Housing Stability: Low Risk  (9/18/2024)    Housing Stability Vital Sign     Unable to Pay for Housing in the Last Year: No     Number of Times Moved in the Last Year: 1     Homeless in the Last Year: No           Allergies/Intolerances:  No Known Allergies    ROS:   Review of Systems   Constitutional:  Negative for chills, fever, malaise/fatigue and weight loss.   HENT:  Negative for congestion and hearing loss.    Eyes:  Negative for blurred vision and double vision.   Respiratory:  Negative for cough, sputum production, shortness of breath and wheezing.    Cardiovascular:  Negative for chest pain and palpitations.   Gastrointestinal:  Negative for abdominal pain, constipation, diarrhea, nausea and vomiting.   Genitourinary:  Negative for dysuria.   Musculoskeletal:  Negative for myalgias.   Skin:  Negative for itching and rash.   Neurological:  Negative for dizziness, focal weakness and headaches.   Endo/Heme/Allergies:  Does not bruise/bleed easily.   Psychiatric/Behavioral:  The patient is not nervous/anxious.       ROS was reviewed and were negative except as above.    Objective    Most Recent Vital Signs:  BP (!) 132/90 (BP Location: Left arm, Patient Position: Sitting, BP Cuff Size: Adult)   Pulse 87   Temp 36.9 °C (98.4 °F) (Temporal)   Resp 20   Ht 1.854 m (6' 1\")   Wt 83.9 kg (185 " lb)   SpO2 97%   BMI 24.41 kg/m²     Physical Exam:  Physical Exam  Vitals reviewed.   Constitutional:       Appearance: Normal appearance.   HENT:      Head: Normocephalic and atraumatic.      Nose: Nose normal.      Mouth/Throat:      Mouth: Mucous membranes are moist.   Eyes:      Pupils: Pupils are equal, round, and reactive to light.   Cardiovascular:      Rate and Rhythm: Normal rate and regular rhythm.   Pulmonary:      Effort: Pulmonary effort is normal.      Breath sounds: Normal breath sounds.   Abdominal:      Palpations: Abdomen is soft.   Musculoskeletal:         General: No tenderness.      Cervical back: Normal range of motion and neck supple.   Skin:     General: Skin is warm and dry.      Coloration: Skin is not jaundiced.      Findings: No erythema or rash.      Comments: Previous left forearm vascular graft site healing appropriately with no surrounding erythema, swelling or drainage.  No open wounds.   Neurological:      Mental Status: He is alert and oriented to person, place, and time.      Motor: No weakness.   Psychiatric:         Mood and Affect: Mood normal.         Behavior: Behavior normal.          Pertinent Lab/Imaging Results:  [unfilled]  @CMP@  WBC   Date/Time Value Ref Range Status   09/23/2024 09:22 AM 8.2 4.8 - 10.8 K/uL Final     RBC   Date/Time Value Ref Range Status   09/23/2024 09:22 AM 4.54 (L) 4.70 - 6.10 M/uL Final     Hemoglobin   Date/Time Value Ref Range Status   09/23/2024 09:22 AM 13.0 (L) 14.0 - 18.0 g/dL Final     Hematocrit   Date/Time Value Ref Range Status   09/23/2024 09:22 AM 41.4 (L) 42.0 - 52.0 % Final     MCV   Date/Time Value Ref Range Status   09/23/2024 09:22 AM 91.2 80.0 - 94.0 fL Final     MCH   Date/Time Value Ref Range Status   09/23/2024 09:22 AM 28.6 27.0 - 31.0 pg Final     MCHC   Date/Time Value Ref Range Status   09/23/2024 09:22 AM 31.4 (L) 33.0 - 37.0 g/dL Final     MPV   Date/Time Value Ref Range Status   09/23/2024 09:22 AM 10.5 (H) 7.4 -  "10.4 fL Final      Sodium   Date/Time Value Ref Range Status   09/23/2024 09:22  136 - 145 mmol/L Final     Potassium   Date/Time Value Ref Range Status   09/23/2024 09:22 AM 4.1 3.5 - 5.1 mmol/L Final     Chloride   Date/Time Value Ref Range Status   09/23/2024 09:22 AM 96 (L) 98 - 107 mmol/L Final     Co2   Date/Time Value Ref Range Status   09/23/2024 09:22 AM 29 21 - 32 mmol/L Final     Glucose   Date/Time Value Ref Range Status   09/23/2024 09:22 AM 96 74 - 99 mg/dL Final     Bun   Date/Time Value Ref Range Status   09/23/2024 09:22 AM 41 (H) 7 - 18 mg/dL Final     Creatinine   Date/Time Value Ref Range Status   09/23/2024 09:22 AM 7.2 (HH) 0.8 - 1.3 mg/dL Final     Comment:     Results confirmed by repeat testing.     Glom Filt Rate, Est   Date/Time Value Ref Range Status   02/15/2023 03:59 AM 7 (L) >60 mL/min/1.7 Final     Comment:     Estimated GFR derived from the MDRD Study equation can be used in patients who are in the hospital.  However, it is important to pay attention to potential inaccuracies due to the non-steady state of serum creatinine, co-morbidities that cause malnutrition, and the use of medications that interfere with the measurement of serum creatinine.    The estimated GFR is only accurate for patients greater than 18 years of age.     Alkaline Phosphatase   Date/Time Value Ref Range Status   09/23/2024 09:22  (H) 46 - 116 U/L Final     AST(SGOT)   Date/Time Value Ref Range Status   09/23/2024 09:22 AM 63 (H) 15 - 37 U/L Final     ALT(SGPT)   Date/Time Value Ref Range Status   09/23/2024 09:22  (H) 12 - 78 U/L Final     Total Bilirubin   Date/Time Value Ref Range Status   09/23/2024 09:22 AM 0.4 0.2 - 1.0 mg/dL Final      No results found for: \"CPKTOTAL\"   Recent Labs     09/23/24  0922   ASTSGOT 63*   ALTSGPT 108*   TBILIRUBIN 0.4   ALKPHOSPHAT 173*   INR 2.38     No results found for: \"BLOODCULTU\", \"BLDCULT\", \"BCHOLD\"    No results found for: \"BLOODCULTU\", \"BLDCULT\", " "\"BCHOLD\"       CULTURE TISSUE W/ GRM STAIN  Order: [id number]   Status: Final result       Visible to patient: Yes (not seen)       Next appt: 08/07/2024 at 09:30 AM in Infectious Diseases (Patrick Hyde, A.P.R.N.)    Specimen Information: Tissue   0 Result Notes      Component 1 mo ago   Significant Indicator POS Positive (POS)   Source TISS   Site aortic valve   Culture Result - Abnormal    Gram Stain Result  Abnormal   * CORRECTED REPORT **  Correct result:  Rare WBCs.  Moderate Gram positive cocci.  Erroneous result:  Rare WBCs.  No organisms seen.    Culture Result  Abnormal   Enterococcus faecalis  Moderate growth    Resulting Agency M        Susceptibility     Enterococcus faecalis     WAQAR     Ampicillin <=2 mcg/mL Sensitive     Daptomycin 2 mcg/mL Sensitive     Gent Synergy <=500 mcg/mL Sensitive     Linezolid 2 mcg/mL Sensitive     Penicillin 2 mcg/mL Sensitive     Vancomycin 1 mcg/mL Sensitive                  Specimen Collected: 06/30/24 10:25 AM Last Resulted: 07/03/24  9:33 AM      CULTURE WOUND W/ GRAM STAIN  Order: [id number]   Status: Final result       Visible to patient: Yes (not seen)       Next appt: 08/07/2024 at 09:30 AM in Infectious Diseases (Patrick Hyde, A.P.R.N.)    Specimen Information: Wound   0 Result Notes      Component 1 mo ago   Significant Indicator POS Positive (POS)   Source WND   Site L forearm fistula graft   Culture Result - Abnormal    Gram Stain Result Many WBCs.  No organisms seen.   Culture Result  Abnormal   Enterococcus faecalis  Moderate growth    Resulting Agency M        Susceptibility     Enterococcus faecalis     WAQAR     Ampicillin <=2 mcg/mL Sensitive     Daptomycin 2 mcg/mL Sensitive     Gent Synergy <=500 mcg/mL Sensitive     Linezolid 2 mcg/mL Sensitive     Penicillin 2 mcg/mL Sensitive     Tetracycline <=4 mcg/mL Sensitive     Vancomycin 1 mcg/mL Sensitive                  Specimen Collected: 06/25/24  2:00 PM Last Resulted: 06/28/24  9:24 AM      BLOOD " CULTURE  Order: [id number]   Status: Final result       Visible to patient: Yes (not seen)       Next appt: 08/07/2024 at 09:30 AM in Infectious Diseases (MICHAEL PorterRMAZIN)    Specimen Information: Peripheral; Blood   0 Result Notes      Component 1 mo ago   Significant Indicator POS Positive (POS)   Source BLD   Site PERIPHERAL   Culture Result Growth detected by Bactec instrument. 06/25/2024  00:21 Abnormal    Culture Result Enterococcus faecalis Abnormal    Resulting Agency M        Susceptibility     Enterococcus faecalis     WAQAR     Ampicillin <=2 mcg/mL Sensitive     Daptomycin 2 mcg/mL Sensitive     Gent Synergy <=500 mcg/mL Sensitive     Linezolid 2 mcg/mL Sensitive     Penicillin 2 mcg/mL Sensitive     Vancomycin 1 mcg/mL Sensitive                  Narrative  Performed by: MARIAN  CALL  [address]  [address] tel. [phone],  CALLED  183 tel. [phone] 06/25/2024, 17:37, Voalted carlos alberto SAINI  Right Hand      Specimen Collected: 06/24/24 12:31 PM Last Resulted: 06/27/24 11:21 AM        Impression/Assessment      1. End stage renal disease (HCC)        2. Infection of prosthetic vascular graft, subsequent encounter  amoxicillin (AMOXIL) 500 MG Cap      3. Heart valve vegetation        4. S/P AVR (aortic valve replacement)        5. Enterococcus faecalis infection        6. Bacteremia        7. Transaminitis  Comp Metabolic Panel        Brian Jasmine is a 68 y.o.  admitted 6/24/2024. Pt has a past medical history of PAD with femoral-popliteal bypass, ESRD on dialysis via left arm graft, A-fib on Coumadin and CAD with stents, he presented complaining of fevers, pain, redness and swelling in his right arm graft site. OR with vascular surgery on 6/25 with resection of the infected left arm dialysis graft pseudoaneurysm,  left greater saphenous vein harvest, reconstruction of left brachial artery, partial removal of the left arm dialysis graft. Left forearm wound culture +E faecalis, amp sensitive. Blood cultures on 6/24 +E  faecalis, amp sensitive. RENEE Aortic valve infective endocarditis, large lobulated mobile mass 1.7 x 1.1 cm with severe aortic insufficiency. Repeat blood culture from 6/27, 6/29, 6/30 also positive. 6/30, patient underwent aortic valve replacement and left atrial appendage resection and ligation. Aortic valve tissue positive for E faecalis. Repeat blood cultures cleared on 7/1 and tunneled catheter placed by IR for dialysis on 7/5. Repeat echo to check EF for reduced perfusion, result EF normal, RV dilated, grade 1 diastolic. S/p thrombectomy and stent at R fem-pop bypass by Dr. Blanchard 7/10. On discharge, 6 weeks of IV daptomycin 800 mg - 800 mg - 1200 mg with his dialysis sessions on M - W - F, end date 8/11/24.  After 8/11/2024 transition to p.o. amoxicillin 500 mg twice daily, for 6 months to possibly 1 year.  Unable to confirm if vascular graft was synthetic versus biologic      9/24/24-patient following up while on a 6-month course of p.o. amoxicillin 500 mg twice daily.  Patient reports that he is no longer experiencing itching.  Most likely this was due to his ESRD.  He is tolerating Augmentin with no complaints of side effects.  Today he denies any nausea, vomiting, fever, chills, constipation or diarrhea.  Patient is following up with vascular surgery and will confirm if vascular graft was synthetic versus biologic.  Most recent labs reviewed from 9/23/2024, WBC 8.2, stable anemia slowly improving 13.0/41.4, neutrophils WNL, CMP with chronic and stable elevated creatinine 7.2 decreased GFR 8, consistent with ESRD.  Sudden transaminitis AST 63 .  Denies EtOH abuse.  States he has not had alcohol in 8 years.  Only changes in medication has been with his Coumadin adjustments. Patient is on multiple medications that are process by the liver.  Previously after starting amoxicillin repeat labs did not show transaminitis after being on the medication for 3 weeks.  Low threshold at this time that  amoxicillin is causing this elevation is majority of the drug is excreted through urine.  Typically latency for liver injury would occur a few days to 2 weeks after starting antibiotic but repeat labs were completed 3 weeks after.  Repeat CMP ordered for close monitoring.  We discussed continuing with amoxicillin for up to 1 year as this would give us the greatest chances of clearing his infection.  6 months would be minimum length of treatment.  Patient would prefer to stay on the antibiotic for 1 year.  Medication refilled    PLAN:   - Continue p.o. amoxicillin 500 mg twice daily for 1 year 6/25/25).   - Repeat labs CBC/CMP once a month, standing order, 6 count.  Patient to get labs completed during dialysis and faxed to [facility]  - Repeat lab CMP in the next 2-3 days for monitoring of transaminitis. Low threshold at this time that amoxicillin is causing this elevation is majority of the drug is excreted through urine.  Typically latency for liver injury would occur a few days to 2 weeks after starting antibiotic but repeat labs were completed 3 weeks after.   - Medication education provided and S/S of side effects discussed   - Recommend routine follow up with vascular surgery.   - Continue po probiotic  - Education provided on S/S of infection and when to report to ER/ call 911     Return visit: 2 weeks . Follow up with primary care physician for chronic medical problems      I have performed a physical exam,  updated ROS and plan today. I have reviewed previous images, labs, and provider notes.      SIERRA Porter.    All Patients should seek medical re-evaluation or report to the ER for new, increasing or worsening symptoms. In some circumstances medical conditions can change from the initial evaluation and may require emergent medical re-evaluation. This includes but is not limited to chest pain, shortness of breath, atypical abdominal pain, atypical headache, ALOC, fever >101, low blood pressure, high  respiratory rate (above 30), low oxygen saturation (below 90%), acute delirium, abnormal bleeding, inability to tolerate any intake, weakness on one side of the body, any worsened or concerning conditions.    Please note that this dictation was created using voice recognition software. I have worked with technical experts from [facility] to optimize the interface.  I have made every reasonable attempt to correct obvious errors, but there may be errors of grammar and possibly content that I did not discover before finalizing the note.

## 2024-09-24 ENCOUNTER — OFFICE VISIT (OUTPATIENT)
Dept: INFECTIOUS DISEASES | Facility: MEDICAL CENTER | Age: 69
End: 2024-09-24
Attending: NURSE PRACTITIONER
Payer: MEDICARE

## 2024-09-24 VITALS
WEIGHT: 185 LBS | HEIGHT: 73 IN | HEART RATE: 87 BPM | BODY MASS INDEX: 24.52 KG/M2 | RESPIRATION RATE: 20 BRPM | DIASTOLIC BLOOD PRESSURE: 90 MMHG | OXYGEN SATURATION: 97 % | TEMPERATURE: 98.4 F | SYSTOLIC BLOOD PRESSURE: 132 MMHG

## 2024-09-24 DIAGNOSIS — T82.7XXD INFECTION OF PROSTHETIC VASCULAR GRAFT, SUBSEQUENT ENCOUNTER: ICD-10-CM

## 2024-09-24 DIAGNOSIS — Z95.2 S/P AVR (AORTIC VALVE REPLACEMENT): ICD-10-CM

## 2024-09-24 DIAGNOSIS — N18.6 END STAGE RENAL DISEASE (HCC): ICD-10-CM

## 2024-09-24 DIAGNOSIS — I33.0 HEART VALVE VEGETATION: ICD-10-CM

## 2024-09-24 DIAGNOSIS — R78.81 BACTEREMIA: ICD-10-CM

## 2024-09-24 DIAGNOSIS — A49.8 ENTEROCOCCUS FAECALIS INFECTION: ICD-10-CM

## 2024-09-24 DIAGNOSIS — R74.01 TRANSAMINITIS: ICD-10-CM

## 2024-09-24 PROCEDURE — 3080F DIAST BP >= 90 MM HG: CPT | Performed by: NURSE PRACTITIONER

## 2024-09-24 PROCEDURE — 99212 OFFICE O/P EST SF 10 MIN: CPT | Performed by: NURSE PRACTITIONER

## 2024-09-24 PROCEDURE — 3075F SYST BP GE 130 - 139MM HG: CPT | Performed by: NURSE PRACTITIONER

## 2024-09-24 PROCEDURE — 99214 OFFICE O/P EST MOD 30 MIN: CPT | Performed by: NURSE PRACTITIONER

## 2024-09-24 RX ORDER — AMOXICILLIN 500 MG/1
500 CAPSULE ORAL 2 TIMES DAILY
Qty: 180 CAPSULE | Refills: 1 | Status: SHIPPED | OUTPATIENT
Start: 2024-09-24

## 2024-09-24 ASSESSMENT — FIBROSIS 4 INDEX: FIB4 SCORE: 1.94

## 2024-09-24 NOTE — PROGRESS NOTES
Critical lab results of elevated creatinine 7.2 and decreased GFR 8.  Consistent with ESRD.  Patient on dialysis.  Continue current antibiotic course.

## 2024-10-08 ENCOUNTER — TELEPHONE (OUTPATIENT)
Dept: WOUND CARE | Facility: MEDICAL CENTER | Age: 69
End: 2024-10-08
Payer: MEDICARE

## 2024-10-08 ENCOUNTER — TELEPHONE (OUTPATIENT)
Dept: INFECTIOUS DISEASES | Facility: MEDICAL CENTER | Age: 69
End: 2024-10-08
Payer: MEDICARE

## 2024-10-08 DIAGNOSIS — T82.7XXD INFECTION OF PROSTHETIC VASCULAR GRAFT, SUBSEQUENT ENCOUNTER: ICD-10-CM

## 2024-10-09 ENCOUNTER — TELEPHONE (OUTPATIENT)
Dept: INFECTIOUS DISEASES | Facility: MEDICAL CENTER | Age: 69
End: 2024-10-09
Payer: MEDICARE

## 2025-02-04 NOTE — PROGRESS NOTES
Chief Complaint   Patient presents with    New Patient   Patient states his stomach has been hurting for a while. He has a history GI issue.Sometimes feel constipated and force to poop,sometimes blood is in his stool. He stated his pain is where his belly bottom is. Sometimes he feels very nauseous. The pain occurs no matter if he eats of drink.  Vitals:    02/04/25 0957   BP: 114/66   Pulse: 84   Resp: 20   SpO2: 98%         Vascular Surgery.    Called by Dr. Little for discoloration and cooler right foot compared to left as well as CT finding of occlusion of the right fem-pop bypass graft.    These findings are CHRONIC.  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  CT today showed identical findings.    Patient has chronic discoloration of the right toes and his right foot is cooler than the left since the bypass graft in the left leg is patent.  Patient has no rest pain.  Nothing needs to be done urgently for the right leg since again this is a chronic condition.    No reason why patient cannot participate in physical therapy.    Patient is on Coudamin for his cardiac issue.  INR is subtherapeutic.  Would consider bridging with heparin gtt.    Discussed with Dr. Little.    Vascular service will sign off.  Patient may follow up with [facility] vascular surgery or with Dr. Colbert his established vascular surgeon after discharged.

## (undated) DEVICE — VESSELOOP MAXI BLUE STERILE- SURG-I-LOOP (10EA/BX)

## (undated) DEVICE — SODIUM CHL. INJ. 0.9% 500ML (24EA/CA 50CA/PF)

## (undated) DEVICE — LACTATED RINGERS INJ 1000 ML - (14EA/CA 60CA/PF)

## (undated) DEVICE — LEAD PACING TEMP MYO - (12/BX)

## (undated) DEVICE — Device

## (undated) DEVICE — DERMABOND ADVANCED - (12EA/BX)

## (undated) DEVICE — TUBING CLEARLINK DUO-VENT - C-FLO (48EA/CA)

## (undated) DEVICE — SUTURE 6-0 PROLENE BV-1 D/A 30 (36PK/BX)"

## (undated) DEVICE — STAPLER SKIN DISP - (6/BX 10BX/CA) VISISTAT

## (undated) DEVICE — BITE BLOCK ADULT 60FR (100EA/CA)

## (undated) DEVICE — SYSTEM CHEST DRAIN ADULT/PEDS W/AUTO TRANSFUSION CAPABILITY SAHARA (6EA/CA)

## (undated) DEVICE — GLOVE SZ 8 BIOGEL PI MICRO - PF LF (50PR/BX)

## (undated) DEVICE — KIT ANESTHESIA W/CIRCUIT & 3/LT BAG W/FILTER (20EA/CA)

## (undated) DEVICE — SUTURE 2-0 ETHIBOND V-5 30 (12EA/BX)"

## (undated) DEVICE — SYRINGE SAFETY 5 ML 18 GA X 1-1/2 BLUNT LL (100/BX 4BX/CA)

## (undated) DEVICE — DEVICE KIT COR-KNOT COMBO2 DEVICES & 12 KNOTS PER KIT (6KT/CA)

## (undated) DEVICE — PEN SKIN MARKER W/RULER - (50EA/BX)

## (undated) DEVICE — KIT TOURNIQUET DLP (40EA/PK)

## (undated) DEVICE — SYRINGE DISP. 50CC LS - (40/BX)

## (undated) DEVICE — CLIP LG INTNL HRZN TI ESCP LGT - (20/BX)

## (undated) DEVICE — SYS DLV COST CLS RM TEMP - INJECTATE (CO-SET II) (10EA/CA)

## (undated) DEVICE — FIBRILLAR SURGICEL 4X4 - 10/CA

## (undated) DEVICE — SUTURE CV

## (undated) DEVICE — RESERVOIR SUCTION 100 CC - SILICONE (20EA/CA)

## (undated) DEVICE — SURGIFOAM (12X7) - (12EA/CA)

## (undated) DEVICE — SLEEVE, VASO, THIGH, MED

## (undated) DEVICE — SET EXTENSION WITH 2 PORTS (48EA/CA) ***PART #2C8610 IS A SUBSTITUTE*****

## (undated) DEVICE — GOWN WARMING STANDARD FLEX - (30/CA)

## (undated) DEVICE — DECANTER FLD BLS - (50/CA)

## (undated) DEVICE — SUTURE 2-0 VICRYL PLUS CT-1 36 (36PK/BX)"

## (undated) DEVICE — GLOVE BIOGEL INDICATOR SZ 9 SURGICAL PF LTX - (160/CA)

## (undated) DEVICE — GLOVE BIOGEL SZ 7 SURGICAL PF LTX - (50PR/BX 4BX/CA)

## (undated) DEVICE — MASK WITH FACE SHIELD (25/BX 4BX/CA)

## (undated) DEVICE — KIT RADIAL ARTERY 20GA W/MAX BARRIER AND BIOPATCH  (5EA/CA) #10740 IS FOR THE SET RADIAL ARTERIAL

## (undated) DEVICE — INSERT STEALTH #3 - (10/BX)

## (undated) DEVICE — MASK ANESTHESIA ADULT  - (100/CA)

## (undated) DEVICE — SUTURE 4-0 30CM STRATAFIX SPIRAL PS-2 (12EA/BX)

## (undated) DEVICE — TUBE E-T HI-LO CUFF 8.0MM (10EA/PK)

## (undated) DEVICE — TUBE CONNECTING SUCTION - CLEAR PLASTIC STERILE 72 IN (50EA/CA)

## (undated) DEVICE — SODIUM CHL IRRIGATION 0.9% 1000ML (12EA/CA)

## (undated) DEVICE — INSTRUMENT JAWCOVER SUTURE - BOOTS (5PK/BX)

## (undated) DEVICE — SUCTION INSTRUMENT YANKAUER OPEN TIP W/O VENT (50EA/CA)

## (undated) DEVICE — TRANSDUCER BIFURCATED MONITORING KIT (10EA/CA)

## (undated) DEVICE — SUCTION TIP STRAIGHT ARGYLE - 50EA/CA

## (undated) DEVICE — TUBE SUCTION YANKAUER  1/4 X 6FT (20EA/CA)"

## (undated) DEVICE — BAG RESUSCITATION DISPOSABLE - WITH MASK (10 EA/CA)

## (undated) DEVICE — SPONGE XRAY 8X4 STERL. 12PL - (10EA/TY 80TY/CA)

## (undated) DEVICE — SYRINGE 20 ML LL (50EA/BX 4BX/CA)

## (undated) DEVICE — GOWN SURGEONS X-LARGE - DISP. (30/CA)

## (undated) DEVICE — DRAIN PENROSE STERILE 1/4 X - 18 IN  (25EA/BX)

## (undated) DEVICE — SUTURE 6-0 PROLENE RB-2 D/A 30 (36PK/BX)"

## (undated) DEVICE — GLOVE BIOGEL SZ 6.5 SURGICAL PF LTX (50PR/BX 4BX/CA)

## (undated) DEVICE — ELECTRODE DUAL RETURN W/ CORD - (50/PK)

## (undated) DEVICE — SOD. CHL. INJ. 0.9% 1000 ML - (14EA/CA 60CA/PF)

## (undated) DEVICE — GLOVE SIZE 6.5  SURGEON ACCELERATOR FREE GREEN (50PR/BX)

## (undated) DEVICE — SET BIFURCATED BLOOD - (48EA/CS)

## (undated) DEVICE — KIT SURGIFLO W/OUT THROMBIN - (6EA/CA)

## (undated) DEVICE — QUICKLOADS COR-KNOT TITANIUM - (12/BX)

## (undated) DEVICE — SET FLUID WARMING STANDARD FLOW - (10/CA)

## (undated) DEVICE — SPONGE GAUZESTER 4 X 4 4PLY - (128PK/CA)

## (undated) DEVICE — SUCTION INSTRUMENT YANKAUER BULBOUS TIP W/O VENT (50EA/CA)

## (undated) DEVICE — SUTURE 3-0 ETHILON FS-1 - (36/BX) 30 INCH

## (undated) DEVICE — TOWELS CLOTH SURGICAL - (4/PK 20PK/CA)

## (undated) DEVICE — GUIDEWIRE 25CM 0.35 260CM ANGLED GLIDEWIRE ADVANTAGE (1/BX)

## (undated) DEVICE — DEVICE INFLATION DIGITAL BLUE DIAMOND (5EA/BX)

## (undated) DEVICE — BALLOON 6.0X40 75CM MUSTANG

## (undated) DEVICE — SPONGE GAUZE NON-STERILE 4X4 - (2000/CA 10PK/CA)

## (undated) DEVICE — SUTURE 6-0 PROLENE C-1 D/A 24 (36PK/BX)"

## (undated) DEVICE — CONTAINER SPECIMEN BAG OR - STERILE 4 OZ W/LID (100EA/CA)

## (undated) DEVICE — PACK AV FISTULA (4EA/CA)

## (undated) DEVICE — PTFE PLED STER - (250/CA)

## (undated) DEVICE — PAD PREP 24 X 48 CUFFED - (100/CA)

## (undated) DEVICE — KIT INTROPERCUTANEOUS SHEATH - 8.5 FR W/MAX BARRIER AND BIOPATCH  (5/CA)

## (undated) DEVICE — WAX BONE ALKYLENE OXIDE HEMOSTASIS OSTENE 3.5GM (10EA/CA)

## (undated) DEVICE — WIRE STEEL 5-0 B&S 20 OHS - 5/PK 12PK/BX ITEM. D5329 OR D6625 CAN BE USED AS A SUB

## (undated) DEVICE — ELECTRODE 850 FOAM ADHESIVE - HYDROGEL RADIOTRNSPRNT (50/PK)

## (undated) DEVICE — QUICKLOADS COR-KNOT TITANIUM - (6EA/PK 12PK/BX)

## (undated) DEVICE — SUTURE 5 SURGICAL STEEL V-40 - (12/BX) CCS CURRENT

## (undated) DEVICE — GLOVE BIOGEL INDICATOR SZ 7.5 SURGICAL PF LTX - (50PR/BX 4BX/CA)

## (undated) DEVICE — SPRING BULLDOG 1/2 FORCE BLUE - (10/BX)

## (undated) DEVICE — DRAPE LARGE 3 QUARTER - (20/CA)

## (undated) DEVICE — BLADE BEAVER 6900 MINI SHARP ALL AROUND (20/CA)

## (undated) DEVICE — BLADE SURGICAL #15 - (50/BX 3BX/CA)

## (undated) DEVICE — SUTURE 3-0 SILK SH C/R 18 IN - (12/BX)

## (undated) DEVICE — PUNCH 4MM SHRP CNCL TIP DL - (6/BX)

## (undated) DEVICE — GLOVE BIOGEL PI INDICATOR SZ 8.0 SURGICAL PF LF -(50/BX 4BX/CA)

## (undated) DEVICE — PACK VEIN - (19/CA)

## (undated) DEVICE — SYRINGE NON SAFETY 3 CC 21 GA X 1 1/2 IN (100/BX 8BX/CA)

## (undated) DEVICE — MICRODRIP PRIMARY VENTED 60 (48EA/CA) THIS WAS PART #2C8428 WHICH WAS DISCONTINUED

## (undated) DEVICE — GLOVE BIOGEL SZ 8.5 SURGICAL PF LTX - (50PR/BX 4BX/CA)

## (undated) DEVICE — SET LEADWIRE 5 LEAD BEDSIDE DISPOSABLE ECG (1SET OF 5/EA)

## (undated) DEVICE — SUTURE 3-0 PROLENE SH 36 (36PK/BX)"

## (undated) DEVICE — GOWN SURGEONS LARGE - (32/CA)

## (undated) DEVICE — TUBE E-T HI-LO CUFF 8.5MM (10EA/PK)

## (undated) DEVICE — SYRINGE SAFETY 3 ML 18 GA X 1 1/2 BLUNT LL (100/BX 8BX/CA)

## (undated) DEVICE — SUTURE 0 VICRYL PLUS CT-1 - 36 INCH (36/BX)

## (undated) DEVICE — GLOVE BIOGEL SZ 8 SURGICAL PF LTX - (50PR/BX 4BX/CA)

## (undated) DEVICE — SYRINGE STERILE 10 ML LL (200/BX)

## (undated) DEVICE — D-5-W INJ. 500 ML - (24EA/CA)

## (undated) DEVICE — SENSOR CEREBRAL AND SOMATIC MONITORING (20/CA)

## (undated) DEVICE — PROTECTOR ULNA NERVE - (36PR/CA)

## (undated) DEVICE — BLADE STERNUM SAW SURGICAL 32.0 X 6.4 MM STERILE (1/EA)

## (undated) DEVICE — FORCEP RADIAL JAW 4 STANDARD CAPACITY W/NEEDLE 240CM (40EA/BX)

## (undated) DEVICE — SUTURE 4-0 PROLENE V-7 D/A (36PK/BX)

## (undated) DEVICE — GLOVE, LITE (PAIR)

## (undated) DEVICE — TRAY SURESTEP FOLEY TEMP SENSING 16FR SNAP SECURE(10EA/CA) ORDER  #18764 FOR TEMP FOLEY ONLY

## (undated) DEVICE — SET TUBING PNEUMOCLEAR HIGH FLOW SMOKE EVACUATION (10EA/BX)

## (undated) DEVICE — BASIN EMESIS DISP. - (250/CA)

## (undated) DEVICE — CANISTER SUCTION 3000ML MECHANICAL FILTER AUTO SHUTOFF MEDI-VAC NONSTERILE LF DISP  (40EA/CA)

## (undated) DEVICE — SUTURE GENERAL

## (undated) DEVICE — DRAIN J-P FLAT 7MM X 20CM - (10/CA)

## (undated) DEVICE — SENSOR SPO2 ADULT LNCS ADTX (20/BX) ORDER ITEM #19593

## (undated) DEVICE — TUBE CHEST 32FR. STRAIGHT - (10EA/CA)

## (undated) DEVICE — GLOVE SZ 6.5 BIOGEL PI MICRO - PF LF (50PR/BX)

## (undated) DEVICE — SUTURE 5-0 PROLENE BLUE C-1 HS 1 X 30 (36EA/BX)"

## (undated) DEVICE — CORETEMP DRAPE FORM-FITTED EASY DROPANDGO DRAPE FOR USE ON THE CORETEMP FLUID MANAGEMENT 56IN X 56IN

## (undated) DEVICE — MULTI TORQUE DEVICE DISP (5EA/BX)

## (undated) DEVICE — GUIDEWIRES STARTER (PTFE COATED) J CURVED FIXED CORE 0.035 180CM 10 3 MM J FS

## (undated) DEVICE — ORGANIZER SUTURE GABBAY-FRAT - ER STERILE (3/SET 4ST/BX)

## (undated) DEVICE — CANNULA W/ SUPPLY TUBING O2 - (50/CA)

## (undated) DEVICE — SENSOR OXIMETER ADULT SPO2 RD SET (20EA/BX)

## (undated) DEVICE — KIT  I.V. START (100EA/CA)

## (undated) DEVICE — CHLORAPREP 26 ML APPLICATOR - ORANGE TINT(25/CA)

## (undated) DEVICE — CATHETER THERMALDILUTION SWAN - (5EA/CA)

## (undated) DEVICE — TRAY MULTI-LUMEN 7FR PRESSURE W/MAX BARRIER AND BIOPATCH - (5/CA)

## (undated) DEVICE — SUTURE 4-0 PROLENE RB-1 D/A 36 (36PK/BX)"

## (undated) DEVICE — GLOVE BIOGEL PI INDICATOR SZ 6.5 SURGICAL PF LF - (50/BX 4BX/CA)

## (undated) DEVICE — GUIDEWIRE HYDROPHILIC COATED STRAIGHT TIP GLIDEWIRE .035 X 180CM (5EA/BX)"

## (undated) DEVICE — SUTURE OHS

## (undated) DEVICE — CANISTER SUCTION RIGID RED 1500CC (40EA/CA)

## (undated) DEVICE — PAD LAP STERILE 18 X 18 - (5/PK 40PK/CA)

## (undated) DEVICE — GLIDEWIRE LNG TAPER ANGLE.035 .035X150CM (5EA/BX)

## (undated) DEVICE — KIT ENDOHARVEST SYSTEM 8 - MUST ORDER 5 AT A TIME

## (undated) DEVICE — SHEATH RO 8F 10CM (10EA/BX)

## (undated) DEVICE — SUTURE 3-0 VICRYL PLUS SH - 8X 18 INCH (12/BX)

## (undated) DEVICE — PACK CV DRAPING/BASIN 2PART - (1/CA)

## (undated) DEVICE — STOPCOCK MALE 4-WAY - (50/CA)

## (undated) DEVICE — COVER LIGHT HANDLE ALC PLUS DISP (18EA/BX)

## (undated) DEVICE — GLOVE CHEMO/AUTOPSY LARGE - POWDER FREE (50/BX)

## (undated) DEVICE — SITE INJ 7/8IN IV M LL ADPR - (100/CA) THIS IS A CUSTOM ITEM AND HAS A 30 DAY LEAD TIME

## (undated) DEVICE — TUBE CHEST 32FR. RIGHT ANGLED (10EA/CA)

## (undated) DEVICE — ADHESIVE DERMABOND HVD MINI (12EA/BX)

## (undated) DEVICE — SOLUTION PREP PVP IODINE 3/4 OZ POUCH PACKET CONTAINER STERILE LATEX FREE

## (undated) DEVICE — GOWN SURGICAL XX-LARGE - (28EA/CA) SIRUS NON REINFORCED

## (undated) DEVICE — CATHETER IV SAFETY 24 GA X 3/4 (50EA/BX)

## (undated) DEVICE — SUTURE 3-0 SILK 12 X 18 IN - (36/BX)

## (undated) DEVICE — INSERT STEALTH #5 - (10/BX)

## (undated) DEVICE — BLOWER/MISTER (5EA/PK)

## (undated) DEVICE — SUTURE 7-0 PROLENE 24BV175-6 - EP8735H (36/BX)"

## (undated) DEVICE — SUTURE 4-0 MONOCRYL PLUS PS-1 - 27 INCH (36/BX)

## (undated) DEVICE — SHEATH RO 7F 25CM (10EA/BX)

## (undated) DEVICE — TUBING INSUFFLATION - (10/BX)

## (undated) DEVICE — SUTURE 3-0 VICRYL PLUS SH - 27 INCH (36/BX)